# Patient Record
Sex: FEMALE | Race: WHITE | NOT HISPANIC OR LATINO | Employment: UNEMPLOYED | URBAN - METROPOLITAN AREA
[De-identification: names, ages, dates, MRNs, and addresses within clinical notes are randomized per-mention and may not be internally consistent; named-entity substitution may affect disease eponyms.]

---

## 2020-06-27 ENCOUNTER — OFFICE VISIT (OUTPATIENT)
Dept: URGENT CARE | Facility: CLINIC | Age: 60
End: 2020-06-27
Payer: MEDICAID

## 2020-06-27 VITALS
WEIGHT: 234 LBS | BODY MASS INDEX: 36.73 KG/M2 | HEIGHT: 67 IN | OXYGEN SATURATION: 96 % | SYSTOLIC BLOOD PRESSURE: 148 MMHG | DIASTOLIC BLOOD PRESSURE: 74 MMHG | TEMPERATURE: 96.8 F | HEART RATE: 85 BPM

## 2020-06-27 DIAGNOSIS — L08.9 TOE INFECTION: ICD-10-CM

## 2020-06-27 DIAGNOSIS — S91.209A AVULSION OF TOENAIL, INITIAL ENCOUNTER: ICD-10-CM

## 2020-06-27 PROCEDURE — 99203 OFFICE O/P NEW LOW 30 MIN: CPT | Performed by: PHYSICIAN ASSISTANT

## 2020-06-27 RX ORDER — LOSARTAN POTASSIUM AND HYDROCHLOROTHIAZIDE 12.5; 1 MG/1; MG/1
TABLET ORAL
COMMUNITY
Start: 2020-05-09 | End: 2022-08-12

## 2020-06-27 RX ORDER — LOSARTAN POTASSIUM 100 MG/1
TABLET ORAL
COMMUNITY
Start: 2020-06-11 | End: 2022-07-13 | Stop reason: SDUPTHER

## 2020-06-27 RX ORDER — CEPHALEXIN 500 MG/1
500 CAPSULE ORAL 4 TIMES DAILY
Qty: 28 CAP | Refills: 0 | Status: SHIPPED | OUTPATIENT
Start: 2020-06-27 | End: 2020-07-04

## 2020-06-27 RX ORDER — CEPHALEXIN 500 MG/1
500 CAPSULE ORAL 4 TIMES DAILY
Qty: 28 CAP | Refills: 0 | Status: SHIPPED | OUTPATIENT
Start: 2020-06-27 | End: 2020-06-27

## 2020-06-27 ASSESSMENT — ENCOUNTER SYMPTOMS
CHILLS: 0
COUGH: 0
JOINT SWELLING: 1
FATIGUE: 0
FEVER: 0
RESPIRATORY NEGATIVE: 1
WEAKNESS: 0
CARDIOVASCULAR NEGATIVE: 1

## 2020-06-28 NOTE — PROGRESS NOTES
"Subjective:      Asha Phan is a 59 y.o. female who presents with Toe Injury (x3 days. Rt big toe injury. Nailbed seperation. Redness, pain to touch.)            Nail avulsion 3 days ago.  Has been cleaning it and soaking it.  However today she noticed redness and pain spreading.  She is concerned about infection.    Toe Injury   This is a new problem. The current episode started in the past 7 days. The problem occurs constantly. The problem has been gradually worsening. Associated symptoms include joint swelling and a rash. Pertinent negatives include no chills, coughing, fatigue, fever or weakness. Nothing aggravates the symptoms. She has tried heat for the symptoms. The treatment provided mild relief.       PMH:  has a past medical history of Thyroid disorder.  MEDS:   Current Outpatient Medications:   •  losartan-hydrochlorothiazide (HYZAAR) 100-12.5 MG per tablet, , Disp: , Rfl:   •  cephALEXin (KEFLEX) 500 MG Cap, Take 1 Cap by mouth 4 times a day for 7 days., Disp: 28 Cap, Rfl: 0  •  losartan (COZAAR) 100 MG Tab, LOSARTAN POTASSIUM 100 MG TABS, Disp: , Rfl:   •  oxycodone-acetaminophen (PERCOCET) 5-325 MG TABS, Take 1-2 Tabs by mouth every 6 hours as needed (pain). (Patient not taking: Reported on 6/27/2020), Disp: 20 Each, Rfl: 0  •  albuterol (VENTOLIN OR PROVENTIL) 108 (90 BASE) MCG/ACT AERS, Inhale 2 Puffs by mouth every 6 hours as needed for Shortness of Breath. (Patient not taking: Reported on 6/27/2020), Disp: 8.5 g, Rfl: 3  ALLERGIES:   Allergies   Allergen Reactions   • Demerol Rash     \"was told this during surgery\"  Other reaction(s): rash     SURGHX:   Past Surgical History:   Procedure Laterality Date   • OTHER      mastoid bone     SOCHX:  reports that she has never smoked. She has never used smokeless tobacco. She reports current alcohol use. She reports that she does not use drugs.  FH: family history is not on file.      Review of Systems   Constitutional: Negative for chills, " "fatigue and fever.   Respiratory: Negative.  Negative for cough.    Cardiovascular: Negative.    Musculoskeletal: Positive for joint swelling.   Skin: Positive for rash.        Nail avulsion   Neurological: Negative for weakness.       Medications, Allergies, and current problem list reviewed today in Epic     Objective:     /74   Pulse 85   Temp 36 °C (96.8 °F)   Ht 1.702 m (5' 7\")   Wt 106.1 kg (234 lb)   SpO2 96%   BMI 36.65 kg/m²      Physical Exam  Vitals signs and nursing note reviewed.   Constitutional:       General: She is not in acute distress.     Appearance: She is well-developed. She is not diaphoretic.   HENT:      Head: Normocephalic and atraumatic.   Eyes:      Conjunctiva/sclera: Conjunctivae normal.   Neck:      Musculoskeletal: Normal range of motion and neck supple.   Cardiovascular:      Rate and Rhythm: Normal rate and regular rhythm.      Heart sounds: Normal heart sounds.   Pulmonary:      Effort: Pulmonary effort is normal. No respiratory distress.      Breath sounds: Normal breath sounds. No wheezing.   Musculoskeletal:      Right foot: Normal range of motion. Tenderness present. No bony tenderness, swelling or deformity.        Feet:       Comments: Right great toenail avulsion.  No bony tenderness.  There is redness, edema and tenderness is erythema, edema and tenderness spreading proximally.  Range of motion normal   Skin:     General: Skin is warm and dry.   Neurological:      Mental Status: She is alert and oriented to person, place, and time.   Psychiatric:         Behavior: Behavior normal.         Thought Content: Thought content normal.         Judgment: Judgment normal.                 Assessment/Plan:     1. Toe infection  cephALEXin (KEFLEX) 500 MG Cap    DISCONTINUED: cephALEXin (KEFLEX) 500 MG Cap   2. Avulsion of toenail, initial encounter  cephALEXin (KEFLEX) 500 MG Cap    DISCONTINUED: cephALEXin (KEFLEX) 500 MG Cap     Right great toenail avulsion.  Nail is " still attached at the cuticle.  No removal indicated.  No bony tenderness or signs of musculoskeletal injury.  There is redness and tenderness spreading proximally concerning for early infection.  OTC meds and conservative measures as discussed    Return to clinic or go to ED if symptoms worsen or persist. Indications for ED discussed at length. Patient voices understanding. Follow-up with your primary care provider in 3-5 days. Red flags discussed. All side effects of medication discussed including allergic response, GI upset, tendon injury, etc.    Please note that this dictation was created using voice recognition software. I have made every reasonable attempt to correct obvious errors, but I expect that there are errors of grammar and possibly content that I did not discover before finalizing the note.

## 2022-03-30 LAB — HBA1C MFR BLD: 6.9 % (ref 0–5.6)

## 2022-04-06 RX ORDER — HYDROCHLOROTHIAZIDE 25 MG/1
TABLET ORAL
COMMUNITY
Start: 2022-02-27 | End: 2022-04-06 | Stop reason: SDUPTHER

## 2022-04-06 RX ORDER — HYDROCHLOROTHIAZIDE 25 MG/1
25 TABLET ORAL DAILY
Qty: 90 TABLET | Refills: 3 | Status: SHIPPED | OUTPATIENT
Start: 2022-04-06 | End: 2022-07-05

## 2022-04-26 ENCOUNTER — OFFICE VISIT (OUTPATIENT)
Dept: MEDICAL GROUP | Facility: CLINIC | Age: 62
End: 2022-04-26
Payer: MEDICAID

## 2022-04-26 VITALS
OXYGEN SATURATION: 90 % | HEART RATE: 93 BPM | HEIGHT: 68 IN | DIASTOLIC BLOOD PRESSURE: 78 MMHG | SYSTOLIC BLOOD PRESSURE: 140 MMHG | BODY MASS INDEX: 42.07 KG/M2 | WEIGHT: 277.6 LBS

## 2022-04-26 DIAGNOSIS — E03.9 HYPOTHYROIDISM, UNSPECIFIED TYPE: ICD-10-CM

## 2022-04-26 DIAGNOSIS — E11.9 TYPE 2 DIABETES MELLITUS WITHOUT COMPLICATION, WITHOUT LONG-TERM CURRENT USE OF INSULIN (HCC): ICD-10-CM

## 2022-04-26 DIAGNOSIS — I10 PRIMARY HYPERTENSION: ICD-10-CM

## 2022-04-26 DIAGNOSIS — F32.A DEPRESSION, UNSPECIFIED DEPRESSION TYPE: ICD-10-CM

## 2022-04-26 DIAGNOSIS — R73.9 HYPERGLYCEMIA: ICD-10-CM

## 2022-04-26 PROCEDURE — 99215 OFFICE O/P EST HI 40 MIN: CPT | Performed by: STUDENT IN AN ORGANIZED HEALTH CARE EDUCATION/TRAINING PROGRAM

## 2022-04-26 RX ORDER — LEVOTHYROXINE SODIUM 0.03 MG/1
TABLET ORAL
COMMUNITY
End: 2022-08-12

## 2022-04-28 PROBLEM — R06.83 SNORING: Status: ACTIVE | Noted: 2019-12-19

## 2022-04-28 PROBLEM — E78.5 DYSLIPIDEMIA: Status: ACTIVE | Noted: 2021-03-04

## 2022-04-28 PROBLEM — E11.9 DIABETES MELLITUS, TYPE 2 (HCC): Status: ACTIVE | Noted: 2019-05-24

## 2022-04-28 PROBLEM — E66.9 OBESITY: Status: ACTIVE | Noted: 2019-12-19

## 2022-04-28 PROBLEM — C50.919 MALIGNANT NEOPLASM OF BREAST (HCC): Status: ACTIVE | Noted: 2019-03-22

## 2022-04-28 PROBLEM — I10 HYPERTENSION: Status: ACTIVE | Noted: 2018-08-17

## 2022-04-28 PROBLEM — F32.A DEPRESSION: Status: ACTIVE | Noted: 2019-04-02

## 2022-04-28 PROBLEM — Z63.9 FAMILY TENSION: Status: ACTIVE | Noted: 2019-03-22

## 2022-04-28 PROBLEM — E03.9 HYPOTHYROIDISM: Status: ACTIVE | Noted: 2021-03-04

## 2022-04-28 RX ORDER — ASPIRIN 81 MG/1
81 TABLET, CHEWABLE ORAL DAILY
COMMUNITY

## 2022-04-28 NOTE — ASSESSMENT & PLAN NOTE
Time of visit, information in the patient's chart was not up-to-date.  Regrettably we discussed this lab as a potential indicator of this disease process rather than being diagnostic.  We do plan to repeat an A1c in 3 months.   Patient would benefit from both lifestyle modification as well as pharmacotherapy with an agent such as metformin.  The patient has educated herself on dietary and physical activity recommendations and states good knowledge of appropriate recommendations.  However she notes barriers include family stressors as listed above.

## 2022-04-28 NOTE — ASSESSMENT & PLAN NOTE
Encouraged patient to continue follow-up with her surgeon.  Counseled patient that delaying treatment poses significant risk to her health.

## 2022-04-28 NOTE — ASSESSMENT & PLAN NOTE
Encouraged patient to take levothyroxine daily as prescribed and have TSH rechecked in 4 to 6 weeks.  She is agreeable to this plan.

## 2022-04-28 NOTE — PROGRESS NOTES
"Subjective:     CC: Follow-up lab results, depressed mood, and breast cancer    HPI:   Asha presents today with    Problem   Dyslipidemia   Hypothyroidism    Prescribed levothyroxine 25 mcg daily  Recent TSH mildly elevated at 5.50  Patient reports she had not been taking her medication regularly at the time she had blood collected for this lab.     Obesity   Snoring   Diabetes Mellitus, Type 2 (Hcc)    A1c in March 6.9%     Depression    Patient with history of depressed mood.  She has insight into this and believes much of it is related to her family stress as well as her diagnosis of breast cancer.  She has had passive suicidal ideation, but denies SI today and reports she has never had an intention or plan.  She reports caring for her family members as the major protective factor.  She has had difficulty finding appropriate resources in this community, both for herself and for her daughters who also struggle with mental health issues.  She previously saw a therapist for a single session and did not feel that this was a good fit for her.  However, she is open to seeking out another therapist or counselor which may be a better fit and understands the value in this treatment.  The patient is not interested in pharmacotherapy at this time.     Family Tension   Malignant Neoplasm of Breast (Hcc)    Bilateral breast cancer diagnosed in 2019  Patient has established with a surgeon.  She has delayed surgery due to personal hindrances, particularly family stress at home.  Patient recently had a follow-up visit with her surgeon, Dr. Arteaga, who ordered repeat CT and bone scan.  The patient is hoping to schedule resection in June of this year.     Hypertension    Taking losartan 100 mg and hydrochlorothiazide 25 mg daily  Today's blood pressure 140/78           Objective:     Exam:  /78 (BP Location: Right arm, Patient Position: Sitting, BP Cuff Size: Large adult)   Pulse 93   Ht 1.721 m (5' 7.75\")   Wt (!) " 126 kg (277 lb 9.6 oz)   SpO2 90%   BMI 42.52 kg/m²  Body mass index is 42.52 kg/m².    Physical Exam  Constitutional:       General: She is not in acute distress.     Appearance: Normal appearance.   HENT:      Ears:      Comments: Bilateral auricles with stiff cartilage.  Eyes:      Conjunctiva/sclera: Conjunctivae normal.   Pulmonary:      Effort: Pulmonary effort is normal. No respiratory distress.   Musculoskeletal:         General: No deformity.   Skin:     General: Skin is warm and dry.   Neurological:      General: No focal deficit present.      Mental Status: She is alert.      Motor: No weakness.      Coordination: Coordination normal.   Psychiatric:      Comments: Depressed affect congruent with mood.           Assessment & Plan:     61 y.o. female with the following -     Problem List Items Addressed This Visit     Depression     Encourage patient to schedule visit with our clinical psychologist to learn about other potential resources in the community for her.         Diabetes mellitus, type 2 (HCC)     Time of visit, information in the patient's chart was not up-to-date.  Regrettably we discussed this lab as a potential indicator of this disease process rather than being diagnostic.  We do plan to repeat an A1c in 3 months.   Patient would benefit from both lifestyle modification as well as pharmacotherapy with an agent such as metformin.  The patient has educated herself on dietary and physical activity recommendations and states good knowledge of appropriate recommendations.  However she notes barriers include family stressors as listed above.         Hypertension     Continue antihypertensives  Consider intensifying therapy if home blood pressures persistently above goal.         Hypothyroidism     Encouraged patient to take levothyroxine daily as prescribed and have TSH rechecked in 4 to 6 weeks.  She is agreeable to this plan.         Relevant Medications    levothyroxine (SYNTHROID) 25 MCG Tab     Other Relevant Orders    TSH      Other Visit Diagnoses     Hyperglycemia        Relevant Orders    HEMOGLOBIN A1C          Follow-up in 4 to 6 weeks or sooner as needed    I spent approximately 40 minutes on this encounter, with greater than 50% of that time being face-to-face with patient providing counseling and/or coordination of care.    Viridiana Stephens MD

## 2022-04-28 NOTE — ASSESSMENT & PLAN NOTE
Continue antihypertensives  Consider intensifying therapy if home blood pressures persistently above goal.

## 2022-04-28 NOTE — ASSESSMENT & PLAN NOTE
Encourage patient to schedule visit with our clinical psychologist to learn about other potential resources in the community for her.

## 2022-05-26 ENCOUNTER — APPOINTMENT (OUTPATIENT)
Dept: MEDICAL GROUP | Facility: CLINIC | Age: 62
End: 2022-05-26
Payer: MEDICAID

## 2022-07-13 RX ORDER — LOSARTAN POTASSIUM 100 MG/1
TABLET ORAL
Qty: 90 TABLET | Refills: 3 | Status: SHIPPED | OUTPATIENT
Start: 2022-07-13 | End: 2023-08-21

## 2022-07-14 ENCOUNTER — APPOINTMENT (OUTPATIENT)
Dept: MEDICAL GROUP | Facility: CLINIC | Age: 62
End: 2022-07-14
Payer: MEDICAID

## 2022-08-12 ENCOUNTER — OFFICE VISIT (OUTPATIENT)
Dept: MEDICAL GROUP | Facility: CLINIC | Age: 62
End: 2022-08-12
Payer: MEDICAID

## 2022-08-12 VITALS
HEIGHT: 68 IN | TEMPERATURE: 97.6 F | DIASTOLIC BLOOD PRESSURE: 90 MMHG | OXYGEN SATURATION: 94 % | HEART RATE: 90 BPM | SYSTOLIC BLOOD PRESSURE: 150 MMHG | WEIGHT: 262 LBS | BODY MASS INDEX: 39.71 KG/M2

## 2022-08-12 DIAGNOSIS — E11.9 TYPE 2 DIABETES MELLITUS WITHOUT COMPLICATION, WITHOUT LONG-TERM CURRENT USE OF INSULIN (HCC): ICD-10-CM

## 2022-08-12 DIAGNOSIS — M54.16 LUMBAR RADICULOPATHY: ICD-10-CM

## 2022-08-12 DIAGNOSIS — C50.912 BILATERAL MALIGNANT NEOPLASM OF BREAST IN FEMALE, UNSPECIFIED ESTROGEN RECEPTOR STATUS, UNSPECIFIED SITE OF BREAST (HCC): ICD-10-CM

## 2022-08-12 DIAGNOSIS — C50.911 BILATERAL MALIGNANT NEOPLASM OF BREAST IN FEMALE, UNSPECIFIED ESTROGEN RECEPTOR STATUS, UNSPECIFIED SITE OF BREAST (HCC): ICD-10-CM

## 2022-08-12 DIAGNOSIS — R06.83 SNORING: ICD-10-CM

## 2022-08-12 DIAGNOSIS — I10 PRIMARY HYPERTENSION: ICD-10-CM

## 2022-08-12 DIAGNOSIS — E03.9 HYPOTHYROIDISM, UNSPECIFIED TYPE: ICD-10-CM

## 2022-08-12 PROCEDURE — 99213 OFFICE O/P EST LOW 20 MIN: CPT | Mod: GE | Performed by: STUDENT IN AN ORGANIZED HEALTH CARE EDUCATION/TRAINING PROGRAM

## 2022-08-12 ASSESSMENT — PATIENT HEALTH QUESTIONNAIRE - PHQ9
SUM OF ALL RESPONSES TO PHQ QUESTIONS 1-9: 19
CLINICAL INTERPRETATION OF PHQ2 SCORE: 6
5. POOR APPETITE OR OVEREATING: 2 - MORE THAN HALF THE DAYS

## 2022-08-13 NOTE — ASSESSMENT & PLAN NOTE
Patient states she had bone scan and CT chest abdomen and pelvis performed in April 2022-that was all within normal limits.  However, given patient's longstanding history with untreated breast cancer we will proceed forward with lumbar imaging to rule out metastatic disease.  MRI lumbar spine ordered.

## 2022-08-13 NOTE — ASSESSMENT & PLAN NOTE
Patient states she has discontinued her levothyroxine.  She is requesting a repeat lab slip today-this was provided to patient.

## 2022-08-13 NOTE — PROGRESS NOTES
"Subjective:     CC: back pain    HPI:   Asha presents today with :    Problem   Lumbar Radiculopathy    Patient complains today of lower back pain.  She describes the pain as starting in her left hip and radiating down her left leg into her toes.  It is associated with numbness and tingling.  Not associated with weakness.  She was taking Flexeril that she had previously been prescribed at home.  She has also been taking codeine and methocarbamol that were initially prescribed to her ex-boyfriend.  This has not been associated with urinary or stool incontinence.  Has started to feel general improvement over the last few days.     Hypothyroidism    Prescribed levothyroxine 25 mcg daily  Recent TSH mildly elevated at 5.50  Patient reports she had not been taking her medication regularly at the time she had blood collected for this lab.     Hypertension    Blood pressures at home 130s/80s.         Current Outpatient Medications Ordered in Epic   Medication Sig Dispense Refill    losartan (COZAAR) 100 MG Tab Take 1 tablet by mouth daily 90 Tablet 3    aspirin (ASA) 81 MG Chew Tab chewable tablet Chew 81 mg every day.       No current [facility]-ordered facility-administered medications on file.         ROS:  Gen: no fevers/chills, no changes in weight  Pulm: no sob, no cough  CV: no chest pain, no palpitations  GI: no nausea/vomiting, no diarrhea    Objective:     Exam:  BP (!) 150/90 (BP Location: Right arm, Patient Position: Sitting, BP Cuff Size: Large adult long)   Pulse 90   Temp 36.4 °C (97.6 °F)   Ht 1.727 m (5' 8\")   Wt 119 kg (262 lb)   SpO2 94%   BMI 39.84 kg/m²  Body mass index is 39.84 kg/m².    Gen: Alert and oriented, No apparent distress.  Ext: No clubbing, cyanosis, edema.    Labs: ordered     Assessment & Plan:     62 y.o. female with the following -     Problem List Items Addressed This Visit       Diabetes mellitus, type 2 (HCC)    Relevant Orders    Comp Metabolic Panel    Hypertension    Relevant " Orders    Comp Metabolic Panel    Hypothyroidism     Patient states she has discontinued her levothyroxine.  She is requesting a repeat lab slip today-this was provided to patient.         Relevant Orders    TSH WITH REFLEX TO FT4    Malignant neoplasm of breast (HCC)    Relevant Orders    MR-LUMBAR SPINE-WITH & W/O    CBC WITH DIFFERENTIAL    Snoring    Relevant Orders    Referral to Pulmonary and Sleep Medicine    Lumbar radiculopathy     Patient states she had bone scan and CT chest abdomen and pelvis performed in April 2022-that was all within normal limits.  However, given patient's longstanding history with untreated breast cancer we will proceed forward with lumbar imaging to rule out metastatic disease.  MRI lumbar spine ordered.         Relevant Orders    MR-LUMBAR SPINE-WITH & W/O    Comp Metabolic Panel    CBC WITH DIFFERENTIAL         No follow-ups on file.    Rosalinda Goldberg MD   PGY3

## 2022-08-16 ENCOUNTER — PATIENT MESSAGE (OUTPATIENT)
Dept: MEDICAL GROUP | Facility: CLINIC | Age: 62
End: 2022-08-16
Payer: MEDICAID

## 2022-08-17 DIAGNOSIS — E78.5 DYSLIPIDEMIA: ICD-10-CM

## 2022-08-17 DIAGNOSIS — E03.9 ACQUIRED HYPOTHYROIDISM: ICD-10-CM

## 2022-08-17 DIAGNOSIS — Z13.79 GENETIC SCREENING: ICD-10-CM

## 2022-08-17 DIAGNOSIS — E11.9 TYPE 2 DIABETES MELLITUS WITHOUT COMPLICATION, WITHOUT LONG-TERM CURRENT USE OF INSULIN (HCC): ICD-10-CM

## 2022-08-17 DIAGNOSIS — E66.9 CLASS 2 OBESITY WITH BODY MASS INDEX (BMI) OF 39.0 TO 39.9 IN ADULT, UNSPECIFIED OBESITY TYPE, UNSPECIFIED WHETHER SERIOUS COMORBIDITY PRESENT: ICD-10-CM

## 2022-08-17 DIAGNOSIS — I10 PRIMARY HYPERTENSION: ICD-10-CM

## 2022-08-18 ENCOUNTER — APPOINTMENT (OUTPATIENT)
Dept: MEDICAL GROUP | Facility: CLINIC | Age: 62
End: 2022-08-18
Payer: MEDICAID

## 2022-08-26 ENCOUNTER — OFFICE VISIT (OUTPATIENT)
Dept: MEDICAL GROUP | Facility: CLINIC | Age: 62
End: 2022-08-26
Payer: MEDICAID

## 2022-08-26 VITALS — BODY MASS INDEX: 37.89 KG/M2 | RESPIRATION RATE: 16 BRPM | WEIGHT: 250 LBS | HEIGHT: 68 IN

## 2022-08-26 DIAGNOSIS — C50.912 BILATERAL MALIGNANT NEOPLASM OF BREAST IN FEMALE, UNSPECIFIED ESTROGEN RECEPTOR STATUS, UNSPECIFIED SITE OF BREAST (HCC): ICD-10-CM

## 2022-08-26 DIAGNOSIS — M54.16 LUMBAR RADICULOPATHY: ICD-10-CM

## 2022-08-26 DIAGNOSIS — E03.9 HYPOTHYROIDISM, UNSPECIFIED TYPE: ICD-10-CM

## 2022-08-26 DIAGNOSIS — E11.9 TYPE 2 DIABETES MELLITUS WITHOUT COMPLICATION, WITHOUT LONG-TERM CURRENT USE OF INSULIN (HCC): ICD-10-CM

## 2022-08-26 DIAGNOSIS — H61.899 OTHER SPECIFIED DISORDERS OF EXTERNAL EAR, UNSPECIFIED EAR: ICD-10-CM

## 2022-08-26 DIAGNOSIS — C50.911 BILATERAL MALIGNANT NEOPLASM OF BREAST IN FEMALE, UNSPECIFIED ESTROGEN RECEPTOR STATUS, UNSPECIFIED SITE OF BREAST (HCC): ICD-10-CM

## 2022-08-26 PROCEDURE — 99213 OFFICE O/P EST LOW 20 MIN: CPT | Performed by: FAMILY MEDICINE

## 2022-08-26 RX ORDER — CYCLOBENZAPRINE HCL 5 MG
5-10 TABLET ORAL 3 TIMES DAILY PRN
Qty: 60 TABLET | Refills: 2 | Status: SHIPPED | OUTPATIENT
Start: 2022-08-26 | End: 2023-05-22

## 2022-08-26 NOTE — NON-PROVIDER
"HPI:    Asha is a 61yo female with PMH of T2DM, breast cancer bilaterally, hypertension, hypothyroidism, and depression. She is presenting to clinic today for follow up on Left sided lumbar radiculopathy she was previously seen for 2 weeks ago. She had been trying stretches and CBD oil daily with tylenol as needed to manage pain and feels she is 80% improved since then, but would like a muscle relaxant to help. The pain is still waking her up at night, and she finds it difficult to walk at times.     She manages her diabetes with diet and exercise, is not interested in medications to help at this time. She checks fingerstick glucose at home and was unable to recall the exact numbers but said she is usually \"in the yellow\".     She manages her hypertension with Losartan 100mg and occasional hydrochlorothiazide with she feels it is high or she notices edema. She also monitors at home occasionally but unable to produce exact numbers, she said its been worse lately with home stress.     Her hypothyroidism was initially managed with a low dose levothyroxine but she reports feeling worse on the medication so she stopped. She has had symptoms of weight gain, but attributes that to poor sleep and life stress.     She does have bilateral breast cancer diagnosed in 2019, and a double mastectomy planned for October with [facility]. She has regular bone scans and CT to monitor through them.     Her depression has been worse with recent life stress. She previously managed her depression with therapy, but stopped around the time she was diagnosed with breast cancer. She is open to restarting therapy but unsure where to go and concerned about being able to get an appointment. She does not want medication at this time for depression, just help working through her current situation.     She is concerned she has sleep apnea, she has already received a referral for a sleep study, and scheduled for December.    She also noted " that the cartilage in her ear helix has calcified/ossified progressively starting around the time she was diagnosed with hypothyroidism.     Medical history:  Mastoiditis in her teens    Social Hx:  Pt lives with her  who recently underwent treatment for prostate cancer but it has metastasized. She has one living child and a good  relationship with her, and lost her son 15yrs ago.     Exam:    HEENT: Head is normocephalic, atraumatic, and without lesions. No erythema or injury to ears, outer helix and antihelix of ears bilaterally is stiff and not moveable, lobule is still soft and flexible bilaterally. Mucous membranes are moist.  Extremities: Warm, well perfused, no erythema, no edema.  Neuro: A&O x4. CN's II-XII are physiological and grossly intact, no localizing findings.  Psychiatric: Depressed mood, congruent affect, speech is pressured but thought process is linear and organized. Responses and behavior appropriate for situation.   Skin: No rashes, ulceration, edema, or lesions noted.       Assessment and Plan:    Asha is a 63yo female presenting to clinic for follow up on lumbar radiculopathy which has largely improved with daily stretching and CBD oil, but is still causing difficulty walking and sleeping. She also has a history of diabetes, hypertension, hypothyroidism, breast cancer, and depression addressed at this visit.     Lumbar radiculopathy, Encouraged pt to continue with stretching exercises daily and monitor for continued improvement. Rx for Flexeril given PRN.    Type 2 Diabetes Mellitus, A1c ordered to assess average glucose levels and discuss results and management moving forward at a future date. Pt prefers to avoid medications.     Hypertension, Continue losartan and monitoring BP at home, we can discuss this further at a future appointment.    Hypothyroidism, labs ordered to check TSH and T4 additionally referral placed to endocrinology for follow up.     Breast Cancer, pt will  continue to follow up with [facility].    Depression, encouraged pt to schedule an appointment with Dr. Guevara here at the clinic or via telemedicine. Pt understands that this appointment will be to help her find a therapist that will continue her care.

## 2022-08-26 NOTE — PROGRESS NOTES
Subjective:     CC: flexeril refill and needs labs/imaging orders re-printed    HPI:   Asha presents today with:    Of note, patient was 15 minutes late to her appointment today.  We were not able to address all of patient's concerns today.  Will need close follow-up with PCP when able to schedule.    Re-printed labs  Re-printed back MRI    She has bilateral breast cancer diagnosed in 2019, and a double mastectomy planned for October with [facility].      Back pain:   Back pain is L sided, radiates down leg  Trying home stretching and CBD oil plus tylenol PRN  Feels 80% improved but has spasms that wake her up and finds it difficult to walk  Requests muscle relaxer, has been on flexeril previously  MRI was ordered last visit which patient wants to follow-up with d/t her cancer status    She is concerned she has sleep apnea, she has already received a referral for a sleep study, and it is scheduled for December.    PMH:  T2DM - managed with diet/exercise  Bilat breast cancer  HTN - on losartan 100mg daily and occasional HCTZ PRN for high BP and edema  Hypothyroidism - was on levothyroxine, but felt worse on the medication so stopped.  Requests referral back to endocrinology.  Previously saw Dr. Balderas. She reports he thinks she had an 'autoimmune disorder because of something like [id number]'    Depression.  Worse recently d/t increased life stress.  Was previously in therapy.    Hx mastoiditis, L side  Auricular calcification, aquired.  Did research on it which she is worried is an 'extreme metabolic disorder'.  Which is why she wants to check labs.  Onset was around when she had her thyroid problems in her early 20s.  Cant remember if it was progressive bilaterally or if started on one side.     Social Hx:  Pt lives with her  who recently underwent treatment for prostate cancer but it has metastasized. She has one living child and a good  relationship with her, and lost her son 15yrs ago.     "    Current Outpatient Medications Ordered in Epic   Medication Sig Dispense Refill    cyclobenzaprine (FLEXERIL) 5 mg tablet Take 1-2 Tablets by mouth 3 times a day as needed for Muscle Spasms. 60 Tablet 2    losartan (COZAAR) 100 MG Tab Take 1 tablet by mouth daily 90 Tablet 3    aspirin (ASA) 81 MG Chew Tab chewable tablet Chew 81 mg every day.       No current [facility]-ordered facility-administered medications on file.       Past Medical History:   Diagnosis Date    Thyroid disorder         Past Surgical History:   Procedure Laterality Date    OTHER      mastoid bone        No family history on file.       ROS:  Gen: no fevers/chills, +weight gain, +poor sleep  Head/Neck:  +chronic calcification/ossification of ears  Pulm: no sob, no cough  CV: no chest pain, no palpitations  GI: no nausea/vomiting, no diarrhea  : no dysuria  MSk: +back pain  Skin: no rash  Neuro: no headaches, no weakness  Psych: +Depressed mood      Objective:     Exam:  BP (!) (P) 145/85 (BP Location: Right arm, Patient Position: Sitting, BP Cuff Size: Adult long)   Pulse (P) 74   Resp 16   Ht 1.727 m (5' 8\")   Wt 113 kg (250 lb)   SpO2 (P) 91%   BMI 38.01 kg/m²  Body mass index is 38.01 kg/m².    Gen: Alert and oriented, No apparent distress.  Head:  NCAT, EOMI, sclera clear without discharge, Ears: helix firmness bilaterally.  Faint posterior auricular scar behind L ear.  TMs grey bilat with intact light reflex  Neck: Neck is supple without lymphadenopathy.  Lungs: Normal effort, CTA bilaterally, no wheezes, rhonchi, or rales  CV: Regular rate and rhythm. No murmurs, rubs, or gallops.  Abd:   Non-distended, soft  Ext: No clubbing, cyanosis, edema.  MSK: Unassisted gait  Derm: No lesions on exposed skin  Psych: normal mood and affect        Assessment & Plan:     62 y.o. female with the following -     Problem List Items Addressed This Visit       Diabetes mellitus, type 2 (HCC)     Re-printed labs ordered last visit  Patient manages " "with diet/exercise, not on medications  Checks home blood sugars         Hypothyroidism     Not taking levothyroxine  Interested in endocrinology referral  Reports she has seen many endocrinologists in the past d/t difficulties controlling her thyroid symptoms         Relevant Orders    Referral to Endocrinology    Malignant neoplasm of breast (HCC)     Follow up with surgery as scheduled         Lumbar radiculopathy     Continue supportive care  Re-printed MRI order from last visit  Prescribed flexeril per patient request         Relevant Medications    cyclobenzaprine (FLEXERIL) 5 mg tablet    Other specified disorders of external ear, unspecified ear     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  May consider re-visiting ENT referral.  This is a chronic, stable condition for the patient, onset in her 20s.  She is not aware of the underlying cause and reports its onset was spontaneous around the time she developed thyroid problems.              Follow-up: with PCP when able to schedule       "

## 2022-08-30 ENCOUNTER — TELEPHONE (OUTPATIENT)
Dept: MEDICAL GROUP | Facility: CLINIC | Age: 62
End: 2022-08-30
Payer: MEDICAID

## 2022-08-30 NOTE — TELEPHONE ENCOUNTER
Caller Name: Asha Phan    Call Back Number: [phone] (home) [phone] (work)      How would the patient prefer to be contacted with a response: Phone call OK to leave a detailed message    Pt called requesting a list of recommended residents for her to schedule with to follow up on her labs. She asked for recommendations from  and . Please advise.

## 2022-09-01 PROBLEM — H61.899: Status: ACTIVE | Noted: 2022-09-01

## 2022-09-01 NOTE — ASSESSMENT & PLAN NOTE
Re-printed labs ordered last visit  Patient manages with diet/exercise, not on medications  Checks home blood sugars

## 2022-09-01 NOTE — ASSESSMENT & PLAN NOTE
Continue supportive care  Re-printed MRI order from last visit  Prescribed flexeril per patient request

## 2022-09-01 NOTE — ASSESSMENT & PLAN NOTE
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  May consider re-visiting ENT referral.  This is a chronic, stable condition for the patient, onset in her 20s.  She is not aware of the underlying cause and reports its onset was spontaneous around the time she developed thyroid problems.  "

## 2022-09-01 NOTE — ASSESSMENT & PLAN NOTE
Not taking levothyroxine  Interested in endocrinology referral  Reports she has seen many endocrinologists in the past d/t difficulties controlling her thyroid symptoms

## 2022-09-13 ENCOUNTER — APPOINTMENT (OUTPATIENT)
Dept: MEDICAL GROUP | Facility: CLINIC | Age: 62
End: 2022-09-13
Payer: MEDICAID

## 2022-09-26 ENCOUNTER — APPOINTMENT (OUTPATIENT)
Dept: MEDICAL GROUP | Facility: CLINIC | Age: 62
End: 2022-09-26
Payer: MEDICAID

## 2022-11-01 ENCOUNTER — APPOINTMENT (OUTPATIENT)
Dept: MEDICAL GROUP | Facility: CLINIC | Age: 62
End: 2022-11-01
Payer: MEDICAID

## 2022-11-07 ENCOUNTER — APPOINTMENT (OUTPATIENT)
Dept: MEDICAL GROUP | Facility: CLINIC | Age: 62
End: 2022-11-07
Payer: MEDICAID

## 2022-11-23 ENCOUNTER — APPOINTMENT (OUTPATIENT)
Dept: MEDICAL GROUP | Facility: CLINIC | Age: 62
End: 2022-11-23
Payer: MEDICAID

## 2022-12-06 ENCOUNTER — OFFICE VISIT (OUTPATIENT)
Dept: MEDICAL GROUP | Facility: CLINIC | Age: 62
End: 2022-12-06
Payer: MEDICAID

## 2022-12-06 VITALS
DIASTOLIC BLOOD PRESSURE: 78 MMHG | WEIGHT: 258 LBS | SYSTOLIC BLOOD PRESSURE: 140 MMHG | TEMPERATURE: 98 F | OXYGEN SATURATION: 95 % | HEART RATE: 94 BPM | HEIGHT: 68 IN | BODY MASS INDEX: 39.1 KG/M2

## 2022-12-06 DIAGNOSIS — F32.A DEPRESSION, UNSPECIFIED DEPRESSION TYPE: ICD-10-CM

## 2022-12-06 DIAGNOSIS — C50.912 BILATERAL MALIGNANT NEOPLASM OF BREAST IN FEMALE, UNSPECIFIED ESTROGEN RECEPTOR STATUS, UNSPECIFIED SITE OF BREAST (HCC): ICD-10-CM

## 2022-12-06 DIAGNOSIS — C50.911 BILATERAL MALIGNANT NEOPLASM OF BREAST IN FEMALE, UNSPECIFIED ESTROGEN RECEPTOR STATUS, UNSPECIFIED SITE OF BREAST (HCC): ICD-10-CM

## 2022-12-06 PROCEDURE — 99212 OFFICE O/P EST SF 10 MIN: CPT | Performed by: PSYCHOLOGIST

## 2022-12-06 PROCEDURE — 99213 OFFICE O/P EST LOW 20 MIN: CPT | Mod: GE | Performed by: STUDENT IN AN ORGANIZED HEALTH CARE EDUCATION/TRAINING PROGRAM

## 2022-12-06 NOTE — Clinical Note
Asking how to get to [facility] for appointment for referral placed at this visit for breast cancer surgeon

## 2022-12-07 DIAGNOSIS — C50.912 BILATERAL MALIGNANT NEOPLASM OF BREAST IN FEMALE, UNSPECIFIED ESTROGEN RECEPTOR STATUS, UNSPECIFIED SITE OF BREAST (HCC): ICD-10-CM

## 2022-12-07 DIAGNOSIS — C50.911 BILATERAL MALIGNANT NEOPLASM OF BREAST IN FEMALE, UNSPECIFIED ESTROGEN RECEPTOR STATUS, UNSPECIFIED SITE OF BREAST (HCC): ICD-10-CM

## 2022-12-07 NOTE — PROGRESS NOTES
"Subjective:     CC: malignant breast cancer    HPI:   Asha presents today for:    Problem   Malignant Neoplasm of Breast (Hcc)    Bilateral breast cancer diagnosed in 2019  Patient has established with a surgeon Dr Arteaga.  She has delayed surgery due to personal hindrances, particularly family stress at home.  Patient recently had a follow-up visit with her surgeon, Dr. Arteaga, who ordered repeat CT and bone scan.  The patient is hoping to schedule resection soon, but has dealt with unfortunate personal discord between herself and her surgeon. She is here today to request an urgent referral for a second opinion to a surgeon who operates outside of the local group. Specifics of this referral are noted in the referral order.          Current Outpatient Medications Ordered in Epic   Medication Sig Dispense Refill    losartan (COZAAR) 100 MG Tab Take 1 tablet by mouth daily 90 Tablet 3    aspirin (ASA) 81 MG Chew Tab chewable tablet Chew 81 mg every day.      cyclobenzaprine (FLEXERIL) 5 mg tablet Take 1-2 Tablets by mouth 3 times a day as needed for Muscle Spasms. (Patient not taking: Reported on 12/6/2022) 60 Tablet 2     No current Epic-ordered facility-administered medications on file.         ROS:    No new symptoms listed below:  Gen: no fevers/chills, no changes in weight  Eyes: no changes in vision  ENT: no sore throat, no hearing loss, no bloody nose  Pulm: no sob, no cough  CV: no chest pain, no palpitations  GI: no nausea/vomiting, no diarrhea  : no dysuria  MSk: no myalgias  Skin: no rash  Neuro: no headaches, no numbness/tingling  Heme/Lymph: no easy bruising      Objective:     Exam:  BP (!) 140/78   Pulse 94   Temp 36.7 °C (98 °F)   Ht 1.727 m (5' 8\")   Wt 117 kg (258 lb)   SpO2 95%   BMI 39.23 kg/m²  Body mass index is 39.23 kg/m².    Gen: Alert and oriented, No apparent distress.  Neck: Neck is supple without lymphadenopathy.  Lungs: Normal effort, CTA bilaterally, no wheezes, " rhonchi, or rales  CV: Regular rate and rhythm. No murmurs, rubs, or gallops.  Ext: No clubbing, cyanosis, edema.      Labs: has not yet completed. Reprinted for patient today    Assessment & Plan:     62 y.o. female with the following -     Problem List Items Addressed This Visit       Malignant neoplasm of breast (HCC)     Referral placed for Dr Ludy Bonilla. See referral order for details.    Notified social work interns regarding anticipated need for transportation to [place].          Relevant Orders    Referral to [facility]

## 2022-12-07 NOTE — ASSESSMENT & PLAN NOTE
Referral placed for Dr Ludy Bonilla. See referral order for details.    Notified social work interns regarding anticipated need for transportation to [place].

## 2022-12-07 NOTE — PROGRESS NOTES
[facility]  Psychotherapy Summary Note    Full therapy note has been documented and is under restricted viewing.  Please see below for summary of today's session.     Patient Name: Asha Phan  Patient MRN: [id number]  Today's Date:  12-6-22    Type of session: intake assessment  Session start time: 3:30   Session stop time: 3:50  Length of time spent face to face with patient: 20  Persons in attendance: patient    Diagnoses:   1. Depression, unspecified depression type       Pt. Came late to the session, we met briefly.    Discusses overwhelm with physical problems, mainly return of cancer.  And struggle to arrange care.      No SI, no  EMMANUEL.  Interested in therapy and we will meet again to talk further before suggesting referrals.  Maribel Guevara, Ph.D.

## 2022-12-12 ENCOUNTER — APPOINTMENT (OUTPATIENT)
Dept: MEDICAL GROUP | Facility: CLINIC | Age: 62
End: 2022-12-12
Payer: MEDICAID

## 2022-12-19 ENCOUNTER — APPOINTMENT (OUTPATIENT)
Dept: MEDICAL GROUP | Facility: CLINIC | Age: 62
End: 2022-12-19
Payer: MEDICAID

## 2022-12-27 ENCOUNTER — APPOINTMENT (OUTPATIENT)
Dept: MEDICAL GROUP | Facility: CLINIC | Age: 62
End: 2022-12-27
Payer: MEDICAID

## 2022-12-28 ENCOUNTER — OFFICE VISIT (OUTPATIENT)
Dept: MEDICAL GROUP | Facility: CLINIC | Age: 62
End: 2022-12-28
Payer: MEDICAID

## 2022-12-28 VITALS
HEART RATE: 88 BPM | OXYGEN SATURATION: 94 % | WEIGHT: 258 LBS | SYSTOLIC BLOOD PRESSURE: 150 MMHG | BODY MASS INDEX: 39.1 KG/M2 | DIASTOLIC BLOOD PRESSURE: 96 MMHG | TEMPERATURE: 98.6 F | HEIGHT: 68 IN

## 2022-12-28 DIAGNOSIS — C50.912 BILATERAL MALIGNANT NEOPLASM OF BREAST IN FEMALE, UNSPECIFIED ESTROGEN RECEPTOR STATUS, UNSPECIFIED SITE OF BREAST (HCC): ICD-10-CM

## 2022-12-28 DIAGNOSIS — C50.911 BILATERAL MALIGNANT NEOPLASM OF BREAST IN FEMALE, UNSPECIFIED ESTROGEN RECEPTOR STATUS, UNSPECIFIED SITE OF BREAST (HCC): ICD-10-CM

## 2022-12-28 PROCEDURE — 99213 OFFICE O/P EST LOW 20 MIN: CPT | Mod: GC

## 2022-12-28 RX ORDER — LOSARTAN POTASSIUM 100 MG/1
1 TABLET ORAL DAILY
COMMUNITY
Start: 2022-07-13 | End: 2023-08-13

## 2022-12-28 RX ORDER — HYDROCHLOROTHIAZIDE 12.5 MG/1
12.5 CAPSULE, GELATIN COATED ORAL DAILY
COMMUNITY
End: 2023-08-13

## 2022-12-28 RX ORDER — ACETAMINOPHEN 325 MG/1
TABLET ORAL
COMMUNITY

## 2022-12-29 ENCOUNTER — TELEPHONE (OUTPATIENT)
Dept: MEDICAL GROUP | Facility: CLINIC | Age: 62
End: 2022-12-29
Payer: MEDICAID

## 2022-12-29 NOTE — TELEPHONE ENCOUNTER
[facility] ([facility] called and said she was returning your call.    Regarding Asha Phan    Please call her back  [phone]  Ext [phone]

## 2022-12-29 NOTE — PROGRESS NOTES
Subjective:     CC: referral to general surgery for bilateral breast cancer    HPI:   Asha presents today with for new referral to general surgeon since the office of the last referred surgeon called her and had to cancel as surgeon was full and unable to see her     Past Medical History:   Diagnosis Date    Thyroid disorder        Past Surgical History:   Procedure Laterality Date    OTHER      mastoid bone       No family history on file.  Social History     Socioeconomic History    Marital status: Legally      Spouse name: Not on file    Number of children: Not on file    Years of education: Not on file    Highest education level: Not on file   Occupational History    Not on file   Tobacco Use    Smoking status: Never    Smokeless tobacco: Never   Substance and Sexual Activity    Alcohol use: Yes     Comment: occas    Drug use: No    Sexual activity: Not on file   Other Topics Concern    Not on file   Social History Narrative    Not on file     Social Determinants of Health     Financial Resource Strain: Not on file   Food Insecurity: Not on file   Transportation Needs: Not on file   Physical Activity: Not on file   Stress: Not on file   Social Connections: Not on file   Intimate Partner Violence: Not on file   Housing Stability: Not on file       Current Outpatient Medications on File Prior to Visit   Medication Sig Dispense Refill    acetaminophen (TYLENOL) 325 MG Tab Tylenol 325 mg tablet   Take 2 tablets every 6 hours by oral route.      hydrochlorothiazide (MICROZIDE) 12.5 MG capsule Take 12.5 mg by mouth every day.      losartan (COZAAR) 100 MG Tab Take 1 tablet by mouth daily 90 Tablet 3    aspirin (ASA) 81 MG Chew Tab chewable tablet Chew 81 mg every day.      losartan (COZAAR) 100 MG Tab Take 1 Tablet by mouth every day. (Patient not taking: Reported on 12/28/2022)      cyclobenzaprine (FLEXERIL) 5 mg tablet Take 1-2 Tablets by mouth 3 times a day as needed for Muscle Spasms. (Patient not  "taking: Reported on 12/6/2022) 60 Tablet 2     No current facility-administered medications on file prior to visit.       Allergies   Allergen Reactions    Demerol Rash     \"was told this during surgery\"  Other reaction(s): rash       ROS:  Gen: no fevers/chills, no weight loss  Pulm: no sob, no cough  CV: no chest pain, no palpitations  GI: no nausea/vomiting, no diarrhea  : no dysuria  MSk: no myalgias  Skin: no rash  Neuro: no headaches, no weakness      OBJECTIVE:     Exam:  BP (!) 150/96 (BP Location: Left arm, Patient Position: Sitting)   Pulse 88   Temp 37 °C (98.6 °F) (Temporal)   Ht 1.715 m (5' 7.5\")   Wt 117 kg (258 lb)   SpO2 94%   BMI 39.81 kg/m²  Body mass index is 39.81 kg/m².    Gen: Alert and oriented, No apparent distress.  Head:  NCAT, EOMI, sclera clear without discharge  Neck: Neck is supple without lymphadenopathy.  Lungs: Normal effort, CTA bilaterally, no wheezes, rhonchi, or rales  CV: Regular rate and rhythm. No murmurs, rubs, or gallops.  Abd:   Non-distended, soft  Ext: No clubbing, cyanosis, edema.  MSK: Unassisted gait  Derm: No lesions on exposed skin  Psych: normal mood and affect      ASSESSMENT/PLAN:     62 y.o. female with the following -     Problem List Items Addressed This Visit       Malignant neoplasm of breast (HCC)    Relevant Orders    Referral to General Surgery    REFERRAL TO CARE MANAGEMENT       Jose Luis Downs, PGY-1  [facility]  "

## 2023-01-03 NOTE — TELEPHONE ENCOUNTER
Phone Number Called: [phone] (home) [phone] (work)      Call outcome: Anesha     Message: I have return Anesha phone call back no answer. I was kept on hold for 17 min. I had to hang up and i'll again later.

## 2023-01-05 NOTE — TELEPHONE ENCOUNTER
Phone Number Called: [phone]    Call outcome:  spoke with Remy    Message: I have called Remy, she informed me she spoke with the patient and informed her that she will have Dr. Vivas reviewed the records and they will later call her. They asked her in the meantime to get the breast imaging films. I did informed her if Dr. Franco is willing to speak to Dr. Vivas about the patient. She will keep posted on the status once's Dr. Vivas reviews the records.

## 2023-01-11 ENCOUNTER — TELEPHONE (OUTPATIENT)
Dept: MEDICAL GROUP | Facility: CLINIC | Age: 63
End: 2023-01-11
Payer: MEDICAID

## 2023-01-12 NOTE — TELEPHONE ENCOUNTER
Caller Name: Asha Hess Sylvester    Call Back Number: [phone] (home) [phone] (work)        Pt called looking for an appointment with a resident due to the hardship of being able to get in to see you. I made sure to make her an appointment on a day you were precepting so you can pop in an say hi. Scheduled for 1/19 with Dr. Vieyra at 3:40pm    Thank you

## 2023-01-13 ENCOUNTER — TELEPHONE (OUTPATIENT)
Dept: MEDICAL GROUP | Facility: CLINIC | Age: 63
End: 2023-01-13
Payer: MEDICAID

## 2023-01-13 ENCOUNTER — RESEARCH ENCOUNTER (OUTPATIENT)
Dept: RESEARCH | Facility: WORKSITE | Age: 63
End: 2023-01-13
Payer: MEDICAID

## 2023-01-13 DIAGNOSIS — Z00.6 RESEARCH STUDY PATIENT: ICD-10-CM

## 2023-01-13 NOTE — RESEARCH NOTE
Patient already in HNP. Patient consented and enrolled in BOX study. ELF test order and lab appointment scheduled.

## 2023-01-14 NOTE — TELEPHONE ENCOUNTER
Pt called and said she needed to talk to Dr. Franco. It would be very quick. I think its regarding the surgeon she is going to.

## 2023-01-16 ENCOUNTER — APPOINTMENT (OUTPATIENT)
Dept: MEDICAL GROUP | Facility: CLINIC | Age: 63
End: 2023-01-16
Payer: MEDICAID

## 2023-01-18 ENCOUNTER — APPOINTMENT (OUTPATIENT)
Dept: BEHAVIORAL HEALTH | Facility: PSYCHIATRIC FACILITY | Age: 63
End: 2023-01-18
Payer: MEDICAID

## 2023-01-19 ENCOUNTER — HOSPITAL ENCOUNTER (OUTPATIENT)
Facility: MEDICAL CENTER | Age: 63
End: 2023-01-19
Attending: PATHOLOGY
Payer: COMMERCIAL

## 2023-01-19 ENCOUNTER — OFFICE VISIT (OUTPATIENT)
Dept: MEDICAL GROUP | Facility: CLINIC | Age: 63
End: 2023-01-19
Payer: MEDICAID

## 2023-01-19 ENCOUNTER — APPOINTMENT (OUTPATIENT)
Dept: LAB | Facility: MEDICAL CENTER | Age: 63
End: 2023-01-19
Payer: MEDICAID

## 2023-01-19 VITALS — BODY MASS INDEX: 39.81 KG/M2 | WEIGHT: 258 LBS

## 2023-01-19 DIAGNOSIS — C50.911 BILATERAL MALIGNANT NEOPLASM OF BREAST IN FEMALE, UNSPECIFIED ESTROGEN RECEPTOR STATUS, UNSPECIFIED SITE OF BREAST (HCC): ICD-10-CM

## 2023-01-19 DIAGNOSIS — C50.912 BILATERAL MALIGNANT NEOPLASM OF BREAST IN FEMALE, UNSPECIFIED ESTROGEN RECEPTOR STATUS, UNSPECIFIED SITE OF BREAST (HCC): ICD-10-CM

## 2023-01-19 PROCEDURE — 99213 OFFICE O/P EST LOW 20 MIN: CPT | Mod: GE | Performed by: STUDENT IN AN ORGANIZED HEALTH CARE EDUCATION/TRAINING PROGRAM

## 2023-01-20 NOTE — PROGRESS NOTES
"Subjective:     CC: Follow-up    HPI:   Asha is a 62-year-old female patient who is established to clinic with me to me who presents for follow-up.    Patient is here for follow-up to discuss referrals.  She has been referred to a breast surgeon for evaluation of breast cancer.  She has scheduled appointment in [place] February 2, 2023.  She is requesting to speak with Dr. Franco, to inform her that she will be sending paperwork for assistance with transportation.  In addition that she will complete her labs today.      ROS: See HPI.     Objective:     Exam:  BP (P) 128/80 (BP Location: Left arm, Patient Position: Sitting, BP Cuff Size: Large adult)   Pulse (P) 85   Temp (P) 36.2 °C (97.2 °F) (Temporal)   Resp (P) 16   Ht (P) 1.715 m (5' 7.5\")   Wt 117 kg (258 lb)   SpO2 (P) 95%   BMI (P) 39.81 kg/m²  Body mass index is 39.81 kg/m² (pended).    Physical Exam:  General: Pt resting in NAD, cooperative   Skin:  No cyanosis or jaundice   HEENT: NC/AT  Lungs:  Non-labored.   CNS:  No gross focal neurologic deficits  Psych: Appropriate mood and affect       Assessment & Plan:     62 y.o. female with the following -     1. Bilateral malignant neoplasm of breast in female, unspecified estrogen receptor status, unspecified site of breast (HCC)  Chronic. Patient has been referred to surgeon and has scheduled appointment.   -Follow-up with surgeon.    -Referral to social work to assist with transportation       "

## 2023-01-23 ENCOUNTER — TELEPHONE (OUTPATIENT)
Dept: MEDICAL GROUP | Facility: CLINIC | Age: 63
End: 2023-01-23
Payer: MEDICAID

## 2023-01-23 DIAGNOSIS — Z00.6 RESEARCH STUDY PATIENT: ICD-10-CM

## 2023-01-23 NOTE — TELEPHONE ENCOUNTER
MSW Intern spoke to [facility] Oncology social worker to discuss transportation options and reimbursement plans. MSW Intern was told that because the patient is seeking treatment outside of [facility], there isn't an option for assistance to [place]. Oncology social worker suggested to  contact [employer] after she speaks to an [facility] Care management team member @[phone]. MSW Intern contacted patient to and provided the information for [facility] [employer], [facility] Management, Ross[facility] Jenkins, [facility], and [facility].     MSW Intern provided patient with emotional support and encouragement.

## 2023-01-25 ENCOUNTER — APPOINTMENT (OUTPATIENT)
Dept: BEHAVIORAL HEALTH | Facility: PSYCHIATRIC FACILITY | Age: 63
End: 2023-01-25
Payer: MEDICAID

## 2023-01-28 LAB
ELF SCORE: 9.9
RELATIVE RISK: ABNORMAL
RISK GROUP: ABNORMAL
RISK: 23.6 %

## 2023-01-29 ENCOUNTER — PATIENT MESSAGE (OUTPATIENT)
Dept: MEDICAL GROUP | Facility: CLINIC | Age: 63
End: 2023-01-29
Payer: MEDICAID

## 2023-02-01 ENCOUNTER — OFFICE VISIT (OUTPATIENT)
Dept: BEHAVIORAL HEALTH | Facility: PSYCHIATRIC FACILITY | Age: 63
End: 2023-02-01
Payer: MEDICAID

## 2023-02-01 DIAGNOSIS — F32.A DEPRESSION, UNSPECIFIED DEPRESSION TYPE: ICD-10-CM

## 2023-02-01 PROCEDURE — 90792 PSYCH DIAG EVAL W/MED SRVCS: CPT | Mod: GC

## 2023-02-03 NOTE — PROGRESS NOTES
"[facility] Psychiatric Evaluation     Evaluation completed by: Natalie Mcdaniel M.D.   Date of Service: 02/03/23   Appointment type: in-office appointment. Patient was late, 45 minutes total    Information below was collected from: patient    Special language or communication needs: No  Responded to any questions about patient rights: N/a  Reviewed limits of confidentiality: No  Confidentiality: The patient was informed that her medical records are confidential except for use by the treatment team in this clinic and others involved in her care.  Records may be shared with outside entities if the patient signs a release of information.  Information may be shared with appropriate authorities without a release of information to report instances of child/elder abuse or if it is determined she is in imminent risk of harm to self or others.     CHIEF COMPLAINT  \"I need support\"    HISTORY OF PRESENT ILLNESS  Asha Hess Sylvester is a 62 y.o. old female who presents today for initial psychiatric evaluation for assessment of psychotherapy needs. Asha is currently going through major health difficulties that combined with lack of support from family have been difficult to cope with. Over the years she has become estranged from several family members including siblings and certain children to who she was supportive of in times of their need but has not felt that she received the same courtesy when she needed it. Additionally, there is a difficult relationship with ex- who not only is not supportive but also has actively tried to turn Asha's children against her.   She was diagnosed with b/l breast cancer in 2019 but due to several circumstances treatment was delayed and tumors grew in size. Now, she is planning to get double mastectomy in [place] which is adding additional stress because she was unable to find surgeon willing to work with her preferences locally, going to [place] for surgery adds " "financial strain, and she cannot find plastic surgeon willing to do reconstructive work for her.    Over the years, Asha has sought out various support groups, mental health classes through [facility], and on/off therapy to help cope with having a child struggle with mental illness and losing another child to unexpected death. She currently is overwhelmed with stressors and looks for support helping her cope.      PSYCHIATRIC REVIEW OF SYSTEMS  Depression: +poor sleep, dysphoria, low energy, increased appetite, poor concentration, passive SI. Denies current active SI with a plan.  Anxiety: +poor concentration, racing thoughts, excessive worry, poor sleep   OCD: deferred   PTSD: deferred   Josee: deferred  Psychosis: deferred  Eating Disorders: deferred  Sleep: poor sleep, difficulty with staying asleep    MEDICAL REVIEW OF SYSTEMS  ROS  Deferred    CURRENT MEDICATIONS  No psychiatric medications  Takes OTC ashwaganda, fish oil, RUY, NAC  Losartan, tylenol. HCTZ, flexeril, asa    ALLERGIES  Allergies   Allergen Reactions    Demerol Rash     \"was told this during surgery\"  Other reaction(s): rash        PAST PSYCHIATRIC HISTORY  On and off short term psychotherapy  Years ago saw psychiatrist who trialed several SSRI's that she did not find helpful.    Diagnoses:   MDD      Self Harm/Suicide Attempts: Deferred    Past Hospitalizations: Denies    SOCIAL HISTORY   . Had 4 children and adopted granddaughter. One child passed away. Adult daughter with drug use who lost custody of 2 children, one adopted by Asha and another adopted by different family who did not allow to remain in contact. Estranged from multiple family members. Difficult relationship with ex . Lives with one of the daughters.  Rest of social history deferred to later appointment    SUBSTANCE USE HISTORY  Alcohol: Denies  Tobacco: Denies  Cannabis: CBD only  Opioids: Denies  Prescription medications: Denies  History of inpatient/outpatient " "rehab treatment: denies    MEDICAL HISTORY  Past Medical History:  No date: Thyroid disorder   Past Surgical History:   Procedure Laterality Date    OTHER      mastoid bone        B/l breast cancer  Cardiac arrhythmias:Deferred  Thyroid disease: No  Diabetes: T2DM  Seizures: Deferred  Head injury/TBI: Deferred    FAMILY PSYCHIATRIC HISTORY  Psychiatric diagnoses:  ADHD, Bipolar, Schizophrenia, alcohol use disorder,  History of suicide attempts:  deferred  History of incarceration: deferred  Substance use history:  Daughter with ongoing drug use, clarification deferred    FAMILY MEDICAL HISTORY  Deferred  Cardiac arrhythmias:   Sudden cardiac death:   Thyroid disease:   Seizure history:     PHYSICAL EXAMINATION  Vital signs: There were no vitals taken for this visit.  Musculoskeletal: Gait is normal. No gross abnormalities noted.   Abnormal movements: none apreciated    MENTAL STATUS EXAMINATION    General: Asha Phan appears stated age and exhibits grooming which is appropriate.  Hygiene is appropriate.     Behavior: Pt is calm and cooperative with interview.  No apparent distress.  Eye contact is appropriate.   Psychomotor: Psychomotor agitation or retardation not noted.  Tics or tremors not noted.  Speech: rate within normal limits, volume within normal limits, and hypertalkative  Mood: \"worried about everything\"  Affect: Congruent with content and Anxious,  Thought Process: Logical and some tangentiality  Thought Content: endorses passive suicidal ideation without a plan or intent  Within normal limits  Perception: No evidence of internal preoccupation. No delusions noted on interview.    Attention span and concentration: Normal  Orientation: Alert and Fully Oriented  Recent and remote memory: No gross evidence of memory deficits  Insight: Adequate  Judgment: Adequate    SAFETY ASSESSMENT - RISK TO SELF  Current suicide attempts or self harm: No  Past suicide attempts or self harm: " Deferred  History of suicide by family member: Deferred  History of suicide by friend/significant other: Deferred  Recent change in amount/specificity/intensity of suicidal thoughts or self-harm behavior: Yes, with current health concerns patient endorses increase in passive SI   Ongoing substance use disorder: No  Current access to firearms, medications, or other identified means of suicide/self-harm: No  If yes, willing to restrict access to means of suicide/self-harm: N/a  Protective factors present: Yes, daughter and granddaughter that need her     SAFETY ASSESSMENT - RISK TO OTHERS  Current aggressive behavior or risk to others: No  Past aggressive behavior or risk to others:  deferred  Recent change in amount/specificity/intensity of thoughts or threats to harm others? No  Current access to firearms/other identified means of harm? No  If yes, willing to restrict access to weapons/means of harm? N/a     CURRENT RISK ASSESSMENT       Suicide: Moderate       Homicide: Low       Self-Harm: Low       Relapse: Not applicable       Crisis Safety Plan Reviewed No    [place]  records  Not indicated. No prescribing    ASSESSMENT  Asha Hess Sylvester is a 62 y.o. old female presenting for initial evaluation for feeling overwhelmed with personal and family difficulties and coping with multiple health concerns including b/l breast cancer and upcoming b/l double mastectomy without a current plan of reconstructive surgery due to not being able to find surgeon accepting her insurance.     Biological: Multiple family members with mental illness, personal history of MDD refractory to medication trials. Personal ongoing health problems.  Psychological: Past experience with short term psychotherapy, resilience, help seeking behavior including joining support groups. Overwhelmed by health difficulties and poor support system.  Social: Has supportive daughter and granddaughter. Estranged from sister and several children.  Difficult relationship with ex .     Today, will plan to begin establishing therapeutic relationship by building rapport, explaining setup of this psychotherapy clinic, assessing patient's hopes for therapy, provide support.    DIAGNOSES/PLAN  Problem 1: Depression, recurrent, complicated by life circumstances  Medications: none  Psychotherapy: Weekly therapy    Problem 2: Anxiety  Medications: none  Psychotherapy: Weekly therapy    Problem 3: Poor sleep  Medications: None  Psychotherapy: Weekly  Other: Plan to discuss sleep hygiene    The patient was educated to call 911, call the suicide hotline, or go to the local ER if having thoughts of suicide or homicide.  The patient verbalized understanding and agreement.   The proposed treatment plan was discussed with the patient who was provided the opportunity to ask questions and make suggestions regarding alternative treatment. Patient verbalized understanding and expressed agreement with the plan.      Return to clinic in 1 week or sooner if symptoms worsen.    This appointment was supervised by attending psychiatrist, Donny Kumar MD, who agrees with assessment and treatment plan.  See attending attestation for more details.       Natalie Mcdaniel M.D.  02/03/23

## 2023-02-15 ENCOUNTER — APPOINTMENT (OUTPATIENT)
Dept: BEHAVIORAL HEALTH | Facility: PSYCHIATRIC FACILITY | Age: 63
End: 2023-02-15
Payer: MEDICAID

## 2023-02-15 ENCOUNTER — TELEPHONE (OUTPATIENT)
Dept: BEHAVIORAL HEALTH | Facility: PSYCHIATRIC FACILITY | Age: 63
End: 2023-02-15
Payer: MEDICAID

## 2023-02-15 NOTE — TELEPHONE ENCOUNTER
Patient has not been seen after initial evaluation because she cancelled appointments presumably due to having to manage her other health concerns. Left a voice message on patient's cell phone requesting to call [employer] at [phone] or send [employer] message confirming that she will attend next scheduled appointment on 2/22/2023 at 4pm by Tuesday 02/21/2023. Patient was notified that if she does not call to confirm appointment, the appointments will be cancelled. She was also notified that in the event she does not call back and future appointments are cancelled, she may call to request new appointments in the future.

## 2023-02-22 ENCOUNTER — OFFICE VISIT (OUTPATIENT)
Dept: BEHAVIORAL HEALTH | Facility: PSYCHIATRIC FACILITY | Age: 63
End: 2023-02-22
Payer: MEDICAID

## 2023-02-22 DIAGNOSIS — F32.A DEPRESSION, UNSPECIFIED DEPRESSION TYPE: ICD-10-CM

## 2023-02-22 PROCEDURE — 99999 PR NO CHARGE: CPT

## 2023-02-23 NOTE — PSYCHOTHERAPY
[facility]  Psychotherapy Progress Note    Patient Name: Asha Phan  Patient MRN: [id number]  Today's Date: 2/22/2023     Resident/Fellow providing service: Natalie Mcdaniel M.D.  Supervising Attending: Mickey Ibarra MD    Type of session:Individual psychotherapy  Session start time: 4:15  Session stop time: 4:50  Length of time spent face to face with patient: 35  Persons in attendance:Patient    Subjective/New Info: Danika went down to [place] to meet with the surgeon who was surprised that she was not followed by oncology here in [place] for radiation or chemo.  Surgeon now wants more imaging before deciding if she is operable as if there are any mets she is no longer operable case.  Surgeon also suggested that shrinking down tumor with chemo is the best approach and she does not like that.    In the meantime she is having difficulty with navigating difficult family relationships.  She is living with her 28-year-old daughter Juana who has ADHD and is her only support.  Her  who is 12 years older than her and has end-stage prostate cancer is estranged although they are still legally .  They have terrible relationship.  Her daughter Ursula is in a relationship with a transgender person and has antagonistic relationship with Asha.  Stage in her  Serge adopted their granddaughter Micki who used to live with astoniel.  They got an argument and she asked her to go stay with Ursula for a few days.  Micki became very upset and refused to come back and live with astoniel.  She is currently with Ursula and states she believes that if she fights her on it and Micki decides she does not want to live with her then she will be forced to go back into foster care which she does not want to happen.  Her oldest daughter Sia lives in the [place] area, has been on meth for 20+ years, and works as escort support herself.  She is also Micki's biological mom.  Micki was supposed to go to residential  "mental health program to which Serge ended up not agreeing to (stage believes he did that just despite her).  Now she just found out that Micki is in the hospital for slitting her wrists and she was just transferred from [facility] to [facility].    Stage and now needs to get PET scan and a new MRI.  She will try calling [facility] to see if they have any availability as PET scan and at [facility] is broken and [facility] is scheduling out very far.    Next session:  Find out why sly campos and Serge are still legally ?  Continue building rapport and let her talk.    Objective/Observations:   Participation: Active verbal participation   Grooming: Neat   Cognition: Alert and Fully Oriented   Eye contact: Good   Mood: \"okay, not really\"   Affect: Congruent with content, Anxious, and Tearful   Thought process: Logical and Tangential   Speech: Volume within normal limits, Rapid, and Hypertalkative   Other:     Diagnoses: No diagnosis found.     Current assessment of risk:   SUICIDE: Low   Homicide: Low   Self-harm: Low   Relapse: Not applicable   Other: Denies SI   Safety Plan reviewed? No   If evidence of imminent risk is present, intervention/plan: n/a  Therapeutic Intervention(s): Establish rapport and Supportive psychotherapy     Medications Reviewed: No     Treatment Goal(s)/Objective(s) addressed: Establish therapeutic relationship, improve coping skills, difficult family dynamic navigation      Progress toward Treatment Goals: No change     Plan:      - Continue weekly therapy.    Natalie Mcdaniel M.D.  2/22/2023                              "

## 2023-02-23 NOTE — PROGRESS NOTES
[facility]  Psychotherapy Summary Note    Full therapy note has been documented and is under restricted viewing.  Please see below for summary of today's session.     Patient Name: Asha Phan  Patient MRN: [id number]  Today's Date:      Resident/Fellow providing service: Natalie Mcdaniel M.D.    Type of session:Individual psychotherapy  Session start time: 4:15  Session stop time: 4:50  Length of time spent face to face with patient: 35min  Persons in attendance:Patient    Diagnoses: No diagnosis found.     Symptoms currently being addressed in therapy: depression, interpersonal difficulty, and anxiety    Therapeutic Intervention(s): Establish rapport and Supportive psychotherapy    Medications Reviewed: No    Treatment Goal(s)/Objective(s) addressed: Establish therapeutic relationship, improve coping skills, difficult family dynamic navigation     Progress toward Treatment Goals: No change    Plan:      - Continue weekly therapy.    Discussed with supervising attending, Mickey Ibarra MD.    Natalie Mcdaniel M.D.

## 2023-03-08 ENCOUNTER — OFFICE VISIT (OUTPATIENT)
Dept: BEHAVIORAL HEALTH | Facility: PSYCHIATRIC FACILITY | Age: 63
End: 2023-03-08
Payer: MEDICAID

## 2023-03-08 DIAGNOSIS — F32.A DEPRESSION, UNSPECIFIED DEPRESSION TYPE: ICD-10-CM

## 2023-03-08 PROCEDURE — 99999 PR NO CHARGE: CPT

## 2023-03-10 NOTE — PSYCHOTHERAPY
" [facility]  Psychotherapy Progress Note    Patient Name: Asha Phan  Patient MRN: [id number]  Today's Date: 3/10/2023     Resident/Fellow providing service: Natalie Mcdaniel M.D.  Supervising Attending: Donny Kumar MD    Type of session:Individual psychotherapy  Session start time: 4:10pm  Session stop time: 4:55pm   Length of time spent face to face with patient: 45min  Persons in attendance:Patient    Subjective/New Info: She had PET scan done at [facility] and depending on what it shows will have to either go on to get MRI next (if no mets are present and surgery is still the main plan) or seek oncology for radiation/chemo. Discussed that she will need oncologist regardless or PET scan outcome as even if cancer is surgical it will still likely require shrinking with radiation or chemo. Pointed out that she needs to call oncology and request appt immediately to save time. Tried to encourage task prioritization but Asha is too stressed out and has difficulty with focusing on any single task.    She also is dealing with a housing difficulty as home she lives in through [facility] needs to be inspected and will likely fail it. She was supposed to get extension for health reasons which was not granted. She is working with legal services and will find out if she has a case shortly. She may lose housing in 1-2 months.    Objective/Observations:   Participation: Active verbal participation and Verbally monopolizing   Grooming: Neat   Cognition: Alert and Fully Oriented   Eye contact: Good   Mood: \"good I guess\"   Affect: Congruent with content   Thought process: Logical and Tangential   Speech: Volume within normal limits and Hypertalkative   Other:     Diagnoses: No diagnosis found.     Current assessment of risk:   SUICIDE: Low   Homicide: Low   Self-harm: Low   Relapse: Not applicable   Other: n/a   Safety Plan reviewed? Not Indicated   If evidence of imminent risk is present, intervention/plan: " n/a    Therapeutic Intervention(s): Establish rapport, Problem-solving, Stressors assessed, and Supportive psychotherapy     Medications Reviewed: No     Treatment Goal(s)/Objective(s) addressed: Improve coping with stressors, priority setting, emotional support      Progress toward Treatment Goals: No change     Plan:      - Continue weekly therapy.      Natalie Mcdaniel M.D.  3/10/2023

## 2023-03-10 NOTE — PROGRESS NOTES
[facility]  Psychotherapy Summary Note    Full therapy note has been documented and is under restricted viewing.  Please see below for summary of today's session.     Patient Name: Asha Phan  Patient MRN: [id number]  Today's Date:      Resident/Fellow providing service: Natalie Mcdaniel M.D.    Type of session:Individual psychotherapy  Session start time: 4:10pm  Session stop time: 4:55pm  Length of time spent face to face with patient: 45 min  Persons in attendance:Patient    Diagnoses: No diagnosis found.     Symptoms currently being addressed in therapy: depression, anxiety, and poor distress tolerance    Therapeutic Intervention(s): Establish rapport, Problem-solving, Stressors assessed, and Supportive psychotherapy    Medications Reviewed: No    Treatment Goal(s)/Objective(s) addressed: Improve coping with stressors, priority setting, emotional support     Progress toward Treatment Goals: No change    Plan:      - Continue weekly therapy.    Discussed with supervising attending, Donny Kumar MD.    Natalie Mcdaniel M.D.

## 2023-03-15 ENCOUNTER — OFFICE VISIT (OUTPATIENT)
Dept: BEHAVIORAL HEALTH | Facility: PSYCHIATRIC FACILITY | Age: 63
End: 2023-03-15
Payer: MEDICAID

## 2023-03-15 DIAGNOSIS — F41.9 ANXIETY: Chronic | ICD-10-CM

## 2023-03-15 DIAGNOSIS — F32.A DEPRESSION, UNSPECIFIED DEPRESSION TYPE: ICD-10-CM

## 2023-03-15 PROCEDURE — 99999 PR NO CHARGE: CPT

## 2023-03-16 NOTE — PROGRESS NOTES
[facility]  Psychotherapy Summary Note    Full therapy note has been documented and is under restricted viewing.  Please see below for summary of today's session.     Patient Name: Asha Phan  Patient MRN: [id number]  Today's Date:      Resident/Fellow providing service: Natalie Mcdaniel M.D.    Type of session:Individual psychotherapy  Session start time: 4:15  Session stop time: 4:55  Length of time spent face to face with patient: 40 min  Persons in attendance:Patient    Diagnoses: No diagnosis found.     Symptoms currently being addressed in therapy: depression, anxiety, and poor distress tolerance    Therapeutic Intervention(s): Establish rapport, Problem-solving, Psychoeducation regarding internal vs external locus of control, and Supportive psychotherapy    Medications Reviewed: No    Treatment Goal(s)/Objective(s) addressed: Prioritizing tasks and focusing on most urgent ones that are within internal locus of control, providing emotional support.    Progress toward Treatment Goals: No change    Plan:      - Continue weekly therapy.    Discussed with supervising attending, Donny Kumar MD.    Natalie Mcdaniel M.D.

## 2023-03-16 NOTE — PSYCHOTHERAPY
[facility]  Psychotherapy Progress Note    Patient Name: Asha Phan  Patient MRN: [id number]  Today's Date: 3/15/2023     Resident/Fellow providing service: Natalie Mcdaniel M.D.  Supervising Attending: Donny Kumar MD    Type of session:Individual psychotherapy  Session start time: 4:15pm  Session stop time: 4:55pm  Length of time spent face to face with patient: 40 min  Persons in attendance:Patient    Subjective/New Info: Today Asha was a little less overwhelmed the last week.  She was supposed to get her PET scan this upcoming Friday, but she will need to reschedule because her late son's friend recently passed away and she wishes to attend the service.  She has a direct phone number for that technician of PET service and is confident she can reschedule for next week.  She has looked into oncology providers and even found one that accepts her insurance.  She was not able to schedule with this oncologist because they require a referral from primary care.  She has primary care appointment for March 21 and oncology office told her that they should be able to get her in within to 3 weeks after.    Asha several times brought up that PET scan and any further imaging is pointless if there is nobody (for example oncologist) to look at these images.  We discussed that every part of her setting up necessary components of her diagnosis and treatment are independently important and should be treated as such.  She eventually admitted that she simply scared to find out that it is too late and cancer has metastasized.  Provided emotional support and discussed that imaging does not change whether cancer has or has not spread but the potential future outcome that involves getting better cannot possibly occur without imaging.  As scary as it is there is no path to getting better without it. Asha talked about her daughter that lives with her who is her only support and she is the main reason why she is  "still hopeful and is still fighting.  She did say that without the daughter she probably would have given up already but daughter is a strong protective factor that motivates her to continue and have future oriented hopeful outlook.    More than half of session provided emotional support and allowed Asha to speak where she went through several tangents of various stressors present now and in the past that have to do with her family, ex-, other children, mother, medical system, and general human kindness and lack of it.  It was important to let her speak and be heard as part of her support.    Objective/Observations:   Participation: Active verbal participation and Verbally monopolizing   Grooming:  eccentric but neat   Cognition: Alert and Fully Oriented   Eye contact: Good   Mood: \"I'm here\"   Affect: Congruent with content and Anxious   Thought process: Logical and Tangential   Speech: Volume within normal limits and Hypertalkative   Other:     Diagnoses:   1. Anxiety    2. Depression, unspecified depression type         Current assessment of risk:   SUICIDE: Low   Homicide: Low   Self-harm: Low   Relapse: Not applicable   Other: n/a   Safety Plan reviewed? No   If evidence of imminent risk is present, intervention/plan: n/a      Therapeutic Intervention(s): Establish rapport, Problem-solving, Psychoeducation regarding internal vs external locus of control, and Supportive psychotherapy    Medications Reviewed: No    Treatment Goal(s)/Objective(s) addressed: Prioritizing tasks and focusing on most urgent ones that are within internal locus of control, providing emotional support.    Progress toward Treatment Goals: No change    Plan:      - Continue weekly therapy.    Natalie Mcdaniel M.D.  3/15/2023                              "

## 2023-03-21 ENCOUNTER — OFFICE VISIT (OUTPATIENT)
Dept: MEDICAL GROUP | Facility: CLINIC | Age: 63
End: 2023-03-21
Payer: MEDICAID

## 2023-03-21 DIAGNOSIS — C50.911 BILATERAL MALIGNANT NEOPLASM OF BREAST IN FEMALE, UNSPECIFIED ESTROGEN RECEPTOR STATUS, UNSPECIFIED SITE OF BREAST (HCC): ICD-10-CM

## 2023-03-21 DIAGNOSIS — C50.912 BILATERAL MALIGNANT NEOPLASM OF BREAST IN FEMALE, UNSPECIFIED ESTROGEN RECEPTOR STATUS, UNSPECIFIED SITE OF BREAST (HCC): ICD-10-CM

## 2023-03-21 DIAGNOSIS — E11.9 TYPE 2 DIABETES MELLITUS WITHOUT COMPLICATION, WITHOUT LONG-TERM CURRENT USE OF INSULIN (HCC): ICD-10-CM

## 2023-03-21 PROCEDURE — 99213 OFFICE O/P EST LOW 20 MIN: CPT | Mod: GE | Performed by: STUDENT IN AN ORGANIZED HEALTH CARE EDUCATION/TRAINING PROGRAM

## 2023-03-21 RX ORDER — LORAZEPAM 0.5 MG/1
0.5 TABLET ORAL EVERY 4 HOURS PRN
Qty: 2 TABLET | Refills: 0 | Status: SHIPPED | OUTPATIENT
Start: 2023-03-21 | End: 2023-03-22

## 2023-03-21 ASSESSMENT — PATIENT HEALTH QUESTIONNAIRE - PHQ9: CLINICAL INTERPRETATION OF PHQ2 SCORE: 0

## 2023-03-21 NOTE — ASSESSMENT & PLAN NOTE
-Radiation oncology referral sent to Dr Cornejo per patient's request.  -Oncology referral ordered  -Follow up with scans and with the breast surgeon.

## 2023-03-22 ENCOUNTER — OFFICE VISIT (OUTPATIENT)
Dept: BEHAVIORAL HEALTH | Facility: PSYCHIATRIC FACILITY | Age: 63
End: 2023-03-22
Payer: MEDICAID

## 2023-03-22 DIAGNOSIS — F41.9 ANXIETY: Chronic | ICD-10-CM

## 2023-03-22 DIAGNOSIS — F32.A DEPRESSION, UNSPECIFIED DEPRESSION TYPE: ICD-10-CM

## 2023-03-22 PROCEDURE — 99999 PR NO CHARGE: CPT

## 2023-03-22 NOTE — ASSESSMENT & PLAN NOTE
- Educated patient on healthy lifestyle changes  - Recheck A1c at the end of May to reassess glucose levels.  Consider initiation of metformin at that time.

## 2023-03-22 NOTE — PROGRESS NOTES
[facility]    Chief Complaint   Patient presents with    Follow-Up     Lab review       HISTORY OF PRESENT ILLNESS: Patient is a 62 y.o. female established patient who presents today to discuss the medical issues below:    Problem   Diabetes Mellitus, Type 2 (Hcc)    A1c in March 6.9%    3/21/23: Most recent A1c stable at 6.9%. She is not interested in medications at this time. Would prefer to try lifestyle changes and then approach medication if necessary.     Malignant Neoplasm of Breast (Hcc)    Bilateral breast cancer diagnosed in 2019  Patient has established with a surgeon Dr Arteaga.  She has delayed surgery due to personal hindrances, particularly family stress at home.  Patient recently had a follow-up visit with her surgeon, Dr. Arteaga, who ordered repeat CT and bone scan.  The patient is hoping to schedule resection soon, but has dealt with unfortunate personal discord between herself and her surgeon. She is here today to request an urgent referral for a second opinion to a surgeon who operates outside of the local group. Specifics of this referral are noted in the referral order.     3/21/23: Patient requesting referral to radiation oncologist. States she has seen a surgeon in [place] that wants to have radiation therapy done prior to surgical intervention. Scheduled for PET scan and MRI, as ordered by surgery. She does not have an oncologist managing her cancer. States that she saw Dr. Andersen in the past and was not happy with her care there.          Patient Active Problem List    Diagnosis Date Noted    Anxiety 03/15/2023    Other specified disorders of external ear, unspecified ear 09/01/2022    Lumbar radiculopathy 08/12/2022    Dyslipidemia 03/04/2021    Hypothyroidism 03/04/2021    Obesity 12/19/2019    Snoring 12/19/2019    Diabetes mellitus, type 2 (HCC) 05/24/2019    Depression 04/02/2019    Family tension 03/22/2019    Malignant neoplasm of breast (HCC) 03/22/2019     Hypertension 08/17/2018       Allergies:Demerol    Current Outpatient Medications   Medication Sig Dispense Refill    LORazepam (ATIVAN) 0.5 MG Tab Take 1 Tablet by mouth every four hours as needed for Anxiety for up to 1 day. 2 Tablet 0    acetaminophen (TYLENOL) 325 MG Tab Tylenol 325 mg tablet   Take 2 tablets every 6 hours by oral route.      hydrochlorothiazide (MICROZIDE) 12.5 MG capsule Take 12.5 mg by mouth every day.      losartan (COZAAR) 100 MG Tab Take 1 tablet by mouth daily 90 Tablet 3    aspirin (ASA) 81 MG Chew Tab chewable tablet Chew 81 mg every day.      losartan (COZAAR) 100 MG Tab Take 1 Tablet by mouth every day. (Patient not taking: Reported on 12/28/2022)      cyclobenzaprine (FLEXERIL) 5 mg tablet Take 1-2 Tablets by mouth 3 times a day as needed for Muscle Spasms. (Patient not taking: Reported on 12/6/2022) 60 Tablet 2     No current facility-administered medications for this visit.         Past Medical History:   Diagnosis Date    Thyroid disorder        Social History     Tobacco Use    Smoking status: Never    Smokeless tobacco: Never   Substance Use Topics    Alcohol use: Yes     Comment: occas    Drug use: No       No family status information on file.   No family history on file.    ROS:  Negative except as stated above.      Exam:   Vitals:    03/21/23 1514   BP: (P) 130/86   Pulse: (P) 100   Resp: (P) 16   SpO2: (P) 92%      Body mass index is 39.16 kg/m² (pended).  General:  Well nourished, well developed female in NAD.  HENT: Normocephalic  Pulmonary: Normal work of breathing  Cardiovascular: Warm and well-perfused  Extremities: No LE edema noted. 5/5 strength in all extremities  Neuro: Grossly nonfocal.  Skin: No lesions, erythema, or other abnormalities noted.  Psych: Alert and oriented to person, place, and time. Appropriate mood and conversation.    Assessment/Plan:    Malignant neoplasm of breast (HCC)  -Radiation oncology referral sent to Dr Cornejo per patient's  request.  -Oncology referral ordered  -Follow up with scans and with the breast surgeon.    Diabetes mellitus, type 2 (HCC)  - Educated patient on healthy lifestyle changes  - Recheck A1c at the end of May to reassess glucose levels.  Consider initiation of metformin at that time.        Gabino Morton, DO  [facility]  PGY-3

## 2023-03-23 NOTE — PROGRESS NOTES
[facility]  Psychotherapy Summary Note    Full therapy note has been documented and is under restricted viewing.  Please see below for summary of today's session.     Patient Name: Asha Phan  Patient MRN: [id number]  Today's Date:      Resident/Fellow providing service: Natalie Mcdaniel M.D.    Type of session:Individual psychotherapy  Session start time: 4:15pm  Session stop time: 5:00pm  Length of time spent face to face with patient: 45 min  Persons in attendance:Patient    Diagnoses:   1. Depression, unspecified depression type    2. Anxiety         Symptoms currently being addressed in therapy: depression, anxiety, and poor distress tolerance    Therapeutic Intervention(s): Establish rapport, Problem-solving, Stressors assessed, and Supportive psychotherapy    Medications Reviewed: No    Treatment Goal(s)/Objective(s) addressed: Continue prioritizing and focusing on most urgent tasks to access treatment. Emotional support.     Progress toward Treatment Goals: No change    Plan:      - Continue weekly therapy.    Discussed with supervising attending, Donny Kumar MD.    Natalie Mcdaniel M.D.

## 2023-03-23 NOTE — PSYCHOTHERAPY
[facility]  Psychotherapy Progress Note    Patient Name: Asha Phan  Patient MRN: [id number]  Today's Date: 3/22/2023     Resident/Fellow providing service: Natalie Mcdaniel M.D.  Supervising Attending: Donny Kumar MD    Type of session:Individual psychotherapy  Session start time: 4:15pm  Session stop time: 5:00pm  Length of time spent face to face with patient: 45min  Persons in attendance:Patient    Subjective/New Info: Asha was able to reschedule her PET scan for next Friday.  She also was seen in [facility] where they gave her referrals to radiation oncology and hematology/oncology.  Now she just has to call and make appointments.  Overall, she was more calm today and having a plan of action appears to have helped.  She is concerned that results of imaging will go directly to the surgeon who per Asha is an excellent surgeon but lacks in communication and bedside manner skills.  She is concerned that imaging will show bad news and the surgeon will just call, drop the news, and leave.  Her results will go to [facility] first as they were the ones who ordered them; however, if they do not get reviewed in a timely manner they may further go to surgeon or just be released to patient on my chart.  While Asha likes Dr. Franco at [facility] and most residents she has seen were great, the fractured care has been difficult for her.  We discussed an office at [facility] who only takes Medicaid patients that may be a better fit.    Asha has been consistently 15 minutes late to each appointment.  That typically happens because of her daughter who drives her being unable to get out of the house on time.  Asha attributes that to her ADHD.  Juana (the daughter) has had some less than great experiences with prior therapist and mental health.  She refuses to seek care beyond just ADHD medications.  We agreed that Asha will tell her daughter that her check-in time from now on will be 3:45 PM.  It  "would also be helpful if Juana would attend a session. Asha thinks she may be reluctant due to her dislike of therapists, but she will present it as helpful for Asha as this is her therapy appointment and getting family input is helpful for the patient.    We revisited plans for further cancer care.    Next visit:  Ask why she is not driving herself    Objective/Observations:              Participation: Active verbal participation and Verbally monopolizing              Grooming:  eccentric but neat              Cognition: Alert and Fully Oriented              Eye contact: Good              Mood: \"Okay, I guess\"              Affect: Congruent with content and Anxious              Thought process: Logical and Tangential              Speech: Volume within normal limits and Hypertalkative              Other:     Diagnoses:   1. Depression, unspecified depression type    2. Anxiety         Current assessment of risk:   SUICIDE: Low   Homicide: Low   Self-harm: Low   Relapse: Not applicable   Other: n/a   Safety Plan reviewed? Not Indicated   If evidence of imminent risk is present, intervention/plan: n/a    TTherapeutic Intervention(s): Establish rapport, Problem-solving, Stressors assessed, and Supportive psychotherapy     Medications Reviewed: No     Treatment Goal(s)/Objective(s) addressed: Continue prioritizing and focusing on most urgent tasks to access treatment. Emotional support.      Progress toward Treatment Goals: No change     Plan:      - Continue weekly therapy.       Natalie Mcdaniel M.D.  3/22/2023                              "

## 2023-03-29 ENCOUNTER — APPOINTMENT (OUTPATIENT)
Dept: BEHAVIORAL HEALTH | Facility: PSYCHIATRIC FACILITY | Age: 63
End: 2023-03-29
Payer: MEDICAID

## 2023-04-05 ENCOUNTER — OFFICE VISIT (OUTPATIENT)
Dept: BEHAVIORAL HEALTH | Facility: PSYCHIATRIC FACILITY | Age: 63
End: 2023-04-05
Payer: MEDICAID

## 2023-04-05 DIAGNOSIS — F32.A DEPRESSION, UNSPECIFIED DEPRESSION TYPE: ICD-10-CM

## 2023-04-05 PROCEDURE — 99999 PR NO CHARGE: CPT

## 2023-04-06 NOTE — PROGRESS NOTES
[facility]  Psychotherapy Summary Note    Full therapy note has been documented and is under restricted viewing.  Please see below for summary of today's session.     Patient Name: Asha Phan  Patient MRN: [id number]  Today's Date:      Resident/Fellow providing service: Natalie Mcdaniel M.D.    Type of session:Individual psychotherapy  Session start time: 4:30  Session stop time: 4:50pm  Length of time spent face to face with patient: 20min  Persons in attendance:Patient    Diagnoses:   1. Depression, unspecified depression type         Symptoms currently being addressed in therapy: depression, anxiety, and interpersonal difficulty    Therapeutic Intervention(s): Problem-solving, Stressors assessed, and Supportive psychotherapy    Medications Reviewed: No    Treatment Goal(s)/Objective(s) addressed: Continue working towards most immediate goals of personal health     Progress toward Treatment Goals: No change    Plan:      - Continue weekly therapy.    Discussed with supervising attending, Donny Kumar MD.    Natalie Mcdaniel M.D.

## 2023-04-06 NOTE — PSYCHOTHERAPY
" [facility]  Psychotherapy Progress Note    Patient Name: Asha Phan  Patient MRN: [id number]  Today's Date: 4/5/2023     Resident/Fellow providing service: Natalie Mcdaniel M.D.  Supervising Attending: Donny Kumar MD    Type of session:Individual psychotherapy  Session start time: 4:30pm  Session stop time: 4:50pm  Length of time spent face to face with patient: 20min  Persons in attendance:Patient    Subjective/New Info: Asha showed up 30 minutes late. She is stressed that PET scan was a few weeks ago and nobody called her. She has appt with tr[facility]et for 4/11 and needs to see family med. I will reach out to family med to ask if she can be squeezed in sooner.    Objective/Observations:   Participation: Active verbal participation and Verbally monopolizing   Grooming: Casual   Cognition: Alert and Fully Oriented   Eye contact: Good   Mood: \"stressed\"   Affect: Flexible and Full range   Thought process: Logical   Speech: Volume within normal limits and Hypertalkative   Other:     Diagnoses:   1. Depression, unspecified depression type         Current assessment of risk:   SUICIDE: Low   Homicide: Low   Self-harm: Low   Relapse: Low   Other: n/a   Safety Plan reviewed? No   If evidence of imminent risk is present, intervention/plan: n/a    Symptoms currently being addressed in therapy: depression, anxiety, and interpersonal difficulty     Therapeutic Intervention(s): Problem-solving, Stressors assessed, and Supportive psychotherapy     Medications Reviewed: No     Treatment Goal(s)/Objective(s) addressed: Continue working towards most immediate goals of personal health      Progress toward Treatment Goals: No change    Plan:      - Continue weekly therapy.    Natalie Mcdaniel M.D.  4/5/2023                              "

## 2023-04-11 ENCOUNTER — APPOINTMENT (OUTPATIENT)
Dept: RADIATION ONCOLOGY | Facility: MEDICAL CENTER | Age: 63
End: 2023-04-11
Attending: RADIOLOGY
Payer: MEDICAID

## 2023-04-12 ENCOUNTER — APPOINTMENT (OUTPATIENT)
Dept: BEHAVIORAL HEALTH | Facility: PSYCHIATRIC FACILITY | Age: 63
End: 2023-04-12
Payer: MEDICAID

## 2023-04-17 ENCOUNTER — APPOINTMENT (OUTPATIENT)
Dept: BEHAVIORAL HEALTH | Facility: PSYCHIATRIC FACILITY | Age: 63
End: 2023-04-17
Payer: MEDICAID

## 2023-04-17 ENCOUNTER — PATIENT OUTREACH (OUTPATIENT)
Dept: ONCOLOGY | Facility: MEDICAL CENTER | Age: 63
End: 2023-04-17
Payer: MEDICAID

## 2023-04-17 NOTE — PROGRESS NOTES
Oncology Nurse Navigator (ONN) Margaret Lopez reached out to patient at [phone], to follow up on new referral.  No answer.  ONN left voice message with my contact information requesting a call back at patient's convenience.    INTERVENTION:  ONN reached out to [facility] & requested the most recent patient note from Dr. Marc.  Staff found most recent note dated November, 2022, & will fax to TAYA Lopez.

## 2023-04-19 ENCOUNTER — APPOINTMENT (OUTPATIENT)
Dept: BEHAVIORAL HEALTH | Facility: PSYCHIATRIC FACILITY | Age: 63
End: 2023-04-19
Payer: MEDICAID

## 2023-04-21 ENCOUNTER — PATIENT OUTREACH (OUTPATIENT)
Dept: ONCOLOGY | Facility: MEDICAL CENTER | Age: 63
End: 2023-04-21

## 2023-04-21 NOTE — PROGRESS NOTES
"Oncology Nurse Navigator (ONN) Margaret Lopez listened to patient's voice message left 4/20/23 @ [phone] from [phone].  ONN replied with an email as her message stated multiple appointments today and her desire to avoid \"phone tag\".  I encouraged her to call back at her convenience & provided my work hours.  Patient's message did mention Monday mid- to late-afternoon would be a good time to connect.    "

## 2023-04-25 ENCOUNTER — HOSPITAL ENCOUNTER (OUTPATIENT)
Facility: MEDICAL CENTER | Age: 63
End: 2023-04-25
Attending: INTERNAL MEDICINE
Payer: MEDICAID

## 2023-04-25 ENCOUNTER — HOSPITAL ENCOUNTER (OUTPATIENT)
Dept: HEMATOLOGY ONCOLOGY | Facility: MEDICAL CENTER | Age: 63
End: 2023-04-25
Attending: INTERNAL MEDICINE
Payer: MEDICAID

## 2023-04-25 VITALS
OXYGEN SATURATION: 94 % | HEART RATE: 111 BPM | RESPIRATION RATE: 16 BRPM | DIASTOLIC BLOOD PRESSURE: 72 MMHG | BODY MASS INDEX: 38.65 KG/M2 | TEMPERATURE: 97.8 F | SYSTOLIC BLOOD PRESSURE: 124 MMHG | HEIGHT: 67 IN | WEIGHT: 246.25 LBS

## 2023-04-25 DIAGNOSIS — C50.911 BILATERAL MALIGNANT NEOPLASM OF BREAST IN FEMALE, UNSPECIFIED ESTROGEN RECEPTOR STATUS, UNSPECIFIED SITE OF BREAST (HCC): ICD-10-CM

## 2023-04-25 DIAGNOSIS — C50.912 BILATERAL MALIGNANT NEOPLASM OF BREAST IN FEMALE, UNSPECIFIED ESTROGEN RECEPTOR STATUS, UNSPECIFIED SITE OF BREAST (HCC): ICD-10-CM

## 2023-04-25 LAB
FORWARD REASON: SPWHY: NORMAL
FORWARDED TO LAB: SPWHR: NORMAL
SPECIMEN SENT (2ND): SPWT2: NORMAL
SPECIMEN SENT (3RD): SPWT3: NORMAL
SPECIMEN SENT (4TH): SPWT4: NORMAL
SPECIMEN SENT: SPWT1: NORMAL

## 2023-04-25 PROCEDURE — 99205 OFFICE O/P NEW HI 60 MIN: CPT | Performed by: INTERNAL MEDICINE

## 2023-04-25 PROCEDURE — 99212 OFFICE O/P EST SF 10 MIN: CPT | Performed by: INTERNAL MEDICINE

## 2023-04-25 ASSESSMENT — PAIN SCALES - GENERAL: PAINLEVEL: NO PAIN

## 2023-04-25 NOTE — PROGRESS NOTES
Consult:  Hematology/Oncology      Referring Physician: Self referred  Primary Care:  Amelia Franco M.D.    Diagnosis: Bilateral breast cancer    Chief Complaint: Bilateral breast cancer    History of Presenting Illness:  Asha Phan is a 62 y.o. postmenopausal female who was initially diagnosed with bilateral breast cancer in February 2022.  Apparently she felt a mass in her left breast as early as 2019.  The left breast showed invasive ductal carcinoma, grade 1, ER positive CT positive, HER2 negative by history although the pathology is not available to me.  She also had a right breast cancer biopsied under ultrasound at that time likely also estrogen positive.  She was seen by Dr. Arteaga and initially scheduled for surgery but opted not to do it.  She had a multiple social and family issues as well as the pandemic and did not receive any neoadjuvant endocrine therapy or chemotherapy.  On 2/7/2023 she obtained another consult from surgeon Dr. Gomez in [place].  By that time she was having bleeding from the left breast mass that was growing through the skin.  The right breast mass has gotten larger as well.  She was told to have chemo or endocrine therapy prior to considering surgery.  She had a PET scan on 3/24/2023 which showed a large left retroareolar and lateral breast mass with hypermetabolic uptake eroding into the skin.  In addition there was 1.7 cm left axillary lymph node and a 2.8 cm soft tissue mass in the right lateral breast at 9:00.  There was small focal area of hypermetabolic uptake in the anterior cortex of the proximal right femoral diaphysis which may represent early bone metastases but no CT correlation was noted.  No other evidence of disease was noted.  She has recently had intermittent bleeding from the mass which is ulcerating the chest wall now.  Denies any symptoms referable to metastatic disease.  Only chronic problem is hypertension. appetite and weight are  "stable.    Past Medical History:   Diagnosis Date    Thyroid disorder        Past Surgical History:   Procedure Laterality Date    OTHER      mastoid bone       Social History     Tobacco Use    Smoking status: Never    Smokeless tobacco: Never   Substance Use Topics    Alcohol use: Yes     Comment: occas    Drug use: No        History reviewed. No pertinent family history.    Allergies as of 04/25/2023 - Reviewed 04/25/2023   Allergen Reaction Noted    Demerol Rash 05/20/2011         Current Outpatient Medications:     acetaminophen (TYLENOL) 325 MG Tab, Tylenol 325 mg tablet  Take 2 tablets every 6 hours by oral route., Disp: , Rfl:     hydrochlorothiazide (MICROZIDE) 12.5 MG capsule, Take 12.5 mg by mouth every day., Disp: , Rfl:     cyclobenzaprine (FLEXERIL) 5 mg tablet, Take 1-2 Tablets by mouth 3 times a day as needed for Muscle Spasms., Disp: 60 Tablet, Rfl: 2    losartan (COZAAR) 100 MG Tab, Take 1 tablet by mouth daily, Disp: 90 Tablet, Rfl: 3    aspirin (ASA) 81 MG Chew Tab chewable tablet, Chew 81 mg every day., Disp: , Rfl:     losartan (COZAAR) 100 MG Tab, Take 1 Tablet by mouth every day. (Patient not taking: Reported on 12/28/2022), Disp: , Rfl:     Review of Systems:  Review of Systems   Constitutional:  Positive for malaise/fatigue and weight loss.   HENT: Negative.     Eyes: Negative.    Respiratory: Negative.     Cardiovascular: Negative.    Gastrointestinal: Negative.    Genitourinary: Negative.    Musculoskeletal: Negative.    Skin: Negative.    Neurological: Negative.    Endo/Heme/Allergies: Negative.    Psychiatric/Behavioral: Negative.          Physical Exam:  Vitals:    04/25/23 0902   BP: 124/72   Pulse: (!) 111   Resp: 16   Temp: 36.6 °C (97.8 °F)   TempSrc: Temporal   SpO2: 94%   Weight: 112 kg (246 lb 4.1 oz)   Height: 1.702 m (5' 7.01\")       DESC; KARNOFSKY SCALE WITH ECOG EQUIVALENT: 80, Normal activity with effort; some signs or symptoms of disease (ECOG equivalent 1)    DISTRESS " LEVEL: mild distress    Physical Exam  Constitutional:       Appearance: Normal appearance.   HENT:      Head: Normocephalic.      Mouth/Throat:      Mouth: Mucous membranes are moist.      Pharynx: Oropharynx is clear.   Eyes:      Extraocular Movements: Extraocular movements intact.      Conjunctiva/sclera: Conjunctivae normal.      Pupils: Pupils are equal, round, and reactive to light.   Cardiovascular:      Rate and Rhythm: Regular rhythm. Tachycardia present.      Pulses: Normal pulses.   Pulmonary:      Effort: Pulmonary effort is normal.      Breath sounds: Normal breath sounds.   Chest:      Comments: 4/25/2023: left breast is replaced by tumor and has involuted substantially.  Laterally there is partial destruction of the lateral nipple areolar complex with scar and ulceration and skin infiltration by tumor.  There are couple small satellite nodule lesions on the left breast.  2 x 2 centimeter left axillary adenopathy is noted but no supraclavicular or infraclavicular adenopathy.  Right breast shows a 3 x 3 cm mass at 9:00.  Abdominal:      General: Abdomen is flat. Bowel sounds are normal.      Palpations: Abdomen is soft. There is no mass.   Musculoskeletal:         General: Normal range of motion.   Skin:     General: Skin is warm.   Neurological:      General: No focal deficit present.      Mental Status: She is alert and oriented to person, place, and time.   Psychiatric:         Mood and Affect: Mood normal.         Behavior: Behavior normal.          Labs:  No visits with results within 1 Month(s) from this visit.   Latest known visit with results is:   Hospital Outpatient Visit on 01/19/2023   Component Date Value Ref Range Status    Elf Score 01/19/2023 9.9   Final    Risk Group 01/19/2023 Moderate   Final    Risk 01/19/2023 23.6 (A)  % Final    (12.4%, 34.9%) Chance of liver related event within 5 years    Relative Risk 01/19/2023 7.4x   Final    Comment: *Relative Risks (calculated hazard ratios  using Davey proportional hazard  model after adjusting for age and sex, relative to ELF < 9.8) for Liver  Related Outcomes within 5 years.    Prognostic Determinations:  A review of clinical outcomes from 457 patients followed for a mean of 5  years was used to establish the prognostic utility of ELF (Oksana et al. 2019).  The results from this analysis demonstrate that the ELF scores are useful  for categorizing patients into risk groups according to the likelihood of  future liver-related clinical outcomes. These outcomes are defined as liver  related death, complication of portal hypertension, liver cancer and liver  transplantation.    Test Description:  The Enhanced Liver Fibrosis (ELF) Score is a multivariate index immunoassay  intended to provide an algorithm score based on quantitative measurements of  hyaluronic acid (HA), amino-terminal propeptide of type III procollagen  (PIIINP) and tissue inhibitor of metalloproteinase 1 (TIMP-1) from [facility].  This test combines the three serum biomarkers to assess the likelihood of  progression to cirrhosis and liver-related clinical events in patients with  advanced fibrosis due to BOX. The analytical measurement range is 4.5 to  14.7    The ELF Test is the only IVD assay granted marketing authorization by the  FDA and measures biomarkers directly involved in the active process of  scarring. The test was developed and its performance characteristics  determined by the [facility]. The laboratory is regulated  under CLIA as qualified to perform high-complexity testing. This testing is  used for clinical purposes. It should not be regarded as investigational or  for research.    Test Performed at: [facility]  [address] [place] [id number]; [phone]  : Rodney Flor MD         Imaging:       Pathology:  Not available    Assessment & Plan:  1. Bilateral malignant neoplasm of breast in  female, unspecified estrogen receptor status, unspecified site of breast (HCC)     1.  Bilateral breast cancer neglected for greater than a year now with extensive destruction of the left breast and ulceration and skin lesions making her inoperable on the left.  Right breast shows a 3 x 3 cm mass.  PET scan shows no clear metastatic disease but possible early bone metastases.  In any event she is a candidate for systemic therapy with letrozole and ribociclib.  We are first checking define MBC and foundation 1 testing as well as routine labs.  I will start her on letrozole for the time being.      Any questions and concerns raised by the patient were answered to the best of my ability. Thank you for allowing me to participate in the care for this patient. Please feel free to contact me for any questions or concerns.     Cm Tabor M.D.

## 2023-04-26 ENCOUNTER — TELEPHONE (OUTPATIENT)
Dept: HEMATOLOGY ONCOLOGY | Facility: MEDICAL CENTER | Age: 63
End: 2023-04-26
Payer: MEDICAID

## 2023-04-26 ENCOUNTER — OFFICE VISIT (OUTPATIENT)
Dept: BEHAVIORAL HEALTH | Facility: PSYCHIATRIC FACILITY | Age: 63
End: 2023-04-26
Payer: MEDICAID

## 2023-04-26 DIAGNOSIS — F32.A DEPRESSION, UNSPECIFIED DEPRESSION TYPE: ICD-10-CM

## 2023-04-26 PROCEDURE — 99999 PR NO CHARGE: CPT

## 2023-04-26 RX ORDER — LETROZOLE 2.5 MG/1
2.5 TABLET, FILM COATED ORAL DAILY
Qty: 30 TABLET | Refills: 5 | Status: SHIPPED | OUTPATIENT
Start: 2023-04-26 | End: 2023-08-13

## 2023-04-26 ASSESSMENT — ENCOUNTER SYMPTOMS
WEIGHT LOSS: 1
RESPIRATORY NEGATIVE: 1
PSYCHIATRIC NEGATIVE: 1
MUSCULOSKELETAL NEGATIVE: 1
GASTROINTESTINAL NEGATIVE: 1
EYES NEGATIVE: 1
CARDIOVASCULAR NEGATIVE: 1
NEUROLOGICAL NEGATIVE: 1

## 2023-04-26 NOTE — ADDENDUM NOTE
Encounter addended by: Korina Cooper, Med Ass't on: 4/26/2023 3:20 PM   Actions taken: Charge Capture section accepted

## 2023-04-26 NOTE — TELEPHONE ENCOUNTER
Called patient LVM stating that DR. Tabor wants her to start Letrozole and he has sent it to her pharmacy. Also stated that I made her a follow up appointment in 2 weeks per his request on 05/06/23 @ 9:30am. Advised she can call back with any questions.

## 2023-04-26 NOTE — ADDENDUM NOTE
Encounter addended by: Korina Cooper, Med Ass't on: 4/26/2023 3:19 PM   Actions taken: Charge Capture section accepted

## 2023-04-27 NOTE — PROGRESS NOTES
[facility]  Psychotherapy Summary Note    Full therapy note has been documented and is under restricted viewing.  Please see below for summary of today's session.     Patient Name: Asha Phan  Patient MRN: [id number]  Today's Date:      Resident/Fellow providing service: Natalie Mcdaniel M.D.    Type of session:Individual psychotherapy  Session start time: 4:25pm   Session stop time: 5:05pm  Length of time spent face to face with patient: 40min  Persons in attendance:Patient    Diagnoses: No diagnosis found.     Symptoms currently being addressed in therapy: depression, anxiety, and interpersonal difficulty    Therapeutic Intervention(s): Problem-solving, Stressors assessed, and Supportive psychotherapy    Medications Reviewed: No    Treatment Goal(s)/Objective(s) addressed: Continue to provide support and set goals for navigating cancer treatment. Plan to set more specific goals and objectives for long term therapy about pre-cancer stressors.     Progress toward Treatment Goals: No change    Plan:      - Continue weekly therapy.    Discussed with supervising attending, Donny Kumar MD.    Natalie Mcdaniel M.D.       Called patient to see if she has done any ortho follow-up since her PCP appointment 8/15, LVM requesting return call.

## 2023-04-27 NOTE — PSYCHOTHERAPY
" [facility]  Psychotherapy Progress Note    Patient Name: Asha Phan  Patient MRN: [id number]  Today's Date: 4/26/2023     Resident/Fellow providing service: Natalie Mcdaniel M.D.  Supervising Attending: Donny Kumar MD    Type of session:Individual psychotherapy  Session start time: 4:25pm  Session stop time: 5:05pm  Length of time spent face to face with patient: 40min  Persons in attendance:Patient    Subjective/New Info: Danika came in today a bit more calm than before.  She had her appointment with oncology yesterday and they will be starting her on letrozole for now with plans of possibly starting chemo with Ribociclib after blood work is done.  Oncologist said that cancer has invaded in the skin but no obvious mets are present although there is a concern for early stage meds and bone.  Most of the session was supportive therapy helping her process what she learned at oncology.    She is requesting that we start working on trauma and grief pertaining to her past including death of her son and losing communication with her other children.    Objective/Observations:   Participation: Active verbal participation, Verbally monopolizing, and Engaged   Grooming: Casual   Cognition: Alert and Fully Oriented   Eye contact: Good   Mood: \"Good I guess\"   Affect: Constricted, Congruent with content, and Anxious   Thought process: Logical and linear   Speech: Volume within normal limits and Hypertalkative   Other:     Diagnoses: No diagnosis found.     Current assessment of risk:   SUICIDE: Low   Homicide: Low   Self-harm: Low   Relapse: Low   Other: n/a   Safety Plan reviewed? No   If evidence of imminent risk is present, intervention/plan: n/a    Symptoms currently being addressed in therapy: depression, anxiety, and interpersonal difficulty    Therapeutic Intervention(s): Problem-solving, Stressors assessed, and Supportive psychotherapy    Medications Reviewed: No    Treatment Goal(s)/Objective(s) " addressed: Continue to provide support and set goals for navigating cancer treatment. Plan to set more specific goals and objectives for long term therapy about pre-cancer stressors.     Progress toward Treatment Goals: No change    Plan:      - Continue weekly therapy.    Discussed with supervising attending, Donny Kumar MD.      Natalie Mcdaniel M.D.  4/26/2023

## 2023-05-01 ENCOUNTER — TELEPHONE (OUTPATIENT)
Dept: HEMATOLOGY ONCOLOGY | Facility: MEDICAL CENTER | Age: 63
End: 2023-05-01

## 2023-05-01 NOTE — TELEPHONE ENCOUNTER
"Pt called to inform Dr Tabor that she was going to the Emergency Room. She will go to [facility] in [facility]. Her breast in bleeding and she thinks it may be infected. States \"this is getting out of hand\" she was notably upset and crying on the phone and expressed she was having pain.In the event she does not get admitted - I held a 2 PM appt for 5/2 for pt.  "

## 2023-05-03 ENCOUNTER — OFFICE VISIT (OUTPATIENT)
Dept: BEHAVIORAL HEALTH | Facility: PSYCHIATRIC FACILITY | Age: 63
End: 2023-05-03
Payer: MEDICAID

## 2023-05-03 DIAGNOSIS — F32.A DEPRESSION, UNSPECIFIED DEPRESSION TYPE: ICD-10-CM

## 2023-05-03 PROCEDURE — 99999 PR NO CHARGE: CPT

## 2023-05-04 ENCOUNTER — PATIENT OUTREACH (OUTPATIENT)
Dept: ONCOLOGY | Facility: MEDICAL CENTER | Age: 63
End: 2023-05-04
Payer: MEDICAID

## 2023-05-04 NOTE — PROGRESS NOTES
On May 4th, 2023, Oncology Social Worker Lakshmi Cohn attempted telephone contact with pt. to follow up on psychosocial distress screening.  OSW Lalit left voicemail message for pt. requesting pt. call back at earliest convenience.  OSW Lalit left contact information in voicemail message.

## 2023-05-08 NOTE — PSYCHOTHERAPY
" [facility]  Psychotherapy Progress Note    Patient Name: Asha Phan  Patient MRN: [id number]  Today's Date: 5/8/2023     Resident/Fellow providing service: Natalie Mcdaniel M.D.  Supervising Attending: Donny Kumar MD    Type of session:Individual psychotherapy  Session start time: 4:15pm  Session stop time: 4:55pm  Length of time spent face to face with patient: 40min  Persons in attendance:Patient    Subjective/New Info: Asha took estrogen 2 days and stopped because of side effects. We discussed at length expected side effects and coping with them, and what may happen if she does not take letrazole and ends up with bone or brain mets.  Discussed appointments she has had and what she needs to make. She was in ED 1 day prior to this appt with infection and was discharged on abx. Provided her info on [facility] and helped set med management appt.    Objective/Observations:   Participation: Active verbal participation, Verbally monopolizing, and Engaged   Grooming: Casual   Cognition: Alert and Fully Oriented   Eye contact: Good   Mood: \"shell\"   Affect: Flexible, Constricted, and Congruent with content   Thought process: Logical   Speech: Rate within normal limits, Volume within normal limits, and Hypertalkative   Other:     Diagnoses: No diagnosis found.     Current assessment of risk:   SUICIDE: Low   Homicide: Low   Self-harm: Low   Relapse: Not applicable   Other: n/a    Safety Plan reviewed? No   If evidence of imminent risk is present, intervention/plan: n/a    Symptoms currently being addressed in therapy: depression and anxiety    Therapeutic Intervention(s): Positive behavior reinforced, Problem-solving, Stressors assessed, and Supportive psychotherapy    Medications Reviewed: No    Treatment Goal(s)/Objective(s) addressed: Continue prioritizing most urgent problems, explore consequences of various choices made or not made     Progress toward Treatment Goals: No change    Plan:      - " Continue weekly therapy.    Natalie Mcdaniel M.D.  5/8/2023

## 2023-05-08 NOTE — PROGRESS NOTES
[facility]  Psychotherapy Summary Note    Full therapy note has been documented and is under restricted viewing.  Please see below for summary of today's session.     Patient Name: Asha Phan  Patient MRN: [id number]  Today's Date:      Resident/Fellow providing service: Natalie Mcdaniel M.D.    Type of session:Individual psychotherapy  Session start time: 4:15pm  Session stop time: 4:55pm  Length of time spent face to face with patient: 40min  Persons in attendance:Patient    Diagnoses: No diagnosis found.     Symptoms currently being addressed in therapy: depression and anxiety    Therapeutic Intervention(s): Positive behavior reinforced, Problem-solving, Stressors assessed, and Supportive psychotherapy    Medications Reviewed: No    Treatment Goal(s)/Objective(s) addressed: Continue prioritizing most urgent problems, explore consequences of various choices made or not made     Progress toward Treatment Goals: No change    Plan:      - Continue weekly therapy.    Discussed with supervising attending, Donny Kumar MD.    Natalie Mcdaniel M.D.

## 2023-05-09 ENCOUNTER — APPOINTMENT (OUTPATIENT)
Dept: HEMATOLOGY ONCOLOGY | Facility: MEDICAL CENTER | Age: 63
End: 2023-05-09
Payer: MEDICAID

## 2023-05-10 ENCOUNTER — APPOINTMENT (OUTPATIENT)
Dept: BEHAVIORAL HEALTH | Facility: PSYCHIATRIC FACILITY | Age: 63
End: 2023-05-10
Payer: MEDICAID

## 2023-05-10 NOTE — PSYCHOTHERAPY
"[facility]  Psychotherapy Progress Note    Patient Name: Asha Phan  Patient MRN: [id number]  Today's Date: 5/10/2023     Resident/Fellow providing service: Natalie Mcdaniel M.D.  Supervising Attending: { Psych Attendings:45942}    Type of session:{[facility] SERVICES:31963008}  Session start time: ***  Session stop time: ***  Length of time spent face to face with patient: ***  Persons in attendance:{[facility] HEALTH ATTENDEES:39975096}    Subjective/New Info: ***    Objective/Observations:   Participation: {[facility] PARTICIPATION MEASURES:68815109}   Grooming: {[facility] GROOMIN}   Cognition: {[facility] ORIENTATION:45091713}   Eye contact: {[facility] EYE CONTACT:06354631}   Mood: \"***\"   Affect: {[facility] AFFECT:32156192}   Thought process: {[facility] THOUGHT PROCESS:73592201}   Speech: {[facility] SPEECH:53748420}   Other:     Diagnoses: No diagnosis found.     Current assessment of risk:   SUICIDE: {[facility] RATINGS:15631828}   Homicide: {[facility] RATINGS:88346620}   Self-harm: {[facility] RATINGS:46486859}   Relapse: {[facility] RATINGS:81408134}   Other: ***   Safety Plan reviewed? {YES/NO/NOT INDICATED:28837}   If evidence of imminent risk is present, intervention/plan: ***    Therapeutic Intervention(s): {THERAPEUTICINTERVENTION:23790}    Treatment Goal(s)/Objective(s) addressed: ***     Progress toward Treatment Goals: {[facility] GOAL PROGRESS:37080926}    Plan:  {THERAPYTXPLAN:89960}    Natalie Mcdaniel M.D.  5/10/2023                              "

## 2023-05-17 ENCOUNTER — APPOINTMENT (OUTPATIENT)
Dept: BEHAVIORAL HEALTH | Facility: PSYCHIATRIC FACILITY | Age: 63
End: 2023-05-17
Payer: MEDICAID

## 2023-05-22 RX ORDER — CYCLOBENZAPRINE HCL 5 MG
TABLET ORAL
Qty: 60 TABLET | Refills: 2 | Status: SHIPPED | OUTPATIENT
Start: 2023-05-22

## 2023-05-24 ENCOUNTER — OFFICE VISIT (OUTPATIENT)
Dept: BEHAVIORAL HEALTH | Facility: PSYCHIATRIC FACILITY | Age: 63
End: 2023-05-24
Payer: MEDICAID

## 2023-05-24 DIAGNOSIS — F32.A DEPRESSION, UNSPECIFIED DEPRESSION TYPE: ICD-10-CM

## 2023-05-24 PROCEDURE — 99999 PR NO CHARGE: CPT

## 2023-05-25 NOTE — PROGRESS NOTES
[facility]  Psychotherapy Summary Note    Full therapy note has been documented and is under restricted viewing.  Please see below for summary of today's session.     Patient Name: Asha Phan  Patient MRN: [id number]  Today's Date:      Resident/Fellow providing service: Natalie Mcdaniel M.D.    Type of session:Individual psychotherapy  Session start time: 4:10pm  Session stop time: 5:10pm  Length of time spent face to face with patient: 60min  Persons in attendance:Patient    Diagnoses: No diagnosis found.     Symptoms currently being addressed in therapy: anxiety, poor insight, and poor distress tolerance    Therapeutic Intervention(s): Supportive psychotherapy    Medications Reviewed: No    Treatment Goal(s)/Objective(s) addressed: Assessed progress towards goals of health management, began exploring past family dynamics causing current distress     Progress toward Treatment Goals: No change    Plan:      - Continue weekly therapy.    Discussed with supervising attending, Donny Kumar MD.    Natalie Mcdaniel M.D.

## 2023-05-30 ENCOUNTER — OFFICE VISIT (OUTPATIENT)
Dept: MEDICAL GROUP | Facility: CLINIC | Age: 63
End: 2023-05-30
Payer: MEDICAID

## 2023-05-30 VITALS
HEIGHT: 68 IN | OXYGEN SATURATION: 98 % | SYSTOLIC BLOOD PRESSURE: 138 MMHG | TEMPERATURE: 98.4 F | WEIGHT: 247 LBS | DIASTOLIC BLOOD PRESSURE: 80 MMHG | BODY MASS INDEX: 37.44 KG/M2 | HEART RATE: 94 BPM

## 2023-05-30 DIAGNOSIS — R06.02 SHORTNESS OF BREATH: ICD-10-CM

## 2023-05-30 DIAGNOSIS — R19.8 ABNORMAL BOWEL MOVEMENT: ICD-10-CM

## 2023-05-30 DIAGNOSIS — E03.9 HYPOTHYROIDISM, UNSPECIFIED TYPE: ICD-10-CM

## 2023-05-30 DIAGNOSIS — M54.41 ACUTE RIGHT-SIDED LOW BACK PAIN WITH RIGHT-SIDED SCIATICA: ICD-10-CM

## 2023-05-30 DIAGNOSIS — E11.9 TYPE 2 DIABETES MELLITUS WITHOUT COMPLICATION, WITHOUT LONG-TERM CURRENT USE OF INSULIN (HCC): ICD-10-CM

## 2023-05-30 PROCEDURE — 3079F DIAST BP 80-89 MM HG: CPT | Performed by: STUDENT IN AN ORGANIZED HEALTH CARE EDUCATION/TRAINING PROGRAM

## 2023-05-30 PROCEDURE — 99213 OFFICE O/P EST LOW 20 MIN: CPT | Mod: GE | Performed by: STUDENT IN AN ORGANIZED HEALTH CARE EDUCATION/TRAINING PROGRAM

## 2023-05-30 PROCEDURE — 3075F SYST BP GE 130 - 139MM HG: CPT | Performed by: STUDENT IN AN ORGANIZED HEALTH CARE EDUCATION/TRAINING PROGRAM

## 2023-05-30 RX ORDER — MELOXICAM 15 MG/1
15 TABLET ORAL DAILY
Qty: 30 TABLET | Refills: 0 | Status: SHIPPED | OUTPATIENT
Start: 2023-05-30 | End: 2023-07-03

## 2023-05-31 ENCOUNTER — APPOINTMENT (OUTPATIENT)
Dept: BEHAVIORAL HEALTH | Facility: PSYCHIATRIC FACILITY | Age: 63
End: 2023-05-31
Payer: MEDICAID

## 2023-05-31 ENCOUNTER — OFFICE VISIT (OUTPATIENT)
Dept: BEHAVIORAL HEALTH | Facility: PSYCHIATRIC FACILITY | Age: 63
End: 2023-05-31
Payer: MEDICAID

## 2023-05-31 DIAGNOSIS — F41.9 ANXIETY: ICD-10-CM

## 2023-05-31 PROCEDURE — 90832 PSYTX W PT 30 MINUTES: CPT | Mod: GC

## 2023-05-31 NOTE — PROGRESS NOTES
[facility]    Chief Complaint   Patient presents with    Follow-Up     Back pain, Shortness of breath        HISTORY OF PRESENT ILLNESS: Patient is a 62 y.o. female established patient who presents today to discuss the medical issues below:    Problem   Shortness of Breath    Patient reports ongoing shortness of breath for approximately the last 2 months.  States this is present even at rest, and is exacerbated with minimal physical activity.  Patient was evaluated [facility] approximately 3 weeks ago, CT of the chest abdomen pelvis was performed which showed masses in bilateral lungs, bilateral pleural effusions, and masses in bilateral breasts.  No evidence of pulmonary embolism.  Patient reports that about 1 month prior she had had a PET scan which did not show any metastases in the lungs.       Acute Right-Sided Low Back Pain With Right-Sided Sciatica    Patient reports new onset of low right back pain that radiates to the posterior right leg.  This is particularly bothersome at night and interferes with sleep.  Has been taking Flexeril with minimal success.  Exacerbated with walking further distances.  Denies lower extremity numbness/tingling, leg weakness, fevers.       Abnormal Bowel Movement    Patient reports new onset of difficulty with bowel movements over the past 2 months, reports that she now requires daily MiraLAX or Colace.  Patient states that when she does not take either of these, she experiences some bloating, abdominal pain, and inability to have a bowel movement.  When she does take these, she reports regular daily bowel movements with normal stool caliber and consistency.  Denies rectal bleeding, rectal pain, nausea/vomiting, or other concerning symptoms.  Patient is concerned that this may be IBS-C  - No new med changes, no new diet changes.            Patient Active Problem List    Diagnosis Date Noted    Shortness of breath 05/30/2023    Acute right-sided low back pain with  right-sided sciatica 05/30/2023    Abnormal bowel movement 05/30/2023    Anxiety 03/15/2023    Other specified disorders of external ear, unspecified ear 09/01/2022    Lumbar radiculopathy 08/12/2022    Dyslipidemia 03/04/2021    Hypothyroidism 03/04/2021    Obesity 12/19/2019    Snoring 12/19/2019    Diabetes mellitus, type 2 (HCC) 05/24/2019    Depression 04/02/2019    Family tension 03/22/2019    Malignant neoplasm of breast (HCC) 03/22/2019    Hypertension 08/17/2018       Allergies:Demerol    Current Outpatient Medications   Medication Sig Dispense Refill    meloxicam (MOBIC) 15 MG tablet Take 1 Tablet by mouth every day. 30 Tablet 0    cyclobenzaprine (FLEXERIL) 5 mg tablet TAKE 1 TO 2 TABLETS BY MOUTH THREE TIMES DAILY AS NEEDED FOR MUSCLE SPASMS 60 Tablet 2    acetaminophen (TYLENOL) 325 MG Tab Tylenol 325 mg tablet   Take 2 tablets every 6 hours by oral route.      losartan (COZAAR) 100 MG Tab Take 1 tablet by mouth daily 90 Tablet 3    aspirin (ASA) 81 MG Chew Tab chewable tablet Chew 81 mg every day.      letrozole (FEMARA) 2.5 MG Tab Take 1 Tablet by mouth every day. (Patient not taking: Reported on 5/30/2023) 30 Tablet 5    losartan (COZAAR) 100 MG Tab Take 1 Tablet by mouth every day. (Patient not taking: Reported on 12/28/2022)      hydrochlorothiazide (MICROZIDE) 12.5 MG capsule Take 12.5 mg by mouth every day. (Patient not taking: Reported on 5/30/2023)       No current facility-administered medications for this visit.         Past Medical History:   Diagnosis Date    Thyroid disorder        Social History     Tobacco Use    Smoking status: Never    Smokeless tobacco: Never   Substance Use Topics    Alcohol use: Yes     Comment: occas    Drug use: No       No family status information on file.   No family history on file.    ROS:  Negative except as stated above.      Exam:   Vitals:    05/30/23 1641   BP: 138/80   Pulse: 94   Temp: 36.9 °C (98.4 °F)   SpO2: 98%    Body mass index is 37.56  kg/m².  General:  Well nourished, well developed female in NAD.  HENT: Normocephalic, EOMI, PERRLA.  Pulmonary: Clear to ausculation.  Mildly increased effort. No rales, rhonchi, or wheezing.  Cardiovascular: Regular rate and rhythm without murmur.   Abdomen: Soft and nontender, no palpable liver, spleen, or masses.  Extremities: No LE edema noted. 5/5 strength in all extremities  Neuro: Grossly nonfocal.  Skin: No lesions, erythema, or other abnormalities noted.  Psych: Alert and oriented to person, place, and time. Appropriate mood and conversation.    Assessment/Plan:    Shortness of breath  Exam of the lungs today largely unremarkable, however patient seems to be short of breath with speaking.  Pulse ox 98%.  Etiology of shortness of breath likely secondary to new pulmonary masses versus pleural effusion, however cannot rule out cardiac etiology at this time.  - Advised patient to follow-up ASAP with oncology to report new findings  - Echocardiogram ordered  - Repeat TSH ordered  - ER precautions given    Acute right-sided low back pain with right-sided sciatica  Straight leg test positive, location of pain appears to be muscular rather than bony.  For now we will attempt treatment with daily NSAIDs with meloxicam 15 mg as well as referral to physical therapy.  Low threshold for imaging in the future given history of breast cancer.    Abnormal bowel movement  Balance seem to be fairly regular without other issues on MiraLAX.  Potentially IBS C, however further work-up is recommended.  Patient would like referral to gastroenterology for further assessment.  - Continue MiraLAX as needed, pending GI referral        Gabino Morton,   [facility]  PGY-3

## 2023-05-31 NOTE — ASSESSMENT & PLAN NOTE
Straight leg test positive, location of pain appears to be muscular rather than bony.  For now we will attempt treatment with daily NSAIDs with meloxicam 15 mg as well as referral to physical therapy.  Low threshold for imaging in the future given history of breast cancer.

## 2023-05-31 NOTE — ASSESSMENT & PLAN NOTE
Exam of the lungs today largely unremarkable, however patient seems to be short of breath with speaking.  Pulse ox 98%.  Etiology of shortness of breath likely secondary to new pulmonary masses versus pleural effusion, however cannot rule out cardiac etiology at this time.  - Advised patient to follow-up ASAP with oncology to report new findings  - Echocardiogram ordered  - Repeat TSH ordered  - ER precautions given

## 2023-05-31 NOTE — ASSESSMENT & PLAN NOTE
Balance seem to be fairly regular without other issues on MiraLAX.  Potentially IBS C, however further work-up is recommended.  Patient would like referral to gastroenterology for further assessment.  - Continue MiraLAX as needed, pending GI referral
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
## 2023-06-01 NOTE — PSYCHOTHERAPY
" [facility]  Psychotherapy Progress Note    Patient Name: Asha Phan  Patient MRN: [id number]  Today's Date: 5/31/2023     Resident/Fellow providing service: Natalie Mcdaniel M.D.  Supervising Attending: Donny Kumar MD    Type of session:Individual psychotherapy  Session start time: 4:30pm  Session stop time: 4:55pm  Length of time spent face to face with patient: 25min  Persons in attendance:Patient    Subjective/New Info: Today patient expressed frustration with medical system again.  She cannot handle everything and had a lot of excuses to every suggestion that was provided.  She states that her daughter Juana will be lost without her but at the same time when challenged and the fact that unless she takes some action she may die is pointed out she states that she cannot handle it and needs to figure out her sleep first.  She canceled her radiation oncology appointment because oncologist apparently told her to do so and she canceled her med management with psychiatry appointment because she wanted to see her primary care first and she wanted to see me since I will be leaving on a vacation at the end of June.  Any time a suggestion is provided she finds a reason why she cannot do it.  Today's session was only 25 minutes and we ended on an agreement that she needs to call oncology and radiation oncology to make appointments and possibly reach out to Rossana Jenkins for additional resources.    Next visit will revisit what she is looking to get out of therapy, and how can I specifically be helpful.  Then will attempt to connect what she stated as her goals to episodes of her being resistant to achieving them.  The hope is that she may become more accepting of what needs to be achieved.    Objective/Observations:   Participation: Active verbal participation, Verbally monopolizing, and Resistant   Grooming: Casual   Cognition: Alert and Fully Oriented   Eye contact: Good   Mood: \"okay\"   Affect: " Constricted, Congruent with content, and Anxious   Thought process: Goal-directed and Circumstantial   Speech: Volume within normal limits and Hypertalkative   Other:     Diagnoses: No diagnosis found.     Current assessment of risk:   SUICIDE: Low   Homicide: Low   Self-harm: Low   Relapse: Not applicable   Other: n/a   Safety Plan reviewed? No   If evidence of imminent risk is present, intervention/plan: n/a    Therapeutic Intervention(s): Stressors assessed, Supportive psychotherapy, and Treatment compliance addressed     Medications Reviewed: No     Treatment Goal(s)/Objective(s) addressed: Revisited importance of prioritizing most urgent goals and why they are important. Emotional support provided, goals for next week set.      Progress toward Treatment Goals: No change     Plan:      - Continue weekly therapy.     Discussed with supervising attending, Donny Kumar MD.     Natalie Mcdaniel M.D.  5/31/2023

## 2023-06-01 NOTE — PROGRESS NOTES
[facility]  Psychotherapy Summary Note    Full therapy note has been documented and is under restricted viewing.  Please see below for summary of today's session.     Patient Name: Asha Phan  Patient MRN: [id number]  Today's Date:      Resident/Fellow providing service: Natalie Mcdaniel M.D.    Type of session:Individual psychotherapy  Session start time: 4:30pm  Session stop time: 4:55pm  Length of time spent face to face with patient: 25min  Persons in attendance:Patient    Diagnoses: No diagnosis found.     Symptoms currently being addressed in therapy: anxiety and poor insight    Therapeutic Intervention(s): Stressors assessed, Supportive psychotherapy, and Treatment compliance addressed    Medications Reviewed: No    Treatment Goal(s)/Objective(s) addressed: Revisited importance of prioritizing most urgent goals and why they are important. Emotional support provided, goals for next week set.     Progress toward Treatment Goals: No change    Plan:      - Continue weekly therapy.    Discussed with supervising attending, Donny Kumar MD.    Natalie Mcdaniel M.D.

## 2023-06-07 ENCOUNTER — OFFICE VISIT (OUTPATIENT)
Dept: BEHAVIORAL HEALTH | Facility: PSYCHIATRIC FACILITY | Age: 63
End: 2023-06-07
Payer: MEDICAID

## 2023-06-07 DIAGNOSIS — F41.9 ANXIETY: Chronic | ICD-10-CM

## 2023-06-07 PROCEDURE — 90834 PSYTX W PT 45 MINUTES: CPT | Mod: GC

## 2023-06-09 NOTE — PROGRESS NOTES
[facility]  Psychotherapy Summary Note    Full therapy note has been documented and is under restricted viewing.  Please see below for summary of today's session.     Patient Name: Asha Phan  Patient MRN: [id number]  Today's Date:      Resident/Fellow providing service: Natalie Mcdaniel M.D.    Type of session:Individual psychotherapy  Session start time: 4:10pm  Session stop time: 5pm  Length of time spent face to face with patient: 50min  Persons in attendance:Patient    Diagnoses: No diagnosis found.     Symptoms currently being addressed in therapy: anxiety, poor insight, and poor distress tolerance    Therapeutic Intervention(s): Parenting/familial roles addressed, Supportive psychotherapy, and Treatment compliance addressed    Medications Reviewed: No    Treatment Goal(s)/Objective(s) addressed: Explore family dynamics to better understand drivers behind thoughts, behaviors, attitudes. Address ongoing management of urgent health concerns.     Progress toward Treatment Goals: No change    Plan:      - Continue weekly therapy.    Discussed with supervising attending, Donny Kumar MD.    Natalie Mcdaniel M.D.

## 2023-06-09 NOTE — PSYCHOTHERAPY
" [facility]  Psychotherapy Progress Note    Patient Name: Asha Phan  Patient MRN: [id number]  Today's Date: 2023     Resident/Fellow providing service: Natalie Mcdaniel M.D.  Supervising Attending: Donny Kumar MD    Type of session:Individual psychotherapy  Session start time: 4:10pm  Session stop time: 5pm  Length of time spent face to face with patient: 50min  Persons in attendance:Patient    Subjective/New Info: She wanted to spend most of the session talking about her children.  She specifically talks about her  son Case and her oldest daughter who \"was a good child\" who became \"boy crazy\" and now is escort  doing drugs.  When asked if she could turn back time what would she change in her parenting.  She immediately responded only their father.  She then went on to explain that he actually went out about that and she did not want a broken home.  When further attempts to ask about any regrets were made, she became defensive and went on to attempt to attack me by pointing out that just like being a good therapist you need a lot of experience it is same with being a parent.    Objective/Observations:   Participation: Active verbal participation, Verbally monopolizing, and Defensive   Grooming: Casual   Cognition: Alert and Fully Oriented   Eye contact: Good   Mood: \"fine\"   Affect: Flexible, Full range, and Congruent with content   Thought process: Logical   Speech: Rate within normal limits, Volume within normal limits, and Hypertalkative   Other:     Diagnoses:   1. Anxiety         Current assessment of risk:   SUICIDE: Low   Homicide: Low   Self-harm: Low   Relapse: Low   Other: n/a   Safety Plan reviewed? No   If evidence of imminent risk is present, intervention/plan: n/a    TSymptoms currently being addressed in therapy: anxiety, poor insight, and poor distress tolerance    Therapeutic Intervention(s): Parenting/familial roles addressed, Supportive psychotherapy, and Treatment " compliance addressed    Medications Reviewed: No    Treatment Goal(s)/Objective(s) addressed: Explore family dynamics to better understand drivers behind thoughts, behaviors, attitudes. Address ongoing management of urgent health concerns.     Progress toward Treatment Goals: No change    Plan:      - Continue weekly therapy.    Discussed with supervising attending, Donny Kumar MD.    Natalie Mcdaniel M.D.  6/9/2023

## 2023-06-14 ENCOUNTER — HOSPITAL ENCOUNTER (OUTPATIENT)
Dept: RADIOLOGY | Facility: MEDICAL CENTER | Age: 63
End: 2023-06-14
Attending: SURGERY
Payer: MEDICAID

## 2023-06-14 ENCOUNTER — OFFICE VISIT (OUTPATIENT)
Dept: BEHAVIORAL HEALTH | Facility: PSYCHIATRIC FACILITY | Age: 63
End: 2023-06-14
Payer: MEDICAID

## 2023-06-14 ENCOUNTER — HOSPITAL ENCOUNTER (OUTPATIENT)
Dept: RADIOLOGY | Facility: MEDICAL CENTER | Age: 63
End: 2023-06-14
Payer: MEDICAID

## 2023-06-14 DIAGNOSIS — F41.9 ANXIETY: ICD-10-CM

## 2023-06-14 PROCEDURE — 99999 PR NO CHARGE: CPT

## 2023-06-18 ENCOUNTER — TELEPHONE (OUTPATIENT)
Dept: HEMATOLOGY ONCOLOGY | Facility: MEDICAL CENTER | Age: 63
End: 2023-06-18
Payer: MEDICAID

## 2023-06-18 NOTE — TELEPHONE ENCOUNTER
After Hours Call    Medical Oncologist: Dr. Tabor  Diagnosis: Breast cancer  Treatment: Letrozole (according to the ER MD patient has not started taking)    Reason for call:  Patient presented to the ER at [facility] with c/o SOB. Imaging completed which showed bilateral effusions. Spoke personally to Dr. Kerr at the ER. He mentioned there were 3 nodules on the CT concerning for metastatic disease. Patient is hypoxic and being admitted.     Plan:  Patient requesting Dr. Tabor to see her while in the hospital, but unfortunately she is at an outside facility. I recommended evaluation for thoracentesis and cytology to be sent while admitted. We will schedule follow up in the office with Dr. Tabor once patient is discharged.     Will attempt to get medical records and imaging pushed to [facility].       CHERIE Roberts  [facility]  Office of [employer] [facility] [facility], and [place]

## 2023-06-20 ENCOUNTER — PATIENT OUTREACH (OUTPATIENT)
Dept: OTHER | Facility: MEDICAL CENTER | Age: 63
End: 2023-06-20
Payer: MEDICAID

## 2023-06-20 ENCOUNTER — PATIENT OUTREACH (OUTPATIENT)
Dept: ONCOLOGY | Facility: MEDICAL CENTER | Age: 63
End: 2023-06-20
Payer: MEDICAID

## 2023-06-20 NOTE — PROGRESS NOTES
On-call Oncology Nurse Navigator (TAYA) shared a phone conversation from patient's current in-patient nurse at [facility].  TAYA Lopez called the patient's cell phone.  No answer.  TAYA left a voice message with my contact information requesting a call back at her convenience.

## 2023-06-20 NOTE — PROGRESS NOTES
Oncology Nurse Navigation  Call received from Joana, inpatient RN from [facility].  Pt currently admitted for PE.  Joana calling to ensure pt connected with ONN.  Confirmed ONN Margaret Lopez has contacted pt.  Provided Joana dates of upcoming appointments: 7/27/23 with Dr. Conrejo and 8/1/23 with Pulmonology.

## 2023-06-21 ENCOUNTER — APPOINTMENT (OUTPATIENT)
Dept: BEHAVIORAL HEALTH | Facility: PSYCHIATRIC FACILITY | Age: 63
End: 2023-06-21
Payer: MEDICAID

## 2023-06-21 NOTE — PROGRESS NOTES
[facility]  Psychotherapy Summary Note    Full therapy note has been documented and is under restricted viewing.  Please see below for summary of today's session.     Patient Name: Asha Phan  Patient MRN: [id number]  Today's Date:      Resident/Fellow providing service: Natalie Mcdaniel M.D.    Type of session:Individual psychotherapy  Session start time: 4:05pm  Session stop time: 4:55pm  Length of time spent face to face with patient: 50min  Persons in attendance:Patient    Diagnoses: No diagnosis found.     Symptoms currently being addressed in therapy: anxiety    Therapeutic Intervention(s): Stressors assessed and Supportive psychotherapy    Medications Reviewed: No    Treatment Goal(s)/Objective(s) addressed: improving insight to prioritizing goals of care, understanding family dynamics and their impact on mental health    Progress toward Treatment Goals: No change    Plan:      - Continue weekly therapy.    Discussed with supervising attending, Americo Guerra MD.    Natalie Mcdaniel M.D.

## 2023-06-21 NOTE — PSYCHOTHERAPY
" [facility]  Psychotherapy Progress Note    Patient Name: Asah Phan  Patient MRN: [id number]  Today's Date: 6/20/2023     Resident/Fellow providing service: Natalie Mcdaniel M.D.  Supervising Attending: Americo Guerra MD    Type of session:Individual psychotherapy  Session start time: 4:05pm  Session stop time: 4:55pm  Length of time spent face to face with patient: 50min  Persons in attendance:Patient    Subjective/New Info: Discussed patients current plan to address her cancer and attempted to analyze most immediate and most time sensitive goals. Pt was unable to fully appreciate the urgency of situation coming up with excuses to every argument.     Objective/Observations:   Participation: Active verbal participation   Grooming: Casual   Cognition: Alert and Fully Oriented   Eye contact: Good   Mood: \"okay\"   Affect: Constricted   Thought process: Logical   Speech: Rate within normal limits, Volume within normal limits, and Hypertalkative   Other:     Diagnoses: No diagnosis found.     Current assessment of risk:   SUICIDE: Low   Homicide: Low   Self-harm: Low   Relapse: Low   Other: n/a   Safety Plan reviewed? Not Indicated   If evidence of imminent risk is present, intervention/plan: n/a    Therapeutic Intervention(s): Stressors assessed and Supportive psychotherapy     Medications Reviewed: No     Treatment Goal(s)/Objective(s) addressed: improving insight to prioritizing goals of care, understanding family dynamics and their impact on mental health     Progress toward Treatment Goals: No change     Plan:      - Continue weekly therapy.     Discussed with supervising attending, Americo Guerra MD.        Natalie Mcdaniel M.D.  6/20/2023                              "

## 2023-06-26 ENCOUNTER — HOSPITAL ENCOUNTER (OUTPATIENT)
Dept: RADIOLOGY | Facility: MEDICAL CENTER | Age: 63
End: 2023-06-26
Payer: MEDICAID

## 2023-07-02 DIAGNOSIS — M54.41 ACUTE RIGHT-SIDED LOW BACK PAIN WITH RIGHT-SIDED SCIATICA: ICD-10-CM

## 2023-07-03 RX ORDER — MELOXICAM 15 MG/1
15 TABLET ORAL DAILY
Qty: 30 TABLET | Refills: 0 | Status: SHIPPED | OUTPATIENT
Start: 2023-07-03

## 2023-07-05 ENCOUNTER — TELEPHONE (OUTPATIENT)
Dept: HEMATOLOGY ONCOLOGY | Facility: MEDICAL CENTER | Age: 63
End: 2023-07-05

## 2023-07-07 ENCOUNTER — PATIENT MESSAGE (OUTPATIENT)
Dept: HEMATOLOGY ONCOLOGY | Facility: MEDICAL CENTER | Age: 63
End: 2023-07-07
Payer: MEDICAID

## 2023-07-07 NOTE — PATIENT COMMUNICATION
Medical Oncology triage RN notified by physician , Mary, that patient has c/o symptoms and requested RN to follow up.  Called patient at [phone] and left voicemail informing pt that RN is following up with her and she can call us at [phone] with any questions/concerns.

## 2023-07-07 NOTE — TELEPHONE ENCOUNTER
Pt called and left a VM wanting to see Dr CRABTREE. I called back and the phone is out of service. I sent the pt a [employer] message offering Dr CRABTREE appt on 7/12 at 11 AM. If pt calls back, please give this spot to her.

## 2023-07-10 ENCOUNTER — TELEPHONE (OUTPATIENT)
Dept: HEMATOLOGY ONCOLOGY | Facility: MEDICAL CENTER | Age: 63
End: 2023-07-10
Payer: MEDICAID

## 2023-07-10 NOTE — TELEPHONE ENCOUNTER
Contacted patient via phone in regard to a message that patient was in [facility] due to c/o SOB. Patient explains that she needs to see Dr. Tabor soon in regard to imaging that was completed at [facility]. Pt stated she had a PET scan at end of May and that recent imaging showed masses in lungs and didn't understand how masses can show up in a month's time. PET actually completed 3/27/23 per facility records. Pt wants appointment scheduled. Patient did cancel an appointment at end of June. Transferred call to Mary to schedule an appointment with patient. Total time spent on phone with patient is 23 minutes.

## 2023-07-11 ENCOUNTER — HOSPITAL ENCOUNTER (OUTPATIENT)
Dept: RADIATION ONCOLOGY | Facility: MEDICAL CENTER | Age: 63
End: 2023-07-31
Attending: RADIOLOGY
Payer: MEDICAID

## 2023-07-11 ENCOUNTER — APPOINTMENT (OUTPATIENT)
Dept: BEHAVIORAL HEALTH | Facility: PSYCHIATRIC FACILITY | Age: 63
End: 2023-07-11
Payer: MEDICAID

## 2023-07-11 ENCOUNTER — HOSPITAL ENCOUNTER (OUTPATIENT)
Dept: HEMATOLOGY ONCOLOGY | Facility: MEDICAL CENTER | Age: 63
End: 2023-07-11
Attending: INTERNAL MEDICINE
Payer: MEDICAID

## 2023-07-11 VITALS
SYSTOLIC BLOOD PRESSURE: 161 MMHG | OXYGEN SATURATION: 93 % | TEMPERATURE: 98.2 F | HEART RATE: 85 BPM | RESPIRATION RATE: 16 BRPM | HEIGHT: 68 IN | WEIGHT: 243.17 LBS | DIASTOLIC BLOOD PRESSURE: 73 MMHG | BODY MASS INDEX: 36.85 KG/M2

## 2023-07-11 VITALS
WEIGHT: 242.06 LBS | OXYGEN SATURATION: 89 % | DIASTOLIC BLOOD PRESSURE: 84 MMHG | HEIGHT: 68 IN | HEART RATE: 86 BPM | SYSTOLIC BLOOD PRESSURE: 160 MMHG | TEMPERATURE: 99 F | BODY MASS INDEX: 36.69 KG/M2

## 2023-07-11 DIAGNOSIS — C50.912 BILATERAL MALIGNANT NEOPLASM OF BREAST IN FEMALE, UNSPECIFIED ESTROGEN RECEPTOR STATUS, UNSPECIFIED SITE OF BREAST (HCC): ICD-10-CM

## 2023-07-11 DIAGNOSIS — C50.911 BILATERAL MALIGNANT NEOPLASM OF BREAST IN FEMALE, UNSPECIFIED ESTROGEN RECEPTOR STATUS, UNSPECIFIED SITE OF BREAST (HCC): ICD-10-CM

## 2023-07-11 PROCEDURE — 99212 OFFICE O/P EST SF 10 MIN: CPT | Mod: 27 | Performed by: INTERNAL MEDICINE

## 2023-07-11 PROCEDURE — 99215 OFFICE O/P EST HI 40 MIN: CPT | Performed by: INTERNAL MEDICINE

## 2023-07-11 PROCEDURE — 99205 OFFICE O/P NEW HI 60 MIN: CPT | Performed by: RADIOLOGY

## 2023-07-11 PROCEDURE — 99214 OFFICE O/P EST MOD 30 MIN: CPT | Performed by: RADIOLOGY

## 2023-07-11 ASSESSMENT — PAIN SCALES - GENERAL
PAINLEVEL: 4=SLIGHT-MODERATE PAIN
PAINLEVEL: 5=MODERATE PAIN

## 2023-07-11 ASSESSMENT — ENCOUNTER SYMPTOMS
PSYCHIATRIC NEGATIVE: 1
MUSCULOSKELETAL NEGATIVE: 1
NEUROLOGICAL NEGATIVE: 1
WEIGHT LOSS: 1
CARDIOVASCULAR NEGATIVE: 1
RESPIRATORY NEGATIVE: 1
EYES NEGATIVE: 1
GASTROINTESTINAL NEGATIVE: 1

## 2023-07-11 NOTE — PROGRESS NOTES
"Patient was seen today in clinic with Dr. Cornejo for consult.  Vitals signs and weight were obtained and pain assessment was completed.  Allergies and medications were reviewed with the patient.       Vitals/Pain:  Vitals:    07/11/23 1312   BP: (!) 161/73   BP Location: Right arm   Patient Position: Sitting   BP Cuff Size: Adult long   Pulse: 85   Resp: 16   Temp: 36.8 °C (98.2 °F)   SpO2: 93%   Weight: 110 kg (243 lb 2.7 oz)   Height: 1.727 m (5' 8\")   Pain Score: 4=Slight-Moderate Pain        Allergies:   Demerol    Current Medications:  Current Outpatient Medications   Medication Sig Dispense Refill    meloxicam (MOBIC) 15 MG tablet TAKE 1 TABLET BY MOUTH EVERY DAY 30 Tablet 0    cyclobenzaprine (FLEXERIL) 5 mg tablet TAKE 1 TO 2 TABLETS BY MOUTH THREE TIMES DAILY AS NEEDED FOR MUSCLE SPASMS 60 Tablet 2    acetaminophen (TYLENOL) 325 MG Tab Tylenol 325 mg tablet   Take 2 tablets every 6 hours by oral route.      losartan (COZAAR) 100 MG Tab Take 1 tablet by mouth daily 90 Tablet 3    aspirin (ASA) 81 MG Chew Tab chewable tablet Chew 81 mg every day.      letrozole (FEMARA) 2.5 MG Tab Take 1 Tablet by mouth every day. (Patient not taking: Reported on 5/30/2023) 30 Tablet 5    losartan (COZAAR) 100 MG Tab Take 1 Tablet by mouth every day. (Patient not taking: Reported on 12/28/2022)      hydrochlorothiazide (MICROZIDE) 12.5 MG capsule Take 12.5 mg by mouth every day. (Patient not taking: Reported on 5/30/2023)       No current facility-administered medications for this encounter.           Arina Ventura R.N.   "

## 2023-07-11 NOTE — PROGRESS NOTES
Medical oncology follow-up visit: 7/11/2023.      Referring Physician: Self referred  Primary Care:  Amelia Franco M.D.    Diagnosis: Bilateral breast cancer    Chief Complaint: Bilateral breast cancer    History of Presenting Illness:  Asha Phan is a 62 y.o. postmenopausal female who was initially diagnosed with bilateral breast cancer in February 2022.  Apparently she felt a mass in her left breast as early as 2019.  The left breast showed invasive ductal carcinoma, grade 1, ER positive MO positive, HER2 negative by history although the pathology is not available to me.  She also had a right breast cancer biopsied under ultrasound at that time likely also estrogen positive.  She was seen by Dr. Arteaga and initially scheduled for surgery but opted not to do it.  She had a multiple social and family issues as well as the pandemic and did not receive any neoadjuvant endocrine therapy or chemotherapy.  On 2/7/2023 she obtained another consult from surgeon Dr. Gomez in [place].  By that time she was having bleeding from the left breast mass that was growing through the skin.  The right breast mass has gotten larger as well.  She was told to have chemo or endocrine therapy prior to considering surgery.  She had a PET scan on 3/24/2023 which showed a large left retroareolar and lateral breast mass with hypermetabolic uptake eroding into the skin.  In addition there was 1.7 cm left axillary lymph node and a 2.8 cm soft tissue mass in the right lateral breast at 9:00.  There was small focal area of hypermetabolic uptake in the anterior cortex of the proximal right femoral diaphysis which may represent early bone metastases but no CT correlation was noted.  No other evidence of disease was noted.  She has recently had intermittent bleeding from the mass which is ulcerating the chest wall now.  Denies any symptoms referable to metastatic disease.  Only chronic problem is hypertension. appetite  and weight are stable.    Interval history 7/11/2023: We recommended letrozole and ribociclib for her locally advanced breast cancer.  She took letrozole for 2 weeks when constipated her and discontinued.  She then developed increasing shortness of breath and ultimately went to [facility] where she was found to have bilateral pleural effusions which were both tapped.  Cytology on one of the effusions was positive for metastatic disease.  CT scan of the chest showed new pleural-based disease as well as lung disease.  Her chest wall disease on the left is not appreciably changed since we saw her in April 2023.  She is now distraught about having stage IV disease documented and is willing to initiate therapy.    Past Medical History:   Diagnosis Date    Anxiety     Depression     Diabetes (HCC)     HLD (hyperlipidemia)     HTN (hypertension)     Hypothyroidism     Thyroid disorder        Past Surgical History:   Procedure Laterality Date    CHOLECYSTECTOMY      OTHER      mastoid bone       Social History     Tobacco Use    Smoking status: Never    Smokeless tobacco: Never   Vaping Use    Vaping Use: Never used   Substance Use Topics    Alcohol use: Yes     Comment: occas    Drug use: No        Family History   Problem Relation Age of Onset    Lung Cancer Mother     Uterine cancer Sister     Cancer Maternal Grandfather     Stomach Cancer Paternal Grandmother        Allergies as of 07/11/2023 - Reviewed 07/11/2023   Allergen Reaction Noted    Demerol Rash 05/20/2011         Current Outpatient Medications:     meloxicam (MOBIC) 15 MG tablet, TAKE 1 TABLET BY MOUTH EVERY DAY, Disp: 30 Tablet, Rfl: 0    cyclobenzaprine (FLEXERIL) 5 mg tablet, TAKE 1 TO 2 TABLETS BY MOUTH THREE TIMES DAILY AS NEEDED FOR MUSCLE SPASMS, Disp: 60 Tablet, Rfl: 2    letrozole (FEMARA) 2.5 MG Tab, Take 1 Tablet by mouth every day. (Patient not taking: Reported on 5/30/2023), Disp: 30 Tablet, Rfl: 5    losartan (COZAAR) 100 MG Tab, Take 1  "Tablet by mouth every day. (Patient not taking: Reported on 12/28/2022), Disp: , Rfl:     acetaminophen (TYLENOL) 325 MG Tab, Tylenol 325 mg tablet  Take 2 tablets every 6 hours by oral route., Disp: , Rfl:     hydrochlorothiazide (MICROZIDE) 12.5 MG capsule, Take 12.5 mg by mouth every day. (Patient not taking: Reported on 5/30/2023), Disp: , Rfl:     losartan (COZAAR) 100 MG Tab, Take 1 tablet by mouth daily, Disp: 90 Tablet, Rfl: 3    aspirin (ASA) 81 MG Chew Tab chewable tablet, Chew 81 mg every day., Disp: , Rfl:     Review of Systems:  Review of Systems   Constitutional:  Positive for malaise/fatigue and weight loss.   HENT: Negative.     Eyes: Negative.    Respiratory: Negative.     Cardiovascular: Negative.    Gastrointestinal: Negative.    Genitourinary: Negative.    Musculoskeletal: Negative.    Skin: Negative.    Neurological: Negative.    Endo/Heme/Allergies: Negative.    Psychiatric/Behavioral: Negative.            Physical Exam:  Vitals:    07/11/23 1640   BP: (!) 160/84   BP Location: Right arm   Patient Position: Sitting   Pulse: 86   Temp: 37.2 °C (99 °F)   TempSrc: Temporal   SpO2: 89%   Weight: 110 kg (242 lb 1 oz)   Height: 1.727 m (5' 8\")       DESC; KARNOFSKY SCALE WITH ECOG EQUIVALENT: 80, Normal activity with effort; some signs or symptoms of disease (ECOG equivalent 1)    DISTRESS LEVEL: mild distress    Physical Exam  Constitutional:       Appearance: Normal appearance.   HENT:      Head: Normocephalic.      Mouth/Throat:      Mouth: Mucous membranes are moist.      Pharynx: Oropharynx is clear.   Eyes:      Extraocular Movements: Extraocular movements intact.      Conjunctiva/sclera: Conjunctivae normal.      Pupils: Pupils are equal, round, and reactive to light.   Cardiovascular:      Rate and Rhythm: Regular rhythm. Tachycardia present.      Pulses: Normal pulses.   Pulmonary:      Effort: Pulmonary effort is normal.      Breath sounds: Normal breath sounds.      Comments: Decreased " breath sounds third of the way up on the right and a quarter of the way up on the left.  No rales or rhonchi are noted.  Chest:      Comments: 7/11/2023: Essentially stable findings 4/25/2023: left breast is replaced by tumor and has involuted substantially.  Laterally there is partial destruction of the lateral nipple areolar complex with scar and ulceration and skin infiltration by tumor.  There are couple small satellite nodule lesions on the left breast.  2 x 2 centimeter left axillary adenopathy is noted but no supraclavicular or infraclavicular adenopathy.  Right breast shows a 3 x 3 cm mass at 9:00.  Abdominal:      General: Abdomen is flat. Bowel sounds are normal.      Palpations: Abdomen is soft. There is no mass.   Musculoskeletal:         General: Normal range of motion.   Skin:     General: Skin is warm.   Neurological:      General: No focal deficit present.      Mental Status: She is alert and oriented to person, place, and time.   Psychiatric:         Mood and Affect: Mood normal.         Behavior: Behavior normal.            Labs:  No visits with results within 1 Month(s) from this visit.   Latest known visit with results is:   Hospital Outpatient Visit on 01/19/2023   Component Date Value Ref Range Status    Elf Score 01/19/2023 9.9   Final    Risk Group 01/19/2023 Moderate   Final    Risk 01/19/2023 23.6 (A)  % Final    (12.4%, 34.9%) Chance of liver related event within 5 years    Relative Risk 01/19/2023 7.4x   Final    Comment: *Relative Risks (calculated hazard ratios using Davey proportional hazard  model after adjusting for age and sex, relative to ELF < 9.8) for Liver  Related Outcomes within 5 years.    Prognostic Determinations:  A review of clinical outcomes from 457 patients followed for a mean of 5  years was used to establish the prognostic utility of ELF (Oksana et al. 2019).  The results from this analysis demonstrate that the ELF scores are useful  for categorizing patients into risk  groups according to the likelihood of  future liver-related clinical outcomes. These outcomes are defined as liver  related death, complication of portal hypertension, liver cancer and liver  transplantation.    Test Description:  The Enhanced Liver Fibrosis (ELF) Score is a multivariate index immunoassay  intended to provide an algorithm score based on quantitative measurements of  hyaluronic acid (HA), amino-terminal propeptide of type III procollagen  (PIIINP) and tissue inhibitor of metalloproteinase 1 (TIMP-1) from [facility].  This test combines the three serum biomarkers to assess the likelihood of  progression to cirrhosis and liver-related clinical events in patients with  advanced fibrosis due to BOX. The analytical measurement range is 4.5 to  14.7    The ELF Test is the only IVD assay granted marketing authorization by the  FDA and measures biomarkers directly involved in the active process of  scarring. The test was developed and its performance characteristics  determined by the [facility]. The laboratory is regulated  under CLIA as qualified to perform high-complexity testing. This testing is  used for clinical purposes. It should not be regarded as investigational or  for research.    Test Performed at: [facility]  [address] [place] [id number]; [phone]  : Rodney Flor MD         Imaging:       Pathology:  Not available    Assessment & Plan:     1.  Bilateral breast cancer neglected for greater than a year now with extensive destruction of the left breast and ulceration and skin lesions making her inoperable on the left.  Right breast shows a 3 x 3 cm mass.  PET scan March 2023 showed non-hypermetabolic pleural effusions which have now progressed and cytology positive.  In addition she seems to have pleural masses which are progressed as well.  She took letrozole for 2 weeks and discontinued and did not start  CDK 4 6 inhibition.    Plan: We are going to get a biopsy of the mass in the left chest wall to confirm biomarkers.  I am sending her for another ultrasound and thoracentesis if fluid is accumulated.  We will see her back in 1 week to initiate therapy likely with fulvestrant and ribociclib.  I spent about 45 minutes with the patient and her daughter explaining the progression of breast cancer from inoperable locally advanced to metastatic which is clearly occurred over the last several months.    Any questions and concerns raised by the patient were answered to the best of my ability. Thank you for allowing me to participate in the care for this patient. Please feel free to contact me for any questions or concerns.     Cm Tabor M.D.

## 2023-07-11 NOTE — CONSULTS
RADIATION ONCOLOGY CONSULT    DATE OF SERVICE: 7/11/2023    IDENTIFICATION: A 62 y.o. female with  neglected bilateral breast cancer   now with an oozing left breast mass.She is here at the kind request of Dr. Cm Tabor.      HISTORY OF PRESENT ILLNESS:  patient noted a mass in her left breast what sounds like as early as 2019.  Work-up apparently was delayed because of COVID.   Biopsies were done 4/20/2019 of the left and the right breast.   2 biopsies were done on the left breast both revealed an invasive ductal carcinoma grade 1.  Tumor was  DC 80 Ki-67 10%.   Right breast was invasive ductal carcinoma tubular type grade 1.     Ki-67 5%.   The left breast was HER2 2+  FISH negative and the right breast was HER2 1+.      She was seen by Dr. Arteaga and surgery was scheduled but she opted against it.  She saw another surgeon down in [place] and did not have therapy there as well.  More recently she had a PET CT scan 3/24/2023 showing a large left retroareolar and lateral breast mass with hypermetabolic uptake eroding into the skin.  In addition there was a 1.7 cm left axillary lymph node and a 2.8 cm soft tissue mass in the right lateral breast at 9:00.  There was small focal hypermetabolic uptake in the anterior cortex of the proximal right femoral diaphysis which may represent early bone metastasis but no CT correlation was noted.  There is no evidence of other disease.  She was seen by Dr. Tabor who recommended hormone blockade and CD4 K inhibitor which apparently she did not take.  She was seen in the emergency room in June at [facility].   she presented with shortness of breath a CT of the chest was performed 618 showed small bilateral pleural effusions right greater than left there was the irregular 7.5 cm left breast mass extending to the nipple consistent with a primary breast cancer.  There is a 2.5 cm lesion in the R outer right breast worrisome for cancer and there were  soft tissue masses abutting the medial pleura and pericardium on the right 8 cm and on the left 3.5 cm worrisome for metastatic disease on the pleural surface from breast cancer.  She reports that she had the effusions drained and she was feeling better but she is again short of breath.  She is seen here in consultation about the role of radiation therapy for possibly palliating the left breast.        PAST MEDICAL HISTORY:   Past Medical History:   Diagnosis Date    Anxiety     Depression     Diabetes (HCC)     HLD (hyperlipidemia)     HTN (hypertension)     Hypothyroidism     Thyroid disorder        PAST SURGICAL HISTORY:  Past Surgical History:   Procedure Laterality Date    CHOLECYSTECTOMY      OTHER      mastoid bone       GYNECOLOGICAL STATUS:  G5, P5    HRT:  None    CURRENT MEDICATIONS:  Current Outpatient Medications   Medication Sig Dispense Refill    meloxicam (MOBIC) 15 MG tablet TAKE 1 TABLET BY MOUTH EVERY DAY 30 Tablet 0    cyclobenzaprine (FLEXERIL) 5 mg tablet TAKE 1 TO 2 TABLETS BY MOUTH THREE TIMES DAILY AS NEEDED FOR MUSCLE SPASMS 60 Tablet 2    acetaminophen (TYLENOL) 325 MG Tab Tylenol 325 mg tablet   Take 2 tablets every 6 hours by oral route.      losartan (COZAAR) 100 MG Tab Take 1 tablet by mouth daily 90 Tablet 3    aspirin (ASA) 81 MG Chew Tab chewable tablet Chew 81 mg every day.      letrozole (FEMARA) 2.5 MG Tab Take 1 Tablet by mouth every day. (Patient not taking: Reported on 5/30/2023) 30 Tablet 5    losartan (COZAAR) 100 MG Tab Take 1 Tablet by mouth every day. (Patient not taking: Reported on 12/28/2022)      hydrochlorothiazide (MICROZIDE) 12.5 MG capsule Take 12.5 mg by mouth every day. (Patient not taking: Reported on 5/30/2023)       No current facility-administered medications for this encounter.       ALLERGIES:    Demerol    FAMILY HISTORY:    Family History   Problem Relation Age of Onset    Lung Cancer Mother     Uterine cancer Sister     Cancer Maternal Grandfather      "Stomach Cancer Paternal Grandmother    [unfilled]        SOCIAL HISTORY:     reports that she has never smoked. She has never used smokeless tobacco. She reports current alcohol use. She reports that she does not use drugs.  Patient is a 62 year old female. She is currently unemployed and lives with her daughter and grand daughter.     REVIEW OF SYSTEMS: Pertinent positives consist of    The masses in the breast left greater than right.  She has swelling in the lower extremities right greater than left.  She has fatigue hot flashes.  She does have known thyroid disease.  She has alteration in taste constipation heartburn urinary frequency nocturia unsteady gait cough shortness of breath depression and anxiety.  The rest of the review of systems is negative and has been reviewed.     PAIN:   Pain Scale: 0-10  Pain Assessement: Initial  Pain Location, Orientation and Scale: Back: Lower : Chronic  : 4  What makes the pain better: Tylenol/ Mobic  What makes the pain worse: n/a       PHYSICAL EXAM:    1= Restricted in physically strenuous activity, but ambulatory and able to carry out work of a light sedentary nature, e.g., light housework, office work.  Vitals:    07/11/23 1312   BP: (!) 161/73   BP Location: Right arm   Patient Position: Sitting   BP Cuff Size: Adult long   Pulse: 85   Resp: 16   Temp: 36.8 °C (98.2 °F)   SpO2: 93%   Weight: 110 kg (243 lb 2.7 oz)   Height: 1.727 m (5' 8\")   Pain Score: 4=Slight-Moderate Pain        GENERAL:   Well-appearing alert and oriented x3 somewhat anxious  Breasts:  left breast has a large exophytic erythematous mass involving the nipple and skin.  It does not appear to be fixed to the chest wall.  There is also palpable axillary adenopathy.  Right breast has a mass in the outer region measuring about 3 cm in greatest dimension I do not appreciate a lymph node on that side.  HEENT:  Pupils are equal, round, and reactive to light.  Extraocular muscles   are intact. Sclerae " nonicteric.  Conjunctivae pink.  Oral cavity, tongue   protrudes midline.   NECK:   No peripheral adenopathy of the neck, supraclavicular fossa or axillae   bilaterally.  LUNGS:  Clear to ascultation   HEART:  Regular rate and rhythm.  No murmur appreciated  ABDOMEN:  Soft. No evidence of hepatosplenomegaly.    EXTREMITIES:  Without Edema.  NEUROLOGIC:  Cranial nerves II through XII were intact. Normal stance and gait motor and sensory grossly within normal limits            IMPRESSION:    A 62 y.o. with  neglected bilateral breast cancer   now with an oozing left breast mass..      RECOMMENDATIONS:     I had a long discussion with the patient regarding her situation.  I told her she most likely has stage IV disease.  The only thing radiation could do would be to palliate any type of discomfort that she has.  Specifically regarding the left breast there is a possibility we could give a short palliative course of radiation therapy to this to stop the oozing and smell.  I've described the details of radiation along with the side effects both acute and chronic, including but not exclusive to fatigue, skin reaction, local soreness, swelling, delayed healing, scarring fibrosis discoloration. Ample time was allowed for questions, and patient understands.      Patient is going to return to see us in about a month.  She is going to see Dr. Tabor about systemic management which I strongly recommend.  He may also need to get a biopsy of the lung if the pleural effusion cytology is negative.  We do not have that report.    Thank you for the opportunity to participate in her care.  If any questions or comments, please do not hesitate in calling.    Please note that this dictation was created using voice recognition software. I have made every reasonable attempt to correct obvious errors, but I expect that there are errors of grammar and possibly content that I did not discover before finalizing the note.

## 2023-07-12 NOTE — ADDENDUM NOTE
Encounter addended by: Zayra Colon, Med Ass't on: 7/12/2023 2:06 PM   Actions taken: Charge Capture section accepted

## 2023-07-13 ENCOUNTER — HOSPITAL ENCOUNTER (OUTPATIENT)
Dept: RADIOLOGY | Facility: MEDICAL CENTER | Age: 63
End: 2023-07-13
Attending: INTERNAL MEDICINE
Payer: MEDICAID

## 2023-07-13 VITALS — OXYGEN SATURATION: 94 % | DIASTOLIC BLOOD PRESSURE: 60 MMHG | SYSTOLIC BLOOD PRESSURE: 139 MMHG | HEART RATE: 66 BPM

## 2023-07-13 DIAGNOSIS — C50.912 BILATERAL MALIGNANT NEOPLASM OF BREAST IN FEMALE, UNSPECIFIED ESTROGEN RECEPTOR STATUS, UNSPECIFIED SITE OF BREAST (HCC): ICD-10-CM

## 2023-07-13 DIAGNOSIS — C50.911 BILATERAL MALIGNANT NEOPLASM OF BREAST IN FEMALE, UNSPECIFIED ESTROGEN RECEPTOR STATUS, UNSPECIFIED SITE OF BREAST (HCC): ICD-10-CM

## 2023-07-13 PROCEDURE — 76604 US EXAM CHEST: CPT

## 2023-07-19 ENCOUNTER — APPOINTMENT (OUTPATIENT)
Dept: RADIOLOGY | Facility: MEDICAL CENTER | Age: 63
End: 2023-07-19
Attending: INTERNAL MEDICINE
Payer: MEDICAID

## 2023-07-19 ENCOUNTER — APPOINTMENT (OUTPATIENT)
Dept: BEHAVIORAL HEALTH | Facility: PSYCHIATRIC FACILITY | Age: 63
End: 2023-07-19
Payer: MEDICAID

## 2023-07-20 ENCOUNTER — APPOINTMENT (OUTPATIENT)
Dept: HEMATOLOGY ONCOLOGY | Facility: MEDICAL CENTER | Age: 63
End: 2023-07-20
Payer: MEDICAID

## 2023-07-26 ENCOUNTER — APPOINTMENT (OUTPATIENT)
Dept: BEHAVIORAL HEALTH | Facility: PSYCHIATRIC FACILITY | Age: 63
End: 2023-07-26
Payer: MEDICAID

## 2023-07-27 ENCOUNTER — HOSPITAL ENCOUNTER (OUTPATIENT)
Dept: RADIOLOGY | Facility: MEDICAL CENTER | Age: 63
End: 2023-07-27
Attending: INTERNAL MEDICINE
Payer: MEDICAID

## 2023-07-27 ENCOUNTER — PATIENT OUTREACH (OUTPATIENT)
Dept: ONCOLOGY | Facility: MEDICAL CENTER | Age: 63
End: 2023-07-27
Payer: MEDICAID

## 2023-07-27 DIAGNOSIS — C50.911 BILATERAL MALIGNANT NEOPLASM OF BREAST IN FEMALE, UNSPECIFIED ESTROGEN RECEPTOR STATUS, UNSPECIFIED SITE OF BREAST (HCC): ICD-10-CM

## 2023-07-27 DIAGNOSIS — R92.8 ABNORMAL FINDING ON RADIOLOGICAL EXAMINATION OF BREAST: ICD-10-CM

## 2023-07-27 DIAGNOSIS — C50.912 BILATERAL MALIGNANT NEOPLASM OF BREAST IN FEMALE, UNSPECIFIED ESTROGEN RECEPTOR STATUS, UNSPECIFIED SITE OF BREAST (HCC): ICD-10-CM

## 2023-07-27 LAB — PATHOLOGY CONSULT NOTE: NORMAL

## 2023-07-27 PROCEDURE — 88374 M/PHMTRC ALYS ISHQUANT/SEMIQ: CPT

## 2023-07-27 PROCEDURE — 88360 TUMOR IMMUNOHISTOCHEM/MANUAL: CPT | Mod: 91

## 2023-07-27 PROCEDURE — 38505 NEEDLE BIOPSY LYMPH NODES: CPT

## 2023-07-27 PROCEDURE — 88361 TUMOR IMMUNOHISTOCHEM/COMPUT: CPT

## 2023-07-27 PROCEDURE — 88305 TISSUE EXAM BY PATHOLOGIST: CPT | Mod: 59

## 2023-07-27 PROCEDURE — 88342 IMHCHEM/IMCYTCHM 1ST ANTB: CPT

## 2023-07-27 PROCEDURE — A4648 IMPLANTABLE TISSUE MARKER: HCPCS

## 2023-07-27 RX ORDER — LORAZEPAM 0.5 MG/1
0.5 TABLET ORAL EVERY 4 HOURS PRN
Qty: 60 TABLET | Refills: 1 | Status: SHIPPED | OUTPATIENT
Start: 2023-07-27 | End: 2023-08-26

## 2023-07-27 NOTE — PROGRESS NOTES
On July 27, 2023, Oncology Social Worker Lakshmi Cohn attempted telephone contact with pt. to follow up on psychosocial distress screening.  OSW Lalit left voicemail message for pt. requesting pt. call back at earliest convenience.  OSW Lalit left contact information in voicemail message.

## 2023-07-28 ENCOUNTER — TELEPHONE (OUTPATIENT)
Dept: RADIOLOGY | Facility: MEDICAL CENTER | Age: 63
End: 2023-07-28
Payer: MEDICAID

## 2023-08-01 ENCOUNTER — HOSPITAL ENCOUNTER (OUTPATIENT)
Dept: RADIOLOGY | Facility: MEDICAL CENTER | Age: 63
End: 2023-08-01
Attending: INTERNAL MEDICINE
Payer: MEDICAID

## 2023-08-01 VITALS — DIASTOLIC BLOOD PRESSURE: 75 MMHG | SYSTOLIC BLOOD PRESSURE: 160 MMHG | HEART RATE: 71 BPM | OXYGEN SATURATION: 92 %

## 2023-08-01 DIAGNOSIS — C50.912 BILATERAL MALIGNANT NEOPLASM OF BREAST IN FEMALE, UNSPECIFIED ESTROGEN RECEPTOR STATUS, UNSPECIFIED SITE OF BREAST (HCC): ICD-10-CM

## 2023-08-01 DIAGNOSIS — C50.911 BILATERAL MALIGNANT NEOPLASM OF BREAST IN FEMALE, UNSPECIFIED ESTROGEN RECEPTOR STATUS, UNSPECIFIED SITE OF BREAST (HCC): ICD-10-CM

## 2023-08-01 LAB — CYTOLOGY REG CYTOL: NORMAL

## 2023-08-01 PROCEDURE — 88342 IMHCHEM/IMCYTCHM 1ST ANTB: CPT | Mod: XU

## 2023-08-01 PROCEDURE — 88305 TISSUE EXAM BY PATHOLOGIST: CPT

## 2023-08-01 PROCEDURE — C1729 CATH, DRAINAGE: HCPCS

## 2023-08-01 PROCEDURE — 88341 IMHCHEM/IMCYTCHM EA ADD ANTB: CPT | Mod: XU

## 2023-08-01 PROCEDURE — 88112 CYTOPATH CELL ENHANCE TECH: CPT

## 2023-08-01 PROCEDURE — 71045 X-RAY EXAM CHEST 1 VIEW: CPT

## 2023-08-01 PROCEDURE — 88360 TUMOR IMMUNOHISTOCHEM/MANUAL: CPT

## 2023-08-02 ENCOUNTER — APPOINTMENT (OUTPATIENT)
Dept: BEHAVIORAL HEALTH | Facility: PSYCHIATRIC FACILITY | Age: 63
End: 2023-08-02
Payer: MEDICAID

## 2023-08-04 ENCOUNTER — TELEPHONE (OUTPATIENT)
Dept: HEMATOLOGY ONCOLOGY | Facility: MEDICAL CENTER | Age: 63
End: 2023-08-04
Payer: MEDICAID

## 2023-08-04 NOTE — TELEPHONE ENCOUNTER
"I have spoke with Asha on numerous occasions throughout the referral process and now established time with Dr Tabor (Dr CRABTREE). Dr CRABTREE has always fit her in last minute as the pt tends to cancel or need to r/s regualry.     He opened time to see Asha 8/8 and offered her a 3 PM appt to see her regarding her bx results and to discuss tx plan and to address her concerns that she calls me about (anxiety, difficulty breathing, confusion about diagnosis). I have multiple times told the pt that I am not clinical and that she needs to call the clinic and have them route the calls appropriately, as I am just the . She did go by this for a week or so then resumed contacting me as she is stating she is \"falling through the cracks\". When in fact - we have offered her many accommodations to see her and get her on a treatment plan.    She has canceled and r/s multiple times with Dr CRABTREE, including a biopsy.    When I spoke with the pt today I offered her an appt 8/8 at 3 PM. She declined stating she wants her daughter to be there. I said it would be OK for her daughter to be on facetime or on the phone to be included in the conversations. Pt declined.    Pt goes on to say that Dr CRABTREE has previously fit her in and made sure to make time for her and now he is not working with her and has too many pts and she is \"being forgotten about\". I made sure to remind the pt that we have always accommodated her and reminder her that she declined to see Dr CRABTREE on 8/8.    She is wanting Dr CRABTREE to put in an order for a Thoracentesis for her Left lung as she just got her Right drained and is requesting relief. I did inform Dr CRABTREE of her wishes and he declined stating he wants to see her in office so he can make sure the procedure is warranted. When I informed the pt of his choice she was upset and feeling like Dr CRABTREE is not taking her health into consideration. I told her that  S does care about her health and is urgently wanting her to be " "seen in office and get a plan going.     I contacted social work. They have attempted to contacted her twice and left 2 vms. When talking with KENDRA Martins, she informed me that there was transportation options for breast cancer pts (Like Asha) that are available to her. Asha has been made aware of these accommodations.     I  urged the pt to make and keep appts with Dr CRABTREE. She states understands the importance but continued to give me reasons that she cannot (including transportation, lack of family support, she wants her daughter at all visits and her daughter is busy, Dr CRABTREE is too busy for her).    We booked her for 8/23 (Dr CRABTREE next available) since she did not want to see him on 8/8. She states she \"wants to see him sooner and hopes he can open up more time options to be seen since he has always done that in the past\" and feels she \"has become spoiled to his flexibility and hopes he can make time\" for her sooner.    I told the pt that if she is having trouble breathing she needs to go to the ED.  "

## 2023-08-07 ENCOUNTER — TELEPHONE (OUTPATIENT)
Dept: RADIOLOGY | Facility: MEDICAL CENTER | Age: 63
End: 2023-08-07
Payer: MEDICAID

## 2023-08-09 ENCOUNTER — APPOINTMENT (OUTPATIENT)
Dept: BEHAVIORAL HEALTH | Facility: PSYCHIATRIC FACILITY | Age: 63
End: 2023-08-09
Payer: MEDICAID

## 2023-08-15 ENCOUNTER — TELEPHONE (OUTPATIENT)
Dept: HEMATOLOGY ONCOLOGY | Facility: MEDICAL CENTER | Age: 63
End: 2023-08-15
Payer: MEDICAID

## 2023-08-15 NOTE — TELEPHONE ENCOUNTER
"An earlier appt became available 8/16 (Due to [facility] pt canceling consult). Attempted to contact Asha to offer her the appt - \"the subscriber you have reached is not available\". Unable to reach pt or leave a VM.  "

## 2023-08-16 ENCOUNTER — APPOINTMENT (OUTPATIENT)
Dept: BEHAVIORAL HEALTH | Facility: PSYCHIATRIC FACILITY | Age: 63
End: 2023-08-16
Payer: MEDICAID

## 2023-08-21 ENCOUNTER — OFFICE VISIT (OUTPATIENT)
Dept: MEDICAL GROUP | Facility: CLINIC | Age: 63
End: 2023-08-21
Payer: MEDICAID

## 2023-08-21 VITALS
SYSTOLIC BLOOD PRESSURE: 138 MMHG | OXYGEN SATURATION: 91 % | HEART RATE: 86 BPM | TEMPERATURE: 99.2 F | BODY MASS INDEX: 36.37 KG/M2 | WEIGHT: 240 LBS | DIASTOLIC BLOOD PRESSURE: 86 MMHG | HEIGHT: 68 IN

## 2023-08-21 DIAGNOSIS — C50.911 BILATERAL MALIGNANT NEOPLASM OF BREAST IN FEMALE, UNSPECIFIED ESTROGEN RECEPTOR STATUS, UNSPECIFIED SITE OF BREAST (HCC): ICD-10-CM

## 2023-08-21 DIAGNOSIS — I51.7 CARDIOMEGALY: ICD-10-CM

## 2023-08-21 DIAGNOSIS — C50.912 BILATERAL MALIGNANT NEOPLASM OF BREAST IN FEMALE, UNSPECIFIED ESTROGEN RECEPTOR STATUS, UNSPECIFIED SITE OF BREAST (HCC): ICD-10-CM

## 2023-08-21 DIAGNOSIS — E78.5 DYSLIPIDEMIA: ICD-10-CM

## 2023-08-21 DIAGNOSIS — R06.02 SHORTNESS OF BREATH: ICD-10-CM

## 2023-08-21 DIAGNOSIS — I51.7 ENLARGED HEART: ICD-10-CM

## 2023-08-21 DIAGNOSIS — E11.9 TYPE 2 DIABETES MELLITUS WITHOUT COMPLICATION, WITHOUT LONG-TERM CURRENT USE OF INSULIN (HCC): ICD-10-CM

## 2023-08-21 PROCEDURE — 99213 OFFICE O/P EST LOW 20 MIN: CPT | Mod: GE

## 2023-08-21 PROCEDURE — 3079F DIAST BP 80-89 MM HG: CPT

## 2023-08-21 PROCEDURE — 3075F SYST BP GE 130 - 139MM HG: CPT

## 2023-08-21 RX ORDER — HYDROCHLOROTHIAZIDE 12.5 MG/1
1 TABLET ORAL
COMMUNITY
End: 2023-08-21

## 2023-08-21 RX ORDER — AMOXICILLIN 250 MG
CAPSULE ORAL
COMMUNITY
Start: 2023-06-21

## 2023-08-21 RX ORDER — LOSARTAN POTASSIUM 100 MG/1
TABLET ORAL
Qty: 90 TABLET | Refills: 3 | Status: SHIPPED | OUTPATIENT
Start: 2023-08-21

## 2023-08-21 RX ORDER — LOSARTAN POTASSIUM 25 MG/1
25 TABLET ORAL
COMMUNITY
Start: 2023-06-18 | End: 2023-08-21

## 2023-08-21 RX ORDER — LOSARTAN POTASSIUM 50 MG/1
50 TABLET ORAL
COMMUNITY
Start: 2023-06-21

## 2023-08-21 RX ORDER — MIRTAZAPINE 15 MG/1
15 TABLET, FILM COATED ORAL
COMMUNITY
Start: 2023-05-02 | End: 2023-08-21

## 2023-08-21 RX ORDER — LOSARTAN POTASSIUM 100 MG/1
1 TABLET ORAL
COMMUNITY
End: 2023-08-21

## 2023-08-21 RX ORDER — LEVOTHYROXINE SODIUM 0.03 MG/1
TABLET ORAL
COMMUNITY
End: 2023-08-21

## 2023-08-21 RX ORDER — HYDROCHLOROTHIAZIDE 25 MG/1
TABLET ORAL
COMMUNITY
End: 2023-08-21

## 2023-08-21 NOTE — PROGRESS NOTES
Subjective:     CC: Shortness of breath    HPI:   Asha with pmhx anxiety, depression, 2 diabetes, dyslipidemia, hypertension, hypothyroidism, and breast cancer who presents today with shortness of breath.  Patient was diagnosed with a grade 1 invasive ductal carcinoma in 2019.  She was supposed to get surgery at the time but opted out of it.  The patient was lost to follow-up and most recently had a PET scan that showed a large breast mass with an left axillary lymph node and soft tissue mass in the right lateral breast.  Per chart review, patient also had hypermetabolic uptake in the anterior cortex of the proximal right femoral diaphysis.  Patient had also been hospitalized in  for shortness of breath and was found to have bilateral pleural effusions.  Cytology demonstrated that the effusions were positive for metastatic disease.  Patient is currently following with oncology and the plan was to get a biopsy of the mass in the left chest wall to confirm biomarkers.  She was also supposed to be started on Fulvestrant and Ribociclib but the patient has not followed up.    Patient reports that she has been experiencing shortness of breath since July when she was hospitalized for bilateral pleural effusions. She has been having recurrent thoracenteses for the pleural effusions that re accumulate. Per chart review, her last thoracentesis was at the beginning of August on the right side. The patient is requesting another thoracentesis and states that typically her Oncologist will order it to get done however, her appointment is not until this Wednesday.     Patient is also requesting a re-order of her echocardiogram. It was initially ordered after an incidental finding of cardiomegaly on chest radiographs. Patient denies any lower extremity swelling.    Problem   Cardiomegaly   Shortness of Breath    Patient reports ongoing shortness of breath for approximately the last 2 months.  States this is present even at rest,  and is exacerbated with minimal physical activity.  Patient was evaluated [facility] approximately 3 weeks ago, CT of the chest abdomen pelvis was performed which showed masses in bilateral lungs, bilateral pleural effusions, and masses in bilateral breasts.  No evidence of pulmonary embolism.  Patient reports that about 1 month prior she had had a PET scan which did not show any metastases in the lungs.     Malignant Neoplasm of Breast (Hcc)    Bilateral breast cancer diagnosed in 2019  Patient has established with a surgeon Dr Arteaga.  She has delayed surgery due to personal hindrances, particularly family stress at home.  Patient recently had a follow-up visit with her surgeon, Dr. Arteaga, who ordered repeat CT and bone scan.  The patient is hoping to schedule resection soon, but has dealt with unfortunate personal discord between herself and her surgeon. She is here today to request an urgent referral for a second opinion to a surgeon who operates outside of the local group. Specifics of this referral are noted in the referral order.     3/21/23: Patient requesting referral to radiation oncologist. States she has seen a surgeon in [place] that wants to have radiation therapy done prior to surgical intervention. Scheduled for PET scan and MRI, as ordered by surgery. She does not have an oncologist managing her cancer. States that she saw Dr. Andersen in the past and was not happy with her care there.         Current Outpatient Medications Ordered in Epic   Medication Sig Dispense Refill    losartan (COZAAR) 100 MG Tab TAKE 1 TABLET BY MOUTH DAILY 90 Tablet 3    senna-docusate (PERICOLACE OR SENOKOT S) 8.6-50 MG Tab 1 Tabs By Mouth 2 Times a Day.      LORazepam (ATIVAN) 0.5 MG Tab Take 1 Tablet by mouth every four hours as needed for Anxiety for up to 30 days. 60 Tablet 1    meloxicam (MOBIC) 15 MG tablet TAKE 1 TABLET BY MOUTH EVERY DAY 30 Tablet 0    cyclobenzaprine (FLEXERIL) 5 mg tablet TAKE  "1 TO 2 TABLETS BY MOUTH THREE TIMES DAILY AS NEEDED FOR MUSCLE SPASMS 60 Tablet 2    acetaminophen (TYLENOL) 325 MG Tab Tylenol 325 mg tablet   Take 2 tablets every 6 hours by oral route.      aspirin (ASA) 81 MG Chew Tab chewable tablet Chew 81 mg every day.      losartan (COZAAR) 50 MG Tab 50 mg. (Patient not taking: Reported on 8/21/2023)       No current Epic-ordered facility-administered medications on file.       ROS:  ROS as per HPI. Otherwise negative.      Objective:     Exam:  /86 (BP Location: Right arm, Patient Position: Sitting, BP Cuff Size: Large adult)   Pulse 86   Temp 37.3 °C (99.2 °F) (Temporal)   Ht 1.727 m (5' 8\")   Wt 109 kg (240 lb)   SpO2 91%   BMI 36.49 kg/m²  Body mass index is 36.49 kg/m².    Gen: Alert and oriented, No apparent distress.  Neck: Neck is supple without lymphadenopathy.  Lungs: Decreased breath sounds in lung bases bilaterally. Normal effort, no wheezes, rhonchi, or rales  CV: Regular rate and rhythm. No murmurs, rubs, or gallops.  Ext: No clubbing, cyanosis, edema.    Labs: None    Assessment & Plan:     63 y.o. female with the following -     Problem List Items Addressed This Visit       Diabetes mellitus, type 2 (HCC)    Relevant Orders    Lipid Profile    HEMOGLOBIN A1C    Comp Metabolic Panel    Dyslipidemia    Relevant Orders    Lipid Profile    Malignant neoplasm of breast (HCC)     Recommend follow-up with Oncology this week.         Relevant Orders    CBC WITH DIFFERENTIAL    Shortness of breath     Patient reports ongoing shortness of breath. She gets thoracenteses often for recurrent pleural effusions secondary to her breast cancer. Last thoracentesis was on the right side at the beginning of August. Decreased lung sounds in bilateral bases on exam. Ordered chest x-ray. Patient will be following up with Oncology this Wednesday.         Relevant Orders    DX-CHEST-2 VIEWS    TSH WITH REFLEX TO FT4    Cardiomegaly     Re-ordered echocardiogram.         " Relevant Medications    losartan (COZAAR) 50 MG Tab     Other Visit Diagnoses       Enlarged heart        Relevant Medications    losartan (COZAAR) 50 MG Tab    Other Relevant Orders    EC-ECHOCARDIOGRAM COMPLETE W/O CONT            I spent a total of 30 minutes with record review, exam, communication with the patient, communication with other providers, and documentation of this encounter.

## 2023-08-22 ENCOUNTER — HOSPITAL ENCOUNTER (OUTPATIENT)
Dept: RADIATION ONCOLOGY | Facility: MEDICAL CENTER | Age: 63
End: 2023-08-31
Attending: RADIOLOGY
Payer: MEDICAID

## 2023-08-22 VITALS
OXYGEN SATURATION: 91 % | DIASTOLIC BLOOD PRESSURE: 77 MMHG | SYSTOLIC BLOOD PRESSURE: 156 MMHG | HEART RATE: 82 BPM | BODY MASS INDEX: 36.37 KG/M2 | WEIGHT: 239.2 LBS

## 2023-08-22 PROCEDURE — 99213 OFFICE O/P EST LOW 20 MIN: CPT | Performed by: RADIOLOGY

## 2023-08-22 PROCEDURE — 99212 OFFICE O/P EST SF 10 MIN: CPT | Performed by: RADIOLOGY

## 2023-08-22 ASSESSMENT — PAIN SCALES - GENERAL: PAINLEVEL: 7=MODERATE-SEVERE PAIN

## 2023-08-22 NOTE — PROGRESS NOTES
RADIATION ONCOLOGY FOLLOW-UP    DATE OF SERVICE: 8/22/2023    IDENTIFICATION:   A 63 y.o. female with neglected bilateral breast cancer   now with an oozing left breast mass.She is here at the kind request of Dr. Cm Tabor.      HISTORY OF PRESENT ILLNESS:   patient noted a mass in her left breast what sounds like as early as 2019.  Work-up apparently was delayed because of COVID.   Biopsies were done 4/20/2019 of the left and the right breast.   2 biopsies were done on the left breast both revealed an invasive ductal carcinoma grade 1.  Tumor was  PA 80 Ki-67 10%.   Right breast was invasive ductal carcinoma tubular type grade 1.     Ki-67 5%.   The left breast was HER2 2+  FISH negative and the right breast was HER2 1+.      She was seen by Dr. Arteaga and surgery was scheduled but she opted against it.  She saw another surgeon down in [place] and did not have therapy there as well.  More recently she had a PET CT scan 3/24/2023 showing a large left retroareolar and lateral breast mass with hypermetabolic uptake eroding into the skin.  In addition there was a 1.7 cm left axillary lymph node and a 2.8 cm soft tissue mass in the right lateral breast at 9:00.  There was small focal hypermetabolic uptake in the anterior cortex of the proximal right femoral diaphysis which may represent early bone metastasis but no CT correlation was noted.  There is no evidence of other disease.  She was seen by Dr. Tabor who recommended hormone blockade and CD4 K inhibitor which apparently she did not take.  She was seen in the emergency room in June at [facility].   she presented with shortness of breath a CT of the chest was performed 618 showed small bilateral pleural effusions right greater than left there was the irregular 7.5 cm left breast mass extending to the nipple consistent with a primary breast cancer.  There is a 2.5 cm lesion in the R outer right breast worrisome for cancer and there  were soft tissue masses abutting the medial pleura and pericardium on the right 8 cm and on the left 3.5 cm worrisome for metastatic disease on the pleural surface from breast cancer.  She reports that she had the effusions drained and she was feeling better but she is again short of breath.    Patient was seen in consultation about a month ago about possible palliative radiation to the left oozing breast mass.  Since that time she has had pleural effusions drained and pathology has come back consistent with breast cancer.  She plans to see Dr. Tabor in the next week as well.  CURRENT MEDICATIONS:  Current Outpatient Medications   Medication Sig Dispense Refill    losartan (COZAAR) 100 MG Tab TAKE 1 TABLET BY MOUTH DAILY 90 Tablet 3    losartan (COZAAR) 50 MG Tab 50 mg. (Patient not taking: Reported on 8/21/2023)      senna-docusate (PERICOLACE OR SENOKOT S) 8.6-50 MG Tab 1 Tabs By Mouth 2 Times a Day.      LORazepam (ATIVAN) 0.5 MG Tab Take 1 Tablet by mouth every four hours as needed for Anxiety for up to 30 days. 60 Tablet 1    meloxicam (MOBIC) 15 MG tablet TAKE 1 TABLET BY MOUTH EVERY DAY 30 Tablet 0    cyclobenzaprine (FLEXERIL) 5 mg tablet TAKE 1 TO 2 TABLETS BY MOUTH THREE TIMES DAILY AS NEEDED FOR MUSCLE SPASMS 60 Tablet 2    acetaminophen (TYLENOL) 325 MG Tab Tylenol 325 mg tablet   Take 2 tablets every 6 hours by oral route.      aspirin (ASA) 81 MG Chew Tab chewable tablet Chew 81 mg every day.       No current facility-administered medications for this encounter.       ALLERGIES:  Demerol    FAMILY HISTORY:    Family History   Problem Relation Age of Onset    Lung Cancer Mother     Uterine cancer Sister     Cancer Maternal Grandfather     Stomach Cancer Paternal Grandmother    [unfilled]        SOCIAL HISTORY:     reports that she has never smoked. She has never used smokeless tobacco. She reports current alcohol use. She reports that she does not use drugs.    PAIN:   None    REVIEW OF SYSTEMS:  Is significant for  hot flashes shortness of breath supplemental oxygen use and depression  The rest of the review of systems has been reviewed.    PHYSICAL EXAM:     ECOG PERFORMANCE STATUS:  1= Restricted in physically strenuous activity, but ambulatory and able to carry out work of a light sedentary nature, e.g., light housework, office work.   Vitals:    08/22/23 1448   BP: (!) 156/77   BP Location: Right arm   Patient Position: Sitting   BP Cuff Size: Adult long   Pulse: 82   SpO2: 91%   Weight: 109 kg (239 lb 3.2 oz)   Pain Score: 7=Moderate-Severe Pain (Needs Thoracentesis, pain w breathing)        GENERAL:  alert oriented x3  BREASTS: left breast mass is oozing and malodorous unchanged from her prior exam.  She does have a mass in the right breast but this is contained within the breast itself.  HEENT:  Pupils are equal, round, and reactive to light.  Extraocular muscles   are intact. Sclerae nonicteric.  Conjunctivae pink.  Oral cavity, tongue   protrudes midline.   NECK:  No preauricular neck supraclavicular axillary adenopathy.  LUNGS:  Clear to ascultation   HEART:  Regular rate and rhythm.  No murmur appreciated  ABDOMEN:  Soft. No evidence of hepatosplenomegaly.   EXTREMITIES:  Without Edema.  NEUROLOGIC:  Cranial nerves II through XII were intact. Normal stance and gait motor and sensory grossly within normal limits          IMPRESSION:    A 63 y.o. with  neglected bilateral breast cancer     With bilateral pleural effusions now documented pathologically as metastatic disease.    RECOMMENDATIONS:       I explained to the patient at this time the only thing radiation therapy can do is palliation.  We could deliver palliative radiation therapy to the left breast to stop the oozing however this would take several treatments on the order of 10-20.   I explained her best option at this point would be systemic management.  She plans to see Dr. Tabor in the near future so she will discuss this with  him.  If she changes her mind and would like something treated with radiation therapy I am happy to see her again.        Thank you for the opportunity to participate in her care.  If any questions or comments, please do not hesitate in calling.      Please note that this dictation was created using voice recognition software. I have made every reasonable attempt to correct obvious errors, but I expect that there are errors of grammar and possibly content that I did not discover before finalizing the note.

## 2023-08-22 NOTE — PROGRESS NOTES
Patient was seen today in clinic with Dr. Cornejo for follow.  Vitals signs and weight were obtained and pain assessment was completed.  Allergies and medications were reviewed with the patient.      Vitals/Pain:  Vitals:    08/22/23 1448   BP: (!) 156/77   BP Location: Right arm   Patient Position: Sitting   BP Cuff Size: Adult long   Pulse: 82   SpO2: 91%   Weight: 109 kg (239 lb 3.2 oz)   Pain Score: 7=Moderate-Severe Pain (Needs Thoracentesis, pain w breathing)        Allergies:   Demerol    Current Medications:  Current Outpatient Medications   Medication Sig Dispense Refill    losartan (COZAAR) 100 MG Tab TAKE 1 TABLET BY MOUTH DAILY 90 Tablet 3    losartan (COZAAR) 50 MG Tab 50 mg. (Patient not taking: Reported on 8/21/2023)      senna-docusate (PERICOLACE OR SENOKOT S) 8.6-50 MG Tab 1 Tabs By Mouth 2 Times a Day.      LORazepam (ATIVAN) 0.5 MG Tab Take 1 Tablet by mouth every four hours as needed for Anxiety for up to 30 days. 60 Tablet 1    meloxicam (MOBIC) 15 MG tablet TAKE 1 TABLET BY MOUTH EVERY DAY 30 Tablet 0    cyclobenzaprine (FLEXERIL) 5 mg tablet TAKE 1 TO 2 TABLETS BY MOUTH THREE TIMES DAILY AS NEEDED FOR MUSCLE SPASMS 60 Tablet 2    acetaminophen (TYLENOL) 325 MG Tab Tylenol 325 mg tablet   Take 2 tablets every 6 hours by oral route.      aspirin (ASA) 81 MG Chew Tab chewable tablet Chew 81 mg every day.       No current facility-administered medications for this encounter.         PCP:  [facility]

## 2023-08-23 ENCOUNTER — APPOINTMENT (OUTPATIENT)
Dept: BEHAVIORAL HEALTH | Facility: PSYCHIATRIC FACILITY | Age: 63
End: 2023-08-23
Payer: MEDICAID

## 2023-08-23 ENCOUNTER — HOSPITAL ENCOUNTER (OUTPATIENT)
Dept: HEMATOLOGY ONCOLOGY | Facility: MEDICAL CENTER | Age: 63
End: 2023-08-23
Attending: INTERNAL MEDICINE
Payer: MEDICAID

## 2023-08-23 VITALS
DIASTOLIC BLOOD PRESSURE: 88 MMHG | WEIGHT: 238.65 LBS | TEMPERATURE: 98.5 F | HEIGHT: 68 IN | OXYGEN SATURATION: 88 % | HEART RATE: 91 BPM | BODY MASS INDEX: 36.17 KG/M2 | SYSTOLIC BLOOD PRESSURE: 140 MMHG

## 2023-08-23 DIAGNOSIS — I51.7 ENLARGED HEART: ICD-10-CM

## 2023-08-23 DIAGNOSIS — J91.0 MALIGNANT PLEURAL EFFUSION: ICD-10-CM

## 2023-08-23 DIAGNOSIS — C50.911 BILATERAL MALIGNANT NEOPLASM OF BREAST IN FEMALE, UNSPECIFIED ESTROGEN RECEPTOR STATUS, UNSPECIFIED SITE OF BREAST (HCC): ICD-10-CM

## 2023-08-23 DIAGNOSIS — C50.912 BILATERAL MALIGNANT NEOPLASM OF BREAST IN FEMALE, UNSPECIFIED ESTROGEN RECEPTOR STATUS, UNSPECIFIED SITE OF BREAST (HCC): ICD-10-CM

## 2023-08-23 PROCEDURE — 99212 OFFICE O/P EST SF 10 MIN: CPT | Performed by: INTERNAL MEDICINE

## 2023-08-23 PROCEDURE — 99215 OFFICE O/P EST HI 40 MIN: CPT | Performed by: INTERNAL MEDICINE

## 2023-08-23 ASSESSMENT — ENCOUNTER SYMPTOMS
MUSCULOSKELETAL NEGATIVE: 1
WEIGHT LOSS: 1
RESPIRATORY NEGATIVE: 1
GASTROINTESTINAL NEGATIVE: 1
NEUROLOGICAL NEGATIVE: 1
CARDIOVASCULAR NEGATIVE: 1
PSYCHIATRIC NEGATIVE: 1
EYES NEGATIVE: 1

## 2023-08-23 NOTE — PROGRESS NOTES
Medical oncology follow-up visit: 7/11/2023.      Referring Physician: Self referred  Primary Care:  Amelia Franco M.D.    Diagnosis: Bilateral breast cancer    Chief Complaint: Bilateral breast cancer    History of Presenting Illness:  Asha Phan is a 62 y.o. postmenopausal female who was initially diagnosed with bilateral breast cancer in February 2022.  Apparently she felt a mass in her left breast as early as 2019.  The left breast showed invasive ductal carcinoma, grade 1, ER positive DE positive, HER2 negative by history although the pathology is not available to me.  She also had a right breast cancer biopsied under ultrasound at that time likely also estrogen positive.  She was seen by Dr. Arteaga and initially scheduled for surgery but opted not to do it.  She had a multiple social and family issues as well as the pandemic and did not receive any neoadjuvant endocrine therapy or chemotherapy.  On 2/7/2023 she obtained another consult from surgeon Dr. Gomez in [place].  By that time she was having bleeding from the left breast mass that was growing through the skin.  The right breast mass has gotten larger as well.  She was told to have chemo or endocrine therapy prior to considering surgery.  She had a PET scan on 3/24/2023 which showed a large left retroareolar and lateral breast mass with hypermetabolic uptake eroding into the skin.  In addition there was 1.7 cm left axillary lymph node and a 2.8 cm soft tissue mass in the right lateral breast at 9:00.  There was small focal area of hypermetabolic uptake in the anterior cortex of the proximal right femoral diaphysis which may represent early bone metastases but no CT correlation was noted.  No other evidence of disease was noted.  She has recently had intermittent bleeding from the mass which is ulcerating the chest wall now.  Denies any symptoms referable to metastatic disease.  Only chronic problem is hypertension. appetite  and weight are stable.    Interval history 7/11/2023: We recommended letrozole and ribociclib for her locally advanced breast cancer.  She took letrozole for 2 weeks when constipated her and discontinued.  She then developed increasing shortness of breath and ultimately went to [facility] where she was found to have bilateral pleural effusions which were both tapped.  Cytology on one of the effusions was positive for metastatic disease.  CT scan of the chest showed new pleural-based disease as well as lung disease.  Her chest wall disease on the left is not appreciably changed since we saw her in April 2023.  She is now distraught about having stage IV disease documented and is willing to initiate therapy.    Interval history 8/23/2023: She has had thoracenteses on both the left and right lung since we last saw her.  She has not taken any systemic therapy since we last saw her.  A biopsy of her left breast showed invasive ductal carcinoma, grade 1, ER positive greater than 90%, MT +10%, Ki-67 less than 5%, HER2 2+ IHC FISH negative.  [place] lymph node biopsy was positive for cancer.  There is some oozing from the ulcerated portion of the breast cancer in the left areola laterally.  She has no bony pain.    Past Medical History:   Diagnosis Date    Anxiety     Depression     Diabetes (HCC)     HLD (hyperlipidemia)     HTN (hypertension)     Hypothyroidism     Thyroid disorder        Past Surgical History:   Procedure Laterality Date    CHOLECYSTECTOMY      OTHER      mastoid bone       Social History     Tobacco Use    Smoking status: Never    Smokeless tobacco: Never   Vaping Use    Vaping Use: Never used   Substance Use Topics    Alcohol use: Yes     Comment: occas    Drug use: No        Family History   Problem Relation Age of Onset    Lung Cancer Mother     Uterine cancer Sister     Cancer Maternal Grandfather     Stomach Cancer Paternal Grandmother        Allergies as of 08/23/2023 - Reviewed 08/23/2023  "  Allergen Reaction Noted    Demerol Rash 05/20/2011         Current Outpatient Medications:     losartan (COZAAR) 100 MG Tab, TAKE 1 TABLET BY MOUTH DAILY, Disp: 90 Tablet, Rfl: 3    losartan (COZAAR) 50 MG Tab, 50 mg. (Patient not taking: Reported on 8/21/2023), Disp: , Rfl:     senna-docusate (PERICOLACE OR SENOKOT S) 8.6-50 MG Tab, 1 Tabs By Mouth 2 Times a Day., Disp: , Rfl:     LORazepam (ATIVAN) 0.5 MG Tab, Take 1 Tablet by mouth every four hours as needed for Anxiety for up to 30 days., Disp: 60 Tablet, Rfl: 1    meloxicam (MOBIC) 15 MG tablet, TAKE 1 TABLET BY MOUTH EVERY DAY, Disp: 30 Tablet, Rfl: 0    cyclobenzaprine (FLEXERIL) 5 mg tablet, TAKE 1 TO 2 TABLETS BY MOUTH THREE TIMES DAILY AS NEEDED FOR MUSCLE SPASMS, Disp: 60 Tablet, Rfl: 2    acetaminophen (TYLENOL) 325 MG Tab, Tylenol 325 mg tablet  Take 2 tablets every 6 hours by oral route., Disp: , Rfl:     aspirin (ASA) 81 MG Chew Tab chewable tablet, Chew 81 mg every day., Disp: , Rfl:     Review of Systems:  Review of Systems   Constitutional:  Positive for malaise/fatigue and weight loss.   HENT: Negative.     Eyes: Negative.    Respiratory: Negative.     Cardiovascular: Negative.    Gastrointestinal: Negative.    Genitourinary: Negative.    Musculoskeletal: Negative.    Skin: Negative.    Neurological: Negative.    Endo/Heme/Allergies: Negative.    Psychiatric/Behavioral: Negative.            Physical Exam:  Vitals:    08/23/23 1522   BP: (!) 140/88   BP Location: Right arm   Patient Position: Sitting   Pulse: 91   Temp: 36.9 °C (98.5 °F)   TempSrc: Temporal   SpO2: 88%   Weight: 108 kg (238 lb 10.4 oz)   Height: 1.727 m (5' 8\")       DESC; KARNOFSKY SCALE WITH ECOG EQUIVALENT: 80, Normal activity with effort; some signs or symptoms of disease (ECOG equivalent 1)    DISTRESS LEVEL: mild distress    Physical Exam  Constitutional:       Appearance: Normal appearance.   HENT:      Head: Normocephalic.      Mouth/Throat:      Mouth: Mucous membranes " are moist.      Pharynx: Oropharynx is clear.   Eyes:      Extraocular Movements: Extraocular movements intact.      Conjunctiva/sclera: Conjunctivae normal.      Pupils: Pupils are equal, round, and reactive to light.   Cardiovascular:      Rate and Rhythm: Regular rhythm. Tachycardia present.      Pulses: Normal pulses.   Pulmonary:      Effort: Pulmonary effort is normal.      Breath sounds: Normal breath sounds.      Comments: Decreased breath sounds third of the way up on the right and a quarter of the way up on the left.  No rales or rhonchi are noted.  Chest:      Comments: 8/23/2023: Slightly worse in the left breast.  Right breast stable 7/11/2023: Essentially stable findings 4/25/2023: left breast is replaced by tumor and has involuted substantially.  Laterally there is partial destruction of the lateral nipple areolar complex with scar and ulceration and skin infiltration by tumor.  There are couple small satellite nodule lesions on the left breast.  2 x 2 centimeter left axillary adenopathy is noted but no supraclavicular or infraclavicular adenopathy.  Right breast shows a 3 x 3 cm mass at 9:00.  Abdominal:      General: Abdomen is flat. Bowel sounds are normal.      Palpations: Abdomen is soft. There is no mass.   Musculoskeletal:         General: Normal range of motion.   Skin:     General: Skin is warm.   Neurological:      General: No focal deficit present.      Mental Status: She is alert and oriented to person, place, and time.   Psychiatric:         Mood and Affect: Mood normal.         Behavior: Behavior normal.            Labs:  No visits with results within 1 Month(s) from this visit.   Latest known visit with results is:   Hospital Outpatient Visit on 01/19/2023   Component Date Value Ref Range Status    Elf Score 01/19/2023 9.9   Final    Risk Group 01/19/2023 Moderate   Final    Risk 01/19/2023 23.6 (A)  % Final    (12.4%, 34.9%) Chance of liver related event within 5 years    Relative Risk  01/19/2023 7.4x   Final    Comment: *Relative Risks (calculated hazard ratios using Davey proportional hazard  model after adjusting for age and sex, relative to ELF < 9.8) for Liver  Related Outcomes within 5 years.    Prognostic Determinations:  A review of clinical outcomes from 457 patients followed for a mean of 5  years was used to establish the prognostic utility of ELF (Oksana et al. 2019).  The results from this analysis demonstrate that the ELF scores are useful  for categorizing patients into risk groups according to the likelihood of  future liver-related clinical outcomes. These outcomes are defined as liver  related death, complication of portal hypertension, liver cancer and liver  transplantation.    Test Description:  The Enhanced Liver Fibrosis (ELF) Score is a multivariate index immunoassay  intended to provide an algorithm score based on quantitative measurements of  hyaluronic acid (HA), amino-terminal propeptide of type III procollagen  (PIIINP) and tissue inhibitor of metalloproteinase 1 (TIMP-1) from [facility].  This test combines the three serum biomarkers to assess the likelihood of  progression to cirrhosis and liver-related clinical events in patients with  advanced fibrosis due to BOX. The analytical measurement range is 4.5 to  14.7    The ELF Test is the only IVD assay granted marketing authorization by the  FDA and measures biomarkers directly involved in the active process of  scarring. The test was developed and its performance characteristics  determined by the [facility]. The laboratory is regulated  under CLIA as qualified to perform high-complexity testing. This testing is  used for clinical purposes. It should not be regarded as investigational or  for research.    Test Performed at: [facility]  [address] [place] [id number]; [phone]  : Rodney Flor MD         Imaging:       Pathology:  Not  available    Assessment & Plan:     1.  Bilateral breast cancer neglected for greater than a year now with extensive destruction of the left breast and ulceration and skin lesions making her inoperable on the left.  Right breast shows a 3 x 3 cm mass.  PET scan March 2023 showed non-hypermetabolic pleural effusions which have now progressed and cytology positive.  In addition she seems to have pleural masses which are.  Now requiring more frequent thoracenteses.  I had a vikash discussion with her today about her progression and prognosis.  I estimate that without treatment she has 6 months or less to live.  We recommended letrozole and Ibrance which has a 5 out of 6 chance of keeping her disease stable and a 2 out of 3 chance of actually shrinking the cancer.  Some benefits of letrozole and Ibrance were explained in detail again and she is willing to try.  Plan: We will get a CT of chest abdomen pelvis to evaluate for other progression of disease.  She will have a stat ultrasound of her chest and thoracentesis of the right side which appears worse clinically.  We will also get an echocardiogram to rule out per pericardial effusion.  She will get [facility] at [facility] tomorrow to check her tumor markers and CBC and chemistry pretreatment with Ibrance.  We will try to get the Ibrance as soon as possible.  She has letrozole at home and will start that tonight.  We will see her back 2 weeks after she starts letrozole.  Her prognosis is guarded but there is optimism that if she is willing to take combination therapy and tolerates that she may get substantial benefit.    Any questions and concerns raised by the patient were answered to the best of my ability. Thank you for allowing me to participate in the care for this patient. Please feel free to contact me for any questions or concerns.     Cm Tabor M.D.

## 2023-08-23 NOTE — ASSESSMENT & PLAN NOTE
Patient reports ongoing shortness of breath. She gets thoracenteses often for recurrent pleural effusions secondary to her breast cancer. Last thoracentesis was on the right side at the beginning of August. Decreased lung sounds in bilateral bases on exam. Ordered chest x-ray. Patient will be following up with Oncology this Wednesday.

## 2023-08-24 NOTE — ADDENDUM NOTE
Encounter addended by: Fidencio Lewis, Med Ass't on: 8/23/2023 5:22 PM   Actions taken: Charge Capture section accepted

## 2023-08-25 DIAGNOSIS — I51.7 CARDIOMEGALY: ICD-10-CM

## 2023-08-26 ENCOUNTER — APPOINTMENT (OUTPATIENT)
Dept: RADIOLOGY | Facility: MEDICAL CENTER | Age: 63
End: 2023-08-26
Attending: INTERNAL MEDICINE
Payer: MEDICAID

## 2023-08-29 ENCOUNTER — HOSPITAL ENCOUNTER (OUTPATIENT)
Dept: CARDIOLOGY | Facility: MEDICAL CENTER | Age: 63
End: 2023-08-29
Attending: FAMILY MEDICINE
Payer: MEDICAID

## 2023-08-29 ENCOUNTER — HOSPITAL ENCOUNTER (OUTPATIENT)
Dept: RADIOLOGY | Facility: MEDICAL CENTER | Age: 63
End: 2023-08-29
Attending: INTERNAL MEDICINE
Payer: MEDICAID

## 2023-08-29 VITALS — SYSTOLIC BLOOD PRESSURE: 127 MMHG | OXYGEN SATURATION: 96 % | DIASTOLIC BLOOD PRESSURE: 62 MMHG | HEART RATE: 74 BPM

## 2023-08-29 DIAGNOSIS — I51.7 CARDIOMEGALY: ICD-10-CM

## 2023-08-29 DIAGNOSIS — C50.912 BILATERAL MALIGNANT NEOPLASM OF BREAST IN FEMALE, UNSPECIFIED ESTROGEN RECEPTOR STATUS, UNSPECIFIED SITE OF BREAST (HCC): ICD-10-CM

## 2023-08-29 DIAGNOSIS — J91.0 MALIGNANT PLEURAL EFFUSION: ICD-10-CM

## 2023-08-29 DIAGNOSIS — C50.911 BILATERAL MALIGNANT NEOPLASM OF BREAST IN FEMALE, UNSPECIFIED ESTROGEN RECEPTOR STATUS, UNSPECIFIED SITE OF BREAST (HCC): ICD-10-CM

## 2023-08-29 LAB
CYTOLOGY REG CYTOL: NORMAL
LV EJECT FRACT  99904: 70

## 2023-08-29 PROCEDURE — 71045 X-RAY EXAM CHEST 1 VIEW: CPT

## 2023-08-29 PROCEDURE — 88112 CYTOPATH CELL ENHANCE TECH: CPT

## 2023-08-29 PROCEDURE — 93306 TTE W/DOPPLER COMPLETE: CPT | Mod: 26 | Performed by: INTERNAL MEDICINE

## 2023-08-29 PROCEDURE — 93306 TTE W/DOPPLER COMPLETE: CPT

## 2023-08-29 PROCEDURE — 88342 IMHCHEM/IMCYTCHM 1ST ANTB: CPT

## 2023-08-29 PROCEDURE — 88341 IMHCHEM/IMCYTCHM EA ADD ANTB: CPT

## 2023-08-29 PROCEDURE — 88305 TISSUE EXAM BY PATHOLOGIST: CPT

## 2023-08-29 PROCEDURE — C1729 CATH, DRAINAGE: HCPCS

## 2023-08-30 ENCOUNTER — OFFICE VISIT (OUTPATIENT)
Dept: BEHAVIORAL HEALTH | Facility: PSYCHIATRIC FACILITY | Age: 63
End: 2023-08-30
Payer: MEDICAID

## 2023-08-30 ENCOUNTER — TELEPHONE (OUTPATIENT)
Dept: HEMATOLOGY ONCOLOGY | Facility: MEDICAL CENTER | Age: 63
End: 2023-08-30
Payer: MEDICAID

## 2023-08-30 ENCOUNTER — APPOINTMENT (OUTPATIENT)
Dept: BEHAVIORAL HEALTH | Facility: PSYCHIATRIC FACILITY | Age: 63
End: 2023-08-30
Payer: MEDICAID

## 2023-08-30 ENCOUNTER — TELEMEDICINE (OUTPATIENT)
Dept: HEMATOLOGY ONCOLOGY | Facility: MEDICAL CENTER | Age: 63
End: 2023-08-30
Payer: MEDICAID

## 2023-08-30 DIAGNOSIS — F41.9 ANXIETY: ICD-10-CM

## 2023-08-30 PROCEDURE — 90837 PSYTX W PT 60 MINUTES: CPT

## 2023-08-30 NOTE — TELEPHONE ENCOUNTER
Pt called and let a VM with me. She wanted to speak with Dr CRABTREE's care team (Fidencio). Pt is aware that I am not clinical and has been informed to contact the main clinic line to get expedited care as I have 72 hours to get back to the pt regarding scheduling matters and the PAR's will make sure the pt is transferred to the clinical staff to have concerns addressed in a faster matter pending urgency. Pt can call the clinic direct line at [phone].     Fidencio please follow up with the pt. She requested to speak with you regarding matter of her care that she would like him to be made aware of prior to him leaving and being out of office until mid   September. Pt can be reached [phone].

## 2023-08-31 NOTE — PSYCHOTHERAPY
" [facility]  Psychotherapy Progress Note    Patient Name: Asha Hannon  Patient MRN: [id number]  Today's Date: 8/30/2023     Resident/Fellow providing service: Natalie Mcdaniel M.D.  Supervising Attending: Donny Kumar MD    Type of session:Individual psychotherapy  Session start time: 4 PM  Session stop time: 5 PM  Length of time spent face to face with patient: 1 hour  Persons in attendance:Patient    Subjective/New Info: Danika spent most of the time talking about how medical system and doctors made mistakes and failed her on her return.  She now has stage IV cancer with metastatic metastases.  She started taking letrozole 10 days ago and  Recommended Ibrance yet she has concerns over potential effects on her liver with it.    Discussed whether she can commit to coming to therapy weekly.  Discussed constraints of this clinic and offered solutions of switching to morning clinic for as needed visits.  She will come next week and go from there.  Address that if she commits to coming weekly and missed an appointment will have to revisit this conversation.    Objective/Observations:   Participation: Active verbal participation and Verbally monopolizing   Grooming: Casual   Cognition: Alert and Fully Oriented   Eye contact: Indirect   Mood: \"Fine\"   Affect: Constricted and Anxious   Thought process: Goal-directed and Circumstantial   Speech: Volume within normal limits and Hypertalkative   Other:     Diagnoses: No diagnosis found.     Current assessment of risk:   SUICIDE: Low   Homicide: Low   Self-harm: Low   Relapse: Not applicable   Other: Not applicable   Safety Plan reviewed? Not Indicated   If evidence of imminent risk is present, intervention/plan: NA    Therapeutic Intervention(s): Stressors assessed, Supportive psychotherapy, and Treatment compliance addressed    Treatment Goal(s)/Objective(s) addressed: Improved coping with stressors    Progress toward Treatment Goals: Mild " decline    Plan:      - Next visit in 1 week    Natalie Mcdaniel M.D.  8/30/2023

## 2023-08-31 NOTE — TELEPHONE ENCOUNTER
Attempted to call patient back today regarding concerns. I was unable to leave her a voice message but it did allow me to send her an SMS text message with the office phone number ([phone]) for the patient to call back to discuss her concerns.

## 2023-08-31 NOTE — PROGRESS NOTES
[facility]  Psychotherapy Summary Note    Full therapy note has been documented and is under restricted viewing.  Please see below for summary of today's session.     Patient Name: Asha Hannon  Patient MRN: [id number]  Today's Date: 08/30/2023    Resident/Fellow providing service: Natalie Mcdaniel M.D.    Type of session:Individual psychotherapy  Session start time: 4 PM  Session stop time: 5 PM  Length of time spent face to face with patient: 1 hour  Persons in attendance:Patient    DSM-V Diagnoses: No diagnosis found.     Current Medications:  None    Medications Reviewed: No    Symptoms currently being addressed in therapy: anxiety, interpersonal difficulty, and poor insight    Therapeutic Intervention(s): Stressors assessed, Supportive psychotherapy, and Treatment compliance addressed    Treatment Goal(s)/Objective(s) addressed: Improved coping with stressors    Progress toward Treatment Goals: Mild decline    Plan:      - Next visit in 1 week    Discussed with supervising attending. Please see attending attestation for further details.    Natalie Mcdaniel M.D.

## 2023-09-06 ENCOUNTER — OFFICE VISIT (OUTPATIENT)
Dept: BEHAVIORAL HEALTH | Facility: PSYCHIATRIC FACILITY | Age: 63
End: 2023-09-06
Payer: MEDICAID

## 2023-09-06 ENCOUNTER — APPOINTMENT (OUTPATIENT)
Dept: BEHAVIORAL HEALTH | Facility: PSYCHIATRIC FACILITY | Age: 63
End: 2023-09-06
Payer: MEDICAID

## 2023-09-06 DIAGNOSIS — F41.9 ANXIETY: ICD-10-CM

## 2023-09-06 PROCEDURE — 90832 PSYTX W PT 30 MINUTES: CPT | Mod: GC

## 2023-09-07 NOTE — PROGRESS NOTES
[facility]  Psychotherapy Summary Note    Full therapy note has been documented and is under restricted viewing.  Please see below for summary of today's session.     Patient Name: Asha Hannon  Patient MRN: [id number]  Today's Date: 09/06/2023    Resident/Fellow providing service: Natalie Mcdaniel M.D.    Type of session:Individual psychotherapy  Session start time: 4:35 PM  Session stop time: 5:05 PM 30 minutes  Length of time spent face to face with patient: 30 minutes  Persons in attendance:Patient    DSM-V Diagnoses: No diagnosis found.     Current Medications:  None managed by behavioral health    Medications Reviewed: No    Symptoms currently being addressed in therapy: depression, anxiety, grief, and interpersonal difficulty    Therapeutic Intervention(s): Supportive psychotherapy     Treatment Goal(s)/Objective(s) addressed: Patient cannot commit to regular therapy at this time, she is aware that I will see her as needed during morning clinic.  She is okay to call to schedule 4 PM psychotherapy but is aware that this appointment slot may fill     Progress toward Treatment Goals: No change     Plan:      - Terminating scheduled weekly therapy due to patient being unable to come weekly, patient can schedule as needed sessions availability of schedule permitting    Discussed with supervising attending. Please see attending attestation for further details.    Natalie Mcdaniel M.D.

## 2023-09-07 NOTE — PSYCHOTHERAPY
"[facility]  Psychotherapy Progress Note    Patient Name: Asha Hannon  Patient MRN: [id number]  Today's Date: 9/6/2023     Resident/Fellow providing service: Natalie Mcdaniel M.D.  Supervising Attending: Donny Kumar MD    Type of session:Individual psychotherapy  Session start time: 4:35 PM  Session stop time: 5:05 PM 30 minutes  Length of time spent face to face with patient: 30 minutes  Persons in attendance:Patient    Subjective/New Info: Today patient wanted to discuss her family dynamics and how heartbroken she is over being abandoned by her family.  She talked about her relationship with her ex-, her daughters who have abandoned her, and her granddaughter who she cannot help her because she will not talk to her.  She also talked about the only child who has remained with her Rocio and how if Danika dies she will be left alone with no family.  Discussed how her family watched her take care of her mother dying of cancer and she was there for her and how heartbroken she is that her family is not doing the same for her.    Objective/Observations:   Participation: Verbally monopolizing and Defensive   Grooming: Casual   Cognition: Alert and Fully Oriented   Eye contact: Limited   Mood: \"Fine I guess\"   Affect: Congruent with content, Sad, Anxious, Tearful, and Angry   Thought process: Circumstantial   Speech: Rate within normal limits, Volume within normal limits, and Hypertalkative   Other:     Diagnoses: No diagnosis found.     Current assessment of risk:   SUICIDE: Low   Homicide: Low   Self-harm: Low   Relapse: Not applicable   Other: NA   Safety Plan reviewed? Not Indicated   If evidence of imminent risk is present, intervention/plan: NA    Therapeutic Intervention(s): Supportive psychotherapy    Treatment Goal(s)/Objective(s) addressed: Patient cannot commit to regular therapy at this time, she is aware that I will see her as needed during morning clinic.  She is okay to call to " schedule 4 PM psychotherapy but is aware that this appointment slot may fill    Progress toward Treatment Goals: No change    Plan:      - Terminating scheduled weekly therapy due to patient being unable to come weekly, patient can schedule as needed sessions availability of schedule permitting    Natalie Mcdaniel M.D.  9/6/2023

## 2023-09-11 ENCOUNTER — TELEPHONE (OUTPATIENT)
Dept: HEMATOLOGY ONCOLOGY | Facility: MEDICAL CENTER | Age: 63
End: 2023-09-11
Payer: MEDICAID

## 2023-09-11 DIAGNOSIS — C50.911 BILATERAL MALIGNANT NEOPLASM OF BREAST IN FEMALE, UNSPECIFIED ESTROGEN RECEPTOR STATUS, UNSPECIFIED SITE OF BREAST (HCC): ICD-10-CM

## 2023-09-11 DIAGNOSIS — C50.912 BILATERAL MALIGNANT NEOPLASM OF BREAST IN FEMALE, UNSPECIFIED ESTROGEN RECEPTOR STATUS, UNSPECIFIED SITE OF BREAST (HCC): ICD-10-CM

## 2023-09-11 RX ORDER — LETROZOLE 2.5 MG/1
2.5 TABLET, FILM COATED ORAL DAILY
Qty: 30 TABLET | Refills: 5 | Status: SHIPPED | OUTPATIENT
Start: 2023-09-11 | End: 2024-03-05

## 2023-09-11 NOTE — TELEPHONE ENCOUNTER
Pt called and let a VM with me. She wanted to speak with Dr CRABTREE's care team (Fidencio). Pt is aware that I am not clinical and has been informed to contact the main clinic line to get expedited care as I have 72 hours to get back to the pt regarding scheduling matters and the PAR's will make sure the pt is transferred to the clinical staff to have concerns addressed in a faster matter pending urgency. Pt can call the clinic direct line at [phone].     Pt called previously and wanted to speak with Fidencio. I sent an encounter to Fidencio and Fidencio attempted to reach pt without success. Pt called 9/8/2023 and left a VM regarding starting a new medication. I transferred this message to Fidencio and she informed me that she would make contact with Dr CRABTREE's Nurse, Tamara, in regards to these medications. Fidencio will follow up with pt.     Pt can call the clinic direct line at [phone].

## 2023-09-11 NOTE — TELEPHONE ENCOUNTER
"LVM for patient asking her to call me back at [facility]. Patient was inquiring about starting letrozole and ibrance. I spoke with Cristiana CRESPO. She stated: \"Dr. CRABTREE ordered a CT chest, abdomen and pelvis which looks like it has not been done or scheduled.  Can you please get this scheduled within the next week so that it is done before she starts the Ibrance?   He also ordered labs which are CBC, CMP, CA 27.29 and CEA.  Looks like she goes to [facility], can you find out if this patient has done these labs yet or not?  If she has not please have her get those done ASAP as well.  She needs those before starting the Ibrance as well.\"     I asked in VM if patient had completed her labs at [facility]? Also if she had completed her CT scan at [facility]? Or if she needed for me to schedule at [facility] and to please call me back directly at [phone].  "

## 2023-09-12 ENCOUNTER — TELEPHONE (OUTPATIENT)
Dept: HEMATOLOGY ONCOLOGY | Facility: MEDICAL CENTER | Age: 63
End: 2023-09-12
Payer: MEDICAID

## 2023-09-12 ENCOUNTER — APPOINTMENT (OUTPATIENT)
Dept: HEMATOLOGY ONCOLOGY | Facility: MEDICAL CENTER | Age: 63
End: 2023-09-12
Payer: MEDICAID

## 2023-09-12 NOTE — TELEPHONE ENCOUNTER
"Pt called to cancel appt with Alsop. I attempted to call pt back but got a message that \"the subscriber is unavailable\". Pt noted in VM that she was having some GI upset and was unable to make appt as she needed some rest.     Asha, I was unable to get a hold of you. Please call back so we can get you on the books to see Alsop if you would like to see her before you scheduled appt with Dr Tabor on 9/19/2023.  "

## 2023-09-13 ENCOUNTER — APPOINTMENT (OUTPATIENT)
Dept: BEHAVIORAL HEALTH | Facility: PSYCHIATRIC FACILITY | Age: 63
End: 2023-09-13
Payer: MEDICAID

## 2023-09-18 ENCOUNTER — OFFICE VISIT (OUTPATIENT)
Dept: MEDICAL GROUP | Facility: CLINIC | Age: 63
End: 2023-09-18
Payer: MEDICAID

## 2023-09-18 VITALS
HEIGHT: 68 IN | HEART RATE: 90 BPM | WEIGHT: 238.8 LBS | DIASTOLIC BLOOD PRESSURE: 82 MMHG | TEMPERATURE: 97.8 F | BODY MASS INDEX: 36.19 KG/M2 | OXYGEN SATURATION: 94 % | SYSTOLIC BLOOD PRESSURE: 138 MMHG

## 2023-09-18 DIAGNOSIS — N61.0 BREAST INFECTION: ICD-10-CM

## 2023-09-18 DIAGNOSIS — R06.02 SHORTNESS OF BREATH: ICD-10-CM

## 2023-09-18 DIAGNOSIS — J91.0 MALIGNANT PLEURAL EFFUSION: ICD-10-CM

## 2023-09-18 DIAGNOSIS — R94.5 ABNORMAL LIVER FUNCTION: ICD-10-CM

## 2023-09-18 PROCEDURE — 3079F DIAST BP 80-89 MM HG: CPT

## 2023-09-18 PROCEDURE — 3075F SYST BP GE 130 - 139MM HG: CPT

## 2023-09-18 PROCEDURE — 99213 OFFICE O/P EST LOW 20 MIN: CPT | Mod: GE

## 2023-09-18 RX ORDER — AMOXICILLIN AND CLAVULANATE POTASSIUM 875; 125 MG/1; MG/1
1 TABLET, FILM COATED ORAL 2 TIMES DAILY
Qty: 20 TABLET | Refills: 0 | Status: SHIPPED | OUTPATIENT
Start: 2023-09-18 | End: 2023-09-28

## 2023-09-19 ENCOUNTER — HOSPITAL ENCOUNTER (OUTPATIENT)
Dept: HEMATOLOGY ONCOLOGY | Facility: MEDICAL CENTER | Age: 63
End: 2023-09-19
Attending: INTERNAL MEDICINE
Payer: MEDICAID

## 2023-09-19 VITALS
DIASTOLIC BLOOD PRESSURE: 80 MMHG | BODY MASS INDEX: 36.07 KG/M2 | HEART RATE: 79 BPM | HEIGHT: 68 IN | SYSTOLIC BLOOD PRESSURE: 140 MMHG | WEIGHT: 237.99 LBS | TEMPERATURE: 97.6 F | OXYGEN SATURATION: 93 %

## 2023-09-19 DIAGNOSIS — C50.911 BILATERAL MALIGNANT NEOPLASM OF BREAST IN FEMALE, UNSPECIFIED ESTROGEN RECEPTOR STATUS, UNSPECIFIED SITE OF BREAST (HCC): ICD-10-CM

## 2023-09-19 DIAGNOSIS — J91.0 MALIGNANT PLEURAL EFFUSION: ICD-10-CM

## 2023-09-19 DIAGNOSIS — C50.912 BILATERAL MALIGNANT NEOPLASM OF BREAST IN FEMALE, UNSPECIFIED ESTROGEN RECEPTOR STATUS, UNSPECIFIED SITE OF BREAST (HCC): ICD-10-CM

## 2023-09-19 PROCEDURE — 99214 OFFICE O/P EST MOD 30 MIN: CPT | Performed by: INTERNAL MEDICINE

## 2023-09-19 PROCEDURE — 99212 OFFICE O/P EST SF 10 MIN: CPT | Performed by: INTERNAL MEDICINE

## 2023-09-19 ASSESSMENT — ENCOUNTER SYMPTOMS
GASTROINTESTINAL NEGATIVE: 1
RESPIRATORY NEGATIVE: 1
PSYCHIATRIC NEGATIVE: 1
WEIGHT LOSS: 1
MUSCULOSKELETAL NEGATIVE: 1
NEUROLOGICAL NEGATIVE: 1
EYES NEGATIVE: 1
CARDIOVASCULAR NEGATIVE: 1

## 2023-09-19 ASSESSMENT — PAIN SCALES - GENERAL: PAINLEVEL: 4=SLIGHT-MODERATE PAIN

## 2023-09-19 NOTE — ASSESSMENT & PLAN NOTE
Informed patient that results have demonstrated pleural effusion is likely due to malignancy and that it will recur while patient's breast cancer is being treated. Patient understands but would still like follow-up with Pulmonology given the recurrence and risk of pulmonary side effects with her chemotherapy. Placed referral.

## 2023-09-19 NOTE — PROGRESS NOTES
Subjective:     CC: Follow-up, breast infection    HPI:   Asha with pmhx anxiety, depression, 2 diabetes, dyslipidemia, hypertension, hypothyroidism, and breast cancer who presents today with:    Problem   Breast Infection    Patient has a history of breast cancer and is currently following with Oncology. She reports that in the past few days, she has been experiencing yellow drainage from the mass and blood. The patient has also noticed erythema around the breast. She is not currently getting radiation therapy and is getting chemotherapy.     Abnormal Liver Function    Patient reports having a previous abnormality in her liver test. Per chart review, patient had ELF testing done which demonstrated moderate risk for liver related event. Patient denies a history of hepatitis B or C. She does not drink alcohol and feels that she has a healthy lifestyle.     Malignant Pleural Effusion    Patient would like to see a Pulmonologist due to her lung issues. She is currently on oxygen as needed for recurring pleural and is receiving therapeutic thoracentesis. Patient also has a history of sleep apnea and is currently using a CPAP. She was not using it for several weeks due to not having the connector between the CPAP and Oxygen. Patientis currently experiencing shortness of breath which only slightly improved with her recent thoracentesis.          Current Outpatient Medications Ordered in Epic   Medication Sig Dispense Refill    amoxicillin-clavulanate (AUGMENTIN) 875-125 MG Tab Take 1 Tablet by mouth 2 times a day for 10 days. 20 Tablet 0    Palbociclib 125 MG Cap Take 125 mg by mouth see administration instructions. Take 125mg by mouth daily on days 1 through 21 (3 weeks on, 1 week off) of 28 day cycle. 21 Capsule 1    letrozole (FEMARA) 2.5 MG Tab Take 1 Tablet by mouth every day. 30 Tablet 5    losartan (COZAAR) 100 MG Tab TAKE 1 TABLET BY MOUTH DAILY 90 Tablet 3    losartan (COZAAR) 50 MG Tab 50 mg.       "senna-docusate (PERICOLACE OR SENOKOT S) 8.6-50 MG Tab 1 Tabs By Mouth 2 Times a Day.      meloxicam (MOBIC) 15 MG tablet TAKE 1 TABLET BY MOUTH EVERY DAY 30 Tablet 0    cyclobenzaprine (FLEXERIL) 5 mg tablet TAKE 1 TO 2 TABLETS BY MOUTH THREE TIMES DAILY AS NEEDED FOR MUSCLE SPASMS 60 Tablet 2    acetaminophen (TYLENOL) 325 MG Tab Tylenol 325 mg tablet   Take 2 tablets every 6 hours by oral route.      aspirin (ASA) 81 MG Chew Tab chewable tablet Chew 81 mg every day.       No current [facility]-ordered facility-administered medications on file.       ROS:  ROS as per HPI. Otherwise negative.    Objective:     Exam:  /82 (BP Location: Left arm, Patient Position: Sitting, BP Cuff Size: Adult)   Pulse 90   Temp 36.6 °C (97.8 °F) (Temporal)   Ht 1.727 m (5' 8\")   Wt 108 kg (238 lb 12.8 oz)   SpO2 94%   BMI 36.31 kg/m²  Body mass index is 36.31 kg/m².    Gen: Alert and oriented, No apparent distress.  Neck: Neck is supple without lymphadenopathy.  Lungs: Decreased breath sounds in bilateral lower lungs. Normal effort, no wheezes, rhonchi, or rales  CV: Regular rate and rhythm. No murmurs, rubs, or gallops.  Ext: No clubbing, cyanosis, edema.    Labs: None    Assessment & Plan:     63 y.o. female with the following -     Problem List Items Addressed This Visit       Shortness of breath    Relevant Orders    DME Incentive Spirometer    Malignant pleural effusion     Informed patient that results have demonstrated pleural effusion is likely due to malignancy and that it will recur while patient's breast cancer is being treated. Patient understands but would still like follow-up with Pulmonology given the recurrence and risk of pulmonary side effects with her chemotherapy. Placed referral.         Relevant Orders    Referral to Pulmonary and Sleep Medicine    Breast infection     Will prescribe 10 day course of Augmentin 875mg twice daily. Recommend follow-up if erythema or drainage does not improve.         Abnormal " liver function     Recommend further testing for autoimmune and viral causes of liver disease. Will also order RUQ US.         Relevant Orders    Referral For Fibroscan    HEP B SURFACE ANTIGEN    HEP C VIRUS ANTIBODY    ALPHA-1 ANTITRYPSIN PHENOTYPE    TRANSFERRIN    IGGY REFLEXIVE PROFILE    US-RUQ       I spent a total of 30 minutes with record review, exam, communication with the patient, communication with other providers, and documentation of this encounter.      Return in about 4 weeks (around 10/16/2023).

## 2023-09-19 NOTE — ASSESSMENT & PLAN NOTE
Recommend further testing for autoimmune and viral causes of liver disease. Will also order RUQ US.

## 2023-09-19 NOTE — PROGRESS NOTES
Medical oncology follow-up visit: 9/19/2023      Referring Physician: Self referred  Primary Care:  Amelia Franco M.D.    Diagnosis: Bilateral breast cancer    Chief Complaint: Bilateral breast cancer    History of Presenting Illness:  Asha Phan is a 62 y.o. postmenopausal female who was initially diagnosed with bilateral breast cancer in February 2022.  Apparently she felt a mass in her left breast as early as 2019.  The left breast showed invasive ductal carcinoma, grade 1, ER positive MN positive, HER2 negative by history although the pathology is not available to me.  She also had a right breast cancer biopsied under ultrasound at that time likely also estrogen positive.  She was seen by Dr. Arteaga and initially scheduled for surgery but opted not to do it.  She had a multiple social and family issues as well as the pandemic and did not receive any neoadjuvant endocrine therapy or chemotherapy.  On 2/7/2023 she obtained another consult from surgeon Dr. Gomez in [place].  By that time she was having bleeding from the left breast mass that was growing through the skin.  The right breast mass has gotten larger as well.  She was told to have chemo or endocrine therapy prior to considering surgery.  She had a PET scan on 3/24/2023 which showed a large left retroareolar and lateral breast mass with hypermetabolic uptake eroding into the skin.  In addition there was 1.7 cm left axillary lymph node and a 2.8 cm soft tissue mass in the right lateral breast at 9:00.  There was small focal area of hypermetabolic uptake in the anterior cortex of the proximal right femoral diaphysis which may represent early bone metastases but no CT correlation was noted.  No other evidence of disease was noted.  She has recently had intermittent bleeding from the mass which is ulcerating the chest wall now.  Denies any symptoms referable to metastatic disease.  Only chronic problem is hypertension. appetite and  weight are stable.    Interval history 7/11/2023: We recommended letrozole and ribociclib for her locally advanced breast cancer.  She took letrozole for 2 weeks when constipated her and discontinued.  She then developed increasing shortness of breath and ultimately went to [facility] where she was found to have bilateral pleural effusions which were both tapped.  Cytology on one of the effusions was positive for metastatic disease.  CT scan of the chest showed new pleural-based disease as well as lung disease.  Her chest wall disease on the left is not appreciably changed since we saw her in April 2023.  She is now distraught about having stage IV disease documented and is willing to initiate therapy.    Interval history 8/23/2023: She has had thoracenteses on both the left and right lung since we last saw her.  She has not taken any systemic therapy since we last saw her.  A biopsy of her left breast showed invasive ductal carcinoma, grade 1, ER positive greater than 90%, ME +10%, Ki-67 less than 5%, HER2 2+ IHC FISH negative.  Sch[place]z lymph node biopsy was positive for cancer.  There is some oozing from the ulcerated portion of the breast cancer in the left areola laterally.  She has no bony pain.    Interval history 9/19/2023: She continues to have thoracenteses for malignant pleural effusions.  The breast mass became malodorous and she saw her primary care physician and started on oral antibiotics.  Some of this may be due to necrotic tumor.  She started her letrozole 3 weeks ago and is tolerating it reasonably well so far.  Having spoken to her some of her friends she wishes to try abemaciclib instead of Ibrance.  We had a long discussion with her that any CDK 4 6 inhibitor is preferable with her advanced disease to none.    Past Medical History:   Diagnosis Date    Anxiety     Depression     Diabetes (HCC)     HLD (hyperlipidemia)     HTN (hypertension)     Hypothyroidism     Thyroid disorder         Past Surgical History:   Procedure Laterality Date    CHOLECYSTECTOMY      OTHER      mastoid bone       Social History     Tobacco Use    Smoking status: Never    Smokeless tobacco: Never   Vaping Use    Vaping Use: Never used   Substance Use Topics    Alcohol use: Yes     Comment: occas    Drug use: No        Family History   Problem Relation Age of Onset    Lung Cancer Mother     Uterine cancer Sister     Cancer Maternal Grandfather     Stomach Cancer Paternal Grandmother        Allergies as of 09/19/2023 - Reviewed 09/19/2023   Allergen Reaction Noted    Demerol Rash 05/20/2011         Current Outpatient Medications:     amoxicillin-clavulanate (AUGMENTIN) 875-125 MG Tab, Take 1 Tablet by mouth 2 times a day for 10 days., Disp: 20 Tablet, Rfl: 0    Palbociclib 125 MG Cap, Take 125 mg by mouth see administration instructions. Take 125mg by mouth daily on days 1 through 21 (3 weeks on, 1 week off) of 28 day cycle., Disp: 21 Capsule, Rfl: 1    letrozole (FEMARA) 2.5 MG Tab, Take 1 Tablet by mouth every day., Disp: 30 Tablet, Rfl: 5    losartan (COZAAR) 100 MG Tab, TAKE 1 TABLET BY MOUTH DAILY, Disp: 90 Tablet, Rfl: 3    losartan (COZAAR) 50 MG Tab, 50 mg., Disp: , Rfl:     senna-docusate (PERICOLACE OR SENOKOT S) 8.6-50 MG Tab, 1 Tabs By Mouth 2 Times a Day., Disp: , Rfl:     meloxicam (MOBIC) 15 MG tablet, TAKE 1 TABLET BY MOUTH EVERY DAY, Disp: 30 Tablet, Rfl: 0    cyclobenzaprine (FLEXERIL) 5 mg tablet, TAKE 1 TO 2 TABLETS BY MOUTH THREE TIMES DAILY AS NEEDED FOR MUSCLE SPASMS, Disp: 60 Tablet, Rfl: 2    acetaminophen (TYLENOL) 325 MG Tab, Tylenol 325 mg tablet  Take 2 tablets every 6 hours by oral route., Disp: , Rfl:     aspirin (ASA) 81 MG Chew Tab chewable tablet, Chew 81 mg every day., Disp: , Rfl:     Review of Systems:  Review of Systems   Constitutional:  Positive for malaise/fatigue and weight loss.   HENT: Negative.     Eyes: Negative.    Respiratory: Negative.     Cardiovascular: Negative.   "  Gastrointestinal: Negative.    Genitourinary: Negative.    Musculoskeletal: Negative.    Skin: Negative.    Neurological: Negative.    Endo/Heme/Allergies: Negative.    Psychiatric/Behavioral: Negative.            Physical Exam:  Vitals:    09/19/23 1639   BP: (!) 140/80   BP Location: Right arm   Patient Position: Sitting   Pulse: 79   Temp: 36.4 °C (97.6 °F)   TempSrc: Temporal   SpO2: 93%   Weight: 108 kg (237 lb 15.8 oz)   Height: 1.727 m (5' 8\")       DESC; KARNOFSKY SCALE WITH ECOG EQUIVALENT: 80, Normal activity with effort; some signs or symptoms of disease (ECOG equivalent 1)    DISTRESS LEVEL: mild distress    Physical Exam  Constitutional:       Appearance: Normal appearance.   HENT:      Head: Normocephalic.      Mouth/Throat:      Mouth: Mucous membranes are moist.      Pharynx: Oropharynx is clear.   Eyes:      Extraocular Movements: Extraocular movements intact.      Conjunctiva/sclera: Conjunctivae normal.      Pupils: Pupils are equal, round, and reactive to light.   Cardiovascular:      Rate and Rhythm: Regular rhythm. Tachycardia present.      Pulses: Normal pulses.   Pulmonary:      Effort: Pulmonary effort is normal.      Breath sounds: Normal breath sounds.      Comments: Decreased breath sounds third of the way up on the right and a quarter of the way up on the left.  No rales or rhonchi are noted.  Chest:      Comments: 8/23/2023: Slightly worse in the left breast.  Right breast stable 7/11/2023: Essentially stable findings 4/25/2023: left breast is replaced by tumor and has involuted substantially.  Laterally there is partial destruction of the lateral nipple areolar complex with scar and ulceration and skin infiltration by tumor.  There are couple small satellite nodule lesions on the left breast.  2 x 2 centimeter left axillary adenopathy is noted but no supraclavicular or infraclavicular adenopathy.  Right breast shows a 3 x 3 cm mass at 9:00.  Abdominal:      General: Abdomen is flat. " Bowel sounds are normal.      Palpations: Abdomen is soft. There is no mass.   Musculoskeletal:         General: Normal range of motion.   Skin:     General: Skin is warm.   Neurological:      General: No focal deficit present.      Mental Status: She is alert and oriented to person, place, and time.   Psychiatric:         Mood and Affect: Mood normal.         Behavior: Behavior normal.          Labs:  No visits with results within 1 Month(s) from this visit.   Latest known visit with results is:   Hospital Outpatient Visit on 01/19/2023   Component Date Value Ref Range Status    Elf Score 01/19/2023 9.9   Final    Risk Group 01/19/2023 Moderate   Final    Risk 01/19/2023 23.6 (A)  % Final    (12.4%, 34.9%) Chance of liver related event within 5 years    Relative Risk 01/19/2023 7.4x   Final    Comment: *Relative Risks (calculated hazard ratios using Davey proportional hazard  model after adjusting for age and sex, relative to ELF < 9.8) for Liver  Related Outcomes within 5 years.    Prognostic Determinations:  A review of clinical outcomes from 457 patients followed for a mean of 5  years was used to establish the prognostic utility of ELF (Oksana et al. 2019).  The results from this analysis demonstrate that the ELF scores are useful  for categorizing patients into risk groups according to the likelihood of  future liver-related clinical outcomes. These outcomes are defined as liver  related death, complication of portal hypertension, liver cancer and liver  transplantation.    Test Description:  The Enhanced Liver Fibrosis (ELF) Score is a multivariate index immunoassay  intended to provide an algorithm score based on quantitative measurements of  hyaluronic acid (HA), amino-terminal propeptide of type III procollagen  (PIIINP) and tissue inhibitor of metalloproteinase 1 (TIMP-1) from [facility].  This test combines the three serum biomarkers to assess the likelihood of  progression to cirrhosis  and liver-related clinical events in patients with  advanced fibrosis due to BOX. The analytical measurement range is 4.5 to  14.7    The ELF Test is the only IVD assay granted marketing authorization by the  FDA and measures biomarkers directly involved in the active process of  scarring. The test was developed and its performance characteristics  determined by the [facility]. The laboratory is regulated  under CLIA as qualified to perform high-complexity testing. This testing is  used for clinical purposes. It should not be regarded as investigational or  for research.    Test Performed at: [facility]  [address] [place] [id number]; [phone]  : Rodney Flor MD         Imaging:       Pathology:  Not available    Assessment & Plan:     1.  Bilateral breast cancer neglected for greater than a year now with extensive destruction of the left breast and ulceration and skin lesions making her inoperable on the left.  Right breast shows a 3 x 3 cm mass.  PET scan March 2023 showed non-hypermetabolic pleural effusions which have now progressed and cytology positive.  In addition she seems to have pleural masses which are.  Now requiring more frequent thoracenteses.  Started anastrozole early September 2023.  Has not yet started CDK 4 6 inhibitor.  We are going to move to initiate abemaciclib 150 mg twice daily daily as soon as possible.  .  Plan: Continue resolve and add abemaciclib.  She was to have thoracentesis as needed.  See her back in 2 weeks.  Any questions and concerns raised by the patient were answered to the best of my ability. Thank you for allowing me to participate in the care for this patient. Please feel free to contact me for any questions or concerns.     Cm Tabor M.D.

## 2023-09-19 NOTE — ASSESSMENT & PLAN NOTE
Will prescribe 10 day course of Augmentin 875mg twice daily. Recommend follow-up if erythema or drainage does not improve.

## 2023-09-20 ENCOUNTER — OFFICE VISIT (OUTPATIENT)
Dept: BEHAVIORAL HEALTH | Facility: PSYCHIATRIC FACILITY | Age: 63
End: 2023-09-20
Payer: MEDICAID

## 2023-09-20 ENCOUNTER — APPOINTMENT (OUTPATIENT)
Dept: BEHAVIORAL HEALTH | Facility: PSYCHIATRIC FACILITY | Age: 63
End: 2023-09-20
Payer: MEDICAID

## 2023-09-20 DIAGNOSIS — C50.911 BILATERAL MALIGNANT NEOPLASM OF BREAST IN FEMALE, UNSPECIFIED ESTROGEN RECEPTOR STATUS, UNSPECIFIED SITE OF BREAST (HCC): ICD-10-CM

## 2023-09-20 DIAGNOSIS — F41.9 ANXIETY: ICD-10-CM

## 2023-09-20 DIAGNOSIS — J91.0 MALIGNANT PLEURAL EFFUSION: ICD-10-CM

## 2023-09-20 DIAGNOSIS — C50.912 BILATERAL MALIGNANT NEOPLASM OF BREAST IN FEMALE, UNSPECIFIED ESTROGEN RECEPTOR STATUS, UNSPECIFIED SITE OF BREAST (HCC): ICD-10-CM

## 2023-09-20 PROCEDURE — 90832 PSYTX W PT 30 MINUTES: CPT | Mod: GC

## 2023-09-20 RX ORDER — ABEMACICLIB 150 MG/1
150 TABLET ORAL EVERY 12 HOURS
Qty: 60 TABLET | Refills: 0 | Status: SHIPPED | OUTPATIENT
Start: 2023-09-20

## 2023-09-21 ENCOUNTER — TELEPHONE (OUTPATIENT)
Dept: HEMATOLOGY ONCOLOGY | Facility: MEDICAL CENTER | Age: 63
End: 2023-09-21
Payer: MEDICAID

## 2023-09-21 LAB — HBA1C MFR BLD: 6.8 % (ref ?–5.8)

## 2023-09-21 NOTE — ADDENDUM NOTE
Encounter addended by: Fidencio Lewis, Med Ass't on: 9/21/2023 11:53 AM   Actions taken: Charge Capture section accepted

## 2023-09-21 NOTE — TELEPHONE ENCOUNTER
Received request for Verzenio, ran a test claim and a PA is required.    Submitted PA via CoverMyMeds key is [id number]

## 2023-09-25 NOTE — PSYCHOTHERAPY
"[facility]  Psychotherapy Progress Note    Patient Name: Asha Hannon  Patient MRN: [id number]  Today's Date: 9/20/2023     Resident/Fellow providing service: Natalie Mcdaniel M.D.  Supervising Attending: Donny Kumar MD    Type of session:Individual psychotherapy  Session start time: 4:30  Session stop time: 5pm  Length of time spent face to face with patient: 30 min  Persons in attendance:Patient    Subjective/New Info: Asha spent todays session talking about failings of medical care she experienced. Provided validation of emotions and support while refraining from validating behavior.     Most of the session discussed her family and specifically circumstances pertaining to her son's passing from accidental overdose on heroin after which he was placed in a shower that resulted in him drowning. Discussed how she learned about it and stigma experienced related to passing from drug related cause.    Objective/Observations:   Participation: Active verbal participation and Verbally monopolizing   Grooming: Casual   Cognition: Alert and Fully Oriented   Eye contact: Limited   Mood: \"I'm still here, so good I guess\"   Affect: Flexible, Full range, Congruent with content, Anxious, Tearful, and Angry   Thought process: Circumstantial, Perseveration, and mostly organized   Speech: Rate within normal limits, Volume within normal limits, and Hypertalkative   Other:     Diagnoses: No diagnosis found.     Current assessment of risk:   SUICIDE: Low   Homicide: Low   Self-harm: Low   Relapse: Not applicable   Other: n/a   Safety Plan reviewed? Not Indicated   If evidence of imminent risk is present, intervention/plan: n/a    Therapeutic Intervention(s): Parenting/familial roles addressed, Stressors assessed, and Supportive psychotherapy    Treatment Goal(s)/Objective(s) addressed: Emotional support and improving insight.      Progress toward Treatment Goals: No change    Plan:      - Psychotherapy at " scheduled appointments weekly will be terminated due to limitations of this office that are unable to meet the flexibility requirements related to patient's complex medical conditions resulting  in inability to consistently attend scheduled appointments and difficulty with arriving to appointments within set by office policy start time due to having to rely on family member for transportation.   -OK to schedule as needed appointments in the morning clinic     Natalie Mcdaniel M.D.  9/20/2023

## 2023-09-25 NOTE — PROGRESS NOTES
[facility]  Psychotherapy Summary Note    Full therapy note has been documented and is under restricted viewing.  Please see below for summary of today's session.     Patient Name: Asha Hannon  Patient MRN: [id number]  Today's Date: 09/20/2023    Resident/Fellow providing service: Natalie Mcdaniel M.D.      Type of session:Individual psychotherapy  Session start time: 4:30  Session stop time: 5pm  Length of time spent face to face with patient: 30 min  Persons in attendance:Patient    DSM-V Diagnoses:   1. Anxiety         Current Medications:  None prescribed by behavioral health    Medications Reviewed:  n/a    Symptoms currently being addressed in therapy: anxiety, grief, and interpersonal difficulty    Therapeutic Intervention(s): Parenting/familial roles addressed, Stressors assessed, and Supportive psychotherapy     Treatment Goal(s)/Objective(s) addressed: Emotional support and improving insight.       Progress toward Treatment Goals: No change     Plan:      - Psychotherapy at scheduled appointments weekly will be terminated due to limitations of this office that are unable to meet the flexibility requirements related to patient's complex medical conditions resulting  in inability to consistently attend scheduled appointments and difficulty with arriving to appointments within set by office policy start time due to having to rely on family member for transportation.   -OK to schedule as needed appointments in the morning clinic     Discussed with supervising attending. Please see attending attestation for further details.    Natalie Mcdaniel M.D.

## 2023-09-27 ENCOUNTER — APPOINTMENT (OUTPATIENT)
Dept: BEHAVIORAL HEALTH | Facility: PSYCHIATRIC FACILITY | Age: 63
End: 2023-09-27
Payer: MEDICAID

## 2023-10-04 ENCOUNTER — TELEPHONE (OUTPATIENT)
Dept: HEMATOLOGY ONCOLOGY | Facility: MEDICAL CENTER | Age: 63
End: 2023-10-04
Payer: MEDICAID

## 2023-10-04 ENCOUNTER — APPOINTMENT (OUTPATIENT)
Dept: BEHAVIORAL HEALTH | Facility: PSYCHIATRIC FACILITY | Age: 63
End: 2023-10-04
Payer: MEDICAID

## 2023-10-04 NOTE — TELEPHONE ENCOUNTER
"PT left a VM with scheduling to RS her appt on 10/6/2023 with Dr CRABTREE. I called pt back. Phone line picked up but no one responded to my \"chantal\". Pt also requested Fidencio's direct line. Fidencio' number is [phone].  "

## 2023-10-05 ENCOUNTER — APPOINTMENT (OUTPATIENT)
Dept: MEDICAL GROUP | Facility: CLINIC | Age: 63
End: 2023-10-05
Payer: MEDICAID

## 2023-10-05 ENCOUNTER — TELEPHONE (OUTPATIENT)
Dept: HEMATOLOGY ONCOLOGY | Facility: MEDICAL CENTER | Age: 63
End: 2023-10-05

## 2023-10-05 NOTE — TELEPHONE ENCOUNTER
Patient called to cancel 10/6 appointment with Dr. Tabor. She shared that she recently had a death in her family, and is in need of imaging. Attempted to reschedule patient and she requested an afternoon. Was unable to provide patient with date options before she shared that she would call back at a later time to reschedule.

## 2023-10-06 ENCOUNTER — APPOINTMENT (OUTPATIENT)
Dept: HEMATOLOGY ONCOLOGY | Facility: MEDICAL CENTER | Age: 63
End: 2023-10-06
Payer: MEDICAID

## 2023-10-06 NOTE — TELEPHONE ENCOUNTER
Unable to get a hold of patient to check status of order for her medication. Called pharmacy to check status and they informed me they haven't been able to get a hold of patient to schedule a delivery. Sent a [employer] message to patient with pharmacy's contact info.

## 2023-10-11 ENCOUNTER — APPOINTMENT (OUTPATIENT)
Dept: BEHAVIORAL HEALTH | Facility: PSYCHIATRIC FACILITY | Age: 63
End: 2023-10-11
Payer: MEDICAID

## 2023-10-16 ENCOUNTER — APPOINTMENT (OUTPATIENT)
Dept: MEDICAL GROUP | Facility: CLINIC | Age: 63
End: 2023-10-16
Payer: MEDICAID

## 2023-10-23 ENCOUNTER — APPOINTMENT (OUTPATIENT)
Dept: RADIATION ONCOLOGY | Facility: MEDICAL CENTER | Age: 63
End: 2023-10-23
Attending: RADIOLOGY
Payer: MEDICAID

## 2023-10-26 ENCOUNTER — OFFICE VISIT (OUTPATIENT)
Dept: MEDICAL GROUP | Facility: CLINIC | Age: 63
End: 2023-10-26
Payer: MEDICAID

## 2023-10-26 VITALS
HEIGHT: 68 IN | WEIGHT: 242.6 LBS | HEART RATE: 84 BPM | TEMPERATURE: 97.9 F | BODY MASS INDEX: 36.77 KG/M2 | OXYGEN SATURATION: 92 % | DIASTOLIC BLOOD PRESSURE: 88 MMHG | SYSTOLIC BLOOD PRESSURE: 138 MMHG

## 2023-10-26 DIAGNOSIS — J91.0 MALIGNANT PLEURAL EFFUSION: ICD-10-CM

## 2023-10-26 DIAGNOSIS — C50.911 BILATERAL MALIGNANT NEOPLASM OF BREAST IN FEMALE, UNSPECIFIED ESTROGEN RECEPTOR STATUS, UNSPECIFIED SITE OF BREAST (HCC): ICD-10-CM

## 2023-10-26 DIAGNOSIS — C50.912 BILATERAL MALIGNANT NEOPLASM OF BREAST IN FEMALE, UNSPECIFIED ESTROGEN RECEPTOR STATUS, UNSPECIFIED SITE OF BREAST (HCC): ICD-10-CM

## 2023-10-26 DIAGNOSIS — R94.5 ABNORMAL LIVER FUNCTION: ICD-10-CM

## 2023-10-26 DIAGNOSIS — M81.0 OSTEOPOROSIS, UNSPECIFIED OSTEOPOROSIS TYPE, UNSPECIFIED PATHOLOGICAL FRACTURE PRESENCE: ICD-10-CM

## 2023-10-26 DIAGNOSIS — J30.2 SEASONAL ALLERGIES: ICD-10-CM

## 2023-10-26 DIAGNOSIS — G47.30 SLEEP APNEA, UNSPECIFIED TYPE: ICD-10-CM

## 2023-10-26 DIAGNOSIS — E03.9 HYPOTHYROIDISM, UNSPECIFIED TYPE: ICD-10-CM

## 2023-10-26 PROCEDURE — 3079F DIAST BP 80-89 MM HG: CPT

## 2023-10-26 PROCEDURE — 3075F SYST BP GE 130 - 139MM HG: CPT

## 2023-10-26 PROCEDURE — 99214 OFFICE O/P EST MOD 30 MIN: CPT | Mod: GC

## 2023-10-26 RX ORDER — LORATADINE 10 MG/1
10 TABLET ORAL DAILY
Qty: 30 TABLET | Refills: 11 | Status: SHIPPED | OUTPATIENT
Start: 2023-10-26

## 2023-10-26 RX ORDER — LEVOTHYROXINE SODIUM 0.03 MG/1
25 TABLET ORAL
Qty: 30 TABLET | Refills: 11 | Status: SHIPPED | OUTPATIENT
Start: 2023-10-26 | End: 2023-10-30

## 2023-10-26 RX ORDER — EPINEPHRINE 0.3 MG/.3ML
INJECTION SUBCUTANEOUS
Qty: 1 EACH | Refills: 0 | Status: SHIPPED | OUTPATIENT
Start: 2023-10-26

## 2023-10-26 NOTE — PROGRESS NOTES
Subjective:     CC:     HPI:   Asha with pmhx anxiety, depression, 2 diabetes, dyslipidemia, hypertension, hypothyroidism, and breast cancer who presents today with:    Problem   Seasonal Allergies    Patient has a history of seasonal allergies and has been experiencing nasal congestion and runny nose.  She was previously on an antihistamine and is requesting a new prescription.  Patient has not tried any intranasal sprays.     Abnormal Liver Function    Patient reports having a previous abnormality in her liver test. Patient had ELF testing done which demonstrated moderate risk for liver related event. Patient denies a history of hepatitis B or C. She does not drink alcohol and feels that she has a healthy lifestyle.  Further testing for autoimmune hepatitis and viral hepatitis were negative.  Her right upper quadrant scan is scheduled.     Malignant Pleural Effusion    Patient would like to see a Pulmonologist due to her lung issues. She is currently on oxygen as needed for recurring pleural and is receiving therapeutic thoracentesis. Patient also has a history of sleep apnea and is currently using a CPAP. She was not using it for several weeks due to not having the connector between the CPAP and Oxygen.  Her shortness of breath has improved and she is no longer requiring frequent thoracenteses.  The patient reports that with her prior referral, the clinic she was referred to had split up and she now needs a new referral.  She is also requesting an incentive spirometer and portable oxygen tank.     Hypothyroidism    Patient has a history of hypothyroidism.  Her most recent TSH was 5.  The patient was previously prescribed levothyroxine 25 mcg daily which she was not taking.  She reports symptoms of fatigue, hair loss, and weight gain however she is also receiving chemotherapy and is unsure which symptoms are related to the medication and which are related to low thyroid.         Current Outpatient Medications  "Ordered in [facility]   Medication Sig Dispense Refill    EPINEPHrine (EPIPEN) 0.3 MG/0.3ML Solution Auto-injector solution for injection Inject 0.3 mL into the thigh one time for 1 dose 1 Each 0    loratadine (CLARITIN) 10 MG Tab Take 1 Tablet by mouth every day. 30 Tablet 11    levothyroxine (SYNTHROID) 25 MCG Tab Take 1 Tablet by mouth every morning on an empty stomach. 30 Tablet 11    Abemaciclib (VERZENIO) 150 MG Tab Take 150 mg by mouth every 12 hours. Can be taken with our without food. 60 Tablet 0    Palbociclib 125 MG Cap Take 125 mg by mouth see administration instructions. Take 125mg by mouth daily on days 1 through 21 (3 weeks on, 1 week off) of 28 day cycle. 21 Capsule 1    letrozole (FEMARA) 2.5 MG Tab Take 1 Tablet by mouth every day. 30 Tablet 5    losartan (COZAAR) 100 MG Tab TAKE 1 TABLET BY MOUTH DAILY 90 Tablet 3    losartan (COZAAR) 50 MG Tab 50 mg.      senna-docusate (PERICOLACE OR SENOKOT S) 8.6-50 MG Tab 1 Tabs By Mouth 2 Times a Day.      meloxicam (MOBIC) 15 MG tablet TAKE 1 TABLET BY MOUTH EVERY DAY 30 Tablet 0    cyclobenzaprine (FLEXERIL) 5 mg tablet TAKE 1 TO 2 TABLETS BY MOUTH THREE TIMES DAILY AS NEEDED FOR MUSCLE SPASMS 60 Tablet 2    acetaminophen (TYLENOL) 325 MG Tab Tylenol 325 mg tablet   Take 2 tablets every 6 hours by oral route.      aspirin (ASA) 81 MG Chew Tab chewable tablet Chew 81 mg every day.       No current [facility]-ordered facility-administered medications on file.       ROS:  ROS as per HPI. Otherwise negative.      Objective:     Exam:  /88 (BP Location: Left arm, Patient Position: Sitting, BP Cuff Size: Adult)   Pulse 84   Temp 36.6 °C (97.9 °F) (Temporal)   Ht 1.727 m (5' 8\")   Wt 110 kg (242 lb 9.6 oz)   SpO2 92%   BMI 36.89 kg/m²  Body mass index is 36.89 kg/m².    Gen: Alert and oriented, No apparent distress.  Neck: Neck is supple without lymphadenopathy.  Lungs: Normal effort, CTA bilaterally, no wheezes, rhonchi, or rales  CV: Regular rate and rhythm. No " murmurs, rubs, or gallops.  Ext: No clubbing, cyanosis, edema.    Labs: None    Assessment & Plan:     63 y.o. female with the following -     Problem List Items Addressed This Visit       Hypothyroidism     Will order levothyroxine 25 mcg.  Patient will follow-up in 4-6 weeks for repeat TSH to determine if current dose is adequate.         Relevant Medications    levothyroxine (SYNTHROID) 25 MCG Tab    Malignant neoplasm of breast (HCC)    Relevant Orders    Referral to Wound Clinic    Malignant pleural effusion     Ordered incentive spirometer and portable oxygen tank.  New referral placed to pulmonology given recurrent paracentesis for possible pleurodesis.         Relevant Orders    DME Incentive Spirometer    DME Portable Oxygen Concentrator    Referral to Pulmonary and Sleep Medicine    Abnormal liver function     Recent CMP was normal.  She is negative for autoimmune hepatitis or viral hepatitis.  Recommend following up with right upper quadrant scan.         Seasonal allergies     Prescribed loratadine.  Recommend follow-up if symptoms do not improve for trial of intranasal corticosteroids.          Other Visit Diagnoses       Sleep apnea, unspecified type        Relevant Orders    Referral to Pulmonary and Sleep Medicine    Osteoporosis, unspecified osteoporosis type, unspecified pathological fracture presence        Relevant Orders    DS-BONE DENSITY STUDY (DEXA)            I spent a total of 30 minutes with record review, exam, communication with the patient, communication with other providers, and documentation of this encounter.      Return in about 4 weeks (around 11/23/2023).

## 2023-10-27 NOTE — ASSESSMENT & PLAN NOTE
Ordered incentive spirometer and portable oxygen tank.  New referral placed to pulmonology given recurrent paracentesis for possible pleurodesis.

## 2023-10-27 NOTE — ASSESSMENT & PLAN NOTE
Will order levothyroxine 25 mcg.  Patient will follow-up in 4-6 weeks for repeat TSH to determine if current dose is adequate.

## 2023-10-27 NOTE — ASSESSMENT & PLAN NOTE
Prescribed loratadine.  Recommend follow-up if symptoms do not improve for trial of intranasal corticosteroids.

## 2023-10-27 NOTE — ASSESSMENT & PLAN NOTE
Recent CMP was normal.  She is negative for autoimmune hepatitis or viral hepatitis.  Recommend following up with right upper quadrant scan.

## 2023-10-30 RX ORDER — LEVOTHYROXINE SODIUM 0.03 MG/1
25 TABLET ORAL
Qty: 90 TABLET | Refills: 2 | Status: SHIPPED | OUTPATIENT
Start: 2023-10-30

## 2023-11-06 ENCOUNTER — TELEPHONE (OUTPATIENT)
Dept: HEALTH INFORMATION MANAGEMENT | Facility: OTHER | Age: 63
End: 2023-11-06
Payer: MEDICAID

## 2023-11-06 NOTE — TELEPHONE ENCOUNTER
Left VM for Wound Care & Dexa scheduling. If patient returns my call, please advise so I may assist with scheduling.

## 2023-11-16 ENCOUNTER — OFFICE VISIT (OUTPATIENT)
Dept: MEDICAL GROUP | Facility: CLINIC | Age: 63
End: 2023-11-16
Payer: MEDICAID

## 2023-11-16 VITALS
BODY MASS INDEX: 37.44 KG/M2 | DIASTOLIC BLOOD PRESSURE: 78 MMHG | OXYGEN SATURATION: 94 % | HEART RATE: 84 BPM | RESPIRATION RATE: 14 BRPM | SYSTOLIC BLOOD PRESSURE: 151 MMHG | WEIGHT: 247 LBS | HEIGHT: 68 IN

## 2023-11-16 DIAGNOSIS — Z59.9 HOUSING PROBLEMS: ICD-10-CM

## 2023-11-16 PROCEDURE — 99213 OFFICE O/P EST LOW 20 MIN: CPT | Mod: GE

## 2023-11-16 PROCEDURE — 3078F DIAST BP <80 MM HG: CPT

## 2023-11-16 PROCEDURE — 3077F SYST BP >= 140 MM HG: CPT

## 2023-11-16 SDOH — ECONOMIC STABILITY - INCOME SECURITY: PROBLEM RELATED TO HOUSING AND ECONOMIC CIRCUMSTANCES, UNSPECIFIED: Z59.9

## 2023-11-16 NOTE — LETTER
Asha Hannon had an appointment with us today 11/16/2023. Patient is currently undergoing treatment for breast cancer and losing her housing would be detrimental to her health. She will need stable housing throughout her treatment.        Thank you,         Katherine Brown M.D.  Electronically Signed

## 2023-11-17 NOTE — PROGRESS NOTES
Subjective:     CC: Follow-up    HPI:   Asha with pmhx anxiety, depression, 2 diabetes, dyslipidemia, hypertension, hypothyroidism, and breast cancer who presents today with:    Problem   Housing Problems    Patient is requesting a letter stating her current medical condition to exempt her from losing her housing as well as her power. She is currently living in a house and she is renting. Patient lives with her daughter and granddaughter.          Current Outpatient Medications Ordered in Epic   Medication Sig Dispense Refill    levothyroxine (SYNTHROID) 25 MCG Tab TAKE 1 TABLET BY MOUTH EVERY MORNING ON AN EMPTY STOMACH 90 Tablet 2    EPINEPHrine (EPIPEN) 0.3 MG/0.3ML Solution Auto-injector solution for injection Inject 0.3 mL into the thigh one time for 1 dose 1 Each 0    loratadine (CLARITIN) 10 MG Tab Take 1 Tablet by mouth every day. 30 Tablet 11    Abemaciclib (VERZENIO) 150 MG Tab Take 150 mg by mouth every 12 hours. Can be taken with our without food. 60 Tablet 0    Palbociclib 125 MG Cap Take 125 mg by mouth see administration instructions. Take 125mg by mouth daily on days 1 through 21 (3 weeks on, 1 week off) of 28 day cycle. 21 Capsule 1    letrozole (FEMARA) 2.5 MG Tab Take 1 Tablet by mouth every day. 30 Tablet 5    losartan (COZAAR) 100 MG Tab TAKE 1 TABLET BY MOUTH DAILY 90 Tablet 3    losartan (COZAAR) 50 MG Tab 50 mg.      senna-docusate (PERICOLACE OR SENOKOT S) 8.6-50 MG Tab 1 Tabs By Mouth 2 Times a Day.      meloxicam (MOBIC) 15 MG tablet TAKE 1 TABLET BY MOUTH EVERY DAY 30 Tablet 0    cyclobenzaprine (FLEXERIL) 5 mg tablet TAKE 1 TO 2 TABLETS BY MOUTH THREE TIMES DAILY AS NEEDED FOR MUSCLE SPASMS 60 Tablet 2    acetaminophen (TYLENOL) 325 MG Tab Tylenol 325 mg tablet   Take 2 tablets every 6 hours by oral route.      aspirin (ASA) 81 MG Chew Tab chewable tablet Chew 81 mg every day.       No current [facility]-ordered facility-administered medications on file.       ROS:  ROS as per HPI. Otherwise  "negative.      Objective:     Exam:  BP (!) 151/78 (BP Location: Right arm, Patient Position: Sitting, BP Cuff Size: Large adult)   Pulse 84   Resp 14   Ht 1.727 m (5' 8\")   Wt 112 kg (247 lb)   SpO2 94%   BMI 37.56 kg/m²  Body mass index is 37.56 kg/m².    Gen: Alert and oriented, No apparent distress.  Neck: Neck is supple without lymphadenopathy.  Lungs: Normal effort, CTA bilaterally, no wheezes, rhonchi, or rales  CV: Regular rate and rhythm. No murmurs, rubs, or gallops.  Ext: No clubbing, cyanosis, edema.    Labs: None    Assessment & Plan:     63 y.o. female with the following -     Problem List Items Addressed This Visit       Housing problems     Faxed letter to [employer].  Patient will also be faxing over paperwork to be filled out for the electric company.            I spent a total of 30 minutes with record review, exam, communication with the patient, communication with other providers, and documentation of this encounter.      Return in about 4 weeks (around 12/14/2023), or if symptoms worsen or fail to improve.       "

## 2023-11-17 NOTE — ASSESSMENT & PLAN NOTE
Faxed letter to [facility].  Patient will also be faxing over paperwork to be filled out for the electric company.

## 2023-11-22 ENCOUNTER — TELEPHONE (OUTPATIENT)
Dept: MEDICAL GROUP | Facility: CLINIC | Age: 63
End: 2023-11-22
Payer: MEDICAID

## 2023-11-23 NOTE — TELEPHONE ENCOUNTER
VOICEMAIL  1. Caller Name: Asha Hannon                      Call Back Number: [phone] (home)       2. Message: documentation.for [employer] form that she needs filled out. She needs it asap. I don't see any forms in here chart.   She will try to email me the forms otherwise she will faxed them.      3. Patient approves office to leave a detailed voicemail/MyChart message: yes

## 2023-11-27 ENCOUNTER — APPOINTMENT (OUTPATIENT)
Dept: RADIATION ONCOLOGY | Facility: MEDICAL CENTER | Age: 63
End: 2023-11-27
Attending: RADIOLOGY
Payer: MEDICAID

## 2023-12-08 RX ORDER — AMOXICILLIN AND CLAVULANATE POTASSIUM 875; 125 MG/1; MG/1
1 TABLET, FILM COATED ORAL 2 TIMES DAILY
COMMUNITY

## 2023-12-08 NOTE — TELEPHONE ENCOUNTER
Received request via: Patient    Was the patient seen in the last year in this department? Yes    Does the patient have an active prescription (recently filled or refills available) for medication(s) requested? No    Does the patient have longterm Plus and need 100 day supply (blood pressure, diabetes and cholesterol meds only)? Patient does not have SCP    Patient called requesting a refill on her medication, as she is having the bumps again and has always gotten a refill on these medication to help out. Please advise.

## 2023-12-11 RX ORDER — AMOXICILLIN AND CLAVULANATE POTASSIUM 875; 125 MG/1; MG/1
1 TABLET, FILM COATED ORAL 2 TIMES DAILY
OUTPATIENT
Start: 2023-12-11

## 2023-12-21 ENCOUNTER — APPOINTMENT (OUTPATIENT)
Dept: MEDICAL GROUP | Facility: CLINIC | Age: 63
End: 2023-12-21
Payer: MEDICAID

## 2023-12-22 ENCOUNTER — OFFICE VISIT (OUTPATIENT)
Dept: MEDICAL GROUP | Facility: CLINIC | Age: 63
End: 2023-12-22
Payer: MEDICAID

## 2023-12-22 VITALS
OXYGEN SATURATION: 93 % | HEART RATE: 80 BPM | WEIGHT: 238 LBS | SYSTOLIC BLOOD PRESSURE: 140 MMHG | TEMPERATURE: 98.1 F | DIASTOLIC BLOOD PRESSURE: 84 MMHG | BODY MASS INDEX: 36.07 KG/M2 | HEIGHT: 68 IN

## 2023-12-22 DIAGNOSIS — C50.912 BILATERAL MALIGNANT NEOPLASM OF BREAST IN FEMALE, UNSPECIFIED ESTROGEN RECEPTOR STATUS, UNSPECIFIED SITE OF BREAST (HCC): ICD-10-CM

## 2023-12-22 DIAGNOSIS — C50.911 BILATERAL MALIGNANT NEOPLASM OF BREAST IN FEMALE, UNSPECIFIED ESTROGEN RECEPTOR STATUS, UNSPECIFIED SITE OF BREAST (HCC): ICD-10-CM

## 2023-12-22 PROBLEM — G47.33 MODERATE OBSTRUCTIVE SLEEP APNEA-HYPOPNEA SYNDROME: Status: ACTIVE | Noted: 2023-03-07

## 2023-12-22 PROBLEM — G47.34 NOCTURNAL HYPOXIA: Status: ACTIVE | Noted: 2023-03-07

## 2023-12-22 PROBLEM — Z17.0 ESTROGEN RECEPTOR POSITIVE STATUS (ER+): Status: ACTIVE | Noted: 2023-02-07

## 2023-12-22 PROBLEM — C50.919 PRIMARY MALIGNANT NEOPLASM OF FEMALE BREAST (HCC): Status: ACTIVE | Noted: 2023-12-22

## 2023-12-22 PROCEDURE — 3079F DIAST BP 80-89 MM HG: CPT

## 2023-12-22 PROCEDURE — 3077F SYST BP >= 140 MM HG: CPT

## 2023-12-22 PROCEDURE — 99213 OFFICE O/P EST LOW 20 MIN: CPT | Mod: GE

## 2023-12-23 NOTE — PROGRESS NOTES
"[facility] MEDICINE     PATIENT ID:  NAME:  Asha Hannon  MRN:               [id number]  YOB: 1960    Date: 1:38 PM      Resident: Elvis Russell M.D.    CC:  Breast mass      HPI: Asha Hannon is a 63 y.o. female who presented for follow up of breast mass.  Patient states that she has a longstanding history of metastatic bilateral breast cancer.  She notes that she has had difficulty following up with her oncologist and radiation oncologist due to personal issues.  She states that she has not made appointments very frequently and has had difficulty with this \"to issues in her personal life including her daughter's mental health.  She states that she would like any additional help she could have with making these appointments easier for her.  She notes that in the past she has had cellulitis related to this breast mass which is fungated outside of her normal breast tissue.  She states that currently she is having slight discharge from the mass of her left breast but denies any associated erythema, swelling, fever, chills or any other concerns at this time.      No problems updated.    REVIEW OF SYSTEMS:   Ten systems reviewed and were negative except as noted in the HPI.                PROBLEM LIST  Patient Active Problem List   Diagnosis    Depression    Diabetes mellitus, type 2 (HCC)    Dyslipidemia    Family tension    Hypertension    Hypothyroidism    Malignant neoplasm of breast (HCC)    Obesity    Snoring    Lumbar radiculopathy    Other specified disorders of external ear, unspecified ear    Anxiety    Shortness of breath    Acute right-sided low back pain with right-sided sciatica    Abnormal bowel movement    Cardiomegaly    Malignant pleural effusion    Breast infection    Abnormal liver function    Seasonal allergies    Housing problems    Estrogen receptor positive status (ER+)    Moderate obstructive sleep apnea-hypopnea syndrome    Nocturnal hypoxia    Primary " "malignant neoplasm of female breast (HCC)        PAST SURGICAL HISTORY:  Past Surgical History:   Procedure Laterality Date    CHOLECYSTECTOMY      OTHER      mastoid bone       FAMILY HISTORY:  Family History   Problem Relation Age of Onset    Lung Cancer Mother     Uterine cancer Sister     Cancer Maternal Grandfather     Stomach Cancer Paternal Grandmother        SOCIAL HISTORY:   Social History     Tobacco Use    Smoking status: Never    Smokeless tobacco: Never   Substance Use Topics    Alcohol use: Yes     Comment: occas       ALLERGIES:  Allergies   Allergen Reactions    Demerol Rash     \"was told this during surgery\"  Other reaction(s): rash       OUTPATIENT MEDICATIONS:    Current Outpatient Medications:     amoxicillin-clavulanate (AUGMENTIN) 875-125 MG Tab, Take 1 Tablet by mouth 2 times a day., Disp: , Rfl:     levothyroxine (SYNTHROID) 25 MCG Tab, TAKE 1 TABLET BY MOUTH EVERY MORNING ON AN EMPTY STOMACH, Disp: 90 Tablet, Rfl: 2    EPINEPHrine (EPIPEN) 0.3 MG/0.3ML Solution Auto-injector solution for injection, Inject 0.3 mL into the thigh one time for 1 dose, Disp: 1 Each, Rfl: 0    loratadine (CLARITIN) 10 MG Tab, Take 1 Tablet by mouth every day., Disp: 30 Tablet, Rfl: 11    Abemaciclib (VERZENIO) 150 MG Tab, Take 150 mg by mouth every 12 hours. Can be taken with our without food., Disp: 60 Tablet, Rfl: 0    Palbociclib 125 MG Cap, Take 125 mg by mouth see administration instructions. Take 125mg by mouth daily on days 1 through 21 (3 weeks on, 1 week off) of 28 day cycle., Disp: 21 Capsule, Rfl: 1    letrozole (FEMARA) 2.5 MG Tab, Take 1 Tablet by mouth every day., Disp: 30 Tablet, Rfl: 5    losartan (COZAAR) 100 MG Tab, TAKE 1 TABLET BY MOUTH DAILY, Disp: 90 Tablet, Rfl: 3    losartan (COZAAR) 50 MG Tab, 50 mg., Disp: , Rfl:     senna-docusate (PERICOLACE OR SENOKOT S) 8.6-50 MG Tab, 1 Tabs By Mouth 2 Times a Day., Disp: , Rfl:     meloxicam (MOBIC) 15 MG tablet, TAKE 1 TABLET BY MOUTH EVERY DAY, " "Disp: 30 Tablet, Rfl: 0    cyclobenzaprine (FLEXERIL) 5 mg tablet, TAKE 1 TO 2 TABLETS BY MOUTH THREE TIMES DAILY AS NEEDED FOR MUSCLE SPASMS, Disp: 60 Tablet, Rfl: 2    acetaminophen (TYLENOL) 325 MG Tab, Tylenol 325 mg tablet  Take 2 tablets every 6 hours by oral route., Disp: , Rfl:     aspirin (ASA) 81 MG Chew Tab chewable tablet, Chew 81 mg every day., Disp: , Rfl:     PHYSICAL EXAM:  Vitals:    12/22/23 1614   BP: (!) 140/84   BP Location: Left arm   Patient Position: Sitting   BP Cuff Size: Adult   Pulse: 80   Temp: 36.7 °C (98.1 °F)   TempSrc: Temporal   SpO2: 93%   Weight: 108 kg (238 lb)   Height: 1.727 m (5' 8\")       General: Pt resting in NAD, pleasant and cooperative   Skin:  Pink, warm and dry.  HEENT: NC/AT. EOMI.  Lungs:  Symmetrical.  CTAB, good air movement, no adventitious sounds  Breasts: There is a irregular 5 x 4 cm fungated breast mass to the left lateral breast, associated foul odor, minimal drainage from necrotic tissue, no associated fluctuance, no surrounding erythema or any other evidence of current infection.  Cardiovascular:  S1/S2 RRR, no murmurs rubs or gallops  Abdomen:  Abdomen is soft, nontender  Extremities:  Full range of motion.  CNS:  Muscle tone is normal. No gross focal neurologic deficits      ASSESSMENT/PLAN:   63 y.o. female who presents to clinic for follow-up of left breast mass.  Patient has a longstanding history of known bilateral breast malignancy with metastasis.  She is currently established with oncology and radiation oncology but has not been making her appointments due to difficulties with her personal life.  Counseled the patient that it is important she follows up with her cancer specialist and will provide her with a referral to care management for care coordination to attempt to make this easier for her.  Advised her that she will need to call the referral center to ensure that this happens.  Patient states that she understands and agrees with this course.  " Will also provide patient with referral to wound management as she states she has been referred there in the past and not been able to make this a reality.  On exam today there does not appear to be any evidence of infection and I do not feel that antibiotics would be helpful at this time.  Counseled the patient about this and she states she understands and agrees with this course.  Patient does have follow-up planned at this clinic next month and advised her to make sure she follows up with her oncology team as soon as possible.    Problem List Items Addressed This Visit       Malignant neoplasm of breast (HCC)    Relevant Orders    REFERRAL TO CARE MANAGEMENT    Referral to Wound Clinic       Elvis Russell M.D.  PGY-3  [facility]

## 2024-01-01 ENCOUNTER — APPOINTMENT (OUTPATIENT)
Dept: RADIOLOGY | Facility: MEDICAL CENTER | Age: 64
DRG: 456 | End: 2024-01-01
Payer: MEDICARE

## 2024-01-01 ENCOUNTER — APPOINTMENT (OUTPATIENT)
Dept: RADIOLOGY | Facility: MEDICAL CENTER | Age: 64
DRG: 456 | End: 2024-01-01
Attending: EMERGENCY MEDICINE
Payer: MEDICARE

## 2024-01-01 PROCEDURE — 71260 CT THORAX DX C+: CPT

## 2024-01-01 PROCEDURE — 72157 MRI CHEST SPINE W/O & W/DYE: CPT

## 2024-01-01 PROCEDURE — 72131 CT LUMBAR SPINE W/O DYE: CPT

## 2024-01-01 PROCEDURE — 72158 MRI LUMBAR SPINE W/O & W/DYE: CPT

## 2024-01-01 PROCEDURE — 72128 CT CHEST SPINE W/O DYE: CPT

## 2024-01-01 PROCEDURE — 70553 MRI BRAIN STEM W/O & W/DYE: CPT

## 2024-01-01 PROCEDURE — 71045 X-RAY EXAM CHEST 1 VIEW: CPT

## 2024-01-09 ENCOUNTER — PATIENT MESSAGE (OUTPATIENT)
Dept: HEALTH INFORMATION MANAGEMENT | Facility: OTHER | Age: 64
End: 2024-01-09

## 2024-02-03 ENCOUNTER — TELEPHONE (OUTPATIENT)
Dept: MEDICAL GROUP | Facility: CLINIC | Age: 64
End: 2024-02-03
Payer: MEDICAID

## 2024-02-03 DIAGNOSIS — K76.0 FATTY LIVER: ICD-10-CM

## 2024-02-06 ENCOUNTER — APPOINTMENT (OUTPATIENT)
Dept: MEDICAL GROUP | Facility: CLINIC | Age: 64
End: 2024-02-06
Payer: MEDICAID

## 2024-04-30 ENCOUNTER — TELEPHONE (OUTPATIENT)
Dept: HEMATOLOGY ONCOLOGY | Facility: MEDICAL CENTER | Age: 64
End: 2024-04-30
Payer: MEDICAID

## 2024-04-30 NOTE — TELEPHONE ENCOUNTER
"Received phone call from patient asking about a refill on letrozole, orders for scans, and an appointment. Per patient, patient has not been around the area for the past 6-7 months and is ready to \" the pieces\". Patient scheduled June 5th at 3:30pm. Advised patient that questions and concerns will be discussed with Dr. Tabor and will contact patient to update on plan of care. No other concerns at this time.  " Medicine approved for one REFILL. Needs follow up appointment with me or LAURA in person or virtually

## 2024-05-02 ENCOUNTER — TELEPHONE (OUTPATIENT)
Dept: HEMATOLOGY ONCOLOGY | Facility: MEDICAL CENTER | Age: 64
End: 2024-05-02
Payer: MEDICAID

## 2024-05-02 DIAGNOSIS — C50.911 BILATERAL MALIGNANT NEOPLASM OF BREAST IN FEMALE, UNSPECIFIED ESTROGEN RECEPTOR STATUS, UNSPECIFIED SITE OF BREAST (HCC): ICD-10-CM

## 2024-05-02 DIAGNOSIS — C50.912 BILATERAL MALIGNANT NEOPLASM OF BREAST IN FEMALE, UNSPECIFIED ESTROGEN RECEPTOR STATUS, UNSPECIFIED SITE OF BREAST (HCC): ICD-10-CM

## 2024-05-02 DIAGNOSIS — Z79.899 ENCOUNTER FOR LONG-TERM (CURRENT) USE OF HIGH-RISK MEDICATION: ICD-10-CM

## 2024-05-02 RX ORDER — LETROZOLE 2.5 MG/1
2.5 TABLET, FILM COATED ORAL DAILY
Qty: 30 TABLET | Refills: 0 | Status: SHIPPED | OUTPATIENT
Start: 2024-05-02

## 2024-05-03 ENCOUNTER — TELEPHONE (OUTPATIENT)
Dept: HEMATOLOGY ONCOLOGY | Facility: MEDICAL CENTER | Age: 64
End: 2024-05-03
Payer: MEDICAID

## 2024-05-03 NOTE — TELEPHONE ENCOUNTER
Contacted patient in regard to plan of care. Patient will receive one month of letrozole with no refills and CBC CMP CEA Ca 27.29 ordered per Dr. Tabor. Patient states she is trying to schedule the scans and is unable to until the labwork is completed. No answer, left voicemail explaining the above.

## 2024-05-03 NOTE — TELEPHONE ENCOUNTER
Received Refill PA request via [employer]  for letrozole (FEMARA) 2.5 MG Tab . (Quantity:30, Day Supply:30)     Insurance: [employer]  Member ID:  [id number]  BIN: [id number]  PCN: [id number]  Group: [id number]     Ran Test claim via [employer] & medication Pays for a $0.00 copay. Will outreach to patient to offer specialty pharmacy services and or release to preferred pharmacy

## 2024-05-10 ENCOUNTER — PATIENT OUTREACH (OUTPATIENT)
Dept: ONCOLOGY | Facility: MEDICAL CENTER | Age: 64
End: 2024-05-10
Payer: MEDICAID

## 2024-05-10 NOTE — PROGRESS NOTES
"ONCOLOGY NURSE NAVIGATION (ONN):  Nurse Navigator Margaret Lopez reached out to patient after listening to voice message she left on 5.9.2024 @ 1750.  Voice message requested a call back in \"mid to late afternoon.\"  No answer.  NN left a voice message stating I would call again in 1 hour.  Patient has JACQUE Lopez contact information, as her message was left on my desk answering machine.  Last attempt to contact patient was 6/20/2023 when message was left by ONN requesting a call back at her first convenience.    5.10.24 @ 1529.  ONN placed a call to the patient.  She answered, but asked to call ONN back, as she needed to find her phone .  ONN confirmed patient's request and shared that I would be in the office today until 4:00 p.m..  Patient verbalized understanding and shared she will call back today.    5.10.24 @ 1542.  Patient called JACQUE Lopez and we spoke for 32 minutes about the general events that have happened since our last contact on 6/20/2023.  She explained she is \"the rock in her family\" and that she was out of the area for many months assisting with family concerns.  JACQUE Lopez verbalized understanding and offered information on the role of the Oncology Nurse Navigator and the resources at [facility].  We reviewed upcoming appointments and Asha shared some thoughts on her plan of care.  NN asked if there was any specific areas of concern regarding questions or barriers to care, but nothing specific was shared at this time.  Patient requested to call back Tuesday afternoon to speak about her situation, care and possible resources.  NN agreed to the plan and patient will call at her convenience Tuesday afternoon.    "

## 2024-05-14 ENCOUNTER — PATIENT OUTREACH (OUTPATIENT)
Dept: ONCOLOGY | Facility: MEDICAL CENTER | Age: 64
End: 2024-05-14
Payer: MEDICAID

## 2024-05-14 NOTE — PROGRESS NOTES
ONCOLOGY NURSE NAVIGATION (ONN):  Nurse Navigator Margaret Lopez listed to voice message left by patient at 0744 this morning.  She is not able to keep our planned telephone call this afternoon.  She requested financial and psychosocial support flyers be mailed to her home address.  Patient specifically mentioned [employer], [employer], financial resources and emotional support.  Patient shared she will reach out once the information is received if she has any questions, concerns or would like additional assistance.  Packet prepared and mailed out today via [email or web address].

## 2024-05-16 ENCOUNTER — PATIENT OUTREACH (OUTPATIENT)
Dept: HEALTH INFORMATION MANAGEMENT | Facility: OTHER | Age: 64
End: 2024-05-16
Payer: MEDICAID

## 2024-05-20 NOTE — PROGRESS NOTES
Chw attempted to follow up back up with patient. No response. Per chart notes, pt is set up with oncology nurse navigator for social and medical care assistance needs. Chw not to follow.

## 2024-05-24 ENCOUNTER — APPOINTMENT (OUTPATIENT)
Dept: RADIOLOGY | Facility: MEDICAL CENTER | Age: 64
End: 2024-05-24
Attending: INTERNAL MEDICINE
Payer: MEDICAID

## 2024-06-04 ENCOUNTER — APPOINTMENT (OUTPATIENT)
Dept: RADIOLOGY | Facility: MEDICAL CENTER | Age: 64
End: 2024-06-04
Payer: MEDICAID

## 2024-06-05 ENCOUNTER — APPOINTMENT (OUTPATIENT)
Dept: RADIOLOGY | Facility: MEDICAL CENTER | Age: 64
End: 2024-06-05
Payer: MEDICAID

## 2024-06-05 ENCOUNTER — HOSPITAL ENCOUNTER (OUTPATIENT)
Dept: HEMATOLOGY ONCOLOGY | Facility: MEDICAL CENTER | Age: 64
End: 2024-06-05
Attending: INTERNAL MEDICINE
Payer: MEDICAID

## 2024-06-05 VITALS
HEIGHT: 68 IN | WEIGHT: 244.49 LBS | HEART RATE: 95 BPM | BODY MASS INDEX: 37.05 KG/M2 | OXYGEN SATURATION: 96 % | TEMPERATURE: 98.7 F | SYSTOLIC BLOOD PRESSURE: 138 MMHG | DIASTOLIC BLOOD PRESSURE: 84 MMHG

## 2024-06-05 DIAGNOSIS — C50.919 PRIMARY MALIGNANT NEOPLASM OF FEMALE BREAST (HCC): ICD-10-CM

## 2024-06-05 PROCEDURE — 99212 OFFICE O/P EST SF 10 MIN: CPT | Performed by: INTERNAL MEDICINE

## 2024-06-05 PROCEDURE — 99215 OFFICE O/P EST HI 40 MIN: CPT | Performed by: INTERNAL MEDICINE

## 2024-06-05 RX ORDER — CLINDAMYCIN HYDROCHLORIDE 300 MG/1
300 CAPSULE ORAL 3 TIMES DAILY
Qty: 30 CAPSULE | Refills: 1 | Status: SHIPPED | OUTPATIENT
Start: 2024-06-05

## 2024-06-05 ASSESSMENT — PAIN SCALES - GENERAL: PAINLEVEL: 5=MODERATE PAIN

## 2024-06-05 ASSESSMENT — ENCOUNTER SYMPTOMS
EYES NEGATIVE: 1
CARDIOVASCULAR NEGATIVE: 1
WEIGHT LOSS: 1
NEUROLOGICAL NEGATIVE: 1
PSYCHIATRIC NEGATIVE: 1
RESPIRATORY NEGATIVE: 1
GASTROINTESTINAL NEGATIVE: 1
MUSCULOSKELETAL NEGATIVE: 1

## 2024-06-05 NOTE — PROGRESS NOTES
Medical oncology follow-up visit: 6/5/2024      Referring Physician: Self referred  Primary Care:  Amelia Franco M.D.    Diagnosis: Bilateral breast cancer    Chief Complaint: Bilateral breast cancer    History of Presenting Illness:  Asha Phan is a 62 y.o. postmenopausal female who was initially diagnosed with bilateral breast cancer in February 2022.  Apparently she felt a mass in her left breast as early as 2019.  The left breast showed invasive ductal carcinoma, grade 1, ER positive [place] positive, HER2 negative by history although the pathology is not available to me.  She also had a right breast cancer biopsied under ultrasound at that time likely also estrogen positive.  She was seen by Dr. Arteaga and initially scheduled for surgery but opted not to do it.  She had a multiple social and family issues as well as the pandemic and did not receive any neoadjuvant endocrine therapy or chemotherapy.  On 2/7/2023 she obtained another consult from surgeon Dr. Gomez in [place].  By that time she was having bleeding from the left breast mass that was growing through the skin.  The right breast mass has gotten larger as well.  She was told to have chemo or endocrine therapy prior to considering surgery.  She had a PET scan on 3/24/2023 which showed a large left retroareolar and lateral breast mass with hypermetabolic uptake eroding into the skin.  In addition there was 1.7 cm left axillary lymph node and a 2.8 cm soft tissue mass in the right lateral breast at 9:00.  There was small focal area of hypermetabolic uptake in the anterior cortex of the proximal right femoral diaphysis which may represent early bone metastases but no CT correlation was noted.  No other evidence of disease was noted.  She has recently had intermittent bleeding from the mass which is ulcerating the chest wall now.  Denies any symptoms referable to metastatic disease.  Only chronic problem is hypertension. appetite and  weight are stable.    Interval history 7/11/2023: We recommended letrozole and ribociclib for her locally advanced breast cancer.  She took letrozole for 2 weeks when constipated her and discontinued.  She then developed increasing shortness of breath and ultimately went to [facility] where she was found to have bilateral pleural effusions which were both tapped.  Cytology on one of the effusions was positive for metastatic disease.  CT scan of the chest showed new pleural-based disease as well as lung disease.  Her chest wall disease on the left is not appreciably changed since we saw her in April 2023.  She is now distraught about having stage IV disease documented and is willing to initiate therapy.    Interval history 8/23/2023: She has had thoracenteses on both the left and right lung since we last saw her.  She has not taken any systemic therapy since we last saw her.  A biopsy of her left breast showed invasive ductal carcinoma, grade 1, ER positive greater than 90%, [place] +10%, Ki-67 less than 5%, HER2 2+ IHC FISH negative.  [place] lymph node biopsy was positive for cancer.  There is some oozing from the ulcerated portion of the breast cancer in the left areola laterally.  She has no bony pain.    Interval history 9/19/2023: She continues to have thoracenteses for malignant pleural effusions.  The breast mass became malodorous and she saw her primary care physician and started on oral antibiotics.  Some of this may be due to necrotic tumor.  She started her letrozole 3 weeks ago and is tolerating it reasonably well so far.  Having spoken to her some of her friends she wishes to try abemaciclib instead of Ibrance.  We had a long discussion with her that any CDK 4 6 inhibitor is preferable with her advanced disease to none.    Interval history 6/5/2024: She has been lost to follow-up because she has been helping her daughter who has an addiction problem.  She has not required any thoracenteses.  She has  been on letrozole until she ran out just recently.  Scans are scheduled for next week.  Her left breast has become erythematous again and the ulcerated lesion in the lateral aspect has gotten larger.  She has not had labs or scans in months.      Past Medical History:   Diagnosis Date    Anxiety     Depression     Diabetes (HCC)     HLD (hyperlipidemia)     HTN (hypertension)     Hypothyroidism        Past Surgical History:   Procedure Laterality Date    CHOLECYSTECTOMY      OTHER      mastoid bone       Social History     Tobacco Use    Smoking status: Never    Smokeless tobacco: Never   Vaping Use    Vaping status: Never Used   Substance Use Topics    Alcohol use: Yes     Comment: occas    Drug use: No        Family History   Problem Relation Age of Onset    Lung Cancer Mother     Uterine cancer Sister     Cancer Maternal Grandfather     Stomach Cancer Paternal Grandmother        Allergies as of 06/05/2024 - Reviewed 06/05/2024   Allergen Reaction Noted    Demerol Rash 05/20/2011         Current Outpatient Medications:     letrozole (FEMARA) 2.5 MG Tab, Take 1 Tablet by mouth every day., Disp: 30 Tablet, Rfl: 0    amoxicillin-clavulanate (AUGMENTIN) 500-125 MG Tab, Take 1 Tablet by mouth 4 times a day. Is taking from a left over prescription (1 tablet 4 times daily) currently 1.5 days 6/5/24, Disp: , Rfl:     EPINEPHrine (EPIPEN) 0.3 MG/0.3ML Solution Auto-injector solution for injection, Inject 0.3 mL into the thigh one time for 1 dose, Disp: 1 Each, Rfl: 0    loratadine (CLARITIN) 10 MG Tab, Take 1 Tablet by mouth every day., Disp: 30 Tablet, Rfl: 11    losartan (COZAAR) 100 MG Tab, TAKE 1 TABLET BY MOUTH DAILY, Disp: 90 Tablet, Rfl: 3    meloxicam (MOBIC) 15 MG tablet, TAKE 1 TABLET BY MOUTH EVERY DAY, Disp: 30 Tablet, Rfl: 0    cyclobenzaprine (FLEXERIL) 5 mg tablet, TAKE 1 TO 2 TABLETS BY MOUTH THREE TIMES DAILY AS NEEDED FOR MUSCLE SPASMS, Disp: 60 Tablet, Rfl: 2    acetaminophen (TYLENOL) 325 MG Tab,  "Tylenol 325 mg tablet  Take 2 tablets every 6 hours by oral route., Disp: , Rfl:     aspirin (ASA) 81 MG Chew Tab chewable tablet, Chew 81 mg every day., Disp: , Rfl:     levothyroxine (SYNTHROID) 25 MCG Tab, TAKE 1 TABLET BY MOUTH EVERY MORNING ON AN EMPTY STOMACH (Patient not taking: Reported on 6/5/2024), Disp: 90 Tablet, Rfl: 2    Abemaciclib (VERZENIO) 150 MG Tab, Take 150 mg by mouth every 12 hours. Can be taken with our without food. (Patient not taking: Reported on 6/5/2024), Disp: 60 Tablet, Rfl: 0    Palbociclib 125 MG Cap, Take 125 mg by mouth see administration instructions. Take 125mg by mouth daily on days 1 through 21 (3 weeks on, 1 week off) of 28 day cycle. (Patient not taking: Reported on 6/5/2024), Disp: 21 Capsule, Rfl: 1    losartan (COZAAR) 50 MG Tab, 50 mg. (Patient not taking: Reported on 6/5/2024), Disp: , Rfl:     senna-docusate (PERICOLACE OR SENOKOT S) 8.6-50 MG Tab, 1 Tabs By Mouth 2 Times a Day. (Patient not taking: Reported on 6/5/2024), Disp: , Rfl:     Review of Systems:  Review of Systems   Constitutional:  Positive for malaise/fatigue and weight loss.   HENT: Negative.     Eyes: Negative.    Respiratory: Negative.     Cardiovascular: Negative.    Gastrointestinal: Negative.    Genitourinary: Negative.    Musculoskeletal: Negative.    Skin: Negative.    Neurological: Negative.    Endo/Heme/Allergies: Negative.    Psychiatric/Behavioral: Negative.            Physical Exam:  Vitals:    06/05/24 1633   BP: 138/84   BP Location: Right arm   Patient Position: Sitting   Pulse: 95   Temp: 37.1 °C (98.7 °F)   TempSrc: Temporal   SpO2: 96%   Weight: 111 kg (244 lb 7.8 oz)   Height: 1.727 m (5' 8\")       DESC; KARNOFSKY SCALE WITH ECOG EQUIVALENT: 80, Normal activity with effort; some signs or symptoms of disease (ECOG equivalent 1)    DISTRESS LEVEL: mild distress    Physical Exam  Constitutional:       Appearance: Normal appearance.   HENT:      Head: Normocephalic.      Mouth/Throat:      " Mouth: Mucous membranes are moist.      Pharynx: Oropharynx is clear.   Eyes:      Extraocular Movements: Extraocular movements intact.      Conjunctiva/sclera: Conjunctivae normal.      Pupils: Pupils are equal, round, and reactive to light.   Cardiovascular:      Rate and Rhythm: Regular rhythm. Tachycardia present.      Pulses: Normal pulses.   Pulmonary:      Effort: Pulmonary effort is normal.      Breath sounds: Normal breath sounds.      Comments: Decreased breath sounds third of the way up on the right and a quarter of the way up on the left.  No rales or rhonchi are noted.  Chest:      Comments: 6/5/2024: Markedly worse left breast than a year ago with a large ulcerated lesion and diffuse erythema and firmness in the rest of the breast.  2 x 2 centimeter left axillary lymph node 2 x 2 cm left supraclavicular lymph node 8/23/2023: Slightly worse in the left breast.  Right breast stable 7/11/2023: Essentially stable findings 4/25/2023: left breast is replaced by tumor and has involuted substantially.  Laterally there is partial destruction of the lateral nipple areolar complex with scar and ulceration and skin infiltration by tumor.  There are couple small satellite nodule lesions on the left breast.  2 x 2 centimeter left axillary adenopathy is noted but no supraclavicular or infraclavicular adenopathy.  Right breast shows a 3 x 3 cm mass at 9:00.  Abdominal:      General: Abdomen is flat. Bowel sounds are normal.      Palpations: Abdomen is soft. There is no mass.   Musculoskeletal:         General: Normal range of motion.   Skin:     General: Skin is warm.   Neurological:      General: No focal deficit present.      Mental Status: She is alert and oriented to person, place, and time.   Psychiatric:         Mood and Affect: Mood normal.         Behavior: Behavior normal.            Labs:  No visits with results within 1 Month(s) from this visit.   Latest known visit with results is:   Hospital Outpatient  Visit on 01/19/2023   Component Date Value Ref Range Status    Elf Score 01/19/2023 9.9   Final    Risk Group 01/19/2023 Moderate   Final    Risk 01/19/2023 23.6 (A)  % Final    (12.4%, 34.9%) Chance of liver related event within 5 years    Relative Risk 01/19/2023 7.4x   Final    Comment: *Relative Risks (calculated hazard ratios using Davey proportional hazard  model after adjusting for age and sex, relative to ELF < 9.8) for Liver  Related Outcomes within 5 years.    Prognostic Determinations:  A review of clinical outcomes from 457 patients followed for a mean of 5  years was used to establish the prognostic utility of ELF (Oksana et al. 2019).  The results from this analysis demonstrate that the ELF scores are useful  for categorizing patients into risk groups according to the likelihood of  future liver-related clinical outcomes. These outcomes are defined as liver  related death, complication of portal hypertension, liver cancer and liver  transplantation.    Test Description:  The Enhanced Liver Fibrosis (ELF) Score is a multivariate index immunoassay  intended to provide an algorithm score based on quantitative measurements of  hyaluronic acid (HA), amino-terminal propeptide of type III procollagen  (PIIINP) and tissue inhibitor of metalloproteinase 1 (TIMP-1) from [facility].  This test combines the three serum biomarkers to assess the likelihood of  progression to cirrhosis and liver-related clinical events in patients with  advanced fibrosis due to BOX. The analytical measurement range is 4.5 to  14.7    The ELF Test is the only IVD assay granted marketing authorization by the  FDA and measures biomarkers directly involved in the active process of  scarring. The test was developed and its performance characteristics  determined by the [facility]. The laboratory is regulated  under CLIA as qualified to perform high-complexity testing. This testing is  used for clinical  purposes. It should not be regarded as investigational or  for research.    Test Performed at: [facility]  [address] [place] [id number]; [phone]  : Rodney Flor MD         Imaging:       Pathology:  Not available    Assessment & Plan:     1.  Bilateral breast cancer neglected for greater than a year now with extensive destruction of the left breast and ulceration and skin lesions making her inoperable on the left.  Right breast shows a 3 x 3 cm mass.  PET scan March 2023 showed non-hypermetabolic pleural effusions which have now progressed and cytology positive.  In addition she seems to have pleural masses which are.  Now requiring more frequent thoracenteses.  Started aromatase inhibition early September 2023.  Has not yet started CDK 4 6 inhibitor.  Lost to follow-up for over 6 months although apparently was on letrozole as single agent.  Now with clear progression of disease in the left breast.    Plan: Obtain scans next week and blood work today.  Then I will see her back and see the best treatment approach.  She appears willing to take abemaciclib now and also asked about radiation treatment.    Any questions and concerns raised by the patient were answered to the best of my ability. Thank you for allowing me to participate in the care for this patient. Please feel free to contact me for any questions or concerns.     Cm Tabor M.D.

## 2024-06-06 NOTE — ADDENDUM NOTE
Encounter addended by: Fidencio Lewis, Med Ass't on: 6/6/2024 9:24 AM   Actions taken: Charge Capture section accepted

## 2024-06-10 DIAGNOSIS — C50.911 BILATERAL MALIGNANT NEOPLASM OF BREAST IN FEMALE, UNSPECIFIED ESTROGEN RECEPTOR STATUS, UNSPECIFIED SITE OF BREAST (HCC): ICD-10-CM

## 2024-06-10 DIAGNOSIS — C50.912 BILATERAL MALIGNANT NEOPLASM OF BREAST IN FEMALE, UNSPECIFIED ESTROGEN RECEPTOR STATUS, UNSPECIFIED SITE OF BREAST (HCC): ICD-10-CM

## 2024-06-10 RX ORDER — LETROZOLE 2.5 MG/1
2.5 TABLET, FILM COATED ORAL DAILY
Qty: 30 TABLET | Refills: 0 | Status: SHIPPED | OUTPATIENT
Start: 2024-06-10 | End: 2024-06-19 | Stop reason: SDUPTHER

## 2024-06-14 ENCOUNTER — HOSPITAL ENCOUNTER (OUTPATIENT)
Dept: RADIOLOGY | Facility: MEDICAL CENTER | Age: 64
End: 2024-06-14
Attending: INTERNAL MEDICINE
Payer: MEDICAID

## 2024-06-19 ENCOUNTER — HOSPITAL ENCOUNTER (OUTPATIENT)
Dept: HEMATOLOGY ONCOLOGY | Facility: MEDICAL CENTER | Age: 64
End: 2024-06-19
Attending: INTERNAL MEDICINE
Payer: MEDICAID

## 2024-06-19 VITALS
HEART RATE: 95 BPM | SYSTOLIC BLOOD PRESSURE: 136 MMHG | OXYGEN SATURATION: 97 % | DIASTOLIC BLOOD PRESSURE: 60 MMHG | WEIGHT: 245.81 LBS | RESPIRATION RATE: 18 BRPM | HEIGHT: 68 IN | TEMPERATURE: 99.1 F | BODY MASS INDEX: 37.25 KG/M2

## 2024-06-19 DIAGNOSIS — C50.912 BILATERAL MALIGNANT NEOPLASM OF BREAST IN FEMALE, UNSPECIFIED ESTROGEN RECEPTOR STATUS, UNSPECIFIED SITE OF BREAST (HCC): ICD-10-CM

## 2024-06-19 DIAGNOSIS — C50.919 PRIMARY MALIGNANT NEOPLASM OF FEMALE BREAST (HCC): ICD-10-CM

## 2024-06-19 DIAGNOSIS — C50.911 BILATERAL MALIGNANT NEOPLASM OF BREAST IN FEMALE, UNSPECIFIED ESTROGEN RECEPTOR STATUS, UNSPECIFIED SITE OF BREAST (HCC): ICD-10-CM

## 2024-06-19 PROCEDURE — 99212 OFFICE O/P EST SF 10 MIN: CPT | Performed by: INTERNAL MEDICINE

## 2024-06-19 PROCEDURE — 99214 OFFICE O/P EST MOD 30 MIN: CPT | Performed by: INTERNAL MEDICINE

## 2024-06-19 RX ORDER — LETROZOLE 2.5 MG/1
2.5 TABLET, FILM COATED ORAL DAILY
Qty: 90 TABLET | Refills: 3 | Status: SHIPPED | OUTPATIENT
Start: 2024-06-19

## 2024-06-19 ASSESSMENT — ENCOUNTER SYMPTOMS
EYES NEGATIVE: 1
RESPIRATORY NEGATIVE: 1
MUSCULOSKELETAL NEGATIVE: 1
GASTROINTESTINAL NEGATIVE: 1
PSYCHIATRIC NEGATIVE: 1
CARDIOVASCULAR NEGATIVE: 1
NEUROLOGICAL NEGATIVE: 1
WEIGHT LOSS: 1

## 2024-06-20 ENCOUNTER — TELEPHONE (OUTPATIENT)
Dept: RADIATION ONCOLOGY | Facility: MEDICAL CENTER | Age: 64
End: 2024-06-20
Payer: MEDICAID

## 2024-06-20 NOTE — TELEPHONE ENCOUNTER
Call left for patient with no answer. VM left for patient after being notified of a referral for palliative radiation with Dr. Velez. Contact information was left for patient to proceed with scheduling.

## 2024-06-20 NOTE — PROGRESS NOTES
Medical oncology follow-up visit: 6/19/2024      Referring Physician: Self referred  Primary Care:  Amelia Franco M.D.    Diagnosis: Bilateral breast cancer    Chief Complaint: Bilateral breast cancer    History of Presenting Illness:  Asha Phan is a 62 y.o. postmenopausal female who was initially diagnosed with bilateral breast cancer in February 2022.  Apparently she felt a mass in her left breast as early as 2019.  The left breast showed invasive ductal carcinoma, grade 1, ER positive SD positive, HER2 negative by history although the pathology is not available to me.  She also had a right breast cancer biopsied under ultrasound at that time likely also estrogen positive.  She was seen by Dr. Arteaga and initially scheduled for surgery but opted not to do it.  She had a multiple social and family issues as well as the pandemic and did not receive any neoadjuvant endocrine therapy or chemotherapy.  On 2/7/2023 she obtained another consult from surgeon Dr. Gomez in [place].  By that time she was having bleeding from the left breast mass that was growing through the skin.  The right breast mass has gotten larger as well.  She was told to have chemo or endocrine therapy prior to considering surgery.  She had a PET scan on 3/24/2023 which showed a large left retroareolar and lateral breast mass with hypermetabolic uptake eroding into the skin.  In addition there was 1.7 cm left axillary lymph node and a 2.8 cm soft tissue mass in the right lateral breast at 9:00.  There was small focal area of hypermetabolic uptake in the anterior cortex of the proximal right femoral diaphysis which may represent early bone metastases but no CT correlation was noted.  No other evidence of disease was noted.  She has recently had intermittent bleeding from the mass which is ulcerating the chest wall now.  Denies any symptoms referable to metastatic disease.  Only chronic problem is hypertension. appetite and  weight are stable.    Interval history 7/11/2023: We recommended letrozole and ribociclib for her locally advanced breast cancer.  She took letrozole for 2 weeks when constipated her and discontinued.  She then developed increasing shortness of breath and ultimately went to [facility] where she was found to have bilateral pleural effusions which were both tapped.  Cytology on one of the effusions was positive for metastatic disease.  CT scan of the chest showed new pleural-based disease as well as lung disease.  Her chest wall disease on the left is not appreciably changed since we saw her in April 2023.  She is now distraught about having stage IV disease documented and is willing to initiate therapy.    Interval history 8/23/2023: She has had thoracenteses on both the left and right lung since we last saw her.  She has not taken any systemic therapy since we last saw her.  A biopsy of her left breast showed invasive ductal carcinoma, grade 1, ER positive greater than 90%, WA +10%, Ki-67 less than 5%, HER2 2+ IHC FISH negative.  [place] lymph node biopsy was positive for cancer.  There is some oozing from the ulcerated portion of the breast cancer in the left areola laterally.  She has no bony pain.    Interval history 9/19/2023: She continues to have thoracenteses for malignant pleural effusions.  The breast mass became malodorous and she saw her primary care physician and started on oral antibiotics.  Some of this may be due to necrotic tumor.  She started her letrozole 3 weeks ago and is tolerating it reasonably well so far.  Having spoken to her some of her friends she wishes to try abemaciclib instead of Ibrance.  We had a long discussion with her that any CDK 4 6 inhibitor is preferable with her advanced disease to none.    Interval history 6/5/2024: She has been lost to follow-up because she has been helping her daughter who has an addiction problem.  She has not required any thoracenteses.  She has  been on letrozole until she ran out just recently.  Scans are scheduled for next week.  Her left breast has become erythematous again and the ulcerated lesion in the lateral aspect has gotten larger.  She has not had labs or scans in months.    Interval history 6/19/2024: She was unable to get the scans or labs in the last 2 weeks.  However they are rescheduled for July 12.  She has had no shortness of breath cough or chest pain and months.  She has not used any oxygen in months as well.  We gave her clindamycin for secondary infection around her exophytic tumor and this is somewhat better but the tumor is slightly larger.  She asked about palliative radiation to the breast tumor and I think this is a good idea before starting abemaciclib which she is not willing to take.  I will refer her to Dr. Velez.    Past Medical History:   Diagnosis Date    Anxiety     Depression     Diabetes (HCC)     HLD (hyperlipidemia)     HTN (hypertension)     Hypothyroidism        Past Surgical History:   Procedure Laterality Date    CHOLECYSTECTOMY      OTHER      mastoid bone       Social History     Tobacco Use    Smoking status: Never    Smokeless tobacco: Never   Vaping Use    Vaping status: Never Used   Substance Use Topics    Alcohol use: Yes     Comment: occas    Drug use: No        Family History   Problem Relation Age of Onset    Lung Cancer Mother     Uterine cancer Sister     Cancer Maternal Grandfather     Stomach Cancer Paternal Grandmother        Allergies as of 06/19/2024 - Reviewed 06/19/2024   Allergen Reaction Noted    Demerol Rash 05/20/2011         Current Outpatient Medications:     letrozole (FEMARA) 2.5 MG Tab, Take 1 Tablet by mouth every day., Disp: 30 Tablet, Rfl: 0    clindamycin (CLEOCIN) 300 MG Cap, Take 1 Capsule by mouth 3 times a day., Disp: 30 Capsule, Rfl: 1    EPINEPHrine (EPIPEN) 0.3 MG/0.3ML Solution Auto-injector solution for injection, Inject 0.3 mL into the thigh one time for 1 dose, Disp: 1  Each, Rfl: 0    losartan (COZAAR) 100 MG Tab, TAKE 1 TABLET BY MOUTH DAILY, Disp: 90 Tablet, Rfl: 3    meloxicam (MOBIC) 15 MG tablet, TAKE 1 TABLET BY MOUTH EVERY DAY, Disp: 30 Tablet, Rfl: 0    cyclobenzaprine (FLEXERIL) 5 mg tablet, TAKE 1 TO 2 TABLETS BY MOUTH THREE TIMES DAILY AS NEEDED FOR MUSCLE SPASMS, Disp: 60 Tablet, Rfl: 2    acetaminophen (TYLENOL) 325 MG Tab, Tylenol 325 mg tablet  Take 2 tablets every 6 hours by oral route., Disp: , Rfl:     amoxicillin-clavulanate (AUGMENTIN) 500-125 MG Tab, Take 1 Tablet by mouth 4 times a day. Is taking from a left over prescription (1 tablet 4 times daily) currently 1.5 days 6/5/24 (Patient not taking: Reported on 6/19/2024), Disp: , Rfl:     levothyroxine (SYNTHROID) 25 MCG Tab, TAKE 1 TABLET BY MOUTH EVERY MORNING ON AN EMPTY STOMACH (Patient not taking: Reported on 6/5/2024), Disp: 90 Tablet, Rfl: 2    loratadine (CLARITIN) 10 MG Tab, Take 1 Tablet by mouth every day. (Patient not taking: Reported on 6/19/2024), Disp: 30 Tablet, Rfl: 11    Abemaciclib (VERZENIO) 150 MG Tab, Take 150 mg by mouth every 12 hours. Can be taken with our without food. (Patient not taking: Reported on 6/5/2024), Disp: 60 Tablet, Rfl: 0    Palbociclib 125 MG Cap, Take 125 mg by mouth see administration instructions. Take 125mg by mouth daily on days 1 through 21 (3 weeks on, 1 week off) of 28 day cycle. (Patient not taking: Reported on 6/5/2024), Disp: 21 Capsule, Rfl: 1    losartan (COZAAR) 50 MG Tab, 50 mg. (Patient not taking: Reported on 6/5/2024), Disp: , Rfl:     senna-docusate (PERICOLACE OR SENOKOT S) 8.6-50 MG Tab, 1 Tabs By Mouth 2 Times a Day. (Patient not taking: Reported on 6/5/2024), Disp: , Rfl:     aspirin (ASA) 81 MG Chew Tab chewable tablet, Chew 81 mg every day. (Patient not taking: Reported on 6/19/2024), Disp: , Rfl:     Review of Systems:  Review of Systems   Constitutional:  Positive for malaise/fatigue and weight loss.   HENT: Negative.     Eyes: Negative.   "  Respiratory: Negative.     Cardiovascular: Negative.    Gastrointestinal: Negative.    Genitourinary: Negative.    Musculoskeletal: Negative.    Skin: Negative.    Neurological: Negative.    Endo/Heme/Allergies: Negative.    Psychiatric/Behavioral: Negative.            Physical Exam:  Vitals:    06/19/24 1655   Height: 1.727 m (5' 8\")       DESC; KARNOFSKY SCALE WITH ECOG EQUIVALENT: 80, Normal activity with effort; some signs or symptoms of disease (ECOG equivalent 1)    DISTRESS LEVEL: mild distress    Physical Exam  Constitutional:       Appearance: Normal appearance.   HENT:      Head: Normocephalic.      Mouth/Throat:      Mouth: Mucous membranes are moist.      Pharynx: Oropharynx is clear.   Eyes:      Extraocular Movements: Extraocular movements intact.      Conjunctiva/sclera: Conjunctivae normal.      Pupils: Pupils are equal, round, and reactive to light.   Cardiovascular:      Rate and Rhythm: Regular rhythm. Tachycardia present.      Pulses: Normal pulses.   Pulmonary:      Effort: Pulmonary effort is normal.      Breath sounds: Normal breath sounds.      Comments: Decreased breath sounds third of the way up on the right and a quarter of the way up on the left.  No rales or rhonchi are noted.  Chest:      Comments: 6/5/2024: Markedly worse left breast than a year ago with a large ulcerated lesion and diffuse erythema and firmness in the rest of the breast.  2 x 2 centimeter left axillary lymph node 2 x 2 cm left supraclavicular lymph node 8/23/2023: Slightly worse in the left breast.  Right breast stable 7/11/2023: Essentially stable findings 4/25/2023: left breast is replaced by tumor and has involuted substantially.  Laterally there is partial destruction of the lateral nipple areolar complex with scar and ulceration and skin infiltration by tumor.  There are couple small satellite nodule lesions on the left breast.  2 x 2 centimeter left axillary adenopathy is noted but no supraclavicular or " infraclavicular adenopathy.  Right breast shows a 3 x 3 cm mass at 9:00.  Abdominal:      General: Abdomen is flat. Bowel sounds are normal.      Palpations: Abdomen is soft. There is no mass.   Musculoskeletal:         General: Normal range of motion.   Skin:     General: Skin is warm.   Neurological:      General: No focal deficit present.      Mental Status: She is alert and oriented to person, place, and time.   Psychiatric:         Mood and Affect: Mood normal.         Behavior: Behavior normal.            Labs:  No visits with results within 1 Month(s) from this visit.   Latest known visit with results is:   Hospital Outpatient Visit on 01/19/2023   Component Date Value Ref Range Status    Elf Score 01/19/2023 9.9   Final    Risk Group 01/19/2023 Moderate   Final    Risk 01/19/2023 23.6 (A)  % Final    (12.4%, 34.9%) Chance of liver related event within 5 years    Relative Risk 01/19/2023 7.4x   Final    Comment: *Relative Risks (calculated hazard ratios using Davey proportional hazard  model after adjusting for age and sex, relative to ELF < 9.8) for Liver  Related Outcomes within 5 years.    Prognostic Determinations:  A review of clinical outcomes from 457 patients followed for a mean of 5  years was used to establish the prognostic utility of ELF (Oksana et al. 2019).  The results from this analysis demonstrate that the ELF scores are useful  for categorizing patients into risk groups according to the likelihood of  future liver-related clinical outcomes. These outcomes are defined as liver  related death, complication of portal hypertension, liver cancer and liver  transplantation.    Test Description:  The Enhanced Liver Fibrosis (ELF) Score is a multivariate index immunoassay  intended to provide an algorithm score based on quantitative measurements of  hyaluronic acid (HA), amino-terminal propeptide of type III procollagen  (PIIINP) and tissue inhibitor of metalloproteinase 1 (TIMP-1) from s                            fifi.  This test combines the three serum biomarkers to assess the likelihood of  progression to cirrhosis and liver-related clinical events in patients with  advanced fibrosis due to BOX. The analytical measurement range is 4.5 to  14.7    The ELF Test is the only IVD assay granted marketing authorization by the  FDA and measures biomarkers directly involved in the active process of  scarring. The test was developed and its performance characteristics  determined by the [facility]. The laboratory is regulated  under CLIA as qualified to perform high-complexity testing. This testing is  used for clinical purposes. It should not be regarded as investigational or  for research.    Test Performed at: [facility]  [address] [place] [id number]; [phone]  : Rodney Flor MD         Imaging:       Pathology:  Not available    Assessment & Plan:     1.  Bilateral breast cancer neglected for greater than a year now with extensive destruction of the left breast and ulceration and skin lesions making her inoperable on the left.  Right breast shows a 3 x 3 cm mass.  PET scan March 2023 showed non-hypermetabolic pleural effusions which have now progressed and cytology positive.  In addition she seems to have pleural masses which are.  Now requiring more frequent thoracenteses.  Started aromatase inhibition early September 2023.  Has not yet started CDK 4 6 inhibitor.  Lost to follow-up for over 6 months although apparently was on letrozole as single agent.  Now with clear progression of disease in the left breast.  However not symptomatic from pleural effusion suggesting systemic disease is under relatively good control despite growth in the breast.  Will refer to radiation for local control and await scans to determine addition of abemaciclib to letrozole.    Plan: Obtain scans next week and blood work.  I will see her back after her CT scan in 4 weeks.   She will continue letrozole for now.  Any questions and concerns raised by the patient were answered to the best of my ability. Thank you for allowing me to participate in the care for this patient. Please feel free to contact me for any questions or concerns.     Cm Tabor M.D.

## 2024-06-28 ENCOUNTER — APPOINTMENT (OUTPATIENT)
Dept: RADIOLOGY | Facility: MEDICAL CENTER | Age: 64
End: 2024-06-28
Payer: MEDICAID

## 2024-07-01 DIAGNOSIS — C50.912 BILATERAL MALIGNANT NEOPLASM OF BREAST IN FEMALE, UNSPECIFIED ESTROGEN RECEPTOR STATUS, UNSPECIFIED SITE OF BREAST (HCC): ICD-10-CM

## 2024-07-01 DIAGNOSIS — C50.911 BILATERAL MALIGNANT NEOPLASM OF BREAST IN FEMALE, UNSPECIFIED ESTROGEN RECEPTOR STATUS, UNSPECIFIED SITE OF BREAST (HCC): ICD-10-CM

## 2024-07-01 RX ORDER — LORAZEPAM 0.5 MG/1
0.5 TABLET ORAL EVERY 4 HOURS PRN
Qty: 60 TABLET | Refills: 0 | Status: SHIPPED | OUTPATIENT
Start: 2024-07-01 | End: 2024-07-31

## 2024-07-02 ENCOUNTER — APPOINTMENT (OUTPATIENT)
Dept: RADIOLOGY | Facility: MEDICAL CENTER | Age: 64
End: 2024-07-02
Payer: MEDICAID

## 2024-07-12 ENCOUNTER — HOSPITAL ENCOUNTER (OUTPATIENT)
Dept: RADIOLOGY | Facility: MEDICAL CENTER | Age: 64
End: 2024-07-12
Attending: INTERNAL MEDICINE
Payer: MEDICAID

## 2024-07-12 DIAGNOSIS — C50.911 BILATERAL MALIGNANT NEOPLASM OF BREAST IN FEMALE, UNSPECIFIED ESTROGEN RECEPTOR STATUS, UNSPECIFIED SITE OF BREAST (HCC): ICD-10-CM

## 2024-07-12 DIAGNOSIS — C50.912 BILATERAL MALIGNANT NEOPLASM OF BREAST IN FEMALE, UNSPECIFIED ESTROGEN RECEPTOR STATUS, UNSPECIFIED SITE OF BREAST (HCC): ICD-10-CM

## 2024-07-12 DIAGNOSIS — J91.0 MALIGNANT PLEURAL EFFUSION: ICD-10-CM

## 2024-07-12 PROCEDURE — 700117 HCHG RX CONTRAST REV CODE 255: Mod: UD | Performed by: INTERNAL MEDICINE

## 2024-07-12 PROCEDURE — 71260 CT THORAX DX C+: CPT

## 2024-07-12 RX ADMIN — IOHEXOL 100 ML: 350 INJECTION, SOLUTION INTRAVENOUS at 16:18

## 2024-07-16 ENCOUNTER — APPOINTMENT (OUTPATIENT)
Dept: HEMATOLOGY ONCOLOGY | Facility: MEDICAL CENTER | Age: 64
End: 2024-07-16
Payer: MEDICAID

## 2024-07-25 ENCOUNTER — OFFICE VISIT (OUTPATIENT)
Dept: WOUND CARE | Facility: MEDICAL CENTER | Age: 64
End: 2024-07-25
Payer: MEDICAID

## 2024-07-25 DIAGNOSIS — Z91.199 NO-SHOW FOR APPOINTMENT: ICD-10-CM

## 2024-07-25 PROCEDURE — 99999 PR NO CHARGE: CPT | Performed by: NURSE PRACTITIONER

## 2024-07-30 ENCOUNTER — HOSPITAL ENCOUNTER (OUTPATIENT)
Dept: RADIATION ONCOLOGY | Facility: MEDICAL CENTER | Age: 64
End: 2024-07-30
Attending: RADIOLOGY
Payer: MEDICAID

## 2024-07-30 ENCOUNTER — APPOINTMENT (OUTPATIENT)
Dept: RADIOLOGY | Facility: MEDICAL CENTER | Age: 64
End: 2024-07-30
Payer: MEDICAID

## 2024-07-30 ENCOUNTER — HOSPITAL ENCOUNTER (OUTPATIENT)
Dept: RADIOLOGY | Facility: MEDICAL CENTER | Age: 64
End: 2024-07-30
Payer: MEDICAID

## 2024-07-30 VITALS
HEART RATE: 92 BPM | BODY MASS INDEX: 36.37 KG/M2 | SYSTOLIC BLOOD PRESSURE: 136 MMHG | DIASTOLIC BLOOD PRESSURE: 81 MMHG | WEIGHT: 239.2 LBS | OXYGEN SATURATION: 95 %

## 2024-07-30 DIAGNOSIS — M81.0 OSTEOPOROSIS, UNSPECIFIED OSTEOPOROSIS TYPE, UNSPECIFIED PATHOLOGICAL FRACTURE PRESENCE: ICD-10-CM

## 2024-07-30 DIAGNOSIS — C50.919 PRIMARY MALIGNANT NEOPLASM OF FEMALE BREAST (HCC): ICD-10-CM

## 2024-07-30 PROCEDURE — 77080 DXA BONE DENSITY AXIAL: CPT

## 2024-07-30 ASSESSMENT — PAIN SCALES - GENERAL: PAINLEVEL: 4=SLIGHT-MODERATE PAIN

## 2024-07-31 ENCOUNTER — TELEPHONE (OUTPATIENT)
Dept: HEMATOLOGY ONCOLOGY | Facility: MEDICAL CENTER | Age: 64
End: 2024-07-31
Payer: MEDICAID

## 2024-07-31 ENCOUNTER — APPOINTMENT (OUTPATIENT)
Dept: HEMATOLOGY ONCOLOGY | Facility: MEDICAL CENTER | Age: 64
End: 2024-07-31
Payer: MEDICAID

## 2024-07-31 ENCOUNTER — NON-PROVIDER VISIT (OUTPATIENT)
Dept: WOUND CARE | Facility: MEDICAL CENTER | Age: 64
End: 2024-07-31
Payer: MEDICAID

## 2024-07-31 PROCEDURE — 97602 WOUND(S) CARE NON-SELECTIVE: CPT

## 2024-07-31 PROCEDURE — 99211 OFF/OP EST MAY X REQ PHY/QHP: CPT

## 2024-08-06 ENCOUNTER — HOSPITAL ENCOUNTER (OUTPATIENT)
Dept: RADIATION ONCOLOGY | Facility: MEDICAL CENTER | Age: 64
End: 2024-08-06

## 2024-08-06 ENCOUNTER — HOSPITAL ENCOUNTER (OUTPATIENT)
Dept: RADIATION ONCOLOGY | Facility: MEDICAL CENTER | Age: 64
End: 2024-08-06
Attending: RADIOLOGY
Payer: MEDICARE

## 2024-08-06 PROCEDURE — 77290 THER RAD SIMULAJ FIELD CPLX: CPT | Performed by: RADIOLOGY

## 2024-08-06 PROCEDURE — 77334 RADIATION TREATMENT AID(S): CPT | Performed by: RADIOLOGY

## 2024-08-06 PROCEDURE — 77470 SPECIAL RADIATION TREATMENT: CPT | Performed by: RADIOLOGY

## 2024-08-06 PROCEDURE — 77263 THER RADIOLOGY TX PLNG CPLX: CPT | Performed by: RADIOLOGY

## 2024-08-06 PROCEDURE — 77334 RADIATION TREATMENT AID(S): CPT | Mod: 26 | Performed by: RADIOLOGY

## 2024-08-06 PROCEDURE — 77470 SPECIAL RADIATION TREATMENT: CPT | Mod: 26 | Performed by: RADIOLOGY

## 2024-08-06 PROCEDURE — 77290 THER RAD SIMULAJ FIELD CPLX: CPT | Mod: 26 | Performed by: RADIOLOGY

## 2024-08-06 NOTE — RADIATION PLANNING NOTES
Echocardiogram PATIENT NAME Asha Hannon   PRIMARY PHYSICIAN Katherine Brown [id number]   REFERRING PHYSICIAN No ref. provider found 1960       DATE OF SERVICE: 8/6/2024    DIAGNOSIS:  No matching staging information was found for the patient.       SPECIAL TREATMENT PROCEDURE NOTE:  Considerable additional effort required in the management of this case because of administration of concurrent okay chemotherapy which may result in increased normal tissue toxicity. This includes longer and possibly more frequent on treatment visits.

## 2024-08-06 NOTE — RADIATION PLANNING NOTES
Clinical Treatment Planning Note    DATE OF SERVICE: 8/6/2024    DIAGNOSIS:  Metastatic breast cancer      IMAGING REVIEWED:  [x] CT     [] MRI     [x] PET/CT     [] BONE SCAN     [] MAMMO     [] OTHER      TREATMENT INTENT:   [] CURATIVE     [] MAINTENANCE     [x]  PALLIATIVE      []  SUPPORTIVE     []  PROPHYLACTIC     [] BENIGN     []  CONSOLIDATIVE      [] DEFINITIVE   []  OLOGIMETASTATIC      LINE OF TREATMENT:  [x] ADJUVANT   [] DEFINITIVE   [] NEOADJUVANT   [] RE-TREATMENT      TECHNIQUE PLANNED:  [] IMRT   [x] 3D   [] SBRT   [] SRS/SRT   [] HDR   [] ELECTRON       IMRT JUSTIFICATION:  []   An immediately adjacent area has been previously irradiated and abutting portals must be established with high precision.    []  Dose escalation is planned to deliver radiation doses in excess of those commonly utilized for similar tumors with conventional treatment.    []  The target volume is concave or convex, and the critical normal tissues are within or around that convexity or concavity.    []  The target volume is in close proximity to critical structures that must be protected.    []  The volume of interest must be covered with narrow margins to adequately protect  immediately adjacent structures.      FIELDS & BLOCKING:  [x] COMPLEX BLOCKS     []  = 3 TX AREAS     []  ARCS     []  CUSTOM SHEILD        []  SIMPLE BLOCK      CHEMOTHERAPY:  []  CONCURRENT     []  INDUCTION     [x] SEQUENTIAL     []  <30 DAYS FROM XRT      NOTES:    OAR CONSTRAINTS: (GUIDELINES ONLY NOT ABSOLUTE)    Target Prescribed Coverage   PTV 95% of PTV covered by 95% (cGy) of RX Dose       WILFRIDO Goal   Max point dose PTV Eval <=110%   Contralateral Breast V5% < 2Gy   Ipsilateral Lung V15% < 20Gy   Ipsilateral Lung V35% < 10Gy   Ipsilateral Lung V50% < 5Gy   Contralateral Lung V10% < 5Gy   Heart (L Sided) V5% < 20Gy   *RTOG 1005, START-A, START-B

## 2024-08-06 NOTE — RADIATION PLANNING NOTES
DATE OF SERVICE: 8/6/2024    DIAGNOSIS:  Metastatic breast cancer    DATE OF SERVICE: 8/6/2024    TYPE OF SIMULATION: Breast       [] Right    [x] Left      GOAL OF TREATMENT:   [x] Curative  [] Palliative  [] Oligometastatic    COMPLEX:  [x] Complex Blocking   []Arcs  [] Custom Blocks  [] >3 Sites    PROCEDURE: Patient placed in supine position on wing board with VAC MARGARITA bag with appropriate slant to compensate for slope of chest wall.  Breast/chest wall borders marked CT scan obtained to contain entire volume of interest.      I have personally reviewed the relevant data, performed the target localization, and determined all relevant factors for this patient’s simulation.

## 2024-08-07 ENCOUNTER — OFFICE VISIT (OUTPATIENT)
Dept: WOUND CARE | Facility: MEDICAL CENTER | Age: 64
End: 2024-08-07
Payer: MEDICARE

## 2024-08-07 ENCOUNTER — HOSPITAL ENCOUNTER (OUTPATIENT)
Dept: HEMATOLOGY ONCOLOGY | Facility: MEDICAL CENTER | Age: 64
End: 2024-08-07
Attending: INTERNAL MEDICINE
Payer: MEDICARE

## 2024-08-07 VITALS
OXYGEN SATURATION: 95 % | BODY MASS INDEX: 36.19 KG/M2 | DIASTOLIC BLOOD PRESSURE: 72 MMHG | TEMPERATURE: 99.1 F | HEART RATE: 85 BPM | WEIGHT: 238.76 LBS | SYSTOLIC BLOOD PRESSURE: 118 MMHG | HEIGHT: 68 IN

## 2024-08-07 VITALS
TEMPERATURE: 97.3 F | RESPIRATION RATE: 16 BRPM | SYSTOLIC BLOOD PRESSURE: 127 MMHG | DIASTOLIC BLOOD PRESSURE: 80 MMHG | HEART RATE: 83 BPM | OXYGEN SATURATION: 97 %

## 2024-08-07 DIAGNOSIS — C50.911 BILATERAL MALIGNANT NEOPLASM OF BREAST IN FEMALE, UNSPECIFIED ESTROGEN RECEPTOR STATUS, UNSPECIFIED SITE OF BREAST (HCC): ICD-10-CM

## 2024-08-07 DIAGNOSIS — E11.65 CONTROLLED TYPE 2 DIABETES MELLITUS WITH HYPERGLYCEMIA, WITHOUT LONG-TERM CURRENT USE OF INSULIN (HCC): ICD-10-CM

## 2024-08-07 DIAGNOSIS — N63.20 MASS OF LEFT BREAST, UNSPECIFIED QUADRANT: ICD-10-CM

## 2024-08-07 DIAGNOSIS — C50.912 BILATERAL MALIGNANT NEOPLASM OF BREAST IN FEMALE, UNSPECIFIED ESTROGEN RECEPTOR STATUS, UNSPECIFIED SITE OF BREAST (HCC): ICD-10-CM

## 2024-08-07 DIAGNOSIS — C50.919 PRIMARY MALIGNANT NEOPLASM OF FEMALE BREAST (HCC): ICD-10-CM

## 2024-08-07 PROCEDURE — 99214 OFFICE O/P EST MOD 30 MIN: CPT | Performed by: INTERNAL MEDICINE

## 2024-08-07 PROCEDURE — 3079F DIAST BP 80-89 MM HG: CPT | Performed by: NURSE PRACTITIONER

## 2024-08-07 PROCEDURE — 99214 OFFICE O/P EST MOD 30 MIN: CPT | Performed by: NURSE PRACTITIONER

## 2024-08-07 PROCEDURE — 99212 OFFICE O/P EST SF 10 MIN: CPT | Performed by: INTERNAL MEDICINE

## 2024-08-07 PROCEDURE — 99214 OFFICE O/P EST MOD 30 MIN: CPT

## 2024-08-07 PROCEDURE — 3074F SYST BP LT 130 MM HG: CPT | Performed by: NURSE PRACTITIONER

## 2024-08-07 RX ORDER — HYDROCODONE BITARTRATE AND ACETAMINOPHEN 5; 325 MG/1; MG/1
1 TABLET ORAL EVERY 4 HOURS PRN
Qty: 30 TABLET | Refills: 0 | Status: SHIPPED | OUTPATIENT
Start: 2024-08-07 | End: 2024-09-06

## 2024-08-07 RX ORDER — METRONIDAZOLE 500 MG/1
500 TABLET ORAL DAILY
Qty: 30 TABLET | Refills: 0 | Status: SHIPPED | OUTPATIENT
Start: 2024-08-07 | End: 2024-09-06

## 2024-08-07 RX ORDER — IBUPROFEN 200 MG
200 TABLET ORAL EVERY 6 HOURS PRN
COMMUNITY

## 2024-08-07 ASSESSMENT — ENCOUNTER SYMPTOMS
MUSCULOSKELETAL NEGATIVE: 1
PSYCHIATRIC NEGATIVE: 1
WEIGHT LOSS: 1
CARDIOVASCULAR NEGATIVE: 1
RESPIRATORY NEGATIVE: 1
NEUROLOGICAL NEGATIVE: 1
EYES NEGATIVE: 1

## 2024-08-07 ASSESSMENT — PAIN SCALES - GENERAL: PAINLEVEL: 7=MODERATE-SEVERE PAIN

## 2024-08-07 NOTE — PATIENT INSTRUCTIONS
-Keep dressings clean and dry. Change dressings if they become over saturated, soiled or fall off.     -If you need to change your dressings at home: Wash your wound with normal saline, wound cleanser, or unscented soap and water prior to applying your new dressings. Please avoid cleansing with hydrogen peroxide or rubbing alcohol. Hydrogen peroxide and rubbing alcohol are toxic to new tissue and skin cells.    -Should you experience any significant changes in your wound(s), such as signs of infection (increasing redness, swelling, localized heat, increased pain, fever > 101 F, chills) or have any questions regarding your home care instructions, please contact the wound center at [phone]. If after hours, contact your primary care physician or go to the hospital emergency room.     -If you are 5 or more minutes late for an appointment, we reserve the right to cancel and reschedule that appointment. Additionally, if you are habitually late or not showing (3 late cancellations and/or no shows), we reserve the right to cancel your remaining appointments and it will be your responsibility to obtain a new referral if services are still needed.

## 2024-08-08 ASSESSMENT — ENCOUNTER SYMPTOMS
VOMITING: 0
NAUSEA: 0
SHORTNESS OF BREATH: 0
DIARRHEA: 0
FEVER: 0
COUGH: 0
CHILLS: 0

## 2024-08-08 NOTE — PROGRESS NOTES
Medical oncology follow-up visit: 8/7/2024      Referring Physician: Self referred  Primary Care:  Amelia Franco M.D.    Diagnosis: Bilateral breast cancer    Chief Complaint: Bilateral breast cancer    History of Presenting Illness:  Asha Phan is a 62 y.o. postmenopausal female who was initially diagnosed with bilateral breast cancer in February 2022.  Apparently she felt a mass in her left breast as early as 2019.  The left breast showed invasive ductal carcinoma, grade 1, ER positive CT positive, HER2 negative by history although the pathology is not available to me.  She also had a right breast cancer biopsied under ultrasound at that time likely also estrogen positive.  She was seen by Dr. Arteaga and initially scheduled for surgery but opted not to do it.  She had a multiple social and family issues as well as the pandemic and did not receive any neoadjuvant endocrine therapy or chemotherapy.  On 2/7/2023 she obtained another consult from surgeon Dr. Gomez in [place].  By that time she was having bleeding from the left breast mass that was growing through the skin.  The right breast mass has gotten larger as well.  She was told to have chemo or endocrine therapy prior to considering surgery.  She had a PET scan on 3/24/2023 which showed a large left retroareolar and lateral breast mass with hypermetabolic uptake eroding into the skin.  In addition there was 1.7 cm left axillary lymph node and a 2.8 cm soft tissue mass in the right lateral breast at 9:00.  There was small focal area of hypermetabolic uptake in the anterior cortex of the proximal right femoral diaphysis which may represent early bone metastases but no CT correlation was noted.  No other evidence of disease was noted.  She has recently had intermittent bleeding from the mass which is ulcerating the chest wall now.  Denies any symptoms referable to metastatic disease.  Only chronic problem is hypertension. appetite and  weight are stable.    Interval history 7/11/2023: We recommended letrozole and ribociclib for her locally advanced breast cancer.  She took letrozole for 2 weeks when constipated her and discontinued.  She then developed increasing shortness of breath and ultimately went to [facility] where she was found to have bilateral pleural effusions which were both tapped.  Cytology on one of the effusions was positive for metastatic disease.  CT scan of the chest showed new pleural-based disease as well as lung disease.  Her chest wall disease on the left is not appreciably changed since we saw her in April 2023.  She is now distraught about having stage IV disease documented and is willing to initiate therapy.    Interval history 8/23/2023: She has had thoracenteses on both the left and right lung since we last saw her.  She has not taken any systemic therapy since we last saw her.  A biopsy of her left breast showed invasive ductal carcinoma, grade 1, ER positive greater than 90%, CO +10%, Ki-67 less than 5%, HER2 2+ IHC FISH negative.  [place] lymph node biopsy was positive for cancer.  There is some oozing from the ulcerated portion of the breast cancer in the left areola laterally.  She has no bony pain.    Interval history 9/19/2023: She continues to have thoracenteses for malignant pleural effusions.  The breast mass became malodorous and she saw her primary care physician and started on oral antibiotics.  Some of this may be due to necrotic tumor.  She started her letrozole 3 weeks ago and is tolerating it reasonably well so far.  Having spoken to her some of her friends she wishes to try abemaciclib instead of Ibrance.  We had a long discussion with her that any CDK 4 6 inhibitor is preferable with her advanced disease to none.    Interval history 6/5/2024: She has been lost to follow-up because she has been helping her daughter who has an addiction problem.  She has not required any thoracenteses.  She has  been on letrozole until she ran out just recently.  Scans are scheduled for next week.  Her left breast has become erythematous again and the ulcerated lesion in the lateral aspect has gotten larger.  She has not had labs or scans in months.    Interval history 6/19/2024: She was unable to get the scans or labs in the last 2 weeks.  However they are rescheduled for July 12.  She has had no shortness of breath cough or chest pain and months.  She has not used any oxygen in months as well.  We gave her clindamycin for secondary infection around her exophytic tumor and this is somewhat better but the tumor is slightly larger.  She asked about palliative radiation to the breast tumor and I think this is a good idea before starting abemaciclib which she is not willing to take.  I will refer her to Dr. Velez.    Interval history 8/7/2024: She finally saw Dr. Ariel Ashraf and is getting simulated and will start radiation to her breast next week.  It is gotten somewhat worse in terms of exudate and intermittent bleeding.  She is getting aggressive wound care now though.  She had a CT of the chest abdomen pelvis on 7/12/2024 which showed improvement in consolidation in the right lung and minimal pleural effusion.  However her bilateral axillary axillary adenopathy is worse as is a hilar node.  Most importantly there are numerous new sclerotic lesions in the bones as well as a large mass in the posterior right liver measuring 6.7 x 8.8 cm and multiple additional smaller metastatic lesions noted.  She has been adherent by history to her letrozole.  Her pain is worse in the breast.  And she has no opioids for this.    Past Medical History:   Diagnosis Date    Anxiety     Depression     Diabetes (HCC)     HLD (hyperlipidemia)     HTN (hypertension)     Hypothyroidism        Past Surgical History:   Procedure Laterality Date    CHOLECYSTECTOMY      OTHER      mastoid bone       Social History     Tobacco Use    Smoking status:  Never    Smokeless tobacco: Never   Vaping Use    Vaping status: Never Used   Substance Use Topics    Alcohol use: Yes     Comment: occas    Drug use: No        Family History   Problem Relation Age of Onset    Lung Cancer Mother     Uterine cancer Sister     Cancer Maternal Grandfather     Stomach Cancer Paternal Grandmother        Allergies as of 08/07/2024 - Reviewed 08/07/2024   Allergen Reaction Noted    Demerol Rash 05/20/2011         Current Outpatient Medications:     ibuprofen (MOTRIN) 200 MG Tab, Take 200 mg by mouth every 6 hours as needed., Disp: , Rfl:     Multiple Vitamin (MULTIVITAMIN ADULT PO), multivitamin, Disp: , Rfl:     letrozole (FEMARA) 2.5 MG Tab, Take 1 Tablet by mouth every day., Disp: 90 Tablet, Rfl: 3    EPINEPHrine (EPIPEN) 0.3 MG/0.3ML Solution Auto-injector solution for injection, Inject 0.3 mL into the thigh one time for 1 dose, Disp: 1 Each, Rfl: 0    losartan (COZAAR) 100 MG Tab, TAKE 1 TABLET BY MOUTH DAILY, Disp: 90 Tablet, Rfl: 3    cyclobenzaprine (FLEXERIL) 5 mg tablet, TAKE 1 TO 2 TABLETS BY MOUTH THREE TIMES DAILY AS NEEDED FOR MUSCLE SPASMS, Disp: 60 Tablet, Rfl: 2    acetaminophen (TYLENOL) 325 MG Tab, Tylenol 325 mg tablet  Take 2 tablets every 6 hours by oral route., Disp: , Rfl:     aspirin (ASA) 81 MG Chew Tab chewable tablet, Chew 81 mg every day., Disp: , Rfl:     levothyroxine (SYNTHROID) 25 MCG Tab, TAKE 1 TABLET BY MOUTH EVERY MORNING ON AN EMPTY STOMACH (Patient not taking: Reported on 6/5/2024), Disp: 90 Tablet, Rfl: 2    Abemaciclib (VERZENIO) 150 MG Tab, Take 150 mg by mouth every 12 hours. Can be taken with our without food. (Patient not taking: Reported on 6/5/2024), Disp: 60 Tablet, Rfl: 0    Review of Systems:  Review of Systems   Constitutional:  Positive for malaise/fatigue and weight loss.   HENT: Negative.     Eyes: Negative.    Respiratory: Negative.     Cardiovascular: Negative.    Gastrointestinal:  Positive for melena.   Genitourinary: Negative.   "  Musculoskeletal: Negative.    Skin: Negative.    Neurological: Negative.    Endo/Heme/Allergies: Negative.    Psychiatric/Behavioral: Negative.            Physical Exam:  Vitals:    08/07/24 1635   BP: 118/72   BP Location: Right arm   Patient Position: Sitting   Pulse: 85   Temp: 37.3 °C (99.1 °F)   TempSrc: Temporal   SpO2: 95%   Weight: 108 kg (238 lb 12.1 oz)   Height: 1.715 m (5' 7.5\")       DESC; KARNOFSKY SCALE WITH ECOG EQUIVALENT: 80, Normal activity with effort; some signs or symptoms of disease (ECOG equivalent 1)    DISTRESS LEVEL: mild distress    Physical Exam  Constitutional:       Appearance: Normal appearance.   HENT:      Head: Normocephalic.      Mouth/Throat:      Mouth: Mucous membranes are moist.      Pharynx: Oropharynx is clear.   Eyes:      Extraocular Movements: Extraocular movements intact.      Conjunctiva/sclera: Conjunctivae normal.      Pupils: Pupils are equal, round, and reactive to light.   Cardiovascular:      Rate and Rhythm: Regular rhythm. Tachycardia present.      Pulses: Normal pulses.   Pulmonary:      Effort: Pulmonary effort is normal.      Breath sounds: Normal breath sounds.      Comments: Decreased breath sounds third of the way up on the right and a quarter of the way up on the left.  No rales or rhonchi are noted.  Chest:      Comments: 6/5/2024: Markedly worse left breast than a year ago with a large ulcerated lesion and diffuse erythema and firmness in the rest of the breast.  2 x 2 centimeter left axillary lymph node 2 x 2 cm left supraclavicular lymph node 8/23/2023: Slightly worse in the left breast.  Right breast stable 7/11/2023: Essentially stable findings 4/25/2023: left breast is replaced by tumor and has involuted substantially.  Laterally there is partial destruction of the lateral nipple areolar complex with scar and ulceration and skin infiltration by tumor.  There are couple small satellite nodule lesions on the left breast.  2 x 2 centimeter left " axillary adenopathy is noted but no supraclavicular or infraclavicular adenopathy.  Right breast shows a 3 x 3 cm mass at 9:00.  Abdominal:      General: Abdomen is flat. Bowel sounds are normal.      Palpations: Abdomen is soft. There is no mass.   Musculoskeletal:         General: Normal range of motion.   Lymphadenopathy:      Comments: New large 3 x 3 cm left supraclavicular lymph node.  Bilateral axillary lymphadenopathy left greater than right.   Skin:     General: Skin is warm.   Neurological:      General: No focal deficit present.      Mental Status: She is alert and oriented to person, place, and time.   Psychiatric:         Mood and Affect: Mood normal.         Behavior: Behavior normal.            Labs:  No visits with results within 1 Month(s) from this visit.   Latest known visit with results is:   Hospital Outpatient Visit on 01/19/2023   Component Date Value Ref Range Status    Elf Score 01/19/2023 9.9   Final    Risk Group 01/19/2023 Moderate   Final    Risk 01/19/2023 23.6 (A)  % Final    (12.4%, 34.9%) Chance of liver related event within 5 years    Relative Risk 01/19/2023 7.4x   Final    Comment: *Relative Risks (calculated hazard ratios using Davey proportional hazard  model after adjusting for age and sex, relative to ELF < 9.8) for Liver  Related Outcomes within 5 years.    Prognostic Determinations:  A review of clinical outcomes from 457 patients followed for a mean of 5  years was used to establish the prognostic utility of ELF (Oksana et al. 2019).  The results from this analysis demonstrate that the ELF scores are useful  for categorizing patients into risk groups according to the likelihood of  future liver-related clinical outcomes. These outcomes are defined as liver  related death, complication of portal hypertension, liver cancer and liver  transplantation.    Test Description:  The Enhanced Liver Fibrosis (ELF) Score is a multivariate index immunoassay  intended to provide an algorithm  score based on quantitative measurements of  hyaluronic acid (HA), amino-terminal propeptide of type III procollagen  (PIIINP) and tissue inhibitor of metalloproteinase 1 (TIMP-1) from [employer]i calix.  This test combines the three serum biomarkers to assess the likelihood of  progression to cirrhosis and liver-related clinical events in patients with  advanced fibrosis due to BOX. The analytical measurement range is 4.5 to  14.7    The ELF Test is the only IVD assay granted marketing authorization by the  FDA and measures biomarkers directly involved in the active process of  scarring. The test was developed and its performance characteristics  determined by the [facility]. The laboratory is regulated  under CLIA as qualified to perform high-complexity testing. This testing is  used for clinical purposes. It should not be regarded as investigational or  for research.    Test Performed at: [facility]  [address] [place] [id number]; [phone]  : Rodney Flor MD         Imaging:       Pathology:  Not available    Assessment & Plan:     1.  Bilateral breast cancer neglected for greater than a year now with extensive destruction of the left breast and ulceration and skin lesions making her inoperable on the left.  Right breast shows a 3 x 3 cm mass.  PET scan March 2023 showed non-hypermetabolic pleural effusions which have now progressed and cytology positive.  In addition she seems to have pleural masses which are.  Now requiring more frequent thoracenteses.  Started aromatase inhibition early September 2023.  Has not yet started CDK 4 6 inhibitor.  Lost to follow-up for over 6 months although apparently was on letrozole as single agent.  Now with clear progression of disease in the left breast.  However not symptomatic from pleural effusion suggesting systemic disease is under relatively good control despite growth in the breast.  Will  refer to radiation for local control and await scans to determine addition of abemaciclib to letrozole.  August 2024: Progression of disease in liver with extensive disease.  Breast is worse.  Lung is better.  Lymph nodes are worse.  To start radiation to symptomatic oozing and exophytic left breast mass.    Plan: Dr. Velez is planning 10 palliative radiation treatments.  Will see her back in 3 weeks to start abemaciclib at that time.  Any questions and concerns raised by the patient were answered to the best of my ability. Thank you for allowing me to participate in the care for this patient. Please feel free to contact me for any questions or concerns.     Cm Tabor M.D.

## 2024-08-08 NOTE — ADDENDUM NOTE
Encounter addended by: Fidencio Lewis, Med Ass't on: 8/8/2024 8:23 AM   Actions taken: Charge Capture section accepted

## 2024-08-08 NOTE — ADDENDUM NOTE
Encounter addended by: Cm Tabor M.D. on: 8/7/2024 6:08 PM   Actions taken: Actions taken from a BestPractice Advisory, Order list changed, Diagnosis association updated

## 2024-08-08 NOTE — PROGRESS NOTES
Provider Encounter- Full Thickness wound    HISTORY OF PRESENT ILLNESS  Wound History:    START OF CARE IN CLINIC: 07/31/2024               REFERRING PROVIDER: Elvis Russell M.D.              WOUND- Full Thickness Wound              LOCATION: Left breast               HISTORY: 64-year-old female referred to [facility] for management of a fungating breast wound to her left breast.  PER ONCOLOGY NOTE: She was initially diagnosed with bilateral breast cancer in February 2022. Apparently she felt a mass in her left breast as early as 2019. The left breast showed invasive ductal carcinoma, grade 1, ER positive SD positive, HER2 negative by history. She also had a right breast cancer biopsied under ultrasound at that time likely also estrogen positive. She was seen by Dr. Arteaga and initially scheduled for surgery but opted not to do it. She had a multiple social and family issues as well as the pandemic and did not receive any neoadjuvant endocrine therapy or chemotherapy.  In February 2023 she obtained another consult from surgeon Dr. Gomez in [place]. By that time she was having bleeding from the left breast mass that was growing through the skin. The right breast mass had gotten larger as well. She was told to have chemo or endocrine therapy prior to considering surgery. She had a PET scan on 3/24/2023 which showed a large left retroareolar and lateral breast mass with hypermetabolic uptake eroding into the skin. In addition there was 1.7 cm left axillary lymph node and a 2.8 cm soft tissue mass in the right lateral breast at 9:00. There was small focal area of hypermetabolic uptake in the anterior cortex of the proximal right femoral diaphysis which may represent early bone metastases but no CT correlation was noted. No other evidence of disease was noted. She has recently had intermittent bleeding from the mass which is ulcerating the chest wall now.         Pertinent Medical History: Metastatic breast  cancer, diabetes mellitus type 2, anxiety, obesity    TOBACCO USE:      Patient's problem list, allergies, and current medications reviewed and updated in Epic    Interval History:  8/7/2024 : Initial provider visit with CHERIE Freire, ESTEBAN-BC, AMNAN, CFCN.   Patient states she is feeling okay.  She was seen by radiation oncology earlier this week, and will be starting radiation therapy to her breast next week.  She will be seeing her oncologist later today.  She is taking letrozole, has been reluctant to try any other types of chemo or immunotherapy.   She has been dressing her wound daily, cleansing with saline or soap and water in the shower, and then applying nonadherent dressings and gauze.      REVIEW OF SYSTEMS:   Review of Systems   Constitutional:  Negative for chills and fever.   Respiratory:  Negative for cough and shortness of breath.    Cardiovascular:  Negative for chest pain.   Gastrointestinal:  Negative for diarrhea, nausea and vomiting.       PHYSICAL EXAMINATION:   /80   Pulse 83   Temp 36.3 °C (97.3 °F) (Temporal)   Resp 16   SpO2 97%     Physical Exam  Cardiovascular:      Rate and Rhythm: Normal rate.   Pulmonary:      Effort: Pulmonary effort is normal.   Skin:     Comments: Full-thickness wound of left breast (neoplasm): Measures larger.  Slough to approximately 80% of wound bed.  Moderate to heavy serous drainage, mild odor.  Induration to periwound.   Neurological:      Mental Status: She is alert.         WOUND ASSESSMENT  Wound 07/31/24 Neoplasm Left breast (Active)   Wound Image   08/07/24 1526   Site Assessment Pink;Red;Yellow;Tan;Painful 08/07/24 1526   Periwound Assessment Fragile 08/07/24 1526   Margins Unattached edges 08/07/24 1526   Closure Secondary intention 07/31/24 1500   Drainage Amount Copious 08/07/24 1526   Drainage Description Serosanguineous 08/07/24 1526   Treatments Cleansed;Site care 08/07/24 1526   Wound Cleansing Hypochlorus Acid 08/07/24 1526    Periwound Protectant Not Applicable 24 1526   Dressing Status Old drainage 24 1500   Dressing Changed New 24 1500   Dressing Cleansing/Solutions Not Applicable 24 1526   Dressing Options Petrolatum Gauze (yellow);Super Absorbent Pad;Other (Comments) 24 1526   Dressing Change/Treatment Frequency As Needed 24 1526   Wound Team Following Weekly 24 1500   Non-staged Wound Description Full thickness 24 1526   Wound Length (cm) 11.2 cm 24 1526   Wound Width (cm) 15 cm 24 1526   Wound Depth (cm) 1 cm 24 1500   Wound Surface Area (cm^2) 168 cm^2 24 1526   Wound Volume (cm^3) 147 cm^3 24 1500   Tunneling (cm) 0 cm 24 1526   Undermining (cm) 0 cm 24 1526   Wound Odor Mild 24 1526   Exposed Structures None 24 1526   Number of days: 7       PROCEDURE:   -2% viscous lidocaine applied topically to wound bed for approximately 5 minutes prior to assessment  -No excisional debridement, contraindicated malignancy  -Wound care completed by wound RN, refer to flowsheet  -Patient tolerated the procedure well, without c/o pain or discomfort.       Pertinent Labs and Diagnostics:    Labs:     A1c:   Lab Results   Component Value Date/Time    HBA1C 6.8 (A) 2023 12:00 AM          IMAGIN2024-CT of abdomen and pelvis with IV contrast  IMPRESSION:     1.  Interval progression of disease. The breast masses are not clearly within the field-of-view of the current study however there appears to be significant interval progression of infiltrative left breast mass from prior study.  2.  Bilateral axillary lymphadenopathy, new on the right and worsening on the left with new abnormal sided lymph nodes.  3.  New mildly enlarged right hilar lymph node.  4.  Small 6 mm posterior right upper lobe nodule appears new from prior, nonspecific and attention on follow-up.  5.  New right hepatic metastases.  6.  Improved right middle lobe and  "lingular consolidations and mild improvement in pleural effusions.  7.  New/worsening osseous metastatic disease, mostly sclerotic with a few lytic lesions.  8.  Additional findings are as described.    VASCULAR STUDIES: N/A    LAST  WOUND CULTURE:  DATE : No results found for: \"CULTRSULT\"      ASSESSMENT AND PLAN:     1. Primary malignant neoplasm of female breast (HCC)  2. Mass of left breast, unspecified quadrant    8/7/2024: Breast mass first identified in 2019.  Significant delays in seeking care.  Patient has been reluctant to pursue aggressive treatment.  Tumor eroded through skin and has progressively gotten larger  -Wound area measures larger today  -Mild odor.  Rx for Flagyl sent to patient's pharmacy.  Patient instructed to crush and applied to wound bed as needed for odor  -She has an appointment later today with her oncologist.  She is currently on letrozole, has been resistant to other treatment modalities  -She has been seen by radiation oncology, and will be starting radiation therapy to her breast next week  -Patient return to clinic weekly for assessment and management of this wound,.  Excisional debridement is contraindicated due to malignancy  -Patient understands the goal of treatment at this point is palliative only, management of drainage and odor.    3. Controlled type 2 diabetes mellitus with hyperglycemia, without long-term current use of insulin (HCC)    8/7/2024: Patient does not check her blood sugars, nor is she taking diabetes medications.  Has opted to treat holistically.  -Encouraged patient to check her blood sugars at least sporadically.   -Patient to follow-up with her PCP      PATIENT EDUCATION  - Importance of adequate nutrition for wound healing  -Advised to go to ER for any increased redness, swelling, drainage, or odor, or if patient develops fever, chills, nausea or vomiting.     My total time spent caring for the patient on the day of the encounter was 30 minutes.   This " does not include time spent on separately billable procedures/tests.       Please note that this note may have been created using voice recognition software. I have worked with technical experts from [facility] to optimize the interface.  I have made every reasonable attempt to correct obvious errors, but there may be errors of grammar and possibly content that I did not discover before finalizing the note.    N

## 2024-08-09 PROCEDURE — 77334 RADIATION TREATMENT AID(S): CPT | Mod: 26 | Performed by: RADIOLOGY

## 2024-08-09 PROCEDURE — 77295 3-D RADIOTHERAPY PLAN: CPT | Mod: 26 | Performed by: RADIOLOGY

## 2024-08-09 PROCEDURE — 77295 3-D RADIOTHERAPY PLAN: CPT | Performed by: RADIOLOGY

## 2024-08-09 PROCEDURE — 77300 RADIATION THERAPY DOSE PLAN: CPT | Mod: 26 | Performed by: RADIOLOGY

## 2024-08-09 PROCEDURE — 77334 RADIATION TREATMENT AID(S): CPT | Performed by: RADIOLOGY

## 2024-08-09 PROCEDURE — 77332 RADIATION TREATMENT AID(S): CPT | Mod: XU | Performed by: RADIOLOGY

## 2024-08-09 PROCEDURE — 77300 RADIATION THERAPY DOSE PLAN: CPT | Performed by: RADIOLOGY

## 2024-08-09 PROCEDURE — 77332 RADIATION TREATMENT AID(S): CPT | Mod: 26,XU | Performed by: RADIOLOGY

## 2024-08-15 ENCOUNTER — APPOINTMENT (OUTPATIENT)
Dept: WOUND CARE | Facility: MEDICAL CENTER | Age: 64
End: 2024-08-15
Payer: MEDICARE

## 2024-08-20 ENCOUNTER — HOSPITAL ENCOUNTER (OUTPATIENT)
Dept: RADIATION ONCOLOGY | Facility: MEDICAL CENTER | Age: 64
End: 2024-08-20

## 2024-08-20 LAB
CHEMOTHERAPY INFUSION START DATE: NORMAL
CHEMOTHERAPY RECORDS: 3
CHEMOTHERAPY RECORDS: 3000
CHEMOTHERAPY RECORDS: NORMAL
CHEMOTHERAPY RX CANCER: NORMAL
DATE 1ST CHEMO CANCER: NORMAL
RAD ONC ARIA COURSE LAST TREATMENT DATE: NORMAL
RAD ONC ARIA COURSE TREATMENT ELAPSED DAYS: NORMAL
RAD ONC ARIA REFERENCE POINT DOSAGE GIVEN TO DATE: 3
RAD ONC ARIA REFERENCE POINT ID: NORMAL
RAD ONC ARIA REFERENCE POINT SESSION DOSAGE GIVEN: 3

## 2024-08-20 PROCEDURE — 77280 THER RAD SIMULAJ FIELD SMPL: CPT | Performed by: RADIOLOGY

## 2024-08-20 PROCEDURE — 77417 THER RADIOLOGY PORT IMAGE(S): CPT | Performed by: RADIOLOGY

## 2024-08-20 PROCEDURE — 77280 THER RAD SIMULAJ FIELD SMPL: CPT | Mod: 26 | Performed by: RADIOLOGY

## 2024-08-20 PROCEDURE — 77412 RADIATION TX DELIVERY LVL 3: CPT | Performed by: RADIOLOGY

## 2024-08-20 NOTE — CT SIMULATION
DATE OF SERVICE: 8/20/2024    Radiation Therapy Episodes       Active Episodes       Radiation Therapy: 3D CRT                   Radiation Treatments         Plan Last Treated On Elapsed Days Fractions Treated Prescribed Fraction Dose (cGy) Prescribed Total Dose (cGy)    L Breast 8/20/2024 0 @ [id number] 1 of 10 300 3,000                  Reference Point Last Treated On Elapsed Days Most Recent Session Dose (cGy) Total Dose (cGy)    L Breast 8/20/2024 0 @ [id number] 300 300                            First Visit Simple Simulation: Called by Truebeam machine to verify treatment parameters including:  treatment site, treatment dose, and treatment setup prior to first treatment. Image derived shifts reviewed in all appropriate planes.  Shifts approved.  Patient treated.    I have personally reviewed the relevant data, performed the target localization, and determined all relevant factors for this patient’s simulation.

## 2024-08-21 ENCOUNTER — HOSPITAL ENCOUNTER (OUTPATIENT)
Dept: RADIATION ONCOLOGY | Facility: MEDICAL CENTER | Age: 64
End: 2024-08-21
Payer: MEDICARE

## 2024-08-21 ENCOUNTER — HOSPITAL ENCOUNTER (OUTPATIENT)
Dept: RADIATION ONCOLOGY | Facility: MEDICAL CENTER | Age: 64
End: 2024-08-21
Attending: RADIOLOGY
Payer: MEDICARE

## 2024-08-21 VITALS
TEMPERATURE: 96.9 F | SYSTOLIC BLOOD PRESSURE: 165 MMHG | DIASTOLIC BLOOD PRESSURE: 83 MMHG | BODY MASS INDEX: 36.71 KG/M2 | WEIGHT: 237.88 LBS | OXYGEN SATURATION: 92 % | HEART RATE: 99 BPM

## 2024-08-21 LAB
CHEMOTHERAPY INFUSION START DATE: NORMAL
CHEMOTHERAPY RECORDS: 3
CHEMOTHERAPY RECORDS: 3000
CHEMOTHERAPY RECORDS: NORMAL
CHEMOTHERAPY RX CANCER: NORMAL
DATE 1ST CHEMO CANCER: NORMAL
RAD ONC ARIA COURSE LAST TREATMENT DATE: NORMAL
RAD ONC ARIA COURSE TREATMENT ELAPSED DAYS: NORMAL
RAD ONC ARIA REFERENCE POINT DOSAGE GIVEN TO DATE: 6
RAD ONC ARIA REFERENCE POINT ID: NORMAL
RAD ONC ARIA REFERENCE POINT SESSION DOSAGE GIVEN: 3

## 2024-08-21 PROCEDURE — 77412 RADIATION TX DELIVERY LVL 3: CPT | Performed by: RADIOLOGY

## 2024-08-21 PROCEDURE — 77417 THER RADIOLOGY PORT IMAGE(S): CPT | Performed by: RADIOLOGY

## 2024-08-21 ASSESSMENT — PAIN SCALES - GENERAL: PAINLEVEL: 3=SLIGHT PAIN

## 2024-08-21 NOTE — ON TREATMENT VISIT
"  ON TREATMENT NOTE  RADIATION ONCOLOGY DEPARTMENT    Patient name:  Asha Hannon    Primary Physician:  Katherine Brown M.D. MRN: [id number]  CSN: [id number]   Referring physician:  Gabino Morton D.O. : 1960, 64 y.o.     ENCOUNTER DATE:  24    DIAGNOSIS:    Metastatic breast cancer    TREATMENT SUMMARY:  Aria Treatment Information          2024   Aria Course Treatment Dates   Course First Treatment Date 2024    Course Last Treatment Date 2024    Aria Treatment Summary   L Breast  Plan from Course C1_LBreast   Fraction 2 of 10   Elapsed Course Days 1 @    Prescribed Fraction Dose 300 cGy   Prescribed Total Dose 3,000 cGy   L Breast  Reference Point from Course C1_LBreast   Elapsed Course Days 1 @    Session Dose 300 cGy   Total Dose 600 cGy      Radiation Treatments       Active   Plans   L Breast   Most recent treatment: Dose planned: 300 cGy (fraction 2 of 10 on 2024)   Total: Dose planned: 3,000 cGy   Elapsed Days: 1 @            Historical   No historical radiation treatments to show.               SUBJECTIVE:   Patient is tolerating her radiation well.  No changes yet she would attribute to her radiation.    VITAL SIGNS:    Encounter Vitals  Temperature: 36.1 °C (96.9 °F)  Blood Pressure : (!) 165/83  Pulse: 99  Pulse Oximetry: 92 %  Weight: 108 kg (237 lb 14 oz)  Weight Source: Stand Up Scale  Pain Score: 3=Slight Pain ('I just took a hydrocodone.\")      2024     3:25 PM 2024     4:35 PM 2024     3:09 PM 2024     4:33 PM   Pain Assessment   Pain Score 3=SLIGHT MONY 7=MODERATE-S 4=SLIGHT MONY 5=MODERATE P   Pain Loc BREAST BREAST BREAST BREAST          PHYSICAL EXAM:  No erythema    TOXICITY      2024     3:27 PM   Toxicity Assessment   Toxicity Assessment Breast   Fatigue (lethargy, malaise, asthenia) Moderate (e.g., decrease in performance status by 1 ECOG level or 20% Karnofsky) or causing " difficulty performing some activities   Fever (in the absence of neutropenia) None   Radiation Dermatitis None   Lymphatics Normal   RT - Pain due to RT None   Dyspnea Normal         IMPRESSION:  Cancer Staging   Malignant neoplasm of breast (HCC)  Staging form: Breast, AJCC 8th Edition  - Clinical stage from 4/25/2023: cT4c, cN2a, cM0, ER+, AR: Unknown, HER2- - Unsigned      PLAN:  No change in treatment plan    Disposition:  Treatment plan reviewed. Questions answered. Continue therapy outlined.     Masoud Velez M.D.    No orders of the defined types were placed in this encounter.

## 2024-08-22 ENCOUNTER — TELEPHONE (OUTPATIENT)
Dept: HEMATOLOGY ONCOLOGY | Facility: MEDICAL CENTER | Age: 64
End: 2024-08-22
Payer: MEDICARE

## 2024-08-22 ENCOUNTER — HOSPITAL ENCOUNTER (OUTPATIENT)
Dept: RADIATION ONCOLOGY | Facility: MEDICAL CENTER | Age: 64
End: 2024-08-22
Payer: MEDICARE

## 2024-08-22 ENCOUNTER — APPOINTMENT (OUTPATIENT)
Dept: WOUND CARE | Facility: MEDICAL CENTER | Age: 64
End: 2024-08-22
Payer: MEDICARE

## 2024-08-22 LAB
CHEMOTHERAPY INFUSION START DATE: NORMAL
CHEMOTHERAPY RECORDS: 3
CHEMOTHERAPY RECORDS: 3000
CHEMOTHERAPY RECORDS: NORMAL
CHEMOTHERAPY RX CANCER: NORMAL
DATE 1ST CHEMO CANCER: NORMAL
RAD ONC ARIA COURSE LAST TREATMENT DATE: NORMAL
RAD ONC ARIA COURSE TREATMENT ELAPSED DAYS: NORMAL
RAD ONC ARIA REFERENCE POINT DOSAGE GIVEN TO DATE: 9
RAD ONC ARIA REFERENCE POINT ID: NORMAL
RAD ONC ARIA REFERENCE POINT SESSION DOSAGE GIVEN: 3

## 2024-08-22 PROCEDURE — 77412 RADIATION TX DELIVERY LVL 3: CPT | Performed by: RADIOLOGY

## 2024-08-22 PROCEDURE — 77417 THER RADIOLOGY PORT IMAGE(S): CPT | Performed by: RADIOLOGY

## 2024-08-22 PROCEDURE — 77336 RADIATION PHYSICS CONSULT: CPT | Performed by: RADIOLOGY

## 2024-08-22 NOTE — TELEPHONE ENCOUNTER
Prior Authorization for Verzenio 150MG tablets (Quantity: 60, Days: 30) has been submitted via Cover My Meds: Key ([id number])    Insurance: [employer]    Will follow up in 24-48 business hours.

## 2024-08-22 NOTE — TELEPHONE ENCOUNTER
Prior Authorization for Verzenio 150MG tablets has been approved for a quantity of 60 , day supply 30    Prior Authorization reference number: N/A  Insurance: [employer]  Effective dates: 08/22/2024 to 12/30/2099  Copay: $4.60

## 2024-08-23 ENCOUNTER — HOSPITAL ENCOUNTER (OUTPATIENT)
Dept: RADIATION ONCOLOGY | Facility: MEDICAL CENTER | Age: 64
End: 2024-08-23
Payer: MEDICARE

## 2024-08-23 LAB
CHEMOTHERAPY INFUSION START DATE: NORMAL
CHEMOTHERAPY RECORDS: 3
CHEMOTHERAPY RECORDS: 3000
CHEMOTHERAPY RECORDS: NORMAL
CHEMOTHERAPY RX CANCER: NORMAL
DATE 1ST CHEMO CANCER: NORMAL
RAD ONC ARIA COURSE LAST TREATMENT DATE: NORMAL
RAD ONC ARIA COURSE TREATMENT ELAPSED DAYS: NORMAL
RAD ONC ARIA REFERENCE POINT DOSAGE GIVEN TO DATE: 12
RAD ONC ARIA REFERENCE POINT ID: NORMAL
RAD ONC ARIA REFERENCE POINT SESSION DOSAGE GIVEN: 3

## 2024-08-23 PROCEDURE — 77417 THER RADIOLOGY PORT IMAGE(S): CPT | Performed by: RADIOLOGY

## 2024-08-23 PROCEDURE — 77412 RADIATION TX DELIVERY LVL 3: CPT | Performed by: RADIOLOGY

## 2024-08-26 ENCOUNTER — HOSPITAL ENCOUNTER (OUTPATIENT)
Dept: RADIATION ONCOLOGY | Facility: MEDICAL CENTER | Age: 64
End: 2024-08-26

## 2024-08-26 LAB
CHEMOTHERAPY INFUSION START DATE: NORMAL
CHEMOTHERAPY RECORDS: 3
CHEMOTHERAPY RECORDS: 3000
CHEMOTHERAPY RECORDS: NORMAL
CHEMOTHERAPY RX CANCER: NORMAL
DATE 1ST CHEMO CANCER: NORMAL
RAD ONC ARIA COURSE LAST TREATMENT DATE: NORMAL
RAD ONC ARIA COURSE TREATMENT ELAPSED DAYS: NORMAL
RAD ONC ARIA REFERENCE POINT DOSAGE GIVEN TO DATE: 15
RAD ONC ARIA REFERENCE POINT ID: NORMAL
RAD ONC ARIA REFERENCE POINT SESSION DOSAGE GIVEN: 3

## 2024-08-26 PROCEDURE — 77417 THER RADIOLOGY PORT IMAGE(S): CPT | Performed by: RADIOLOGY

## 2024-08-26 PROCEDURE — 77412 RADIATION TX DELIVERY LVL 3: CPT | Performed by: RADIOLOGY

## 2024-08-27 ENCOUNTER — HOSPITAL ENCOUNTER (OUTPATIENT)
Dept: RADIATION ONCOLOGY | Facility: MEDICAL CENTER | Age: 64
End: 2024-08-27

## 2024-08-27 ENCOUNTER — APPOINTMENT (OUTPATIENT)
Dept: RADIOLOGY | Facility: MEDICAL CENTER | Age: 64
End: 2024-08-27
Payer: MEDICARE

## 2024-08-27 LAB
CHEMOTHERAPY INFUSION START DATE: NORMAL
CHEMOTHERAPY RECORDS: 3
CHEMOTHERAPY RECORDS: 3000
CHEMOTHERAPY RECORDS: NORMAL
CHEMOTHERAPY RX CANCER: NORMAL
DATE 1ST CHEMO CANCER: NORMAL
RAD ONC ARIA COURSE LAST TREATMENT DATE: NORMAL
RAD ONC ARIA COURSE TREATMENT ELAPSED DAYS: NORMAL
RAD ONC ARIA REFERENCE POINT DOSAGE GIVEN TO DATE: 18
RAD ONC ARIA REFERENCE POINT ID: NORMAL
RAD ONC ARIA REFERENCE POINT SESSION DOSAGE GIVEN: 3

## 2024-08-27 PROCEDURE — 77427 RADIATION TX MANAGEMENT X5: CPT | Performed by: RADIOLOGY

## 2024-08-27 PROCEDURE — 77417 THER RADIOLOGY PORT IMAGE(S): CPT | Performed by: RADIOLOGY

## 2024-08-27 PROCEDURE — 77412 RADIATION TX DELIVERY LVL 3: CPT | Performed by: RADIOLOGY

## 2024-08-28 ENCOUNTER — HOSPITAL ENCOUNTER (OUTPATIENT)
Dept: RADIATION ONCOLOGY | Facility: MEDICAL CENTER | Age: 64
End: 2024-08-28
Attending: RADIOLOGY
Payer: MEDICARE

## 2024-08-28 ENCOUNTER — APPOINTMENT (OUTPATIENT)
Dept: HEMATOLOGY ONCOLOGY | Facility: MEDICAL CENTER | Age: 64
End: 2024-08-28
Payer: MEDICARE

## 2024-08-28 ENCOUNTER — HOSPITAL ENCOUNTER (OUTPATIENT)
Dept: RADIATION ONCOLOGY | Facility: MEDICAL CENTER | Age: 64
End: 2024-08-28

## 2024-08-28 VITALS
TEMPERATURE: 97.1 F | BODY MASS INDEX: 36.81 KG/M2 | OXYGEN SATURATION: 95 % | SYSTOLIC BLOOD PRESSURE: 126 MMHG | HEART RATE: 88 BPM | DIASTOLIC BLOOD PRESSURE: 76 MMHG | WEIGHT: 238.54 LBS

## 2024-08-28 LAB
CHEMOTHERAPY INFUSION START DATE: NORMAL
CHEMOTHERAPY RECORDS: 3
CHEMOTHERAPY RECORDS: 3000
CHEMOTHERAPY RECORDS: NORMAL
CHEMOTHERAPY RX CANCER: NORMAL
DATE 1ST CHEMO CANCER: NORMAL
RAD ONC ARIA COURSE LAST TREATMENT DATE: NORMAL
RAD ONC ARIA COURSE TREATMENT ELAPSED DAYS: NORMAL
RAD ONC ARIA REFERENCE POINT DOSAGE GIVEN TO DATE: 21
RAD ONC ARIA REFERENCE POINT ID: NORMAL
RAD ONC ARIA REFERENCE POINT SESSION DOSAGE GIVEN: 3

## 2024-08-28 PROCEDURE — 77417 THER RADIOLOGY PORT IMAGE(S): CPT | Performed by: RADIOLOGY

## 2024-08-28 PROCEDURE — 77412 RADIATION TX DELIVERY LVL 3: CPT | Performed by: RADIOLOGY

## 2024-08-28 ASSESSMENT — PAIN SCALES - GENERAL: PAINLEVEL: 7=MODERATE-SEVERE PAIN

## 2024-08-28 NOTE — ON TREATMENT VISIT
ON TREATMENT NOTE  RADIATION ONCOLOGY DEPARTMENT    Patient name:  Asha Hannon    Primary Physician:  Katherine Brown M.D. MRN: [id number]  CSN: [id number]   Referring physician:  Gabino Morton D.O. : 1960, 64 y.o.     ENCOUNTER DATE:  24    DIAGNOSIS:    Metastatic breast cancer    TREATMENT SUMMARY:  Aria Treatment Information          2024   Aria Course Treatment Dates   Course First Treatment Date 2024    Course Last Treatment Date 2024    Aria Treatment Summary   L Breast  Plan from Course C1_LBreast   Fraction 7 of 10   Elapsed Course Days 8 @ [id number]   Prescribed Fraction Dose 300 cGy   Prescribed Total Dose 3,000 cGy   L Breast  Reference Point from Course C1_LBreast   Elapsed Course Days 8 @ [id number]   Session Dose 300 cGy   Total Dose 2,100 cGy      Radiation Treatments       Active   Plans   L Breast   Most recent treatment: Dose planned: 300 cGy (fraction 7 of 10 on 2024)   Total: Dose planned: 3,000 cGy   Elapsed Days: 8 @            Historical   No historical radiation treatments to show.               SUBJECTIVE:   Patient is starting to see some initial skin response from the radiation.  The tumor remains stable but has not yet shown evidence of regression.    VITAL SIGNS:    Encounter Vitals  Temperature: 36.2 °C (97.1 °F)  Blood Pressure : 126/76  Pulse: 88  Pulse Oximetry: 95 %  Weight: 108 kg (238 lb 8.6 oz)  Weight Source: Stand Up Scale  Pain Score: 7=Moderate-Severe Pain      2024     3:17 PM 2024     3:25 PM 2024     4:35 PM 2024     3:09 PM 2024     4:33 PM   Pain Assessment   Pain Score 7=MODERATE-S 3=SLIGHT MONY 7=MODERATE-S 4=SLIGHT MONY 5=MODERATE P   Pain Loc BREAST BREAST BREAST BREAST BREAST          PHYSICAL EXAM:  Large fungating mass largely unchanged    TOXICITY      2024     3:20 PM 2024     3:27 PM   Toxicity Assessment   Toxicity Assessment Breast Breast   Fatigue  (lethargy, malaise, asthenia) Moderate (e.g., decrease in performance status by 1 ECOG level or 20% Karnofsky) or causing difficulty performing some activities Moderate (e.g., decrease in performance status by 1 ECOG level or 20% Karnofsky) or causing difficulty performing some activities   Fever (in the absence of neutropenia) None None   Radiation Dermatitis Faint erythema or dry desquamation None   Lymphatics Normal Normal   RT - Pain due to RT Disabling None   Dyspnea Dyspnea on exertion Normal         IMPRESSION:  Cancer Staging   Malignant neoplasm of breast (HCC)  Staging form: Breast, AJCC 8th Edition  - Clinical stage from 4/25/2023: cT4c, cN2a, cM0, ER+, CA: Unknown, HER2- - Unsigned      PLAN:  No change in treatment plan    Disposition:  Treatment plan reviewed. Questions answered. Continue therapy outlined.     Masoud Velez M.D.    No orders of the defined types were placed in this encounter.

## 2024-08-29 ENCOUNTER — OFFICE VISIT (OUTPATIENT)
Dept: WOUND CARE | Facility: MEDICAL CENTER | Age: 64
End: 2024-08-29
Payer: MEDICARE

## 2024-08-29 ENCOUNTER — HOSPITAL ENCOUNTER (OUTPATIENT)
Dept: RADIATION ONCOLOGY | Facility: MEDICAL CENTER | Age: 64
End: 2024-08-29

## 2024-08-29 VITALS
TEMPERATURE: 97.4 F | DIASTOLIC BLOOD PRESSURE: 64 MMHG | SYSTOLIC BLOOD PRESSURE: 120 MMHG | HEART RATE: 73 BPM | RESPIRATION RATE: 18 BRPM

## 2024-08-29 DIAGNOSIS — E11.65 CONTROLLED TYPE 2 DIABETES MELLITUS WITH HYPERGLYCEMIA, WITHOUT LONG-TERM CURRENT USE OF INSULIN (HCC): ICD-10-CM

## 2024-08-29 DIAGNOSIS — C50.919 PRIMARY MALIGNANT NEOPLASM OF FEMALE BREAST (HCC): ICD-10-CM

## 2024-08-29 DIAGNOSIS — N63.20 MASS OF LEFT BREAST, UNSPECIFIED QUADRANT: ICD-10-CM

## 2024-08-29 LAB

## 2024-08-29 PROCEDURE — 99213 OFFICE O/P EST LOW 20 MIN: CPT

## 2024-08-29 PROCEDURE — 77336 RADIATION PHYSICS CONSULT: CPT | Performed by: RADIOLOGY

## 2024-08-29 PROCEDURE — 77417 THER RADIOLOGY PORT IMAGE(S): CPT | Performed by: RADIOLOGY

## 2024-08-29 PROCEDURE — 77412 RADIATION TX DELIVERY LVL 3: CPT | Performed by: RADIOLOGY

## 2024-08-29 NOTE — PROGRESS NOTES
Pt inquires about supply order. Kristi needed pt's Medicare information and it has been uploaded to their system.

## 2024-08-29 NOTE — PATIENT INSTRUCTIONS
Should you experience any significant changes in your wound(s) such as infection (redness, swelling, localized heat, increased pain, fever >101 F, chills) or have any questions regarding your home care instructions, please contact the wound center [phone]. If after hours, contact your primary care physician or go the hospital emergency room.  Keep dressing clean and dry and cover while bathing. Only change dressing if over saturated, soiled or its falling off.

## 2024-08-30 ENCOUNTER — HOSPITAL ENCOUNTER (OUTPATIENT)
Dept: RADIATION ONCOLOGY | Facility: MEDICAL CENTER | Age: 64
End: 2024-08-30

## 2024-08-30 LAB
CHEMOTHERAPY INFUSION START DATE: NORMAL
CHEMOTHERAPY RECORDS: 3 GY
CHEMOTHERAPY RECORDS: 3000 CGY
CHEMOTHERAPY RECORDS: NORMAL
CHEMOTHERAPY RX CANCER: NORMAL
DATE 1ST CHEMO CANCER: NORMAL
RAD ONC ARIA COURSE LAST TREATMENT DATE: NORMAL
RAD ONC ARIA COURSE TREATMENT ELAPSED DAYS: NORMAL
RAD ONC ARIA REFERENCE POINT DOSAGE GIVEN TO DATE: 27 GY
RAD ONC ARIA REFERENCE POINT ID: NORMAL
RAD ONC ARIA REFERENCE POINT SESSION DOSAGE GIVEN: 3 GY

## 2024-08-30 PROCEDURE — 77417 THER RADIOLOGY PORT IMAGE(S): CPT | Performed by: RADIOLOGY

## 2024-08-30 PROCEDURE — 77412 RADIATION TX DELIVERY LVL 3: CPT | Performed by: RADIOLOGY

## 2024-08-30 NOTE — PROGRESS NOTES
Provider Encounter- Full Thickness wound    HISTORY OF PRESENT ILLNESS  Wound History:    START OF CARE IN CLINIC: 07/31/2024               REFERRING PROVIDER: Elvis Russell M.D.              WOUND- Full Thickness Wound              LOCATION: Left breast               HISTORY: 64-year-old female referred to [facility] for management of a fungating breast wound to her left breast.  PER ONCOLOGY NOTE: She was initially diagnosed with bilateral breast cancer in February 2022. Apparently she felt a mass in her left breast as early as 2019. The left breast showed invasive ductal carcinoma, grade 1, ER positive PA positive, HER2 negative by history. She also had a right breast cancer biopsied under ultrasound at that time likely also estrogen positive. She was seen by Dr. Arteaga and initially scheduled for surgery but opted not to do it. She had a multiple social and family issues as well as the pandemic and did not receive any neoadjuvant endocrine therapy or chemotherapy.  In February 2023 she obtained another consult from surgeon Dr. Gomez in [place]. By that time she was having bleeding from the left breast mass that was growing through the skin. The right breast mass had gotten larger as well. She was told to have chemo or endocrine therapy prior to considering surgery. She had a PET scan on 3/24/2023 which showed a large left retroareolar and lateral breast mass with hypermetabolic uptake eroding into the skin. In addition there was 1.7 cm left axillary lymph node and a 2.8 cm soft tissue mass in the right lateral breast at 9:00. There was small focal area of hypermetabolic uptake in the anterior cortex of the proximal right femoral diaphysis which may represent early bone metastases but no CT correlation was noted. No other evidence of disease was noted. She has recently had intermittent bleeding from the mass which is ulcerating the chest wall now.         Pertinent Medical History: Metastatic breast  cancer, diabetes mellitus type 2, anxiety, obesity    TOBACCO USE:      Patient's problem list, allergies, and current medications reviewed and updated in Epic    Interval History:  8/7/2024 : Initial provider visit with CHERIE Freire, PERRY, LAUREN, KISHORE.   Patient states she is feeling okay.  She was seen by radiation oncology earlier this week, and will be starting radiation therapy to her breast next week.  She will be seeing her oncologist later today.  She is taking letrozole, has been reluctant to try any other types of chemo or immunotherapy.   She has been dressing her wound daily, cleansing with saline or soap and water in the shower, and then applying nonadherent dressings and gauze.    8/29/2024 : Clinic visit with CHERIE Freire, PERRY, LAUREN, KISHORE.   Patient returns to clinic after 3-week absence, she was last seen on 8/7.  Since then she has completed several weeks of radiation therapy.  Her breast wound measures slightly smaller.  She did  Flagyl prescription that I ordered last visit, however was confused about whether she was to take this p.o. or apply to wound bed.  This was clarified in clinic today, I instructed her to apply to wound bed. .  She states she was given capsules,  I told her to open capsules and sprinkle contents on wound bed to manage odor.   She has 2 more radiation treatments left, and will be starting oral chemo shortly thereafter.  Her main complaint today is pain and inability to sleep.  Her oncologist is manage her pain medication, she has an appointment to see him next week.      REVIEW OF SYSTEMS:   Unchanged from previous clinic visit on 8/7/2024, except as documented in interval history above    PHYSICAL EXAMINATION:   /64   Pulse 73   Temp 36.3 °C (97.4 °F) (Temporal)   Resp 18     Physical Exam  Cardiovascular:      Rate and Rhythm: Normal rate.   Pulmonary:      Effort: Pulmonary effort is normal.   Skin:     Comments: Full-thickness wound of  left breast (neoplasm): Measures smaller.  Slough and friable tissue to wound bed.  Moderate to heavy serous drainage, mild odor.  Erythema to periwound radiating 5-10 cm from wound edges (she is receiving radiation therapy)   Neurological:      Mental Status: She is alert and oriented to person, place, and time.         WOUND ASSESSMENT  Wound 07/31/24 Neoplasm Left breast (Active)   Wound Image   08/29/24 1600   Site Assessment Pink;Red;Yellow;Painful 08/29/24 1600   Periwound Assessment Fragile;Blanchable erythema 08/29/24 1600   Margins Unattached edges 08/29/24 1600   Closure Secondary intention 07/31/24 1500   Drainage Amount Copious 08/29/24 1600   Drainage Description Serosanguineous 08/29/24 1600   Treatments Cleansed;Site care 08/29/24 1600   Wound Cleansing Hypochlorus Acid 08/29/24 1600   Periwound Protectant Not Applicable 08/29/24 1600   Dressing Status Old drainage 07/31/24 1500   Dressing Changed New 07/31/24 1500   Dressing Cleansing/Solutions Not Applicable 08/29/24 1600   Dressing Options Petrolatum Gauze (yellow);Super Absorbent Pad;Other (Comments) 08/29/24 1600   Dressing Change/Treatment Frequency As Needed 08/29/24 1600   Wound Team Following Weekly 07/31/24 1500   Non-staged Wound Description Full thickness 08/29/24 1600   Wound Length (cm) 7 cm 08/29/24 1600   Wound Width (cm) 12 cm 08/29/24 1600   Wound Depth (cm) 1 cm 07/31/24 1500   Wound Surface Area (cm^2) 84 cm^2 08/29/24 1600   Wound Volume (cm^3) 147 cm^3 07/31/24 1500   Tunneling (cm) 0 cm 08/29/24 1600   Undermining (cm) 0 cm 08/29/24 1600   Wound Odor Mild 08/29/24 1600   Exposed Structures None 08/29/24 1600   Number of days: 29       PROCEDURE:   -2% viscous lidocaine applied topically to wound bed for approximately 5 minutes prior to assessment  -No excisional debridement, contraindicated malignancy  -Wound care completed by wound RN, refer to flowsheet  -Patient tolerated the procedure well, without c/o pain or discomfort.  "      Pertinent Labs and Diagnostics:    Labs:     A1c:   Lab Results   Component Value Date/Time    HBA1C 6.8 (A) 2023 12:00 AM          IMAGIN2024-CT of abdomen and pelvis with IV contrast  IMPRESSION:     1.  Interval progression of disease. The breast masses are not clearly within the field-of-view of the current study however there appears to be significant interval progression of infiltrative left breast mass from prior study.  2.  Bilateral axillary lymphadenopathy, new on the right and worsening on the left with new abnormal sided lymph nodes.  3.  New mildly enlarged right hilar lymph node.  4.  Small 6 mm posterior right upper lobe nodule appears new from prior, nonspecific and attention on follow-up.  5.  New right hepatic metastases.  6.  Improved right middle lobe and lingular consolidations and mild improvement in pleural effusions.  7.  New/worsening osseous metastatic disease, mostly sclerotic with a few lytic lesions.  8.  Additional findings are as described.    VASCULAR STUDIES: N/A    LAST  WOUND CULTURE:  DATE : No results found for: \"CULTRSULT\"      ASSESSMENT AND PLAN:     1. Primary malignant neoplasm of female breast (HCC)  2. Mass of left breast, unspecified quadrant    2024: Breast mass first identified in 2019.  Significant delays in seeking care.  Patient was reluctant to pursue aggressive treatment.  Tumor eroded through skin and has progressively gotten larger  -Patient has undergone several weeks of radiation therapy, has 2 more treatments  -Wound area has decreased slightly since last assessment.  Tissue quality about the same  -Mild odor.  Rx for Flagyl sent to patient's pharmacy last visit.  Patient unsure administration.  Reviewed that this was to be crush and applied to wound bed to manage odor.  Patient states she was given capsule, which will be even easier, she only needs to open capsule and sprinkle powder on her wound bed.  -Follow-up with oncologist " next week  -Patient return to clinic in 2 weeks for assessment and management of this wound,.  Excisional debridement is contraindicated due to malignancy  -Patient understands the goal of treatment at this point is palliative only, management of drainage and odor.    Wound care: Xeroform, superabsorbent pad, secured with patient's own bra    3. Controlled type 2 diabetes mellitus with hyperglycemia, without long-term current use of insulin ([facility])    8/29/2024: Patient does not check her blood sugars, nor is she taking diabetes medications.  Has opted to treat holistically.  -I previously encouraged her to check her blood sugars at least sporadically.   -Patient to follow-up with her PCP      PATIENT EDUCATION  - Importance of adequate nutrition for wound healing  -Advised to go to ER for any increased redness, swelling, drainage, or odor, or if patient develops fever, chills, nausea or vomiting.     My total time spent caring for the patient on the day of the encounter was 20 minutes  This does not include time spent on separately billable procedures/tests.       Please note that this note may have been created using voice recognition software. I have worked with technical experts from [employer] to optimize the interface.  I have made every reasonable attempt to correct obvious errors, but there may be errors of grammar and possibly content that I did not discover before finalizing the note.    N

## 2024-09-03 ENCOUNTER — HOSPITAL ENCOUNTER (OUTPATIENT)
Dept: RADIATION ONCOLOGY | Facility: MEDICAL CENTER | Age: 64
End: 2024-09-03
Attending: RADIOLOGY
Payer: MEDICARE

## 2024-09-03 ENCOUNTER — HOSPITAL ENCOUNTER (OUTPATIENT)
Dept: RADIATION ONCOLOGY | Facility: MEDICAL CENTER | Age: 64
End: 2024-09-03

## 2024-09-03 LAB
CHEMOTHERAPY INFUSION START DATE: NORMAL
CHEMOTHERAPY INFUSION START DATE: NORMAL
CHEMOTHERAPY INFUSION STOP DATE: NORMAL
CHEMOTHERAPY RECORDS: 3 GY
CHEMOTHERAPY RECORDS: 3 GY
CHEMOTHERAPY RECORDS: 3000 CGY
CHEMOTHERAPY RECORDS: 3000 CGY
CHEMOTHERAPY RECORDS: NORMAL
CHEMOTHERAPY RX CANCER: NORMAL
CHEMOTHERAPY RX CANCER: NORMAL
DATE 1ST CHEMO CANCER: NORMAL
DATE 1ST CHEMO CANCER: NORMAL
RAD ONC ARIA COURSE LAST TREATMENT DATE: NORMAL
RAD ONC ARIA COURSE LAST TREATMENT DATE: NORMAL
RAD ONC ARIA COURSE TREATMENT ELAPSED DAYS: NORMAL
RAD ONC ARIA COURSE TREATMENT ELAPSED DAYS: NORMAL
RAD ONC ARIA REFERENCE POINT DOSAGE GIVEN TO DATE: 30 GY
RAD ONC ARIA REFERENCE POINT DOSAGE GIVEN TO DATE: 30 GY
RAD ONC ARIA REFERENCE POINT ID: NORMAL
RAD ONC ARIA REFERENCE POINT ID: NORMAL
RAD ONC ARIA REFERENCE POINT SESSION DOSAGE GIVEN: 3 GY

## 2024-09-03 PROCEDURE — 77427 RADIATION TX MANAGEMENT X5: CPT | Performed by: RADIOLOGY

## 2024-09-03 PROCEDURE — 77417 THER RADIOLOGY PORT IMAGE(S): CPT | Performed by: RADIOLOGY

## 2024-09-03 PROCEDURE — 77412 RADIATION TX DELIVERY LVL 3: CPT | Performed by: RADIOLOGY

## 2024-09-04 DIAGNOSIS — C50.911 BILATERAL MALIGNANT NEOPLASM OF BREAST IN FEMALE, UNSPECIFIED ESTROGEN RECEPTOR STATUS, UNSPECIFIED SITE OF BREAST (HCC): ICD-10-CM

## 2024-09-04 DIAGNOSIS — C50.912 BILATERAL MALIGNANT NEOPLASM OF BREAST IN FEMALE, UNSPECIFIED ESTROGEN RECEPTOR STATUS, UNSPECIFIED SITE OF BREAST (HCC): ICD-10-CM

## 2024-09-04 RX ORDER — HYDROCODONE BITARTRATE AND ACETAMINOPHEN 7.5; 325 MG/1; MG/1
1 TABLET ORAL EVERY 4 HOURS PRN
Qty: 60 TABLET | Refills: 0 | Status: SHIPPED | OUTPATIENT
Start: 2024-09-04 | End: 2024-10-04

## 2024-09-04 NOTE — RADIATION COMPLETION NOTES
END OF TREATMENT SUMMARY    Patient name:  Asha Hannon    Primary Physician:  Katherine Brown M.D. MRN: [id number]  CSN: [id number]   Referring physician:  Cm Tabor : 1960, 64 y.o.       TREATMENT SUMMARY:        Course First Treatment Date 2024    Course Last Treatment Date 2024   Course Elapsed Days 14 @ 2024   Course Intent Curative     Radiation Therapy Episodes       Active Episodes       Radiation Therapy: 3D CRT                   Radiation Treatments         Plan Last Treated On Elapsed Days Fractions Treated Prescribed Fraction Dose (cGy) Prescribed Total Dose (cGy)    L Breast 9/3/2024 14 @ 2024 10 of 10 300 3,000                  Reference Point Last Treated On Elapsed Days Most Recent Session Dose (cGy) Total Dose (cGy)    L Breast 9/3/2024 14 @ 2024 -- 3,000                                     STAGE:   Stage IV     TREATMENT INDICATION:   Metastatic breast cancer, ulcerative left breast     CONCURRENT SYSTEMIC TREATMENT:   Letrozole and Verzenio     RT COURSE DISCONTINUED EARLY:   No     PATIENT EXPERIENCE:       2024     3:20 PM 2024     3:27 PM   Toxicity Assessment   Toxicity Assessment Breast Breast   Fatigue (lethargy, malaise, asthenia) Moderate (e.g., decrease in performance status by 1 ECOG level or 20% Karnofsky) or causing difficulty performing some activities Moderate (e.g., decrease in performance status by 1 ECOG level or 20% Karnofsky) or causing difficulty performing some activities   Fever (in the absence of neutropenia) None None   Radiation Dermatitis Faint erythema or dry desquamation None   Lymphatics Normal Normal   RT - Pain due to RT Disabling None   Dyspnea Dyspnea on exertion Normal        FOLLOW-UP PLAN:   6 Weeks     COMMENT:          ANATOMIC TARGET SUMMARY    ANATOMIC TARGET MODALITY TECHNIQUE   Left breast   External beam, photons 3D Conformal            COMMENT:         DIAGRAMS:      DOSE VOLUME  HISTOGRAMS:

## 2024-09-05 ENCOUNTER — TELEPHONE (OUTPATIENT)
Dept: HEMATOLOGY ONCOLOGY | Facility: MEDICAL CENTER | Age: 64
End: 2024-09-05
Payer: MEDICARE

## 2024-09-05 NOTE — TELEPHONE ENCOUNTER
Contacted patient in regard to voicemail left. Patient inquired about pain medication. Contacted patient via to update on status of pain medication. Pain medication sent yesterday with pharmacy receipt confirmed at 5:31 pm per chart review. No answer, left message describing above information.

## 2024-09-10 ENCOUNTER — OFFICE VISIT (OUTPATIENT)
Dept: WOUND CARE | Facility: MEDICAL CENTER | Age: 64
End: 2024-09-10
Payer: MEDICARE

## 2024-09-10 VITALS
RESPIRATION RATE: 16 BRPM | TEMPERATURE: 97.7 F | DIASTOLIC BLOOD PRESSURE: 96 MMHG | SYSTOLIC BLOOD PRESSURE: 130 MMHG | HEART RATE: 91 BPM | OXYGEN SATURATION: 92 %

## 2024-09-10 DIAGNOSIS — C50.919 PRIMARY MALIGNANT NEOPLASM OF FEMALE BREAST (HCC): ICD-10-CM

## 2024-09-10 PROCEDURE — 99213 OFFICE O/P EST LOW 20 MIN: CPT

## 2024-09-10 PROCEDURE — 99213 OFFICE O/P EST LOW 20 MIN: CPT | Performed by: NURSE PRACTITIONER

## 2024-09-10 NOTE — PROGRESS NOTES
Patient was almost 10 minutes late to her appointment today.  I had already close after chart and designated her as a no-show.  However, patient asked to be seen due to concerns for increased shortness of breath and pain in her chest.  She states shortness of breath has been going on for at least 10 days.  I informed her that she should go to the emergency room, offered to call  RODNEY, which she declined.  Stated her daughter will take her to the emergency room at [facility].    Despite her symptoms today, she was insistent that a look at her wound.  She states she has been changing the dressing 4 times per day and is running out of ABD pads, was hopeful we could order her some more.  I explained to her that her insurance would only cover certain amount of dressings per month, not likely enough for 4 times per day.      Her wound was assessed in clinic today.  No significant change in area or drainage.  No appreciable odor.  Wound cleansed and new dressing applied.  No excisional debridement, contraindicated by malignancy.    She is supposed to see her oncologist, Dr. Thornton tomorrow to go over her more recent scans.    Patient to go to emergency room ASAP due to increased shortness of breath and diffuse pain in chest.    My total time spent caring for the patient on the day of the encounter was 20 minutes.   This does not include time spent on separately billable procedures/tests.

## 2024-09-10 NOTE — PATIENT INSTRUCTIONS
Reviewed POC (pt to go to ED per APRN conversation with pt), importance of keeping the secondary dressing dry and intact, s/s of complications/infection.  Pt verbalized understanding to all.

## 2024-09-11 ENCOUNTER — APPOINTMENT (OUTPATIENT)
Dept: HEMATOLOGY ONCOLOGY | Facility: MEDICAL CENTER | Age: 64
End: 2024-09-11
Payer: MEDICARE

## 2024-09-20 ENCOUNTER — HOSPITAL ENCOUNTER (INPATIENT)
Facility: MEDICAL CENTER | Age: 64
LOS: 2 days | DRG: 189 | End: 2024-09-23
Attending: STUDENT IN AN ORGANIZED HEALTH CARE EDUCATION/TRAINING PROGRAM | Admitting: INTERNAL MEDICINE
Payer: MEDICARE

## 2024-09-20 ENCOUNTER — APPOINTMENT (OUTPATIENT)
Dept: RADIOLOGY | Facility: MEDICAL CENTER | Age: 64
DRG: 189 | End: 2024-09-20
Attending: STUDENT IN AN ORGANIZED HEALTH CARE EDUCATION/TRAINING PROGRAM
Payer: MEDICARE

## 2024-09-20 DIAGNOSIS — J90 PLEURAL EFFUSION: ICD-10-CM

## 2024-09-20 DIAGNOSIS — C78.7 METASTASIS TO LIVER (HCC): ICD-10-CM

## 2024-09-20 DIAGNOSIS — C78.02 MALIGNANT NEOPLASM METASTATIC TO BOTH LUNGS (HCC): ICD-10-CM

## 2024-09-20 DIAGNOSIS — C78.01 MALIGNANT NEOPLASM METASTATIC TO BOTH LUNGS (HCC): ICD-10-CM

## 2024-09-20 DIAGNOSIS — C50.919 PRIMARY MALIGNANT NEOPLASM OF FEMALE BREAST (HCC): ICD-10-CM

## 2024-09-20 DIAGNOSIS — C79.51 METASTASIS TO BONE (HCC): ICD-10-CM

## 2024-09-20 LAB
BASOPHILS # BLD AUTO: 0.9 % (ref 0–1.8)
BASOPHILS # BLD: 0.06 K/UL (ref 0–0.12)
EKG IMPRESSION: NORMAL
EOSINOPHIL # BLD AUTO: 0.09 K/UL (ref 0–0.51)
EOSINOPHIL NFR BLD: 1.3 % (ref 0–6.9)
ERYTHROCYTE [DISTWIDTH] IN BLOOD BY AUTOMATED COUNT: 47.9 FL (ref 35.9–50)
HCT VFR BLD AUTO: 36 % (ref 37–47)
HGB BLD-MCNC: 11 G/DL (ref 12–16)
IMM GRANULOCYTES # BLD AUTO: 0.04 K/UL (ref 0–0.11)
IMM GRANULOCYTES NFR BLD AUTO: 0.6 % (ref 0–0.9)
LYMPHOCYTES # BLD AUTO: 1.15 K/UL (ref 1–4.8)
LYMPHOCYTES NFR BLD: 16.5 % (ref 22–41)
MCH RBC QN AUTO: 25.6 PG (ref 27–33)
MCHC RBC AUTO-ENTMCNC: 30.6 G/DL (ref 32.2–35.5)
MCV RBC AUTO: 83.9 FL (ref 81.4–97.8)
MONOCYTES # BLD AUTO: 0.63 K/UL (ref 0–0.85)
MONOCYTES NFR BLD AUTO: 9 % (ref 0–13.4)
NEUTROPHILS # BLD AUTO: 5 K/UL (ref 1.82–7.42)
NEUTROPHILS NFR BLD: 71.7 % (ref 44–72)
NRBC # BLD AUTO: 0 K/UL
NRBC BLD-RTO: 0 /100 WBC (ref 0–0.2)
PLATELET # BLD AUTO: 319 K/UL (ref 164–446)
PMV BLD AUTO: 9.2 FL (ref 9–12.9)
RBC # BLD AUTO: 4.29 M/UL (ref 4.2–5.4)
WBC # BLD AUTO: 7 K/UL (ref 4.8–10.8)

## 2024-09-20 PROCEDURE — 93005 ELECTROCARDIOGRAM TRACING: CPT | Performed by: STUDENT IN AN ORGANIZED HEALTH CARE EDUCATION/TRAINING PROGRAM

## 2024-09-20 PROCEDURE — 84484 ASSAY OF TROPONIN QUANT: CPT

## 2024-09-20 PROCEDURE — 83880 ASSAY OF NATRIURETIC PEPTIDE: CPT

## 2024-09-20 PROCEDURE — 84443 ASSAY THYROID STIM HORMONE: CPT

## 2024-09-20 PROCEDURE — 85025 COMPLETE CBC W/AUTO DIFF WBC: CPT

## 2024-09-20 PROCEDURE — 71045 X-RAY EXAM CHEST 1 VIEW: CPT

## 2024-09-20 PROCEDURE — 93005 ELECTROCARDIOGRAM TRACING: CPT

## 2024-09-20 PROCEDURE — 36415 COLL VENOUS BLD VENIPUNCTURE: CPT

## 2024-09-20 PROCEDURE — 99285 EMERGENCY DEPT VISIT HI MDM: CPT

## 2024-09-20 PROCEDURE — 80053 COMPREHEN METABOLIC PANEL: CPT

## 2024-09-21 ENCOUNTER — APPOINTMENT (OUTPATIENT)
Dept: RADIOLOGY | Facility: MEDICAL CENTER | Age: 64
DRG: 189 | End: 2024-09-21
Payer: MEDICARE

## 2024-09-21 PROBLEM — J90 BILATERAL PLEURAL EFFUSION: Status: ACTIVE | Noted: 2024-09-21

## 2024-09-21 PROBLEM — J96.21 ACUTE ON CHRONIC HYPOXIC RESPIRATORY FAILURE (HCC): Status: ACTIVE | Noted: 2024-09-21

## 2024-09-21 PROBLEM — J90 PLEURAL EFFUSION: Status: ACTIVE | Noted: 2024-09-21

## 2024-09-21 PROBLEM — C78.00 METASTASIS TO LUNG (HCC): Status: ACTIVE | Noted: 2024-09-21

## 2024-09-21 PROBLEM — C78.7 METASTASIS TO LIVER (HCC): Status: ACTIVE | Noted: 2024-09-21

## 2024-09-21 PROBLEM — C79.51 METASTASIS TO BONE (HCC): Status: ACTIVE | Noted: 2024-09-21

## 2024-09-21 LAB
ALBUMIN SERPL BCP-MCNC: 3.6 G/DL (ref 3.2–4.9)
ALBUMIN/GLOB SERPL: 1.3 G/DL
ALP SERPL-CCNC: 86 U/L (ref 30–99)
ALT SERPL-CCNC: 22 U/L (ref 2–50)
ANION GAP SERPL CALC-SCNC: 8 MMOL/L (ref 7–16)
AST SERPL-CCNC: 49 U/L (ref 12–45)
B PARAP IS1001 DNA NPH QL NAA+NON-PROBE: NOT DETECTED
B PERT.PT PRMT NPH QL NAA+NON-PROBE: NOT DETECTED
BILIRUB SERPL-MCNC: 0.3 MG/DL (ref 0.1–1.5)
BUN SERPL-MCNC: 11 MG/DL (ref 8–22)
C PNEUM DNA NPH QL NAA+NON-PROBE: NOT DETECTED
CALCIUM ALBUM COR SERPL-MCNC: 9.7 MG/DL (ref 8.5–10.5)
CALCIUM SERPL-MCNC: 9.4 MG/DL (ref 8.5–10.5)
CHLORIDE SERPL-SCNC: 101 MMOL/L (ref 96–112)
CO2 SERPL-SCNC: 29 MMOL/L (ref 20–33)
CREAT SERPL-MCNC: 0.67 MG/DL (ref 0.5–1.4)
FLUAV RNA NPH QL NAA+NON-PROBE: NOT DETECTED
FLUAV RNA SPEC QL NAA+PROBE: NEGATIVE
FLUBV RNA NPH QL NAA+NON-PROBE: NOT DETECTED
FLUBV RNA SPEC QL NAA+PROBE: NEGATIVE
GFR SERPLBLD CREATININE-BSD FMLA CKD-EPI: 97 ML/MIN/1.73 M 2
GLOBULIN SER CALC-MCNC: 2.8 G/DL (ref 1.9–3.5)
GLUCOSE SERPL-MCNC: 133 MG/DL (ref 65–99)
HADV DNA NPH QL NAA+NON-PROBE: NOT DETECTED
HCOV 229E RNA NPH QL NAA+NON-PROBE: NOT DETECTED
HCOV HKU1 RNA NPH QL NAA+NON-PROBE: NOT DETECTED
HCOV NL63 RNA NPH QL NAA+NON-PROBE: NOT DETECTED
HCOV OC43 RNA NPH QL NAA+NON-PROBE: NOT DETECTED
HMPV RNA NPH QL NAA+NON-PROBE: NOT DETECTED
HPIV1 RNA NPH QL NAA+NON-PROBE: NOT DETECTED
HPIV2 RNA NPH QL NAA+NON-PROBE: NOT DETECTED
HPIV3 RNA NPH QL NAA+NON-PROBE: NOT DETECTED
HPIV4 RNA NPH QL NAA+NON-PROBE: NOT DETECTED
INR PPP: 0.97 (ref 0.87–1.13)
M PNEUMO DNA NPH QL NAA+NON-PROBE: NOT DETECTED
NT-PROBNP SERPL IA-MCNC: 105 PG/ML (ref 0–125)
NT-PROBNP SERPL IA-MCNC: 98 PG/ML (ref 0–125)
POTASSIUM SERPL-SCNC: 4 MMOL/L (ref 3.6–5.5)
PROT SERPL-MCNC: 6.4 G/DL (ref 6–8.2)
PROTHROMBIN TIME: 12.9 SEC (ref 12–14.6)
RSV RNA NPH QL NAA+NON-PROBE: NOT DETECTED
RSV RNA SPEC QL NAA+PROBE: NEGATIVE
RV+EV RNA NPH QL NAA+NON-PROBE: NOT DETECTED
SARS-COV-2 RNA NPH QL NAA+NON-PROBE: NOTDETECTED
SARS-COV-2 RNA RESP QL NAA+PROBE: NOTDETECTED
SODIUM SERPL-SCNC: 138 MMOL/L (ref 135–145)
TROPONIN T SERPL-MCNC: 11 NG/L (ref 6–19)
TSH SERPL DL<=0.005 MIU/L-ACNC: 2.34 UIU/ML (ref 0.38–5.33)

## 2024-09-21 PROCEDURE — 83036 HEMOGLOBIN GLYCOSYLATED A1C: CPT

## 2024-09-21 PROCEDURE — 97602 WOUND(S) CARE NON-SELECTIVE: CPT

## 2024-09-21 PROCEDURE — 99222 1ST HOSP IP/OBS MODERATE 55: CPT | Mod: GC | Performed by: FAMILY MEDICINE

## 2024-09-21 PROCEDURE — 71275 CT ANGIOGRAPHY CHEST: CPT

## 2024-09-21 PROCEDURE — 0241U HCHG SARS-COV-2 COVID-19 NFCT DS RESP RNA 4 TRGT ED POC: CPT

## 2024-09-21 PROCEDURE — 83880 ASSAY OF NATRIURETIC PEPTIDE: CPT

## 2024-09-21 PROCEDURE — 71045 X-RAY EXAM CHEST 1 VIEW: CPT

## 2024-09-21 PROCEDURE — 85610 PROTHROMBIN TIME: CPT

## 2024-09-21 PROCEDURE — 0202U NFCT DS 22 TRGT SARS-COV-2: CPT

## 2024-09-21 PROCEDURE — 99222 1ST HOSP IP/OBS MODERATE 55: CPT | Performed by: INTERNAL MEDICINE

## 2024-09-21 PROCEDURE — 700117 HCHG RX CONTRAST REV CODE 255: Mod: UD | Performed by: STUDENT IN AN ORGANIZED HEALTH CARE EDUCATION/TRAINING PROGRAM

## 2024-09-21 PROCEDURE — 0WJ93ZZ INSPECTION OF RIGHT PLEURAL CAVITY, PERCUTANEOUS APPROACH: ICD-10-PCS | Performed by: NURSE PRACTITIONER

## 2024-09-21 PROCEDURE — A9270 NON-COVERED ITEM OR SERVICE: HCPCS | Mod: UD

## 2024-09-21 PROCEDURE — 700102 HCHG RX REV CODE 250 W/ 637 OVERRIDE(OP): Mod: UD

## 2024-09-21 PROCEDURE — 700111 HCHG RX REV CODE 636 W/ 250 OVERRIDE (IP): Mod: JZ

## 2024-09-21 PROCEDURE — C1729 CATH, DRAINAGE: HCPCS

## 2024-09-21 PROCEDURE — 36415 COLL VENOUS BLD VENIPUNCTURE: CPT

## 2024-09-21 PROCEDURE — 770004 HCHG ROOM/CARE - ONCOLOGY PRIVATE *

## 2024-09-21 RX ORDER — HYDROMORPHONE HYDROCHLORIDE 1 MG/ML
0.5 INJECTION, SOLUTION INTRAMUSCULAR; INTRAVENOUS; SUBCUTANEOUS
Status: DISCONTINUED | OUTPATIENT
Start: 2024-09-21 | End: 2024-09-23 | Stop reason: HOSPADM

## 2024-09-21 RX ORDER — ENOXAPARIN SODIUM 100 MG/ML
40 INJECTION SUBCUTANEOUS DAILY
Status: DISCONTINUED | OUTPATIENT
Start: 2024-09-21 | End: 2024-09-23 | Stop reason: HOSPADM

## 2024-09-21 RX ORDER — OXYCODONE HYDROCHLORIDE 10 MG/1
10 TABLET ORAL
Status: DISCONTINUED | OUTPATIENT
Start: 2024-09-21 | End: 2024-09-22

## 2024-09-21 RX ORDER — OXYCODONE HYDROCHLORIDE 5 MG/1
5 TABLET ORAL
Status: DISCONTINUED | OUTPATIENT
Start: 2024-09-21 | End: 2024-09-22

## 2024-09-21 RX ORDER — ONDANSETRON 2 MG/ML
4 INJECTION INTRAMUSCULAR; INTRAVENOUS EVERY 4 HOURS PRN
Status: DISCONTINUED | OUTPATIENT
Start: 2024-09-21 | End: 2024-09-23 | Stop reason: HOSPADM

## 2024-09-21 RX ORDER — SODIUM HYPOCHLORITE 1.25 MG/ML
SOLUTION TOPICAL 2 TIMES DAILY
Status: DISCONTINUED | OUTPATIENT
Start: 2024-09-21 | End: 2024-09-21

## 2024-09-21 RX ORDER — ACETAMINOPHEN 325 MG/1
650 TABLET ORAL EVERY 6 HOURS PRN
Status: DISCONTINUED | OUTPATIENT
Start: 2024-09-21 | End: 2024-09-23 | Stop reason: HOSPADM

## 2024-09-21 RX ORDER — FUROSEMIDE 10 MG/ML
20 INJECTION INTRAMUSCULAR; INTRAVENOUS ONCE
Status: COMPLETED | OUTPATIENT
Start: 2024-09-21 | End: 2024-09-21

## 2024-09-21 RX ORDER — LOSARTAN POTASSIUM 50 MG/1
100 TABLET ORAL DAILY
Status: DISCONTINUED | OUTPATIENT
Start: 2024-09-21 | End: 2024-09-23 | Stop reason: HOSPADM

## 2024-09-21 RX ORDER — LETROZOLE 2.5 MG/1
2.5 TABLET, FILM COATED ORAL DAILY
Status: DISCONTINUED | OUTPATIENT
Start: 2024-09-21 | End: 2024-09-21

## 2024-09-21 RX ORDER — ONDANSETRON 4 MG/1
4 TABLET, ORALLY DISINTEGRATING ORAL EVERY 4 HOURS PRN
Status: DISCONTINUED | OUTPATIENT
Start: 2024-09-21 | End: 2024-09-23 | Stop reason: HOSPADM

## 2024-09-21 RX ORDER — LABETALOL HYDROCHLORIDE 5 MG/ML
10 INJECTION, SOLUTION INTRAVENOUS EVERY 4 HOURS PRN
Status: DISCONTINUED | OUTPATIENT
Start: 2024-09-21 | End: 2024-09-23 | Stop reason: HOSPADM

## 2024-09-21 RX ADMIN — ONDANSETRON 4 MG: 2 INJECTION INTRAMUSCULAR; INTRAVENOUS at 16:20

## 2024-09-21 RX ADMIN — LOSARTAN POTASSIUM 100 MG: 50 TABLET, FILM COATED ORAL at 07:41

## 2024-09-21 RX ADMIN — OXYCODONE HYDROCHLORIDE 10 MG: 10 TABLET ORAL at 09:37

## 2024-09-21 RX ADMIN — IOHEXOL 60 ML: 350 INJECTION, SOLUTION INTRAVENOUS at 01:15

## 2024-09-21 RX ADMIN — ONDANSETRON 4 MG: 2 INJECTION INTRAMUSCULAR; INTRAVENOUS at 12:32

## 2024-09-21 RX ADMIN — FUROSEMIDE 20 MG: 10 INJECTION, SOLUTION INTRAVENOUS at 15:00

## 2024-09-21 RX ADMIN — OXYCODONE HYDROCHLORIDE 5 MG: 5 TABLET ORAL at 05:41

## 2024-09-21 RX ADMIN — ACETAMINOPHEN 650 MG: 325 TABLET, FILM COATED ORAL at 20:03

## 2024-09-21 SDOH — ECONOMIC STABILITY: TRANSPORTATION INSECURITY
IN THE PAST 12 MONTHS, HAS THE LACK OF TRANSPORTATION KEPT YOU FROM MEDICAL APPOINTMENTS OR FROM GETTING MEDICATIONS?: PATIENT DECLINED

## 2024-09-21 SDOH — ECONOMIC STABILITY: TRANSPORTATION INSECURITY
IN THE PAST 12 MONTHS, HAS LACK OF RELIABLE TRANSPORTATION KEPT YOU FROM MEDICAL APPOINTMENTS, MEETINGS, WORK OR FROM GETTING THINGS NEEDED FOR DAILY LIVING?: PATIENT DECLINED

## 2024-09-21 ASSESSMENT — LIFESTYLE VARIABLES
TOTAL SCORE: 0
TOTAL SCORE: 0
CONSUMPTION TOTAL: NEGATIVE
HAVE PEOPLE ANNOYED YOU BY CRITICIZING YOUR DRINKING: NO
AVERAGE NUMBER OF DAYS PER WEEK YOU HAVE A DRINK CONTAINING ALCOHOL: 1
EVER HAD A DRINK FIRST THING IN THE MORNING TO STEADY YOUR NERVES TO GET RID OF A HANGOVER: NO
EVER FELT BAD OR GUILTY ABOUT YOUR DRINKING: NO
HAVE YOU EVER FELT YOU SHOULD CUT DOWN ON YOUR DRINKING: NO
HOW MANY TIMES IN THE PAST YEAR HAVE YOU HAD 5 OR MORE DRINKS IN A DAY: 0
TOTAL SCORE: 0
ON A TYPICAL DAY WHEN YOU DRINK ALCOHOL HOW MANY DRINKS DO YOU HAVE: 1
DOES PATIENT WANT TO STOP DRINKING: NO
ALCOHOL_USE: YES

## 2024-09-21 ASSESSMENT — PAIN DESCRIPTION - PAIN TYPE
TYPE: ACUTE PAIN
TYPE: ACUTE PAIN
TYPE: ACUTE PAIN;CHRONIC PAIN
TYPE: ACUTE PAIN;CHRONIC PAIN
TYPE: ACUTE PAIN
TYPE: ACUTE PAIN

## 2024-09-21 ASSESSMENT — FIBROSIS 4 INDEX
FIB4 SCORE: 2.13
FIB4 SCORE: 2.13

## 2024-09-21 ASSESSMENT — HEART SCORE
HISTORY: SLIGHTLY SUSPICIOUS
ECG: NORMAL
AGE: 65+
RISK FACTORS: 1-2 RISK FACTORS
HEART SCORE: 3
TROPONIN: LESS THAN OR EQUAL TO NORMAL LIMIT

## 2024-09-21 ASSESSMENT — SOCIAL DETERMINANTS OF HEALTH (SDOH)
IN THE PAST 12 MONTHS, HAS THE ELECTRIC, GAS, OIL, OR WATER COMPANY THREATENED TO SHUT OFF SERVICE IN YOUR HOME?: PATIENT DECLINED
WITHIN THE PAST 12 MONTHS, YOU WORRIED THAT YOUR FOOD WOULD RUN OUT BEFORE YOU GOT THE MONEY TO BUY MORE: PATIENT DECLINED
WITHIN THE PAST 12 MONTHS, THE FOOD YOU BOUGHT JUST DIDN'T LAST AND YOU DIDN'T HAVE MONEY TO GET MORE: PATIENT DECLINED
WITHIN THE LAST YEAR, HAVE TO BEEN RAPED OR FORCED TO HAVE ANY KIND OF SEXUAL ACTIVITY BY YOUR PARTNER OR EX-PARTNER?: PATIENT DECLINED
WITHIN THE LAST YEAR, HAVE YOU BEEN AFRAID OF YOUR PARTNER OR EX-PARTNER?: PATIENT DECLINED
WITHIN THE LAST YEAR, HAVE YOU BEEN HUMILIATED OR EMOTIONALLY ABUSED IN OTHER WAYS BY YOUR PARTNER OR EX-PARTNER?: PATIENT DECLINED
WITHIN THE LAST YEAR, HAVE YOU BEEN KICKED, HIT, SLAPPED, OR OTHERWISE PHYSICALLY HURT BY YOUR PARTNER OR EX-PARTNER?: PATIENT DECLINED

## 2024-09-21 ASSESSMENT — PATIENT HEALTH QUESTIONNAIRE - PHQ9
SUM OF ALL RESPONSES TO PHQ9 QUESTIONS 1 AND 2: 0
2. FEELING DOWN, DEPRESSED, IRRITABLE, OR HOPELESS: NOT AT ALL
1. LITTLE INTEREST OR PLEASURE IN DOING THINGS: NOT AT ALL

## 2024-09-21 NOTE — PROGRESS NOTES
Received bedside report from Cait night shift RN. Assumed care at 0700  Patient a & o x 4.   SpO2 95% on 2.5L. SOB with exertion  Voiding w/o difficulty  BM on PTA  Pt tolerating reg diet  Pt states pain 6/10 to left breast; wound care consult  Patient ambulating self Gait stead.   Patient oriented to room, surroundings and call bell. Bed alarm off. Bed in lowest position, locked and upper side rails up. Patient demonstrated correct use of call bell. Call bell/belongings within reach. Patient educated on hourly rounding.   Plan for today:  Thoracentesis this morning  Pain control  Wound care  Monitor vitals.   Pall consult.

## 2024-09-21 NOTE — PROGRESS NOTES
Assumed patient care at 1610. Patient transported via WC with RN. Patient oriented to the room and use of call light. Patient complains of a minor headache and mild nausea. Will medicate per MAR. No acute changes. Will continue to monitor.

## 2024-09-21 NOTE — H&P
FAMILY MEDICINE HISTORY AND PHYSICAL       PATIENT ID:  NAME:  Asha Hannon  MRN:               [id number]  YOB: 1960    Date of Admission: 9/20/2024     Attending: Lida Jones M.d.     Resident: Abdulaziz Nieto M.D. (PGY-2)    Primary Care Physician:  Katherine Brown M.D.    CC:    Chief Complaint   Patient presents with    Shortness of Breath     Reports having worsening short of breath starting late August, been having trouble breathing for the last 4 session of her radiation. Unable to breath laying back at 2.5 LPM NC. Hx of stage 4 bilateral breast cancer. Denies fever or anyone sick at home. She states having chest pain only when she breath.  Not on chemo but underwent 10 session of radiation.       HPI: Asha Hannon is a 64 y.o. female with stage 4 breast cancer with mets to lungs, liver, and bones who presented with progressive shortness of breath over the past one month. She states she had a similar episode one year ago and was found to have a pleural effusion at that time, requiring admission for drainage bilaterally. She was diagnosed with lung metastases at that time and was started on letrozole shortly thereafter. She required supplemental O2 in the month following her prior admission but had no issues with breathing and did not use any supplemental O2 until this past month.     She states around the end of August, she noticed some cold-like symptoms with a few days of fevers, fatigue, and some pleurisy. The fevers resolved, but the pleuritic pain continued and worsened, along with some increasing shortness of breath. She started radiation therapy of her left lung during the last week of August, and [facility] through her treatment course, she began requiring supplemental O2 while lying flat during her treatments. Over the last few weeks, her pleuritic pain has been improving, but she has had progressive shortness of breath and started using supplemental oxygen  intermittently. She is now requiring supplemental O2 all of the time. Today she felt worsening SOB while lying flat, and unable to sleep, so she presented for evaluation.    Pt is followed by Dr. Tabor with [facility] and Dr. Velez with [facility]. She has a large, fungating mass of the left breast that she has been seeing wound care for. She also has a history of Type 2 Diabetes which she has opted to treat holistically and thus does not check her blood sugar or take any medications.    ER Course:  Pt presented to ED for shortness of breath, 96% on 2.5 L O2.  EKG shows sinus rhythm. CBC and CMP grossly normal. BNP, troponin normal. Viral respiratory panel negative. CXR shows bilateral effusions. CTA Chest shows no obvious PE, but does show interval increase of her effusions as well as interval increase of metastatic disease. Pt admitted for management of pleural effusions.    REVIEW OF SYSTEMS:   Ten systems reviewed and were negative except as noted in the HPI.                PAST MEDICAL HISTORY:   has a past medical history of Anxiety, Depression, Diabetes (HCC), HLD (hyperlipidemia), HTN (hypertension), and Hypothyroidism.     PAST SURGICAL HISTORY:   has a past surgical history that includes other and cholecystectomy.     FAMILY HISTORY:  family history includes Cancer in her maternal grandfather; Lung Cancer in her mother; Stomach Cancer in her paternal grandmother; Uterine cancer in her sister.     SOCIAL HISTORY:   Employment: Unemployed  Living Situation: Lives in [place] with her daughter, Jane, whom she designates as her medical surrogate.  Smoking: Denies  Etoh: Occasional, a few drinks per month.  Recreational Drug: Occasional CBD gummies, denies other drugs.    DIET:   Orders Placed This Encounter   Procedures    Diet Order Diet: Regular     Standing Status:   Standing     Number of Occurrences:   1     Order Specific Question:   Diet:     Answer:   Regular [1]  "      ALLERGIES:  Allergies   Allergen Reactions    Demerol Rash     \"was told this during surgery\"  Other reaction(s): rash       OUTPATIENT MEDICATIONS:    Current Outpatient Medications:     HYDROcodone-acetaminophen (NORCO) 7.5-325 MG tab, Take 1 Tablet by mouth every four hours as needed for Moderate Pain for up to 30 days., Disp: 60 Tablet, Rfl: 0    ibuprofen (MOTRIN) 200 MG Tab, Take 200 mg by mouth every 6 hours as needed., Disp: , Rfl:     Multiple Vitamin (MULTIVITAMIN ADULT PO), multivitamin, Disp: , Rfl:     letrozole (FEMARA) 2.5 MG Tab, Take 1 Tablet by mouth every day., Disp: 90 Tablet, Rfl: 3    levothyroxine (SYNTHROID) 25 MCG Tab, TAKE 1 TABLET BY MOUTH EVERY MORNING ON AN EMPTY STOMACH (Patient not taking: Reported on 2024), Disp: 90 Tablet, Rfl: 2    EPINEPHrine (EPIPEN) 0.3 MG/0.3ML Solution Auto-injector solution for injection, Inject 0.3 mL into the thigh one time for 1 dose, Disp: 1 Each, Rfl: 0    Abemaciclib (VERZENIO) 150 MG Tab, Take 150 mg by mouth every 12 hours. Can be taken with our without food. (Patient not taking: Reported on 2024), Disp: 60 Tablet, Rfl: 0    losartan (COZAAR) 100 MG Tab, TAKE 1 TABLET BY MOUTH DAILY, Disp: 90 Tablet, Rfl: 3    cyclobenzaprine (FLEXERIL) 5 mg tablet, TAKE 1 TO 2 TABLETS BY MOUTH THREE TIMES DAILY AS NEEDED FOR MUSCLE SPASMS, Disp: 60 Tablet, Rfl: 2    acetaminophen (TYLENOL) 325 MG Tab, Tylenol 325 mg tablet  Take 2 tablets every 6 hours by oral route., Disp: , Rfl:     aspirin (ASA) 81 MG Chew Tab chewable tablet, Chew 81 mg every day., Disp: , Rfl:     PHYSICAL EXAM:  Vitals:    24 2146 24 2319 24 0116 24 0149   BP:  (!) 160/73 (!) 185/78 (!) 163/72   Pulse:  75 75 73   Resp:       Temp:       TempSrc:       SpO2:  94% 97% 96%   Weight: 109 kg (239 lb 10.2 oz)      Height: 1.727 m (5' 8\")      , Temp (24hrs), Av.7 °C (96.3 °F), Min:35.7 °C (96.3 °F), Max:35.7 °C (96.3 °F)  , Pulse Oximetry: 96 %, O2 (LPM): 3, " O2 Delivery Device: Silicone Nasal Cannula    Physical Exam  Vitals reviewed.   Constitutional:       General: She is not in acute distress.     Appearance: Normal appearance. She is not toxic-appearing.      Comments: Pleasant  female, sitting in ER [facility].  red hair. Answering questions with verbose responses, often tangentially.   HENT:      Head: Normocephalic and atraumatic.      Right Ear: External ear normal.      Left Ear: External ear normal.      Nose: Nose normal. No congestion.      Mouth/Throat:      Mouth: Mucous membranes are moist.      Pharynx: Oropharynx is clear.   Eyes:      Extraocular Movements: Extraocular movements intact.      Pupils: Pupils are equal, round, and reactive to light.   Cardiovascular:      Rate and Rhythm: Normal rate and regular rhythm.      Heart sounds: No murmur heard.  Pulmonary:      Effort: Pulmonary effort is normal. No respiratory distress.      Breath sounds: Rales (bibasilar) present.   Chest:      Chest wall: No tenderness.   Abdominal:      General: Bowel sounds are normal. There is no distension.      Palpations: Abdomen is soft.      Tenderness: There is no abdominal tenderness. There is no guarding or rebound.   Musculoskeletal:         General: No swelling or deformity. Normal range of motion.      Cervical back: Normal range of motion and neck supple.   Skin:     General: Skin is warm and dry.      Capillary Refill: Capillary refill takes less than 2 seconds.      Findings: Lesion present.      Comments: Erythema on central chest at radiation site. Large fungating mass of the left breast, see clinical picture.   Neurological:      General: No focal deficit present.      Mental Status: She is alert and oriented to person, place, and time.      Cranial Nerves: No cranial nerve deficit.   Psychiatric:         Mood and Affect: Mood normal.         Behavior: Behavior normal.            LAB TESTS:   Results for orders placed or performed during the  hospital encounter of 09/20/24   CBC with Differential   Result Value Ref Range    WBC 7.0 4.8 - 10.8 K/uL    RBC 4.29 4.20 - 5.40 M/uL    Hemoglobin 11.0 (L) 12.0 - 16.0 g/dL    Hematocrit 36.0 (L) 37.0 - 47.0 %    MCV 83.9 81.4 - 97.8 fL    MCH 25.6 (L) 27.0 - 33.0 pg    MCHC 30.6 (L) 32.2 - 35.5 g/dL    RDW 47.9 35.9 - 50.0 fL    Platelet Count 319 164 - 446 K/uL    MPV 9.2 9.0 - 12.9 fL    Neutrophils-Polys 71.70 44.00 - 72.00 %    Lymphocytes 16.50 (L) 22.00 - 41.00 %    Monocytes 9.00 0.00 - 13.40 %    Eosinophils 1.30 0.00 - 6.90 %    Basophils 0.90 0.00 - 1.80 %    Immature Granulocytes 0.60 0.00 - 0.90 %    Nucleated RBC 0.00 0.00 - 0.20 /100 WBC    Neutrophils (Absolute) 5.00 1.82 - 7.42 K/uL    Lymphs (Absolute) 1.15 1.00 - 4.80 K/uL    Monos (Absolute) 0.63 0.00 - 0.85 K/uL    Eos (Absolute) 0.09 0.00 - 0.51 K/uL    Baso (Absolute) 0.06 0.00 - 0.12 K/uL    Immature Granulocytes (abs) 0.04 0.00 - 0.11 K/uL    NRBC (Absolute) 0.00 K/uL   Comp Metabolic Panel   Result Value Ref Range    Sodium 138 135 - 145 mmol/L    Potassium 4.0 3.6 - 5.5 mmol/L    Chloride 101 96 - 112 mmol/L    Co2 29 20 - 33 mmol/L    Anion Gap 8.0 7.0 - 16.0    Glucose 133 (H) 65 - 99 mg/dL    Bun 11 8 - 22 mg/dL    Creatinine 0.67 0.50 - 1.40 mg/dL    Calcium 9.4 8.5 - 10.5 mg/dL    Correct Calcium 9.7 8.5 - 10.5 mg/dL    AST(SGOT) 49 (H) 12 - 45 U/L    ALT(SGPT) 22 2 - 50 U/L    Alkaline Phosphatase 86 30 - 99 U/L    Total Bilirubin 0.3 0.1 - 1.5 mg/dL    Albumin 3.6 3.2 - 4.9 g/dL    Total Protein 6.4 6.0 - 8.2 g/dL    Globulin 2.8 1.9 - 3.5 g/dL    A-G Ratio 1.3 g/dL   proBrain Natriuretic Peptide, NT   Result Value Ref Range    NT-proBNP 98 0 - 125 pg/mL   Troponin   Result Value Ref Range    Troponin T 11 6 - 19 ng/L   ESTIMATED GFR   Result Value Ref Range    GFR (CKD-EPI) 97 >60 mL/min/1.73 m 2   EKG   Result Value Ref Range    Report       [facility] of the [facility] Emergency Dept.    Test Date:  2024-09-20  Pt Name:     CHILANGO MUÑOZ         Department: ER  MRN:        [id number]                      Room:  Gender:     Female                       Technician: [id number]  :        1960                   Requested By:ER TRIAGE PROTOCOL  Order #:    [id number]                    Reading MD: Wally Nunez    Measurements  Intervals                                Axis  Rate:       75                           P:          65  VT:         156                          QRS:        53  QRSD:       101                          T:          43  QT:         358  QTc:        400    Interpretive Statements  Sinus rhythm  Compared to ECG 2013 01:20:00  No significant changes  Electronically Signed On 2024 23:52:07 PDT by Wally Nunez     POC CoV-2, FLU A/B, RSV by PCR   Result Value Ref Range    POC Influenza A RNA, PCR Negative Negative    POC Influenza B RNA, PCR Negative Negative    POC RSV, by PCR Negative Negative    POC SARS-CoV-2, PCR NotDetected NotDetected        CULTURES:   Results       Procedure Component Value Units Date/Time    FLUID CULTURE W/GRAM STAIN [[id number]]     Order Status: Sent Specimen: Body Fluid from Thoracentesis Fluid     AFB CULTURE [[id number]]     Order Status: Sent Specimen: Body Fluid from Thoracentesis Fluid     FUNGAL CULTURE [[id number]]     Order Status: Sent Specimen: Body Fluid from Thoracentesis Fluid             IMAGES:  CT-CTA CHEST PULMONARY ARTERY W/ RECONS   Final Result         1. No definite evidence for pulmonary embolism.   2. Interval increase in pleural effusions and bilateral atelectasis.   3. Thoracic lymphadenopathy.   4. Hepatic and osseous metastatic disease has worsened.   5. Interval increase in size of the right upper lobe nodule.            DX-CHEST-PORTABLE (1 VIEW)   Final Result      Bilateral effusions with associated compressive atelectasis/infiltrate.      US-THORACENTESIS PUNCTURE RIGHT    (Results Pending)        CONSULTS:   Interventional  Radiology  Palliative Care    ASSESSMENT/PLAN:   64 y.o. female with stage 4 breast cancer with mets to lungs, liver, and bones admitted for subacute hypoxic respiratory failure 2/2 bilateral pleural effusions. Likely malignant effusion in the setting of worsening metastatic disease. Pt followed by Dr. Tabor with [facility] and Dr. Velez with [facility].     I anticipate this patient is appropriate for observation status at this time because she may have quick clinical improvement after thoracentesis and will need outpatient follow up for long-term management of her malignancy.    * Acute on chronic hypoxic respiratory failure (HCC)- (present on admission)  Assessment & Plan  Subacute hypoxic respiratory failure in the setting of bilateral pleural effusions, progressive over the last 4 weeks. Worse with lying flat. No current signs or symptoms to indicate infectious etiology. Most likely malignant effusion in the setting of worsening metastatic disease.   - Titrate O2 for sats greater than 90%  - Continuous pulse oximetry  - Monitor for improvement after management of pleural effusions    Bilateral pleural effusion- (present on admission)  Assessment & Plan  Bilateral pleural effusions, pt with symptoms worsening gradually over the past month. Likely malignant effusion in the setting of worsening metastatic disease.   - US-guided thoracentesis ordered, monitor for improvement in respiratory status  - Fluid labs ordered, will follow results  - Pt will need outpatient follow up with oncology for long-term management of her underlying metastatic disease    Malignant neoplasm of breast (HCC)- (present on admission)  Assessment & Plan  Malignancy of left breast with metastasis to lung, bone, and liver. Followed by Dr. Tabor with [facility] and Dr. Velez with [facility]. Recently underwent radiation therapy of the left lung, last treatment was 9/3. Currently on letrozole.  Imaging on admission shows likely progression of metastatic disease. Pt seems to have a poor understanding of the progression of her disease, as she reports the return and progression of her cancer was a complete surprise to her, yet she has a grossly fungating mass of the left breast which would be difficult to miss. Chart review shows frequent loss to follow up and failed attempts to contact the patient about her results. There is likely a component of denial of her condition and avoidance of bad news that has contributed to the under-treatment and progression of her disease.  - Inpatient consult to wound care  - [facility] in AM of patient's admission  - Discussed general long-term goals with patient, explained plan of care will mostly be with her oncology team outpatient after discharge  - Pt agreeable to inpatient or outpatient referral to palliative care team for GOC discussion and filling out POLST.    - Pt currently full code, designates her daughter, Jane, as her surrogate decision maker   - Ordered IP consult to palliative care, outpatient referral also placed.    Hypothyroidism- (present on admission)  Assessment & Plan  Pt with prior reported hypothyroidism on levothyroxine 25 mcg daily. Not taking for at least the past year.  - Check TSH with reflex to T4    Diabetes mellitus, type 2 (HCC)- (present on admission)  Assessment & Plan  Pt with history of T2DM, last A1c was 6.8 one year ago. Pt not checking home blood sugars or taking any medications.  - Will check A1c, consider adding SSI as indicated          Core Measures:  Fluids: PO hydration   Lines: Peripheral IV for intravenous access  Abx: None  Diet: regular diet  PPX: enoxaparin ppx after thoracentesis    Disposition  Patient is not medically cleared for discharge.   Anticipate discharge to home with close outpatient follow-up.  I have placed the appropriate orders for post-discharge needs.    I have personally seen and examined the patient  at bedside. I discussed the plan of care with patient and  Lida Jones M.d..    CODE Status: Full Code      Abdulaziz Nieto M.D.   PGY-2  [facility]

## 2024-09-21 NOTE — PROGRESS NOTES
1118  Thoracentesis at bedside. Resp panel swab sent to lab. Wound care evaluated pt and did dressing change.       1335  Pt had thoracentesis. Unable to drain any fluid. CXR done. Lasix ordered. Resp panel negative.     1511  Attempted to call report to tru on . Tru RN unavailable and will call RN back.       1520  Report given to Tru RITTER on . Awiating transport.

## 2024-09-21 NOTE — CARE PLAN
Problem: Knowledge Deficit - Standard  Goal: Patient and family/care givers will demonstrate understanding of plan of care, disease process/condition, diagnostic tests and medications  Outcome: Progressing     Problem: Pain - Standard  Goal: Alleviation of pain or a reduction in pain to the patient’s comfort goal  Outcome: Progressing     Problem: Respiratory:  Goal: Respiratory status will improve  Outcome: Progressing   The patient is Stable - Low risk of patient condition declining or worsening    Shift Goals  Clinical Goals: thoracentesis; wound care  Patient Goals: poc  Family Goals: none at bedside    Progress made toward(s) clinical / shift goals:  pt states pain improved with 10mg of oral oxycodone. Wound care did dressing change. Daily dressing changes. Pt sob with exertion. Pt requiring 2.5L NC. Right thoracentesis done but unable to aspirate any fluid.     Patient is not progressing towards the following goals:

## 2024-09-21 NOTE — WOUND TEAM
[facility]  Inpatient Services  Initial Wound and Skin Care Evaluation    Admission Date: 9/20/2024     Last order of IP CONSULT TO WOUND CARE was found on 9/21/2024 from Hospital Encounter on 9/20/2024     HPI, PMH, SH: Reviewed    Past Surgical History:   Procedure Laterality Date    CHOLECYSTECTOMY      OTHER      mastoid bone     Social History     Tobacco Use    Smoking status: Never    Smokeless tobacco: Never   Substance Use Topics    Alcohol use: Yes     Comment: occas     Chief Complaint   Patient presents with    Shortness of Breath     Reports having worsening short of breath starting late August, been having trouble breathing for the last 4 session of her radiation. Unable to breath laying back at 2.5 LPM NC. Hx of stage 4 bilateral breast cancer. Denies fever or anyone sick at home. She states having chest pain only when she breath.  Not on chemo but underwent 10 session of radiation.     Diagnosis: Acute on chronic hypoxic respiratory failure (HCC) [J96.21]  Pleural effusion [J90]    Unit where seen by Wound Team: T206/00     WOUND CONSULT RELATED TO:  L breast     WOUND TEAM PLAN OF CARE - Frequency of Follow-up:   Nursing to follow dressing orders written for wound care. Contact wound team if area fails to progress, deteriorates or with any questions/concerns if something comes up before next scheduled follow up (See below as to whether wound is following and frequency of wound follow up)   Weekly - L breast     WOUND HISTORY:     Sent from [facility] wound clinic after reporting SOB. Patient with hx of stage 4 breast cancer and fungating mass to her left breast that was being followed by [facility]        WOUND ASSESSMENT/LDA    Wound 07/31/24 Neoplasm Left breast (Active)   Date First Assessed/Time First Assessed: 07/31/24 1721   Present on Original Admission: Yes  Hand Hygiene Completed: Yes  Primary Wound Type: Neoplasm  Wound Description (Comments): Left breast      Assessments 9/21/2024 10:21  AM   Wound Image     Site Assessment Red;Drainage;Fragile;Yellow   Periwound Assessment Denuded;Red   Margins Undefined edges   Closure Open to air   Drainage Amount Moderate   Drainage Description Cloudy/turbid;Foul purulent   Treatments Cleansed;Site care   Wound Cleansing Approved Wound Cleanser   Periwound Protectant Cavilon Advanced   Dressing Status Clean;Dry;Intact   Dressing Changed New   Dressing Cleansing/Solutions Not Applicable   Dressing Options Petrolatum Gauze (yellow);Absorbent Abdominal Pad   Dressing Change/Treatment Frequency Daily, and As Needed   NEXT Dressing Change/Treatment Date 09/22/24   Wound Team Following Weekly   Non-staged Wound Description Full thickness   Wound Length (cm) 4.5 cm   Wound Width (cm) 8 cm   Wound Surface Area (cm^2) 36 cm^2   Shape irregular   Wound Odor Mild;Foul   Exposed Structures None   WOUND NURSE ONLY - Time Spent with Patient (mins) 30        Vascular:    LESLEE:   No results found.    Lab Values:    Lab Results   Component Value Date/Time    WBC 7.0 09/20/2024 10:43 PM    RBC 4.29 09/20/2024 10:43 PM    HEMOGLOBIN 11.0 (L) 09/20/2024 10:43 PM    HEMATOCRIT 36.0 (L) 09/20/2024 10:43 PM    HBA1C 6.8 (A) 09/21/2023 12:00 AM         Culture Results show:  No results found for this or any previous visit (from the past 720 hour(s)).    Pain Level/Medicated:  PO pain medications administered by bedside RN   prior       INTERVENTIONS BY WOUND TEAM:  Chart and images reviewed. Discussed with bedside RN. All areas of concern (based on picture review, LDA review and discussion with bedside RN) have been thoroughly assessed. Documentation of areas based on significant findings. This RN in to assess patient. Performed standard wound care which includes appropriate positioning, dressing removal and non-selective debridement. Pictures and measurements obtained weekly if/when required.    Wound:  L breast   Preparation for Dressing removal: Dressing soaked with wound  cleanser  Cleansed/Non-selectively Debrided with:  Wound cleanser and Gauze  An wound: Cleansed with Wound cleanser and Gauze, Prepped with Cavilon Advanced  Primary Dressing:  xeroform yellow   Secondary (Outer) Dressing: ABD pad    Advanced Wound Care Discharge Planning  Number of Clinicians necessary to complete wound care: 1  Is patient requiring IV pain medications for dressing changes:  No   Length of time for dressing change 30 min. (This does not include chart review, pre-medication time, set up, clean up or time spent charting.)    Interdisciplinary consultation: Patient, Bedside RN, N/A.  Pressure injury and staging reviewed with N/A.    EVALUATION / RATIONALE FOR TREATMENT:     Date:  09/21/24  Wound Status:  Initial evaluation    Patient with fungating mass along left breast. Wound tissue fragile and bleeding easily with mild, malodorous smell. Xeroform (yellow) applied to provide an occlusive dressing that incorporates bacteriostatic protection. Patient also with denuded periwound skin, likely due to drainage from her wound in addition to radiation dermatitis. Advanced skin prep applied due to its ability to conform to the skin and creases to assure a greater skin barrier integrity, durability, and protection against irritants. It is also a non-stinging solvent that is able to attach to wet, weepy, damaged skin due to its polymer-cyanoacrylate properties in order to repel the irritants and support patient healing and comfort.      Sacrum and BL heels intact. Pt is mobile and ambulatory.          Goals: Steady decrease in wound area and depth weekly.    NURSING PLAN OF CARE ORDERS:  Dressing changes: See Dressing Care orders  Skin care: See Skin Care orders    NUTRITION RECOMMENDATIONS   Wound Team Recommendations:  N/A    DIET ORDERS (From admission to next 24h)       Start     Ordered    09/21/24 5583  Diet Order Diet: Regular  ALL MEALS        Question:  Diet:  Answer:  Regular    09/21/24 6544                     PREVENTATIVE INTERVENTIONS:    Q shift Raj - performed per nursing policy  Q shift pressure point assessments - performed per nursing policy    Surface/Positioning  Standard/trauma mattress - Currently in Place  Reposition q 2 hours - Currently in Place    Offloading/Redistribution  N/A    Anticipated discharge plans:  TBD        Vac Discharge Needs:  Vac Discharge plan is purely a recommendation from wound team and not a requirement for discharge unless otherwise stated by physician.  Not Applicable Pt not on a wound vac

## 2024-09-21 NOTE — PROGRESS NOTES
4 Eyes Skin Assessment Completed by RIYA Chavira and RIYA Calderón.    Head WDL  Ears WDL  Nose Dryness around nares  Mouth WDL  Neck WDL  Breast/Chest Redness and Discoloration Fungating mass of left breast (Abdominal pad in place)  Shoulder Blades WDL  Spine WDL  (R) Arm/Elbow/Hand Scab scattered scratches  (L) Arm/Elbow/Hand WDL  Abdomen small scratch to right lower abdomen  Groin WDL  Scrotum/Coccyx/Buttocks WDL  (R) Leg Redness, Scab, and Edema  (L) Leg Redness, Scab, and Edema  (R) Heel/Foot/Toe WDL  (L) Heel/Foot/Toe WDL          Devices In Places Blood Pressure Cuff and Pulse Ox      Interventions In Place Pillows    Possible Skin Injury No    Pictures Uploaded Into Epic Yes  Wound Consult Placed Yes  RN Wound Prevention Protocol Ordered Yes

## 2024-09-21 NOTE — ASSESSMENT & PLAN NOTE
Subacute hypoxic respiratory failure in the setting of bilateral pleural effusions, progressive over the last 4 weeks. Worse with lying flat. No current signs or symptoms to indicate infectious etiology. Most likely malignant effusion in the setting of worsening metastatic disease.   - Titrate O2 for sats greater than 90%  - Continuous pulse oximetry  - Monitor for improvement after management of pleural effusions

## 2024-09-21 NOTE — PROCEDURES
Diagnostic RT side Thoracentesis was performed beside by CHERIE Moreno. Thora was attempted in two different areas on the RT side without success in removing fluid. No fluid was sent to the lab. Pt tolerated procedure well. DX Chest was ordered and performed in Pt room.     CHERIE Moreno advised she will reach out to ordering Dr to provide a report.     Post procedure report was provided to the RN via text. Vitals were monitored in room.

## 2024-09-21 NOTE — HOSPITAL COURSE
Asha Hannon is a 64-year-old female with a past medical history of stage IV breast cancer with mets to the lungs, liver, and bones, as well as type 2 diabetes that she elected to treat with holistically (does not take medications or check her BG) who presented on 9/21/2024 with progressively worsening shortness of breath over the past month, with it being especially worse left dorsiflexion of her radiation.  She stated that she had a similar episode approximately 1 year ago and was found to have a pleural effusion at that time, requiring admission for bilateral thoracenteses.  At that time, she was diagnosed with lung metastases and was started on letrozole shortly thereafter.  Over the past month she has had difficulty laying on her back and started requiring supplemental oxygen at 2.5 L nasal cannula.  Patient reports at the end of August she started noticing some pain like symptoms for few days with fevers, fatigue, and some pleurisy.  The fevers did resolve, but the pleuritic chest pain continued and got worse, with progressive worsening of her shortness of breath.  She started radiation therapy shortly after on her left lung in the last week of August, and started requiring oxygen during her therapy treatments.  Patient stated over the last 2 weeks or so she started requiring supplemental O2 around-the-clock.  On the day of admission, she reported feeling worsening shortness of breath while laying flat, was unable to sleep, so she presented to the hospital for further evaluation.    Patient is followed by Dr. Thornton with [facility] and Dr. Velez with radiation oncology.  The patient has a large, fungating mass of the left breast for which she has been seeing wound care.     In the ED, she was satting 96% on 2.5 L nasal cannula.  EKG demonstrated sinus rhythm.  Her chest x-ray demonstrated bilateral effusions with associated compressive atelectasis versus infiltrate.  COVID/flu/RSV negative.   CTA demonstrating increased size of pleural effusions and bilateral atelectasis, thoracic lymphadenopathy, negative for PE, and demonstrated worsening of her hepatic and osseous metastatic disease.  Additionally it demonstrated increased size of her right upper lobe nodule.  She was admitted to observation status for a IR guided thoracentesis

## 2024-09-21 NOTE — ASSESSMENT & PLAN NOTE
Pt with history of T2DM, last A1c was 6.8 one year ago. Pt not checking home blood sugars or taking any medications.  - Will check A1c, consider adding SSI as indicated

## 2024-09-21 NOTE — ASSESSMENT & PLAN NOTE
Malignancy of left breast with metastasis to lung, bone, and liver. Followed by Dr. Tabor with [facility] and Dr. Velez with Radiation Oncology. Recently underwent radiation therapy of the left lung, last treatment was 9/3. Currently on letrozole. Imaging on admission shows likely progression of metastatic disease. Pt seems to have a poor understanding of the progression of her disease, as she reports the return and progression of her cancer was a complete surprise to her, yet she has a grossly fungating mass of the left breast which would be difficult to miss. Chart review shows frequent loss to follow up and failed attempts to contact the patient about her results. There is likely a component of denial of her condition and avoidance of bad news that has contributed to the under-treatment and progression of her disease.  - Inpatient consult to wound care  - [facility] in AM of patient's admission  - Discussed general long-term goals with patient, explained plan of care will mostly be with her oncology team outpatient after discharge  - Pt agreeable to inpatient or outpatient referral to palliative care team for GOC discussion and filling out POLST.    - Pt currently full code, designates her daughter, Jane, as her surrogate decision maker   - Ordered IP consult to palliative care, outpatient referral also placed.

## 2024-09-21 NOTE — ASSESSMENT & PLAN NOTE
Pt with prior reported hypothyroidism on levothyroxine 25 mcg daily. Not taking for at least the past year.  - Check TSH with reflex to T4

## 2024-09-21 NOTE — CONSULTS
Pulmonary Consultation    Date of consult: 9/21/2024    Referring Physician  Arabella Andres D.N.P.    Reason for Consultation  Pleural effusion    History of Presenting Illness  64 y.o. female who presented 9/20/2024 with hx of stage IV breast Ca initially dx in 2022 ( ER positive greater than 90%, LA +10%, Ki-67 less than 5%, HER2 2+ IHC FISH negative) with mets to liver, lungs ad  bones.   Based on review on oncology notes pt opted for no surgery  Admitted with SOB  She has a fungating mass of the left breat and also type II DM.  Now needing O2 2l  Liver mass now  9 cm  IR attempted US guide thoracentesis but unable to get any fluid out  Based on the review of the CT scan pleural effusions are small and associcated with atelectasis        Code Status  Full Code    Review of Systems  ROS  Patient asleep    Past Medical History   has a past medical history of Anxiety, Depression, Diabetes (HCC), HLD (hyperlipidemia), HTN (hypertension), and Hypothyroidism.    Surgical History   has a past surgical history that includes other and cholecystectomy.    Family History  family history includes Cancer in her maternal grandfather; Lung Cancer in her mother; Stomach Cancer in her paternal grandmother; Uterine cancer in her sister.    Social History   reports that she has never smoked. She has never used smokeless tobacco. She reports current alcohol use. She reports current drug use. Drug: Marijuana.    Medications  Home Medications       Reviewed by Cait Santiago R.N. (Registered Nurse) on 09/21/24 at 0406  Med List Status: Complete     Medication Last Dose Status   Abemaciclib (VERZENIO) 150 MG Tab Not Started Yet Active   acetaminophen (TYLENOL) 325 MG Tab UNK Active   aspirin (ASA) 81 MG Chew Tab chewable tablet 9/20/2024 Active   cyclobenzaprine (FLEXERIL) 5 mg tablet UNK Active   EPINEPHrine (EPIPEN) 0.3 MG/0.3ML Solution Auto-injector solution for injection UNK Active   HYDROcodone-acetaminophen (NORCO) 7.5-325 MG  "tab 9/20/2024 Active   ibuprofen (MOTRIN) 200 MG Tab UNK Active   letrozole (FEMARA) 2.5 MG Tab 9/20/2024 Active   levothyroxine (SYNTHROID) 25 MCG Tab Not Taking Active   losartan (COZAAR) 100 MG Tab 9/20/2024 Active   Multiple Vitamin (MULTIVITAMIN ADULT PO) 9/20/2024 Active                  Audit from Redirected Encounters    **Home medications have not yet been reviewed for this encounter**       Current Facility-Administered Medications   Medication Dose Route Frequency Provider Last Rate Last Admin    [Held by provider] enoxaparin (Lovenox) inj 40 mg  40 mg Subcutaneous DAILY AT 1800 Abdulaziz Nieto M.D.        Pharmacy Consult Request ...Pain Management Review 1 Each  1 Each Other PHARMACY TO DOSE Abdulaziz Nieto M.D.        oxyCODONE immediate-release (Roxicodone) tablet 5 mg  5 mg Oral Q3HRS PRN Abdulaziz Nieto M.D.   5 mg at 09/21/24 0541    Or    oxyCODONE immediate release (Roxicodone) tablet 10 mg  10 mg Oral Q3HRS PRN Abdulaziz Nieto M.D.   10 mg at 09/21/24 0937    Or    HYDROmorphone (Dilaudid) injection 0.5 mg  0.5 mg Intravenous Q3HRS PRN Abdulaziz Nieto M.D.        acetaminophen (Tylenol) tablet 650 mg  650 mg Oral Q6HRS PRN Abdulaziz Nieto M.D.        labetalol (Normodyne/Trandate) injection 10 mg  10 mg Intravenous Q4HRS PRN Abdulaziz Nieto M.D.        ondansetron (Zofran) syringe/vial injection 4 mg  4 mg Intravenous Q4HRS PRN Abdulaziz Nieto M.D.   4 mg at 09/21/24 1232    ondansetron (Zofran ODT) dispertab 4 mg  4 mg Oral Q4HRS PRN Abdulaziz Nieto M.D.        losartan (Cozaar) tablet 100 mg  100 mg Oral DAILY Abdulaziz Nieto M.D.   100 mg at 09/21/24 0741       Allergies  Allergies   Allergen Reactions    Demerol Rash     \"was told this during surgery\"  Other reaction(s): rash       Vital Signs last 24 hours  Temp:  [35.7 °C (96.3 °F)-36.3 °C (97.4 °F)] 36.1 °C (97 °F)  Pulse:  [65-82] 65  Resp:  [14-24] 20  BP: (160-187)/(67-83) 161/67  SpO2:  [94 %-97 %] 95 %    Physical Exam  Physical " Exam  Constitutional:       Appearance: She is obese.      Comments: asleep   HENT:      Head: Normocephalic and atraumatic.   Cardiovascular:      Rate and Rhythm: Normal rate.   Pulmonary:      Effort: Pulmonary effort is normal.         Fluids  No intake or output data in the 24 hours ending 24 1244    Laboratory  Recent Results (from the past 48 hour(s))   EKG    Collection Time: 24 10:06 PM   Result Value Ref Range    Report       [facility]    Test Date:  2024  Pt Name:    CHILANGO MUÑOZ         Department: ER  MRN:        [id number]                      Room:  Gender:     Female                       Technician: [id number]  :        1960                   Requested By:ER TRIAGE PROTOCOL  Order #:    [id number]                    Reading MD: Wally Nunez    Measurements  Intervals                                Axis  Rate:       75                           P:          65  OR:         156                          QRS:        53  QRSD:       101                          T:          43  QT:         358  QTc:        400    Interpretive Statements  Sinus rhythm  Compared to ECG 2013 01:20:00  No significant changes  Electronically Signed On 2024 23:52:07 PDT by Wally Nunez     CBC with Differential    Collection Time: 24 10:43 PM   Result Value Ref Range    WBC 7.0 4.8 - 10.8 K/uL    RBC 4.29 4.20 - 5.40 M/uL    Hemoglobin 11.0 (L) 12.0 - 16.0 g/dL    Hematocrit 36.0 (L) 37.0 - 47.0 %    MCV 83.9 81.4 - 97.8 fL    MCH 25.6 (L) 27.0 - 33.0 pg    MCHC 30.6 (L) 32.2 - 35.5 g/dL    RDW 47.9 35.9 - 50.0 fL    Platelet Count 319 164 - 446 K/uL    MPV 9.2 9.0 - 12.9 fL    Neutrophils-Polys 71.70 44.00 - 72.00 %    Lymphocytes 16.50 (L) 22.00 - 41.00 %    Monocytes 9.00 0.00 - 13.40 %    Eosinophils 1.30 0.00 - 6.90 %    Basophils 0.90 0.00 - 1.80 %    Immature Granulocytes 0.60 0.00 - 0.90 %    Nucleated RBC 0.00 0.00 - 0.20 /100 WBC     Neutrophils (Absolute) 5.00 1.82 - 7.42 K/uL    Lymphs (Absolute) 1.15 1.00 - 4.80 K/uL    Monos (Absolute) 0.63 0.00 - 0.85 K/uL    Eos (Absolute) 0.09 0.00 - 0.51 K/uL    Baso (Absolute) 0.06 0.00 - 0.12 K/uL    Immature Granulocytes (abs) 0.04 0.00 - 0.11 K/uL    NRBC (Absolute) 0.00 K/uL   Comp Metabolic Panel    Collection Time: 09/20/24 10:43 PM   Result Value Ref Range    Sodium 138 135 - 145 mmol/L    Potassium 4.0 3.6 - 5.5 mmol/L    Chloride 101 96 - 112 mmol/L    Co2 29 20 - 33 mmol/L    Anion Gap 8.0 7.0 - 16.0    Glucose 133 (H) 65 - 99 mg/dL    Bun 11 8 - 22 mg/dL    Creatinine 0.67 0.50 - 1.40 mg/dL    Calcium 9.4 8.5 - 10.5 mg/dL    Correct Calcium 9.7 8.5 - 10.5 mg/dL    AST(SGOT) 49 (H) 12 - 45 U/L    ALT(SGPT) 22 2 - 50 U/L    Alkaline Phosphatase 86 30 - 99 U/L    Total Bilirubin 0.3 0.1 - 1.5 mg/dL    Albumin 3.6 3.2 - 4.9 g/dL    Total Protein 6.4 6.0 - 8.2 g/dL    Globulin 2.8 1.9 - 3.5 g/dL    A-G Ratio 1.3 g/dL   proBrain Natriuretic Peptide, NT    Collection Time: 09/20/24 10:43 PM   Result Value Ref Range    NT-proBNP 98 0 - 125 pg/mL   Troponin    Collection Time: 09/20/24 10:43 PM   Result Value Ref Range    Troponin T 11 6 - 19 ng/L   ESTIMATED GFR    Collection Time: 09/20/24 10:43 PM   Result Value Ref Range    GFR (CKD-EPI) 97 >60 mL/min/1.73 m 2   TSH WITH REFLEX TO FT4    Collection Time: 09/20/24 10:43 PM   Result Value Ref Range    TSH 2.340 0.380 - 5.330 uIU/mL   POC CoV-2, FLU A/B, RSV by PCR    Collection Time: 09/21/24 12:08 AM   Result Value Ref Range    POC Influenza A RNA, PCR Negative Negative    POC Influenza B RNA, PCR Negative Negative    POC RSV, by PCR Negative Negative    POC SARS-CoV-2, PCR NotDetected NotDetected   PT/INR    Collection Time: 09/21/24  7:50 AM   Result Value Ref Range    PT 12.9 12.0 - 14.6 sec    INR 0.97 0.87 - 1.13   Respiratory Panel by PCR (Inpatient ONLY)    Collection Time: 09/21/24 10:25 AM    Specimen: Nasopharyngeal   Result Value Ref  Range    Adenovirus, PCR Not Detected     SARS-CoV-2 (COVID-19) RNA by AMBIKA NotDetected     Coronavirus 229E, PCR Not Detected     Coronavirus HKU1, PCR Not Detected     Coronavirus NL63, PCR Not Detected     Coronavirus OC43, PCR Not Detected     Human Metapneumovirus, PCR Not Detected     Rhino/Enterovirus, PCR Not Detected     Influenza A, PCR Not Detected     Influenza B, PCR Not Detected     Parainfluenza 1, PCR Not Detected     Parainfluenza 2, PCR Not Detected     Parainfluenza 3, PCR Not Detected     Parainfluenza 4, PCR Not Detected     RSV (Respiratory Syncytial Virus), PCR Not Detected     Bordetella parapertussis ([id number]), PCR Not Detected     Bordetella pertussis (ptxP), PCR Not Detected     Mycoplasma pneumoniae, PCR Not Detected     Chlamydia pneumoniae, PCR Not Detected          Assessment/Plan  #Pleural effusions b/l    Small b/l effusion in the costophrenic angles with associated atelectasis  Very small effusions and should not be the cause of her hypoxemia  These effusions are known to be malignant  At this time I do not recommend any intervention with the pleural effusions  Her hypoxemia could be from shunting through the liver that is very enlarged and also has an enlarged liver mass.  She also has elevated BMI with elevated HCO3 and could support obesity hypoventilation as a component of her hypoxemia especially when laying down  Target sats 89-92%  Agree with palliative team consult as pt's cancer is progressing     D/w Jorgito Andres DNP

## 2024-09-21 NOTE — ED PROVIDER NOTES
CHIEF COMPLAINT  Chief Complaint   Patient presents with    Shortness of Breath     Reports having worsening short of breath starting late August, been having trouble breathing for the last 4 session of her radiation. Unable to breath laying back at 2.5 LPM NC. Hx of stage 4 bilateral breast cancer. Denies fever or anyone sick at home. She states having chest pain only when she breath.  Not on chemo but underwent 10 session of radiation.       LIMITATION TO HISTORY   Select: None    [facility]    Asha Jimena Hannon is a 64 y.o. female who presents to the Emergency Department patient presented for evaluation of ongoing shortness of breath with associated chest pain and a dry nonproductive cough.  Patient does have a history of breast cancer, is undergoing radiation therapy.  States over the past month she has had increasing shortness of breath.  She was previously on oxygen though has been off it for over a year until about 3 weeks ago and has been having an increasing oxygen requirement at home over the past few weeks.  Does admit to pain on the right side of her chest that seems to be sharp radiating to her back and worse with inspiration.  No measurable fevers.    OUTSIDE HISTORIAN(S):  Select: None    EXTERNAL RECORDS REVIEWED  Select: Other multiple external records reviewed, including the patient's oncology note, patient does have a history of a fungating breast wound of her left breast.  Does have a history of invasive ductal carcinoma      PAST MEDICAL HISTORY  Past Medical History:   Diagnosis Date    Anxiety     Depression     Diabetes (HCC)     HLD (hyperlipidemia)     HTN (hypertension)     Hypothyroidism      .    SURGICAL HISTORY  Past Surgical History:   Procedure Laterality Date    CHOLECYSTECTOMY      OTHER      mastoid bone         FAMILY HISTORY  Family History   Problem Relation Age of Onset    Lung Cancer Mother     Uterine cancer Sister     Cancer Maternal Grandfather     Stomach Cancer  Paternal Grandmother           SOCIAL HISTORY  Social History     Socioeconomic History    Marital status: Legally      Spouse name: Not on file    Number of children: Not on file    Years of education: Not on file    Highest education level: Not on file   Occupational History    Not on file   Tobacco Use    Smoking status: Never    Smokeless tobacco: Never   Vaping Use    Vaping status: Never Used   Substance and Sexual Activity    Alcohol use: Yes     Comment: occas    Drug use: No    Sexual activity: Not on file   Other Topics Concern    Not on file   Social History Narrative    Not on file     Social Determinants of Health     Financial Resource Strain: Not on file   Food Insecurity: Not on file   Transportation Needs: Not on file   Physical Activity: Not on file   Stress: Not on file   Social Connections: Not on file   Intimate Partner Violence: Not on file   Housing Stability: Not on file         CURRENT MEDICATIONS  No current facility-administered medications on file prior to encounter.     Current Outpatient Medications on File Prior to Encounter   Medication Sig Dispense Refill    HYDROcodone-acetaminophen (NORCO) 7.5-325 MG tab Take 1 Tablet by mouth every four hours as needed for Moderate Pain for up to 30 days. 60 Tablet 0    ibuprofen (MOTRIN) 200 MG Tab Take 200 mg by mouth every 6 hours as needed.      Multiple Vitamin (MULTIVITAMIN ADULT PO) multivitamin      letrozole (FEMARA) 2.5 MG Tab Take 1 Tablet by mouth every day. 90 Tablet 3    levothyroxine (SYNTHROID) 25 MCG Tab TAKE 1 TABLET BY MOUTH EVERY MORNING ON AN EMPTY STOMACH (Patient not taking: Reported on 6/5/2024) 90 Tablet 2    EPINEPHrine (EPIPEN) 0.3 MG/0.3ML Solution Auto-injector solution for injection Inject 0.3 mL into the thigh one time for 1 dose 1 Each 0    Abemaciclib (VERZENIO) 150 MG Tab Take 150 mg by mouth every 12 hours. Can be taken with our without food. (Patient not taking: Reported on 6/5/2024) 60 Tablet 0     "losartan (COZAAR) 100 MG Tab TAKE 1 TABLET BY MOUTH DAILY 90 Tablet 3    cyclobenzaprine (FLEXERIL) 5 mg tablet TAKE 1 TO 2 TABLETS BY MOUTH THREE TIMES DAILY AS NEEDED FOR MUSCLE SPASMS 60 Tablet 2    acetaminophen (TYLENOL) 325 MG Tab Tylenol 325 mg tablet   Take 2 tablets every 6 hours by oral route.      aspirin (ASA) 81 MG Chew Tab chewable tablet Chew 81 mg every day.             ALLERGIES  Allergies   Allergen Reactions    Demerol Rash     \"was told this during surgery\"  Other reaction(s): rash       PHYSICAL EXAM  VITAL SIGNS:BP (!) 163/72   Pulse 73   Temp (!) 35.7 °C (96.3 °F) (Temporal)   Resp 14   Ht 1.727 m (5' 8\")   Wt 109 kg (239 lb 10.2 oz)   SpO2 96%   BMI 36.44 kg/m²       VITALS - vital signs documented prior to this note have been reviewed and noted,  GENERAL - awake, alert, oriented, GCS 15, no apparent distress, non-toxic  appearing  HEENT - normocephalic, atraumatic, pupils equal, sclera anicteric, mucus  membranes moist  NECK - supple, no meningismus, full active range of motion, trachea midline  CARDIOVASCULAR - regular rate/rhythm, no murmurs/gallops/rubs  Chest: Fungating wound of left chest wall  PULMONARY - no respiratory distress, speaking in full sentences, clear to  auscultation bilaterally, no wheezing/ronchi/rales, no accessory muscle use  GASTROINTESTINAL - soft, non-tender, non-distended, no rebound, guarding,  or peritonitis  GENITOURINARY - Deferred  NEUROLOGIC - Awake alert, normal mental status, speech fluid, cognition  normal, moves all extremities  MUSCULOSKELETAL - no obvious asymmetry or deformities present  EXTREMITIES - warm, well-perfused, no cyanosis or significant edema  DERMATOLOGIC - warm, dry, no rashes, no jaundice  PSYCHIATRIC - normal affect, normal insight, normal concentration    DIAGNOSTIC STUDIES / PROCEDURES  EKG  I have independently interpreted this EKG  Interpreted below by myself as EKG shows a normal sinus rhythm with a normal MS QRS QTc " interval there is a normal axis.  There is no ST elevation or depression to suggest ischemia no abnormal T wave inversion.  There is no STEMI pattern.  Interpretation is normal sinus rhythm as interpreted by myself  Rate is 75         LABS  Labs Reviewed   CBC WITH DIFFERENTIAL - Abnormal; Notable for the following components:       Result Value    Hemoglobin 11.0 (*)     Hematocrit 36.0 (*)     MCH 25.6 (*)     MCHC 30.6 (*)     Lymphocytes 16.50 (*)     All other components within normal limits   COMP METABOLIC PANEL - Abnormal; Notable for the following components:    Glucose 133 (*)     AST(SGOT) 49 (*)     All other components within normal limits   PROBRAIN NATRIURETIC PEPTIDE, NT   TROPONIN   ESTIMATED GFR   POCT COV-2, FLU A/B, RSV BY PCR   POC COV-2, FLU A/B, RSV BY PCR       Mild anemia  RADIOLOGY  I have independently interpreted the diagnostic imaging associated with this visit and am waiting the final reading from the radiologist.   My preliminary interpretation is as follows: Infiltrate of the right lower lobe      Radiologist interpretation:   CT-CTA CHEST PULMONARY ARTERY W/ RECONS   Final Result         1. No definite evidence for pulmonary embolism.   2. Interval increase in pleural effusions and bilateral atelectasis.   3. Thoracic lymphadenopathy.   4. Hepatic and osseous metastatic disease has worsened.   5. Interval increase in size of the right upper lobe nodule.            DX-CHEST-PORTABLE (1 VIEW)   Final Result      Bilateral effusions with associated compressive atelectasis/infiltrate.           COURSE & MEDICAL DECISION MAKING    ED COURSE:        INTERVENTIONS BY ME:  Medications   iohexol (OMNIPAQUE) 350 mg/mL (IV) (60 mL Intravenous Given 9/21/24 0115)               CHEST PAIN:   HEART Score for Major Cardiac Events  HEART Score     History: Slightly suspicious  ECG: Normal  Age: 65+  Risk Factors: 1-2 risk factors  Troponin: Less than or equal to normal limit    Heart Score:  3    Total Score   0-3 Points = Low Score, risk of MACE 0.9-1.7%.  4-6 Points = Moderate Score, risk of MACE 12-16.6%  7-10 Points = High Score, risk of MACE 50-65%     INITIAL ASSESSMENT, COURSE AND PLAN  Care Narrative: Patient presented for evaluation of shortness of breath.  Differential include was not limited to pneumonia pleural effusion pneumothorax pulmonary embolism CHF exacerbation atypical ACS among many other considerations.  Patient does have a known history of breast cancer, as well as a prior history of pleural effusions and oxygen dependence  though has not been on oxygen for the past year until about 3 weeks ago when she began to experience increasing shortness of breath and exertional dyspnea.  A chest x-ray was obtained which upon my review does show infiltrates of the right lower lobe as well as an effusion.  Given her history of malignancy with worsening shortness of breath did obtain a CTA, which was negative for pulmonary embolism though did show moderate to large bilateral pleural effusions.  I believe is likely etiology of the patient's increasing dyspnea and oxygen requirement thus we will admit the patient to the hospital for further care and evaluation.  Spoke with the medicine residents, and the patient was admitted in stable condition.         ADDITIONAL PROBLEM LIST    DISPOSITION AND DISCUSSIONS  I have discussed management of the patient with the following physicians and JAMILAH's: Medicine residents        FINAL DIAGNOSIS  #1 acute on chronic hypoxic respiratory failure  2.  Bilateral pleural effusions  3.  Anemia  4.  Breast cancer         Electronically signed by: Wally Nunez DO ,2:27 AM 09/20/24

## 2024-09-21 NOTE — ED TRIAGE NOTES
"Chief Complaint   Patient presents with    Shortness of Breath     Reports having worsening short of breath starting late August, been having trouble breathing for the last 4 session of her radiation. Unable to breath laying back at 2.5 LPM NC. Hx of stage 4 bilateral breast cancer. Denies fever or anyone sick at home. She states having chest pain only when she breath.  Not on chemo but underwent 10 session of radiation.       Patient states having cough but dry and non productive.     BP (!) 170/83   Pulse 81   Temp (!) 35.7 °C (96.3 °F) (Temporal)   Resp 14   Ht 1.727 m (5' 8\")   Wt 109 kg (239 lb 10.2 oz)   SpO2 96%   BMI 36.44 kg/m²       Pt on O2 at 2.5 LPN NC, came in to triage ambulatory with steady gait for the above complaints.     Pt is alert and oriented x 4, speaking in full sentences, follows commands and responds appropriately to questions.     Respirations slightly labored.    Pt placed EKG room and phleb arey. Pt educated on triage process.     Pt encouraged to inform staff for any changes in condition or if needs help while waiting to be room in.      "

## 2024-09-21 NOTE — ED NOTES
Pt back from the rest room ambulatory with steady gait,  changed to hospital gown       Xray at bedside

## 2024-09-21 NOTE — ED NOTES
Pt refused w/c multiple times and ambulated to Red 4 with a steady gait. C/C is is Shortness of breath. Pt is in a gown, on the monitor and call light within reach. Chart up for ERP. Pt ambulated to and from Bathroom with a steady gait and no assistance.

## 2024-09-21 NOTE — ED NOTES
POCT ([facility] ER ONLY) Cov-2, Flu A/B, RSV by PCR run on Julep machine, results awaiting.

## 2024-09-21 NOTE — PROGRESS NOTES
HOSPITALIST AFTER MIDNIGHT PROGRESS NOTE    Asha Hannon is a 64-year-old female with a past medical history of stage IV breast cancer with mets to the lungs, liver, and bones, as well as type 2 diabetes that she elected to treat with holistically (does not take medications or check her BG) who presented on 9/21/2024 with progressively worsening shortness of breath over the past month, with it being especially worse left dorsiflexion of her radiation.  She stated that she had a similar episode approximately 1 year ago and was found to have a pleural effusion at that time, requiring admission for bilateral thoracenteses.  At that time, she was diagnosed with lung metastases and was started on letrozole shortly thereafter.  Over the past month she has had difficulty laying on her back and started requiring supplemental oxygen at 2.5 L nasal cannula.  Patient reports at the end of August she started noticing some pain like symptoms for few days with fevers, fatigue, and some pleurisy.  The fevers did resolve, but the pleuritic chest pain continued and got worse, with progressive worsening of her shortness of breath.  She started radiation therapy shortly after on her left lung in the last week of August, and started requiring oxygen during her therapy treatments.  Patient stated over the last 2 weeks or so she started requiring supplemental O2 around-the-clock.  On the day of admission, she reported feeling worsening shortness of breath while laying flat, was unable to sleep, so she presented to the hospital for further evaluation.    Patient is followed by Dr. Thornton with [facility] and Dr. Velez with radiation oncology.  The patient has a large, fungating mass of the left breast for which she has been seeing wound care.     In the ED, she was satting 96% on 2.5 L nasal cannula.  EKG demonstrated sinus rhythm.  Her chest x-ray demonstrated bilateral effusions with associated compressive  atelectasis versus infiltrate.  COVID/flu/RSV negative.  CTA demonstrating increased size of pleural effusions and bilateral atelectasis, thoracic lymphadenopathy, negative for PE, and demonstrated worsening of her hepatic and osseous metastatic disease.  Additionally it demonstrated increased size of her right upper lobe nodule.  She was admitted to observation status for a IR guided thoracentesis       Vitals:    09/21/24 0800   BP: (!) 161/67   Pulse: 65   Resp:    Temp:    SpO2: 95%      Interval Update:  09/21/24: Reports slight improvement in her shortness of breath.  Does have chest wall pain, but reports narcotics are effective for this.  Will check a respiratory panel PCR.  Lung sounds notable for Rales bilaterally and diminished in the bases.  Additionally she does have a murmur.    Addendum: Pulm Dr Hopper was consulted as IR was unable to successfully perform thoracentesis.  Per Dr. Hopper, the thoracentesis was likely unsuccessful due to low volume pleural fluid.  Will trial a dose of Lasix.  Continue to titrate oxygen as able.    #Assessment/Plan:    # Acute on chronic hypoxic respiratory failure  -In the setting of bilateral pleural effusions that are likely malignant effusions  -IR guided thoracentesis  -Titrate oxygen to maintain SpO2 greater than 90%  -Continuous pulse oximetry  -Pleural fluid labs are ordered  -Extended respiratory viral panel ordered    # Malignant neoplasm of breast  -With metastases to lung, bone, and liver  -CTA chest demonstrated increased size of right upper lobe nodule and liver mass -likely worsening progression of her malignancy  -Poor prognosis  -Last radiation treatment noted on 9/3  -Palliative care consult pending -patient does have poor insight into the severity of her condition and her overall very poor prognosis    Plan: Update to inpatient status as patient will likely require several days of oxygen titration and monitoring post-thoracentesis.  Pending palliative  consult.    ------------------------------------------------------------------------------------------------------------------------------------  Electronically signed by:  Ilan Andres, DNP, APRN, AGAHONORIO-BC  Hospitalist Services

## 2024-09-21 NOTE — ASSESSMENT & PLAN NOTE
Bilateral pleural effusions, pt with symptoms worsening gradually over the past month. Likely malignant effusion in the setting of worsening metastatic disease.   - US-guided thoracentesis ordered, monitor for improvement in respiratory status  - Fluid labs ordered, will follow results  - Pt will need outpatient follow up with oncology for long-term management of her underlying metastatic disease

## 2024-09-21 NOTE — CARE PLAN
The patient is Stable - Low risk of patient condition declining or worsening    Shift Goals  Clinical Goals: O2 weening, Thoracentesis  Patient Goals: Thoracentesis, Rest  Family Goals: Not at bedside    Progress made toward(s) clinical / shift goals:    Problem: Knowledge Deficit - Standard  Goal: Patient and family/care givers will demonstrate understanding of plan of care, disease process/condition, diagnostic tests and medications  Description: Target End Date:  1-3 days or as soon as patient condition allows    Document in Patient Education    1.  Patient and family/caregiver oriented to unit, equipment, visitation policy and means for communicating concern  2.  Complete/review Learning Assessment  3.  Assess knowledge level of disease process/condition, treatment plan, diagnostic tests and medications  4.  Explain disease process/condition, treatment plan, diagnostic tests and medications  Outcome: Progressing       Problem: Respiratory:  Goal: Respiratory status will improve  Outcome: Progressing

## 2024-09-22 ENCOUNTER — APPOINTMENT (OUTPATIENT)
Dept: CARDIOLOGY | Facility: MEDICAL CENTER | Age: 64
DRG: 189 | End: 2024-09-22
Attending: INTERNAL MEDICINE
Payer: MEDICARE

## 2024-09-22 PROBLEM — E03.9 HYPOTHYROIDISM: Status: RESOLVED | Noted: 2021-03-04 | Resolved: 2024-09-22

## 2024-09-22 LAB
ANION GAP SERPL CALC-SCNC: 8 MMOL/L (ref 7–16)
ANISOCYTOSIS BLD QL SMEAR: ABNORMAL
BASOPHILS # BLD AUTO: 0.7 % (ref 0–1.8)
BASOPHILS # BLD: 0.05 K/UL (ref 0–0.12)
BUN SERPL-MCNC: 9 MG/DL (ref 8–22)
CALCIUM SERPL-MCNC: 9.1 MG/DL (ref 8.5–10.5)
CHLORIDE SERPL-SCNC: 100 MMOL/L (ref 96–112)
CO2 SERPL-SCNC: 30 MMOL/L (ref 20–33)
COMMENT 1642: NORMAL
CREAT SERPL-MCNC: 0.52 MG/DL (ref 0.5–1.4)
EOSINOPHIL # BLD AUTO: 0.21 K/UL (ref 0–0.51)
EOSINOPHIL NFR BLD: 3 % (ref 0–6.9)
ERYTHROCYTE [DISTWIDTH] IN BLOOD BY AUTOMATED COUNT: 47.7 FL (ref 35.9–50)
EST. AVERAGE GLUCOSE BLD GHB EST-MCNC: 157 MG/DL
GFR SERPLBLD CREATININE-BSD FMLA CKD-EPI: 103 ML/MIN/1.73 M 2
GLUCOSE SERPL-MCNC: 156 MG/DL (ref 65–99)
HBA1C MFR BLD: 7.1 % (ref 4–5.6)
HCT VFR BLD AUTO: 35.9 % (ref 37–47)
HGB BLD-MCNC: 10.6 G/DL (ref 12–16)
HYPOCHROMIA BLD QL SMEAR: ABNORMAL
IMM GRANULOCYTES # BLD AUTO: 0.02 K/UL (ref 0–0.11)
IMM GRANULOCYTES NFR BLD AUTO: 0.3 % (ref 0–0.9)
LYMPHOCYTES # BLD AUTO: 1.08 K/UL (ref 1–4.8)
LYMPHOCYTES NFR BLD: 15.6 % (ref 22–41)
MCH RBC QN AUTO: 25 PG (ref 27–33)
MCHC RBC AUTO-ENTMCNC: 29.5 G/DL (ref 32.2–35.5)
MCV RBC AUTO: 84.7 FL (ref 81.4–97.8)
MICROCYTES BLD QL SMEAR: ABNORMAL
MONOCYTES # BLD AUTO: 0.71 K/UL (ref 0–0.85)
MONOCYTES NFR BLD AUTO: 10.3 % (ref 0–13.4)
MORPHOLOGY BLD-IMP: NORMAL
NEUTROPHILS # BLD AUTO: 4.85 K/UL (ref 1.82–7.42)
NEUTROPHILS NFR BLD: 70.1 % (ref 44–72)
NRBC # BLD AUTO: 0 K/UL
NRBC BLD-RTO: 0 /100 WBC (ref 0–0.2)
PLATELET # BLD AUTO: 294 K/UL (ref 164–446)
PLATELET BLD QL SMEAR: NORMAL
PMV BLD AUTO: 9.2 FL (ref 9–12.9)
POTASSIUM SERPL-SCNC: 4.2 MMOL/L (ref 3.6–5.5)
RBC # BLD AUTO: 4.24 M/UL (ref 4.2–5.4)
RBC BLD AUTO: PRESENT
SODIUM SERPL-SCNC: 138 MMOL/L (ref 135–145)
WBC # BLD AUTO: 6.9 K/UL (ref 4.8–10.8)

## 2024-09-22 PROCEDURE — 93306 TTE W/DOPPLER COMPLETE: CPT

## 2024-09-22 PROCEDURE — 99232 SBSQ HOSP IP/OBS MODERATE 35: CPT | Performed by: INTERNAL MEDICINE

## 2024-09-22 PROCEDURE — 770004 HCHG ROOM/CARE - ONCOLOGY PRIVATE *

## 2024-09-22 PROCEDURE — 85025 COMPLETE CBC W/AUTO DIFF WBC: CPT

## 2024-09-22 PROCEDURE — 700102 HCHG RX REV CODE 250 W/ 637 OVERRIDE(OP)

## 2024-09-22 PROCEDURE — 80048 BASIC METABOLIC PNL TOTAL CA: CPT

## 2024-09-22 PROCEDURE — A9270 NON-COVERED ITEM OR SERVICE: HCPCS

## 2024-09-22 PROCEDURE — 700117 HCHG RX CONTRAST REV CODE 255: Performed by: INTERNAL MEDICINE

## 2024-09-22 PROCEDURE — 700111 HCHG RX REV CODE 636 W/ 250 OVERRIDE (IP): Mod: JZ | Performed by: INTERNAL MEDICINE

## 2024-09-22 RX ORDER — FUROSEMIDE 10 MG/ML
20 INJECTION INTRAMUSCULAR; INTRAVENOUS
Status: DISCONTINUED | OUTPATIENT
Start: 2024-09-22 | End: 2024-09-23 | Stop reason: HOSPADM

## 2024-09-22 RX ORDER — HYDROCODONE BITARTRATE AND ACETAMINOPHEN 5; 325 MG/1; MG/1
1-2 TABLET ORAL EVERY 4 HOURS PRN
Status: DISCONTINUED | OUTPATIENT
Start: 2024-09-22 | End: 2024-09-23 | Stop reason: HOSPADM

## 2024-09-22 RX ADMIN — HUMAN ALBUMIN MICROSPHERES AND PERFLUTREN 3 ML: 10; .22 INJECTION, SOLUTION INTRAVENOUS at 17:06

## 2024-09-22 RX ADMIN — LOSARTAN POTASSIUM 100 MG: 50 TABLET, FILM COATED ORAL at 04:52

## 2024-09-22 RX ADMIN — FUROSEMIDE 20 MG: 10 INJECTION, SOLUTION INTRAVENOUS at 10:58

## 2024-09-22 RX ADMIN — ACETAMINOPHEN 650 MG: 325 TABLET, FILM COATED ORAL at 09:10

## 2024-09-22 RX ADMIN — ENOXAPARIN SODIUM 40 MG: 100 INJECTION SUBCUTANEOUS at 18:12

## 2024-09-22 ASSESSMENT — COGNITIVE AND FUNCTIONAL STATUS - GENERAL
DAILY ACTIVITIY SCORE: 24
SUGGESTED CMS G CODE MODIFIER MOBILITY: CH
MOBILITY SCORE: 24
SUGGESTED CMS G CODE MODIFIER DAILY ACTIVITY: CH

## 2024-09-22 ASSESSMENT — ENCOUNTER SYMPTOMS
WEAKNESS: 0
SHORTNESS OF BREATH: 1

## 2024-09-22 ASSESSMENT — PAIN DESCRIPTION - PAIN TYPE
TYPE: ACUTE PAIN
TYPE: ACUTE PAIN

## 2024-09-22 NOTE — PROGRESS NOTES
Hospital Medicine Daily Progress Note    Date of Service  9/22/2024    Chief Complaint  Asha Hannon is a 64 y.o. female admitted 9/20/2024 with SOB    Hospital Course  63 yo woman with metastatic breast cancer to lung, liver, bone who presented with worsening shortness of breath.  Pt is followed by Dr. Tabor with [facility] and Dr. Velez with Radiation Oncology. She has a large, fungating mass of the left breast that she has been seeing wound care for.  CTA chest was negative for PE, showed bilateral pleural effusions.  IR was unable to perform thoracentesis, concern for loculations.  Pulmonology was consulted, and did not recommend further intervention.    Interval Problem Update  93% on 2.5 L O2  She feels like her shortness of breath is a little better.  I will continue her on Lasix    I have discussed this patient's plan of care and discharge plan at IDT rounds today with Case Management, Nursing, Nursing leadership, and other members of the IDT team.    Consultants/Specialty  pulmonary    Code Status  Full Code    Disposition  The patient is not medically cleared for discharge to home or a post-acute facility.  Anticipate discharge to: home with close outpatient follow-up    I have placed the appropriate orders for post-discharge needs.    Review of Systems  Review of Systems   Constitutional:  Positive for malaise/fatigue.   Respiratory:  Positive for shortness of breath.    Cardiovascular:  Negative for chest pain.   Neurological:  Negative for weakness.        Physical Exam  Temp:  [36.4 °C (97.5 °F)-36.9 °C (98.5 °F)] 36.7 °C (98 °F)  Pulse:  [60-66] 65  Resp:  [16-18] 18  BP: (117-136)/(55-80) 135/55  SpO2:  [93 %-96 %] 93 %    Physical Exam  Vitals and nursing note reviewed.   Constitutional:       General: She is not in acute distress.     Appearance: She is not toxic-appearing.   HENT:      Head: Normocephalic.      Mouth/Throat:      Mouth: Mucous membranes are moist.    Eyes:      General:         Right eye: No discharge.         Left eye: No discharge.   Cardiovascular:      Rate and Rhythm: Normal rate and regular rhythm.   Pulmonary:      Effort: Pulmonary effort is normal. No respiratory distress.      Breath sounds: No wheezing or rales.   Abdominal:      Palpations: Abdomen is soft.      Tenderness: There is no abdominal tenderness.   Musculoskeletal:         General: No swelling.      Cervical back: Neck supple.   Skin:     General: Skin is warm and dry.      Comments: Breast lesion noted, wound photos reviewed   Neurological:      Mental Status: She is alert and oriented to person, place, and time.         Fluids    Intake/Output Summary (Last 24 hours) at 9/22/2024 1554  Last data filed at 9/22/2024 1000  Gross per 24 hour   Intake 600 ml   Output --   Net 600 ml        Laboratory  Recent Labs     09/20/24 2243 09/22/24  0338   WBC 7.0 6.9   RBC 4.29 4.24   HEMOGLOBIN 11.0* 10.6*   HEMATOCRIT 36.0* 35.9*   MCV 83.9 84.7   MCH 25.6* 25.0*   MCHC 30.6* 29.5*   RDW 47.9 47.7   PLATELETCT 319 294   MPV 9.2 9.2     Recent Labs     09/20/24 2243 09/22/24  0338   SODIUM 138 138   POTASSIUM 4.0 4.2   CHLORIDE 101 100   CO2 29 30   GLUCOSE 133* 156*   BUN 11 9   CREATININE 0.67 0.52   CALCIUM 9.4 9.1     Recent Labs     09/21/24  0750   INR 0.97               Imaging  US-THORACENTESIS PUNCTURE RIGHT   Final Result      1. Unsuccessful Ultrasound guided right sided thoracentesis due to to inability to aspirate fluid. No fluid withdrawn      DX-CHEST-PORTABLE (1 VIEW)   Final Result      1.  No evidence of pneumothorax.      2.  Stable bibasilar atelectasis/consolidation.      3.  Small bilateral pleural effusions.      CT-CTA CHEST PULMONARY ARTERY W/ RECONS   Final Result         1. No definite evidence for pulmonary embolism.   2. Interval increase in pleural effusions and bilateral atelectasis.   3. Thoracic lymphadenopathy.   4. Hepatic and osseous metastatic disease has  worsened.   5. Interval increase in size of the right upper lobe nodule.            DX-CHEST-PORTABLE (1 VIEW)   Final Result      Bilateral effusions with associated compressive atelectasis/infiltrate.      EC-ECHOCARDIOGRAM COMPLETE W/ CONT    (Results Pending)        Assessment/Plan  * Acute on chronic hypoxic respiratory failure (HCC)- (present on admission)  Assessment & Plan  CTA chest showed pleural effusion, negative for PE  Unable to have thoracentesis, pulmonology did not recommend further intervention  Continue on Lasix diuresis  Check echocardiogram  If unable to wean off oxygen, may need home O2    Malignant neoplasm of breast (HCC)- (present on admission)  Assessment & Plan  With mets to liver and bone  Dr. Tabor with [facility] and Dr. Velez with Radiation Oncology  I will consult [facility]  Wound care for breast mass    Diabetes mellitus, type 2 (HCC)- (present on admission)  Assessment & Plan  A1c 7.1%.  Diet controlled.  Glucose currently controlled, monitor daily         VTE prophylaxis:    enoxaparin ppx      I have performed a physical exam and reviewed and updated ROS and Plan today (9/22/2024). In review of yesterday's note (9/21/2024), there are no changes except as documented above.

## 2024-09-22 NOTE — CARE PLAN
The patient is Stable - Low risk of patient condition declining or worsening    Shift Goals  Clinical Goals: thoracentesis; wound care  Patient Goals: poc  Family Goals: none at bedside    Progress made toward(s) clinical / shift goals:    Problem: Knowledge Deficit - Standard  Goal: Patient and family/care givers will demonstrate understanding of plan of care, disease process/condition, diagnostic tests and medications  Outcome: Progressing     Problem: Pain - Standard  Goal: Alleviation of pain or a reduction in pain to the patient’s comfort goal  Outcome: Progressing     Problem: Respiratory:  Goal: Respiratory status will improve  Outcome: Progressing     Problem: Hemodynamics  Goal: Patient's hemodynamics, fluid balance and neurologic status will be stable or improve  Outcome: Progressing     Problem: Wound/ / Incision Healing  Goal: Patient's wound/surgical incision will decrease in size and heals properly  Outcome: Progressing     Problem: Fall Risk  Goal: Patient will remain free from falls  Outcome: Progressing       Patient is not progressing towards the following goals:

## 2024-09-22 NOTE — PROGRESS NOTES
Multiple attempts made to complete home oxygen assessment with patient. Patient states she is tired and needs to rest. Education provided to explain the importance of the assessment to the patient by this RN, patient still refusing at this time, as she is tired and wants to rest.

## 2024-09-22 NOTE — CARE PLAN
The patient is Stable - Low risk of patient condition declining or worsening    Shift Goals  Clinical Goals: wean O2 as tolerated  Patient Goals: rest  Family Goals: none at bedside    Progress made toward(s) clinical / shift goals:   in use, decreasing oxygen usage as tolerated, encouraged deep breathing and coughing, educated on use of IS.     Problem: Knowledge Deficit - Standard  Goal: Patient and family/care givers will demonstrate understanding of plan of care, disease process/condition, diagnostic tests and medications  Outcome: Progressing     Problem: Pain - Standard  Goal: Alleviation of pain or a reduction in pain to the patient’s comfort goal  Outcome: Progressing     Problem: Respiratory:  Goal: Respiratory status will improve  Outcome: Progressing     Problem: Communication  Goal: The ability to communicate needs accurately and effectively will improve  Outcome: Progressing     Problem: Respiratory  Goal: Patient will achieve/maintain optimum respiratory ventilation and gas exchange  Outcome: Progressing       Patient is not progressing towards the following goals:

## 2024-09-22 NOTE — ASSESSMENT & PLAN NOTE
With mets to liver and bone  Dr. Tabor with [facility] On[facility] and Dr. Velez with Radiation Oncology  I will consult [facility] oncology  Wound care for breast mass

## 2024-09-22 NOTE — ASSESSMENT & PLAN NOTE
CTA chest showed pleural effusion, negative for PE  Unable to have thoracentesis, pulmonology did not recommend further intervention  Continue on Lasix diuresis  Check echocardiogram  If unable to wean off oxygen, may need home O2

## 2024-09-23 ENCOUNTER — PHARMACY VISIT (OUTPATIENT)
Dept: PHARMACY | Facility: MEDICAL CENTER | Age: 64
End: 2024-09-23
Payer: COMMERCIAL

## 2024-09-23 VITALS
HEART RATE: 66 BPM | OXYGEN SATURATION: 96 % | RESPIRATION RATE: 18 BRPM | BODY MASS INDEX: 35.82 KG/M2 | DIASTOLIC BLOOD PRESSURE: 74 MMHG | WEIGHT: 236.33 LBS | HEIGHT: 68 IN | TEMPERATURE: 97 F | SYSTOLIC BLOOD PRESSURE: 137 MMHG

## 2024-09-23 LAB
ANION GAP SERPL CALC-SCNC: 9 MMOL/L (ref 7–16)
BASOPHILS # BLD AUTO: 0.5 % (ref 0–1.8)
BASOPHILS # BLD: 0.04 K/UL (ref 0–0.12)
BUN SERPL-MCNC: 10 MG/DL (ref 8–22)
CALCIUM SERPL-MCNC: 9.3 MG/DL (ref 8.5–10.5)
CHLORIDE SERPL-SCNC: 99 MMOL/L (ref 96–112)
CO2 SERPL-SCNC: 32 MMOL/L (ref 20–33)
CREAT SERPL-MCNC: 0.56 MG/DL (ref 0.5–1.4)
EOSINOPHIL # BLD AUTO: 0.15 K/UL (ref 0–0.51)
EOSINOPHIL NFR BLD: 1.9 % (ref 0–6.9)
ERYTHROCYTE [DISTWIDTH] IN BLOOD BY AUTOMATED COUNT: 47.3 FL (ref 35.9–50)
GFR SERPLBLD CREATININE-BSD FMLA CKD-EPI: 101 ML/MIN/1.73 M 2
GLUCOSE SERPL-MCNC: 154 MG/DL (ref 65–99)
HCT VFR BLD AUTO: 38.9 % (ref 37–47)
HGB BLD-MCNC: 11.7 G/DL (ref 12–16)
IMM GRANULOCYTES # BLD AUTO: 0.03 K/UL (ref 0–0.11)
IMM GRANULOCYTES NFR BLD AUTO: 0.4 % (ref 0–0.9)
LYMPHOCYTES # BLD AUTO: 1.26 K/UL (ref 1–4.8)
LYMPHOCYTES NFR BLD: 16.4 % (ref 22–41)
MCH RBC QN AUTO: 25.1 PG (ref 27–33)
MCHC RBC AUTO-ENTMCNC: 30.1 G/DL (ref 32.2–35.5)
MCV RBC AUTO: 83.3 FL (ref 81.4–97.8)
MONOCYTES # BLD AUTO: 0.61 K/UL (ref 0–0.85)
MONOCYTES NFR BLD AUTO: 7.9 % (ref 0–13.4)
NEUTROPHILS # BLD AUTO: 5.61 K/UL (ref 1.82–7.42)
NEUTROPHILS NFR BLD: 72.9 % (ref 44–72)
NRBC # BLD AUTO: 0 K/UL
NRBC BLD-RTO: 0 /100 WBC (ref 0–0.2)
PLATELET # BLD AUTO: 319 K/UL (ref 164–446)
PMV BLD AUTO: 9 FL (ref 9–12.9)
POTASSIUM SERPL-SCNC: 3.6 MMOL/L (ref 3.6–5.5)
RBC # BLD AUTO: 4.67 M/UL (ref 4.2–5.4)
SODIUM SERPL-SCNC: 140 MMOL/L (ref 135–145)
WBC # BLD AUTO: 7.7 K/UL (ref 4.8–10.8)

## 2024-09-23 PROCEDURE — 99497 ADVNCD CARE PLAN 30 MIN: CPT | Performed by: NURSE PRACTITIONER

## 2024-09-23 PROCEDURE — 85025 COMPLETE CBC W/AUTO DIFF WBC: CPT

## 2024-09-23 PROCEDURE — 80048 BASIC METABOLIC PNL TOTAL CA: CPT

## 2024-09-23 PROCEDURE — 99239 HOSP IP/OBS DSCHRG MGMT >30: CPT | Performed by: INTERNAL MEDICINE

## 2024-09-23 PROCEDURE — 700111 HCHG RX REV CODE 636 W/ 250 OVERRIDE (IP): Mod: JZ | Performed by: INTERNAL MEDICINE

## 2024-09-23 PROCEDURE — RXMED WILLOW AMBULATORY MEDICATION CHARGE: Performed by: INTERNAL MEDICINE

## 2024-09-23 PROCEDURE — 700102 HCHG RX REV CODE 250 W/ 637 OVERRIDE(OP): Performed by: INTERNAL MEDICINE

## 2024-09-23 PROCEDURE — 99223 1ST HOSP IP/OBS HIGH 75: CPT | Mod: 25 | Performed by: NURSE PRACTITIONER

## 2024-09-23 PROCEDURE — A9270 NON-COVERED ITEM OR SERVICE: HCPCS | Performed by: INTERNAL MEDICINE

## 2024-09-23 PROCEDURE — 700102 HCHG RX REV CODE 250 W/ 637 OVERRIDE(OP)

## 2024-09-23 PROCEDURE — A9270 NON-COVERED ITEM OR SERVICE: HCPCS

## 2024-09-23 RX ORDER — FUROSEMIDE 20 MG/1
20 TABLET ORAL DAILY
Qty: 14 TABLET | Refills: 0 | Status: SHIPPED | OUTPATIENT
Start: 2024-09-23 | End: 2024-09-23

## 2024-09-23 RX ORDER — FUROSEMIDE 20 MG/1
20 TABLET ORAL
Qty: 14 TABLET | Refills: 0 | Status: SHIPPED | OUTPATIENT
Start: 2024-09-23

## 2024-09-23 RX ORDER — POTASSIUM CHLORIDE 1500 MG/1
20 TABLET, EXTENDED RELEASE ORAL DAILY
Qty: 14 TABLET | Refills: 0 | Status: SHIPPED | OUTPATIENT
Start: 2024-09-23 | End: 2024-10-07

## 2024-09-23 RX ADMIN — FUROSEMIDE 20 MG: 10 INJECTION, SOLUTION INTRAVENOUS at 05:36

## 2024-09-23 RX ADMIN — HYDROCODONE BITARTRATE AND ACETAMINOPHEN 2 TABLET: 5; 325 TABLET ORAL at 05:36

## 2024-09-23 RX ADMIN — ACETAMINOPHEN 650 MG: 325 TABLET, FILM COATED ORAL at 13:01

## 2024-09-23 RX ADMIN — LOSARTAN POTASSIUM 100 MG: 50 TABLET, FILM COATED ORAL at 05:36

## 2024-09-23 ASSESSMENT — PAIN DESCRIPTION - PAIN TYPE
TYPE: ACUTE PAIN

## 2024-09-23 ASSESSMENT — ENCOUNTER SYMPTOMS
SHORTNESS OF BREATH: 1
CONSTIPATION: 0
DIARRHEA: 0
INSOMNIA: 1

## 2024-09-23 NOTE — CARE PLAN
The patient is Stable - Low risk of patient condition declining or worsening    Shift Goals  Clinical Goals: Wean patient off of O2, ambulation  Patient Goals: Sleep  Family Goals: Comfort    Progress made toward(s) clinical / shift goals:        Problem: Pain - Standard  Goal: Alleviation of pain or a reduction in pain to the patient’s comfort goal  Outcome: Progressing     Problem: Wound/ / Incision Healing  Goal: Patient's wound/surgical incision will decrease in size and heals properly  Outcome: Progressing     Problem: Fall Risk  Goal: Patient will remain free from falls  Outcome: Progressing  Note: Patient able to ambulate safely. Steady on her feet.        Patient is not progressing towards the following goals:

## 2024-09-23 NOTE — DISCHARGE PLANNING
1150  DPA sent referral to [facility], per choice form.     1205  DPA spoke with Ximena ([facility]). Ximena says just received order and is processing it.  has a lot of orders and delivery will be a while. Advised KENDRA Harp.     1216  DPA received call from [facility] Ambrocio) to confirm Medicaid ID#.     1426  DPA spoke with Kati Christianson), requested update on delivery. Kati says pt is the next stop for the . DPA let Kati know pt will be in the DC lounge for delivery. Advised KENDRA Harp.

## 2024-09-23 NOTE — CONSULTS
"MRN: [id number]  Date of palliative consult: 24  Reason for consult: ACP: Stage IV breast cancer, patient requesting assistance filling out POLST, discussing goals of care.  Referring provider: Dr. Nieto  Location of consult: [facility]  Additional consulting services: Pulmonary     HPI:   Asha Hannon is a 64 y.o. female with medical history significant for stage IV bilateral breast cancer followed by [facility] onco/rad onc with recent radiation therapy with metastases to lung, liver, and bones presented to ED for shortness of breath.  CT imaging of chest  without definitive evidence of pulmonary embolism, increase in pleural effusions and bilateral atelectasis, thoracic lymphadenopathy, worsening hepatic and osseous metastases, and increase in size of right upper lobe nodule.  Patient underwent unsuccessful ultrasound-guided right sided thoracentesis (inability to aspirate fluid).    Medical History: Diabetes, HTN, HLD anxiety, depression hypothyroidism, cholecystectomy    ROS:    Review of Systems   Constitutional:  Positive for malaise/fatigue.   Respiratory:  Positive for shortness of breath.    Cardiovascular:  Positive for chest pain (\"discomfort\" from breast cancer/tumor whorse after readiation but improving now).   Gastrointestinal:  Negative for constipation and diarrhea.   Psychiatric/Behavioral:  The patient has insomnia.      PE:   Recent vital signs  BMI: Body mass index is 35.93 kg/m².    Temp (24hrs), Av.5 °C (97.7 °F), Min:36.1 °C (97 °F), Max:36.8 °C (98.3 °F)  Temperature: 36.1 °C (97 °F)  Pulse  Av.2  Min: 60  Max: 82   Blood Pressure : 137/74       Physical Exam  Constitutional:       General: She is not in acute distress.     Comments: Sitting upright on bed.  Kept eyes closed the majority of her discussion which she counted to being sleep deprived.  Daughter sleeping on cot.   Pulmonary:      Effort: No respiratory distress.   Musculoskeletal:         General: " Deformity (left uper chest related to breast cancer) present.   Skin:     Coloration: Skin is pale.      Findings: Erythema (left chest) present.   Neurological:      Mental Status: She is alert and oriented to person, place, and time.      Comments: Oriented to event       ASSESSMENT/PLAN WITH SHARED DECISION MAKING:   PHYSICAL ASPECTS OF CARE  Palliative Performance Scale: 60%    Stage IV breast cancer with lung, liver, and bone metastases followed by Dr. Liz [facility]illa Oncology & Dr. Agustin Quintanilla Radiation Oncology  - discussed plan of care via VOLTE with oncology team  Acute on chronic hypoxic respiratory failure  Bilateral pleural effusions    SOCIAL ASPECTS OF CARE  Lives in [facility] with her daughter Juana who assist patient with getting to and from appointments.  Patient independent with ADLs however stopped driving a little while ago.  She started taking some opioid pain medication after radiation and did not want to be driving while taking pain medications.  Patient does not work.    SPIRITUAL ASPECTS OF CARE   Deferred due to patient fatigue/discharging.     GOALS OF CARE/SERIOUS ILLNESS CONVERSATION  Introduced myself to patient. Discussed role of palliative care and reason for consult.  she is agreeable to discuss goals of care.  Patient shared her perspective of her cancer journey.  She confirms she has responded well to treatment and wants to avoid chemotherapy however is more open to systemic treatment at this time.  She has not completed any type of healthcare directive but expressed she knows she should.  Offered assistance with completing document.  She expressed she has significant fatigue due to not sleeping.  Left advanced directive with her and reviewed.  Left POLST form with her and reviewed.  Confirmed I can assist her with documents now and potentially arrange for mobile notary or volunteer notary.  She would like to discuss documents with her daughter.  Confirmed [facility] has  "advance directive workshops (information can be found on our website) and there are other workshops in the community.      We discussed CODE STATUS.  Patient expressed she likely would not want resuscitation if there was no hope of recovery or survival.  She feels positive as her overall functional status has been quite good up until a few weeks ago.  She again is hopeful that systemic treatment will help prolong her life and improve her function and she may even have some elements of remission though she understands she will not have total remission.  I provided education in regards to poor survival rates in patients who have advanced cancer who received CPR.  Discussed my concern as a relates to her malignant pleural effusions and chest wall mass given anatomical nature of chest compressions.  She would like to give this some thought, discussed with daughter, and will reach out if she would like to complete healthcare directives.  I discussed outpatient palliative services including home palliative versus oncology palliative clinic.  All questions answered and concerns addressed.  Provided palliative care contact information and encouraged patient to reach out with any questions/needs.     Code Status: Full    ACP Documents: None    20 minutes spent discussing advance care planning, this time excludes any other billed services.    Interval diagnostic studies and medical documentation entries pertinent to this case were reviewed independently by me. This patient has at least one acute or chronic illness or injury that poses a threat to life or bodily function. This patient suffers from a high risk of morbidity from additional invasive diagnostic testing or intensive treatment. Discussion of recommendations and coordination of care undertaken with primary provider/treatment team.      Petra \"Halley\" CHERIE Jameson, [facility]  Inpatient Palliative Care (service hours Mon-Fri 8AM - 5PM)  [phone]      "

## 2024-09-23 NOTE — FACE TO FACE
"Face to Face Note  -  Durable Medical Equipment    Ria Coyle M.D. - NPI: [id number]  I certify that this patient is under my care and that they had a durable medical equipment(DME)face to face encounter by myself that meets the physician DME face-to-face encounter requirements with this patient on:    Date of encounter:   Patient:                    MRN:                       YOB: 2025  Asha Hannon  [id number]  1960     The encounter with the patient was in whole, or in part, for the following medical condition, which is the primary reason for durable medical equipment:  Other - metastatic breast cancer    I certify that, based on my findings, the following durable medical equipment is medically necessary:    Oxygen   HOME O2 Saturation Measurements:(Values must be present for Home Oxygen orders)  Room air sat at rest: 90  Room air sat with amb: 86  With liters of O2: 2, O2 sat at rest with O2: 96  With Liters of O2: 2, O2 sat with amb with O2 : 93  Is the patient mobile?: Yes  If patient feels more short of breath, they can go up to 6 liters per minute and contact healthcare provider.    Supporting Symptoms: The patient requires supplemental oxygen, as the following interventions have been tried with limited or no improvement: \"Ambulation with oximetry and \"Incentive spirometry.    My Clinical findings support the need for the above equipment due to:  Hypoxia  "

## 2024-09-23 NOTE — DISCHARGE PLANNING
Care Transition Team Assessment    Patient is a 64 year-old female admitted for Acute on chronic hypoxic respiratory failure. Please see patient's H&P for prior medical history. LMSW met with patient at bedside to complete assessment. Patient is A&Ox4 and able to verify the information on the face sheet. Patient lives with daughter in a single story house with zero steps to enter, Jane Duron (p) [phone]; NOK and emergency contact. No Advance Directive on file, patient currently working on completing AD and will provide hospital with copy. Prior to admission patient is independent with ADL's and IADL’s. Patient uses 2L of O2 at baseline supplied by [facility]. Patient reported that daughter is good support for her. Patient receives monthly SSI deposits. The patient's PCP is Dr. Brown. Patient's preferred pharmacy is [facility]. Patient denies a history of SNF/IPR nor HHC use in the past. Patient denies any SA or MH concerns. Patient's confirmed medical coverage via Medicare. Patient has means of transportation when medically cleared and daughter will provide transport once stable for discharge. Patient's primary oncologist is Dr. Tabor.    Obtained choice for [facility]- pt discharged with O2.     Information Source  Orientation Level: Oriented X4  Information Given By: Patient  Who is responsible for making decisions for patient? : Patient    Readmission Evaluation  Is this a readmission?: No    Elopement Risk  Legal Hold: No  Ambulatory or Self Mobile in Wheelchair: Yes  Disoriented: No  Psychiatric Symptoms: None  History of Wandering: No  Elopement this Admit: No  Vocalizing Wanting to Leave: No  Displays Behaviors, Body Language Wanting to Leave: No-Not at Risk for Elopement  Elopement Risk: Not at Risk for Elopement    Interdisciplinary Discharge Planning  Lives with - Patient's Self Care Capacity: Adult Children  Patient or legal guardian wants to designate a caregiver: No  Support  Systems: Family Member(s), Friends / Neighbors  Housing / Facility: 1 Story House    Discharge Preparedness  What is your plan after discharge?: Home with help  What are your discharge supports?: Child  Prior Functional Level: Ambulatory  Difficulity with ADLs: None  Difficulity with IADLs: None    Functional Assesment  Prior Functional Level: Ambulatory    Finances  Financial Barriers to Discharge: No  Prescription Coverage: Yes    Vision / Hearing Impairment  Vision Impairment : Yes  Right Eye Vision: Impaired, Wears Glasses  Left Eye Vision: Impaired, Wears Glasses  Hearing Impairment : No         Advance Directive  Advance Directive?: None    Domestic Abuse  Have you ever been the victim of abuse or violence?: No  Possible Abuse/Neglect Reported to:: Not Applicable    Psychological Assessment  History of Substance Abuse: None  History of Psychiatric Problems: No  Non-compliant with Treatment: No  Newly Diagnosed Illness: No    Discharge Risks or Barriers  Discharge risks or barriers?: Complex medical needs  Patient risk factors: Complex medical needs    Anticipated Discharge Information  Discharge Disposition: Discharged to home/self care (01)

## 2024-09-23 NOTE — DISCHARGE SUMMARY
Discharge Summary    CHIEF COMPLAINT ON ADMISSION  Chief Complaint   Patient presents with    Shortness of Breath     Reports having worsening short of breath starting late August, been having trouble breathing for the last 4 session of her radiation. Unable to breath laying back at 2.5 LPM NC. Hx of stage 4 bilateral breast cancer. Denies fever or anyone sick at home. She states having chest pain only when she breath.  Not on chemo but underwent 10 session of radiation.       Reason for Admission  Difficulty Breathing     Admission Date  9/20/2024    CODE STATUS  Full Code    HPI & HOSPITAL COURSE  63 yo woman with metastatic breast cancer to lung, liver, bone who presented with worsening shortness of breath.  Pt is followed by Dr. Tabor with [facility] and Dr. Velez with Radiation Oncology. She has a large, fungating mass of the left breast that she has been seeing wound care for.  CTA chest was negative for PE, showed bilateral pleural effusions, evidence of metastatic liver and bone lesions.  IR was unable to perform thoracentesis, concern for loculations.  Pulmonology was consulted and said that her effusions are quite small and hypoxemia could be related to shunting of her liver mass.  She was given Lasix with some improvement of her shortness of breath.  She was seen by Dr. Tabor and made plans to follow-up with her for chemotherapy.  She was set up with home oxygen on discharge.    Therefore, she is discharged in good and stable condition to home with close outpatient follow-up.    The patient met 2-midnight criteria for an inpatient stay at the time of discharge.    Discharge Date  9/23/24    FOLLOW UP ITEMS POST DISCHARGE  Follow-up with oncology    DISCHARGE DIAGNOSES  Principal Problem:    Acute on chronic hypoxic respiratory failure (HCC) (POA: Yes)  Active Problems:    Diabetes mellitus, type 2 (HCC) (POA: Yes)      Overview: A1c in March 6.9%            3/21/23: Most recent A1c  stable at 6.9%. She is not interested in       medications at this time. Would prefer to try lifestyle changes and then       approach medication if necessary.    Malignant neoplasm of breast (HCC) (POA: Yes)      Overview: Bilateral breast cancer diagnosed in 2019      Patient has established with a surgeon Dr Arteaga.  She has delayed       surgery due to personal hindrances, particularly family stress at home.      Patient recently had a follow-up visit with her surgeon, Dr. Arteaga,       who ordered repeat CT and bone scan.  The patient is hoping to schedule       resection soon, but has dealt with unfortunate personal discord between       herself and her surgeon. She is here today to request an urgent referral       for a second opinion to a surgeon who operates outside of the local group.       Specifics of this referral are noted in the referral order.             3/21/23: Patient requesting referral to radiation oncologist. States she       has seen a surgeon in [place] that wants to have radiation therapy done       prior to surgical intervention. Scheduled for PET scan and MRI, as ordered       by surgery. She does not have an oncologist managing her cancer. States       that she saw Dr. Andersen in the past and was not happy with her care       there.    Bilateral pleural effusion (POA: Yes)    Metastasis to liver (HCC) (POA: Yes)    Metastasis to bone (HCC) (POA: Yes)    Metastasis to lung (HCC) (POA: Yes)    Pleural effusion (POA: Yes)  Resolved Problems:    Hypothyroidism (POA: Yes)      Overview: Patient has a history of hypothyroidism.  Her most recent TSH was 5.  The       patient was previously prescribed levothyroxine 25 mcg daily which she was       not taking.  She reports symptoms of fatigue, hair loss, and weight gain       however she is also receiving chemotherapy and is unsure which symptoms       are related to the medication and which are related to low thyroid.      FOLLOW  UP  Future Appointments   Date Time Provider Department Center   9/27/2024  4:30 PM Parth Downs M.D. [facility]   10/1/2024 11:30 AM Elvis Russell M.D. [facility]   10/2/2024  4:00 PM Cm Tabor M.D. [facility] None   10/21/2024  3:00 PM Masoud Velez M.D. [facility] None   10/30/2024  3:30 PM Amelia Franco M.D. [facility] Tabor M.D.  [address] [facility] [address]  [facility] [id number] [phone]    Follow up in 1 week(s)        MEDICATIONS ON DISCHARGE     Medication List        START taking these medications        Instructions   furosemide 20 MG Tabs  Commonly known as: Lasix   Take 1 Tablet by mouth 1 time a day as needed (Shortness of breath or swelling).  Dose: 20 mg     potassium chloride SA 20 MEQ Tbcr  Commonly known as: Kdur   Take 1 Tablet by mouth every day for 14 days. Take with furosemide  Dose: 20 mEq            CONTINUE taking these medications        Instructions   acetaminophen 325 MG Tabs  Commonly known as: Tylenol   Tylenol 325 mg tablet   Take 2 tablets every 6 hours by oral route.     aspirin 81 MG Chew chewable tablet  Commonly known as: Asa   Chew 81 mg every day.  Dose: 81 mg     cyclobenzaprine 5 mg tablet  Commonly known as: Flexeril   TAKE 1 TO 2 TABLETS BY MOUTH THREE TIMES DAILY AS NEEDED FOR MUSCLE SPASMS     EPINEPHrine 0.3 MG/0.3ML Soaj solution for injection  Commonly known as: Epipen   Doctor's comments: Please provide EpiPen that is patient or insurance preference.  Inject 0.3 mL into the thigh one time for 1 dose     HYDROcodone-acetaminophen 7.5-325 MG tab  Commonly known as: Norco   Take 1 Tablet by mouth every four hours as needed for Moderate Pain for up to 30 days.  Dose: 1 Tablet     ibuprofen 200 MG Tabs  Commonly known as: Motrin   Take 200 mg by mouth every 6 hours as needed.  Dose: 200 mg     losartan 100 MG Tabs  Commonly known as: Cozaar   TAKE 1 TABLET BY MOUTH DAILY     MULTIVITAMIN ADULT PO   multivitamin         "    STOP taking these medications      levothyroxine 25 MCG Tabs  Commonly known as: Synthroid     Verzenio 150 MG Tabs  Generic drug: Abemaciclib              Allergies  Allergies   Allergen Reactions    Demerol Rash     \"was told this during surgery\"  Other reaction(s): rash       DIET  Orders Placed This Encounter   Procedures    Diet Order Diet: Regular     Standing Status:   Standing     Number of Occurrences:   1     Order Specific Question:   Diet:     Answer:   Regular [1]       ACTIVITY  As tolerated.    CONSULTATIONS  Pulmonology, oncology    PROCEDURES  EC-ECHOCARDIOGRAM COMPLETE W/ CONT         US-THORACENTESIS PUNCTURE RIGHT   Final Result      1. Unsuccessful Ultrasound guided right sided thoracentesis due to to inability to aspirate fluid. No fluid withdrawn      DX-CHEST-PORTABLE (1 VIEW)   Final Result      1.  No evidence of pneumothorax.      2.  Stable bibasilar atelectasis/consolidation.      3.  Small bilateral pleural effusions.      CT-CTA CHEST PULMONARY ARTERY W/ RECONS   Final Result         1. No definite evidence for pulmonary embolism.   2. Interval increase in pleural effusions and bilateral atelectasis.   3. Thoracic lymphadenopathy.   4. Hepatic and osseous metastatic disease has worsened.   5. Interval increase in size of the right upper lobe nodule.            DX-CHEST-PORTABLE (1 VIEW)   Final Result      Bilateral effusions with associated compressive atelectasis/infiltrate.           LABORATORY  Lab Results   Component Value Date    SODIUM 140 09/23/2024    POTASSIUM 3.6 09/23/2024    CHLORIDE 99 09/23/2024    CO2 32 09/23/2024    GLUCOSE 154 (H) 09/23/2024    BUN 10 09/23/2024    CREATININE 0.56 09/23/2024        Lab Results   Component Value Date    WBC 7.7 09/23/2024    HEMOGLOBIN 11.7 (L) 09/23/2024    HEMATOCRIT 38.9 09/23/2024    PLATELETCT 319 09/23/2024        Total time of the discharge process exceeds 35 minutes.  "

## 2024-09-23 NOTE — DISCHARGE INSTRUCTIONS
Chronic Respiratory Failure  Chronic respiratory failure is a long-term type of respiratory failure. Respiratory failure is when one or both of these things happen:  Oxygen cannot pass from the lungs into the blood, causing the blood oxygen level to drop. Loss of blood oxygen means tissues and organs may not work well.  A gas called carbon dioxide cannot pass from the blood into the lungs so the body can get rid of it. The buildup of carbon dioxide can damage the tissues and organs in the body.  Chronic respiratory failure may develop over weeks, months, or years. It is usually the body's response to another long-term (chronic) condition that affects breathing.  You may also get sudden shortness of breath (acute respiratory failure) when you have chronic respiratory failure. Sudden shortness of breath needs to be treated right away.  What increases the risk?  You are more likely to develop this condition if you have another chronic condition, such as:  Chronic obstructive pulmonary disease (COPD).  Cystic fibrosis.  Neuromuscular weakness from a stroke, spinal cord injury, or myasthenia gravis.  Pulmonary fibrosis. This is scarring of the lung tissue.  Sleep apnea.  Obesity-hypoventilation syndrome.  Congestive heart failure.  What are the signs or symptoms?  Chronic respiratory failure may not have clear symptoms when it happens slowly over time and the body gets used to it.  Symptoms of this condition may include:  Shortness of breath or trouble breathing.  Feeling very tired, sleepy, or low energy.  Blurred vision or headaches.  Fast breathing.  Confusion.  Fingernail beds or toenail beds that look bluish.  Fingernails or toenails that look wide, swollen, spongy, or bulging.  How is this diagnosed?  This condition may be diagnosed based on:  Your medical history.  A physical exam.  Other tests. These may include:  Blood tests, such as an arterial blood gas test to check oxygen and carbon dioxide  levels.  Imaging tests, such as a chest X-ray or CT scan.  Pulmonary function tests. These help to find out if you have chronic lung disease.  An echocardiogram. This test uses sound waves to make pictures of your heart.  How is this treated?  Treatment for this condition depends on the cause. Treatment may include:  Oxygen given through a tube with prongs that sit in your nose (nasal cannula) or through a mask that fits over your face.  Noninvasive positive pressure ventilation. This is breathing support using a machine to blow air into your lungs through a mask. Examples of these machines are:  Continuous positive airway pressure (CPAP) machine.  Bilevel positive airway pressure (BIPAP) machine.  Medicines to help with breathing, such as:  Medicines that open up and relax air passages, such as bronchodilators. These may be given through a device that turns liquid medicines into a mist you can breathe in (nebulizer).  Diuretics. These medicines get rid of extra fluid in your lungs.  Steroid medicines. These decrease inflammation in the lungs.  Pulmonary rehabilitation. This is an exercise program that strengthens the muscles in your chest and helps you learn breathing methods to manage your condition.  A ventilator. This is a breathing machine that sends oxygen to your lungs through a tube that is put into your windpipe (trachea). This machine is used when you can no longer breathe well enough on your own.  Follow these instructions at home:  Medicines  Take over-the-counter and prescription medicines only as told by your health care provider.  If you were prescribed antibiotics, take them as told by your health care provider. Do not stop using the antibiotic even if you start to feel better.  General instructions    Do not use any products that contain nicotine or tobacco. These products include cigarettes, chewing tobacco, and vaping devices, such as e-cigarettes. If you need help quitting, ask your health care  provider.  If you were told to use oxygen therapy at home, follow the instructions from your health care provider. Do not use more oxygen than you were told. Getting too much oxygen may be harmful when you have this condition.  If you were given a CPAP or BIPAP machine, follow the instructions for use, cleaning, and care as told by your health care provider.  Return to your normal activities as told by your health care provider. Ask your health care provider what activities are safe for you.  Stay up to date on all vaccines, especially pneumonia and yearly flu (influenza) vaccines.  Stay away from people who are sick, and avoid crowded places, especially during the cold and flu season.  Keep all follow-up visits.  Contact a health care provider if:  Your shortness of breath gets worse.  You have more mucus from your lungs (sputum), high-pitched whistling sounds when you breathe (wheezing), coughing, or loss of energy.  You get oxygen therapy and you still have trouble breathing.  You have a fever of 100.4°F (38°C) or higher.  Get help right away if:  Your shortness of breath gets much worse or gets worse very fast.  You get pain, tightness, or pressure in your chest.  You cannot say more than a few words without having to catch your breath.  Your lips, toenails, or fingernails look bluish.  You become confused or difficult to wake up.  These symptoms may be an emergency. Get help right away. Call 911.  Do not wait to see if the symptoms will go away.  Do not drive yourself to the hospital.  Summary  Chronic respiratory failure is a long-term breathing problem that may develop over weeks, months, or years. It is usually the body's response to another long-term condition that affects breathing.  This condition may not have clear symptoms when it happens slowly over time and the body gets used to it.  Shortness of breath and trouble breathing are the most common symptoms of this condition.  If you were told to use  oxygen therapy at home, do not use more oxygen than you were told. Getting too much oxygen may be harmful when you have this condition.  This information is not intended to replace advice given to you by your health care provider. Make sure you discuss any questions you have with your health care provider.  Document Revised: 02/24/2023 Document Reviewed: 02/24/2023  [employer] Patient Education © 2023 [employer]  Acute Respiratory Failure, Adult  Acute respiratory failure is when one or both of these things happen:  Oxygen cannot pass from the lungs into the blood, causing the blood oxygen level to drop. Loss of blood oxygen means tissues and organs may not work well.  A gas called carbon dioxide cannot pass from the blood into the lungs so the body can get rid of it. The buildup of carbon dioxide can damage the tissues and organs in the body.  Acute respiratory failure happens fast. It is an emergency and needs to be treated right away.  What are the causes?  Common causes of respiratory failure that may cause low oxygen levels include:  Trauma to the lung, chest, or ribs, or to the tissues around the lung.  Lung conditions, such as pneumonia, asthma, or blood clots in the lungs (pulmonary embolism).  Breathing in harmful chemicals, smoke, water, or vomit.  A widespread infection (sepsis).  Heart attack.  Common causes of respiratory failure that cause a buildup of carbon dioxide include:  Stroke.  A spinal cord injury.  Drug or alcohol overdose.  Sepsis.  The heart stopping all of a sudden (cardiac arrest).  What increases the risk?  This condition is more likely to develop in people who have:  Known lung conditions, such as asthma or chronic obstructive pulmonary disease (COPD).  A condition that damages or weakens the muscles, nerves, bones, or tissues that are involved in breathing, such as myasthenia gravis or Guillain-Barré syndrome.  A health problem that blocks the unconscious reflex that is involved in  breathing, such as hypothyroidism or sleep apnea.  What are the signs or symptoms?  Symptoms may depend on the cause and on the levels of oxygen and carbon dioxide in your blood.   Trouble breathing is the main symptom of acute respiratory failure.  Other symptoms may include:  Fast breathing.  Making high-pitched whistling sounds when you breathe (wheezing) and grunting.  Confusion or changes in behavior.  Feeling tired, sleeping more than normal, or being hard to wake.  Skin, lips, or fingernails that look blue (cyanosis).  Feeling restless or anxious.  Fast or irregular heartbeats (palpitations).  How is this diagnosed?  This condition may be diagnosed based on:  Your medical history and a physical exam. Your health care provider will listen to your heart and lungs to check for abnormal sounds.  Tests to confirm the diagnosis and find the cause of respiratory failure. Tests may include:  Measuring the amount of oxygen in your blood (pulse oximetry). This involves placing a small device on your finger, earlobe, or toe.  Blood tests to measure levels of blood oxygen and carbon dioxide.  Chest X-ray.  How is this treated?  Treatment for this condition usually takes place in a hospital intensive care unit (ICU). Treatment depends on the cause of the condition. Treatment may include one or more of these:  Oxygen given through your nose or a face mask.  A device to help you breathe, such as a continuous positive airway pressure (CPAP) machine or bilevel positive airway pressure (BIPAP) machine. The device gives you oxygen and pressure.  Other breathing treatments, fluids, and medicines.  A breathing machine called a ventilator. This gives you oxygen and pressure to help you breathe. A tube is put into your mouth and windpipe (trachea) and connects to the ventilator.  Tracheostomy placement, if you are on a ventilator for a long time. A tracheostomy is a breathing tube put through your neck into your trachea.  Follow  these instructions at home:  Medicines  Take over-the-counter and prescription medicines only as told by your health care provider.  If you were prescribed antibiotics, take them as told by your health care provider. Do not stop using the antibiotic even if you start to feel better.  General instructions  Return to your normal activities as told by your health care provider. Ask your health care provider what activities are safe for you.  Do not use any products that contain nicotine or tobacco. These products include cigarettes, chewing tobacco, and vaping devices, such as e-cigarettes. If you need help quitting, ask your health care provider.  Keep all follow-up visits.  Contact a health care provider if:  Your symptoms do not improve or they get worse.  Get help right away if:  You are having trouble breathing.  You lose consciousness.  Your heart starts beating very fast.  Your fingers, lips, or other areas of your body turn blue.  You are confused.  These symptoms may be an emergency. Get help right away. Call 911.  Do not wait to see if the symptoms will go away.  Do not drive yourself to the hospital.  Summary  Acute respiratory failure is a condition that develops fast and needs to be treated right away.  The main symptom of this condition is trouble breathing.  Treatment for this condition usually takes place in a hospital intensive care unit (ICU). Treatment may include oxygen, fluids, and medicines. A machine may be used to help you breathe, such as a ventilator.  Contact a health care provider if your symptoms do not improve or if they get worse.  This information is not intended to replace advice given to you by your health care provider. Make sure you discuss any questions you have with your health care provider.  Document Revised: 02/24/2023 Document Reviewed: 02/24/2023  [employer] Patient Education © 2023 [employer]

## 2024-09-23 NOTE — PROGRESS NOTES
DC instructions reviewed w/ pt, verbalized understanding. PIV dc'd, armband removed. Meds to bed delivered. Home o2 pending delivery.

## 2024-09-24 DIAGNOSIS — C50.919 PRIMARY MALIGNANT NEOPLASM OF FEMALE BREAST (HCC): ICD-10-CM

## 2024-09-24 DIAGNOSIS — C50.912 BILATERAL MALIGNANT NEOPLASM OF BREAST IN FEMALE, UNSPECIFIED ESTROGEN RECEPTOR STATUS, UNSPECIFIED SITE OF BREAST (HCC): ICD-10-CM

## 2024-09-24 DIAGNOSIS — C50.911 BILATERAL MALIGNANT NEOPLASM OF BREAST IN FEMALE, UNSPECIFIED ESTROGEN RECEPTOR STATUS, UNSPECIFIED SITE OF BREAST (HCC): ICD-10-CM

## 2024-09-26 ENCOUNTER — APPOINTMENT (OUTPATIENT)
Dept: WOUND CARE | Facility: MEDICAL CENTER | Age: 64
End: 2024-09-26
Payer: MEDICARE

## 2024-09-27 ENCOUNTER — APPOINTMENT (OUTPATIENT)
Dept: MEDICAL GROUP | Facility: CLINIC | Age: 64
End: 2024-09-27
Payer: MEDICARE

## 2024-10-01 ENCOUNTER — APPOINTMENT (OUTPATIENT)
Dept: MEDICAL GROUP | Facility: CLINIC | Age: 64
End: 2024-10-01
Payer: MEDICARE

## 2024-10-02 ENCOUNTER — APPOINTMENT (OUTPATIENT)
Dept: HEMATOLOGY ONCOLOGY | Facility: MEDICAL CENTER | Age: 64
End: 2024-10-02
Payer: MEDICARE

## 2024-10-04 ENCOUNTER — APPOINTMENT (OUTPATIENT)
Dept: MEDICAL GROUP | Facility: CLINIC | Age: 64
End: 2024-10-04
Payer: MEDICARE

## 2024-10-09 ENCOUNTER — APPOINTMENT (OUTPATIENT)
Dept: HEMATOLOGY ONCOLOGY | Facility: MEDICAL CENTER | Age: 64
End: 2024-10-09
Payer: MEDICARE

## 2024-10-10 ENCOUNTER — APPOINTMENT (OUTPATIENT)
Dept: MEDICAL GROUP | Facility: CLINIC | Age: 64
End: 2024-10-10
Payer: MEDICARE

## 2024-10-11 DIAGNOSIS — C50.911 BILATERAL MALIGNANT NEOPLASM OF BREAST IN FEMALE, UNSPECIFIED ESTROGEN RECEPTOR STATUS, UNSPECIFIED SITE OF BREAST (HCC): ICD-10-CM

## 2024-10-11 DIAGNOSIS — C50.912 BILATERAL MALIGNANT NEOPLASM OF BREAST IN FEMALE, UNSPECIFIED ESTROGEN RECEPTOR STATUS, UNSPECIFIED SITE OF BREAST (HCC): ICD-10-CM

## 2024-10-11 RX ORDER — HYDROCODONE BITARTRATE AND ACETAMINOPHEN 5; 325 MG/1; MG/1
1 TABLET ORAL EVERY 4 HOURS PRN
Qty: 60 TABLET | Refills: 0 | Status: SHIPPED | OUTPATIENT
Start: 2024-10-11 | End: 2024-11-10

## 2024-10-16 ENCOUNTER — OFFICE VISIT (OUTPATIENT)
Dept: MEDICAL GROUP | Facility: CLINIC | Age: 64
End: 2024-10-16
Payer: MEDICARE

## 2024-10-16 VITALS
BODY MASS INDEX: 35.01 KG/M2 | HEIGHT: 68 IN | WEIGHT: 231 LBS | HEART RATE: 88 BPM | DIASTOLIC BLOOD PRESSURE: 79 MMHG | TEMPERATURE: 97.7 F | SYSTOLIC BLOOD PRESSURE: 154 MMHG | OXYGEN SATURATION: 90 %

## 2024-10-16 DIAGNOSIS — C78.02 MALIGNANT NEOPLASM METASTATIC TO BOTH LUNGS (HCC): ICD-10-CM

## 2024-10-16 DIAGNOSIS — C78.7 METASTASIS TO LIVER (HCC): ICD-10-CM

## 2024-10-16 DIAGNOSIS — E11.9 TYPE 2 DIABETES MELLITUS WITHOUT COMPLICATION, WITHOUT LONG-TERM CURRENT USE OF INSULIN (HCC): ICD-10-CM

## 2024-10-16 DIAGNOSIS — C78.01 MALIGNANT NEOPLASM METASTATIC TO BOTH LUNGS (HCC): ICD-10-CM

## 2024-10-16 DIAGNOSIS — C50.912 BILATERAL MALIGNANT NEOPLASM OF BREAST IN FEMALE, UNSPECIFIED ESTROGEN RECEPTOR STATUS, UNSPECIFIED SITE OF BREAST (HCC): ICD-10-CM

## 2024-10-16 DIAGNOSIS — C50.911 BILATERAL MALIGNANT NEOPLASM OF BREAST IN FEMALE, UNSPECIFIED ESTROGEN RECEPTOR STATUS, UNSPECIFIED SITE OF BREAST (HCC): ICD-10-CM

## 2024-10-16 DIAGNOSIS — C79.51 METASTASIS TO BONE (HCC): ICD-10-CM

## 2024-10-16 PROCEDURE — 99215 OFFICE O/P EST HI 40 MIN: CPT

## 2024-10-16 RX ORDER — LANCETS 30 GAUGE
EACH MISCELLANEOUS
Qty: 100 EACH | Refills: 0 | Status: SHIPPED | OUTPATIENT
Start: 2024-10-16

## 2024-10-16 RX ORDER — GLUCOSAMINE HCL/CHONDROITIN SU 500-400 MG
CAPSULE ORAL
Qty: 100 EACH | Refills: 0 | Status: SHIPPED | OUTPATIENT
Start: 2024-10-16

## 2024-10-16 ASSESSMENT — FIBROSIS 4 INDEX: FIB4 SCORE: 2.13

## 2024-10-17 LAB
LV EJECT FRACT  99904: 67
LV EJECT FRACT MOD 2C 99903: 72.26
LV EJECT FRACT MOD 4C 99902: 61.64
LV EJECT FRACT MOD BP 99901: 66.76

## 2024-10-17 PROCEDURE — 93306 TTE W/DOPPLER COMPLETE: CPT | Mod: 26 | Performed by: INTERNAL MEDICINE

## 2024-10-24 ENCOUNTER — APPOINTMENT (OUTPATIENT)
Dept: MEDICAL GROUP | Facility: CLINIC | Age: 64
End: 2024-10-24
Payer: MEDICARE

## 2024-10-28 ENCOUNTER — HOSPITAL ENCOUNTER (OUTPATIENT)
Dept: HEMATOLOGY ONCOLOGY | Facility: MEDICAL CENTER | Age: 64
End: 2024-10-28
Attending: FAMILY MEDICINE
Payer: MEDICARE

## 2024-10-28 VITALS
WEIGHT: 232 LBS | OXYGEN SATURATION: 95 % | HEART RATE: 88 BPM | HEIGHT: 68 IN | TEMPERATURE: 97.9 F | BODY MASS INDEX: 35.16 KG/M2 | DIASTOLIC BLOOD PRESSURE: 88 MMHG | SYSTOLIC BLOOD PRESSURE: 142 MMHG

## 2024-10-28 DIAGNOSIS — C78.02 MALIGNANT NEOPLASM METASTATIC TO BOTH LUNGS (HCC): ICD-10-CM

## 2024-10-28 DIAGNOSIS — C78.01 MALIGNANT NEOPLASM METASTATIC TO BOTH LUNGS (HCC): ICD-10-CM

## 2024-10-28 DIAGNOSIS — G89.3 CANCER RELATED PAIN: ICD-10-CM

## 2024-10-28 DIAGNOSIS — C50.919 PRIMARY MALIGNANT NEOPLASM OF FEMALE BREAST (HCC): ICD-10-CM

## 2024-10-28 PROCEDURE — 99214 OFFICE O/P EST MOD 30 MIN: CPT | Performed by: FAMILY MEDICINE

## 2024-10-28 PROCEDURE — 99212 OFFICE O/P EST SF 10 MIN: CPT | Performed by: FAMILY MEDICINE

## 2024-10-28 ASSESSMENT — FIBROSIS 4 INDEX: FIB4 SCORE: 2.13

## 2024-10-30 ENCOUNTER — APPOINTMENT (OUTPATIENT)
Dept: MEDICAL GROUP | Facility: CLINIC | Age: 64
End: 2024-10-30
Payer: MEDICARE

## 2024-10-30 ENCOUNTER — HOSPITAL ENCOUNTER (OUTPATIENT)
Dept: HEMATOLOGY ONCOLOGY | Facility: MEDICAL CENTER | Age: 64
End: 2024-10-30
Attending: INTERNAL MEDICINE
Payer: MEDICARE

## 2024-10-30 ENCOUNTER — HOSPITAL ENCOUNTER (OUTPATIENT)
Dept: LAB | Facility: MEDICAL CENTER | Age: 64
End: 2024-10-30
Attending: INTERNAL MEDICINE
Payer: MEDICARE

## 2024-10-30 VITALS
HEIGHT: 68 IN | BODY MASS INDEX: 35.3 KG/M2 | SYSTOLIC BLOOD PRESSURE: 168 MMHG | DIASTOLIC BLOOD PRESSURE: 84 MMHG | WEIGHT: 232.92 LBS | OXYGEN SATURATION: 96 % | HEART RATE: 102 BPM | TEMPERATURE: 98.1 F

## 2024-10-30 DIAGNOSIS — C50.912 BILATERAL MALIGNANT NEOPLASM OF BREAST IN FEMALE, UNSPECIFIED ESTROGEN RECEPTOR STATUS, UNSPECIFIED SITE OF BREAST (HCC): ICD-10-CM

## 2024-10-30 DIAGNOSIS — C50.911 BILATERAL MALIGNANT NEOPLASM OF BREAST IN FEMALE, UNSPECIFIED ESTROGEN RECEPTOR STATUS, UNSPECIFIED SITE OF BREAST (HCC): ICD-10-CM

## 2024-10-30 PROBLEM — G89.3 CANCER RELATED PAIN: Status: ACTIVE | Noted: 2024-10-30

## 2024-10-30 LAB
ALBUMIN SERPL BCP-MCNC: 4.2 G/DL (ref 3.2–4.9)
ALBUMIN/GLOB SERPL: 1.2 G/DL
ALP SERPL-CCNC: 106 U/L (ref 30–99)
ALT SERPL-CCNC: 40 U/L (ref 2–50)
ANION GAP SERPL CALC-SCNC: 12 MMOL/L (ref 7–16)
ANISOCYTOSIS BLD QL SMEAR: ABNORMAL
AST SERPL-CCNC: 77 U/L (ref 12–45)
BASOPHILS # BLD AUTO: 0.9 % (ref 0–1.8)
BASOPHILS # BLD: 0.05 K/UL (ref 0–0.12)
BILIRUB SERPL-MCNC: 0.4 MG/DL (ref 0.1–1.5)
BUN SERPL-MCNC: 10 MG/DL (ref 8–22)
CALCIUM ALBUM COR SERPL-MCNC: 10.1 MG/DL (ref 8.5–10.5)
CALCIUM SERPL-MCNC: 10.3 MG/DL (ref 8.5–10.5)
CEA SERPL-MCNC: 87.6 NG/ML (ref 0–3)
CHLORIDE SERPL-SCNC: 99 MMOL/L (ref 96–112)
CO2 SERPL-SCNC: 28 MMOL/L (ref 20–33)
COMMENT 1642: NORMAL
CREAT SERPL-MCNC: 0.59 MG/DL (ref 0.5–1.4)
EOSINOPHIL # BLD AUTO: 0.09 K/UL (ref 0–0.51)
EOSINOPHIL NFR BLD: 1.7 % (ref 0–6.9)
ERYTHROCYTE [DISTWIDTH] IN BLOOD BY AUTOMATED COUNT: 51.1 FL (ref 35.9–50)
GFR SERPLBLD CREATININE-BSD FMLA CKD-EPI: 100 ML/MIN/1.73 M 2
GLOBULIN SER CALC-MCNC: 3.5 G/DL (ref 1.9–3.5)
GLUCOSE SERPL-MCNC: 168 MG/DL (ref 65–99)
HCT VFR BLD AUTO: 43.9 % (ref 37–47)
HGB BLD-MCNC: 13.1 G/DL (ref 12–16)
IMM GRANULOCYTES # BLD AUTO: 0.02 K/UL (ref 0–0.11)
IMM GRANULOCYTES NFR BLD AUTO: 0.4 % (ref 0–0.9)
LYMPHOCYTES # BLD AUTO: 0.86 K/UL (ref 1–4.8)
LYMPHOCYTES NFR BLD: 15.9 % (ref 22–41)
MCH RBC QN AUTO: 25.1 PG (ref 27–33)
MCHC RBC AUTO-ENTMCNC: 29.8 G/DL (ref 32.2–35.5)
MCV RBC AUTO: 84.1 FL (ref 81.4–97.8)
MICROCYTES BLD QL SMEAR: ABNORMAL
MONOCYTES # BLD AUTO: 0.4 K/UL (ref 0–0.85)
MONOCYTES NFR BLD AUTO: 7.4 % (ref 0–13.4)
MORPHOLOGY BLD-IMP: NORMAL
NEUTROPHILS # BLD AUTO: 4 K/UL (ref 1.82–7.42)
NEUTROPHILS NFR BLD: 73.7 % (ref 44–72)
NRBC # BLD AUTO: 0 K/UL
NRBC BLD-RTO: 0 /100 WBC (ref 0–0.2)
PLATELET # BLD AUTO: 347 K/UL (ref 164–446)
PLATELET BLD QL SMEAR: NORMAL
PMV BLD AUTO: 9.4 FL (ref 9–12.9)
POTASSIUM SERPL-SCNC: 4.1 MMOL/L (ref 3.6–5.5)
PROT SERPL-MCNC: 7.7 G/DL (ref 6–8.2)
RBC # BLD AUTO: 5.22 M/UL (ref 4.2–5.4)
RBC BLD AUTO: PRESENT
SODIUM SERPL-SCNC: 139 MMOL/L (ref 135–145)
WBC # BLD AUTO: 5.4 K/UL (ref 4.8–10.8)

## 2024-10-30 PROCEDURE — 99212 OFFICE O/P EST SF 10 MIN: CPT | Performed by: INTERNAL MEDICINE

## 2024-10-30 PROCEDURE — 86300 IMMUNOASSAY TUMOR CA 15-3: CPT

## 2024-10-30 PROCEDURE — 80053 COMPREHEN METABOLIC PANEL: CPT

## 2024-10-30 PROCEDURE — 82378 CARCINOEMBRYONIC ANTIGEN: CPT

## 2024-10-30 PROCEDURE — 36415 COLL VENOUS BLD VENIPUNCTURE: CPT

## 2024-10-30 PROCEDURE — 85025 COMPLETE CBC W/AUTO DIFF WBC: CPT

## 2024-10-30 RX ORDER — AMLODIPINE BESYLATE 5 MG/1
TABLET ORAL
COMMUNITY
Start: 2024-10-27

## 2024-10-30 RX ORDER — LETROZOLE 2.5 MG/1
2.5 TABLET, FILM COATED ORAL DAILY
Qty: 30 TABLET | Refills: 5 | Status: SHIPPED | OUTPATIENT
Start: 2024-10-30

## 2024-10-30 RX ORDER — EPINEPHRINE 0.3 MG/.3ML
INJECTION SUBCUTANEOUS
Qty: 1 EACH | Refills: 0 | Status: SHIPPED | OUTPATIENT
Start: 2024-10-30

## 2024-10-30 ASSESSMENT — ENCOUNTER SYMPTOMS
MYALGIAS: 1
NERVOUS/ANXIOUS: 0
BACK PAIN: 1
DEPRESSION: 0
PALPITATIONS: 0
SHORTNESS OF BREATH: 0
MUSCULOSKELETAL NEGATIVE: 1
WEIGHT LOSS: 1
BLURRED VISION: 0
COUGH: 0
BRUISES/BLEEDS EASILY: 0
NEUROLOGICAL NEGATIVE: 1
HEADACHES: 0
VOMITING: 0
NAUSEA: 0
CARDIOVASCULAR NEGATIVE: 1
FEVER: 0
EYES NEGATIVE: 1
CONSTIPATION: 0
WEIGHT LOSS: 0
PSYCHIATRIC NEGATIVE: 1
DIARRHEA: 0
CHILLS: 0

## 2024-10-30 ASSESSMENT — PAIN SCALES - GENERAL: PAINLEVEL: 3=SLIGHT PAIN

## 2024-10-30 ASSESSMENT — FIBROSIS 4 INDEX: FIB4 SCORE: 2.13

## 2024-10-31 ASSESSMENT — ENCOUNTER SYMPTOMS
SHORTNESS OF BREATH: 1
ABDOMINAL PAIN: 1

## 2024-11-01 LAB — CANCER AG27-29 SERPL-ACNC: 129.5 U/ML

## 2024-11-05 ENCOUNTER — APPOINTMENT (OUTPATIENT)
Dept: MEDICAL GROUP | Facility: CLINIC | Age: 64
End: 2024-11-05
Payer: MEDICARE

## 2024-11-05 VITALS
BODY MASS INDEX: 35.46 KG/M2 | HEIGHT: 68 IN | HEART RATE: 78 BPM | WEIGHT: 234 LBS | OXYGEN SATURATION: 92 % | DIASTOLIC BLOOD PRESSURE: 67 MMHG | RESPIRATION RATE: 20 BRPM | TEMPERATURE: 97.8 F | SYSTOLIC BLOOD PRESSURE: 117 MMHG

## 2024-11-05 DIAGNOSIS — G89.3 CANCER RELATED PAIN: ICD-10-CM

## 2024-11-05 DIAGNOSIS — J90 BILATERAL PLEURAL EFFUSION: ICD-10-CM

## 2024-11-05 DIAGNOSIS — C50.912 BILATERAL MALIGNANT NEOPLASM OF BREAST IN FEMALE, UNSPECIFIED ESTROGEN RECEPTOR STATUS, UNSPECIFIED SITE OF BREAST (HCC): ICD-10-CM

## 2024-11-05 DIAGNOSIS — E87.8 ELECTROLYTE ABNORMALITY: ICD-10-CM

## 2024-11-05 DIAGNOSIS — M54.6 ACUTE LEFT-SIDED THORACIC BACK PAIN: ICD-10-CM

## 2024-11-05 DIAGNOSIS — C50.911 BILATERAL MALIGNANT NEOPLASM OF BREAST IN FEMALE, UNSPECIFIED ESTROGEN RECEPTOR STATUS, UNSPECIFIED SITE OF BREAST (HCC): ICD-10-CM

## 2024-11-05 DIAGNOSIS — E11.9 TYPE 2 DIABETES MELLITUS WITHOUT COMPLICATION, WITHOUT LONG-TERM CURRENT USE OF INSULIN (HCC): ICD-10-CM

## 2024-11-05 PROCEDURE — 3078F DIAST BP <80 MM HG: CPT

## 2024-11-05 PROCEDURE — 99214 OFFICE O/P EST MOD 30 MIN: CPT

## 2024-11-05 PROCEDURE — 3074F SYST BP LT 130 MM HG: CPT

## 2024-11-05 RX ORDER — LIDOCAINE 4 G/G
1 PATCH TOPICAL EVERY 24 HOURS
Qty: 20 PATCH | Refills: 0 | Status: SHIPPED | OUTPATIENT
Start: 2024-11-05

## 2024-11-05 ASSESSMENT — FIBROSIS 4 INDEX: FIB4 SCORE: 2.28

## 2024-11-06 NOTE — PROGRESS NOTES
[facility]     PATIENT ID:  NAME:  Asha Hannon  MRN:               [id number]  YOB: 1960    Date: 3:00 PM      Fellow: Elvis Russell M.D.    CC:  Follow up      HPI: Asha Hannon is a 64 y.o. female who presented for follow up.  Patient states that after her last visit the clinic she was seen at the emergency department at [facility] and admitted secondary to acute respiratory failure.  The patient had bilateral pleural centesis and fluid collected was found to have metastatic cells in both samples.  Patient states that she has felt slightly improved since discharge from the hospital but states she has continued to need supplemental oxygen and has been having worsening of her chronic chest wall pain.  She states that she met with palliative care and was advised that her oncologist usually likes to control the patient's pain regimen and she states she is worried that her current pain regimen may not be strong enough.  She notes over the past week her worst pain has been along her left mid back.  Otherwise the patient denies any recent fever, chills, nausea, vomiting, headache, dizziness, lightheadedness, short falls, injury or trauma.    No problems updated.    REVIEW OF SYSTEMS:   Ten systems reviewed and were negative except as noted in the HPI.                PROBLEM LIST  Patient Active Problem List   Diagnosis    Depression    Diabetes mellitus, type 2 (HCC)    Dyslipidemia    Family tension    Hypertension    Malignant neoplasm of breast (HCC)    Obesity    Snoring    Lumbar radiculopathy    Other specified disorders of external ear, unspecified ear    Anxiety    Shortness of breath    Acute right-sided low back pain with right-sided sciatica    Abnormal bowel movement    Cardiomegaly    Malignant pleural effusion    Breast infection    Abnormal liver function    Seasonal allergies    Housing problems    Estrogen receptor positive status (ER+)    Moderate  "obstructive sleep apnea-hypopnea syndrome    Nocturnal hypoxia    Primary malignant neoplasm of female breast (HCC)    Acute on chronic hypoxic respiratory failure (HCC)    Bilateral pleural effusion    Metastasis to liver (HCC)    Metastasis to bone (HCC)    Metastasis to lung (HCC)    Pleural effusion    Cancer related pain        PAST SURGICAL HISTORY:  Past Surgical History:   Procedure Laterality Date    CHOLECYSTECTOMY      OTHER      mastoid bone       FAMILY HISTORY:  Family History   Problem Relation Age of Onset    Lung Cancer Mother     Uterine cancer Sister     Cancer Maternal Grandfather     Stomach Cancer Paternal Grandmother        SOCIAL HISTORY:   Social History     Tobacco Use    Smoking status: Never    Smokeless tobacco: Never   Substance Use Topics    Alcohol use: Yes     Comment: occas       ALLERGIES:  Allergies   Allergen Reactions    Demerol Rash     \"was told this during surgery\"  Other reaction(s): rash       OUTPATIENT MEDICATIONS:    Current Outpatient Medications:     lidocaine (ASPERFLEX) 4 % Patch, Place 1 Patch on the skin every 24 hours., Disp: 20 Patch, Rfl: 0    EPINEPHrine (EPIPEN) 0.3 MG/0.3ML Solution Auto-injector solution for injection, Inject 0.3 mL into the thigh one time for 1 dose, Disp: 1 Each, Rfl: 0    Blood Glucose Meter Kit, Test blood sugar as recommended by provider. One Touch Verio Flex blood glucose monitoring kit., Disp: 1 Kit, Rfl: 0    Blood Glucose Test Strips, Use one One Touch Verio Flex strip to test blood sugar once daily early morning before first meal and when symptomatic., Disp: 100 Strip, Rfl: 0    Alcohol Swabs, Wipe site with prep pad prior to injection., Disp: 100 Each, Rfl: 0    HYDROcodone-acetaminophen (NORCO) 5-325 MG Tab per tablet, Take 1 Tablet by mouth every four hours as needed (severe pain) for up to 30 days., Disp: 60 Tablet, Rfl: 0    losartan (COZAAR) 100 MG Tab, TAKE 1 TABLET BY MOUTH DAILY, Disp: 90 Tablet, Rfl: 3    cyclobenzaprine " "(FLEXERIL) 5 mg tablet, TAKE 1 TO 2 TABLETS BY MOUTH THREE TIMES DAILY AS NEEDED FOR MUSCLE SPASMS, Disp: 60 Tablet, Rfl: 2    aspirin (ASA) 81 MG Chew Tab chewable tablet, Chew 81 mg every day., Disp: , Rfl:     amLODIPine (NORVASC) 5 MG Tab, , Disp: , Rfl:     letrozole (FEMARA) 2.5 MG Tab, Take 1 Tablet by mouth every day., Disp: 30 Tablet, Rfl: 5    Lancets, Use one One Touch Verio Flex lancet to test blood sugar once daily early morning before first meal and as needed for symptomatic., Disp: 100 Each, Rfl: 0    Abemaciclib 150 MG Tab, Take 150 mg by mouth 2 times a day., Disp: 30 Tablet, Rfl: 2    furosemide (LASIX) 20 MG Tab, Take 1 Tablet by mouth 1 time a day as needed (Shortness of breath or swelling)., Disp: 14 Tablet, Rfl: 0    ibuprofen (MOTRIN) 200 MG Tab, Take 200 mg by mouth every 6 hours as needed. (Patient not taking: Reported on 10/30/2024), Disp: , Rfl:     Multiple Vitamin (MULTIVITAMIN ADULT PO), multivitamin, Disp: , Rfl:     acetaminophen (TYLENOL) 325 MG Tab, Tylenol 325 mg tablet  Take 2 tablets every 6 hours by oral route. (Patient not taking: Reported on 10/30/2024), Disp: , Rfl:     PHYSICAL EXAM:  Vitals:    11/05/24 1644   BP: 117/67   Pulse: 78   Resp: 20   Temp: 36.6 °C (97.8 °F)   TempSrc: Temporal   SpO2: 92%   Weight: 106 kg (234 lb)   Height: 1.727 m (5' 8\")       General: Pt resting in NAD, pleasant and cooperative   Skin:  Pink, warm and dry.  HEENT: NC/AT. EOMI. PERRLA  Lungs:  Symmetrical.  CTAB, diminished lung sounds throughout, no rales, no rhonchi, no wheezes, no adventitious sounds  Chest wall: Mild tenderness to palpation to mid thoracic paraspinal region on the left chest wall  Cardiovascular:  S1/S2 RRR, no murmurs rubs or gallops, no lower extremity edema  Abdomen:  Abdomen is soft, nontender, no distention  Extremities:  Full range of motion.  CNS:  Muscle tone is normal. No gross focal neurologic deficits      ASSESSMENT/PLAN:   64 y.o. female presents to clinic for " follow-up after being seen in the emergency department and admitted to [facility].  The patient states that after our last visit she visited the emergency department where she was admitted due to acute respiratory failure.  She states during that admission she had pleurocentesis of both lungs with a small amount of fluid which was tested and found to be positive for metastatic cells.  Patient states that she has recovered slightly since discharge from the hospital but has not had as dramatic of her recovery as she did in the past when she had large amounts of fluid drained.  She states that she has also had chronic pain that her oncologist believes is related to her metastatic cancer as she does have known bony metastasis.  She locates her pain currently in the right paraspinal thoracic area.  Upon review of the patient's CT scans it does appear that she has metastasis to multiple ribs which may be the source of this pain.  She is currently on norco prescribed by her oncologist and has met with palliative care. Counseled patient to continue to make these concerns heard by her oncology team but if any acute concerns I would be happy to help find a solution.  On examination today patient does not appear to have pathologic fracture.  Patient feels very let down by her oncology team and states she feels she is only heard when she is at this clinic, reassured patient that I will be as involved as she would like me to be with any problems in the future.  Will plan on monthly follow ups for continued care and encouraged that if she has any other concerns to reach out or return to clinic.     Problem List Items Addressed This Visit       Diabetes mellitus, type 2 (HCC)    Malignant neoplasm of breast (HCC)    [Metastasis to liver (HCC)]    [Metastasis to bone (HCC)]    [Metastasis to lung (HCC)]    Bilateral pleural effusion    Cancer related pain     Other Visit Diagnoses       Acute left-sided thoracic back  pain        Relevant Medications    lidocaine (ASPERFLEX) 4 % Patch    Electrolyte abnormality        Relevant Orders    Basic Metabolic Panel            Elvis Russell M.D.  PGY-4, [employer] Fellow  [facility]

## 2024-11-12 ENCOUNTER — APPOINTMENT (OUTPATIENT)
Dept: MEDICAL GROUP | Facility: CLINIC | Age: 64
End: 2024-11-12
Payer: MEDICARE

## 2024-11-12 DIAGNOSIS — C50.911 BILATERAL MALIGNANT NEOPLASM OF BREAST IN FEMALE, UNSPECIFIED ESTROGEN RECEPTOR STATUS, UNSPECIFIED SITE OF BREAST (HCC): ICD-10-CM

## 2024-11-12 DIAGNOSIS — C50.912 BILATERAL MALIGNANT NEOPLASM OF BREAST IN FEMALE, UNSPECIFIED ESTROGEN RECEPTOR STATUS, UNSPECIFIED SITE OF BREAST (HCC): ICD-10-CM

## 2024-11-12 RX ORDER — HYDROCODONE BITARTRATE AND ACETAMINOPHEN 5; 325 MG/1; MG/1
1 TABLET ORAL EVERY 4 HOURS PRN
Qty: 60 TABLET | Refills: 0 | Status: CANCELLED | OUTPATIENT
Start: 2024-11-12 | End: 2024-12-12

## 2024-11-13 DIAGNOSIS — C50.911 BILATERAL MALIGNANT NEOPLASM OF BREAST IN FEMALE, UNSPECIFIED ESTROGEN RECEPTOR STATUS, UNSPECIFIED SITE OF BREAST (HCC): ICD-10-CM

## 2024-11-13 DIAGNOSIS — C50.912 BILATERAL MALIGNANT NEOPLASM OF BREAST IN FEMALE, UNSPECIFIED ESTROGEN RECEPTOR STATUS, UNSPECIFIED SITE OF BREAST (HCC): ICD-10-CM

## 2024-11-13 RX ORDER — HYDROCODONE BITARTRATE AND ACETAMINOPHEN 5; 325 MG/1; MG/1
1 TABLET ORAL EVERY 4 HOURS PRN
Qty: 60 TABLET | Refills: 0 | Status: SHIPPED | OUTPATIENT
Start: 2024-11-13 | End: 2024-12-13

## 2024-11-20 ENCOUNTER — APPOINTMENT (OUTPATIENT)
Dept: HEMATOLOGY ONCOLOGY | Facility: MEDICAL CENTER | Age: 64
End: 2024-11-20
Payer: MEDICARE

## 2024-11-27 NOTE — TELEPHONE ENCOUNTER
Received request via: Pharmacy    Was the patient seen in the last year in this department? Yes    Does the patient have an active prescription (recently filled or refills available) for medication(s) requested? No    Pharmacy Name: [facility]     Does the patient have [facility] and need 100-day supply? (This applies to ALL medications) Patient does not have SCP

## 2024-12-03 RX ORDER — LOSARTAN POTASSIUM 100 MG/1
TABLET ORAL
Qty: 90 TABLET | Refills: 3 | Status: ON HOLD | OUTPATIENT
Start: 2024-12-03

## 2024-12-16 ENCOUNTER — APPOINTMENT (OUTPATIENT)
Dept: HEMATOLOGY ONCOLOGY | Facility: MEDICAL CENTER | Age: 64
End: 2024-12-16
Attending: NURSE PRACTITIONER
Payer: MEDICARE

## 2024-12-16 ENCOUNTER — TELEPHONE (OUTPATIENT)
Dept: HEMATOLOGY ONCOLOGY | Facility: MEDICAL CENTER | Age: 64
End: 2024-12-16
Payer: MEDICARE

## 2024-12-16 NOTE — TELEPHONE ENCOUNTER
Called ans spoke to pts daughter Jane . Pt stated that Asha is admitted to [facility]. Have rescheduled a follow up with daughter. Provider notified

## 2024-12-19 ENCOUNTER — TELEPHONE (OUTPATIENT)
Dept: HEMATOLOGY ONCOLOGY | Facility: MEDICAL CENTER | Age: 64
End: 2024-12-19
Payer: MEDICARE

## 2024-12-19 NOTE — TELEPHONE ENCOUNTER
Received a call from patient for Medical Oncology. She states she is going to be discharged from [facility] today but will probably be going back to [facility] ER because she is not ambulatory and can not get around. She states that the doctors at [facility] ordered MRI scans to be completed as well as other blood work. She is also requesting that I request all of her records from October 2024 to current as she has been admitted there 3 separate times. I let her know I would request her records.     She apologized for missing her appointment earlier this week with Cristiana CRESPO and I let her know she did not need to apologize. We found out she was admitted in the hospital and knew she would not be able to attend her appointment. She was thankful for our conversation and looks forward to her return to [facility].

## 2024-12-24 ENCOUNTER — APPOINTMENT (OUTPATIENT)
Dept: HEMATOLOGY ONCOLOGY | Facility: MEDICAL CENTER | Age: 64
End: 2024-12-24
Attending: NURSE PRACTITIONER
Payer: MEDICARE

## 2024-12-24 ENCOUNTER — TELEPHONE (OUTPATIENT)
Dept: HEMATOLOGY ONCOLOGY | Facility: MEDICAL CENTER | Age: 64
End: 2024-12-24
Payer: MEDICARE

## 2024-12-25 ENCOUNTER — APPOINTMENT (OUTPATIENT)
Dept: RADIOLOGY | Facility: MEDICAL CENTER | Age: 64
End: 2024-12-25
Attending: EMERGENCY MEDICINE
Payer: MEDICARE

## 2024-12-25 ENCOUNTER — HOSPITAL ENCOUNTER (INPATIENT)
Facility: MEDICAL CENTER | Age: 64
End: 2024-12-25
Attending: EMERGENCY MEDICINE | Admitting: FAMILY MEDICINE
Payer: MEDICARE

## 2024-12-25 DIAGNOSIS — R06.02 SHORTNESS OF BREATH: ICD-10-CM

## 2024-12-25 DIAGNOSIS — R94.5 ABNORMAL LIVER FUNCTION: ICD-10-CM

## 2024-12-25 DIAGNOSIS — R26.2 INABILITY TO WALK: ICD-10-CM

## 2024-12-25 DIAGNOSIS — H61.899 OTHER SPECIFIED DISORDERS OF EXTERNAL EAR, UNSPECIFIED EAR: ICD-10-CM

## 2024-12-25 DIAGNOSIS — E78.5 DYSLIPIDEMIA: ICD-10-CM

## 2024-12-25 DIAGNOSIS — E87.6 HYPOKALEMIA: ICD-10-CM

## 2024-12-25 DIAGNOSIS — C50.912 BILATERAL MALIGNANT NEOPLASM OF BREAST IN FEMALE, UNSPECIFIED ESTROGEN RECEPTOR STATUS, UNSPECIFIED SITE OF BREAST (HCC): ICD-10-CM

## 2024-12-25 DIAGNOSIS — N61.0 BREAST INFECTION: ICD-10-CM

## 2024-12-25 DIAGNOSIS — C50.919 PRIMARY MALIGNANT NEOPLASM OF FEMALE BREAST (HCC): ICD-10-CM

## 2024-12-25 DIAGNOSIS — R19.8 ABNORMAL BOWEL MOVEMENT: ICD-10-CM

## 2024-12-25 DIAGNOSIS — J96.22 ACUTE ON CHRONIC RESPIRATORY FAILURE WITH HYPOXIA AND HYPERCAPNIA (HCC): ICD-10-CM

## 2024-12-25 DIAGNOSIS — F32.A DEPRESSION, UNSPECIFIED DEPRESSION TYPE: ICD-10-CM

## 2024-12-25 DIAGNOSIS — Z63.9 FAMILY TENSION: ICD-10-CM

## 2024-12-25 DIAGNOSIS — J90 PLEURAL EFFUSION: ICD-10-CM

## 2024-12-25 DIAGNOSIS — C78.01 MALIGNANT NEOPLASM METASTATIC TO BOTH LUNGS (HCC): ICD-10-CM

## 2024-12-25 DIAGNOSIS — J90 BILATERAL PLEURAL EFFUSION: ICD-10-CM

## 2024-12-25 DIAGNOSIS — C50.912 MALIGNANT NEOPLASM OF LEFT FEMALE BREAST, UNSPECIFIED ESTROGEN RECEPTOR STATUS, UNSPECIFIED SITE OF BREAST (HCC): ICD-10-CM

## 2024-12-25 DIAGNOSIS — R29.898 WEAKNESS OF BOTH LOWER EXTREMITIES: ICD-10-CM

## 2024-12-25 DIAGNOSIS — D63.8 ANEMIA OF CHRONIC DISEASE: ICD-10-CM

## 2024-12-25 DIAGNOSIS — C78.02 MALIGNANT NEOPLASM METASTATIC TO BOTH LUNGS (HCC): ICD-10-CM

## 2024-12-25 DIAGNOSIS — Z71.89 GOALS OF CARE, COUNSELING/DISCUSSION: ICD-10-CM

## 2024-12-25 DIAGNOSIS — M54.41 ACUTE RIGHT-SIDED LOW BACK PAIN WITH RIGHT-SIDED SCIATICA: ICD-10-CM

## 2024-12-25 DIAGNOSIS — J96.21 ACUTE ON CHRONIC RESPIRATORY FAILURE WITH HYPOXIA AND HYPERCAPNIA (HCC): ICD-10-CM

## 2024-12-25 DIAGNOSIS — R06.83 SNORING: ICD-10-CM

## 2024-12-25 DIAGNOSIS — M80.08XA PATHOLOGICAL FRACTURE OF VERTEBRA DUE TO OSTEOPOROSIS, UNSPECIFIED OSTEOPOROSIS TYPE, INITIAL ENCOUNTER (HCC): ICD-10-CM

## 2024-12-25 DIAGNOSIS — E11.69 TYPE 2 DIABETES MELLITUS WITH OTHER SPECIFIED COMPLICATION, WITHOUT LONG-TERM CURRENT USE OF INSULIN (HCC): ICD-10-CM

## 2024-12-25 DIAGNOSIS — G89.3 CANCER RELATED PAIN: ICD-10-CM

## 2024-12-25 DIAGNOSIS — C79.49 MALIGNANT NEOPLASM METASTATIC TO EPIDURAL SPACE (HCC): ICD-10-CM

## 2024-12-25 DIAGNOSIS — G47.34 NOCTURNAL HYPOXIA: ICD-10-CM

## 2024-12-25 DIAGNOSIS — M54.16 LUMBAR RADICULOPATHY: ICD-10-CM

## 2024-12-25 DIAGNOSIS — C50.911 BILATERAL MALIGNANT NEOPLASM OF BREAST IN FEMALE, UNSPECIFIED ESTROGEN RECEPTOR STATUS, UNSPECIFIED SITE OF BREAST (HCC): ICD-10-CM

## 2024-12-25 DIAGNOSIS — J91.0 MALIGNANT PLEURAL EFFUSION: ICD-10-CM

## 2024-12-25 DIAGNOSIS — J98.11 ATELECTASIS: ICD-10-CM

## 2024-12-25 DIAGNOSIS — R53.1 WEAKNESS: ICD-10-CM

## 2024-12-25 DIAGNOSIS — Z17.0 ESTROGEN RECEPTOR POSITIVE STATUS (ER+): ICD-10-CM

## 2024-12-25 DIAGNOSIS — C79.51 METASTASIS TO BONE (HCC): ICD-10-CM

## 2024-12-25 DIAGNOSIS — Z59.9 HOUSING PROBLEMS: ICD-10-CM

## 2024-12-25 DIAGNOSIS — J30.2 SEASONAL ALLERGIES: ICD-10-CM

## 2024-12-25 DIAGNOSIS — R10.13 EPIGASTRIC PAIN: ICD-10-CM

## 2024-12-25 DIAGNOSIS — J95.811 POSTPROCEDURAL PNEUMOTHORAX: Primary | ICD-10-CM

## 2024-12-25 DIAGNOSIS — I10 PRIMARY HYPERTENSION: ICD-10-CM

## 2024-12-25 DIAGNOSIS — S22.000A COMPRESSION FRACTURE OF BODY OF THORACIC VERTEBRA (HCC): ICD-10-CM

## 2024-12-25 DIAGNOSIS — I51.7 CARDIOMEGALY: ICD-10-CM

## 2024-12-25 DIAGNOSIS — I95.81 HYPOTENSION AFTER PROCEDURE: ICD-10-CM

## 2024-12-25 DIAGNOSIS — C78.7 METASTASIS TO LIVER (HCC): ICD-10-CM

## 2024-12-25 DIAGNOSIS — J93.9 PNEUMOTHORAX, UNSPECIFIED TYPE: ICD-10-CM

## 2024-12-25 DIAGNOSIS — J96.02 ACUTE HYPERCAPNIC RESPIRATORY FAILURE (HCC): ICD-10-CM

## 2024-12-25 DIAGNOSIS — C80.1 MALIGNANCY (HCC): ICD-10-CM

## 2024-12-25 DIAGNOSIS — G47.33 MODERATE OBSTRUCTIVE SLEEP APNEA-HYPOPNEA SYNDROME: ICD-10-CM

## 2024-12-25 DIAGNOSIS — F41.9 ANXIETY: Chronic | ICD-10-CM

## 2024-12-25 DIAGNOSIS — G25.81 RESTLESS LEG SYNDROME: ICD-10-CM

## 2024-12-25 LAB
ALBUMIN SERPL BCP-MCNC: 3.9 G/DL (ref 3.2–4.9)
ALBUMIN/GLOB SERPL: 1.4 G/DL
ALP SERPL-CCNC: 84 U/L (ref 30–99)
ALT SERPL-CCNC: 49 U/L (ref 2–50)
ANION GAP SERPL CALC-SCNC: 13 MMOL/L (ref 7–16)
APPEARANCE UR: ABNORMAL
AST SERPL-CCNC: 139 U/L (ref 12–45)
BACTERIA #/AREA URNS HPF: ABNORMAL /HPF
BASOPHILS # BLD AUTO: 0.8 % (ref 0–1.8)
BASOPHILS # BLD: 0.04 K/UL (ref 0–0.12)
BILIRUB SERPL-MCNC: 0.5 MG/DL (ref 0.1–1.5)
BILIRUB UR QL STRIP.AUTO: NEGATIVE
BUN SERPL-MCNC: 9 MG/DL (ref 8–22)
CALCIUM ALBUM COR SERPL-MCNC: 9.5 MG/DL (ref 8.5–10.5)
CALCIUM SERPL-MCNC: 9.4 MG/DL (ref 8.5–10.5)
CASTS URNS QL MICRO: ABNORMAL /LPF (ref 0–2)
CHLORIDE SERPL-SCNC: 99 MMOL/L (ref 96–112)
CO2 SERPL-SCNC: 25 MMOL/L (ref 20–33)
COLOR UR: YELLOW
CREAT SERPL-MCNC: 0.65 MG/DL (ref 0.5–1.4)
EKG IMPRESSION: NORMAL
EOSINOPHIL # BLD AUTO: 0.08 K/UL (ref 0–0.51)
EOSINOPHIL NFR BLD: 1.6 % (ref 0–6.9)
EPITHELIAL CELLS 1715: ABNORMAL /HPF (ref 0–5)
ERYTHROCYTE [DISTWIDTH] IN BLOOD BY AUTOMATED COUNT: 51.8 FL (ref 35.9–50)
GFR SERPLBLD CREATININE-BSD FMLA CKD-EPI: 97 ML/MIN/1.73 M 2
GLOBULIN SER CALC-MCNC: 2.8 G/DL (ref 1.9–3.5)
GLUCOSE SERPL-MCNC: 143 MG/DL (ref 65–99)
GLUCOSE UR STRIP.AUTO-MCNC: NEGATIVE MG/DL
HCT VFR BLD AUTO: 36.5 % (ref 37–47)
HGB BLD-MCNC: 11.9 G/DL (ref 12–16)
IMM GRANULOCYTES # BLD AUTO: 0.01 K/UL (ref 0–0.11)
IMM GRANULOCYTES NFR BLD AUTO: 0.2 % (ref 0–0.9)
KETONES UR STRIP.AUTO-MCNC: ABNORMAL MG/DL
LEUKOCYTE ESTERASE UR QL STRIP.AUTO: ABNORMAL
LIPASE SERPL-CCNC: 11 U/L (ref 11–82)
LYMPHOCYTES # BLD AUTO: 0.81 K/UL (ref 1–4.8)
LYMPHOCYTES NFR BLD: 15.9 % (ref 22–41)
MCH RBC QN AUTO: 27.7 PG (ref 27–33)
MCHC RBC AUTO-ENTMCNC: 32.6 G/DL (ref 32.2–35.5)
MCV RBC AUTO: 85.1 FL (ref 81.4–97.8)
MICRO URNS: ABNORMAL
MONOCYTES # BLD AUTO: 0.47 K/UL (ref 0–0.85)
MONOCYTES NFR BLD AUTO: 9.3 % (ref 0–13.4)
NEUTROPHILS # BLD AUTO: 3.67 K/UL (ref 1.82–7.42)
NEUTROPHILS NFR BLD: 72.2 % (ref 44–72)
NITRITE UR QL STRIP.AUTO: NEGATIVE
NRBC # BLD AUTO: 0 K/UL
NRBC BLD-RTO: 0 /100 WBC (ref 0–0.2)
PH UR STRIP.AUTO: 5.5 [PH] (ref 5–8)
PLATELET # BLD AUTO: 250 K/UL (ref 164–446)
PMV BLD AUTO: 9.1 FL (ref 9–12.9)
POTASSIUM SERPL-SCNC: 3.6 MMOL/L (ref 3.6–5.5)
PROT SERPL-MCNC: 6.7 G/DL (ref 6–8.2)
PROT UR QL STRIP: 30 MG/DL
RBC # BLD AUTO: 4.29 M/UL (ref 4.2–5.4)
RBC # URNS HPF: ABNORMAL /HPF (ref 0–2)
RBC UR QL AUTO: ABNORMAL
SODIUM SERPL-SCNC: 137 MMOL/L (ref 135–145)
SP GR UR STRIP.AUTO: 1.01
TROPONIN T SERPL-MCNC: 21 NG/L (ref 6–19)
UROBILINOGEN UR STRIP.AUTO-MCNC: 1 EU/DL
WBC # BLD AUTO: 5.1 K/UL (ref 4.8–10.8)
WBC #/AREA URNS HPF: ABNORMAL /HPF

## 2024-12-25 PROCEDURE — 84484 ASSAY OF TROPONIN QUANT: CPT

## 2024-12-25 PROCEDURE — 36415 COLL VENOUS BLD VENIPUNCTURE: CPT

## 2024-12-25 PROCEDURE — 83690 ASSAY OF LIPASE: CPT

## 2024-12-25 PROCEDURE — 700111 HCHG RX REV CODE 636 W/ 250 OVERRIDE (IP): Mod: UD | Performed by: EMERGENCY MEDICINE

## 2024-12-25 PROCEDURE — 85025 COMPLETE CBC W/AUTO DIFF WBC: CPT

## 2024-12-25 PROCEDURE — 700111 HCHG RX REV CODE 636 W/ 250 OVERRIDE (IP): Mod: JZ,UD

## 2024-12-25 PROCEDURE — 80053 COMPREHEN METABOLIC PANEL: CPT

## 2024-12-25 PROCEDURE — 96375 TX/PRO/DX INJ NEW DRUG ADDON: CPT

## 2024-12-25 PROCEDURE — 96376 TX/PRO/DX INJ SAME DRUG ADON: CPT

## 2024-12-25 PROCEDURE — 770004 HCHG ROOM/CARE - ONCOLOGY PRIVATE *

## 2024-12-25 PROCEDURE — 81001 URINALYSIS AUTO W/SCOPE: CPT

## 2024-12-25 PROCEDURE — 700117 HCHG RX CONTRAST REV CODE 255: Mod: UD | Performed by: EMERGENCY MEDICINE

## 2024-12-25 PROCEDURE — 700111 HCHG RX REV CODE 636 W/ 250 OVERRIDE (IP): Mod: JZ,UD | Performed by: EMERGENCY MEDICINE

## 2024-12-25 PROCEDURE — 96374 THER/PROPH/DIAG INJ IV PUSH: CPT

## 2024-12-25 PROCEDURE — 71045 X-RAY EXAM CHEST 1 VIEW: CPT

## 2024-12-25 PROCEDURE — 93005 ELECTROCARDIOGRAM TRACING: CPT | Mod: TC | Performed by: EMERGENCY MEDICINE

## 2024-12-25 PROCEDURE — 99285 EMERGENCY DEPT VISIT HI MDM: CPT

## 2024-12-25 RX ORDER — CARVEDILOL 3.12 MG/1
3.12 TABLET ORAL 2 TIMES DAILY
COMMUNITY

## 2024-12-25 RX ORDER — PANTOPRAZOLE SODIUM 40 MG/10ML
40 INJECTION, POWDER, LYOPHILIZED, FOR SOLUTION INTRAVENOUS ONCE
Status: COMPLETED | OUTPATIENT
Start: 2024-12-25 | End: 2024-12-25

## 2024-12-25 RX ORDER — ACETAMINOPHEN 500 MG
1000 TABLET ORAL EVERY 6 HOURS
Status: DISCONTINUED | OUTPATIENT
Start: 2024-12-26 | End: 2024-12-26

## 2024-12-25 RX ORDER — METOCLOPRAMIDE HYDROCHLORIDE 5 MG/ML
10 INJECTION INTRAMUSCULAR; INTRAVENOUS ONCE
Status: COMPLETED | OUTPATIENT
Start: 2024-12-26 | End: 2024-12-26

## 2024-12-25 RX ORDER — ONDANSETRON 2 MG/ML
4 INJECTION INTRAMUSCULAR; INTRAVENOUS ONCE
Status: DISCONTINUED | OUTPATIENT
Start: 2024-12-25 | End: 2024-12-25

## 2024-12-25 RX ORDER — OXYCODONE HYDROCHLORIDE 10 MG/1
10 TABLET ORAL
Status: DISCONTINUED | OUTPATIENT
Start: 2024-12-25 | End: 2024-12-26

## 2024-12-25 RX ORDER — CALCITONIN SALMON 200 [IU]/.09ML
1 SPRAY, METERED NASAL DAILY
Status: DISCONTINUED | OUTPATIENT
Start: 2024-12-26 | End: 2025-01-02

## 2024-12-25 RX ORDER — HYDROCODONE BITARTRATE AND ACETAMINOPHEN 5; 325 MG/1; MG/1
1 TABLET ORAL EVERY 4 HOURS PRN
COMMUNITY

## 2024-12-25 RX ORDER — OXYCODONE HYDROCHLORIDE 5 MG/1
5 TABLET ORAL
Status: DISCONTINUED | OUTPATIENT
Start: 2024-12-25 | End: 2024-12-26

## 2024-12-25 RX ORDER — ACETAMINOPHEN 500 MG
1000 TABLET ORAL EVERY 6 HOURS PRN
Status: DISCONTINUED | OUTPATIENT
Start: 2024-12-31 | End: 2024-12-26

## 2024-12-25 RX ORDER — ROPINIROLE 1 MG/1
1 TABLET, FILM COATED ORAL ONCE
Status: COMPLETED | OUTPATIENT
Start: 2024-12-26 | End: 2024-12-26

## 2024-12-25 RX ORDER — HYDROMORPHONE HYDROCHLORIDE 1 MG/ML
0.5 INJECTION, SOLUTION INTRAMUSCULAR; INTRAVENOUS; SUBCUTANEOUS
Status: DISCONTINUED | OUTPATIENT
Start: 2024-12-25 | End: 2024-12-26

## 2024-12-25 RX ORDER — METOCLOPRAMIDE HYDROCHLORIDE 5 MG/ML
10 INJECTION INTRAMUSCULAR; INTRAVENOUS ONCE
Status: COMPLETED | OUTPATIENT
Start: 2024-12-25 | End: 2024-12-25

## 2024-12-25 RX ADMIN — METOCLOPRAMIDE HYDROCHLORIDE 10 MG: 5 INJECTION INTRAMUSCULAR; INTRAVENOUS at 18:39

## 2024-12-25 RX ADMIN — FENTANYL CITRATE 50 MCG: 50 INJECTION, SOLUTION INTRAMUSCULAR; INTRAVENOUS at 18:42

## 2024-12-25 RX ADMIN — PANTOPRAZOLE SODIUM 40 MG: 40 INJECTION, POWDER, FOR SOLUTION INTRAVENOUS at 21:28

## 2024-12-25 RX ADMIN — FENTANYL CITRATE 50 MCG: 50 INJECTION, SOLUTION INTRAMUSCULAR; INTRAVENOUS at 20:43

## 2024-12-25 RX ADMIN — IOHEXOL 100 ML: 350 INJECTION, SOLUTION INTRAVENOUS at 20:40

## 2024-12-25 RX ADMIN — FENTANYL CITRATE 50 MCG: 50 INJECTION, SOLUTION INTRAMUSCULAR; INTRAVENOUS at 23:57

## 2024-12-25 SDOH — ECONOMIC STABILITY - INCOME SECURITY: PROBLEM RELATED TO HOUSING AND ECONOMIC CIRCUMSTANCES, UNSPECIFIED: Z59.9

## 2024-12-25 SDOH — SOCIAL STABILITY - SOCIAL INSECURITY: PROBLEM RELATED TO PRIMARY SUPPORT GROUP, UNSPECIFIED: Z63.9

## 2024-12-25 ASSESSMENT — FIBROSIS 4 INDEX: FIB4 SCORE: 2.28

## 2024-12-26 PROBLEM — G25.81 RESTLESS LEG SYNDROME: Status: ACTIVE | Noted: 2024-12-26

## 2024-12-26 PROBLEM — S22.050A COMPRESSION FRACTURE OF T5 VERTEBRA (HCC): Status: ACTIVE | Noted: 2024-12-26

## 2024-12-26 PROBLEM — C50.919 BREAST CANCER METASTASIZED TO BONE, UNSPECIFIED LATERALITY (HCC): Status: RESOLVED | Noted: 2024-12-25 | Resolved: 2024-12-26

## 2024-12-26 PROBLEM — C79.51 BREAST CANCER METASTASIZED TO BONE, UNSPECIFIED LATERALITY (HCC): Status: RESOLVED | Noted: 2024-12-25 | Resolved: 2024-12-26

## 2024-12-26 PROBLEM — K59.00 CONSTIPATION: Status: ACTIVE | Noted: 2024-12-26

## 2024-12-26 PROBLEM — R53.1 WEAKNESS: Status: ACTIVE | Noted: 2024-12-26

## 2024-12-26 LAB
ALBUMIN SERPL BCP-MCNC: 3.7 G/DL (ref 3.2–4.9)
ALBUMIN/GLOB SERPL: 1.4 G/DL
ALP SERPL-CCNC: 78 U/L (ref 30–99)
ALT SERPL-CCNC: 51 U/L (ref 2–50)
ANION GAP SERPL CALC-SCNC: 13 MMOL/L (ref 7–16)
AST SERPL-CCNC: 136 U/L (ref 12–45)
BILIRUB SERPL-MCNC: 0.4 MG/DL (ref 0.1–1.5)
BUN SERPL-MCNC: 9 MG/DL (ref 8–22)
CALCIUM ALBUM COR SERPL-MCNC: 9.5 MG/DL (ref 8.5–10.5)
CALCIUM SERPL-MCNC: 9.3 MG/DL (ref 8.5–10.5)
CHLORIDE SERPL-SCNC: 100 MMOL/L (ref 96–112)
CO2 SERPL-SCNC: 26 MMOL/L (ref 20–33)
CREAT SERPL-MCNC: 0.71 MG/DL (ref 0.5–1.4)
ERYTHROCYTE [DISTWIDTH] IN BLOOD BY AUTOMATED COUNT: 53.8 FL (ref 35.9–50)
EST. AVERAGE GLUCOSE BLD GHB EST-MCNC: 137 MG/DL
FERRITIN SERPL-MCNC: 316 NG/ML (ref 10–291)
GFR SERPLBLD CREATININE-BSD FMLA CKD-EPI: 94 ML/MIN/1.73 M 2
GLOBULIN SER CALC-MCNC: 2.7 G/DL (ref 1.9–3.5)
GLUCOSE SERPL-MCNC: 128 MG/DL (ref 65–99)
HBA1C MFR BLD: 6.4 % (ref 4–5.6)
HCT VFR BLD AUTO: 35.9 % (ref 37–47)
HGB BLD-MCNC: 11.7 G/DL (ref 12–16)
IRON SATN MFR SERPL: 11 % (ref 15–55)
IRON SERPL-MCNC: 33 UG/DL (ref 40–170)
MCH RBC QN AUTO: 28.5 PG (ref 27–33)
MCHC RBC AUTO-ENTMCNC: 32.6 G/DL (ref 32.2–35.5)
MCV RBC AUTO: 87.6 FL (ref 81.4–97.8)
NT-PROBNP SERPL IA-MCNC: 267 PG/ML (ref 0–125)
PLATELET # BLD AUTO: 231 K/UL (ref 164–446)
PMV BLD AUTO: 8.8 FL (ref 9–12.9)
POTASSIUM SERPL-SCNC: 3.7 MMOL/L (ref 3.6–5.5)
PROCALCITONIN SERPL-MCNC: 0.08 NG/ML
PROT SERPL-MCNC: 6.4 G/DL (ref 6–8.2)
RBC # BLD AUTO: 4.1 M/UL (ref 4.2–5.4)
SODIUM SERPL-SCNC: 139 MMOL/L (ref 135–145)
TIBC SERPL-MCNC: 292 UG/DL (ref 250–450)
TROPONIN T SERPL-MCNC: 22 NG/L (ref 6–19)
TROPONIN T SERPL-MCNC: 25 NG/L (ref 6–19)
UIBC SERPL-MCNC: 259 UG/DL (ref 110–370)
WBC # BLD AUTO: 5.1 K/UL (ref 4.8–10.8)

## 2024-12-26 PROCEDURE — 83036 HEMOGLOBIN GLYCOSYLATED A1C: CPT

## 2024-12-26 PROCEDURE — 700111 HCHG RX REV CODE 636 W/ 250 OVERRIDE (IP): Mod: JZ

## 2024-12-26 PROCEDURE — A9270 NON-COVERED ITEM OR SERVICE: HCPCS

## 2024-12-26 PROCEDURE — 99231 SBSQ HOSP IP/OBS SF/LOW 25: CPT | Performed by: INTERNAL MEDICINE

## 2024-12-26 PROCEDURE — 99223 1ST HOSP IP/OBS HIGH 75: CPT | Performed by: NURSE PRACTITIONER

## 2024-12-26 PROCEDURE — 700111 HCHG RX REV CODE 636 W/ 250 OVERRIDE (IP): Performed by: EMERGENCY MEDICINE

## 2024-12-26 PROCEDURE — 700102 HCHG RX REV CODE 250 W/ 637 OVERRIDE(OP): Performed by: NURSE PRACTITIONER

## 2024-12-26 PROCEDURE — 83880 ASSAY OF NATRIURETIC PEPTIDE: CPT

## 2024-12-26 PROCEDURE — 700102 HCHG RX REV CODE 250 W/ 637 OVERRIDE(OP)

## 2024-12-26 PROCEDURE — 97602 WOUND(S) CARE NON-SELECTIVE: CPT

## 2024-12-26 PROCEDURE — 84145 PROCALCITONIN (PCT): CPT

## 2024-12-26 PROCEDURE — 83550 IRON BINDING TEST: CPT

## 2024-12-26 PROCEDURE — 700102 HCHG RX REV CODE 250 W/ 637 OVERRIDE(OP): Performed by: EMERGENCY MEDICINE

## 2024-12-26 PROCEDURE — 85027 COMPLETE CBC AUTOMATED: CPT

## 2024-12-26 PROCEDURE — 99233 SBSQ HOSP IP/OBS HIGH 50: CPT | Mod: GC | Performed by: STUDENT IN AN ORGANIZED HEALTH CARE EDUCATION/TRAINING PROGRAM

## 2024-12-26 PROCEDURE — 83540 ASSAY OF IRON: CPT

## 2024-12-26 PROCEDURE — 82728 ASSAY OF FERRITIN: CPT

## 2024-12-26 PROCEDURE — 770004 HCHG ROOM/CARE - ONCOLOGY PRIVATE *

## 2024-12-26 PROCEDURE — A9270 NON-COVERED ITEM OR SERVICE: HCPCS | Performed by: NURSE PRACTITIONER

## 2024-12-26 PROCEDURE — 36415 COLL VENOUS BLD VENIPUNCTURE: CPT

## 2024-12-26 PROCEDURE — 84484 ASSAY OF TROPONIN QUANT: CPT

## 2024-12-26 PROCEDURE — 700111 HCHG RX REV CODE 636 W/ 250 OVERRIDE (IP)

## 2024-12-26 PROCEDURE — 80053 COMPREHEN METABOLIC PANEL: CPT

## 2024-12-26 PROCEDURE — A9270 NON-COVERED ITEM OR SERVICE: HCPCS | Performed by: EMERGENCY MEDICINE

## 2024-12-26 PROCEDURE — 700101 HCHG RX REV CODE 250

## 2024-12-26 RX ORDER — ACETAMINOPHEN 325 MG/1
650 TABLET ORAL EVERY 4 HOURS PRN
Status: DISCONTINUED | OUTPATIENT
Start: 2024-12-26 | End: 2025-01-01

## 2024-12-26 RX ORDER — ROPINIROLE 1 MG/1
1 TABLET, FILM COATED ORAL
Status: DISCONTINUED | OUTPATIENT
Start: 2024-12-26 | End: 2025-01-04

## 2024-12-26 RX ORDER — AMOXICILLIN 250 MG
2 CAPSULE ORAL EVERY EVENING
Status: DISCONTINUED | OUTPATIENT
Start: 2024-12-26 | End: 2024-12-27

## 2024-12-26 RX ORDER — HEPARIN SODIUM 5000 [USP'U]/ML
5000 INJECTION, SOLUTION INTRAVENOUS; SUBCUTANEOUS EVERY 8 HOURS
Status: DISCONTINUED | OUTPATIENT
Start: 2024-12-26 | End: 2025-01-01

## 2024-12-26 RX ORDER — ONDANSETRON 2 MG/ML
4 INJECTION INTRAMUSCULAR; INTRAVENOUS EVERY 4 HOURS PRN
Status: DISCONTINUED | OUTPATIENT
Start: 2024-12-26 | End: 2025-01-26

## 2024-12-26 RX ORDER — LIDOCAINE 4 G/G
1 PATCH TOPICAL EVERY 24 HOURS
Status: DISCONTINUED | OUTPATIENT
Start: 2024-12-26 | End: 2025-01-07

## 2024-12-26 RX ORDER — ONDANSETRON 4 MG/1
4 TABLET, ORALLY DISINTEGRATING ORAL EVERY 4 HOURS PRN
Status: DISCONTINUED | OUTPATIENT
Start: 2024-12-26 | End: 2025-01-04

## 2024-12-26 RX ORDER — POLYETHYLENE GLYCOL 3350 17 G/17G
1 POWDER, FOR SOLUTION ORAL
Status: DISCONTINUED | OUTPATIENT
Start: 2024-12-26 | End: 2024-12-27

## 2024-12-26 RX ORDER — FUROSEMIDE 10 MG/ML
40 INJECTION INTRAMUSCULAR; INTRAVENOUS ONCE
Status: DISPENSED | OUTPATIENT
Start: 2024-12-26 | End: 2024-12-27

## 2024-12-26 RX ORDER — CARVEDILOL 3.12 MG/1
3.12 TABLET ORAL 2 TIMES DAILY
Status: DISCONTINUED | OUTPATIENT
Start: 2024-12-26 | End: 2025-01-07

## 2024-12-26 RX ORDER — METOCLOPRAMIDE 10 MG/1
10 TABLET ORAL 4 TIMES DAILY PRN
Status: DISCONTINUED | OUTPATIENT
Start: 2024-12-26 | End: 2024-12-27

## 2024-12-26 RX ORDER — FERROUS SULFATE 325(65) MG
325 TABLET ORAL
Status: DISCONTINUED | OUTPATIENT
Start: 2024-12-27 | End: 2025-01-04

## 2024-12-26 RX ORDER — LOSARTAN POTASSIUM 50 MG/1
100 TABLET ORAL
Status: DISCONTINUED | OUTPATIENT
Start: 2024-12-26 | End: 2025-01-07

## 2024-12-26 RX ORDER — LETROZOLE 2.5 MG/1
2.5 TABLET, FILM COATED ORAL DAILY
Status: DISCONTINUED | OUTPATIENT
Start: 2024-12-26 | End: 2024-12-26

## 2024-12-26 RX ORDER — HYDROCODONE BITARTRATE AND ACETAMINOPHEN 5; 325 MG/1; MG/1
1 TABLET ORAL EVERY 6 HOURS PRN
Status: DISCONTINUED | OUTPATIENT
Start: 2024-12-26 | End: 2025-01-01

## 2024-12-26 RX ORDER — ROPINIROLE 0.25 MG/1
0.25 TABLET, FILM COATED ORAL
Status: DISCONTINUED | OUTPATIENT
Start: 2024-12-26 | End: 2024-12-26

## 2024-12-26 RX ORDER — HYDROCODONE BITARTRATE AND ACETAMINOPHEN 5; 325 MG/1; MG/1
2 TABLET ORAL EVERY 6 HOURS PRN
Status: DISCONTINUED | OUTPATIENT
Start: 2024-12-26 | End: 2025-01-01

## 2024-12-26 RX ADMIN — METOCLOPRAMIDE 10 MG: 5 INJECTION, SOLUTION INTRAMUSCULAR; INTRAVENOUS at 01:40

## 2024-12-26 RX ADMIN — CARVEDILOL 3.12 MG: 3.12 TABLET, FILM COATED ORAL at 05:53

## 2024-12-26 RX ADMIN — ROPINIROLE HYDROCHLORIDE 1 MG: 1 TABLET, FILM COATED ORAL at 20:27

## 2024-12-26 RX ADMIN — HYDROCODONE BITARTRATE AND ACETAMINOPHEN 1 TABLET: 5; 325 TABLET ORAL at 14:40

## 2024-12-26 RX ADMIN — LIDOCAINE 1 PATCH: 4 PATCH TOPICAL at 10:17

## 2024-12-26 RX ADMIN — HEPARIN SODIUM 5000 UNITS: 5000 INJECTION, SOLUTION INTRAVENOUS; SUBCUTANEOUS at 05:50

## 2024-12-26 RX ADMIN — POLYETHYLENE GLYCOL 3350 1 PACKET: 17 POWDER, FOR SOLUTION ORAL at 14:40

## 2024-12-26 RX ADMIN — CARVEDILOL 3.12 MG: 3.12 TABLET, FILM COATED ORAL at 16:54

## 2024-12-26 RX ADMIN — FENTANYL CITRATE 25 MCG: 50 INJECTION, SOLUTION INTRAMUSCULAR; INTRAVENOUS at 05:48

## 2024-12-26 RX ADMIN — FENTANYL CITRATE 25 MCG: 50 INJECTION, SOLUTION INTRAMUSCULAR; INTRAVENOUS at 23:09

## 2024-12-26 RX ADMIN — ACETAMINOPHEN 650 MG: 325 TABLET ORAL at 12:14

## 2024-12-26 RX ADMIN — ROPINIROLE HYDROCHLORIDE 1 MG: 1 TABLET, FILM COATED ORAL at 00:23

## 2024-12-26 RX ADMIN — HYDROCODONE BITARTRATE AND ACETAMINOPHEN 1 TABLET: 5; 325 TABLET ORAL at 20:56

## 2024-12-26 RX ADMIN — HYDROCODONE BITARTRATE AND ACETAMINOPHEN 1 TABLET: 5; 325 TABLET ORAL at 04:31

## 2024-12-26 RX ADMIN — METOCLOPRAMIDE 10 MG: 10 TABLET ORAL at 23:09

## 2024-12-26 RX ADMIN — LOSARTAN POTASSIUM 100 MG: 50 TABLET, FILM COATED ORAL at 05:52

## 2024-12-26 RX ADMIN — SENNOSIDES AND DOCUSATE SODIUM 1 TABLET: 50; 8.6 TABLET ORAL at 17:30

## 2024-12-26 ASSESSMENT — COGNITIVE AND FUNCTIONAL STATUS - GENERAL
DRESSING REGULAR UPPER BODY CLOTHING: A LITTLE
SUGGESTED CMS G CODE MODIFIER DAILY ACTIVITY: CK
STANDING UP FROM CHAIR USING ARMS: A LOT
MOVING TO AND FROM BED TO CHAIR: A LITTLE
MOBILITY SCORE: 16
DAILY ACTIVITIY SCORE: 19
WALKING IN HOSPITAL ROOM: A LOT
DRESSING REGULAR LOWER BODY CLOTHING: A LITTLE
TOILETING: A LOT
SUGGESTED CMS G CODE MODIFIER MOBILITY: CK
CLIMB 3 TO 5 STEPS WITH RAILING: A LOT
MOVING FROM LYING ON BACK TO SITTING ON SIDE OF FLAT BED: A LITTLE
HELP NEEDED FOR BATHING: A LITTLE

## 2024-12-26 ASSESSMENT — LIFESTYLE VARIABLES
EVER FELT BAD OR GUILTY ABOUT YOUR DRINKING: NO
HAVE YOU EVER FELT YOU SHOULD CUT DOWN ON YOUR DRINKING: NO
AVERAGE NUMBER OF DAYS PER WEEK YOU HAVE A DRINK CONTAINING ALCOHOL: 0
TOTAL SCORE: 0
TOTAL SCORE: 0
HAVE PEOPLE ANNOYED YOU BY CRITICIZING YOUR DRINKING: NO
DOES PATIENT WANT TO STOP DRINKING: NO
ON A TYPICAL DAY WHEN YOU DRINK ALCOHOL HOW MANY DRINKS DO YOU HAVE: 0
CONSUMPTION TOTAL: NEGATIVE
TOTAL SCORE: 0
HOW MANY TIMES IN THE PAST YEAR HAVE YOU HAD 5 OR MORE DRINKS IN A DAY: 0
EVER HAD A DRINK FIRST THING IN THE MORNING TO STEADY YOUR NERVES TO GET RID OF A HANGOVER: NO
ALCOHOL_USE: NO

## 2024-12-26 ASSESSMENT — PAIN DESCRIPTION - PAIN TYPE
TYPE: ACUTE PAIN

## 2024-12-26 ASSESSMENT — PATIENT HEALTH QUESTIONNAIRE - PHQ9
SUM OF ALL RESPONSES TO PHQ9 QUESTIONS 1 AND 2: 0
1. LITTLE INTEREST OR PLEASURE IN DOING THINGS: NOT AT ALL
2. FEELING DOWN, DEPRESSED, IRRITABLE, OR HOPELESS: NOT AT ALL

## 2024-12-26 ASSESSMENT — SOCIAL DETERMINANTS OF HEALTH (SDOH)
WITHIN THE LAST YEAR, HAVE YOU BEEN AFRAID OF YOUR PARTNER OR EX-PARTNER?: PATIENT DECLINED
WITHIN THE LAST YEAR, HAVE YOU BEEN HUMILIATED OR EMOTIONALLY ABUSED IN OTHER WAYS BY YOUR PARTNER OR EX-PARTNER?: PATIENT DECLINED
WITHIN THE LAST YEAR, HAVE YOU BEEN KICKED, HIT, SLAPPED, OR OTHERWISE PHYSICALLY HURT BY YOUR PARTNER OR EX-PARTNER?: PATIENT DECLINED
WITHIN THE PAST 12 MONTHS, YOU WORRIED THAT YOUR FOOD WOULD RUN OUT BEFORE YOU GOT THE MONEY TO BUY MORE: PATIENT DECLINED
IN THE PAST 12 MONTHS, HAS THE ELECTRIC, GAS, OIL, OR WATER COMPANY THREATENED TO SHUT OFF SERVICE IN YOUR HOME?: PATIENT DECLINED
WITHIN THE LAST YEAR, HAVE TO BEEN RAPED OR FORCED TO HAVE ANY KIND OF SEXUAL ACTIVITY BY YOUR PARTNER OR EX-PARTNER?: PATIENT DECLINED
WITHIN THE PAST 12 MONTHS, THE FOOD YOU BOUGHT JUST DIDN'T LAST AND YOU DIDN'T HAVE MONEY TO GET MORE: PATIENT DECLINED

## 2024-12-26 ASSESSMENT — FIBROSIS 4 INDEX: FIB4 SCORE: 5.16

## 2024-12-26 NOTE — PROGRESS NOTES
Pt refussed MRI and Lasixs.   Pt says she is unable to lay flat due to the fluids in her lungs. Pt say that the ER doctor says she would need a thoracentesis before the MRI.     Pt as well refused lasix. Pt says that they don't help and only make here pee. Pt says she will need to talk to a doctor about that. Pt says she prefers a thoracentesis.     This RN educated to the pt on laxi is a less invasive method of treating pleural effusions.

## 2024-12-26 NOTE — PROGRESS NOTES
FAMILY MEDICINE PROGRESS NOTE          PATIENT ID:  NAME:  Asha Hannon  MRN:               [id number]  YOB: 1960    Date of Admission: 12/25/2024     Attending: Americo Ly M.d.     Resident: Yosi Espino M.D.    Primary Care Physician:  Elvis Russell M.D.      SUBJECTIVE:   No acute events overnight, patient reports that her weakness is what brought her into the hospital.  She states that she is short of breath not worse than yesterday.  Reports that diuresis was ineffective for her pleural effusion last time and she got it drained at a subsequent hospitalization.  Patient reports she cannot tolerate MRI due to the breathing and pain without medication.  Denies fever, chills, nausea, vomiting, abdominal pain, diarrhea.  She is constipated.    OBJECTIVE:  Temp:  [36.1 °C (97 °F)-36.6 °C (97.8 °F)] 36.5 °C (97.7 °F)  Pulse:  [63-78] 73  Resp:  [14-20] 20  BP: (122-205)/(58-98) 140/70  SpO2:  [89 %-100 %] 96 %    No intake or output data in the 24 hours ending 12/26/24 0548    PHYSICAL EXAM:  Physical Exam  Constitutional:       General: She is not in acute distress.     Appearance: Normal appearance. She is not toxic-appearing or diaphoretic.   HENT:      Head: Normocephalic and atraumatic.      Right Ear: External ear normal. There is no impacted cerumen.      Nose: Nose normal. No congestion.      Mouth/Throat:      Mouth: Mucous membranes are moist.      Pharynx: No oropharyngeal exudate.   Eyes:      General:         Right eye: No discharge.         Left eye: No discharge.      Extraocular Movements: Extraocular movements intact.   Cardiovascular:      Rate and Rhythm: Normal rate and regular rhythm.      Pulses: Normal pulses.      Heart sounds: No murmur heard.  Pulmonary:      Breath sounds: No wheezing, rhonchi or rales.      Comments: Decreased lung sounds at base R>L  Abdominal:      General: Abdomen is flat. There is no distension.      Palpations: Abdomen is soft.       Tenderness: There is no abdominal tenderness.   Musculoskeletal:         General: Tenderness present. Normal range of motion.      Cervical back: Normal range of motion. No rigidity.      Right lower leg: Edema present.      Left lower leg: Edema present.      Comments: 5 out of 5 strength in upper and lower extremities   Neurological:      General: No focal deficit present.      Mental Status: She is alert and oriented to person, place, and time.      Cranial Nerves: No cranial nerve deficit.      Motor: No weakness.      Comments: 2+ Achilles reflex.  Sensation intact   Psychiatric:         Mood and Affect: Mood normal.         Behavior: Behavior normal.         LABS:  Reviewed    IMAGING:  Reviewed    CULTURES:   Reviewed    MEDS:  Reviewed    ASSESSMENT/PLAN:  64 y.o. female admitted for compression fracture of vertebrae  * Malignant neoplasm of breast (HCC)- (present on admission)  Assessment & Plan  Extensive metastatic disease to lungs, liver, and bones. Followed by Dr. Tabor, Oncology, and Dr. Velez, Rad Onc. Pt has declined surgical management in the past, currently on oral chemotherapy. Large fungating mass of the left breast, followed by wound care.   - Admit to med/onc floor  - Consulting Dr. Tabor for chemo recommendations   - Lidocaine patch and hydrocodone for pain management while inpatient. Cannot do fentanyl on floor   - Pain regimen, pharmacy consult  - Discussing GOC  - Wound care consulted for fungating left breast mass     Restless leg syndrome  Assessment & Plan  Patient reports a history of restless leg syndrome well-controlled on ropinirole although she does not know the dosage.  - Start ropinirole 0.25 mg nightly and will titrate up  - Iron studies    Constipation  Assessment & Plan  Chronic.  Patient reports it is worse with her chronic opioid use.  - Bowel regimen ordered, will titrate to 1-2 soft stools daily    Weakness- (present on admission)  Assessment & Plan  Pt  reporting progressive weakness since discharge in September, now becoming weak even with ambulation to the restroom. Not requiring any assistive devices. Pt requesting bedside commode due to difficulty with frequent ambulation to restroom  - PT/OT consults    Compression fracture of T5 vertebra (HCC)- (present on admission)  Assessment & Plan  Noted on CT imaging, pathologic fracture secondary to cancer.Neurosurgery, consulted appreciate recommendations.  - full spine MRI  - Pain regimen ordered, pharmacy consult  - Calcitonin for compression-fracture-related pain control  - Follow neurosurgery recs    Cancer related pain- (present on admission)  Assessment & Plan  Pain regimen ordered, malignant neoplasm of breast for plan.     Bilateral pleural effusion- (present on admission)  Assessment & Plan  Bilateral moderate sized pleural effusions seen on admission CT. Hx of same on prior admission in September 2024. At that time, IR was consulted for thoracentesis and this could not be completed due to loculations. Pulmonology was then consulted, and they noted her hypoxia may be in part due to shunting from her liver mass.  Patient reports subsequently she went to [facility] who performed thoracentesis on the right.  Patient reports thoracentesis was consistent with malignant effusion though records not available.  Pt on 2 L NC at discharge in 09/2024, requiring 3 L NC to maintain saturations on admission.  - Continue O2 supplementation, goal sats >90%.   - IR consult thoracentesis   - Trial CORDELIA garg labs   -Obtaining [facility] outside records    Acute on chronic hypoxic respiratory failure (HCC)- (present on admission)  Assessment & Plan  Likely related to bilateral pleural effusion. In addition, see by pulmonology at last admission and thought to be component of hepatic shunting. Patient with new bilateral upper lobe infiltrates on CTAP. Without clinical pneumonia.   -Procal ordered  -Trending troponin,  low threshold for CT PE   -CTM, low threshold treatment for CAP     Hypertension- (present on admission)  Assessment & Plan  Continue home carvedilol, losartan    Diabetes mellitus, type 2 (HCC)- (present on admission)  Assessment & Plan  Diet controlled. Last A1c 7.1 in September 2024.  - Trend blood glucose on chem panel         Core Measures:  Fluids: PO  Lines: PIV  Abx: None  Diet: diabetic  PPX: SCDs/TEDs and heparin ppx    CODE Status: Full Code      Disposition  Patient is not medically cleared for discharge.   Anticipate discharge to to home with close outpatient follow-up.  I have placed the appropriate orders for post-discharge needs.      Yosi Espino M.D.

## 2024-12-26 NOTE — ED NOTES
Med rec updated  and complete. Allergies reviewed.      Interviewed family ( daughter) at bedside.    No anticoagulant or antiplatelet medications.  No outpatient antibiotic use in last 30 days.    Preferred pharmacy   [facility] = [phone]    Abemaciclib is [facility] [phone]

## 2024-12-26 NOTE — ASSESSMENT & PLAN NOTE
Extensive metastatic disease to lungs, liver, and bones. Followed by Dr. Tabor, Oncology, and Dr. Velez, Rad Onc. Pt has declined surgical management in the past, currently on oral chemotherapy. Large fungating mass of the left breast. Wound care has signed off. Dr. Tabor consulted, recommends patient follow in 1 week for discussion of different cancer treatment regimen, possible further radiation if recommended by Rad Onc.  - Full spine/brain MRI, completed 12/31, will follow results    - Wound care consulted for fungating left breast mass, signed off  - Lidocaine patch and hydrocodone for pain management while inpatient with breakthrough fentanyl, will adjust regimen prior to discharge  - Palliative care consulted, patient deferring GOC discussion until MRI has returned.   - Follow up with Dr. Tabor in 1 week following discharge

## 2024-12-26 NOTE — ASSESSMENT & PLAN NOTE
Noted on CT imaging, pathologic fracture secondary to cancer. Neurosurgery, consulted appreciate recommendations.  - full spine/brain MRI completed 12/31, will follow up results  - Pain regimen ordered, pharmacy consult  - Calcitonin for compression-fracture-related pain control  - Follow neurosurgery recs  - Per oncology would likely proceed with radiation pending what imaging shows

## 2024-12-26 NOTE — ED TRIAGE NOTES
"Chief Complaint   Patient presents with    Shortness of Breath     Pt c/o shortness of breath over the past couple of days. Denies new fever.  Pt wears 1.5L NC at baseline.  Pt has hx breast cancer with mets to the lungs. Currently taking PO chemotherapy pills.     Extremity Weakness     Pt states she has progressively been feeling weaker over the past couple of days to that point where she cannot ambulate.     Abdominal Pain     Pt also c/o epigastric pain and nausea.      /58   Pulse 68   Temp 36.3 °C (97.3 °F) (Temporal)   Resp 15   Ht 1.727 m (5' 8\")   Wt 106 kg (234 lb)   LMP 03/05/2013   SpO2 93%   BMI 35.58 kg/m²     Pt GCS 15.  Pt connected to monitor. Abdomina pain and EKG protocol ordered.   "

## 2024-12-26 NOTE — ED PROVIDER NOTES
ED Provider Note    CHIEF COMPLAINT  Chief Complaint   Patient presents with    Shortness of Breath     Pt c/o shortness of breath over the past couple of days. Denies new fever.  Pt wears 1.5L NC at baseline.  Pt has hx breast cancer with mets to the lungs. Currently taking PO chemotherapy pills.     Extremity Weakness     Pt states she has progressively been feeling weaker over the past couple of days to that point where she cannot ambulate.     Abdominal Pain     Pt also c/o epigastric pain and nausea.      EXTERNAL RECORDS REVIEWED  Outpatient notes from [facility].  She has been seen most recently on 11/5.  Patient has had issues with recurrent pleural effusions, known metastatic cells in the samples, has required increasing oxygen needs, has been having worsening chronic chest wall pain.  Patient notes that she had met with palliative care and advised that her oncologist usually likes to control the patient's pain regiment and she is concerned that her pain regimen is not strong enough.  That time she was not having any fevers, chills or nausea or vomiting.    known hx of stage IV breast Ca initially dx in 2022 ( ER positive greater than 90%, UT +10%, Ki-67 less than 5%, HER2 2+ IHC FISH negative) with mets to liver, lungs ad  bones.   Based on review on oncology notes pt opted for no surgery  She has a fungating mass of the left breat and also type II DM.    Most recent oncology note from October shows that they had a vikash discussion regarding acceleration of her cancer burden, thoughts of starting IV chemotherapy or switching to other medications.  She was somewhat reticent to this and the plan was for a 3-week follow-up.  Dr. Tabor    HPI/ROS  LIMITATION TO HISTORY   Select: : None  OUTSIDE HISTORIAN(S):  Daughter at bedside    Asha Hannon is a 64 y.o. female who presents to the emergency room for evaluation of ongoing worsening pain discomfort and lower extremity weakness.   Patient states that she has been on chemotherapy for some time, had switch medications at the end of October and into November with some interruptions secondary to admissions.  Since finishing radiation she has had substantial amounts of debilitation and feels like not only as her respiratory status getting slowly worse which has been attributed to intermittent effusions in addition to liver mass with apparent flow.  She is having worsening amounts of upper back pain and over the last 6 days reports that she is unable to get to her follow-up appointments as anytime she tries to stand she has had profound weakness and discomfort throughout her upper legs that causes her legs to buckle.  She has not been able to get to her car, this had gone on until she could not take it anymore and with her pain medications she was still having breakthrough pain and discomfort and came in for further evaluation.  She is denying any fevers, denying any bowel incontinence, no bladder incontinence, no pelvic numbness.  She has not had any back procedures.    PAST MEDICAL HISTORY   has a past medical history of Anxiety, Depression, Diabetes (HCC), HLD (hyperlipidemia), HTN (hypertension), and Hypothyroidism.    SURGICAL HISTORY   has a past surgical history that includes other and cholecystectomy.    FAMILY HISTORY  Family History   Problem Relation Age of Onset    Lung Cancer Mother     Uterine cancer Sister     Cancer Maternal Grandfather     Stomach Cancer Paternal Grandmother        SOCIAL HISTORY  Social History     Tobacco Use    Smoking status: Never    Smokeless tobacco: Never   Vaping Use    Vaping status: Never Used   Substance and Sexual Activity    Alcohol use: Yes     Comment: occas    Drug use: Yes     Types: Marijuana     Comment: CBD/THC gummies    Sexual activity: Not on file       CURRENT MEDICATIONS  Home Medications       Reviewed by Veronika Taylor R.N. (Registered Nurse) on 12/25/24 at 1703  Med List Status:  "<None>     Medication Last Dose Status   Abemaciclib 150 MG Tab  Active   acetaminophen (TYLENOL) 325 MG Tab  Active   Alcohol Swabs  Active   amLODIPine (NORVASC) 5 MG Tab  Active   aspirin (ASA) 81 MG Chew Tab chewable tablet  Active   Blood Glucose Meter Kit  Active   Blood Glucose Test Strips  Active   cyclobenzaprine (FLEXERIL) 5 mg tablet  Active   EPINEPHrine (EPIPEN) 0.3 MG/0.3ML Solution Auto-injector solution for injection  Active   furosemide (LASIX) 20 MG Tab  Active   ibuprofen (MOTRIN) 200 MG Tab  Active   Lancets  Active   letrozole (FEMARA) 2.5 MG Tab  Active   lidocaine (ASPERFLEX) 4 % Patch  Active   losartan (COZAAR) 100 MG Tab  Active   Multiple Vitamin (MULTIVITAMIN ADULT PO)  Active                  Audit from Redirected Encounters    **Home medications have not yet been reviewed for this encounter**         ALLERGIES  Allergies   Allergen Reactions    Demerol Rash     \"was told this during surgery\"  Other reaction(s): rash       PHYSICAL EXAM  VITAL SIGNS: /58   Pulse 68   Temp 36.3 °C (97.3 °F) (Temporal)   Resp 15   Ht 1.727 m (5' 8\")   Wt 106 kg (234 lb)   LMP 03/05/2013   SpO2 93%   BMI 35.58 kg/m²    Genl: F sitting in chair comfortably, speaking clearly, tearful, appears in mild distress   Head: NC/AT   Pulmonary: Oxygen dependent, lungs are clear to auscultation bilaterally, no wheezes  Chest: No TTP  CV:  RRR, no murmur appreciated  Back: T5 tenderness along the midline, nonspecific.  Lumbar discomfort, no midline tenderness in the lower thoracic or lumbar region.  No sacral tenderness  Abdomen: soft, NT/ND; no rebound/guarding, no masses palpated, no HSM   : no CVA or suprapubic tenderness   Musculoskeletal: Pain free ROM of the neck. Moving upper and lower extremities in spontaneous and coordinated fashion  Neuro: A&Ox4 (person, place, time, situation), speech fluent, gait Unable to assess as the patient has difficulty bearing weight on her legs however in the bed she " can elevate and extend hips and knees and ankles. no focal deficits appreciated, No cerebellar signs. Sensation is grossly intact in the distal upper and lower extremities.    Psych: Patient has an appropriate affect and behavior  Skin: No rash or lesions.  No pallor or jaundice.  No cyanosis.  Warm and dry.  Like it was    EKG/LABS  Labs Reviewed   CBC WITH DIFFERENTIAL - Abnormal; Notable for the following components:       Result Value    Hemoglobin 11.9 (*)     Hematocrit 36.5 (*)     RDW 51.8 (*)     Neutrophils-Polys 72.20 (*)     Lymphocytes 15.90 (*)     Lymphs (Absolute) 0.81 (*)     All other components within normal limits   COMP METABOLIC PANEL - Abnormal; Notable for the following components:    Glucose 143 (*)     AST(SGOT) 139 (*)     All other components within normal limits   TROPONIN - Abnormal; Notable for the following components:    Troponin T 21 (*)     All other components within normal limits   LIPASE   ESTIMATED GFR   URINALYSIS     RADIOLOGY/PROCEDURES   I have independently interpreted the diagnostic imaging associated with this visit and am waiting the final reading from the radiologist.   My preliminary interpretation is as follows: Stable bilateral pleural effusions and stable cardiomegaly  Radiologist interpretation:  DX-CHEST-PORTABLE (1 VIEW)   Final Result      1.  Stable bibasilar atelectasis/consolidation and small bilateral pleural effusions.      2.  Stable cardiomegaly and interstitial edema.      CT-CHEST,ABDOMEN,PELVIS WITH    (Results Pending)   CT-TSPINE W/O PLUS RECONS    (Results Pending)   CT-LSPINE W/O PLUS RECONS    (Results Pending)     COURSE & MEDICAL DECISION MAKING    ASSESSMENT, COURSE AND PLAN  Care Narrative: Patient presents emergency room for symptoms as described above.  The patient is alert, oriented and has a complex past medical history with known metastatic disease process, multiple areas of involvement including a large liver mass that has caused pseudo  heart failure symptomology and ongoing back discomfort.  Patient is supposed to be on several medications but with her debilitation's over the last several months she has not fully started up this chemotherapy regimen.  She now presents primarily with weakness that is only when attempts to ambulate.  She is not having bowel or bladder incontinence, she is able to move her extremities while sitting in the bed, she has some nonspecific upper abdominal discomfort and some shortness of breath that she finds difficult to fully discern if it is worse or better.  She has significant penumbra and overall likely will require admission as she is unable to walk at the bedside during my assessment.  Concern would be for pathological fractures, spinal metastases and other ongoing symptomology.  She has not had a headache, neck pain and has no evidence of trauma.  Her chest wall does not show other evidence of progressive infectious etiology from her prior breast cancer sites.    Lab work shows stable anemia, no gross electrolyte abnormalities or VERONICA, she has a troponin that is detectable with no active chest pain, no ischemic EKG changes.  Urinalysis came back with some leukocyte esterase and small amount of blood though there is contamination noted and she has been asymptomatic.    Plan at the time of signout is for the patient to be receiving CT imaging here to rule out other pathological fractures.  Patient will require admission to the hospital for MRIs of the T and L-spine she is unable to walk and has known metastatic disease process.  Oncoming physician will follow-up on the CTs to make sure there is no other emergent neurosurgical or spinal interventions needed, will admit to family medicine.    FINAL DIAGNOSIS  1. Shortness of breath    2. Malignancy (HCC)    3. Inability to walk    4. Weakness of both lower extremities    5. Bilateral malignant neoplasm of breast in female, unspecified estrogen receptor status,  unspecified site of breast (HCC)      Electronically signed by: Shaheed Franco M.D., 12/25/2024 5:24 PM

## 2024-12-26 NOTE — H&P
FAMILY MEDICINE HISTORY AND PHYSICAL       PATIENT ID:  NAME:  Asha Hannon  MRN:               [id number]  YOB: 1960    Date of Admission: 12/25/2024     Attending: Mark Butts MD    Resident: Abdulaziz Nieto M.D. (PGY-2)    Primary Care Physician:  Elvis Russell M.D.    CC:    Chief Complaint   Patient presents with    Shortness of Breath     Pt c/o shortness of breath over the past couple of days. Denies new fever.  Pt wears 1.5L NC at baseline.  Pt has hx breast cancer with mets to the lungs. Currently taking PO chemotherapy pills.     Extremity Weakness     Pt states she has progressively been feeling weaker over the past couple of days to that point where she cannot ambulate.     Abdominal Pain     Pt also c/o epigastric pain and nausea.        HPI: Asha Hannon is a 64 y.o. female with history of metastatic breast cancer who presented with worsening weakness, SOB, and abdominal pain for the past several weeks. She is very sleepy during our discussion and provides only brief answers to questioning. She reports that since her last discharge from the hospital, she has had progressively worsening weakness and difficulty ambulating at home, which prompted her to seek additional care today. In addition, for the last several days she has had increasing shortness of breath at home, with some associated upper abdominal pain and nausea without vomiting. She also reports some pain in her mid/lower back that does not radiate. She denies any chest pains, no diarrhea, no bladder/bowel symptoms, no extremity pains. Though she reports weakness, she has been able to ambulate independently at home; she is just becoming very easily fatigued even with mild exertion.    The patient has a history of known breast cancer with metastasis to lungs and bones. She is followed by Dr. Tabor, Oncology. It appears she has declined multiple surgical interventions for her cancer in the past and  has been too weak for aggressive chemotherapy more recently. She has recently completed a course of radiation therapy and has a large fungating mass of her left breast, followed by wound care.     During her prior hospitalization in September 2024, she was noted to have loculated pleural effusions that could not be drained by IR thoracentesis. Pulmonology at the time felt that some of her hypoxemia may be related to shunting from her liver mass.       ER Course:  Pt presented to ED with CC as above. VS notable for 3 L NC required to maintain saturations >90%. CBC, CMP grossly normal, trop mildly elevated. EKG reassuring. CXR shows small bilateral pleural effusions. CT imaging shows new extensive metastases in the lungs and spine and compression fracture of T5. Pt medicated with PPI, GI cocktail, fentanyl. Neurosurgery consulted by ERP, recommended admission for full spine MRI w/wo for further evaluation.     REVIEW OF SYSTEMS:   Ten systems reviewed and were negative except as noted in the HPI.                PAST MEDICAL HISTORY:   has a past medical history of Anxiety, Depression, Diabetes (HCC), HLD (hyperlipidemia), HTN (hypertension), and Hypothyroidism.     PAST SURGICAL HISTORY:   has a past surgical history that includes other and cholecystectomy.     FAMILY HISTORY:  family history includes Cancer in her maternal grandfather; Lung Cancer in her mother; Stomach Cancer in her paternal grandmother; Uterine cancer in her sister.     SOCIAL HISTORY:   Employment: Unemployed  Living Situation: Lives in [place] with her daughter, Jane. Jane accompanied her mother to the hospital but had left by the time of my interview.  Smoking: Denies  Etoh: Denies  Recreational Drug: Occ. CBD gummies    DIET:   Orders Placed This Encounter   Procedures    Diet Order Diet: Consistent CHO (Diabetic)     Standing Status:   Standing     Number of Occurrences:   1     Order Specific Question:   Diet:     Answer:   Consistent CHO  "(Diabetic) [4]       ALLERGIES:  Allergies   Allergen Reactions    Demerol Rash     \"was told this during surgery\"  Other reaction(s): rash       OUTPATIENT MEDICATIONS:  Current Outpatient Medications   Medication Instructions    Abemaciclib 150 mg, Oral, 2 TIMES DAILY    carvedilol (COREG) 3.125 mg, Oral, 2 TIMES DAILY    HYDROcodone-acetaminophen (NORCO) 5-325 MG Tab per tablet 1 Tablet, Oral, EVERY 4 HOURS PRN    letrozole (FEMARA) 2.5 mg, Oral, DAILY    losartan (COZAAR) 100 MG Tab Take 1 tablet by mouth daily        PHYSICAL EXAM:  Vitals:    24 1800 24 1846 24   BP:  (!) 205/98 (!) 144/73    Pulse: 68 78 78    Resp: 20   14   Temp:       TempSrc:       SpO2: 94% 94% 91%    Weight:       Height:       , Temp (24hrs), Av.3 °C (97.3 °F), Min:36.3 °C (97.3 °F), Max:36.3 °C (97.3 °F)  , Pulse Oximetry: 91 %, O2 (LPM): 3, O2 Delivery Device: Silicone Nasal Cannula    Physical Exam  Vitals reviewed.   Constitutional:       General: She is not in acute distress.     Appearance: Normal appearance. She is ill-appearing.      Comments: Very polite female sleeping in hospital bed. Fatigued, sleeping but wakes for exam. Quickly falling back asleep during questioning.   HENT:      Head: Normocephalic and atraumatic.      Right Ear: External ear normal.      Left Ear: External ear normal.      Nose: Nose normal. No congestion.      Mouth/Throat:      Mouth: Mucous membranes are moist.      Pharynx: Oropharynx is clear.   Eyes:      Extraocular Movements: Extraocular movements intact.      Pupils: Pupils are equal, round, and reactive to light.   Cardiovascular:      Rate and Rhythm: Normal rate and regular rhythm.      Heart sounds: No murmur heard.  Pulmonary:      Effort: Pulmonary effort is normal. No respiratory distress.      Comments: Lung sounds diminished  Abdominal:      General: Abdomen is flat. Bowel sounds are normal. There is no distension.      Palpations: Abdomen is " soft.      Tenderness: There is no abdominal tenderness. There is no guarding or rebound.   Musculoskeletal:         General: No swelling or deformity. Normal range of motion.      Cervical back: Normal range of motion and neck supple.   Skin:     General: Skin is warm and dry.      Capillary Refill: Capillary refill takes less than 2 seconds.      Comments: Fungating mass of left breast   Neurological:      General: No focal deficit present.      Mental Status: She is alert and oriented to person, place, and time. Mental status is at baseline.      Cranial Nerves: No cranial nerve deficit.   Psychiatric:         Mood and Affect: Mood normal.         Behavior: Behavior normal.            LAB TESTS:   Results for orders placed or performed during the hospital encounter of 12/25/24   CBC with Differential    Collection Time: 12/25/24  5:08 PM   Result Value Ref Range    WBC 5.1 4.8 - 10.8 K/uL    RBC 4.29 4.20 - 5.40 M/uL    Hemoglobin 11.9 (L) 12.0 - 16.0 g/dL    Hematocrit 36.5 (L) 37.0 - 47.0 %    MCV 85.1 81.4 - 97.8 fL    MCH 27.7 27.0 - 33.0 pg    MCHC 32.6 32.2 - 35.5 g/dL    RDW 51.8 (H) 35.9 - 50.0 fL    Platelet Count 250 164 - 446 K/uL    MPV 9.1 9.0 - 12.9 fL    Neutrophils-Polys 72.20 (H) 44.00 - 72.00 %    Lymphocytes 15.90 (L) 22.00 - 41.00 %    Monocytes 9.30 0.00 - 13.40 %    Eosinophils 1.60 0.00 - 6.90 %    Basophils 0.80 0.00 - 1.80 %    Immature Granulocytes 0.20 0.00 - 0.90 %    Nucleated RBC 0.00 0.00 - 0.20 /100 WBC    Neutrophils (Absolute) 3.67 1.82 - 7.42 K/uL    Lymphs (Absolute) 0.81 (L) 1.00 - 4.80 K/uL    Monos (Absolute) 0.47 0.00 - 0.85 K/uL    Eos (Absolute) 0.08 0.00 - 0.51 K/uL    Baso (Absolute) 0.04 0.00 - 0.12 K/uL    Immature Granulocytes (abs) 0.01 0.00 - 0.11 K/uL    NRBC (Absolute) 0.00 K/uL   Complete Metabolic Panel    Collection Time: 12/25/24  5:08 PM   Result Value Ref Range    Sodium 137 135 - 145 mmol/L    Potassium 3.6 3.6 - 5.5 mmol/L    Chloride 99 96 - 112 mmol/L     Co2 25 20 - 33 mmol/L    Anion Gap 13.0 7.0 - 16.0    Glucose 143 (H) 65 - 99 mg/dL    Bun 9 8 - 22 mg/dL    Creatinine 0.65 0.50 - 1.40 mg/dL    Calcium 9.4 8.5 - 10.5 mg/dL    Correct Calcium 9.5 8.5 - 10.5 mg/dL    AST(SGOT) 139 (H) 12 - 45 U/L    ALT(SGPT) 49 2 - 50 U/L    Alkaline Phosphatase 84 30 - 99 U/L    Total Bilirubin 0.5 0.1 - 1.5 mg/dL    Albumin 3.9 3.2 - 4.9 g/dL    Total Protein 6.7 6.0 - 8.2 g/dL    Globulin 2.8 1.9 - 3.5 g/dL    A-G Ratio 1.4 g/dL   Lipase    Collection Time: 24  5:08 PM   Result Value Ref Range    Lipase 11 11 - 82 U/L   Troponin    Collection Time: 24  5:08 PM   Result Value Ref Range    Troponin T 21 (H) 6 - 19 ng/L   ESTIMATED GFR    Collection Time: 24  5:08 PM   Result Value Ref Range    GFR (CKD-EPI) 97 >60 mL/min/1.73 m 2   EKG    Collection Time: 24  5:20 PM   Result Value Ref Range    Report       [facility] Emergency [facility]    Test Date:  2024  Pt Name:    CHILANGO MUÑOZ         Department: ER  MRN:        [id number]                      Room:       [facility]  Gender:     Female                       Technician: [id number]  :        1960                   Requested By:ER TRIAGE PROTOCOL  Order #:    [id number]                    Reading MD:    Measurements  Intervals                                Axis  Rate:       70                           P:          43  NJ:         149                          QRS:        72  QRSD:       108                          T:          13  QT:         396  QTc:        428    Interpretive Statements  Sinus rhythm  Probable left atrial enlargement  Artifact in lead(s) I,II,III,aVR,aVL,aVF  Compared to ECG 2024 22:06:23  No significant changes     Urinalysis    Collection Time: 24  7:00 PM    Specimen: Urine   Result Value Ref Range    Color Yellow     Character Cloudy (A)     Specific Gravity 1.014 <1.035    Ph 5.5 5.0 - 8.0    Glucose Negative Negative mg/dL    Ketones  Trace (A) Negative mg/dL    Protein 30 (A) Negative mg/dL    Bilirubin Negative Negative    Urobilinogen, Urine 1.0 <=1.0 EU/dL    Nitrite Negative Negative    Leukocyte Esterase Moderate (A) Negative    Occult Blood Small (A) Negative    Micro Urine Req Microscopic    URINE MICROSCOPIC (W/UA)    Collection Time: 12/25/24  7:00 PM   Result Value Ref Range    WBC 11-20 (A) /hpf    RBC 3-5 (A) 0 - 2 /hpf    Bacteria Few (A) None /hpf    Epithelial Cells 6-10 (A) 0 - 5 /hpf    Urine Casts 3-5 (A) 0 - 2 /lpf     *Note: Due to a large number of results and/or encounters for the requested time period, some results have not been displayed. A complete set of results can be found in Results Review.        CULTURES:   Results       Procedure Component Value Units Date/Time    Urinalysis [[id number]]  (Abnormal) Collected: 12/25/24 1900    Order Status: Completed Specimen: Urine Updated: 12/25/24 1945     Color Yellow     Character Cloudy     Specific Gravity 1.014     Ph 5.5     Glucose Negative mg/dL      Ketones Trace mg/dL      Protein 30 mg/dL      Bilirubin Negative     Urobilinogen, Urine 1.0 EU/dL      Nitrite Negative     Leukocyte Esterase Moderate     Occult Blood Small     Micro Urine Req Microscopic            IMAGES:  CT-LSPINE W/O PLUS RECONS   Final Result         1. Diffuse metastatic disease.   2. No definite pathologic fracture.      CT-TSPINE W/O PLUS RECONS   Final Result         1. Extensive metastatic disease.   2. There is new pathologic compression fracture deformity of T5.      CT-CHEST,ABDOMEN,PELVIS WITH   Final Result         1. New bilateral upper lobe nodular infiltrates.   2. Moderate-sized bilateral pleural effusions.   3. Interval worsening of hepatic metastatic disease.   4. New abdominal lymphadenopathy. Thoracic adenopathy is again noted.   5. Diffuse osseous metastatic disease. There is a new compression fracture of T5 as reported on the prior spine CT.      DX-CHEST-PORTABLE (1 VIEW)    Final Result      1.  Stable bibasilar atelectasis/consolidation and small bilateral pleural effusions.      2.  Stable cardiomegaly and interstitial edema.      MR-CERVICAL SPINE-WITH & W/O    (Results Pending)   MR-LUMBAR SPINE-WITH & W/O    (Results Pending)   MR-THORACIC SPINE-WITH & W/O    (Results Pending)        CONSULTS:   Spine/Neurosurgery    ASSESSMENT/PLAN:   64 y.o. female with history of breast cancer with metastases to lungs admitted for increasing weakness, pain, and shortness of breath. Found to have increasing, extensive metastases on admission workup, including spinal metastases with new T5 compression fracture. Neuro/Spine consulted by ERP, recommended admission for full spine MRI w/wo for further evaluation. Will start with diuresis for pleural effusions given prior difficulty with thoracentesis due to loculations. Will consider IR consult to attempt thoracentesis if needed. Wound care consulted for fungating left breast mass. Palliative consulted for additional goals of care discussion.     I anticipate this patient will require at least two midnights for appropriate medical management, necessitating inpatient admission because she is requiring neurosurgical evaluation for possible procedures.      * Malignant neoplasm of breast (HCC)- (present on admission)  Assessment & Plan  Extensive metastatic disease to lungs, liver, and bones. Followed by Dr. Tabor, Oncology, and Dr. Velez, Rad Onc. Pt has declined surgical management in the past, currently on oral chemotherapy. Large fungating mass of the left breast, followed by wound care. Pt still full code at the time of admission, too tired to engage in thorough goals of care discussion at this time as she is repeatedly falling asleep throughout the interview.   - Admit to med/onc floor  - Will contact oncology in AM, prescribing oncologist must be contacted in order to continue chemotherapy inpatient  - Pt on hydrocodone chronically  "outpatient. Reports \"violent reaction\" to oxycodone/dilaudid. Requesting fentanyl/hydrocodone for pain management while inpatient.   - Pain regimen, pharmacy consult  - Palliative care consulted for in-depth goals of care discussion with patient and family  - Wound care consulted for fungating left breast mass     Bilateral pleural effusion- (present on admission)  Assessment & Plan  Bilateral pleural effusions seen on admission CT. Hx of same on prior admission in September 2024. At that time, IR was consulted for thoracentesis and this could not be completed due to loculations. Pulmonology was then consulted, and they noted her hypoxia may be in part due to shunting from her liver mass. Pt on 1.5 L NC baseline, requiring 3 L NC to maintain saturations on admission.  - Continue O2 supplementation, goal sats >90%.   - Consider IR consult in AM for possible thoracentesis  - Will order lasix IV 40 mg once, as patient had good response to diuresis on prior admission.    Acute on chronic hypoxic respiratory failure (HCC)- (present on admission)  Assessment & Plan  See #Bilateral pleural effusion    Weakness- (present on admission)  Assessment & Plan  Pt reporting progressive weakness since discharge in September, now becoming weak even with ambulation to the restroom. Not requiring any assistive devices. Pt requesting bedside commode due to difficulty with frequent ambulation to restroom  - PT/OT consults    Compression fracture of T5 vertebra (HCC)- (present on admission)  Assessment & Plan  Noted on CT imaging, likely due to extensive metastatic disease. Dr. Lowery, Neurosurgery, consulted by ERP. Recommended full spine MRI w/wo for further characterization.  - Ordered full spine MRI  - Pain regimen ordered, pharmacy consult  - Ordered IN calcitonin for compression-fracture-related pain control  - Follow neurosurgery recs    Cancer related pain- (present on admission)  Assessment & Plan  Pain regimen ordered, see " #Malignant neoplasm of breast    Hypertension- (present on admission)  Assessment & Plan  Continue home carvedilol, losartan    Diabetes mellitus, type 2 (HCC)- (present on admission)  Assessment & Plan  Diet controlled. Last A1c 7.1 in September 2024.  - Trend blood glucose on chem panel  - Check A1c          Core Measures:  Fluids: PO hydration   Lines: Peripheral IV for intravenous access  Abx: None  Diet: diabetic diet  PPX: SCDs/TEDs and heparin ppx    Disposition  Patient is not medically cleared for discharge.   Anticipate discharge dispo pending PT/OT recs.  I have placed the appropriate orders for post-discharge needs.    I have personally seen and examined the patient at bedside. I discussed the plan of care with patient, bedside RN, and pharmacy.    CODE Status: Full Code. Palliative consult ordered for additional goals of care discussion once family is present and patient is more awake.       Abdulaziz Nieto M.D.   PGY-2  [facility]

## 2024-12-26 NOTE — PROGRESS NOTES
CT of the thoracic and lumbar spine reviewed.  Extensive metastatic disease seen along the spinal axis with a new pathologic compression fracture of T5.  Will need MRI of the entire spinal axis with and without contrast.  Will see patient once MRIs have been performed.  Being admitted to medicine for pain control and further metastatic disease workup.  Patient has known breast cancer with mets.  Has not started treatment yet according to emergency room physician.

## 2024-12-26 NOTE — ASSESSMENT & PLAN NOTE
Likely related to bilateral pleural effusion. In addition, see by pulmonology at last admission and thought to be component of hepatic shunting. Patient with new bilateral upper lobe infiltrates on CTAP. Without clinical pneumonia and procal negative. Troponin peaked.   -CTM, titrate oxygen to goal  -Home O2 eval before discharge if pt is still requiring oxygen

## 2024-12-26 NOTE — PROGRESS NOTES
4 Eyes Skin Assessment Completed by RIYA Carrera and RIYA Retana.    Head WDL  Ears Redness and Blanching  Nose WDL  Mouth WDL  Neck WDL  Breast/Chest WDL  Shoulder Blades WDL  Spine WDL  (R) Arm/Elbow/Hand WDL  (L) Arm/Elbow/Hand WDL  Abdomen Bruising  Groin WDL  Scrotum/Coccyx/Buttocks WDL  (R) Leg Scab  (L) Leg Scab  (R) Heel/Foot/Toe WDL  (L) Heel/Foot/Toe WDL          Devices In Places Pulse Ox      Interventions In Place Gray Ear Foams and Pillows    Possible Skin Injury No    Pictures Uploaded Into Epic No, needs to be completed  Wound Consult Placed N/A  RN Wound Prevention Protocol Ordered No

## 2024-12-26 NOTE — PROGRESS NOTES
Neurosurgery Progress Note    Subjective:  65 yo female with known h/o bilateral breast cancer since 2019 but apparently had delay in treatment for various reasons until .  Has completed some hormone therapy and radiation per patient.  Presented with worsening thoracic spine pain and notes some right thigh numbness.  CT's of the thoracic and lumbar spine show diffuse spinal mets with T5 pathologic fx and CT CAP shows new upper lung infiltrates with bilateral pleural effusions with worsening hepatic mets and new abdominal lymphadenopathy in addition to known thoracic adenopathy.  Denies saddle anesthesia or loss of control over B&B.  Patient has some SOB with pleural effusions and states Lasix has not helped with this before, and is requesting thoracentesis prior to obtaining MRI's as she does not feel she could lay flat for these.   MRI's of spinal axis are ordered and pending.     Exam:  Calm and cooperative with examination.  Speech eloquent and clear.  A&O x3  CN 2-12 grossly intact.  PERRL, EOMI  Motor 4/5 bilateral EHL's remainder 5/5.  Bilateral UE and LE sensory exam with decreased right L3 sensory only.    BP  Min: 122/60  Max: 205/98  Pulse  Av.3  Min: 63  Max: 78  Resp  Av.9  Min: 14  Max: 20  Temp  Av.4 °C (97.5 °F)  Min: 36.1 °C (97 °F)  Max: 36.6 °C (97.8 °F)  SpO2  Av.8 %  Min: 89 %  Max: 100 %    No data recorded    Recent Labs     24  17024  0334   WBC 5.1 5.1   RBC 4.29 4.10*   HEMOGLOBIN 11.9* 11.7*   HEMATOCRIT 36.5* 35.9*   MCV 85.1 87.6   MCH 27.7 28.5   MCHC 32.6 32.6   RDW 51.8* 53.8*   PLATELETCT 250 231   MPV 9.1 8.8*     Recent Labs     24  17024  0334   SODIUM 137 139   POTASSIUM 3.6 3.7   CHLORIDE 99 100   CO2 25 26   GLUCOSE 143* 128*   BUN 9 9   CREATININE 0.65 0.71   CALCIUM 9.4 9.3               Intake/Output       None            No intake or output data in the 24 hours ending 24 0933          heparin  5,000 Units Q8HRS     ondansetron  4 mg Q4HRS PRN    ondansetron  4 mg Q4HRS PRN    carvedilol  3.125 mg BID    losartan  100 mg Q DAY    HYDROcodone-acetaminophen  1 Tablet Q6HRS PRN    Or    HYDROcodone-acetaminophen  2 Tablet Q6HRS PRN    furosemide  40 mg Once    senna-docusate  2 Tablet Q EVENING    And    polyethylene glycol/lytes  1 Packet QDAY PRN    GI Cocktail (hyoscyamine-lidocaine-Maalox)  30 mL Once    Pharmacy Consult Request  1 Each PHARMACY TO DOSE    calcitonin (salmon)  1 [place] DAILY       Assessment and Plan:  Hospital day # 2  Known breast cancer with worsening spinal mets and other mets.  Needs MRI's full spinal axis with and without contrast, and will likely need thoracentesis prior to undergoing these.  Continue medical care.  Dr. Lowery to fully evaluate after MRI's completed.  Following.      Chemical prophylactic DVT therapy: No  Start date/time: TBD     Brain Compression: No

## 2024-12-26 NOTE — ED NOTES
Pt transferred off unit with hospital staff for admission. Report given, patient on O2. Belongings and family sent with patient

## 2024-12-26 NOTE — ASSESSMENT & PLAN NOTE
Pt reporting progressive weakness since discharge in September, now becoming weak even with ambulation to the restroom. Not requiring any assistive devices. Pt requesting bedside commode due to difficulty with frequent ambulation to restroom  - PT/OT consults

## 2024-12-26 NOTE — DISCHARGE PLANNING
Care Transition Team Assessment    Patient is a 64 year-old female admitted for malignant neoplasm of breast. Please see patient's H&P for prior medical history. LMSW met with patient at bedside to complete assessment. Patient is A&Ox4 and able to verify the information on the face sheet. Patient lives with daughter in a single story house with zero steps to enter, Jane Duron (p) [phone]; NOK and emergency contact. No Advance Directive on file, patient currently working on completing AD and will provide hospital with copy. Prior to admission patient is independent with ADL's and IADL’s. Patient uses 2L of O2 at baseline supplied by [employer]. Patient reported that daughter is good support for her. Patient receives monthly SSI deposits. The patient's PCP is Dr. Brown. Patient's preferred pharmacy is [facility]. Patient denies a history of SNF/IPR nor HHC use in the past. Patient denies any SA or MH concerns. Patient's confirmed medical coverage via Medicare. Patient has means of transportation when medically cleared and daughter will provide transport once stable for discharge. Patient's primary oncologist is Dr. Tabor.       Information Source  Orientation Level: Oriented X4  Information Given By: Patient  Who is responsible for making decisions for patient? : Patient    Readmission Evaluation  Is this a readmission?: No    Elopement Risk  Legal Hold: No  Ambulatory or Self Mobile in Wheelchair: Yes  Disoriented: No  Psychiatric Symptoms: None  History of Wandering: No  Elopement this Admit: No  Vocalizing Wanting to Leave: No  Displays Behaviors, Body Language Wanting to Leave: No-Not at Risk for Elopement  Elopement Risk: Not at Risk for Elopement    Interdisciplinary Discharge Planning  Lives with - Patient's Self Care Capacity: Adult Children  Patient or legal guardian wants to designate a caregiver: Yes  Caregiver name: Jane  Caregiver contact info: [phone]  [place] [place] for  Release of Health Information has been completed: Yes  Support Systems: Family Member(s)  Housing / Facility: 1 Story House    Discharge Preparedness  What is your plan after discharge?: Home with help  What are your discharge supports?: Child  Prior Functional Level: Ambulatory  Difficulity with ADLs: None  Difficulity with IADLs: None    Functional Assesment  Prior Functional Level: Ambulatory    Finances  Financial Barriers to Discharge: No  Prescription Coverage: Yes              Advance Directive  Advance Directive?: None    Domestic Abuse  Have you ever been the victim of abuse or violence?: No  Possible Abuse/Neglect Reported to:: Not Applicable    Psychological Assessment  History of Substance Abuse: None  History of Psychiatric Problems: No  Non-compliant with Treatment: No  Newly Diagnosed Illness: No    Discharge Risks or Barriers  Discharge risks or barriers?: Complex medical needs  Patient risk factors: Complex medical needs    Anticipated Discharge Information  Discharge Disposition: Discharged to home/self care (01)

## 2024-12-26 NOTE — ASSESSMENT & PLAN NOTE
Bilateral moderate sized pleural effusions seen on admission CT. Hx of same on prior admission in September 2024. At that time, IR was consulted for thoracentesis and this could not be completed due to loculations. Pulmonology was then consulted, and they noted her hypoxia may be in part due to shunting from her liver mass.  Patient reports subsequently she went to [facility] who performed thoracentesis on the right.  Patient reports thoracentesis was consistent with malignant effusion though records not available.  Pt on 2 L NC at discharge in 09/2024, requiring 3 L NC to maintain saturations on admission which is stable to slightly improving.   - Continue O2 supplementation, goal sats >90%.   - S/p thoracentesis 12/27, will consider repeat therapeutic thoracentesis prior to discharge if pt is still having increased O2 demand  - Malignant cells seen per pathology and exudative effusion with     - Fluid cultures show moderate WBC but no organisms, fungal cultures also negative

## 2024-12-26 NOTE — CARE PLAN
The patient is Watcher - Medium risk of patient condition declining or worsening    Shift Goals  Clinical Goals: Pain control  Patient Goals: Thoracentesis procedure  Family Goals: pt safety    Progress made toward(s) clinical / shift goals:  Pain not well controlled at this time, patient refusing MAR ordered medications. No thoracentesis will be done today, patient chose to eat instead, NPO at midnight, thoracentesis tomorrow    Patient is not progressing towards the following goals:      Problem: Pain - Standard  Goal: Alleviation of pain or a reduction in pain to the patient’s comfort goal  Outcome: Not Progressing  Note: Patient refusing MAR medications

## 2024-12-26 NOTE — ED NOTES
Bedside report to RIYA Stevens.  Pt in [facility], connected to monitor, on 3L NC.  Daughter at bedside. Call light within reach.

## 2024-12-26 NOTE — CARE PLAN
The patient is Watcher - Medium risk of patient condition declining or worsening    Shift Goals  Clinical Goals: MRI, pain claude be controlled at 3/10 or less  Patient Goals: Sleep pain control  Family Goals: pt safety    Progress made toward(s) clinical / shift goals:    Problem: Knowledge Deficit - Standard  Goal: Patient and family/care givers will demonstrate understanding of plan of care, disease process/condition, diagnostic tests and medications  Outcome: Progressing     Problem: Fall Risk  Goal: Patient will remain free from falls  Outcome: Progressing  Note: Pt and family member refused bed alarm. Pt and family educated.   Bed is locked, at lowest position, and call light is within reach.         Patient is not progressing towards the following goals:

## 2024-12-26 NOTE — PROGRESS NOTES
"ED Observation Progress Note    Date of Service: 12/25/24    Interval History and Interventions    Patient with metastatic breast cancer signed out to follow-up imaging due to back pain and weakness in the legs.  Unfortunately the patient does have evidence of significant metastatic disease throughout the chest on pelvis on the spine unfortunately she does have a new fracture and T5 secondary to her cancer.    10:30 PM  Spoke w Dr Lowery on-call for spine who at this time recommends full MRI of the spine from C to L-spine with and without contrast and he will evaluate the patient tomorrow.      I spoke with the family medicine resident they have accepted the patient to their service.  Diminished breath sounds bilaterally    Physical Exam  BP (!) 144/73   Pulse 78   Temp 36.3 °C (97.3 °F) (Temporal)   Resp 14   Ht 1.727 m (5' 8\")   Wt 106 kg (234 lb)   LMP 03/05/2013   SpO2 91%   BMI 35.58 kg/m² .    Constitutional: Awake and alert. Nontoxic  HENT:  Grossly normal  Eyes: Grossly normal  Neck: Normal range of motion  Cardiovascular: Normal heart rate   Thorax & Lungs: No respiratory distress  Abdomen: Nontender  Skin:  No pathologic rash.   Extremities: Well perfused  Psychiatric: Affect normal    Labs  Labs Reviewed   CBC WITH DIFFERENTIAL - Abnormal; Notable for the following components:       Result Value    Hemoglobin 11.9 (*)     Hematocrit 36.5 (*)     RDW 51.8 (*)     Neutrophils-Polys 72.20 (*)     Lymphocytes 15.90 (*)     Lymphs (Absolute) 0.81 (*)     All other components within normal limits   COMP METABOLIC PANEL - Abnormal; Notable for the following components:    Glucose 143 (*)     AST(SGOT) 139 (*)     All other components within normal limits   URINALYSIS - Abnormal; Notable for the following components:    Character Cloudy (*)     Ketones Trace (*)     Protein 30 (*)     Leukocyte Esterase Moderate (*)     Occult Blood Small (*)     All other components within normal limits   TROPONIN - " Abnormal; Notable for the following components:    Troponin T 21 (*)     All other components within normal limits   URINE MICROSCOPIC (W/UA) - Abnormal; Notable for the following components:    WBC 11-20 (*)     RBC 3-5 (*)     Bacteria Few (*)     Epithelial Cells 6-10 (*)     Urine Casts 3-5 (*)     All other components within normal limits   LIPASE   ESTIMATED GFR         Radiology  CT-LSPINE W/O PLUS RECONS   Final Result         1. Diffuse metastatic disease.   2. No definite pathologic fracture.      CT-TSPINE W/O PLUS RECONS   Final Result         1. Extensive metastatic disease.   2. There is new pathologic compression fracture deformity of T5.      CT-CHEST,ABDOMEN,PELVIS WITH   Final Result         1. New bilateral upper lobe nodular infiltrates.   2. Moderate-sized bilateral pleural effusions.   3. Interval worsening of hepatic metastatic disease.   4. New abdominal lymphadenopathy. Thoracic adenopathy is again noted.   5. Diffuse osseous metastatic disease. There is a new compression fracture of T5 as reported on the prior spine CT.      DX-CHEST-PORTABLE (1 VIEW)   Final Result      1.  Stable bibasilar atelectasis/consolidation and small bilateral pleural effusions.      2.  Stable cardiomegaly and interstitial edema.          Problem List  1.   1. Shortness of breath        2. Malignancy (HCC)        3. Inability to walk        4. Weakness of both lower extremities        5. Bilateral malignant neoplasm of breast in female, unspecified estrogen receptor status, unspecified site of breast (HCC)        6. Compression fracture of body of thoracic vertebra (HCC)              Electronically signed by: Peggy Sharpe M.D., 12/25/2024 10:30 PM

## 2024-12-26 NOTE — ASSESSMENT & PLAN NOTE
Patient reports a history of restless leg syndrome well-controlled on ropinirole although she does not know the dosage. Has iron deficient anemia.  - Ropinirole 1 mg nightly and will titrate up  - Iron supplementation ordered

## 2024-12-26 NOTE — PROGRESS NOTES
Medical Oncology  Patient well known to me. Has apparently been on 1/2 dose Verzenio with letrozole and has clearcut progression of disease. Willing to try fulvestrant as endocrine treatment with full dose Verzenio once best approach to progressive spinal disease and pathologic fracture addressed. She might benefit from additional radiation.   MRI of back pending after thoracentesis. I will followup in 1 week.  Cm Tabor

## 2024-12-26 NOTE — ED NOTES
Pt assisted to bedside commode, x1 assist. Pt unable to void, states commode is too tall and cuts off the circulation in her legs.  Pt assisted back to bed and new commode found for pt.

## 2024-12-27 ENCOUNTER — APPOINTMENT (OUTPATIENT)
Dept: RADIOLOGY | Facility: MEDICAL CENTER | Age: 64
End: 2024-12-27
Payer: MEDICARE

## 2024-12-27 LAB
ALBUMIN SERPL BCP-MCNC: 3.4 G/DL (ref 3.2–4.9)
ALBUMIN/GLOB SERPL: 1.1 G/DL
ALP SERPL-CCNC: 77 U/L (ref 30–99)
ALT SERPL-CCNC: 49 U/L (ref 2–50)
AMYLASE FLD-CCNC: 29 U/L
ANION GAP SERPL CALC-SCNC: 12 MMOL/L (ref 7–16)
APPEARANCE FLD: NORMAL
AST SERPL-CCNC: 137 U/L (ref 12–45)
BASOPHILS # BLD AUTO: 1.2 % (ref 0–1.8)
BASOPHILS # BLD: 0.05 K/UL (ref 0–0.12)
BILIRUB SERPL-MCNC: 0.4 MG/DL (ref 0.1–1.5)
BODY FLD TYPE: NORMAL
BUN SERPL-MCNC: 9 MG/DL (ref 8–22)
CALCIUM ALBUM COR SERPL-MCNC: 9.7 MG/DL (ref 8.5–10.5)
CALCIUM SERPL-MCNC: 9.2 MG/DL (ref 8.5–10.5)
CELLS FLD: 7
CHLORIDE SERPL-SCNC: 101 MMOL/L (ref 96–112)
CO2 SERPL-SCNC: 23 MMOL/L (ref 20–33)
COLOR FLD: NORMAL
CREAT SERPL-MCNC: 0.61 MG/DL (ref 0.5–1.4)
CSF COMMENTS 1658: NORMAL
CYTOLOGY REG CYTOL: NORMAL
EOSINOPHIL # BLD AUTO: 0.09 K/UL (ref 0–0.51)
EOSINOPHIL NFR BLD: 2.1 % (ref 0–6.9)
ERYTHROCYTE [DISTWIDTH] IN BLOOD BY AUTOMATED COUNT: 55.1 FL (ref 35.9–50)
FUNGUS SPEC FUNGUS STN: NORMAL
GFR SERPLBLD CREATININE-BSD FMLA CKD-EPI: 99 ML/MIN/1.73 M 2
GLOBULIN SER CALC-MCNC: 3.1 G/DL (ref 1.9–3.5)
GLUCOSE BLD STRIP.AUTO-MCNC: 140 MG/DL (ref 65–99)
GLUCOSE FLD-MCNC: 129 MG/DL
GLUCOSE SERPL-MCNC: 149 MG/DL (ref 65–99)
GRAM STN SPEC: NORMAL
HCT VFR BLD AUTO: 36 % (ref 37–47)
HGB BLD-MCNC: 11.7 G/DL (ref 12–16)
IMM GRANULOCYTES # BLD AUTO: 0.02 K/UL (ref 0–0.11)
IMM GRANULOCYTES NFR BLD AUTO: 0.5 % (ref 0–0.9)
LDH FLD L TO P-CCNC: 608 U/L
LYMPHOCYTES # BLD AUTO: 0.78 K/UL (ref 1–4.8)
LYMPHOCYTES NFR BLD: 18.4 % (ref 22–41)
LYMPHOCYTES NFR FLD: 73 %
MCH RBC QN AUTO: 28.7 PG (ref 27–33)
MCHC RBC AUTO-ENTMCNC: 32.5 G/DL (ref 32.2–35.5)
MCV RBC AUTO: 88.5 FL (ref 81.4–97.8)
MONOCYTES # BLD AUTO: 0.48 K/UL (ref 0–0.85)
MONOCYTES NFR BLD AUTO: 11.3 % (ref 0–13.4)
MONOS+MACROS NFR FLD MANUAL: 15 %
NEUTROPHILS # BLD AUTO: 2.83 K/UL (ref 1.82–7.42)
NEUTROPHILS NFR BLD: 66.5 % (ref 44–72)
NEUTROPHILS NFR FLD: 3 %
NRBC # BLD AUTO: 0 K/UL
NRBC BLD-RTO: 0 /100 WBC (ref 0–0.2)
NUC CELL # FLD: 306 CELLS/UL
PATH REV: NORMAL
PATH REV: NORMAL
PH FLD: 8 [PH]
PLATELET # BLD AUTO: 197 K/UL (ref 164–446)
PMV BLD AUTO: 9.1 FL (ref 9–12.9)
POTASSIUM SERPL-SCNC: 3.7 MMOL/L (ref 3.6–5.5)
PROT FLD-MCNC: 4.2 G/DL
PROT SERPL-MCNC: 6.5 G/DL (ref 6–8.2)
RBC # BLD AUTO: 4.07 M/UL (ref 4.2–5.4)
RBC # FLD: NORMAL CELLS/UL
SIGNIFICANT IND 70042: NORMAL
SIGNIFICANT IND 70042: NORMAL
SITE SITE: NORMAL
SITE SITE: NORMAL
SODIUM SERPL-SCNC: 136 MMOL/L (ref 135–145)
SOURCE SOURCE: NORMAL
SOURCE SOURCE: NORMAL
WBC # BLD AUTO: 4.3 K/UL (ref 4.8–10.8)
WBC OTHER NFR FLD: 2 %

## 2024-12-27 PROCEDURE — A9270 NON-COVERED ITEM OR SERVICE: HCPCS

## 2024-12-27 PROCEDURE — 88342 IMHCHEM/IMCYTCHM 1ST ANTB: CPT

## 2024-12-27 PROCEDURE — A9270 NON-COVERED ITEM OR SERVICE: HCPCS | Performed by: NURSE PRACTITIONER

## 2024-12-27 PROCEDURE — 0W993ZZ DRAINAGE OF RIGHT PLEURAL CAVITY, PERCUTANEOUS APPROACH: ICD-10-PCS | Performed by: NURSE PRACTITIONER

## 2024-12-27 PROCEDURE — 87070 CULTURE OTHR SPECIMN AEROBIC: CPT

## 2024-12-27 PROCEDURE — 99232 SBSQ HOSP IP/OBS MODERATE 35: CPT | Mod: GC | Performed by: STUDENT IN AN ORGANIZED HEALTH CARE EDUCATION/TRAINING PROGRAM

## 2024-12-27 PROCEDURE — 700102 HCHG RX REV CODE 250 W/ 637 OVERRIDE(OP)

## 2024-12-27 PROCEDURE — 89051 BODY FLUID CELL COUNT: CPT

## 2024-12-27 PROCEDURE — 87102 FUNGUS ISOLATION CULTURE: CPT

## 2024-12-27 PROCEDURE — C1729 CATH, DRAINAGE: HCPCS

## 2024-12-27 PROCEDURE — 700111 HCHG RX REV CODE 636 W/ 250 OVERRIDE (IP)

## 2024-12-27 PROCEDURE — 87205 SMEAR GRAM STAIN: CPT

## 2024-12-27 PROCEDURE — 82945 GLUCOSE OTHER FLUID: CPT

## 2024-12-27 PROCEDURE — 88112 CYTOPATH CELL ENHANCE TECH: CPT

## 2024-12-27 PROCEDURE — 83986 ASSAY PH BODY FLUID NOS: CPT

## 2024-12-27 PROCEDURE — 88341 IMHCHEM/IMCYTCHM EA ADD ANTB: CPT | Mod: 91

## 2024-12-27 PROCEDURE — 88305 TISSUE EXAM BY PATHOLOGIST: CPT

## 2024-12-27 PROCEDURE — 99233 SBSQ HOSP IP/OBS HIGH 50: CPT | Performed by: NURSE PRACTITIONER

## 2024-12-27 PROCEDURE — 700101 HCHG RX REV CODE 250

## 2024-12-27 PROCEDURE — 80503 PATH CLIN CONSLTJ SF 5-20: CPT

## 2024-12-27 PROCEDURE — 83615 LACTATE (LD) (LDH) ENZYME: CPT

## 2024-12-27 PROCEDURE — 700102 HCHG RX REV CODE 250 W/ 637 OVERRIDE(OP): Performed by: NURSE PRACTITIONER

## 2024-12-27 PROCEDURE — 82962 GLUCOSE BLOOD TEST: CPT

## 2024-12-27 PROCEDURE — 770004 HCHG ROOM/CARE - ONCOLOGY PRIVATE *

## 2024-12-27 PROCEDURE — 36415 COLL VENOUS BLD VENIPUNCTURE: CPT

## 2024-12-27 PROCEDURE — 87015 SPECIMEN INFECT AGNT CONCNTJ: CPT | Mod: 91

## 2024-12-27 PROCEDURE — 88360 TUMOR IMMUNOHISTOCHEM/MANUAL: CPT | Mod: 91

## 2024-12-27 PROCEDURE — 87116 MYCOBACTERIA CULTURE: CPT

## 2024-12-27 PROCEDURE — 82150 ASSAY OF AMYLASE: CPT

## 2024-12-27 PROCEDURE — 84157 ASSAY OF PROTEIN OTHER: CPT

## 2024-12-27 PROCEDURE — 80053 COMPREHEN METABOLIC PANEL: CPT

## 2024-12-27 PROCEDURE — 85025 COMPLETE CBC W/AUTO DIFF WBC: CPT

## 2024-12-27 PROCEDURE — 87206 SMEAR FLUORESCENT/ACID STAI: CPT

## 2024-12-27 RX ORDER — AMOXICILLIN 250 MG
2 CAPSULE ORAL 2 TIMES DAILY
Status: DISCONTINUED | OUTPATIENT
Start: 2024-12-27 | End: 2024-12-27

## 2024-12-27 RX ORDER — METOCLOPRAMIDE HYDROCHLORIDE 5 MG/ML
10 INJECTION INTRAMUSCULAR; INTRAVENOUS EVERY 6 HOURS
Status: DISCONTINUED | OUTPATIENT
Start: 2024-12-27 | End: 2025-01-02

## 2024-12-27 RX ORDER — POLYETHYLENE GLYCOL 3350 17 G/17G
1 POWDER, FOR SOLUTION ORAL
Status: DISCONTINUED | OUTPATIENT
Start: 2024-12-27 | End: 2024-12-27

## 2024-12-27 RX ORDER — AMOXICILLIN 250 MG
2 CAPSULE ORAL 2 TIMES DAILY
Status: DISCONTINUED | OUTPATIENT
Start: 2024-12-27 | End: 2024-12-30

## 2024-12-27 RX ORDER — CALCIUM CARBONATE 500 MG/1
500 TABLET, CHEWABLE ORAL 3 TIMES DAILY PRN
Status: DISCONTINUED | OUTPATIENT
Start: 2024-12-27 | End: 2025-01-04

## 2024-12-27 RX ORDER — ROPINIROLE 1 MG/1
1 TABLET, FILM COATED ORAL ONCE
Status: COMPLETED | OUTPATIENT
Start: 2024-12-27 | End: 2024-12-27

## 2024-12-27 RX ORDER — POLYETHYLENE GLYCOL 3350 17 G/17G
1 POWDER, FOR SOLUTION ORAL
Status: DISCONTINUED | OUTPATIENT
Start: 2024-12-27 | End: 2024-12-30

## 2024-12-27 RX ADMIN — METOCLOPRAMIDE HYDROCHLORIDE 10 MG: 5 INJECTION INTRAMUSCULAR; INTRAVENOUS at 12:26

## 2024-12-27 RX ADMIN — ONDANSETRON 4 MG: 4 TABLET, ORALLY DISINTEGRATING ORAL at 10:34

## 2024-12-27 RX ADMIN — HEPARIN SODIUM 5000 UNITS: 5000 INJECTION, SOLUTION INTRAVENOUS; SUBCUTANEOUS at 14:48

## 2024-12-27 RX ADMIN — CARVEDILOL 3.12 MG: 3.12 TABLET, FILM COATED ORAL at 17:54

## 2024-12-27 RX ADMIN — HYDROCODONE BITARTRATE AND ACETAMINOPHEN 1 TABLET: 5; 325 TABLET ORAL at 03:23

## 2024-12-27 RX ADMIN — LOSARTAN POTASSIUM 100 MG: 50 TABLET, FILM COATED ORAL at 04:22

## 2024-12-27 RX ADMIN — DICLOFENAC SODIUM 2 G: 10 GEL TOPICAL at 20:02

## 2024-12-27 RX ADMIN — ANTACID TABLETS 500 MG: 500 TABLET, CHEWABLE ORAL at 04:22

## 2024-12-27 RX ADMIN — ROPINIROLE HYDROCHLORIDE 1 MG: 1 TABLET, FILM COATED ORAL at 21:43

## 2024-12-27 RX ADMIN — HYDROCODONE BITARTRATE AND ACETAMINOPHEN 1 TABLET: 5; 325 TABLET ORAL at 13:22

## 2024-12-27 RX ADMIN — SENNOSIDES AND DOCUSATE SODIUM 2 TABLET: 50; 8.6 TABLET ORAL at 14:49

## 2024-12-27 RX ADMIN — ROPINIROLE HYDROCHLORIDE 1 MG: 1 TABLET, FILM COATED ORAL at 13:22

## 2024-12-27 RX ADMIN — DICLOFENAC SODIUM 2 G: 10 GEL TOPICAL at 04:22

## 2024-12-27 RX ADMIN — ACETAMINOPHEN 650 MG: 325 TABLET ORAL at 06:34

## 2024-12-27 RX ADMIN — HYDROCODONE BITARTRATE AND ACETAMINOPHEN 2 TABLET: 5; 325 TABLET ORAL at 19:44

## 2024-12-27 RX ADMIN — ANTACID TABLETS 500 MG: 500 TABLET, CHEWABLE ORAL at 11:13

## 2024-12-27 RX ADMIN — LIDOCAINE 1 PATCH: 4 PATCH TOPICAL at 09:42

## 2024-12-27 RX ADMIN — CARVEDILOL 3.12 MG: 3.12 TABLET, FILM COATED ORAL at 04:23

## 2024-12-27 RX ADMIN — FERROUS SULFATE TAB 325 MG (65 MG ELEMENTAL FE) 325 MG: 325 (65 FE) TAB at 09:42

## 2024-12-27 ASSESSMENT — PAIN DESCRIPTION - PAIN TYPE
TYPE: ACUTE PAIN

## 2024-12-27 ASSESSMENT — ENCOUNTER SYMPTOMS: BACK PAIN: 1

## 2024-12-27 NOTE — CONSULTS
Neurosurgery Consult Note    Patient: Asha Hannon    MRN: [id number]    Date of Consultation: 12/27/2024    Reason for Consultation: Spinal Mets    Referring Physician: [facility]    History of Present Illness:  64 yr old female with known Breast CA. Presents with back pain and a few days of subjective weakness in BL LE. Able to ambulate. Getting treated for Breast Ca however not started yet. Denies loss of bowel or bladder, no other complaints at this time. Obtained thoracic thoracentesis today.    Review of Systems:  Constitutional: Denies fevers, chills, night sweats, or weight changes  Eyes: Denies changes in vision, or eye pain  Ears/Nose/Throat/Mouth: Denies nasal congestion or sore throat   Cardiovascular: Denies chest pain or palpitations   Respiratory: Denies shortness of breath, or cough  Gastrointestinal/Hepatic: Denies abdominal pain, nausea, vomiting, diarrhea, or constipation  Genitourinary: Denies dysuria, frequency, or incontinence  Musculoskeletal/Rheum: Denies joint pain or swelling   Skin: Denies rash  Neurological: Denies headache, confusion, memory loss, does complain of focal weakness in BL LE  Psychiatric: Denies mood disorder   Endocrine: Denies hx of diabetes or thyroid dysfunction  Heme/Oncology/Lymph Nodes: Denies enlarged lymph nodes, bruising, or known bleeding disorder  Allergic/Immunologic: Denies hx of autoimmune disorder      Medications:  Current Facility-Administered Medications   Medication Dose Route Frequency Provider Last Rate Last Admin    diclofenac sodium (Voltaren) 1 % gel 2 g  2 g Topical 4X/DAY PRN Katherine Brown M.D.   2 g at 12/27/24 0422    metoclopramide (Reglan) injection 10 mg  10 mg Intravenous Q6HRS Katherine Brown M.D.   10 mg at 12/27/24 1226    calcium carbonate (Tums) chewable tab 500 mg  500 mg Oral TID PRN Katherine Brown M.D.   500 mg at 12/27/24 1113    [Held by provider] heparin injection 5,000 Units  5,000 Units Subcutaneous  "Q8HRS Yosi Espino M.D.   5,000 Units at 12/26/24 0550    ondansetron (Zofran) syringe/vial injection 4 mg  4 mg Intravenous Q4HRS PRMOISES Nieto M.D.        ondansetron (Zofran ODT) dispertab 4 mg  4 mg Oral Q4HRS PRN Abdulaziz Nieto M.D.   4 mg at 12/27/24 1034    carvedilol (Coreg) tablet 3.125 mg  3.125 mg Oral BID Abdulaziz Nieto M.D.   3.125 mg at 12/27/24 0423    losartan (Cozaar) tablet 100 mg  100 mg Oral Q DAY Abdulaziz Nieto M.D.   100 mg at 12/27/24 0422    HYDROcodone-acetaminophen (Norco) 5-325 MG per tablet 1 Tablet  1 Tablet Oral Q6HRS PRN Abdulaziz Nieto M.D.   1 Tablet at 12/27/24 1322    Or    HYDROcodone-acetaminophen (Norco) 5-325 MG per tablet 2 Tablet  2 Tablet Oral Q6HRS PRN Abdulaziz Nieto M.D.   1 Tablet at 12/26/24 0431    senna-docusate (Pericolace Or Senokot S) 8.6-50 MG per tablet 2 Tablet  2 Tablet Oral Q EVENING Yosi Espino M.D.   1 Tablet at 12/26/24 1730    And    polyethylene glycol/lytes (Miralax) Packet 1 Packet  1 Packet Oral QDAY PRN Yosi Espino M.D.   1 Packet at 12/26/24 1440    lidocaine (Asperflex) 4 % patch 1 Patch  1 Patch Transdermal Q24HR Yosi Espino M.D.   1 Patch at 12/27/24 0942    acetaminophen (Tylenol) tablet 650 mg  650 mg Oral Q4HRS PRN Yosi Espino M.D.   650 mg at 12/27/24 0634    ferrous sulfate tablet 325 mg  325 mg Oral QDAY with Breakfast Yosi Espino M.D.   325 mg at 12/27/24 0942    ROPINIRole (Requip) tablet 1 mg  1 mg Oral QHS YESIKA Roman.   1 mg at 12/26/24 2027    fentaNYL (Sublimaze) injection 25 mcg  25 mcg Intravenous Q HOUR PRN Katherine Brown M.D.   25 mcg at 12/26/24 2309    Pharmacy Consult Request ...Pain Management Review 1 Each  1 Each Other PHARMACY TO DOSE Abdulaziz Nieto M.D.        calcitonin (salmon) (Miacalcin) nasal spray 200 Units  1 [place] Nasal DAILY Abdulaziz Nieto M.D.           Allergies:  Allergies   Allergen Reactions    Demerol Rash     \"was told this during " "surgery\"  Other reaction(s): rash       Past Medical History:  Past Medical History:   Diagnosis Date    Anxiety     Breast cancer metastasized to bone, unspecified laterality (HCC) 12/25/2024    Depression     Diabetes (HCC)     HLD (hyperlipidemia)     HTN (hypertension)     Hypothyroidism        Past Surgical History:  Past Surgical History:   Procedure Laterality Date    CHOLECYSTECTOMY      OTHER      mastoid bone       Family History:  Family History   Problem Relation Age of Onset    Lung Cancer Mother     Uterine cancer Sister     Cancer Maternal Grandfather     Stomach Cancer Paternal Grandmother        Social History:  Social History     Socioeconomic History    Marital status: Legally      Spouse name: Not on file    Number of children: Not on file    Years of education: Not on file    Highest education level: Not on file   Occupational History    Not on file   Tobacco Use    Smoking status: Never    Smokeless tobacco: Never   Vaping Use    Vaping status: Never Used   Substance and Sexual Activity    Alcohol use: Yes     Comment: occas    Drug use: Yes     Types: Marijuana     Comment: CBD/THC gummies    Sexual activity: Not on file   Other Topics Concern    Not on file   Social History Narrative    Not on file     Social Drivers of Health     Financial Resource Strain: Not on file   Food Insecurity: Patient Declined (12/26/2024)    Hunger Vital Sign     Worried About Running Out of Food in the Last Year: Patient declined     Ran Out of Food in the Last Year: Patient declined   Transportation Needs: Patient Declined (12/26/2024)    PRAPARE - Transportation     Lack of Transportation (Medical): Patient declined     Lack of Transportation (Non-Medical): Patient declined   Physical Activity: Not on file   Stress: Not on file   Social Connections: Not on file   Intimate Partner Violence: Patient Declined (12/26/2024)    Humiliation, Afraid, Rape, and Kick questionnaire     Fear of Current or " Ex-Partner: Patient declined     Emotionally Abused: Patient declined     Physically Abused: Patient declined     Sexually Abused: Patient declined   Housing Stability: Patient Declined (12/26/2024)    Housing Stability Vital Sign     Unable to Pay for Housing in the Last Year: Patient declined     Number of Times Moved in the Last Year: 1     Homeless in the Last Year: Patient declined       Physical Examination:  AOx3. Reflexes and motor strength normal and symmetric. Cranial nerves 2-12 and sensation grossly intact.  Some subjective weakness in BL LE  Good strength as measured while sitting on edge of bed  Denies numbness in BL LE  Band like pain in thoracic and chest wall around T5    Labs:  Recent Labs     12/25/24  1708 12/26/24  0334 12/27/24  0253   WBC 5.1 5.1 4.3*   RBC 4.29 4.10* 4.07*   HEMOGLOBIN 11.9* 11.7* 11.7*   HEMATOCRIT 36.5* 35.9* 36.0*   MCV 85.1 87.6 88.5   MCH 27.7 28.5 28.7   MCHC 32.6 32.6 32.5   RDW 51.8* 53.8* 55.1*   PLATELETCT 250 231 197   MPV 9.1 8.8* 9.1     Recent Labs     12/25/24  1708 12/26/24  0334 12/27/24  0253   SODIUM 137 139 136   POTASSIUM 3.6 3.7 3.7   CHLORIDE 99 100 101   CO2 25 26 23   GLUCOSE 143* 128* 149*   BUN 9 9 9   CREATININE 0.65 0.71 0.61   CALCIUM 9.4 9.3 9.2               Imaging:  CT L/T spine: 1. Extensive metastatic disease.  2. There is new pathologic compression fracture deformity of T5.    Assessment and Plan:  T5 pathologic fracture  Extensive spinal bony mets   Hx of Breast CA  Back pain  LE Weakness    64 yr old female with extensive metastatic disease.  Awaiting MRI C/T/L spine to assess epidural metastatic mets. May need decompressive surgery if mets to spine with cord compression. T5 level suspicious for mets with extension into the spinal canal.  Pain control  Further recs to follow post MRIs  Please call with any questions.   PT/OT   SCD/IS      Karthik Lowery D.O.

## 2024-12-27 NOTE — CARE PLAN
The patient is Watcher - Medium risk of patient condition declining or worsening    Shift Goals  Clinical Goals: edward jose with pt, POC  Patient Goals: discuss POC  Family Goals: understand POC    Progress made toward(s) clinical / shift goals:    Problem: Knowledge Deficit - Standard  Goal: Patient and family/care givers will demonstrate understanding of plan of care, disease process/condition, diagnostic tests and medications  Outcome: Progressing  Note: Pt is anxious and stressed about POC. Pt is worried that by staying NPO for the thoracentesis it will cause her more debilitation. Pt is worried about dehydration and for her sugar to drop. This RN educated pt on how I will monitor her labs in the morning to make sure she is not dehydrate and that her glucose is stable. This RN contacted provider on pt concern. Provider pushed her NPO status to 0200.      Problem: Pain - Standard  Goal: Alleviation of pain or a reduction in pain to the patient’s comfort goal  Outcome: Progressing  Note: Pt says the the Norco with fentanyl for breakthrough is helpful. Pt did request for Diclofenace topical to  help with her back pain. Order was placed by provider.        Patient is not progressing towards the following goals:

## 2024-12-27 NOTE — PROGRESS NOTES
FAMILY MEDICINE PROGRESS NOTE          PATIENT ID:  NAME:  Asha Hannon  MRN:               [id number]  YOB: 1960    Date of Admission: 12/25/2024     Attending: Mark Butts M.d.     Resident: Yosi Espino M.D.    Primary Care Physician:  Elvis Russell M.D.      SUBJECTIVE:   No acute events overnight, patient is upset about not getting thoracentesis yesterday. Still short of breath and in back pain. No fever, chills.    Patient states she went to breast surgeon 2 years ago to get resection. States she was supposed to be on SERM prior to resection and states she was not. Now with metastatic cancer.     OBJECTIVE:  Temp:  [36.5 °C (97.7 °F)-36.8 °C (98.2 °F)] 36.5 °C (97.7 °F)  Pulse:  [68-77] 77  Resp:  [15-20] 15  BP: (123-158)/(57-76) 158/76  SpO2:  [93 %-98 %] 98 %    No intake or output data in the 24 hours ending 12/26/24 0548    PHYSICAL EXAM:  Physical Exam  Constitutional:       General: She is not in acute distress.     Appearance: Normal appearance. She is not toxic-appearing or diaphoretic.   HENT:      Head: Normocephalic and atraumatic.      Right Ear: External ear normal. There is no impacted cerumen.      Nose: Nose normal. No congestion.      Mouth/Throat:      Mouth: Mucous membranes are moist.      Pharynx: No oropharyngeal exudate.   Eyes:      General:         Right eye: No discharge.         Left eye: No discharge.      Extraocular Movements: Extraocular movements intact.   Cardiovascular:      Rate and Rhythm: Normal rate and regular rhythm.      Pulses: Normal pulses.      Heart sounds: No murmur heard.  Pulmonary:      Breath sounds: No wheezing.      Comments: Decreased lung sounds at base R>L, improved at apices   Abdominal:      General: Abdomen is flat. There is no distension.      Palpations: Abdomen is soft.      Tenderness: There is no abdominal tenderness.   Musculoskeletal:         General: Normal range of motion.      Cervical back: Normal  range of motion. No rigidity.      Right lower leg: Edema present.      Left lower leg: Edema present.      Comments: 5 out of 5 strength in upper and lower extremities   Neurological:      General: No focal deficit present.      Mental Status: She is alert and oriented to person, place, and time.      Cranial Nerves: No cranial nerve deficit.      Motor: No weakness.      Comments: 2+ Achilles reflex.  Sensation intact   Psychiatric:         Mood and Affect: Mood normal.         Behavior: Behavior normal.         LABS:  Reviewed    IMAGING:  Reviewed    CULTURES:   Reviewed    MEDS:  Reviewed    ASSESSMENT/PLAN:  64 y.o. female admitted for compression fracture of vertebrae  * Malignant neoplasm of breast (HCC)- (present on admission)  Assessment & Plan  Extensive metastatic disease to lungs, liver, and bones. Followed by Dr. Tabor, Oncology, and Dr. Velez, Rad Onc. Pt has declined surgical management in the past, currently on oral chemotherapy. Large fungating mass of the left breast, followed by wound care. Dr. Tabor consulted, recommends patient follow in 1 week for discussion of different cancer treatment regimen, possible further radiation if recommended by Rad Onc.   - Admit to med/onc floor  - Lidocaine patch and hydrocodone for pain management while inpatient. Cannot do fentanyl on floor   - Pain regimen, pharmacy consult  - Palliative care spoke with patient 12/26, patient deferred GOC discussion. Appreciate palliative care service's guidance   - Wound care consulted for fungating left breast mass     Restless leg syndrome  Assessment & Plan  Patient reports a history of restless leg syndrome well-controlled on ropinirole although she does not know the dosage. Has iron deficient anemia.  - Ropinirole 1 mg nightly and will titrate up  - Iron supplementation ordered    Constipation  Assessment & Plan  Chronic.  Patient reports it is worse with her chronic opioid use.  - Bowel regimen  ordered, will titrate to 1-2 soft stools daily    Weakness- (present on admission)  Assessment & Plan  Pt reporting progressive weakness since discharge in September, now becoming weak even with ambulation to the restroom. Not requiring any assistive devices. Pt requesting bedside commode due to difficulty with frequent ambulation to restroom  - PT/OT consults    Compression fracture of T5 vertebra (HCC)- (present on admission)  Assessment & Plan  Noted on CT imaging, pathologic fracture secondary to cancer.Neurosurgery, consulted appreciate recommendations.  - full spine MRI  - Pain regimen ordered, pharmacy consult  - Calcitonin for compression-fracture-related pain control  - Follow neurosurgery recs    Cancer related pain- (present on admission)  Assessment & Plan  Pain regimen ordered, malignant neoplasm of breast for plan.     Bilateral pleural effusion- (present on admission)  Assessment & Plan  Bilateral moderate sized pleural effusions seen on admission CT. Hx of same on prior admission in September 2024. At that time, IR was consulted for thoracentesis and this could not be completed due to loculations. Pulmonology was then consulted, and they noted her hypoxia may be in part due to shunting from her liver mass.  Patient reports subsequently she went to [facility] who performed thoracentesis on the right.  Patient reports thoracentesis was consistent with malignant effusion though records not available.  Pt on 2 L NC at discharge in 09/2024, requiring 3 L NC to maintain saturations on admission which is stable.  - Continue O2 supplementation, goal sats >90%.   - Thoracentesis 12/27 scheduled    Acute on chronic hypoxic respiratory failure (HCC)- (present on admission)  Assessment & Plan  Likely related to bilateral pleural effusion. In addition, see by pulmonology at last admission and thought to be component of hepatic shunting. Patient with new bilateral upper lobe infiltrates on CTAP. Without  clinical pneumonia and procal negative. Troponin peaked.   -CTM, titrate oxygen to goal    Hypertension- (present on admission)  Assessment & Plan  Continue home carvedilol, losartan    Diabetes mellitus, type 2 (HCC)- (present on admission)  Assessment & Plan  Diet controlled. Last A1c 7.1 in September 2024.  - Trend blood glucose on chem panel         Core Measures:  Fluids: PO  Lines: PIV  Abx: None  Diet: diabetic  PPX: SCDs/TEDs and heparin ppx    CODE Status: Full Code      Disposition  Patient is not medically cleared for discharge.   Anticipate discharge to to home with close outpatient follow-up.  I have placed the appropriate orders for post-discharge needs.    Yosi Espino M.D.

## 2024-12-27 NOTE — WOUND TEAM
[facility]  Inpatient Services  Wound and Skin Care Brief Evaluation    Admission Date: 12/25/2024     Last order of IP CONSULT TO WOUND CARE was found on 12/26/2024 from Hospital Encounter on 12/25/2024     HPI, PMH, SH: Reviewed    Chief Complaint   Patient presents with    Shortness of Breath     Pt c/o shortness of breath over the past couple of days. Denies new fever.  Pt wears 1.5L NC at baseline.  Pt has hx breast cancer with mets to the lungs. Currently taking PO chemotherapy pills.     Extremity Weakness     Pt states she has progressively been feeling weaker over the past couple of days to that point where she cannot ambulate.     Abdominal Pain     Pt also c/o epigastric pain and nausea.      Diagnosis: Breast cancer metastasized to bone, unspecified laterality (HCC) [C50.919, C79.51]    Unit where seen by Wound Team: R323/00     Wound consult placed regarding L breast. Chart and images reviewed. This clinician in to assess patient. Patient pleasant and agreeable.  Area is resolved, no interventions indicated.    No pressure injuries or advanced wound care needs identified. Wound consult completed. Wound team signing off, re-consult if patient has further advanced wound care needs.         PREVENTATIVE INTERVENTIONS:    Q shift Raj - performed per nursing policy  Q shift pressure point assessments - performed per nursing policy

## 2024-12-27 NOTE — PROGRESS NOTES
Neurosurgery Progress Note    Subjective:  63 yo female with known h/o bilateral breast cancer since 2019 but apparently had delay in treatment for various reasons until .  Has completed some hormone therapy and radiation per patient.  Presented with worsening thoracic spine pain and notes some right thigh numbness.  CT's of the thoracic and lumbar spine show diffuse spinal mets with T5 pathologic fx and CT CAP shows new upper lung infiltrates with bilateral pleural effusions with worsening hepatic mets and new abdominal lymphadenopathy in addition to known thoracic adenopathy.  Denies saddle anesthesia or loss of control over B&B.  Patient has some SOB with pleural effusions and states Lasix has not helped with this before, and is requesting thoracentesis prior to obtaining MRI's as she does not feel she could lay flat for these.     MRI's of spinal axis are ordered and pending still.     Exam:  Calm and cooperative with examination.  Speech eloquent and clear.  A&O x3  CN 2-12 grossly intact.  PERRL, EOMI  Motor 4/5 bilateral EHL's remainder 5/5.  Bilateral UE and LE sensory exam with decreased right L3 sensory only.    BP  Min: 123/57  Max: 164/94  Pulse  Av.6  Min: 68  Max: 77  Resp  Av.4  Min: 15  Max: 20  Temp  Av.6 °C (97.9 °F)  Min: 36.5 °C (97.7 °F)  Max: 36.8 °C (98.2 °F)  SpO2  Av %  Min: 93 %  Max: 98 %    No data recorded    Recent Labs     24  1708 24  0334 24  0253   WBC 5.1 5.1 4.3*   RBC 4.29 4.10* 4.07*   HEMOGLOBIN 11.9* 11.7* 11.7*   HEMATOCRIT 36.5* 35.9* 36.0*   MCV 85.1 87.6 88.5   MCH 27.7 28.5 28.7   MCHC 32.6 32.6 32.5   RDW 51.8* 53.8* 55.1*   PLATELETCT 250 231 197   MPV 9.1 8.8* 9.1     Recent Labs     24  1708 24  0334 24  0253   SODIUM 137 139 136   POTASSIUM 3.6 3.7 3.7   CHLORIDE 99 100 101   CO2 25 26 23   GLUCOSE 143* 128* 149*   BUN 9 9 9   CREATININE 0.65 0.71 0.61   CALCIUM 9.4 9.3 9.2               Intake/Output                          12/26/24 0700 - 12/27/24 0659 12/27/24 0700 - 12/28/24 0659     0700-1859 1900-0659 Total 0700-1859 1900-0659 Total                 Intake    Total Intake -- -- -- -- -- --       Output    Urine  --  -- --  --  -- --    Number of Times Voided 3 x 2 x 5 x -- -- --    Total Output -- -- -- -- -- --       Net I/O     -- -- -- -- -- --            No intake or output data in the 24 hours ending 12/27/24 0925          diclofenac sodium  2 g 4X/DAY PRN    metoclopramide  10 mg Q6HRS    calcium carbonate  500 mg TID PRN    [Held by provider] heparin  5,000 Units Q8HRS    ondansetron  4 mg Q4HRS PRN    ondansetron  4 mg Q4HRS PRN    carvedilol  3.125 mg BID    losartan  100 mg Q DAY    HYDROcodone-acetaminophen  1 Tablet Q6HRS PRN    Or    HYDROcodone-acetaminophen  2 Tablet Q6HRS PRN    senna-docusate  2 Tablet Q EVENING    And    polyethylene glycol/lytes  1 Packet QDAY PRN    lidocaine  1 Patch Q24HR    acetaminophen  650 mg Q4HRS PRN    ferrous sulfate  325 mg QDAY with Breakfast    ROPINIRole  1 mg QHS    fentaNYL  25 mcg Q HOUR PRN    Pharmacy Consult Request  1 Each PHARMACY TO DOSE    calcitonin (salmon)  1 Saline DAILY       Assessment and Plan:  Hospital day # 3  Known breast cancer with worsening spinal mets and other mets.  Needs MRI's full spinal axis with and without contrast, and will likely need thoracentesis prior to undergoing these.  Continue medical care.  Dr. Lowery to fully evaluate after MRI's completed.  Following.      Chemical prophylactic DVT therapy: No  Start date/time: TBD     Brain Compression: No

## 2024-12-27 NOTE — CONSULTS
"MRN: [id number]  Date of palliative consult: 12/26/24  Reason for consult: ACP \" worsening metastatic disease, patient and family in need of additional GOC discussion given evolving symptoms and potential for either more aggressive surgical management versus more palliative approach.\"  Referring provider: Dr. Nieto  Location of consult: [facility]  Additional consulting services: Neurosurgery 12/25, oncologist Dr. Tabor in to see patient after my visit; provided update    HPI:   Asha Hannon is a 64 y.o. female  with past medical history significant for metastatic breast cancer to lungs, liver and bone followed outpatient by Dr. Tabor, fungating mass of left breast followed by wound care with sporadic follow-up due to social dynamics, chronic pleural effusions, and associated acute on chronic hypoxic respiratory failure admitted 12/25/2024 with shortness of breath, progressive extreme weakness, and abdominal pain.  CT imaging obtained and patient has extensive metastatic disease including T5 vertebral compression fracture.    Additional history: Syndrome, anxiety, depression, diabetes, HLD, HTN, and hypothyroidism    Home Medications    Medication Sig Taking? Last Dose Authorizing Provider   carvedilol (COREG) 3.125 MG Tab Take 3.125 mg by mouth 2 times a day. Yes 12/25/2024 Morning Physician Outpatient   HYDROcodone-acetaminophen (NORCO) 5-325 MG Tab per tablet Take 1 Tablet by mouth every four hours as needed. Yes 12/25/2024 Morning Physician Outpatient   losartan (COZAAR) 100 MG Tab Take 1 tablet by mouth daily Yes 12/25/2024 Morning Elvis Russell M.D.   letrozole (FEMARA) 2.5 MG Tab Take 1 Tablet by mouth every day. Yes New Rx Cm Tabor M.D.   Abemaciclib 150 MG Tab Take 150 mg by mouth 2 times a day. Yes 12/25/2024 Morning Cm Tabor M.D.     Medication Allergy/Sensitivities:  Allergies   Allergen Reactions    Demerol Rash     \"was told this during surgery\"  Other " reaction(s): rash       ROS:    Review of Systems   Unable to perform ROS: Other     PE:   Recent vital signs  BMI: Body mass index is 32.85 kg/m².    Temp (24hrs), Av.4 °C (97.5 °F), Min:36.1 °C (97 °F), Max:36.6 °C (97.8 °F)  Temperature: 36.5 °C (97.7 °F)  Pulse  Av.4  Min: 63  Max: 78   Blood Pressure : (!) 150/66       Physical Exam  Constitutional:       General: She is sleeping. She is not in acute distress.     Appearance: She is obese.   Pulmonary:      Effort: No respiratory distress.       Recent Labs     24  1708 24  0334   SODIUM 137 139   POTASSIUM 3.6 3.7   CHLORIDE 99 100   CO2 25 26   GLUCOSE 143* 128*   BUN 9 9   CREATININE 0.65 0.71   CALCIUM 9.4 9.3     Recent Labs     24  1708 24  0334   WBC 5.1 5.1   RBC 4.29 4.10*   HEMOGLOBIN 11.9* 11.7*   HEMATOCRIT 36.5* 35.9*   MCV 85.1 87.6   MCH 27.7 28.5   MCHC 32.6 32.6   RDW 51.8* 53.8*   PLATELETCT 250 231   MPV 9.1 8.8*     ASSESSMENT/PLAN WITH SHARED DECISION MAKING:   Medications reviewed. Labs Reviewed.   Pertinent imaging reviewed.    PHYSICAL ASPECTS OF CARE  Palliative Performance Scale: 50%    # Breast cancer to lungs, liver, and bones  # Compression fracture T5 vertebra; neurosurgery following and pending MRI  # Cancer associated pain; basic pain regiment ordered by primary team  # Bilateral pleural effusions pending IR consult for possible thoracentesis  # Acute on chronic hypoxic respiratory failure  # Weakness  # Restless leg syndrome   Medications reviewed; increase ropinirole from 0.25 mg to 1 mg p.o. nightly; patient tolerated 1 mg dose last night    SOCIAL ASPECTS OF CARE  Patient lives locally with her daughter Juana who provides transportation.  Patient is .    SPIRITUAL ASPECTS OF CARE   Deferred.    GOALS OF CARE/SERIOUS ILLNESS CONVERSATION  Deferred per patient request.  She reports she is very tired and has hardly slept.  Offered to return tomorrow which she would appreciate.  She only  asked for increase in her restless leg medication.  Updated oncologist Dr. Chang.    Code Status: Full    ACP Documents: None on file; patient recently saw Dr. Reagan in oncology palliative clinic outpatient and declined completion of ACP documents    Interval diagnostic studies and medical documentation entries pertinent to this case were reviewed independently by me. This patient has at least one acute or chronic illness or injury that poses a threat to life or bodily function. This patient suffers from a high risk of morbidity from additional invasive diagnostic testing or intensive treatment. Discussion of recommendations and coordination of care undertaken with primary provider/treatment team.    YESIKA Rae.  Palliative Care Nurse Practitioner  [phone]                normal...

## 2024-12-28 LAB
ANION GAP SERPL CALC-SCNC: 8 MMOL/L (ref 7–16)
BASOPHILS # BLD AUTO: 1 % (ref 0–1.8)
BASOPHILS # BLD: 0.04 K/UL (ref 0–0.12)
BUN SERPL-MCNC: 7 MG/DL (ref 8–22)
CALCIUM SERPL-MCNC: 9.6 MG/DL (ref 8.5–10.5)
CHLORIDE SERPL-SCNC: 102 MMOL/L (ref 96–112)
CO2 SERPL-SCNC: 30 MMOL/L (ref 20–33)
CREAT SERPL-MCNC: 0.7 MG/DL (ref 0.5–1.4)
EOSINOPHIL # BLD AUTO: 0.1 K/UL (ref 0–0.51)
EOSINOPHIL NFR BLD: 2.5 % (ref 0–6.9)
ERYTHROCYTE [DISTWIDTH] IN BLOOD BY AUTOMATED COUNT: 55 FL (ref 35.9–50)
GFR SERPLBLD CREATININE-BSD FMLA CKD-EPI: 96 ML/MIN/1.73 M 2
GLUCOSE SERPL-MCNC: 142 MG/DL (ref 65–99)
HCT VFR BLD AUTO: 35.2 % (ref 37–47)
HGB BLD-MCNC: 11.3 G/DL (ref 12–16)
IMM GRANULOCYTES # BLD AUTO: 0.02 K/UL (ref 0–0.11)
IMM GRANULOCYTES NFR BLD AUTO: 0.5 % (ref 0–0.9)
LYMPHOCYTES # BLD AUTO: 0.72 K/UL (ref 1–4.8)
LYMPHOCYTES NFR BLD: 17.9 % (ref 22–41)
MCH RBC QN AUTO: 28.3 PG (ref 27–33)
MCHC RBC AUTO-ENTMCNC: 32.1 G/DL (ref 32.2–35.5)
MCV RBC AUTO: 88 FL (ref 81.4–97.8)
MONOCYTES # BLD AUTO: 0.54 K/UL (ref 0–0.85)
MONOCYTES NFR BLD AUTO: 13.4 % (ref 0–13.4)
MYCOBACTERIUM SPEC CULT: NORMAL
NEUTROPHILS # BLD AUTO: 2.6 K/UL (ref 1.82–7.42)
NEUTROPHILS NFR BLD: 64.7 % (ref 44–72)
NRBC # BLD AUTO: 0 K/UL
NRBC BLD-RTO: 0 /100 WBC (ref 0–0.2)
PLATELET # BLD AUTO: 219 K/UL (ref 164–446)
PMV BLD AUTO: 9.2 FL (ref 9–12.9)
POTASSIUM SERPL-SCNC: 4 MMOL/L (ref 3.6–5.5)
RBC # BLD AUTO: 4 M/UL (ref 4.2–5.4)
RHODAMINE-AURAMINE STN SPEC: NORMAL
RHODAMINE-AURAMINE STN SPEC: NORMAL
SIGNIFICANT IND 70042: NORMAL
SIGNIFICANT IND 70042: NORMAL
SITE SITE: NORMAL
SITE SITE: NORMAL
SODIUM SERPL-SCNC: 140 MMOL/L (ref 135–145)
SOURCE SOURCE: NORMAL
SOURCE SOURCE: NORMAL
WBC # BLD AUTO: 4 K/UL (ref 4.8–10.8)

## 2024-12-28 PROCEDURE — A9270 NON-COVERED ITEM OR SERVICE: HCPCS | Performed by: NURSE PRACTITIONER

## 2024-12-28 PROCEDURE — 36415 COLL VENOUS BLD VENIPUNCTURE: CPT

## 2024-12-28 PROCEDURE — 80048 BASIC METABOLIC PNL TOTAL CA: CPT

## 2024-12-28 PROCEDURE — 700111 HCHG RX REV CODE 636 W/ 250 OVERRIDE (IP)

## 2024-12-28 PROCEDURE — 99232 SBSQ HOSP IP/OBS MODERATE 35: CPT | Performed by: STUDENT IN AN ORGANIZED HEALTH CARE EDUCATION/TRAINING PROGRAM

## 2024-12-28 PROCEDURE — 700111 HCHG RX REV CODE 636 W/ 250 OVERRIDE (IP): Mod: JZ

## 2024-12-28 PROCEDURE — A9270 NON-COVERED ITEM OR SERVICE: HCPCS

## 2024-12-28 PROCEDURE — 700102 HCHG RX REV CODE 250 W/ 637 OVERRIDE(OP): Performed by: NURSE PRACTITIONER

## 2024-12-28 PROCEDURE — 85025 COMPLETE CBC W/AUTO DIFF WBC: CPT

## 2024-12-28 PROCEDURE — 700102 HCHG RX REV CODE 250 W/ 637 OVERRIDE(OP)

## 2024-12-28 PROCEDURE — 770004 HCHG ROOM/CARE - ONCOLOGY PRIVATE *

## 2024-12-28 PROCEDURE — 700101 HCHG RX REV CODE 250

## 2024-12-28 RX ORDER — ROPINIROLE 1 MG/1
1 TABLET, FILM COATED ORAL 2 TIMES DAILY PRN
Status: DISCONTINUED | OUTPATIENT
Start: 2024-12-28 | End: 2025-01-04

## 2024-12-28 RX ORDER — LORAZEPAM 2 MG/ML
2 INJECTION INTRAMUSCULAR
Status: COMPLETED | OUTPATIENT
Start: 2024-12-28 | End: 2024-12-28

## 2024-12-28 RX ORDER — ROPINIROLE 1 MG/1
1 TABLET, FILM COATED ORAL
Status: DISCONTINUED | OUTPATIENT
Start: 2024-12-28 | End: 2024-12-28

## 2024-12-28 RX ORDER — LANOLIN ALCOHOL/MO/W.PET/CERES
400 CREAM (GRAM) TOPICAL DAILY
Status: DISCONTINUED | OUTPATIENT
Start: 2024-12-28 | End: 2025-01-04

## 2024-12-28 RX ORDER — METOCLOPRAMIDE HYDROCHLORIDE 5 MG/ML
10 INJECTION INTRAMUSCULAR; INTRAVENOUS
Status: COMPLETED | OUTPATIENT
Start: 2024-12-28 | End: 2024-12-28

## 2024-12-28 RX ADMIN — ROPINIROLE HYDROCHLORIDE 1 MG: 1 TABLET, FILM COATED ORAL at 13:59

## 2024-12-28 RX ADMIN — CARVEDILOL 3.12 MG: 3.12 TABLET, FILM COATED ORAL at 16:44

## 2024-12-28 RX ADMIN — SENNOSIDES AND DOCUSATE SODIUM 2 TABLET: 50; 8.6 TABLET ORAL at 16:34

## 2024-12-28 RX ADMIN — HYDROCODONE BITARTRATE AND ACETAMINOPHEN 2 TABLET: 5; 325 TABLET ORAL at 18:42

## 2024-12-28 RX ADMIN — DICLOFENAC SODIUM 2 G: 10 GEL TOPICAL at 02:11

## 2024-12-28 RX ADMIN — ANTACID TABLETS 500 MG: 500 TABLET, CHEWABLE ORAL at 02:07

## 2024-12-28 RX ADMIN — METOCLOPRAMIDE HYDROCHLORIDE 10 MG: 5 INJECTION INTRAMUSCULAR; INTRAVENOUS at 16:33

## 2024-12-28 RX ADMIN — DICLOFENAC SODIUM 2 G: 10 GEL TOPICAL at 19:41

## 2024-12-28 RX ADMIN — ROPINIROLE HYDROCHLORIDE 1 MG: 1 TABLET, FILM COATED ORAL at 22:15

## 2024-12-28 RX ADMIN — LOSARTAN POTASSIUM 100 MG: 50 TABLET, FILM COATED ORAL at 04:45

## 2024-12-28 RX ADMIN — FENTANYL CITRATE 25 MCG: 50 INJECTION, SOLUTION INTRAMUSCULAR; INTRAVENOUS at 19:40

## 2024-12-28 RX ADMIN — FERROUS SULFATE TAB 325 MG (65 MG ELEMENTAL FE) 325 MG: 325 (65 FE) TAB at 06:27

## 2024-12-28 RX ADMIN — FENTANYL CITRATE 25 MCG: 50 INJECTION, SOLUTION INTRAMUSCULAR; INTRAVENOUS at 23:08

## 2024-12-28 RX ADMIN — CARVEDILOL 3.12 MG: 3.12 TABLET, FILM COATED ORAL at 04:45

## 2024-12-28 RX ADMIN — CALCITONIN SALMON 200 UNITS: 200 SPRAY, METERED NASAL at 04:48

## 2024-12-28 RX ADMIN — METOCLOPRAMIDE HYDROCHLORIDE 10 MG: 5 INJECTION INTRAMUSCULAR; INTRAVENOUS at 22:16

## 2024-12-28 RX ADMIN — ROPINIROLE HYDROCHLORIDE 1 MG: 1 TABLET, FILM COATED ORAL at 23:03

## 2024-12-28 RX ADMIN — SENNOSIDES AND DOCUSATE SODIUM 2 TABLET: 50; 8.6 TABLET ORAL at 04:45

## 2024-12-28 RX ADMIN — LORAZEPAM 2 MG: 2 INJECTION INTRAMUSCULAR; INTRAVENOUS at 23:07

## 2024-12-28 RX ADMIN — HYDROCODONE BITARTRATE AND ACETAMINOPHEN 2 TABLET: 5; 325 TABLET ORAL at 10:08

## 2024-12-28 RX ADMIN — METOCLOPRAMIDE HYDROCHLORIDE 10 MG: 5 INJECTION INTRAMUSCULAR; INTRAVENOUS at 11:03

## 2024-12-28 RX ADMIN — DICLOFENAC SODIUM 2 G: 10 GEL TOPICAL at 10:08

## 2024-12-28 RX ADMIN — LIDOCAINE 1 PATCH: 4 PATCH TOPICAL at 10:07

## 2024-12-28 RX ADMIN — METOCLOPRAMIDE HYDROCHLORIDE 10 MG: 5 INJECTION INTRAMUSCULAR; INTRAVENOUS at 23:07

## 2024-12-28 RX ADMIN — HYDROCODONE BITARTRATE AND ACETAMINOPHEN 2 TABLET: 5; 325 TABLET ORAL at 04:14

## 2024-12-28 RX ADMIN — Medication 400 MG: at 11:03

## 2024-12-28 RX ADMIN — ONDANSETRON 4 MG: 4 TABLET, ORALLY DISINTEGRATING ORAL at 16:34

## 2024-12-28 RX ADMIN — HEPARIN SODIUM 5000 UNITS: 5000 INJECTION, SOLUTION INTRAVENOUS; SUBCUTANEOUS at 22:16

## 2024-12-28 ASSESSMENT — PAIN DESCRIPTION - PAIN TYPE
TYPE: ACUTE PAIN

## 2024-12-28 NOTE — CARE PLAN
The patient is Watcher - Medium risk of patient condition declining or worsening    Shift Goals  Clinical Goals: MRI  Patient Goals: pain control, rest  Family Goals: POC    Progress made toward(s) clinical / shift goals:        Problem: Knowledge Deficit - Standard  Goal: Patient and family/care givers will demonstrate understanding of plan of care, disease process/condition, diagnostic tests and medications  Outcome: Progressing  Note: AOx4, patient understands plan of care, all questions answered at this time.      Problem: Skin Integrity  Goal: Skin integrity is maintained or improved  Outcome: Progressing     Problem: Fall Risk  Goal: Patient will remain free from falls  Outcome: Progressing     Problem: Pain - Standard  Goal: Alleviation of pain or a reduction in pain to the patient’s comfort goal  Outcome: Progressing

## 2024-12-28 NOTE — PROGRESS NOTES
FAMILY MEDICINE PROGRESS NOTE          PATIENT ID:  NAME:  Asha Hannon  MRN:               [id number]  YOB: 1960    Date of Admission: 12/25/2024     Attending: Mark Butts M.d.     Resident: Familia Sal D.O.     Primary Care Physician:  Elvis Russell M.D.    SUBJECTIVE:   No acute events overnight. Had thoracentesis yesterday, feels she is able to take deeper breaths today and pain is okay right now. Reports some anticipation regarding MRI and having to lay flat for that as it causes her a lot of pain. Has had to be under anesthesia in the past to tolerate before.     OBJECTIVE:  Temp:  [36.4 °C (97.5 °F)-37.3 °C (99.2 °F)] 36.4 °C (97.5 °F)  Pulse:  [62-74] 62  Resp:  [14-17] 17  BP: (113-139)/(55-63) 113/55  SpO2:  [94 %-99 %] 94 %    No intake or output data in the 24 hours ending 12/26/24 0548    PHYSICAL EXAM:  Physical Exam  Constitutional:       General: She is not in acute distress.     Appearance: Normal appearance. She is not toxic-appearing or diaphoretic.   HENT:      Head: Normocephalic and atraumatic.      Right Ear: External ear normal. There is no impacted cerumen.      Nose: Nose normal. No congestion.      Mouth/Throat:      Mouth: Mucous membranes are moist.      Pharynx: No oropharyngeal exudate.   Eyes:      General:         Right eye: No discharge.         Left eye: No discharge.      Extraocular Movements: Extraocular movements intact.   Cardiovascular:      Rate and Rhythm: Normal rate and regular rhythm.      Pulses: Normal pulses.      Heart sounds: No murmur heard.  Pulmonary:      Breath sounds: No wheezing.      Comments: Good air movement throughout. Bandage to right lower lateral thoracic wall where thoracentesis was completed.   Abdominal:      General: Abdomen is flat. There is no distension.      Palpations: Abdomen is soft.      Tenderness: There is no abdominal tenderness.   Musculoskeletal:         General: Normal range of motion.       Cervical back: Normal range of motion. No rigidity.      Right lower leg: Edema present.      Left lower leg: Edema present.      Comments: 5 out of 5 strength in upper and lower extremities   Neurological:      General: No focal deficit present.      Mental Status: She is alert and oriented to person, place, and time.      Cranial Nerves: No cranial nerve deficit.      Motor: No weakness.   Psychiatric:         Mood and Affect: Mood normal.         Behavior: Behavior normal.         LABS:  Reviewed    IMAGING:  Reviewed    CULTURES:   Reviewed    MEDS:  Reviewed    ASSESSMENT/PLAN:  64 y.o. female admitted for compression fracture of vertebrae  * Malignant neoplasm of breast (HCC)- (present on admission)  Assessment & Plan  Extensive metastatic disease to lungs, liver, and bones. Followed by Dr. Tabor, Oncology, and Dr. Velez, Rad Onc. Pt has declined surgical management in the past, currently on oral chemotherapy. Large fungating mass of the left breast, followed by wound care. Dr. Tabor consulted, recommends patient follow in 1 week for discussion of different cancer treatment regimen, possible further radiation if recommended by Rad Onc.    Plan:  - Pending full spine MRI after thoracentesis, follow recs per neurosurgery   - Can plan for ativan for MRI   - Wound care consulted for fungating left breast mass   - Lidocaine patch and hydrocodone for pain management while inpatient with breakthrough fentanyl  - Palliative care consulted, patient deferring GOC discussion until MRI has returned.   - Follow up with Dr. Tabor in 1 week following discharge        Compression fracture of T5 vertebra (HCC)- (present on admission)  Assessment & Plan  Noted on CT imaging, pathologic fracture secondary to cancer.Neurosurgery, consulted appreciate recommendations.  - full spine MRI  - Pain regimen ordered, pharmacy consult  - Calcitonin for compression-fracture-related pain control  - Follow  neurosurgery recs  - Per oncology would likely proceed with radiation pending what imaging shows     Bilateral pleural effusion- (present on admission)  Assessment & Plan  Bilateral moderate sized pleural effusions seen on admission CT. Hx of same on prior admission in September 2024. At that time, IR was consulted for thoracentesis and this could not be completed due to loculations. Pulmonology was then consulted, and they noted her hypoxia may be in part due to shunting from her liver mass.  Patient reports subsequently she went to [facility] who performed thoracentesis on the right.  Patient reports thoracentesis was consistent with malignant effusion though records not available.  Pt on 2 L NC at discharge in 09/2024, requiring 3 L NC to maintain saturations on admission which is stable.    Plan:  - Continue O2 supplementation, goal sats >90%.   - S/p thoracentesis 12/27  - Malignant cells seen per pathology and exudative effusion with     - Follow fluid cultures    Acute on chronic hypoxic respiratory failure (HCC)- (present on admission)  Assessment & Plan  Likely related to bilateral pleural effusion. In addition, see by pulmonology at last admission and thought to be component of hepatic shunting. Patient with new bilateral upper lobe infiltrates on CTAP. Without clinical pneumonia and procal negative. Troponin peaked.   -CTM, titrate oxygen to goal    Restless leg syndrome- (present on admission)  Assessment & Plan  Patient reports a history of restless leg syndrome well-controlled on ropinirole although she does not know the dosage. Has iron deficient anemia.  - Ropinirole 1 mg nightly and will titrate up  - Iron supplementation ordered    Constipation- (present on admission)  Assessment & Plan  Chronic.  Patient reports it is worse with her chronic opioid use.  - Bowel regimen ordered, will titrate to 1-2 soft stools daily    Weakness- (present on admission)  Assessment & Plan  Pt reporting  progressive weakness since discharge in September, now becoming weak even with ambulation to the restroom. Not requiring any assistive devices. Pt requesting bedside commode due to difficulty with frequent ambulation to restroom  - PT/OT consults    Cancer related pain- (present on admission)  Assessment & Plan  Pain regimen ordered, malignant neoplasm of breast for plan.     Hypertension- (present on admission)  Assessment & Plan  Continue home carvedilol, losartan    Diabetes mellitus, type 2 (HCC)- (present on admission)  Assessment & Plan  Diet controlled. Last A1c 7.1 in September 2024.  - Trend blood glucose on chem panel         Core Measures:  Fluids: PO  Lines: PIV  Abx: None  Diet: diabetic  PPX: SCDs/TEDs and heparin ppx    CODE Status: Full Code      Disposition  Patient is not medically cleared for discharge.   Anticipate discharge to to home with close outpatient follow-up.  I have placed the appropriate orders for post-discharge needs.    Familia Sal DO, PGY-2  [facility]

## 2024-12-28 NOTE — PROGRESS NOTES
"MRN: [id number]  Date of palliative consult: 24  Reason for consult: ACP \" worsening metastatic disease, patient and family in need of additional GOC discussion given evolving symptoms and potential for either more aggressive surgical management versus more palliative approach.\"  Referring provider: Dr. Nieto  Location of consult: [facility]  Additional consulting services: Neurosurgery , oncologist Dr. Tabor in to see patient after my visit; provided update    HPI:   Asha Hannon is a 64 y.o. female  with past medical history significant for metastatic breast cancer to lungs, liver and bone followed outpatient by Dr. Tabor, fungating mass of left breast followed by wound care with sporadic follow-up due to social dynamics, chronic pleural effusions, and associated acute on chronic hypoxic respiratory failure admitted 2024 with shortness of breath, progressive extreme weakness, and abdominal pain.  CT imaging obtained and patient has extensive metastatic disease including T5 vertebral compression fracture. Pending MRI and neurosurgery consult.     Additional history: Restless leg syndrome, anxiety, depression, diabetes, HLD, HTN, and hypothyroidism    Interval History:  : Discussed with Dr. Tabor via VOLTE - shared with primary team Dr. Espino; pending MRI to evaluate spinal mets/disease. Pain well controlled. Patient appreciative of increase in ropinirole last night.  She reports her pain is well-managed with Norco and IV fentanyl.  Patient would like to avoid oxycodone and Dilaudid due to extreme severe nausea reported on prior administrations.  She confirms she is intolerant of Demerol.    ROS:    Review of Systems   Unable to perform ROS: Other   Musculoskeletal:  Positive for back pain.     PE:   Recent vital signs  BMI: Body mass index is 32.85 kg/m².    Temp (24hrs), Av.8 °C (98.3 °F), Min:36.5 °C (97.7 °F), Max:37.3 °C (99.2 °F)  Temperature: 37.3 °C " (99.2 °F)  Pulse  Av.6  Min: 63  Max: 78   Blood Pressure : 130/63       Physical Exam  Constitutional:       General: She is sleeping. She is not in acute distress.     Appearance: She is obese.      Comments: Laying on right side; granddaughter at bedside but left room during visit   Pulmonary:      Effort: No respiratory distress.   Skin:     Coloration: Skin is pale.      Findings: Bruising (BLE and arms) present.   Neurological:      Mental Status: She is oriented to person, place, and time.      Comments: Oriented to event       Recent Labs     24  0253   SODIUM 137 139 136   POTASSIUM 3.6 3.7 3.7   CHLORIDE 99 100 101   CO2    GLUCOSE 143* 128* 149*   BUN 9 9 9   CREATININE 0.65 0.71 0.61   CALCIUM 9.4 9.3 9.2     Recent Labs     24  0253   WBC 5.1 5.1 4.3*   RBC 4.29 4.10* 4.07*   HEMOGLOBIN 11.9* 11.7* 11.7*   HEMATOCRIT 36.5* 35.9* 36.0*   MCV 85.1 87.6 88.5   MCH 27.7 28.5 28.7   MCHC 32.6 32.6 32.5   RDW 51.8* 53.8* 55.1*   PLATELETCT 250 231 197   MPV 9.1 8.8* 9.1     ASSESSMENT/PLAN WITH SHARED DECISION MAKING:   ReviewPertinent imaging reviewed.    PHYSICAL ASPECTS OF CARE  Palliative Performance Scale: 50%    # Breast cancer to lungs, liver, and bones  # Compression fracture T5 vertebra; neurosurgery following and pending MRI  # Cancer associated pain; basic pain regiment ordered by primary team  PDMP reviewed score 220   MEDD ~ 19 mg  continue Norco and IV fentanyl as needed  # Bilateral pleural effusions pending IR consult for possible thoracentesis  # Acute on chronic hypoxic respiratory failure  # Weakness  # Restless leg syndrome   Medications reviewed; increase ropinirole from 0.25 mg to 1 mg p.o. nightly; patient tolerated 1 mg dose last night    SOCIAL ASPECTS OF CARE  Patient lives locally with her daughter Juana who provides transportation.  Patient is .    SPIRITUAL ASPECTS OF CARE   Jewish;  deferred at this time.    GOALS OF CARE/SERIOUS ILLNESS CONVERSATION  12/26 Deferred per patient request.  She reports she is very tired and has hardly slept.  Offered to return tomorrow which she would appreciate.  She only asked for increase in her restless leg medication.  Updated oncologist Dr. Tabor.     12/27 Provided overview of palliative care services including symptom management and discussions regarding goals of care in collaboration with patient's care team.  She has not completed an advance directive or advance care planning documents.  Confirmed palliative care can assist her if she wishes to complete such documents outlining her healthcare wishes for the future and explore important values.  Patient would like to defer goals of care discussion until MRI results are back.  Also encouraged her to continue discussing with her oncologist.  Confirmed palliative care can provide an additional layer of support depending on her needs.  She confirms she will reach out and was okay with me following up next week if I do not hear from her.  She has palliative care contact information from yesterday.  Encouraged her to reach out with any questions or needs    Code Status: Full    ACP Documents: None on file.    Interval diagnostic studies and medical documentation entries pertinent to this case were reviewed independently by me. This patient has at least one acute or chronic illness or injury that poses a threat to life or bodily function. This patient suffers from a high risk of morbidity from additional invasive diagnostic testing or intensive treatment. Discussion of recommendations and coordination of care undertaken with primary provider/treatment team.    YESIKA Rae.  Palliative Care Nurse Practitioner  [phone]    Learner present: Chichi MAY

## 2024-12-28 NOTE — CARE PLAN
The patient is Watcher - Medium risk of patient condition declining or worsening    Shift Goals  Clinical Goals: MRI scheduled, pain control,  Patient Goals: pain control, rest  Family Goals: POC    Progress made toward(s) clinical / shift goals:  Pt is alert and orientated. 0-10 pain scale used to evaluate pain level. Pt has reported pain during this shift and appropriate medication administered for pain see MAR. Pt being reassessed for pain as needed. Pt calls appropriately. MRI yet to be scheduled.     Patient is not progressing towards the following goals:NA

## 2024-12-29 LAB
ALBUMIN SERPL BCP-MCNC: 3.4 G/DL (ref 3.2–4.9)
ALBUMIN/GLOB SERPL: 1.2 G/DL
ALP SERPL-CCNC: 79 U/L (ref 30–99)
ALT SERPL-CCNC: 53 U/L (ref 2–50)
ANION GAP SERPL CALC-SCNC: 10 MMOL/L (ref 7–16)
AST SERPL-CCNC: 122 U/L (ref 12–45)
BASOPHILS # BLD AUTO: 1.5 % (ref 0–1.8)
BASOPHILS # BLD: 0.06 K/UL (ref 0–0.12)
BILIRUB SERPL-MCNC: 0.3 MG/DL (ref 0.1–1.5)
BUN SERPL-MCNC: 9 MG/DL (ref 8–22)
CALCIUM ALBUM COR SERPL-MCNC: 9.7 MG/DL (ref 8.5–10.5)
CALCIUM SERPL-MCNC: 9.2 MG/DL (ref 8.5–10.5)
CHLORIDE SERPL-SCNC: 102 MMOL/L (ref 96–112)
CO2 SERPL-SCNC: 29 MMOL/L (ref 20–33)
CREAT SERPL-MCNC: 0.64 MG/DL (ref 0.5–1.4)
EOSINOPHIL # BLD AUTO: 0.08 K/UL (ref 0–0.51)
EOSINOPHIL NFR BLD: 2 % (ref 0–6.9)
ERYTHROCYTE [DISTWIDTH] IN BLOOD BY AUTOMATED COUNT: 55.6 FL (ref 35.9–50)
GFR SERPLBLD CREATININE-BSD FMLA CKD-EPI: 98 ML/MIN/1.73 M 2
GLOBULIN SER CALC-MCNC: 2.8 G/DL (ref 1.9–3.5)
GLUCOSE SERPL-MCNC: 113 MG/DL (ref 65–99)
HCT VFR BLD AUTO: 34.9 % (ref 37–47)
HGB BLD-MCNC: 11.2 G/DL (ref 12–16)
IMM GRANULOCYTES # BLD AUTO: 0.01 K/UL (ref 0–0.11)
IMM GRANULOCYTES NFR BLD AUTO: 0.2 % (ref 0–0.9)
LYMPHOCYTES # BLD AUTO: 0.73 K/UL (ref 1–4.8)
LYMPHOCYTES NFR BLD: 18 % (ref 22–41)
MCH RBC QN AUTO: 28.5 PG (ref 27–33)
MCHC RBC AUTO-ENTMCNC: 32.1 G/DL (ref 32.2–35.5)
MCV RBC AUTO: 88.8 FL (ref 81.4–97.8)
MONOCYTES # BLD AUTO: 0.51 K/UL (ref 0–0.85)
MONOCYTES NFR BLD AUTO: 12.6 % (ref 0–13.4)
NEUTROPHILS # BLD AUTO: 2.66 K/UL (ref 1.82–7.42)
NEUTROPHILS NFR BLD: 65.7 % (ref 44–72)
NRBC # BLD AUTO: 0 K/UL
NRBC BLD-RTO: 0 /100 WBC (ref 0–0.2)
PLATELET # BLD AUTO: 223 K/UL (ref 164–446)
PMV BLD AUTO: 9 FL (ref 9–12.9)
POTASSIUM SERPL-SCNC: 3.9 MMOL/L (ref 3.6–5.5)
PROT SERPL-MCNC: 6.2 G/DL (ref 6–8.2)
RBC # BLD AUTO: 3.93 M/UL (ref 4.2–5.4)
SODIUM SERPL-SCNC: 141 MMOL/L (ref 135–145)
WBC # BLD AUTO: 4.1 K/UL (ref 4.8–10.8)

## 2024-12-29 PROCEDURE — A9270 NON-COVERED ITEM OR SERVICE: HCPCS

## 2024-12-29 PROCEDURE — 700102 HCHG RX REV CODE 250 W/ 637 OVERRIDE(OP)

## 2024-12-29 PROCEDURE — 700111 HCHG RX REV CODE 636 W/ 250 OVERRIDE (IP)

## 2024-12-29 PROCEDURE — 770004 HCHG ROOM/CARE - ONCOLOGY PRIVATE *

## 2024-12-29 PROCEDURE — 700111 HCHG RX REV CODE 636 W/ 250 OVERRIDE (IP): Mod: JZ

## 2024-12-29 PROCEDURE — 36415 COLL VENOUS BLD VENIPUNCTURE: CPT

## 2024-12-29 PROCEDURE — A9270 NON-COVERED ITEM OR SERVICE: HCPCS | Performed by: NURSE PRACTITIONER

## 2024-12-29 PROCEDURE — 700101 HCHG RX REV CODE 250

## 2024-12-29 PROCEDURE — 700102 HCHG RX REV CODE 250 W/ 637 OVERRIDE(OP): Performed by: NURSE PRACTITIONER

## 2024-12-29 PROCEDURE — 85025 COMPLETE CBC W/AUTO DIFF WBC: CPT

## 2024-12-29 PROCEDURE — 80053 COMPREHEN METABOLIC PANEL: CPT

## 2024-12-29 PROCEDURE — 99232 SBSQ HOSP IP/OBS MODERATE 35: CPT | Mod: GC | Performed by: STUDENT IN AN ORGANIZED HEALTH CARE EDUCATION/TRAINING PROGRAM

## 2024-12-29 RX ADMIN — METOCLOPRAMIDE HYDROCHLORIDE 10 MG: 5 INJECTION INTRAMUSCULAR; INTRAVENOUS at 05:25

## 2024-12-29 RX ADMIN — LIDOCAINE 1 PATCH: 4 PATCH TOPICAL at 11:30

## 2024-12-29 RX ADMIN — LOSARTAN POTASSIUM 100 MG: 50 TABLET, FILM COATED ORAL at 05:25

## 2024-12-29 RX ADMIN — HYDROCODONE BITARTRATE AND ACETAMINOPHEN 2 TABLET: 5; 325 TABLET ORAL at 02:29

## 2024-12-29 RX ADMIN — FENTANYL CITRATE 25 MCG: 50 INJECTION, SOLUTION INTRAMUSCULAR; INTRAVENOUS at 21:29

## 2024-12-29 RX ADMIN — ROPINIROLE HYDROCHLORIDE 1 MG: 1 TABLET, FILM COATED ORAL at 17:59

## 2024-12-29 RX ADMIN — ROPINIROLE HYDROCHLORIDE 1 MG: 1 TABLET, FILM COATED ORAL at 21:30

## 2024-12-29 RX ADMIN — SENNOSIDES AND DOCUSATE SODIUM 2 TABLET: 50; 8.6 TABLET ORAL at 18:48

## 2024-12-29 RX ADMIN — DICLOFENAC SODIUM 2 G: 10 GEL TOPICAL at 02:30

## 2024-12-29 RX ADMIN — ROPINIROLE HYDROCHLORIDE 1 MG: 1 TABLET, FILM COATED ORAL at 07:17

## 2024-12-29 RX ADMIN — FENTANYL CITRATE 25 MCG: 50 INJECTION, SOLUTION INTRAMUSCULAR; INTRAVENOUS at 19:54

## 2024-12-29 RX ADMIN — METOCLOPRAMIDE HYDROCHLORIDE 10 MG: 5 INJECTION INTRAMUSCULAR; INTRAVENOUS at 17:51

## 2024-12-29 RX ADMIN — METOCLOPRAMIDE HYDROCHLORIDE 10 MG: 5 INJECTION INTRAMUSCULAR; INTRAVENOUS at 11:30

## 2024-12-29 RX ADMIN — FENTANYL CITRATE 25 MCG: 50 INJECTION, SOLUTION INTRAMUSCULAR; INTRAVENOUS at 03:45

## 2024-12-29 RX ADMIN — SENNOSIDES AND DOCUSATE SODIUM 2 TABLET: 50; 8.6 TABLET ORAL at 05:25

## 2024-12-29 RX ADMIN — Medication 400 MG: at 05:24

## 2024-12-29 RX ADMIN — DICLOFENAC SODIUM 2 G: 10 GEL TOPICAL at 18:49

## 2024-12-29 RX ADMIN — FENTANYL CITRATE 25 MCG: 50 INJECTION, SOLUTION INTRAMUSCULAR; INTRAVENOUS at 13:38

## 2024-12-29 RX ADMIN — HYDROCODONE BITARTRATE AND ACETAMINOPHEN 2 TABLET: 5; 325 TABLET ORAL at 11:29

## 2024-12-29 RX ADMIN — CARVEDILOL 3.12 MG: 3.12 TABLET, FILM COATED ORAL at 17:51

## 2024-12-29 RX ADMIN — FERROUS SULFATE TAB 325 MG (65 MG ELEMENTAL FE) 325 MG: 325 (65 FE) TAB at 07:17

## 2024-12-29 RX ADMIN — HYDROCODONE BITARTRATE AND ACETAMINOPHEN 2 TABLET: 5; 325 TABLET ORAL at 18:47

## 2024-12-29 RX ADMIN — CARVEDILOL 3.12 MG: 3.12 TABLET, FILM COATED ORAL at 05:25

## 2024-12-29 ASSESSMENT — PAIN DESCRIPTION - PAIN TYPE
TYPE: ACUTE PAIN

## 2024-12-29 NOTE — PROGRESS NOTES
Pt arrived to room w/ transport, notified by transport that pt fell when he attempted to get pt out of wheelchair w/o assistance from staff. When I was notified of pt in room, pt was already in bed.   Pt stated her legs felt weak and gave out, leading to fall. Post-fall assessment completed, vitals stable, neuro at baseline. Daughter at bedside witnessed fall. [facility] resident notified. Pharmacy notified.

## 2024-12-29 NOTE — CARE PLAN
The patient is Watcher - Medium risk of patient condition declining or worsening    Shift Goals  Clinical Goals: Pt will have MRI done and pain managed at 3/10 or less throughout the end of my shift  Patient Goals: Pain management  Family Goals: MIMI    Progress made toward(s) clinical / shift goals:    Problem: Skin Integrity  Goal: Skin integrity is maintained or improved  Outcome: Progressing  Note: Skin integrity maintained w/ silicone O2 tubing, grey ear foams, pillows, and ambulating pt to bathroom      Problem: Fall Risk  Goal: Patient will remain free from falls  Outcome: Progressing  Note: Pt scored as a LOW FALL RISK at initial assessment, but pt experienced a fall w/ transport and is now a MODERATE FALL RISK      Problem: Pain - Standard  Goal: Alleviation of pain or a reduction in pain to the patient’s comfort goal  Outcome: Progressing  Note: Pt calls for pain medication consistently and states there is no relief of pain after pharmacological and non-pharm interventions.        Patient is not progressing towards the following goals:

## 2024-12-29 NOTE — PROGRESS NOTES
Neurosurgery Progress Note    Subjective:  63 yo female with known h/o bilateral breast cancer since 2019 but apparently had delay in treatment for various reasons until .  Has completed some hormone therapy and radiation per patient.  Presented with worsening thoracic spine pain and notes some right thigh numbness.  CT's of the thoracic and lumbar spine show diffuse spinal mets with T5 pathologic fx and CT CAP shows new upper lung infiltrates with bilateral pleural effusions with worsening hepatic mets and new abdominal lymphadenopathy in addition to known thoracic adenopathy.  Denies saddle anesthesia or loss of control over B&B.  Attempted to get MRI's with some sedation yesterday, but was still too painful for patient, so will need general anesthesia for these.  Patient did have an assisted fall to floor while getting back to bed following sedation for attempted MRI yesterday, denies new pain per family who witnessed this.  Patient resting comfortably this AM.         Exam:  Calm and cooperative with examination.  Speech eloquent and clear.  A&O x3  CN 2-12 grossly intact.  PERRL, EOMI  Motor 4/5 bilateral EHL's remainder 5/5.  Bilateral UE and LE sensory exam with decreased right L3 sensory only.    BP  Min: 110/56  Max: 139/56  Pulse  Av.2  Min: 63  Max: 72  Resp  Avg: 15.8  Min: 15  Max: 17  Temp  Av.5 °C (97.7 °F)  Min: 36.1 °C (97 °F)  Max: 37.2 °C (99 °F)  SpO2  Av.5 %  Min: 92 %  Max: 98 %    No data recorded    Recent Labs     24  0253 24  0504 24  0454   WBC 4.3* 4.0* 4.1*   RBC 4.07* 4.00* 3.93*   HEMOGLOBIN 11.7* 11.3* 11.2*   HEMATOCRIT 36.0* 35.2* 34.9*   MCV 88.5 88.0 88.8   MCH 28.7 28.3 28.5   MCHC 32.5 32.1* 32.1*   RDW 55.1* 55.0* 55.6*   PLATELETCT 197 219 223   MPV 9.1 9.2 9.0     Recent Labs     24  0253 24  0504 24  0454   SODIUM 136 140 141   POTASSIUM 3.7 4.0 3.9   CHLORIDE 101 102 102   CO2 23 30 29   GLUCOSE 149* 142* 113*   BUN 9  7* 9   CREATININE 0.61 0.70 0.64   CALCIUM 9.2 9.6 9.2               Intake/Output                         12/28/24 0700 - 12/29/24 0659 12/29/24 0700 - 12/30/24 0659     0700-1859 1900-0659 Total 0700-1859 1900-0659 Total                 Intake    Total Intake -- -- -- -- -- --       Output    Urine  --  -- --  --  -- --    Number of Times Voided -- 2 x 2 x 1 x -- 1 x    Stool  --  -- --  --  -- --    Number of Times Stooled -- 0 x 0 x -- -- --    Total Output -- -- -- -- -- --       Net I/O     -- -- -- -- -- --            No intake or output data in the 24 hours ending 12/29/24 0848          magnesium oxide  400 mg DAILY    ROPINIRole  1 mg BID PRN    diclofenac sodium  2 g 4X/DAY PRN    metoclopramide  10 mg Q6HRS    calcium carbonate  500 mg TID PRN    senna-docusate  2 Tablet BID    And    polyethylene glycol/lytes  1 Packet QDAY PRN    heparin  5,000 Units Q8HRS    ondansetron  4 mg Q4HRS PRN    ondansetron  4 mg Q4HRS PRN    carvedilol  3.125 mg BID    losartan  100 mg Q DAY    HYDROcodone-acetaminophen  1 Tablet Q6HRS PRN    Or    HYDROcodone-acetaminophen  2 Tablet Q6HRS PRN    lidocaine  1 Patch Q24HR    acetaminophen  650 mg Q4HRS PRN    ferrous sulfate  325 mg QDAY with Breakfast    ROPINIRole  1 mg QHS    fentaNYL  25 mcg Q HOUR PRN    Pharmacy Consult Request  1 Each PHARMACY TO DOSE    calcitonin (salmon)  1 [place] DAILY       Assessment and Plan:  Hospital day # 5  Known breast cancer with worsening spinal mets and other mets.  Needs MRI's full spinal axis with and without contrast, and will need general anesthesia to complete.   Continue medical care.  Dr. Lowery has seen and plans to fully evaluate after MRI's completed.  Following.      Chemical prophylactic DVT therapy: No  Start date/time: TBD     Brain Compression: No

## 2024-12-29 NOTE — PROGRESS NOTES
FAMILY MEDICINE PROGRESS NOTE          PATIENT ID:  NAME:  Asha Hannon  MRN:               [id number]  YOB: 1960    Date of Admission: 12/25/2024     Attending: Mark Butts M.d.     Resident: Yosi Espino M.D.    Primary Care Physician:  Elvis Russell M.D.      SUBJECTIVE:   Patient fell overnight with feeling of legs giving out. Did not hit head. Neurologically intact after fall per night resident.    Patient reports fall happened when she still had oxycodone in her system and does not remember falling.  Denies new pain. Reports she does not feel short of breath or have chest pain.  No lightheadedness, dizziness, change of vision, nausea, vomiting, new or focal weakness.    OBJECTIVE:  Temp:  [36.1 °C (97 °F)-37.2 °C (99 °F)] 36.1 °C (97 °F)  Pulse:  [63-72] 63  Resp:  [15-17] 17  BP: (110-139)/(56-70) 139/56  SpO2:  [92 %-98 %] 92 %    No intake or output data in the 24 hours ending 12/26/24 0548    PHYSICAL EXAM:  Physical Exam  Constitutional:       General: She is not in acute distress.     Appearance: Normal appearance. She is not toxic-appearing or diaphoretic.   HENT:      Head: Normocephalic and atraumatic.      Right Ear: External ear normal. There is no impacted cerumen.      Nose: Nose normal. No congestion.      Mouth/Throat:      Mouth: Mucous membranes are moist.      Pharynx: No oropharyngeal exudate.   Eyes:      General:         Right eye: No discharge.         Left eye: No discharge.      Extraocular Movements: Extraocular movements intact.   Cardiovascular:      Rate and Rhythm: Normal rate and regular rhythm.      Pulses: Normal pulses.      Heart sounds: No murmur heard.  Pulmonary:      Effort: Pulmonary effort is normal.      Breath sounds: No wheezing.      Comments: Decreased lung sounds at base bilateral  Abdominal:      General: Abdomen is flat. There is no distension.      Palpations: Abdomen is soft.      Tenderness: There is no abdominal  tenderness.   Musculoskeletal:         General: No tenderness. Normal range of motion.      Cervical back: Normal range of motion. No rigidity.      Comments: 5 out of 5 strength in upper and lower extremities   Neurological:      General: No focal deficit present.      Mental Status: She is alert and oriented to person, place, and time.      Cranial Nerves: No cranial nerve deficit.      Motor: No weakness.   Psychiatric:         Mood and Affect: Mood normal.         Behavior: Behavior normal.         LABS:  Reviewed    IMAGING:  Reviewed    CULTURES:   Reviewed    MEDS:  Reviewed    ASSESSMENT/PLAN:  64 y.o. female admitted for pathologic compression fracture of vertebrae with pleural effusion   * Malignant neoplasm of breast (HCC)- (present on admission)  Assessment & Plan  Extensive metastatic disease to lungs, liver, and bones. Followed by Dr. Tabor, Oncology, and Dr. Velez, Rad Onc. Pt has declined surgical management in the past, currently on oral chemotherapy. Large fungating mass of the left breast, followed by wound care. Dr. Tabor consulted, recommends patient follow in 1 week for discussion of different cancer treatment regimen, possible further radiation if recommended by Rad Onc.  - Pending full spine/brain MRI, ordered with anesthesia as could not tolerate on 12/28. Follow recs per neurosurgery   - Wound care consulted for fungating left breast mass   - Lidocaine patch and hydrocodone for pain management while inpatient with breakthrough fentanyl  - Palliative care consulted, patient deferring GOC discussion until MRI has returned.   - Follow up with Dr. Tabor in 1 week following discharge        Restless leg syndrome- (present on admission)  Assessment & Plan  Patient reports a history of restless leg syndrome well-controlled on ropinirole although she does not know the dosage. Has iron deficient anemia.  - Ropinirole 1 mg nightly and will titrate up  - Iron supplementation  ordered    Constipation- (present on admission)  Assessment & Plan  Chronic.  Patient reports it is worse with her chronic opioid use.  - Bowel regimen ordered, will titrate to 1-2 soft stools daily    Weakness- (present on admission)  Assessment & Plan  Pt reporting progressive weakness since discharge in September, now becoming weak even with ambulation to the restroom. Not requiring any assistive devices. Pt requesting bedside commode due to difficulty with frequent ambulation to restroom  - PT/OT consults    Compression fracture of T5 vertebra (HCC)- (present on admission)  Assessment & Plan  Noted on CT imaging, pathologic fracture secondary to cancer.Neurosurgery, consulted appreciate recommendations.  - full spine/brain MRI  - Pain regimen ordered, pharmacy consult  - Calcitonin for compression-fracture-related pain control  - Follow neurosurgery recs  - Per oncology would likely proceed with radiation pending what imaging shows     Cancer related pain- (present on admission)  Assessment & Plan  Pain regimen ordered, malignant neoplasm of breast for plan.     Bilateral pleural effusion- (present on admission)  Assessment & Plan  Bilateral moderate sized pleural effusions seen on admission CT. Hx of same on prior admission in September 2024. At that time, IR was consulted for thoracentesis and this could not be completed due to loculations. Pulmonology was then consulted, and they noted her hypoxia may be in part due to shunting from her liver mass.  Patient reports subsequently she went to [facility] who performed thoracentesis on the right.  Patient reports thoracentesis was consistent with malignant effusion though records not available.  Pt on 2 L NC at discharge in 09/2024, requiring 3 L NC to maintain saturations on admission which is stable to slightly improving.   - Continue O2 supplementation, goal sats >90%.   - S/p thoracentesis 12/27  - Malignant cells seen per pathology and exudative effusion  with     - Follow fluid cultures    Acute on chronic hypoxic respiratory failure (HCC)- (present on admission)  Assessment & Plan  Likely related to bilateral pleural effusion. In addition, see by pulmonology at last admission and thought to be component of hepatic shunting. Patient with new bilateral upper lobe infiltrates on CTAP. Without clinical pneumonia and procal negative. Troponin peaked.   -CTM, titrate oxygen to goal    Hypertension- (present on admission)  Assessment & Plan  Continue home carvedilol, losartan    Diabetes mellitus, type 2 (HCC)- (present on admission)  Assessment & Plan  Diet controlled. Last A1c 7.1 in September 2024.  - Trend blood glucose on chem panel         Core Measures:  Fluids: PO  Lines: PIV  Abx: None  Diet: diabetic  PPX: SCDs/TEDs and heparin ppx    CODE Status: Full Code    Disposition  Patient is not medically cleared for discharge, pending MRI with sedation of spine/brain.   Anticipate discharge to to home with close outpatient follow-up.  I have placed the appropriate orders for post-discharge needs.    Yosi Espino M.D.

## 2024-12-30 PROBLEM — R10.13 EPIGASTRIC PAIN: Status: ACTIVE | Noted: 2024-12-30

## 2024-12-30 LAB
ALBUMIN SERPL BCP-MCNC: 3.9 G/DL (ref 3.2–4.9)
ALBUMIN/GLOB SERPL: 1.4 G/DL
ALP SERPL-CCNC: 90 U/L (ref 30–99)
ALT SERPL-CCNC: 59 U/L (ref 2–50)
ANION GAP SERPL CALC-SCNC: 10 MMOL/L (ref 7–16)
AST SERPL-CCNC: 135 U/L (ref 12–45)
BACTERIA FLD AEROBE CULT: NORMAL
BASOPHILS # BLD AUTO: 0.7 % (ref 0–1.8)
BASOPHILS # BLD: 0.04 K/UL (ref 0–0.12)
BILIRUB SERPL-MCNC: 0.4 MG/DL (ref 0.1–1.5)
BUN SERPL-MCNC: 9 MG/DL (ref 8–22)
CALCIUM ALBUM COR SERPL-MCNC: 9.6 MG/DL (ref 8.5–10.5)
CALCIUM SERPL-MCNC: 9.5 MG/DL (ref 8.5–10.5)
CHLORIDE SERPL-SCNC: 98 MMOL/L (ref 96–112)
CO2 SERPL-SCNC: 29 MMOL/L (ref 20–33)
CREAT SERPL-MCNC: 0.58 MG/DL (ref 0.5–1.4)
EOSINOPHIL # BLD AUTO: 0.11 K/UL (ref 0–0.51)
EOSINOPHIL NFR BLD: 2 % (ref 0–6.9)
ERYTHROCYTE [DISTWIDTH] IN BLOOD BY AUTOMATED COUNT: 55.5 FL (ref 35.9–50)
GFR SERPLBLD CREATININE-BSD FMLA CKD-EPI: 100 ML/MIN/1.73 M 2
GLOBULIN SER CALC-MCNC: 2.8 G/DL (ref 1.9–3.5)
GLUCOSE SERPL-MCNC: 116 MG/DL (ref 65–99)
GRAM STN SPEC: NORMAL
HCT VFR BLD AUTO: 37.9 % (ref 37–47)
HGB BLD-MCNC: 12.1 G/DL (ref 12–16)
IMM GRANULOCYTES # BLD AUTO: 0.02 K/UL (ref 0–0.11)
IMM GRANULOCYTES NFR BLD AUTO: 0.4 % (ref 0–0.9)
LYMPHOCYTES # BLD AUTO: 0.87 K/UL (ref 1–4.8)
LYMPHOCYTES NFR BLD: 15.5 % (ref 22–41)
MCH RBC QN AUTO: 28.5 PG (ref 27–33)
MCHC RBC AUTO-ENTMCNC: 31.9 G/DL (ref 32.2–35.5)
MCV RBC AUTO: 89.2 FL (ref 81.4–97.8)
MONOCYTES # BLD AUTO: 0.76 K/UL (ref 0–0.85)
MONOCYTES NFR BLD AUTO: 13.5 % (ref 0–13.4)
NEUTROPHILS # BLD AUTO: 3.81 K/UL (ref 1.82–7.42)
NEUTROPHILS NFR BLD: 67.9 % (ref 44–72)
NRBC # BLD AUTO: 0 K/UL
NRBC BLD-RTO: 0 /100 WBC (ref 0–0.2)
PLATELET # BLD AUTO: 268 K/UL (ref 164–446)
PMV BLD AUTO: 9 FL (ref 9–12.9)
POTASSIUM SERPL-SCNC: 4.3 MMOL/L (ref 3.6–5.5)
PROT SERPL-MCNC: 6.7 G/DL (ref 6–8.2)
RBC # BLD AUTO: 4.25 M/UL (ref 4.2–5.4)
SIGNIFICANT IND 70042: NORMAL
SITE SITE: NORMAL
SODIUM SERPL-SCNC: 137 MMOL/L (ref 135–145)
SOURCE SOURCE: NORMAL
WBC # BLD AUTO: 5.6 K/UL (ref 4.8–10.8)

## 2024-12-30 PROCEDURE — 700111 HCHG RX REV CODE 636 W/ 250 OVERRIDE (IP)

## 2024-12-30 PROCEDURE — A9270 NON-COVERED ITEM OR SERVICE: HCPCS | Performed by: NURSE PRACTITIONER

## 2024-12-30 PROCEDURE — 700102 HCHG RX REV CODE 250 W/ 637 OVERRIDE(OP)

## 2024-12-30 PROCEDURE — A9270 NON-COVERED ITEM OR SERVICE: HCPCS

## 2024-12-30 PROCEDURE — 700102 HCHG RX REV CODE 250 W/ 637 OVERRIDE(OP): Performed by: NURSE PRACTITIONER

## 2024-12-30 PROCEDURE — 700111 HCHG RX REV CODE 636 W/ 250 OVERRIDE (IP): Mod: JZ

## 2024-12-30 PROCEDURE — 770004 HCHG ROOM/CARE - ONCOLOGY PRIVATE *

## 2024-12-30 PROCEDURE — 80053 COMPREHEN METABOLIC PANEL: CPT

## 2024-12-30 PROCEDURE — 700101 HCHG RX REV CODE 250

## 2024-12-30 PROCEDURE — 85025 COMPLETE CBC W/AUTO DIFF WBC: CPT

## 2024-12-30 PROCEDURE — 99233 SBSQ HOSP IP/OBS HIGH 50: CPT | Mod: GC | Performed by: STUDENT IN AN ORGANIZED HEALTH CARE EDUCATION/TRAINING PROGRAM

## 2024-12-30 PROCEDURE — 97163 PT EVAL HIGH COMPLEX 45 MIN: CPT

## 2024-12-30 PROCEDURE — 36415 COLL VENOUS BLD VENIPUNCTURE: CPT

## 2024-12-30 RX ORDER — POLYETHYLENE GLYCOL 3350 17 G/17G
1 POWDER, FOR SOLUTION ORAL DAILY
Status: DISCONTINUED | OUTPATIENT
Start: 2024-12-30 | End: 2025-01-01

## 2024-12-30 RX ORDER — AMOXICILLIN 250 MG
2 CAPSULE ORAL 2 TIMES DAILY
Status: DISCONTINUED | OUTPATIENT
Start: 2024-12-30 | End: 2025-01-01

## 2024-12-30 RX ADMIN — Medication 400 MG: at 05:00

## 2024-12-30 RX ADMIN — CALCITONIN SALMON 200 UNITS: 200 SPRAY, METERED NASAL at 05:00

## 2024-12-30 RX ADMIN — LOSARTAN POTASSIUM 100 MG: 50 TABLET, FILM COATED ORAL at 05:00

## 2024-12-30 RX ADMIN — CARVEDILOL 3.12 MG: 3.12 TABLET, FILM COATED ORAL at 05:00

## 2024-12-30 RX ADMIN — METOCLOPRAMIDE HYDROCHLORIDE 10 MG: 5 INJECTION INTRAMUSCULAR; INTRAVENOUS at 23:33

## 2024-12-30 RX ADMIN — METOCLOPRAMIDE HYDROCHLORIDE 10 MG: 5 INJECTION INTRAMUSCULAR; INTRAVENOUS at 04:59

## 2024-12-30 RX ADMIN — FENTANYL CITRATE 25 MCG: 50 INJECTION, SOLUTION INTRAMUSCULAR; INTRAVENOUS at 21:10

## 2024-12-30 RX ADMIN — FENTANYL CITRATE 25 MCG: 50 INJECTION, SOLUTION INTRAMUSCULAR; INTRAVENOUS at 17:12

## 2024-12-30 RX ADMIN — HEPARIN SODIUM 5000 UNITS: 5000 INJECTION, SOLUTION INTRAVENOUS; SUBCUTANEOUS at 04:59

## 2024-12-30 RX ADMIN — FENTANYL CITRATE 25 MCG: 50 INJECTION, SOLUTION INTRAMUSCULAR; INTRAVENOUS at 04:59

## 2024-12-30 RX ADMIN — SENNOSIDES AND DOCUSATE SODIUM 2 TABLET: 50; 8.6 TABLET ORAL at 17:11

## 2024-12-30 RX ADMIN — SENNOSIDES AND DOCUSATE SODIUM 2 TABLET: 50; 8.6 TABLET ORAL at 05:00

## 2024-12-30 RX ADMIN — ROPINIROLE HYDROCHLORIDE 1 MG: 1 TABLET, FILM COATED ORAL at 21:10

## 2024-12-30 RX ADMIN — METOCLOPRAMIDE HYDROCHLORIDE 10 MG: 5 INJECTION INTRAMUSCULAR; INTRAVENOUS at 01:00

## 2024-12-30 RX ADMIN — LIDOCAINE HYDROCHLORIDE 30 ML: 20 SOLUTION ORAL; TOPICAL at 12:16

## 2024-12-30 RX ADMIN — ROPINIROLE HYDROCHLORIDE 1 MG: 1 TABLET, FILM COATED ORAL at 17:12

## 2024-12-30 RX ADMIN — HYDROCODONE BITARTRATE AND ACETAMINOPHEN 2 TABLET: 5; 325 TABLET ORAL at 03:21

## 2024-12-30 RX ADMIN — ONDANSETRON 4 MG: 2 INJECTION INTRAMUSCULAR; INTRAVENOUS at 23:33

## 2024-12-30 RX ADMIN — FENTANYL CITRATE 25 MCG: 50 INJECTION, SOLUTION INTRAMUSCULAR; INTRAVENOUS at 12:18

## 2024-12-30 RX ADMIN — METOCLOPRAMIDE HYDROCHLORIDE 10 MG: 5 INJECTION INTRAMUSCULAR; INTRAVENOUS at 12:22

## 2024-12-30 RX ADMIN — LIDOCAINE 1 PATCH: 4 PATCH TOPICAL at 12:17

## 2024-12-30 RX ADMIN — CARVEDILOL 3.12 MG: 3.12 TABLET, FILM COATED ORAL at 16:57

## 2024-12-30 RX ADMIN — METOCLOPRAMIDE HYDROCHLORIDE 10 MG: 5 INJECTION INTRAMUSCULAR; INTRAVENOUS at 16:57

## 2024-12-30 ASSESSMENT — PAIN DESCRIPTION - PAIN TYPE
TYPE: ACUTE PAIN
TYPE: ACUTE PAIN;CHRONIC PAIN
TYPE: ACUTE PAIN
TYPE: ACUTE PAIN;CHRONIC PAIN
TYPE: ACUTE PAIN;CHRONIC PAIN

## 2024-12-30 ASSESSMENT — GAIT ASSESSMENTS
GAIT LEVEL OF ASSIST: STANDBY ASSIST
DEVIATION: BRADYKINETIC;DECREASED HEEL STRIKE;DECREASED TOE OFF
DISTANCE (FEET): 10
ASSISTIVE DEVICE: FRONT WHEEL WALKER

## 2024-12-30 ASSESSMENT — COGNITIVE AND FUNCTIONAL STATUS - GENERAL
MOBILITY SCORE: 17
MOVING TO AND FROM BED TO CHAIR: A LITTLE
MOVING FROM LYING ON BACK TO SITTING ON SIDE OF FLAT BED: A LITTLE
TURNING FROM BACK TO SIDE WHILE IN FLAT BAD: A LITTLE
CLIMB 3 TO 5 STEPS WITH RAILING: A LOT
SUGGESTED CMS G CODE MODIFIER MOBILITY: CK
STANDING UP FROM CHAIR USING ARMS: A LITTLE
WALKING IN HOSPITAL ROOM: A LITTLE

## 2024-12-30 NOTE — PROGRESS NOTES
Neurosurgery Progress Note    Subjective:  65 yo female with known h/o bilateral breast cancer since  but apparently had delay in treatment for various reasons until .  Has completed some hormone therapy and radiation per patient.  Presented with worsening thoracic spine pain and notes some right thigh numbness.  CT's of the thoracic and lumbar spine show diffuse spinal mets with T5 pathologic fx and CT CAP shows new upper lung infiltrates with bilateral pleural effusions with worsening hepatic mets and new abdominal lymphadenopathy in addition to known thoracic adenopathy.  Denies saddle anesthesia or loss of control over B&B.  Attempted to get MRI's with some sedation , but was still too painful for patient, so will need general anesthesia for these.  Patient did have an assisted fall to floor while getting back to bed following sedation for attempted MRI , denies new pain per family who witnessed this.    No changes overnight.       Exam:  Calm and cooperative with examination.  Speech eloquent and clear.  A&O x3  CN 2-12 grossly intact.  PERRL, EOMI  Motor 4/5 bilateral EHL's remainder 5/5.  Bilateral UE and LE sensory exam with decreased right L3 sensory only.    BP  Min: 132/66  Max: 150/85  Pulse  Av.3  Min: 66  Max: 73  Resp  Av.5  Min: 16  Max: 22  Temp  Av.4 °C (97.6 °F)  Min: 36.2 °C (97.1 °F)  Max: 36.9 °C (98.5 °F)  SpO2  Av.8 %  Min: 94 %  Max: 96 %    No data recorded    Recent Labs     24  0504 24  0454 24  0319   WBC 4.0* 4.1* 5.6   RBC 4.00* 3.93* 4.25   HEMOGLOBIN 11.3* 11.2* 12.1   HEMATOCRIT 35.2* 34.9* 37.9   MCV 88.0 88.8 89.2   MCH 28.3 28.5 28.5   MCHC 32.1* 32.1* 31.9*   RDW 55.0* 55.6* 55.5*   PLATELETCT 219 223 268   MPV 9.2 9.0 9.0     Recent Labs     24  0504 24  0454 24  0319   SODIUM 140 141 137   POTASSIUM 4.0 3.9 4.3   CHLORIDE 102 102 98   CO2 30 29 29   GLUCOSE 142* 113* 116*   BUN 7* 9 9   CREATININE 0.70  0.64 0.58   CALCIUM 9.6 9.2 9.5               Intake/Output                         12/29/24 0700 - 12/30/24 0659 12/30/24 0700 - 12/31/24 0659     [id number] 1900-0659 Total 0700-1859 1900-0659 Total                 Intake    Total Intake -- -- -- -- -- --       Output    Urine  --  -- --  --  -- --    Number of Times Voided 1 x -- 1 x -- -- --    Stool  --  -- --  --  -- --    Number of Times Stooled -- 1 x 1 x -- -- --    Total Output -- -- -- -- -- --       Net I/O     -- -- -- -- -- --            No intake or output data in the 24 hours ending 12/30/24 0927          senna-docusate  2 Tablet BID    And    polyethylene glycol/lytes  1 Packet DAILY    magnesium oxide  400 mg DAILY    ROPINIRole  1 mg BID PRN    diclofenac sodium  2 g 4X/DAY PRN    metoclopramide  10 mg Q6HRS    calcium carbonate  500 mg TID PRN    heparin  5,000 Units Q8HRS    ondansetron  4 mg Q4HRS PRN    ondansetron  4 mg Q4HRS PRN    carvedilol  3.125 mg BID    losartan  100 mg Q DAY    HYDROcodone-acetaminophen  1 Tablet Q6HRS PRN    Or    HYDROcodone-acetaminophen  2 Tablet Q6HRS PRN    lidocaine  1 Patch Q24HR    acetaminophen  650 mg Q4HRS PRN    ferrous sulfate  325 mg QDAY with Breakfast    ROPINIRole  1 mg QHS    fentaNYL  25 mcg Q HOUR PRN    Pharmacy Consult Request  1 Each PHARMACY TO DOSE    calcitonin ([place])  1 [place] DAILY       Assessment and Plan:  Hospital day # 6  Known breast cancer with worsening spinal mets and other mets.  Needs MRI's full spinal axis with and without contrast, and will need general anesthesia to complete. Hopefully this gets done today.  Continue medical care.  Dr. Lowery has seen and plans to fully evaluate after MRI's completed.  Following.      Chemical prophylactic DVT therapy: No  Start date/time: TBD     Brain Compression: No

## 2024-12-30 NOTE — PROGRESS NOTES
Attending:   Dr. Ruby    Resident:   Sandra Long D.O.    PATIENT:   Asha Hannon; [id number]; 1960    ID:   64 y.o. female admitted for hypoxia secondary to bilateral pleural effusions as well as worsening pain and weakness secondary to malignant neoplasm of breast with mets to spine.    SUBJECTIVE:   No acute events overnight, patient states she has increased numbness in her waist as well as increased weakness in her lower extremities with more weakness on the left side.  She says she is having a lot of difficulty walking to and from the bathroom.  She does still also feel constipated however she did have a bowel movement this morning.    OBJECTIVE:  Vitals:    12/29/24 1606 12/29/24 2027 12/30/24 0425 12/30/24 0800   BP: 132/66 (!) 146/61 (!) 150/85 (!) 149/70   Pulse: 71 67 66 73   Resp: 16 16 16 (!) 22   Temp: 36.3 °C (97.3 °F) 36.2 °C (97.1 °F) 36.3 °C (97.3 °F) 36.9 °C (98.5 °F)   TempSrc: Temporal Temporal Temporal Temporal   SpO2: 94% 96% 94% 95%   Weight:       Height:         No intake or output data in the 24 hours ending 12/30/24 0833    PHYSICAL EXAM:  General: No acute distress, afebrile, resting comfortably  HEENT: NC/AT. EOMI.   Cardiovascular: RRR without murmurs. Normal capillary refill   Respiratory: Diminished lung sounds in lower lobes bilaterally  Abdomen: soft, nontender, nondistended, no masses  EXT:  sensation in tact in LE bilaterally  Skin: No erythema/lesions   Neuro: Non-focal    LABS:  Recent Labs     12/28/24  0504 12/29/24  0454 12/30/24  0319   WBC 4.0* 4.1* 5.6   RBC 4.00* 3.93* 4.25   HEMOGLOBIN 11.3* 11.2* 12.1   HEMATOCRIT 35.2* 34.9* 37.9   MCV 88.0 88.8 89.2   MCH 28.3 28.5 28.5   RDW 55.0* 55.6* 55.5*   PLATELETCT 219 223 268   MPV 9.2 9.0 9.0   NEUTSPOLYS 64.70 65.70 67.90   LYMPHOCYTES 17.90* 18.00* 15.50*   MONOCYTES 13.40 12.60 13.50*   EOSINOPHILS 2.50 2.00 2.00   BASOPHILS 1.00 1.50 0.70     Recent Labs     12/28/24  0504 12/29/24  0454 12/30/24  0319  "  SODIUM 140 141 137   POTASSIUM 4.0 3.9 4.3   CHLORIDE 102 102 98   CO2 30 29 29   BUN 7* 9 9   CREATININE 0.70 0.64 0.58   CALCIUM 9.6 9.2 9.5   ALBUMIN  --  3.4 3.9     Estimated GFR/CRCL = Estimated Creatinine Clearance: 118.3 mL/min (by C-G formula based on SCr of 0.58 mg/dL).  Recent Labs     12/28/24  0504 12/29/24  0454 12/30/24  0319   GLUCOSE 142* 113* 116*     Recent Labs     12/29/24  0454 12/30/24  0319   ASTSGOT 122* 135*   ALTSGPT 53* 59*   TBILIRUBIN 0.3 0.4   ALKPHOSPHAT 79 90   GLOBULIN 2.8 2.8             No results for input(s): \"INR\", \"APTT\", \"FIBRINOGEN\" in the last 72 hours.    Invalid input(s): \"DIMER\"      IMAGING:  US-THORACENTESIS PUNCTURE RIGHT   Final Result      1. Ultrasound guided right sided therapeutic and diagnostic thoracentesis.      2. 1000 mL of fluid withdrawn.      DX-CHEST-PORTABLE (1 VIEW)   Final Result      1.  Small persistent right pleural effusion which is decreased since recent thoracentesis.      2.  Small to moderate left pleural effusion.      3.  Bilateral perihilar and left lower lobe atelectasis and/or pneumonitis.      4.  Increased interstitial markings throughout the lung fields consistent with parenchymal scarring, and/or pulmonary edema.      CT-LSPINE W/O PLUS RECONS   Final Result         1. Diffuse metastatic disease.   2. No definite pathologic fracture.      CT-TSPINE W/O PLUS RECONS   Final Result         1. Extensive metastatic disease.   2. There is new pathologic compression fracture deformity of T5.      CT-CHEST,ABDOMEN,PELVIS WITH   Final Result         1. New bilateral upper lobe nodular infiltrates.   2. Moderate-sized bilateral pleural effusions.   3. Interval worsening of hepatic metastatic disease.   4. New abdominal lymphadenopathy. Thoracic adenopathy is again noted.   5. Diffuse osseous metastatic disease. There is a new compression fracture of T5 as reported on the prior spine CT.      DX-CHEST-PORTABLE (1 VIEW)   Final Result      1.  " Stable bibasilar atelectasis/consolidation and small bilateral pleural effusions.      2.  Stable cardiomegaly and interstitial edema.      MR-CERVICAL SPINE-WITH & W/O    (Results Pending)   MR-LUMBAR SPINE-WITH & W/O    (Results Pending)   MR-THORACIC SPINE-WITH & W/O    (Results Pending)   MR-BRAIN-WITH & W/O    (Results Pending)       MEDS:  Current Facility-Administered Medications   Medication Last Admin    senna-docusate (Pericolace Or Senokot S) 8.6-50 MG per tablet 2 Tablet      And    polyethylene glycol/lytes (Miralax) Packet 1 Packet      GI Cocktail (hyoscyamine-lidocaine-Maalox) oral susp cup 30 mL      magnesium oxide tablet 400 mg 400 mg at 12/30/24 0500    ROPINIRole (Requip) tablet 1 mg 1 mg at 12/29/24 1759    diclofenac sodium (Voltaren) 1 % gel 2 g 2 g at 12/29/24 1849    metoclopramide (Reglan) injection 10 mg 10 mg at 12/30/24 0459    calcium carbonate (Tums) chewable tab 500 mg 500 mg at 12/28/24 0207    heparin injection 5,000 Units 5,000 Units at 12/30/24 0459    ondansetron (Zofran) syringe/vial injection 4 mg      ondansetron (Zofran ODT) dispertab 4 mg 4 mg at 12/28/24 1634    carvedilol (Coreg) tablet 3.125 mg 3.125 mg at 12/30/24 0500    losartan (Cozaar) tablet 100 mg 100 mg at 12/30/24 0500    HYDROcodone-acetaminophen (Norco) 5-325 MG per tablet 1 Tablet 1 Tablet at 12/27/24 1322    Or    HYDROcodone-acetaminophen (Norco) 5-325 MG per tablet 2 Tablet 2 Tablet at 12/30/24 0321    lidocaine (Asperflex) 4 % patch 1 Patch 1 Patch at 12/29/24 1130    acetaminophen (Tylenol) tablet 650 mg 650 mg at 12/27/24 0634    ferrous sulfate tablet 325 mg 325 mg at 12/29/24 0717    ROPINIRole (Requip) tablet 1 mg 1 mg at 12/29/24 2130    fentaNYL (Sublimaze) injection 25 mcg 25 mcg at 12/30/24 0459    Pharmacy Consult Request ...Pain Management Review 1 Each      calcitonin (salmon) (Miacalcin) nasal spray 200 Units 200 Units at 12/30/24 0500       ASSESSMENT/PLAN:      * Malignant neoplasm of  "breast (HCC)- (present on admission)  Assessment & Plan  Extensive metastatic disease to lungs, liver, and bones. Followed by Dr. Tabor, Oncology, and Dr. Velez, Rad Onc. Pt has declined surgical management in the past, currently on oral chemotherapy. Large fungating mass of the left breast, followed by wound care. Dr. Tabor consulted, recommends patient follow in 1 week for discussion of different cancer treatment regimen, possible further radiation if recommended by Rad Onc.  - Pending full spine/brain MRI, ordered with anesthesia as could not tolerate on 12/28. Follow recs per neurosurgery   - Wound care consulted for fungating left breast mass   - Lidocaine patch and hydrocodone for pain management while inpatient with breakthrough fentanyl, will adjust regimen prior to discharge  - Palliative care consulted, patient deferring GOC discussion until MRI has returned.   - Follow up with Dr. Tabor in 1 week following discharge        Epigastric pain  Assessment & Plan  Pt is having epigastric \"burning\" abdominal pain. She states she gets this pain when she doesn't eat. She is not on a PPI at home.   - GI cocktail administered  - Will continue to monitor and start PPI if she continues to have worsening pain    Restless leg syndrome- (present on admission)  Assessment & Plan  Patient reports a history of restless leg syndrome well-controlled on ropinirole although she does not know the dosage. Has iron deficient anemia.  - Ropinirole 1 mg nightly and will titrate up  - Iron supplementation ordered    Constipation- (present on admission)  Assessment & Plan  Chronic.  Patient reports it is worse with her chronic opioid use.  - Bowel regimen scheduled    Weakness- (present on admission)  Assessment & Plan  Pt reporting progressive weakness since discharge in September, now becoming weak even with ambulation to the restroom. Not requiring any assistive devices. Pt requesting bedside commode due to " difficulty with frequent ambulation to restroom  - PT/OT consults    Compression fracture of T5 vertebra (HCC)- (present on admission)  Assessment & Plan  Noted on CT imaging, pathologic fracture secondary to cancer.Neurosurgery, consulted appreciate recommendations.  - full spine/brain MRI  - Pain regimen ordered, pharmacy consult  - Calcitonin for compression-fracture-related pain control  - Follow neurosurgery recs  - Per oncology would likely proceed with radiation pending what imaging shows     Cancer related pain- (present on admission)  Assessment & Plan  Pain regimen ordered, malignant neoplasm of breast for plan.     Bilateral pleural effusion- (present on admission)  Assessment & Plan  Bilateral moderate sized pleural effusions seen on admission CT. Hx of same on prior admission in September 2024. At that time, IR was consulted for thoracentesis and this could not be completed due to loculations. Pulmonology was then consulted, and they noted her hypoxia may be in part due to shunting from her liver mass.  Patient reports subsequently she went to [facility] who performed thoracentesis on the right.  Patient reports thoracentesis was consistent with malignant effusion though records not available.  Pt on 2 L NC at discharge in 09/2024, requiring 3 L NC to maintain saturations on admission which is stable to slightly improving.   - Continue O2 supplementation, goal sats >90%.   - S/p thoracentesis 12/27, will consider repeat therapeutic thoracentesis prior to discharge if pt is still having increased O2 demand  - Malignant cells seen per pathology and exudative effusion with     - Fluid cultures show moderate WBC but no organisms, fungal cultures also negative    Acute on chronic hypoxic respiratory failure (HCC)- (present on admission)  Assessment & Plan  Likely related to bilateral pleural effusion. In addition, see by pulmonology at last admission and thought to be component of hepatic  shunting. Patient with new bilateral upper lobe infiltrates on CTAP. Without clinical pneumonia and procal negative. Troponin peaked.   -CTM, titrate oxygen to goal  -Home O2 eval before discharge if pt is still requiring oxygen    Hypertension- (present on admission)  Assessment & Plan  Continue home carvedilol, losartan    Diabetes mellitus, type 2 (HCC)- (present on admission)  Assessment & Plan  Diet controlled. Last A1c 7.1 in September 2024.  - Trend blood glucose on chem panel           Core Measures:  Fluids: Oral hydration  Lines: PIV  Abx: None  Diet: NPO prior to MRI, will resume diet after MRI  PPX: Heparin    DISPO: In patient pending MRI, pt not medically cleared      CODE STATUS: Full    Sandra Long D.O.  PGY-1  [facility]

## 2024-12-30 NOTE — DIETARY
"Nutrition Services: Initial Assessment     Day 5 of admit. Asha Hannon is 64 y.o., female with admitting DX of Breast cancer metastasized to bone, unspecified laterality (HCC) [C50.919, C79.51].    Consult Received for: MST - Malnutrition Screening Tool    Current Hospital Problem list:   Malignant Neoplasm of breast   Epigastric pain   Restless leg syndrome   Constipation   Weakness   Compression fracture of T5 vertebra   Cancer related pain   Acute on chronic hypoxic respiratory failure   HTN   T2DM        Nutrition Assessment:      Height: 172.7 cm (5' 8\")  Weight: 98 kg (216 lb 0.8 oz)  Weight taken via: Bed Scale  BMI Calculated: 35.85 kg/m2  BMI Classification: Obesity Class 1     Wt Readings from Last 30 Encounters:   12/26/24 98 kg (216 lb 0.8 oz)   11/05/24 106 kg (234 lb)   10/30/24 106 kg (232 lb 14.7 oz)   10/28/24 105 kg (232 lb)   10/16/24 105 kg (231 lb)   09/21/24 107 kg (236 lb 5.3 oz)   08/28/24 108 kg (238 lb 8.6 oz)   08/21/24 108 kg (237 lb 14 oz)   08/07/24 108 kg (238 lb 12.1 oz)   07/30/24 109 kg (239 lb 3.2 oz)   06/19/24 112 kg (245 lb 13 oz)   06/05/24 111 kg (244 lb 7.8 oz)   12/22/23 108 kg (238 lb)   11/16/23 112 kg (247 lb)   10/26/23 110 kg (242 lb 9.6 oz)   09/19/23 108 kg (237 lb 15.8 oz)   09/18/23 108 kg (238 lb 12.8 oz)   08/22/23 109 kg (239 lb 3.2 oz)   08/23/23 108 kg (238 lb 10.4 oz)   08/21/23 109 kg (240 lb)   07/11/23 110 kg (243 lb 2.7 oz)   07/11/23 110 kg (242 lb 1 oz)   05/30/23 112 kg (247 lb)   04/25/23 112 kg (246 lb 4.1 oz)   03/21/23 (P) 113 kg (250 lb)   01/19/23 117 kg (258 lb)   12/28/22 117 kg (258 lb)   12/06/22 117 kg (258 lb)   08/26/22 113 kg (250 lb)   08/12/22 119 kg (262 lb)       Objective:   Pertinent Medical Hx: hx known breast cancer with metastasis to lungs and bones   Indication for Nutrition Support: Unable to meet nutrition needs orally  Enteral Access:  none noted  Pertinent Labs: BG high (116), iron low (33)   Pertinent Meds: " calcium carbonate, ferrous sulfate, magnesium oxide, reglan, senna  Skin/Wounds:  none noted  Food Allergies: NKFA  Last BM:  Not observed  12/30/24   Formula based on RD Eval: Glucerna 1.2 Primitivo if EN initiated at some point during hospital stay.       Current Diet Order/Intake:   Regular with Ensure Plus TID       Subjective:   Patient reported UBW: 223#  Dietary Recall/Energy Intake: % intake prior to most recent NPO status. This indicates likely Insufficient energy intake.   - Pt reported urinary discomfort at time of assessment waiting for a nurse to help her to the restroom.   - Pt requested I come back to interview her another time.        Nutrition Focused Physical Exam (NFPE)  Weight Loss: 9.6% BW loss in < 6 months. RD Suspects this change related to inadequate oral intake in the setting of s/s associated with cancer dx  Muscle Mass: Deferred due to pt request   Subcutaneous Fat: Deferred due to pt request  Fluid Accumulation: none noted  Reduced  Strength: N/A in acute care setting.    Nutrition Diagnosis:      Inadequate Oral Intake related to s/s associated with cancer dx  as evidenced by weight loss of 9.6% BW in <6 months.      Unable to fully assess for malnutrition at this time    Nutrition Interventions:      Consistent CHO diet to address high blood sugar levels  If intake <50% at meals over the next 2 days, trial Glucerna BID to support calorie and protein intake.  Weekly weights   Chart all meal ADLs to help facilitate cross-communication among the interdisciplinary team.   Patient aware of active plan of care as appropriate.     Nutrition Monitoring and Evaluation:      Monitor nutrition POC, goal for >=50% intake from meals and supplements.  Additional fluids per MD/DO  Monitor vital signs pertinent to nutrition.    RD following and will provide updated recommendations as indicated.    Xochilt Gutierrez R.D.                                     [facility] MALNUTRITION

## 2024-12-30 NOTE — CARE PLAN
The patient is Stable - Low risk of patient condition declining or worsening    Shift Goals  Clinical Goals: Safety  Patient Goals: Remain updated on POC  Family Goals: MIMI    Progress made toward(s) clinical / shift goals:      Problem: Knowledge Deficit - Standard  Goal: Patient and family/care givers will demonstrate understanding of plan of care, disease process/condition, diagnostic tests and medications  Outcome: Progressing     Problem: Skin Integrity  Goal: Skin integrity is maintained or improved  Outcome: Progressing     Problem: Fall Risk  Goal: Patient will remain free from falls  Outcome: Progressing     Problem: Pain - Standard  Goal: Alleviation of pain or a reduction in pain to the patient’s comfort goal  Outcome: Progressing

## 2024-12-30 NOTE — HOSPITAL COURSE
Patient presented on 12/26/24 due to worsening weakness, shortness of breath, and abdominal pain. Patient has history of bilateral breast cancer dx in 2022 with mets to lungs and bones. Has completed some chemotherapy an radiation. She was recently admitted in September for shortness of breath and since then continues to have progressively worsening weakness and difficulty ambulating at home. Patient is followed by Dr. Tabor Oncology.     In ED vital signs notable for hypoxia requiring 3 L NC. Chest xray indicated bilateral pleural effusions. CT chest/abdomen/pelvis and CT T and L spine howed new extensive metastases in lungs and spine as well as compression fracture of T5. Patient then admitted for further care.     In regarding to hypoxia in the setting of bilateral pleural effusion, IR performed thoracentesis. 1000 cc was removed from right pleural fluid collection. Fluid results:     For pain regimen patient requesting hydrocodone and fentanyl as she had GI upset with oxycodone and dilaudid. She was also started on calcitonin for compression fracture pain control. Neurosurgery was consulted and advised full spine MRI which required general anesthesia as patient could not tolerate MRI without full sedation.     Palliative care was also consulted, patient did not want to discuss palliative care until imaging was performed.

## 2024-12-30 NOTE — DISCHARGE PLANNING
Case Management Discharge Planning    Admission Date: 12/25/2024  GMLOS: 4.8  ALOS: 5    6-Clicks ADL Score: 19  6-Clicks Mobility Score: (P) 17  PT and/or OT Eval ordered: Yes  Post-acute Referrals Ordered: Yes  Post-acute Choice Obtained: Yes  Has referral(s) been sent to post-acute provider:  Yes      Anticipated Discharge Dispo: Discharge Disposition: Discharged to home/self care (01)    DME Needed: No    Action(s) Taken: Discussed with Resident team, pt is pending MRI with sedation at this time, it has been expedited to patient flow and CNU leadership. Neurosurgery continuing to follow.     Escalations Completed: None    Medically Clear: No    Next Steps: Pending MRI    Barriers to Discharge: Medical clearance    Is the patient up for discharge tomorrow: No

## 2024-12-30 NOTE — ASSESSMENT & PLAN NOTE
"Pt is having epigastric \"burning\" abdominal pain. She states she gets this pain when she doesn't eat. She is not on a PPI at home.   - GI cocktail administered  - Will continue to monitor and start PPI if she continues to have worsening pain  "

## 2024-12-30 NOTE — THERAPY
Physical Therapy   Initial Evaluation     Patient Name: Asha Hannon  Age:  64 y.o., Sex:  female  Medical Record #: [id number]  Today's Date: 12/30/2024     Precautions  Precautions: (P) Fall Risk;Spinal / Back Precautions   Comments: (P) T5 compression fracture    Assessment    64 y.o. female was admitted for SOB, increased pain, and increased weakness.  She was found to have acute on chronic respiratory failure and a T5 compression fracture in the setting of breast cancer with lung, liver, and bone mets, for which she is taking chemo pills.PMHx also includes HTN, DM, and RLS.  She lives with her daughter in a 1SH with 1 LOBO and was mod If or mobility with a FWW.  On eval, she presents with mid pain /p pre-medication, decreased activity tolerance and decreased balance with core weakness impairing her mobility.  She will benefit from continued acute PT services to address the above deficits in order to return home, where she would be alone for part of the day, safely.  Recommend post acute PT services.    Plan    Physical Therapy Initial Treatment Plan   Treatment Plan : (P) Bed Mobility, Equipment, Family / Caregiver Training, Gait Training, Manual Therapy, Neuro Re-Education / Balance, Self Care / Home Evaluation, Stair Training, Therapeutic Activities, Therapeutic Exercise  Treatment Frequency: (P) 4 Times per Week  Duration: (P) Until Therapy Goals Met    DC Equipment Recommendations: (P) Unable to determine at this time  Discharge Recommendations: (P) Recommend post-acute placement for additional physical therapy services prior to discharge home       Subjective    Pt reports being unable to lay flat on her back d/t pain.     Objective       12/30/24 1315   Initial Contact Note    Initial Contact Note Order Received and Verified, Physical Therapy Evaluation in Progress with Full Report to Follow.   Precautions   Precautions Fall Risk;Spinal / Back Precautions    Comments T5 compression fracture    Vitals   Pulse 69   Pulse Oximetry 95 %   O2 (LPM) 3   O2 Delivery Device Nasal Cannula   Vitals Comments /p ambulation   Pain 0 - 10 Group   Therapist Pain Assessment During Activity;2  (premedicated)   Prior Living Situation   Housing / Facility 1 Story House   Steps Into Home 1   Rail None   Bathroom Set up Bathtub / Shower Combination   Equipment Owned Front-Wheel Walker  (home O2- 1.5 - 2L)   Lives with - Patient's Self Care Capacity Adult Children  (daughter works part time)   Prior Level of Functional Mobility   Bed Mobility Independent   Transfer Status Independent   Ambulation Independent   Ambulation Distance community   Assistive Devices Used Front-Wheel Walker   Stairs Independent   Comments doesn't drive, doesn't work   History of Falls   History of Falls No   Date of Last Fall   (Pt reports no falls x1 year)   Cognition    Cognition / Consciousness WDL   Level of Consciousness Alert  (drowsy- pt was pre-medicated)   Active ROM Lower Body    Active ROM Lower Body  WDL   Strength Lower Body   Lower Body Strength  WDL   Sensation Lower Body   Lower Extremity Sensation   WDL   Balance Assessment   Sitting Balance (Static) Fair   Sitting Balance (Dynamic) Fair -   Standing Balance (Static) Fair -   Standing Balance (Dynamic) Fair -   Weight Shift Sitting Fair   Weight Shift Standing Fair   Comments with FWW   Bed Mobility    Supine to Sit Standby Assist   Sit to Supine Standby Assist   Scooting Standby Assist   Rolling Standby Assist   Comments bed flat, rail   Gait Analysis   Gait Level Of Assist Standby Assist   Assistive Device Front Wheel Walker   Distance (Feet) 10   # of Times Distance was Traveled 1   Deviation Bradykinetic;Decreased Heel Strike;Decreased Toe Off  (core instability- wobbly)   Weight Bearing Status FWB BLEs   Functional Mobility   Sit to Stand Standby Assist   Bed, Chair, Wheelchair Transfer Standby Assist   Tub / Shower Transfers Standby Assist   Transfer Method Stand Step    Mobility with FWW   Edema / Skin Assessment   Edema / Skin  Not Assessed   Short Term Goals    Short Term Goal # 1 Pt will perform supine < > sit SPV with bed flat, no rail in 6 visits in order to set up for upright mobility   Short Term Goal # 2 Pt will perform sit < > stand SPV in 6 visits in order to prepare for ambulation   Short Term Goal # 3 Pt will ambulate 150' SPV in 6 visits with FWW in order to return home safely   Short Term Goal # 4 Pt will ascend/ descend 1 step SPV in 6 visits in order to access her home   Education Group   Education Provided Role of Physical Therapist;Spine Precautions   Spine Precautions Patient Response Patient;Acceptance;Explanation;Demonstration;Action Demonstration;Reinforcement Needed   Role of Physical Therapist Patient Response Patient;Acceptance;Explanation;Action Demonstration   Physical Therapy Initial Treatment Plan    Treatment Plan  Bed Mobility;Equipment;Family / Caregiver Training;Gait Training;Manual Therapy;Neuro Re-Education / Balance;Self Care / Home Evaluation;Stair Training;Therapeutic Activities;Therapeutic Exercise   Treatment Frequency 4 Times per Week   Duration Until Therapy Goals Met   Problem List    Problems Impaired Bed Mobility;Impaired Transfers;Impaired Ambulation;Pain;Decreased Activity Tolerance  (spinal precautions)   Anticipated Discharge Equipment and Recommendations   DC Equipment Recommendations Unable to determine at this time   Discharge Recommendations Recommend post-acute placement for additional physical therapy services prior to discharge home   Interdisciplinary Plan of Care Collaboration   IDT Collaboration with  Nursing   Patient Position at End of Therapy In Bed;Call Light within Reach;Tray Table within Reach   Collaboration Comments RN updated   Session Information   Date / Session Number  12/30 -1 (1/4, 1/5/2025)

## 2024-12-30 NOTE — CARE PLAN
The patient is Stable - Low risk of patient condition declining or worsening    Shift Goals  Clinical Goals: Pain control, monitor O2 sats  Patient Goals: Pain control, MRI soon      Progress made toward(s) clinical / shift goals:     Problem: Knowledge Deficit - Standard  Goal: Patient and family/care givers will demonstrate understanding of plan of care, disease process/condition, diagnostic tests and medications  Outcome: Progressing     Problem: Skin Integrity  Goal: Skin integrity is maintained or improved  Outcome: Progressing     Problem: Fall Risk  Goal: Patient will remain free from falls  Outcome: Progressing     Problem: Pain - Standard  Goal: Alleviation of pain or a reduction in pain to the patient’s comfort goal  Outcome: Progressing

## 2024-12-31 ENCOUNTER — APPOINTMENT (OUTPATIENT)
Dept: RADIOLOGY | Facility: MEDICAL CENTER | Age: 64
End: 2024-12-31
Payer: MEDICARE

## 2024-12-31 ENCOUNTER — ANESTHESIA EVENT (OUTPATIENT)
Dept: RADIOLOGY | Facility: MEDICAL CENTER | Age: 64
End: 2024-12-31
Payer: MEDICARE

## 2024-12-31 ENCOUNTER — ANESTHESIA (OUTPATIENT)
Dept: RADIOLOGY | Facility: MEDICAL CENTER | Age: 64
End: 2024-12-31
Payer: MEDICARE

## 2024-12-31 VITALS
WEIGHT: 216.05 LBS | DIASTOLIC BLOOD PRESSURE: 58 MMHG | SYSTOLIC BLOOD PRESSURE: 104 MMHG | TEMPERATURE: 97.8 F | RESPIRATION RATE: 18 BRPM | HEIGHT: 68 IN | OXYGEN SATURATION: 92 % | HEART RATE: 85 BPM | BODY MASS INDEX: 32.74 KG/M2

## 2024-12-31 LAB
ALBUMIN SERPL BCP-MCNC: 4 G/DL (ref 3.2–4.9)
ALBUMIN/GLOB SERPL: 1.5 G/DL
ALP SERPL-CCNC: 93 U/L (ref 30–99)
ALT SERPL-CCNC: 59 U/L (ref 2–50)
ANION GAP SERPL CALC-SCNC: 11 MMOL/L (ref 7–16)
AST SERPL-CCNC: 148 U/L (ref 12–45)
BASOPHILS # BLD AUTO: 0.9 % (ref 0–1.8)
BASOPHILS # BLD: 0.06 K/UL (ref 0–0.12)
BILIRUB SERPL-MCNC: 0.6 MG/DL (ref 0.1–1.5)
BUN SERPL-MCNC: 7 MG/DL (ref 8–22)
CALCIUM ALBUM COR SERPL-MCNC: 9.5 MG/DL (ref 8.5–10.5)
CALCIUM SERPL-MCNC: 9.5 MG/DL (ref 8.5–10.5)
CHLORIDE SERPL-SCNC: 98 MMOL/L (ref 96–112)
CO2 SERPL-SCNC: 30 MMOL/L (ref 20–33)
CREAT SERPL-MCNC: 0.54 MG/DL (ref 0.5–1.4)
EOSINOPHIL # BLD AUTO: 0.03 K/UL (ref 0–0.51)
EOSINOPHIL NFR BLD: 0.4 % (ref 0–6.9)
ERYTHROCYTE [DISTWIDTH] IN BLOOD BY AUTOMATED COUNT: 55.7 FL (ref 35.9–50)
FUNGUS SPEC CULT: NORMAL
FUNGUS SPEC FUNGUS STN: NORMAL
GFR SERPLBLD CREATININE-BSD FMLA CKD-EPI: 102 ML/MIN/1.73 M 2
GLOBULIN SER CALC-MCNC: 2.7 G/DL (ref 1.9–3.5)
GLUCOSE SERPL-MCNC: 134 MG/DL (ref 65–99)
HCT VFR BLD AUTO: 38.5 % (ref 37–47)
HGB BLD-MCNC: 12.4 G/DL (ref 12–16)
IMM GRANULOCYTES # BLD AUTO: 0.03 K/UL (ref 0–0.11)
IMM GRANULOCYTES NFR BLD AUTO: 0.4 % (ref 0–0.9)
LYMPHOCYTES # BLD AUTO: 0.89 K/UL (ref 1–4.8)
LYMPHOCYTES NFR BLD: 13 % (ref 22–41)
MCH RBC QN AUTO: 28.1 PG (ref 27–33)
MCHC RBC AUTO-ENTMCNC: 32.2 G/DL (ref 32.2–35.5)
MCV RBC AUTO: 87.3 FL (ref 81.4–97.8)
MONOCYTES # BLD AUTO: 0.78 K/UL (ref 0–0.85)
MONOCYTES NFR BLD AUTO: 11.4 % (ref 0–13.4)
NEUTROPHILS # BLD AUTO: 5.08 K/UL (ref 1.82–7.42)
NEUTROPHILS NFR BLD: 73.9 % (ref 44–72)
NRBC # BLD AUTO: 0 K/UL
NRBC BLD-RTO: 0 /100 WBC (ref 0–0.2)
PLATELET # BLD AUTO: 296 K/UL (ref 164–446)
PMV BLD AUTO: 9.2 FL (ref 9–12.9)
POTASSIUM SERPL-SCNC: 4.4 MMOL/L (ref 3.6–5.5)
PROT SERPL-MCNC: 6.7 G/DL (ref 6–8.2)
RBC # BLD AUTO: 4.41 M/UL (ref 4.2–5.4)
SIGNIFICANT IND 70042: NORMAL
SITE SITE: NORMAL
SODIUM SERPL-SCNC: 139 MMOL/L (ref 135–145)
SOURCE SOURCE: NORMAL
WBC # BLD AUTO: 6.9 K/UL (ref 4.8–10.8)

## 2024-12-31 PROCEDURE — 700102 HCHG RX REV CODE 250 W/ 637 OVERRIDE(OP): Performed by: NURSE PRACTITIONER

## 2024-12-31 PROCEDURE — 700111 HCHG RX REV CODE 636 W/ 250 OVERRIDE (IP): Mod: JZ | Performed by: ANESTHESIOLOGY

## 2024-12-31 PROCEDURE — 700101 HCHG RX REV CODE 250

## 2024-12-31 PROCEDURE — 700105 HCHG RX REV CODE 258: Performed by: ANESTHESIOLOGY

## 2024-12-31 PROCEDURE — 700111 HCHG RX REV CODE 636 W/ 250 OVERRIDE (IP): Mod: JZ

## 2024-12-31 PROCEDURE — 700102 HCHG RX REV CODE 250 W/ 637 OVERRIDE(OP)

## 2024-12-31 PROCEDURE — A9579 GAD-BASE MR CONTRAST NOS,1ML: HCPCS | Mod: JZ

## 2024-12-31 PROCEDURE — A9270 NON-COVERED ITEM OR SERVICE: HCPCS | Performed by: NURSE PRACTITIONER

## 2024-12-31 PROCEDURE — A9270 NON-COVERED ITEM OR SERVICE: HCPCS

## 2024-12-31 PROCEDURE — 160035 HCHG PACU - 1ST 60 MINS PHASE I

## 2024-12-31 PROCEDURE — 700117 HCHG RX CONTRAST REV CODE 255: Mod: JZ

## 2024-12-31 PROCEDURE — 700101 HCHG RX REV CODE 250: Performed by: ANESTHESIOLOGY

## 2024-12-31 PROCEDURE — 72156 MRI NECK SPINE W/O & W/DYE: CPT

## 2024-12-31 PROCEDURE — 80053 COMPREHEN METABOLIC PANEL: CPT

## 2024-12-31 PROCEDURE — 700111 HCHG RX REV CODE 636 W/ 250 OVERRIDE (IP)

## 2024-12-31 PROCEDURE — 85025 COMPLETE CBC W/AUTO DIFF WBC: CPT

## 2024-12-31 PROCEDURE — 99232 SBSQ HOSP IP/OBS MODERATE 35: CPT | Mod: GC | Performed by: STUDENT IN AN ORGANIZED HEALTH CARE EDUCATION/TRAINING PROGRAM

## 2024-12-31 PROCEDURE — 36415 COLL VENOUS BLD VENIPUNCTURE: CPT

## 2024-12-31 PROCEDURE — 160002 HCHG RECOVERY MINUTES (STAT)

## 2024-12-31 PROCEDURE — 770004 HCHG ROOM/CARE - ONCOLOGY PRIVATE *

## 2024-12-31 RX ORDER — DIPHENHYDRAMINE HYDROCHLORIDE 50 MG/ML
12.5 INJECTION INTRAMUSCULAR; INTRAVENOUS
Status: DISCONTINUED | OUTPATIENT
Start: 2024-12-31 | End: 2024-12-31 | Stop reason: HOSPADM

## 2024-12-31 RX ORDER — LIDOCAINE HYDROCHLORIDE 20 MG/ML
INJECTION, SOLUTION INFILTRATION; PERINEURAL PRN
Status: DISCONTINUED | OUTPATIENT
Start: 2024-12-31 | End: 2024-12-31 | Stop reason: SURG

## 2024-12-31 RX ORDER — ONDANSETRON 2 MG/ML
4 INJECTION INTRAMUSCULAR; INTRAVENOUS
Status: DISCONTINUED | OUTPATIENT
Start: 2024-12-31 | End: 2024-12-31 | Stop reason: HOSPADM

## 2024-12-31 RX ORDER — HYDRALAZINE HYDROCHLORIDE 20 MG/ML
5 INJECTION INTRAMUSCULAR; INTRAVENOUS
Status: DISCONTINUED | OUTPATIENT
Start: 2024-12-31 | End: 2024-12-31 | Stop reason: HOSPADM

## 2024-12-31 RX ORDER — SODIUM CHLORIDE, SODIUM LACTATE, POTASSIUM CHLORIDE, CALCIUM CHLORIDE 600; 310; 30; 20 MG/100ML; MG/100ML; MG/100ML; MG/100ML
INJECTION, SOLUTION INTRAVENOUS
Status: DISCONTINUED | OUTPATIENT
Start: 2024-12-31 | End: 2024-12-31 | Stop reason: SURG

## 2024-12-31 RX ORDER — HALOPERIDOL 5 MG/ML
1 INJECTION INTRAMUSCULAR
Status: DISCONTINUED | OUTPATIENT
Start: 2024-12-31 | End: 2024-12-31 | Stop reason: HOSPADM

## 2024-12-31 RX ORDER — EPHEDRINE SULFATE 50 MG/ML
INJECTION, SOLUTION INTRAVENOUS PRN
Status: DISCONTINUED | OUTPATIENT
Start: 2024-12-31 | End: 2024-12-31 | Stop reason: SURG

## 2024-12-31 RX ORDER — LABETALOL HYDROCHLORIDE 5 MG/ML
5 INJECTION, SOLUTION INTRAVENOUS
Status: DISCONTINUED | OUTPATIENT
Start: 2024-12-31 | End: 2024-12-31 | Stop reason: HOSPADM

## 2024-12-31 RX ORDER — EPHEDRINE SULFATE 50 MG/ML
5 INJECTION, SOLUTION INTRAVENOUS
Status: DISCONTINUED | OUTPATIENT
Start: 2024-12-31 | End: 2024-12-31 | Stop reason: HOSPADM

## 2024-12-31 RX ORDER — DEXAMETHASONE SODIUM PHOSPHATE 4 MG/ML
INJECTION, SOLUTION INTRA-ARTICULAR; INTRALESIONAL; INTRAMUSCULAR; INTRAVENOUS; SOFT TISSUE PRN
Status: DISCONTINUED | OUTPATIENT
Start: 2024-12-31 | End: 2024-12-31 | Stop reason: SURG

## 2024-12-31 RX ORDER — ALBUTEROL SULFATE 5 MG/ML
2.5 SOLUTION RESPIRATORY (INHALATION)
Status: DISCONTINUED | OUTPATIENT
Start: 2024-12-31 | End: 2024-12-31 | Stop reason: HOSPADM

## 2024-12-31 RX ORDER — PHENYLEPHRINE HYDROCHLORIDE 10 MG/ML
INJECTION, SOLUTION INTRAMUSCULAR; INTRAVENOUS; SUBCUTANEOUS PRN
Status: DISCONTINUED | OUTPATIENT
Start: 2024-12-31 | End: 2024-12-31 | Stop reason: SURG

## 2024-12-31 RX ORDER — SODIUM CHLORIDE, SODIUM LACTATE, POTASSIUM CHLORIDE, CALCIUM CHLORIDE 600; 310; 30; 20 MG/100ML; MG/100ML; MG/100ML; MG/100ML
INJECTION, SOLUTION INTRAVENOUS CONTINUOUS
Status: DISCONTINUED | OUTPATIENT
Start: 2024-12-31 | End: 2024-12-31 | Stop reason: HOSPADM

## 2024-12-31 RX ADMIN — EPHEDRINE SULFATE 10 MG: 50 INJECTION, SOLUTION INTRAVENOUS at 09:59

## 2024-12-31 RX ADMIN — FENTANYL CITRATE 50 MCG: 50 INJECTION, SOLUTION INTRAMUSCULAR; INTRAVENOUS at 13:15

## 2024-12-31 RX ADMIN — ROPINIROLE HYDROCHLORIDE 1 MG: 1 TABLET, FILM COATED ORAL at 22:32

## 2024-12-31 RX ADMIN — FENTANYL CITRATE 25 MCG: 50 INJECTION, SOLUTION INTRAMUSCULAR; INTRAVENOUS at 00:38

## 2024-12-31 RX ADMIN — FENTANYL CITRATE 25 MCG: 50 INJECTION, SOLUTION INTRAMUSCULAR; INTRAVENOUS at 04:52

## 2024-12-31 RX ADMIN — PHENYLEPHRINE HYDROCHLORIDE 100 MCG: 10 INJECTION INTRAVENOUS at 10:00

## 2024-12-31 RX ADMIN — Medication 400 MG: at 04:57

## 2024-12-31 RX ADMIN — PHENYLEPHRINE HYDROCHLORIDE 100 MCG: 10 INJECTION INTRAVENOUS at 10:41

## 2024-12-31 RX ADMIN — CARVEDILOL 3.12 MG: 3.12 TABLET, FILM COATED ORAL at 04:57

## 2024-12-31 RX ADMIN — HEPARIN SODIUM 5000 UNITS: 5000 INJECTION, SOLUTION INTRAVENOUS; SUBCUTANEOUS at 22:30

## 2024-12-31 RX ADMIN — PHENYLEPHRINE HYDROCHLORIDE 200 MCG: 10 INJECTION INTRAVENOUS at 10:55

## 2024-12-31 RX ADMIN — SODIUM CHLORIDE, POTASSIUM CHLORIDE, SODIUM LACTATE AND CALCIUM CHLORIDE: 600; 310; 30; 20 INJECTION, SOLUTION INTRAVENOUS at 09:45

## 2024-12-31 RX ADMIN — HYDROCODONE BITARTRATE AND ACETAMINOPHEN 2 TABLET: 5; 325 TABLET ORAL at 13:19

## 2024-12-31 RX ADMIN — DICLOFENAC SODIUM 2 G: 10 GEL TOPICAL at 00:49

## 2024-12-31 RX ADMIN — LOSARTAN POTASSIUM 100 MG: 50 TABLET, FILM COATED ORAL at 04:57

## 2024-12-31 RX ADMIN — HEPARIN SODIUM 5000 UNITS: 5000 INJECTION, SOLUTION INTRAVENOUS; SUBCUTANEOUS at 04:52

## 2024-12-31 RX ADMIN — METOCLOPRAMIDE HYDROCHLORIDE 10 MG: 5 INJECTION INTRAMUSCULAR; INTRAVENOUS at 14:53

## 2024-12-31 RX ADMIN — PHENYLEPHRINE HYDROCHLORIDE 200 MCG: 10 INJECTION INTRAVENOUS at 12:08

## 2024-12-31 RX ADMIN — EPHEDRINE SULFATE 10 MG: 50 INJECTION, SOLUTION INTRAVENOUS at 10:11

## 2024-12-31 RX ADMIN — PROPOFOL 150 MG: 10 INJECTION, EMULSION INTRAVENOUS at 09:51

## 2024-12-31 RX ADMIN — PHENYLEPHRINE HYDROCHLORIDE 200 MCG: 10 INJECTION INTRAVENOUS at 11:13

## 2024-12-31 RX ADMIN — FENTANYL CITRATE 50 MCG: 50 INJECTION, SOLUTION INTRAMUSCULAR; INTRAVENOUS at 13:03

## 2024-12-31 RX ADMIN — ONDANSETRON 4 MG: 2 INJECTION INTRAMUSCULAR; INTRAVENOUS at 04:53

## 2024-12-31 RX ADMIN — CALCITONIN SALMON 200 UNITS: 200 SPRAY, METERED NASAL at 04:57

## 2024-12-31 RX ADMIN — HYDROCODONE BITARTRATE AND ACETAMINOPHEN 2 TABLET: 5; 325 TABLET ORAL at 20:11

## 2024-12-31 RX ADMIN — FENTANYL CITRATE 25 MCG: 50 INJECTION, SOLUTION INTRAMUSCULAR; INTRAVENOUS at 02:50

## 2024-12-31 RX ADMIN — METOCLOPRAMIDE HYDROCHLORIDE 10 MG: 5 INJECTION INTRAMUSCULAR; INTRAVENOUS at 04:53

## 2024-12-31 RX ADMIN — ONDANSETRON 4 MG: 2 INJECTION INTRAMUSCULAR; INTRAVENOUS at 09:00

## 2024-12-31 RX ADMIN — FENTANYL CITRATE 25 MCG: 50 INJECTION, SOLUTION INTRAMUSCULAR; INTRAVENOUS at 22:23

## 2024-12-31 RX ADMIN — LIDOCAINE 1 PATCH: 4 PATCH TOPICAL at 14:53

## 2024-12-31 RX ADMIN — LIDOCAINE HYDROCHLORIDE 60 MG: 20 INJECTION, SOLUTION INFILTRATION; PERINEURAL at 09:51

## 2024-12-31 RX ADMIN — PHENYLEPHRINE HYDROCHLORIDE 100 MCG: 10 INJECTION INTRAVENOUS at 10:43

## 2024-12-31 RX ADMIN — GADOTERIDOL 20 ML: 279.3 INJECTION, SOLUTION INTRAVENOUS at 12:37

## 2024-12-31 RX ADMIN — DEXAMETHASONE SODIUM PHOSPHATE 4 MG: 4 INJECTION, SOLUTION INTRAMUSCULAR; INTRAVENOUS at 09:51

## 2024-12-31 ASSESSMENT — PAIN DESCRIPTION - PAIN TYPE
TYPE: ACUTE PAIN

## 2024-12-31 ASSESSMENT — PAIN SCALES - GENERAL: PAIN_LEVEL: 6

## 2024-12-31 NOTE — ANESTHESIA PROCEDURE NOTES
Airway    Date/Time: 12/31/2024 9:52 AM    Performed by: Lex Hart M.D.  Authorized by: Lex Hart M.D.    Location:  OR  Urgency:  Elective  Indications for Airway Management:  Anesthesia      Spontaneous Ventilation: absent    Sedation Level:  Deep  Preoxygenated: Yes    Mask Difficulty Assessment:  0 - not attempted  Final Airway Type:  Supraglottic airway  Final Supraglottic Airway:  Standard LMA    SGA Size:  4  Number of Attempts at Approach:  1  Number of Other Approaches Attempted:  0

## 2024-12-31 NOTE — PROGRESS NOTES
1228: Pt arrived from MRI under anesthesia. Pt is awake. Cardiac rhythm appears to be SR.     1344: Report to RIYA Christie.     1407: Pt returned to room via gurney with transport.

## 2024-12-31 NOTE — PROGRESS NOTES
1445: Staff assist called to room. Per pt, pt was getting to bedside commode with daughter when she twisted her hips and feet did not move and the front wheel walker slipped out from under her. This caused her to fall to the floor hitting her shoulder on the side of the bed and then the ground which she said had protected her head from hitting the ground. When assisting the patient back to bed, pt was able to easily bend both knees to chest to assist with standing up. Assistance of standing up from the ground required two people to minimally assist by hooking under patients armpits. Pt then pivoted to the bed. Pt complaining of generalized pain, which had been noted baseline throughout shift, but shoulder pain relating to fall. Patient does have full range of motion to shoulder. MD Shaver notified. MD Long to bedside. Charge RN notified. Pharmacy notified. Bed in low and locked position. Call light within reach. Wrist band on. Fall risk signed was on door.    Patient had been refusing bed alarm prior to fall. Educated on usage of bed alarm. Bed alarm then placed under patient following fall. Patient become irritated as she did not want alarm to be under her as it makes her physically uncomfortable. Educated that bed alarm does have to be on.

## 2024-12-31 NOTE — PROGRESS NOTES
MRIs of spine and Brain reviewed.   Has extension of tumor in T5 into epidural space with spinal cord compression.   May need operative intervention at T5 for decompression. Will discuss with the patient her options with final recs to follow .

## 2024-12-31 NOTE — PROGRESS NOTES
Attending:   Dr. Ruby    Resident:   Sandra Long D.O.    PATIENT:   Asha Hannon; [id number]; 1960    ID:   64 y.o. female admitted for hypoxia secondary to bilateral pleural effusions as well as worsening pain and weakness secondary to malignant neoplasm of breast with mets to spine.     SUBJECTIVE:   No acute events overnight, patient says she is feeling all right but is having some pain in her right leg along the back of the thigh that started after a fall yesterday.  She says the pain is not bad and the leg is mostly numb, which has been an ongoing problem.  She does have some bruising on the leg however she says this bruises were not new.  She is still requiring oxygen.    OBJECTIVE:  Vitals:    12/31/24 1342 12/31/24 1346 12/31/24 1430 12/31/24 1500   BP: (!) 185/90 (!) 165/75 124/77 135/68   Pulse: 94 88 92 87   Resp: (!) 25 17 18 18   Temp:   36.1 °C (97 °F) 36.2 °C (97.2 °F)   TempSrc:   Temporal Temporal   SpO2: 93% 93%  93%   Weight:       Height:           Intake/Output Summary (Last 24 hours) at 12/31/2024 1501  Last data filed at 12/31/2024 1229  Gross per 24 hour   Intake 700 ml   Output 0 ml   Net 700 ml       PHYSICAL EXAM:  General: No acute distress, afebrile, resting comfortably  HEENT: NC/AT. EOMI.   Cardiovascular: RRR without murmurs. Normal capillary refill   Respiratory: CTAB  Abdomen: soft, nontender, nondistended, no masses  EXT:  ORELLANA, no edema  Skin: inversion of breast tissue with erythema lateral to left nipple extending into armpit, unchanged from previous images  Neuro: Non-focal    LABS:  Recent Labs     12/29/24  0454 12/30/24  0319 12/31/24  0502   WBC 4.1* 5.6 6.9   RBC 3.93* 4.25 4.41   HEMOGLOBIN 11.2* 12.1 12.4   HEMATOCRIT 34.9* 37.9 38.5   MCV 88.8 89.2 87.3   MCH 28.5 28.5 28.1   RDW 55.6* 55.5* 55.7*   PLATELETCT 223 268 296   MPV 9.0 9.0 9.2   NEUTSPOLYS 65.70 67.90 73.90*   LYMPHOCYTES 18.00* 15.50* 13.00*   MONOCYTES 12.60 13.50* 11.40   EOSINOPHILS  "2.00 2.00 0.40   BASOPHILS 1.50 0.70 0.90     Recent Labs     12/29/24  0454 12/30/24  0319 12/31/24  0502   SODIUM 141 137 139   POTASSIUM 3.9 4.3 4.4   CHLORIDE 102 98 98   CO2 29 29 30   BUN 9 9 7*   CREATININE 0.64 0.58 0.54   CALCIUM 9.2 9.5 9.5   ALBUMIN 3.4 3.9 4.0     Estimated GFR/CRCL = Estimated Creatinine Clearance: 127.1 mL/min (by C-G formula based on SCr of 0.54 mg/dL).  Recent Labs     12/29/24 0454 12/30/24 0319 12/31/24  0502   GLUCOSE 113* 116* 134*     Recent Labs     12/29/24 0454 12/30/24 0319 12/31/24  0502   ASTSGOT 122* 135* 148*   ALTSGPT 53* 59* 59*   TBILIRUBIN 0.3 0.4 0.6   ALKPHOSPHAT 79 90 93   GLOBULIN 2.8 2.8 2.7             No results for input(s): \"INR\", \"APTT\", \"FIBRINOGEN\" in the last 72 hours.    Invalid input(s): \"DIMER\"      IMAGING:  US-THORACENTESIS PUNCTURE RIGHT   Final Result   Addendum (preliminary) 1 of 1 12/27/2024 10:59 AM      HISTORY/REASON FOR EXAM:  Shortness of breath         TECHNIQUE/EXAM DESCRIPTION:   Ultrasound-guided thoracentesis.      Indication:  RIGHT pleural fluid collection.      COMPARISON:  None-however patient states history of right-sided    thoracentesis September 2024.      PROCEDURE:     Informed consent was obtained. A timeout was taken. A right    pleural effusion was localized with real-time ultrasound guidance. The    right posterior chest wall was prepped and draped in a sterile manner.    Following local anesthesia with 1%    lidocaine, and under live ultrasound guidance a 5 Sami Yueh pigtail    catheter was advanced into the pleural space with trocar technique and    pleural fluid was drained. The patient tolerated the procedure well    without evidence of complication. A post    thoracentesis chest radiograph is forthcoming.      FINDINGS:      Fluid was sent to the laboratory.      Fluid character: serosanguinous         1. Ultrasound guided right sided therapeutic and diagnostic thoracentesis.      2. 1000 mL of fluid " withdrawn.      Final      1. Ultrasound guided right sided therapeutic and diagnostic thoracentesis.      2. 1000 mL of fluid withdrawn.      DX-CHEST-PORTABLE (1 VIEW)   Final Result      1.  Small persistent right pleural effusion which is decreased since recent thoracentesis.      2.  Small to moderate left pleural effusion.      3.  Bilateral perihilar and left lower lobe atelectasis and/or pneumonitis.      4.  Increased interstitial markings throughout the lung fields consistent with parenchymal scarring, and/or pulmonary edema.      CT-LSPINE W/O PLUS RECONS   Final Result         1. Diffuse metastatic disease.   2. No definite pathologic fracture.      CT-TSPINE W/O PLUS RECONS   Final Result         1. Extensive metastatic disease.   2. There is new pathologic compression fracture deformity of T5.      CT-CHEST,ABDOMEN,PELVIS WITH   Final Result         1. New bilateral upper lobe nodular infiltrates.   2. Moderate-sized bilateral pleural effusions.   3. Interval worsening of hepatic metastatic disease.   4. New abdominal lymphadenopathy. Thoracic adenopathy is again noted.   5. Diffuse osseous metastatic disease. There is a new compression fracture of T5 as reported on the prior spine CT.      DX-CHEST-PORTABLE (1 VIEW)   Final Result      1.  Stable bibasilar atelectasis/consolidation and small bilateral pleural effusions.      2.  Stable cardiomegaly and interstitial edema.      MR-CERVICAL SPINE-WITH & W/O    (Results Pending)   MR-LUMBAR SPINE-WITH & W/O    (Results Pending)   MR-THORACIC SPINE-WITH & W/O    (Results Pending)   MR-BRAIN-WITH & W/O    (Results Pending)       MEDS:  Current Facility-Administered Medications   Medication Last Admin    senna-docusate (Pericolace Or Senokot S) 8.6-50 MG per tablet 2 Tablet 2 Tablet at 12/30/24 1711    And    polyethylene glycol/lytes (Miralax) Packet 1 Packet      magnesium oxide tablet 400 mg 400 mg at 12/31/24 0457    ROPINIRole (Requip) tablet 1 mg 1 mg  at 12/30/24 1712    diclofenac sodium (Voltaren) 1 % gel 2 g 2 g at 12/31/24 0049    metoclopramide (Reglan) injection 10 mg 10 mg at 12/31/24 1453    calcium carbonate (Tums) chewable tab 500 mg 500 mg at 12/28/24 0207    heparin injection 5,000 Units 5,000 Units at 12/31/24 0452    ondansetron (Zofran) syringe/vial injection 4 mg 4 mg at 12/31/24 0900    ondansetron (Zofran ODT) dispertab 4 mg 4 mg at 12/28/24 1634    carvedilol (Coreg) tablet 3.125 mg 3.125 mg at 12/31/24 0457    losartan (Cozaar) tablet 100 mg 100 mg at 12/31/24 0457    HYDROcodone-acetaminophen (Norco) 5-325 MG per tablet 1 Tablet 1 Tablet at 12/27/24 1322    Or    HYDROcodone-acetaminophen (Norco) 5-325 MG per tablet 2 Tablet 2 Tablet at 12/31/24 1319    lidocaine (Asperflex) 4 % patch 1 Patch 1 Patch at 12/31/24 1453    acetaminophen (Tylenol) tablet 650 mg 650 mg at 12/27/24 0634    ferrous sulfate tablet 325 mg 325 mg at 12/29/24 0717    ROPINIRole (Requip) tablet 1 mg 1 mg at 12/30/24 2110    fentaNYL (Sublimaze) injection 25 mcg 25 mcg at 12/31/24 0452    Pharmacy Consult Request ...Pain Management Review 1 Each      calcitonin (salmon) (Miacalcin) nasal spray 200 Units 200 Units at 12/31/24 0457       ASSESSMENT/PLAN:    Problem   Epigastric Pain   Compression Fracture of T5 Vertebra (Hcc)   Constipation   Acute On Chronic Hypoxic Respiratory Failure (Hcc)   Bilateral Pleural Effusion   Malignant Neoplasm of Breast (Hcc)    Bilateral breast cancer diagnosed in 2019  Patient has established with a surgeon Dr Arteaga.  She has delayed surgery due to personal hindrances, particularly family stress at home.  Patient recently had a follow-up visit with her surgeon, Dr. Arteaga, who ordered repeat CT and bone scan.  The patient is hoping to schedule resection soon, but has dealt with unfortunate personal discord between herself and her surgeon. She is here today to request an urgent referral for a second opinion to a surgeon who  "operates outside of the local group. Specifics of this referral are noted in the referral order.     3/21/23: Patient requesting referral to radiation oncologist. States she has seen a surgeon in [place] that wants to have radiation therapy done prior to surgical intervention. Scheduled for PET scan and MRI, as ordered by surgery. She does not have an oncologist managing her cancer. States that she saw Dr. Andersen in the past and was not happy with her care there.         * Malignant neoplasm of breast (HCC)- (present on admission)  Assessment & Plan  Extensive metastatic disease to lungs, liver, and bones. Followed by Dr. Tabor, Oncology, and Dr. Velez, Rad Onc. Pt has declined surgical management in the past, currently on oral chemotherapy. Large fungating mass of the left breast. Wound care has signed off. Dr. Tabor consulted, recommends patient follow in 1 week for discussion of different cancer treatment regimen, possible further radiation if recommended by Rad Onc.  - Full spine/brain MRI, completed 12/31, will follow results    - Wound care consulted for fungating left breast mass, signed off  - Lidocaine patch and hydrocodone for pain management while inpatient with breakthrough fentanyl, will adjust regimen prior to discharge  - Palliative care consulted, patient deferring GOC discussion until MRI has returned.   - Follow up with Dr. Tabor in 1 week following discharge        Epigastric pain  Assessment & Plan  Pt is having epigastric \"burning\" abdominal pain. She states she gets this pain when she doesn't eat. She is not on a PPI at home.   - GI cocktail administered  - Will continue to monitor and start PPI if she continues to have worsening pain    Restless leg syndrome- (present on admission)  Assessment & Plan  Patient reports a history of restless leg syndrome well-controlled on ropinirole although she does not know the dosage. Has iron deficient anemia.  - Ropinirole 1 mg " nightly and will titrate up  - Iron supplementation ordered    Constipation- (present on admission)  Assessment & Plan  Chronic.  Patient reports it is worse with her chronic opioid use.  - Bowel regimen scheduled    Weakness- (present on admission)  Assessment & Plan  Pt reporting progressive weakness since discharge in September, now becoming weak even with ambulation to the restroom. Not requiring any assistive devices. Pt requesting bedside commode due to difficulty with frequent ambulation to restroom  - PT/OT consults    Compression fracture of T5 vertebra (HCC)- (present on admission)  Assessment & Plan  Noted on CT imaging, pathologic fracture secondary to cancer. Neurosurgery, consulted appreciate recommendations.  - full spine/brain MRI completed 12/31, will follow up results  - Pain regimen ordered, pharmacy consult  - Calcitonin for compression-fracture-related pain control  - Follow neurosurgery recs  - Per oncology would likely proceed with radiation pending what imaging shows     Cancer related pain- (present on admission)  Assessment & Plan  Pain regimen ordered, malignant neoplasm of breast for plan.     Bilateral pleural effusion- (present on admission)  Assessment & Plan  Bilateral moderate sized pleural effusions seen on admission CT. Hx of same on prior admission in September 2024. At that time, IR was consulted for thoracentesis and this could not be completed due to loculations. Pulmonology was then consulted, and they noted her hypoxia may be in part due to shunting from her liver mass.  Patient reports subsequently she went to [facility] who performed thoracentesis on the right.  Patient reports thoracentesis was consistent with malignant effusion though records not available.  Pt on 2 L NC at discharge in 09/2024, requiring 3 L NC to maintain saturations on admission which is stable to slightly improving.   - Continue O2 supplementation, goal sats >90%.   - S/p thoracentesis 12/27,  will consider repeat therapeutic thoracentesis prior to discharge if pt is still having increased O2 demand  - Malignant cells seen per pathology and exudative effusion with     - Fluid cultures show moderate WBC but no organisms, fungal cultures also negative    Acute on chronic hypoxic respiratory failure (HCC)- (present on admission)  Assessment & Plan  Likely related to bilateral pleural effusion. In addition, see by pulmonology at last admission and thought to be component of hepatic shunting. Patient with new bilateral upper lobe infiltrates on CTAP. Without clinical pneumonia and procal negative. Troponin peaked.   -CTM, titrate oxygen to goal  -Home O2 eval before discharge if pt is still requiring oxygen    Hypertension- (present on admission)  Assessment & Plan  Continue home carvedilol, losartan    Diabetes mellitus, type 2 (HCC)- (present on admission)  Assessment & Plan  Diet controlled. Last A1c 7.1 in September 2024.  - Trend blood glucose on chem panel           Core Measures:  Fluids: None  Lines: PIV  Abx: None  Diet: Regular  PPX: Heparin    DISPO: In patient for management of metastatic breast cancer and acute hypoxic respiratory failure      CODE STATUS: Full    Sandra Long D.O.  PGY-1  [facility]

## 2024-12-31 NOTE — ANESTHESIA TIME REPORT
Anesthesia Start and Stop Event Times       Date Time Event    12/31/2024 0943 Ready for Procedure     0945 Anesthesia Start     1229 Anesthesia Stop          Responsible Staff  12/31/24      Name Role Begin End    Lex Hart M.D. Anesth 0945 1229          Overtime Reason:  no overtime (within assigned shift)    Comments:

## 2024-12-31 NOTE — ANESTHESIA POSTPROCEDURE EVALUATION
Patient: Asha Hannon    Procedure Summary       Date: 12/31/24 Room / Location: [facility]    Anesthesia Start: 0945 Anesthesia Stop: 1229    Procedure: MR-CERVICAL SPINE-WITH & W/O Diagnosis:       Breast cancer metastasized to bone, unspecified laterality (HCC)      Breast cancer metastasized to bone, unspecified laterality (HCC)      (SHORTNESS OF BREATH, EXTREMITY WEAKNESS, ABDOMINAL PAIN)      (Metastatic breast cancer, known T5 compression fracture on CT)    Scheduled Providers: Lex Hart M.D. Responsible Provider: Lex Hart M.D.    Anesthesia Type: general ASA Status: 3            Final Anesthesia Type: general  Last vitals  BP   Blood Pressure : (!) 175/85    Temp   36.6 °C (97.8 °F)    Pulse   92   Resp   18    SpO2   92 %      Anesthesia Post Evaluation    Patient location during evaluation: PACU  Patient participation: complete - patient participated  Level of consciousness: sleepy but conscious  Pain score: 6    Airway patency: patent  Anesthetic complications: no  Cardiovascular status: hemodynamically stable  Respiratory status: acceptable  Hydration status: euvolemic    PONV: none          There were no known notable events for this encounter.

## 2024-12-31 NOTE — CARE PLAN
The patient is Watcher - Medium risk of patient condition declining or worsening    Shift Goals  Clinical Goals: Patient will remain free of falls. Nausea/pain control  Patient Goals: Pain/nausea control, rest  Family Goals: Not present    Progress made toward(s) clinical / shift goals:    Problem: Knowledge Deficit - Standard  Goal: Patient and family/care givers will demonstrate understanding of plan of care, disease process/condition, diagnostic tests and medications  Outcome: Progressing     Problem: Fall Risk  Goal: Patient will remain free from falls  Outcome: Progressing     Problem: Pain - Standard  Goal: Alleviation of pain or a reduction in pain to the patient’s comfort goal  Outcome: Progressing       Patient is not progressing towards the following goals:

## 2024-12-31 NOTE — PROGRESS NOTES
Neurosurgery Progress Note    Subjective:  65 yo female with known h/o bilateral breast cancer since  but apparently had delay in treatment for various reasons until .  Has completed some hormone therapy and radiation per patient.  Presented with worsening thoracic spine pain and notes some right thigh numbness.  CT's of the thoracic and lumbar spine show diffuse spinal mets with T5 pathologic fx and CT CAP shows new upper lung infiltrates with bilateral pleural effusions with worsening hepatic mets and new abdominal lymphadenopathy in addition to known thoracic adenopathy.  Denies saddle anesthesia or loss of control over B&B.  Attempted to get MRI's with some sedation , but was still too painful for patient, so will need general anesthesia for these.  Patient did have an assisted fall to floor while getting back to bed following sedation for attempted MRI , denies new pain per family who witnessed this.    No changes overnight.       Exam:  Calm and cooperative with examination.  Speech eloquent and clear.  A&O x3  CN 2-12 grossly intact.  PERRL, EOMI  Motor 4/5 bilateral EHL's remainder 5/5.  Bilateral UE and LE sensory exam with decreased right L3 sensory only.    BP  Min: 126/68  Max: 163/96  Pulse  Av.4  Min: 69  Max: 85  Resp  Av.5  Min: 18  Max: 20  Temp  Av.7 °C (98.1 °F)  Min: 36.5 °C (97.7 °F)  Max: 36.8 °C (98.3 °F)  SpO2  Av.2 %  Min: 94 %  Max: 95 %    No data recorded    Recent Labs     24  0454 24  0502   WBC 4.1* 5.6 6.9   RBC 3.93* 4.25 4.41   HEMOGLOBIN 11.2* 12.1 12.4   HEMATOCRIT 34.9* 37.9 38.5   MCV 88.8 89.2 87.3   MCH 28.5 28.5 28.1   MCHC 32.1* 31.9* 32.2   RDW 55.6* 55.5* 55.7*   PLATELETCT 223 268 296   MPV 9.0 9.0 9.2     Recent Labs     24  0454 24  0502   SODIUM 141 137 139   POTASSIUM 3.9 4.3 4.4   CHLORIDE 102 98 98   CO2 29 29 30   GLUCOSE 113* 116* 134*   BUN 9 9 7*   CREATININE 0.64 0.58  0.54   CALCIUM 9.2 9.5 9.5               Intake/Output                         12/30/24 0700 - 12/31/24 0659 12/31/24 0700 - 01/01/25 0659     [id number] 1900-0659 Total 0700-1859 1900-0659 Total                 Intake    Total Intake -- -- -- -- -- --       Output    Urine  --  -- --  --  -- --    Number of Times Voided 12 x 4 x 16 x -- -- --    Stool  --  -- --  --  -- --    Number of Times Stooled 1 x -- 1 x -- -- --    Total Output -- -- -- -- -- --       Net I/O     -- -- -- -- -- --            No intake or output data in the 24 hours ending 12/31/24 0914          senna-docusate  2 Tablet BID    And    polyethylene glycol/lytes  1 Packet DAILY    magnesium oxide  400 mg DAILY    ROPINIRole  1 mg BID PRN    diclofenac sodium  2 g 4X/DAY PRN    metoclopramide  10 mg Q6HRS    calcium carbonate  500 mg TID PRN    heparin  5,000 Units Q8HRS    ondansetron  4 mg Q4HRS PRN    ondansetron  4 mg Q4HRS PRN    carvedilol  3.125 mg BID    losartan  100 mg Q DAY    HYDROcodone-acetaminophen  1 Tablet Q6HRS PRN    Or    HYDROcodone-acetaminophen  2 Tablet Q6HRS PRN    lidocaine  1 Patch Q24HR    acetaminophen  650 mg Q4HRS PRN    ferrous sulfate  325 mg QDAY with Breakfast    ROPINIRole  1 mg QHS    fentaNYL  25 mcg Q HOUR PRN    Pharmacy Consult Request  1 Each PHARMACY TO DOSE    calcitonin (salmon)  1 [place] DAILY       Assessment and Plan:  Hospital day # 7  Known breast cancer with worsening spinal mets and other mets.  Needs MRI's full spinal axis with and without contrast, and will need general anesthesia to complete. Hopefully this gets done today.  Continue medical care.  Dr. Lowery has seen and plans to fully evaluate after MRI's completed.  Following.      Chemical prophylactic DVT therapy: No  Start date/time: TBD     Brain Compression: No

## 2024-12-31 NOTE — PROGRESS NOTES
Pt presents to MRI1. Anesthesia care provided by .Pt transferred to MRI table in Supine position. Patient underwent a MRI brain/ MRI total spine. All vital signs monitoring and medication administration by anesthesia services - See anesthesia flowsheet for details. Report called to PACU RIYA Ibrahim. Pt transported by chantal with RN to PACU.

## 2024-12-31 NOTE — ANESTHESIA PREPROCEDURE EVALUATION
Date/Time: 12/31/24 0830    Scheduled providers: Lex Hart M.D.    Procedure: MR-CERVICAL SPINE-WITH & W/O    Diagnosis:       Breast cancer metastasized to bone, unspecified laterality (HCC) [C50.919, C79.51]      Breast cancer metastasized to bone, unspecified laterality (HCC) [C50.919, C79.51]    Indications:       SHORTNESS OF BREATH, EXTREMITY WEAKNESS, ABDOMINAL PAIN      Metastatic breast cancer, known T5 compression fracture on CT    Location: [facility] - MRI - [facility]            Relevant Problems   ANESTHESIA   (positive) Moderate obstructive sleep apnea-hypopnea syndrome      PULMONARY   (positive) Shortness of breath      CARDIAC   (positive) Hypertension         (positive) Metastasis to liver (HCC)      ENDO   (positive) Diabetes mellitus, type 2 (HCC)      Other   (positive) Acute on chronic hypoxic respiratory failure (HCC)   (positive) Bilateral pleural effusion   (positive) Cardiomegaly   (positive) Compression fracture of T5 vertebra (HCC)   (positive) Lumbar radiculopathy   (positive) Metastasis to bone (HCC)   (positive) Metastasis to lung (HCC)   (positive) Obesity   (positive) Snoring       Physical Exam    Airway   Mallampati: II  TM distance: >3 FB  Neck ROM: full       Cardiovascular - normal exam  Rhythm: regular  Rate: normal  (-) murmur     Dental - normal exam           Pulmonary - normal exam  Breath sounds clear to auscultation     Abdominal    Neurological - normal exam                   Anesthesia Plan    ASA 3 (metastatic cancer, respiratory insufficiency.)       Plan - general       Airway plan will be LMA          Induction: intravenous    Postoperative Plan: Postoperative administration of opioids is intended.    Pertinent diagnostic labs and testing reviewed    Informed Consent:    Anesthetic plan and risks discussed with patient.    Use of blood products discussed with: patient whom consented to blood products.

## 2025-01-01 ENCOUNTER — APPOINTMENT (OUTPATIENT)
Dept: RADIOLOGY | Facility: MEDICAL CENTER | Age: 65
DRG: 456 | End: 2025-01-01
Attending: INTERNAL MEDICINE
Payer: MEDICARE

## 2025-01-01 ENCOUNTER — APPOINTMENT (OUTPATIENT)
Dept: RADIOLOGY | Facility: MEDICAL CENTER | Age: 65
DRG: 456 | End: 2025-01-01
Attending: NEUROLOGICAL SURGERY
Payer: MEDICARE

## 2025-01-01 ENCOUNTER — APPOINTMENT (OUTPATIENT)
Dept: RADIOLOGY | Facility: MEDICAL CENTER | Age: 65
DRG: 456 | End: 2025-01-01
Attending: FAMILY MEDICINE
Payer: MEDICARE

## 2025-01-01 ENCOUNTER — APPOINTMENT (OUTPATIENT)
Dept: RADIOLOGY | Facility: MEDICAL CENTER | Age: 65
DRG: 456 | End: 2025-01-01
Attending: EMERGENCY MEDICINE
Payer: MEDICARE

## 2025-01-01 ENCOUNTER — APPOINTMENT (OUTPATIENT)
Dept: RADIOLOGY | Facility: MEDICAL CENTER | Age: 65
DRG: 456 | End: 2025-01-01
Attending: NURSE PRACTITIONER
Payer: MEDICARE

## 2025-01-01 ENCOUNTER — ANESTHESIA (OUTPATIENT)
Dept: SURGERY | Facility: MEDICAL CENTER | Age: 65
End: 2025-01-01
Payer: MEDICARE

## 2025-01-01 ENCOUNTER — HOSPICE ADMISSION (OUTPATIENT)
Dept: HOSPICE | Facility: HOSPICE | Age: 65
End: 2025-01-01
Payer: MEDICARE

## 2025-01-01 ENCOUNTER — APPOINTMENT (OUTPATIENT)
Dept: RADIOLOGY | Facility: MEDICAL CENTER | Age: 65
DRG: 456 | End: 2025-01-01
Attending: HOSPITALIST
Payer: MEDICARE

## 2025-01-01 ENCOUNTER — APPOINTMENT (OUTPATIENT)
Dept: RADIOLOGY | Facility: MEDICAL CENTER | Age: 65
DRG: 456 | End: 2025-01-01
Attending: RADIOLOGY
Payer: MEDICARE

## 2025-01-01 ENCOUNTER — APPOINTMENT (OUTPATIENT)
Dept: RADIOLOGY | Facility: MEDICAL CENTER | Age: 65
DRG: 456 | End: 2025-01-01
Attending: STUDENT IN AN ORGANIZED HEALTH CARE EDUCATION/TRAINING PROGRAM
Payer: MEDICARE

## 2025-01-01 ENCOUNTER — APPOINTMENT (OUTPATIENT)
Dept: RADIOLOGY | Facility: MEDICAL CENTER | Age: 65
End: 2025-01-01
Attending: NEUROLOGICAL SURGERY
Payer: MEDICARE

## 2025-01-01 ENCOUNTER — APPOINTMENT (OUTPATIENT)
Dept: RADIOLOGY | Facility: MEDICAL CENTER | Age: 65
DRG: 456 | End: 2025-01-01
Payer: MEDICARE

## 2025-01-01 ENCOUNTER — ANESTHESIA EVENT (OUTPATIENT)
Dept: SURGERY | Facility: MEDICAL CENTER | Age: 65
End: 2025-01-01
Payer: MEDICARE

## 2025-01-01 LAB
GLUCOSE BLD STRIP.AUTO-MCNC: 102 MG/DL (ref 65–99)
INR PPP: 0.96 (ref 0.87–1.13)
PROTHROMBIN TIME: 12.8 SEC (ref 12–14.6)

## 2025-01-01 PROCEDURE — 00NX0ZZ RELEASE THORACIC SPINAL CORD, OPEN APPROACH: ICD-10-PCS | Performed by: NEUROLOGICAL SURGERY

## 2025-01-01 PROCEDURE — 700111 HCHG RX REV CODE 636 W/ 250 OVERRIDE (IP): Performed by: ANESTHESIOLOGY

## 2025-01-01 PROCEDURE — C1713 ANCHOR/SCREW BN/BN,TIS/BN: HCPCS | Performed by: NEUROLOGICAL SURGERY

## 2025-01-01 PROCEDURE — 71045 X-RAY EXAM CHEST 1 VIEW: CPT

## 2025-01-01 PROCEDURE — 160031 HCHG SURGERY MINUTES - 1ST 30 MINS LEVEL 5: Performed by: NEUROLOGICAL SURGERY

## 2025-01-01 PROCEDURE — 88307 TISSUE EXAM BY PATHOLOGIST: CPT

## 2025-01-01 PROCEDURE — 95940 IONM IN OPERATNG ROOM 15 MIN: CPT | Performed by: NEUROLOGICAL SURGERY

## 2025-01-01 PROCEDURE — 700111 HCHG RX REV CODE 636 W/ 250 OVERRIDE (IP)

## 2025-01-01 PROCEDURE — 160009 HCHG ANES TIME/MIN: Performed by: NEUROLOGICAL SURGERY

## 2025-01-01 PROCEDURE — 700102 HCHG RX REV CODE 250 W/ 637 OVERRIDE(OP): Performed by: NEUROLOGICAL SURGERY

## 2025-01-01 PROCEDURE — 700102 HCHG RX REV CODE 250 W/ 637 OVERRIDE(OP)

## 2025-01-01 PROCEDURE — 88360 TUMOR IMMUNOHISTOCHEM/MANUAL: CPT | Mod: 91

## 2025-01-01 PROCEDURE — 88311 DECALCIFY TISSUE: CPT

## 2025-01-01 PROCEDURE — 700105 HCHG RX REV CODE 258: Performed by: ANESTHESIOLOGY

## 2025-01-01 PROCEDURE — A9270 NON-COVERED ITEM OR SERVICE: HCPCS

## 2025-01-01 PROCEDURE — 160035 HCHG PACU - 1ST 60 MINS PHASE I: Performed by: NEUROLOGICAL SURGERY

## 2025-01-01 PROCEDURE — A9270 NON-COVERED ITEM OR SERVICE: HCPCS | Performed by: NEUROLOGICAL SURGERY

## 2025-01-01 PROCEDURE — 160036 HCHG PACU - EA ADDL 30 MINS PHASE I: Performed by: NEUROLOGICAL SURGERY

## 2025-01-01 PROCEDURE — 71275 CT ANGIOGRAPHY CHEST: CPT

## 2025-01-01 PROCEDURE — 700111 HCHG RX REV CODE 636 W/ 250 OVERRIDE (IP): Mod: JZ | Performed by: NEUROLOGICAL SURGERY

## 2025-01-01 PROCEDURE — 700101 HCHG RX REV CODE 250: Performed by: ANESTHESIOLOGY

## 2025-01-01 PROCEDURE — 700101 HCHG RX REV CODE 250: Performed by: NEUROLOGICAL SURGERY

## 2025-01-01 PROCEDURE — 160002 HCHG RECOVERY MINUTES (STAT): Performed by: NEUROLOGICAL SURGERY

## 2025-01-01 PROCEDURE — 160042 HCHG SURGERY MINUTES - EA ADDL 1 MIN LEVEL 5: Performed by: NEUROLOGICAL SURGERY

## 2025-01-01 PROCEDURE — 700105 HCHG RX REV CODE 258: Performed by: NEUROLOGICAL SURGERY

## 2025-01-01 PROCEDURE — C1729 CATH, DRAINAGE: HCPCS

## 2025-01-01 PROCEDURE — 95939 C MOTOR EVOKED UPR&LWR LIMBS: CPT | Performed by: NEUROLOGICAL SURGERY

## 2025-01-01 PROCEDURE — 110371 HCHG SHELL REV 272: Performed by: NEUROLOGICAL SURGERY

## 2025-01-01 PROCEDURE — 95938 SOMATOSENSORY TESTING: CPT | Performed by: NEUROLOGICAL SURGERY

## 2025-01-01 PROCEDURE — 770004 HCHG ROOM/CARE - ONCOLOGY PRIVATE *

## 2025-01-01 PROCEDURE — 85610 PROTHROMBIN TIME: CPT

## 2025-01-01 PROCEDURE — 700101 HCHG RX REV CODE 250

## 2025-01-01 PROCEDURE — 72070 X-RAY EXAM THORAC SPINE 2VWS: CPT

## 2025-01-01 PROCEDURE — 99232 SBSQ HOSP IP/OBS MODERATE 35: CPT | Mod: GC | Performed by: STUDENT IN AN ORGANIZED HEALTH CARE EDUCATION/TRAINING PROGRAM

## 2025-01-01 PROCEDURE — 502000 HCHG MISC OR IMPLANTS RC 0278: Performed by: NEUROLOGICAL SURGERY

## 2025-01-01 PROCEDURE — 36415 COLL VENOUS BLD VENIPUNCTURE: CPT

## 2025-01-01 PROCEDURE — 95937 NEUROMUSCULAR JUNCTION TEST: CPT | Performed by: NEUROLOGICAL SURGERY

## 2025-01-01 PROCEDURE — 88342 IMHCHEM/IMCYTCHM 1ST ANTB: CPT

## 2025-01-01 PROCEDURE — 110454 HCHG SHELL REV 250: Performed by: NEUROLOGICAL SURGERY

## 2025-01-01 PROCEDURE — 160048 HCHG OR STATISTICAL LEVEL 1-5: Performed by: NEUROLOGICAL SURGERY

## 2025-01-01 PROCEDURE — 502240 HCHG MISC OR SUPPLY RC 0272: Performed by: NEUROLOGICAL SURGERY

## 2025-01-01 PROCEDURE — 95861 NEEDLE EMG 2 EXTREMITIES: CPT | Performed by: NEUROLOGICAL SURGERY

## 2025-01-01 PROCEDURE — 82962 GLUCOSE BLOOD TEST: CPT

## 2025-01-01 PROCEDURE — 0RG70K1 FUSION OF 2 TO 7 THORACIC VERTEBRAL JOINTS WITH NONAUTOLOGOUS TISSUE SUBSTITUTE, POSTERIOR APPROACH, POSTERIOR COLUMN, OPEN APPROACH: ICD-10-PCS | Performed by: NEUROLOGICAL SURGERY

## 2025-01-01 DEVICE — SCREW SET CD HORIZON SOLERAL VOYAGER 5/6MM (1EA): Type: IMPLANTABLE DEVICE | Site: BACK | Status: FUNCTIONAL

## 2025-01-01 DEVICE — IMPLANTABLE DEVICE: Type: IMPLANTABLE DEVICE | Site: BACK | Status: FUNCTIONAL

## 2025-01-01 DEVICE — PUTTY GRAFTON 5CC: Type: IMPLANTABLE DEVICE | Site: BACK | Status: FUNCTIONAL

## 2025-01-01 RX ORDER — VANCOMYCIN HYDROCHLORIDE 1 G/20ML
INJECTION, POWDER, LYOPHILIZED, FOR SOLUTION INTRAVENOUS
Status: COMPLETED | OUTPATIENT
Start: 2025-01-01 | End: 2025-01-01

## 2025-01-01 RX ORDER — SODIUM CHLORIDE 9 MG/ML
INJECTION, SOLUTION INTRAVENOUS CONTINUOUS
Status: ACTIVE | OUTPATIENT
Start: 2025-01-01 | End: 2025-01-02

## 2025-01-01 RX ORDER — AMOXICILLIN 250 MG
1 CAPSULE ORAL
Status: DISCONTINUED | OUTPATIENT
Start: 2025-01-01 | End: 2025-01-04

## 2025-01-01 RX ORDER — OXYCODONE HYDROCHLORIDE 5 MG/1
5 TABLET ORAL
Status: DISCONTINUED | OUTPATIENT
Start: 2025-01-01 | End: 2025-01-03

## 2025-01-01 RX ORDER — HYDRALAZINE HYDROCHLORIDE 20 MG/ML
5 INJECTION INTRAMUSCULAR; INTRAVENOUS
Status: DISCONTINUED | OUTPATIENT
Start: 2025-01-01 | End: 2025-01-01 | Stop reason: HOSPADM

## 2025-01-01 RX ORDER — PHENYLEPHRINE HYDROCHLORIDE 10 MG/ML
INJECTION, SOLUTION INTRAMUSCULAR; INTRAVENOUS; SUBCUTANEOUS PRN
Status: DISCONTINUED | OUTPATIENT
Start: 2025-01-01 | End: 2025-01-01 | Stop reason: SURG

## 2025-01-01 RX ORDER — METHOCARBAMOL 750 MG/1
750 TABLET, FILM COATED ORAL EVERY 8 HOURS PRN
Status: DISCONTINUED | OUTPATIENT
Start: 2025-01-01 | End: 2025-01-04

## 2025-01-01 RX ORDER — SODIUM CHLORIDE, SODIUM LACTATE, POTASSIUM CHLORIDE, CALCIUM CHLORIDE 600; 310; 30; 20 MG/100ML; MG/100ML; MG/100ML; MG/100ML
INJECTION, SOLUTION INTRAVENOUS CONTINUOUS
Status: DISCONTINUED | OUTPATIENT
Start: 2025-01-01 | End: 2025-01-01 | Stop reason: HOSPADM

## 2025-01-01 RX ORDER — POLYETHYLENE GLYCOL 3350 17 G/17G
1 POWDER, FOR SOLUTION ORAL 2 TIMES DAILY PRN
Status: DISCONTINUED | OUTPATIENT
Start: 2025-01-01 | End: 2025-01-04

## 2025-01-01 RX ORDER — OXYCODONE HCL 5 MG/5 ML
10 SOLUTION, ORAL ORAL
Status: DISCONTINUED | OUTPATIENT
Start: 2025-01-01 | End: 2025-01-01 | Stop reason: HOSPADM

## 2025-01-01 RX ORDER — OXYCODONE HYDROCHLORIDE 10 MG/1
10 TABLET ORAL
Status: DISCONTINUED | OUTPATIENT
Start: 2025-01-01 | End: 2025-01-03

## 2025-01-01 RX ORDER — BISACODYL 10 MG
10 SUPPOSITORY, RECTAL RECTAL
Status: DISCONTINUED | OUTPATIENT
Start: 2025-01-01 | End: 2025-01-08

## 2025-01-01 RX ORDER — AMOXICILLIN 250 MG
1 CAPSULE ORAL NIGHTLY
Status: DISCONTINUED | OUTPATIENT
Start: 2025-01-01 | End: 2025-01-04

## 2025-01-01 RX ORDER — ALBUTEROL SULFATE 5 MG/ML
2.5 SOLUTION RESPIRATORY (INHALATION)
Status: DISCONTINUED | OUTPATIENT
Start: 2025-01-01 | End: 2025-01-01 | Stop reason: HOSPADM

## 2025-01-01 RX ORDER — KETOROLAC TROMETHAMINE 15 MG/ML
15 INJECTION, SOLUTION INTRAMUSCULAR; INTRAVENOUS EVERY 6 HOURS
Status: DISCONTINUED | OUTPATIENT
Start: 2025-01-02 | End: 2025-01-03

## 2025-01-01 RX ORDER — OXYCODONE HCL 5 MG/5 ML
5 SOLUTION, ORAL ORAL
Status: DISCONTINUED | OUTPATIENT
Start: 2025-01-01 | End: 2025-01-01 | Stop reason: HOSPADM

## 2025-01-01 RX ORDER — IBUPROFEN 800 MG/1
800 TABLET, FILM COATED ORAL 3 TIMES DAILY PRN
Status: DISCONTINUED | OUTPATIENT
Start: 2025-01-04 | End: 2025-01-03

## 2025-01-01 RX ORDER — ACETAMINOPHEN 500 MG
1000 TABLET ORAL EVERY 6 HOURS PRN
Status: DISCONTINUED | OUTPATIENT
Start: 2025-01-07 | End: 2025-01-03

## 2025-01-01 RX ORDER — HYDROMORPHONE HYDROCHLORIDE 1 MG/ML
0.2 INJECTION, SOLUTION INTRAMUSCULAR; INTRAVENOUS; SUBCUTANEOUS
Status: DISCONTINUED | OUTPATIENT
Start: 2025-01-01 | End: 2025-01-01 | Stop reason: HOSPADM

## 2025-01-01 RX ORDER — ACETAMINOPHEN 500 MG
1000 TABLET ORAL EVERY 6 HOURS
Status: DISCONTINUED | OUTPATIENT
Start: 2025-01-02 | End: 2025-01-03

## 2025-01-01 RX ORDER — EPHEDRINE SULFATE 50 MG/ML
INJECTION, SOLUTION INTRAVENOUS PRN
Status: DISCONTINUED | OUTPATIENT
Start: 2025-01-01 | End: 2025-01-01 | Stop reason: SURG

## 2025-01-01 RX ORDER — CEFAZOLIN SODIUM 1 G/3ML
INJECTION, POWDER, FOR SOLUTION INTRAMUSCULAR; INTRAVENOUS
Status: DISCONTINUED | OUTPATIENT
Start: 2025-01-01 | End: 2025-01-01 | Stop reason: HOSPADM

## 2025-01-01 RX ORDER — HYDROMORPHONE HYDROCHLORIDE 1 MG/ML
0.5 INJECTION, SOLUTION INTRAMUSCULAR; INTRAVENOUS; SUBCUTANEOUS
Status: DISCONTINUED | OUTPATIENT
Start: 2025-01-01 | End: 2025-01-01 | Stop reason: HOSPADM

## 2025-01-01 RX ORDER — HYDROMORPHONE HYDROCHLORIDE 2 MG/ML
INJECTION, SOLUTION INTRAMUSCULAR; INTRAVENOUS; SUBCUTANEOUS PRN
Status: DISCONTINUED | OUTPATIENT
Start: 2025-01-01 | End: 2025-01-03 | Stop reason: HOSPADM

## 2025-01-01 RX ORDER — HEPARIN SODIUM 5000 [USP'U]/ML
5000 INJECTION, SOLUTION INTRAVENOUS; SUBCUTANEOUS EVERY 8 HOURS
Status: DISCONTINUED | OUTPATIENT
Start: 2025-01-03 | End: 2025-01-04

## 2025-01-01 RX ORDER — ONDANSETRON 2 MG/ML
4 INJECTION INTRAMUSCULAR; INTRAVENOUS
Status: DISCONTINUED | OUTPATIENT
Start: 2025-01-01 | End: 2025-01-01 | Stop reason: HOSPADM

## 2025-01-01 RX ORDER — SODIUM CHLORIDE, SODIUM LACTATE, POTASSIUM CHLORIDE, CALCIUM CHLORIDE 600; 310; 30; 20 MG/100ML; MG/100ML; MG/100ML; MG/100ML
INJECTION, SOLUTION INTRAVENOUS
Status: DISCONTINUED | OUTPATIENT
Start: 2025-01-01 | End: 2025-01-01 | Stop reason: SURG

## 2025-01-01 RX ORDER — DOCUSATE SODIUM 100 MG/1
100 CAPSULE, LIQUID FILLED ORAL 2 TIMES DAILY
Status: DISCONTINUED | OUTPATIENT
Start: 2025-01-01 | End: 2025-01-04

## 2025-01-01 RX ORDER — ECHINACEA PURPUREA EXTRACT 125 MG
2 TABLET ORAL
Status: DISCONTINUED | OUTPATIENT
Start: 2025-01-01 | End: 2025-01-26

## 2025-01-01 RX ORDER — DEXAMETHASONE SODIUM PHOSPHATE 4 MG/ML
INJECTION, SOLUTION INTRA-ARTICULAR; INTRALESIONAL; INTRAMUSCULAR; INTRAVENOUS; SOFT TISSUE PRN
Status: DISCONTINUED | OUTPATIENT
Start: 2025-01-01 | End: 2025-01-01 | Stop reason: SURG

## 2025-01-01 RX ORDER — ONDANSETRON 2 MG/ML
INJECTION INTRAMUSCULAR; INTRAVENOUS PRN
Status: DISCONTINUED | OUTPATIENT
Start: 2025-01-01 | End: 2025-01-01 | Stop reason: SURG

## 2025-01-01 RX ORDER — BUPIVACAINE HYDROCHLORIDE AND EPINEPHRINE 5; 5 MG/ML; UG/ML
INJECTION, SOLUTION PERINEURAL
Status: DISCONTINUED | OUTPATIENT
Start: 2025-01-01 | End: 2025-01-01 | Stop reason: HOSPADM

## 2025-01-01 RX ORDER — HALOPERIDOL 5 MG/ML
1 INJECTION INTRAMUSCULAR
Status: DISCONTINUED | OUTPATIENT
Start: 2025-01-01 | End: 2025-01-01 | Stop reason: HOSPADM

## 2025-01-01 RX ORDER — DIPHENHYDRAMINE HYDROCHLORIDE 50 MG/ML
12.5 INJECTION INTRAMUSCULAR; INTRAVENOUS
Status: DISCONTINUED | OUTPATIENT
Start: 2025-01-01 | End: 2025-01-01 | Stop reason: HOSPADM

## 2025-01-01 RX ORDER — MIDAZOLAM HYDROCHLORIDE 1 MG/ML
INJECTION INTRAMUSCULAR; INTRAVENOUS PRN
Status: DISCONTINUED | OUTPATIENT
Start: 2025-01-01 | End: 2025-01-02

## 2025-01-01 RX ORDER — EPHEDRINE SULFATE 50 MG/ML
5 INJECTION, SOLUTION INTRAVENOUS
Status: DISCONTINUED | OUTPATIENT
Start: 2025-01-01 | End: 2025-01-01 | Stop reason: HOSPADM

## 2025-01-01 RX ORDER — HYDROMORPHONE HYDROCHLORIDE 1 MG/ML
0.4 INJECTION, SOLUTION INTRAMUSCULAR; INTRAVENOUS; SUBCUTANEOUS
Status: DISCONTINUED | OUTPATIENT
Start: 2025-01-01 | End: 2025-01-01 | Stop reason: HOSPADM

## 2025-01-01 RX ORDER — CEFAZOLIN SODIUM 1 G/3ML
INJECTION, POWDER, FOR SOLUTION INTRAMUSCULAR; INTRAVENOUS PRN
Status: DISCONTINUED | OUTPATIENT
Start: 2025-01-01 | End: 2025-01-01 | Stop reason: SURG

## 2025-01-01 RX ORDER — HYDROMORPHONE HYDROCHLORIDE 1 MG/ML
0.5 INJECTION, SOLUTION INTRAMUSCULAR; INTRAVENOUS; SUBCUTANEOUS
Status: DISCONTINUED | OUTPATIENT
Start: 2025-01-01 | End: 2025-01-03

## 2025-01-01 RX ORDER — LIDOCAINE HYDROCHLORIDE 20 MG/ML
INJECTION, SOLUTION EPIDURAL; INFILTRATION; INTRACAUDAL; PERINEURAL PRN
Status: DISCONTINUED | OUTPATIENT
Start: 2025-01-01 | End: 2025-01-01 | Stop reason: SURG

## 2025-01-01 RX ORDER — MEPERIDINE HYDROCHLORIDE 25 MG/ML
12.5 INJECTION INTRAMUSCULAR; INTRAVENOUS; SUBCUTANEOUS
Status: DISCONTINUED | OUTPATIENT
Start: 2025-01-01 | End: 2025-01-01 | Stop reason: HOSPADM

## 2025-01-01 RX ORDER — TRANEXAMIC ACID 100 MG/ML
INJECTION, SOLUTION INTRAVENOUS PRN
Status: DISCONTINUED | OUTPATIENT
Start: 2025-01-01 | End: 2025-01-01 | Stop reason: SURG

## 2025-01-01 RX ORDER — MORPHINE SULFATE 15 MG/1
15 TABLET, FILM COATED, EXTENDED RELEASE ORAL EVERY 12 HOURS
Status: DISCONTINUED | OUTPATIENT
Start: 2025-01-01 | End: 2025-01-04

## 2025-01-01 RX ADMIN — ROPINIROLE HYDROCHLORIDE 1 MG: 1 TABLET, FILM COATED ORAL at 08:35

## 2025-01-01 RX ADMIN — TRANEXAMIC ACID 1000 MG: 100 INJECTION, SOLUTION INTRAVENOUS at 16:40

## 2025-01-01 RX ADMIN — DOCUSATE SODIUM 100 MG: 100 CAPSULE, LIQUID FILLED ORAL at 22:54

## 2025-01-01 RX ADMIN — PHENYLEPHRINE HYDROCHLORIDE 200 MCG: 10 INJECTION INTRAVENOUS at 16:23

## 2025-01-01 RX ADMIN — CEFAZOLIN 2 G: 1 INJECTION, POWDER, FOR SOLUTION INTRAMUSCULAR; INTRAVENOUS at 16:18

## 2025-01-01 RX ADMIN — DICLOFENAC SODIUM 2 G: 10 GEL TOPICAL at 01:34

## 2025-01-01 RX ADMIN — DEXAMETHASONE SODIUM PHOSPHATE 10 MG: 4 INJECTION INTRA-ARTICULAR; INTRALESIONAL; INTRAMUSCULAR; INTRAVENOUS; SOFT TISSUE at 16:18

## 2025-01-01 RX ADMIN — FENTANYL CITRATE 25 MCG: 50 INJECTION, SOLUTION INTRAMUSCULAR; INTRAVENOUS at 09:49

## 2025-01-01 RX ADMIN — FENTANYL CITRATE 25 MCG: 50 INJECTION, SOLUTION INTRAMUSCULAR; INTRAVENOUS at 12:39

## 2025-01-01 RX ADMIN — LIDOCAINE 1 PATCH: 4 PATCH TOPICAL at 09:35

## 2025-01-01 RX ADMIN — Medication 100 MG: at 16:15

## 2025-01-01 RX ADMIN — Medication 2 SPRAY: at 06:00

## 2025-01-01 RX ADMIN — PROPOFOL 100 MCG/KG/MIN: 10 INJECTION, EMULSION INTRAVENOUS at 16:39

## 2025-01-01 RX ADMIN — ONDANSETRON 4 MG: 2 INJECTION INTRAMUSCULAR; INTRAVENOUS at 22:45

## 2025-01-01 RX ADMIN — METOCLOPRAMIDE HYDROCHLORIDE 10 MG: 5 INJECTION INTRAMUSCULAR; INTRAVENOUS at 23:31

## 2025-01-01 RX ADMIN — ONDANSETRON 4 MG: 2 INJECTION INTRAMUSCULAR; INTRAVENOUS at 19:17

## 2025-01-01 RX ADMIN — LIDOCAINE HYDROCHLORIDE 60 MG: 20 INJECTION, SOLUTION EPIDURAL; INFILTRATION; INTRACAUDAL; PERINEURAL at 16:15

## 2025-01-01 RX ADMIN — SODIUM CHLORIDE 600 MCG/KG/HR: 9 INJECTION, SOLUTION INTRAVENOUS at 16:39

## 2025-01-01 RX ADMIN — FENTANYL CITRATE 25 MCG: 50 INJECTION, SOLUTION INTRAMUSCULAR; INTRAVENOUS at 23:09

## 2025-01-01 RX ADMIN — CALCITONIN SALMON 200 UNITS: 200 SPRAY, METERED NASAL at 08:36

## 2025-01-01 RX ADMIN — MORPHINE SULFATE 15 MG: 15 TABLET, FILM COATED, EXTENDED RELEASE ORAL at 10:48

## 2025-01-01 RX ADMIN — FENTANYL CITRATE 25 MCG: 50 INJECTION, SOLUTION INTRAMUSCULAR; INTRAVENOUS at 08:13

## 2025-01-01 RX ADMIN — Medication 400 MG: at 05:53

## 2025-01-01 RX ADMIN — CARVEDILOL 3.12 MG: 3.12 TABLET, FILM COATED ORAL at 05:49

## 2025-01-01 RX ADMIN — FENTANYL CITRATE 25 MCG: 50 INJECTION, SOLUTION INTRAMUSCULAR; INTRAVENOUS at 02:10

## 2025-01-01 RX ADMIN — SENNOSIDES AND DOCUSATE SODIUM 1 TABLET: 50; 8.6 TABLET ORAL at 22:54

## 2025-01-01 RX ADMIN — PHENYLEPHRINE HYDROCHLORIDE 200 MCG: 10 INJECTION INTRAVENOUS at 16:29

## 2025-01-01 RX ADMIN — EPHEDRINE SULFATE 15 MG: 50 INJECTION, SOLUTION INTRAVENOUS at 16:48

## 2025-01-01 RX ADMIN — HYDROMORPHONE HYDROCHLORIDE 2 MG: 2 INJECTION INTRAMUSCULAR; INTRAVENOUS; SUBCUTANEOUS at 16:15

## 2025-01-01 RX ADMIN — PHENYLEPHRINE HYDROCHLORIDE 200 MCG: 10 INJECTION INTRAVENOUS at 16:43

## 2025-01-01 RX ADMIN — PHENOL 1 SPRAY: 1.5 LIQUID ORAL at 09:35

## 2025-01-01 RX ADMIN — PROPOFOL 120 MG: 10 INJECTION, EMULSION INTRAVENOUS at 16:15

## 2025-01-01 RX ADMIN — SODIUM CHLORIDE: 9 INJECTION, SOLUTION INTRAVENOUS at 22:30

## 2025-01-01 RX ADMIN — METOCLOPRAMIDE HYDROCHLORIDE 10 MG: 5 INJECTION INTRAMUSCULAR; INTRAVENOUS at 08:14

## 2025-01-01 RX ADMIN — MIDAZOLAM HYDROCHLORIDE 2 MG: 1 INJECTION, SOLUTION INTRAMUSCULAR; INTRAVENOUS at 16:10

## 2025-01-01 RX ADMIN — KETOROLAC TROMETHAMINE 15 MG: 15 INJECTION, SOLUTION INTRAMUSCULAR; INTRAVENOUS at 23:08

## 2025-01-01 RX ADMIN — SODIUM CHLORIDE, POTASSIUM CHLORIDE, SODIUM LACTATE AND CALCIUM CHLORIDE: 600; 310; 30; 20 INJECTION, SOLUTION INTRAVENOUS at 16:10

## 2025-01-01 RX ADMIN — PHENYLEPHRINE HYDROCHLORIDE 1 MCG/KG/MIN: 10 INJECTION INTRAVENOUS at 16:57

## 2025-01-01 ASSESSMENT — PAIN DESCRIPTION - PAIN TYPE
TYPE: ACUTE PAIN
TYPE: ACUTE PAIN;SURGICAL PAIN
TYPE: ACUTE PAIN
TYPE: ACUTE PAIN

## 2025-01-01 NOTE — PROGRESS NOTES
Patient seen and examined, NAD.  Continues to have some weakness in bilateral lower extremities however no loss of bowel or bladder function.  Sensory appears to be intact according to patient.  Hyperreflexic in bilateral lower extremities  Clearly myelopathic  Had a recent fall coming back from the commode.    MRI of the brain/cervical/thoracic/lumbar with and without contrast all reviewed.  Patient does have evidence of spinal cord compression with epidural encroachment and extension of the T5 vertebral body metastatic lesion.  This is causing spinal cord compression with stenosis at the T5 level.  At this time I do believe the patient would benefit from operative intervention in the form of decompressive laminectomy from T4-T6 with percutaneous screw kaiser fixation from T4-T6 as well.  Will attempt to schedule the patient for 1/1/2025.  N.p.o. after midnight

## 2025-01-01 NOTE — CARE PLAN
The patient is Watcher - Medium risk of patient condition declining or worsening    Shift Goals  Clinical Goals: npo for mri, nausea/pain control, fall precautions  Patient Goals: pain and nausea control, eat  Family Goals: comfort and eat    Progress made toward(s) clinical / shift goals:  yes    Pt aaox4.  Npo for mri with sedation.  Up with sba/fww/bsc - states she feels her legs will given out.  Piv patent.  Reported nausea/zofran with relief.  Reports right hip/leg pain - prn pain meds with tolerable pain control.  Fall precautions in place - bed alarm on.  Call light within reach and family    Problem: Knowledge Deficit - Standard  Goal: Patient and family/care givers will demonstrate understanding of plan of care, disease process/condition, diagnostic tests and medications  Outcome: Progressing     Problem: Skin Integrity  Goal: Skin integrity is maintained or improved  Outcome: Progressing     Problem: Fall Risk  Goal: Patient will remain free from falls  Outcome: Progressing     Problem: Pain - Standard  Goal: Alleviation of pain or a reduction in pain to the patient’s comfort goal  Outcome: Progressing

## 2025-01-01 NOTE — PROGRESS NOTES
Bedside report received, Assume care from haile RITTER.     A&O x 4, Able to make needs known.  Pain Assessment: 7/10 to right leg . Medication provided per MAR .     Bed in low position and locked, pt resting comfortably now, call light with in reach, all needs met at this time. Interventions will be executed per plan of care. Educate patient    815  pain medication given.  Educate patient that she will be NPO midnight and will have procedure tomorrow.

## 2025-01-01 NOTE — ANESTHESIA PREPROCEDURE EVALUATION
" Case: [id number] Date/Time: 01/01/25 1515    Procedures:       LAMINECTOMY, SPINE, THORACIC. T4-T6      FUSION, SPINE, THORACIC APPROACH (Spine Thoracic)    Anesthesia type: General    Location: [place] [id number] / SURGERY [facility]    Surgeons: Karthik Lowery D.O.            Relevant Problems   ANESTHESIA   (positive) Moderate obstructive sleep apnea-hypopnea syndrome      PULMONARY   (positive) Shortness of breath      CARDIAC   (positive) Hypertension         (positive) Metastasis to liver (HCC)      ENDO   (positive) Diabetes mellitus, type 2 (HCC)      Other   (positive) Acute on chronic hypoxic respiratory failure (HCC)   (positive) Compression fracture of T5 vertebra (HCC)   (positive) Obesity     Anes H&P:  PAST MEDICAL HISTORY:   64 y.o. female who presents for Procedure(s):  LAMINECTOMY, SPINE, THORACIC. T4-T6  FUSION, SPINE, THORACIC APPROACH.  She has current and past medical problems significant for:    Past Medical History:   Diagnosis Date    Anxiety     Breast cancer metastasized to bone, unspecified laterality (HCC) 12/25/2024    Depression     Diabetes (HCC)     HLD (hyperlipidemia)     HTN (hypertension)     Hypothyroidism        SMOKING/ALCOHOL/RECREATIONAL DRUG USE:  Social History     Tobacco Use    Smoking status: Never    Smokeless tobacco: Never   Vaping Use    Vaping status: Never Used   Substance Use Topics    Alcohol use: Yes     Comment: occas    Drug use: Yes     Types: Marijuana     Comment: CBD/THC gummies     Social History     Substance and Sexual Activity   Drug Use Yes    Types: Marijuana    Comment: CBD/THC gummies       PAST SURGICAL HISTORY:  Past Surgical History:   Procedure Laterality Date    CHOLECYSTECTOMY      OTHER      mastoid bone       ALLERGIES:   Allergies   Allergen Reactions    Demerol Rash     \"was told this during surgery\"  Other reaction(s): rash       MEDICATIONS:  No current facility-administered medications on file prior to encounter.     Current Outpatient " Medications on File Prior to Encounter   Medication Sig Dispense Refill    carvedilol (COREG) 3.125 MG Tab Take 3.125 mg by mouth 2 times a day.      HYDROcodone-acetaminophen (NORCO) 5-325 MG Tab per tablet Take 1 Tablet by mouth every four hours as needed.      losartan (COZAAR) 100 MG Tab Take 1 tablet by mouth daily 90 Tablet 3    letrozole (FEMARA) 2.5 MG Tab Take 1 Tablet by mouth every day. 30 Tablet 5    Abemaciclib 150 MG Tab Take 150 mg by mouth 2 times a day. 30 Tablet 2       LABS:  Lab Results   Component Value Date/Time    HEMOGLOBIN 12.4 12/31/2024 0502    HEMATOCRIT 38.5 12/31/2024 0502    WBC 6.9 12/31/2024 0502     Lab Results   Component Value Date/Time    SODIUM 139 12/31/2024 0502    POTASSIUM 4.4 12/31/2024 0502    CHLORIDE 98 12/31/2024 0502    CO2 30 12/31/2024 0502    GLUCOSE 134 (H) 12/31/2024 0502    BUN 7 (L) 12/31/2024 0502    CALCIUM 9.5 12/31/2024 0502         PREVIOUS ANESTHETICS: See EMR  __________________________________________      Physical Exam    Airway   Mallampati: I  TM distance: >3 FB  Neck ROM: full       Cardiovascular - normal exam  Rhythm: regular  Rate: normal  (-) murmur     Dental - normal exam      Very poor dentition     Pulmonary - normal exam  Breath sounds clear to auscultation     Abdominal   (+) obese     Neurological - normal exam                   Anesthesia Plan    ASA 3   ASA physical status 3 criteria: hypertension - poorly controlled    Plan - general       Airway plan will be ETT          Induction: intravenous    Postoperative Plan: Postoperative administration of opioids is intended.    Pertinent diagnostic labs and testing reviewed    Informed Consent:    Anesthetic plan and risks discussed with patient.    Use of blood products discussed with: patient whom consented to blood products.

## 2025-01-01 NOTE — PROGRESS NOTES
[facility] MEDICINE PROGRESS NOTE      Attending:   Mark Butts M.D.     Resident:   Griselda Shaver M.D. PGY-3 [employer]      PATIENT:   Asha Hannon; [id number]; 1960    ID:   64 y.o. female with past medical history of metastatic breast cancer admitted for acute hypoxic respiratory failure and worsening weakness and pain. Found to have pathologic fracture of the thoracic spine and spinal compression. Plan for surgery 1/1/25.     SUBJECTIVE:   No acute events overnight, NPO overnight. Patient reports continual persistent left lower extremity discomfort and pain. Pain is making her restless. Fentanyl will momentarily assist but then quickly wears off. Discussed staring MS contin, patient is willing to try medication. She also is requesting that medical team talk to her oncologist to update regarding surgery and MRI findings.     OBJECTIVE:  Vitals:    12/31/24 1904 12/31/24 2011 01/01/25 0023 01/01/25 0404   BP: 104/58  112/66 115/71   Pulse: 85  83 83   Resp: 18 18 17 18   Temp: 36.6 °C (97.8 °F)  36.4 °C (97.5 °F) 36.3 °C (97.3 °F)   TempSrc: Temporal  Temporal Temporal   SpO2: 92%  96% 94%   Weight:       Height:           Intake/Output Summary (Last 24 hours) at 1/1/2025 0602  Last data filed at 12/31/2024 1229  Gross per 24 hour   Intake 700 ml   Output 400 ml   Net 300 ml       PHYSICAL EXAM:  General: No acute distress, afebrile, uncomfortable appearing, restless   HEENT: NC/AT. EOMI.   Cardiovascular: RRR without murmurs. Normal capillary refill   Respiratory: diminished breath sounds in lower lung fields   Abdomen: soft, nontender, nondistended, no masses  EXT:  ORELLANA, no edema  Skin: No erythema/lesions   Neuro: Non-focal    LABS:  Recent Labs     12/30/24  0319 12/31/24  0502   WBC 5.6 6.9   RBC 4.25 4.41   HEMOGLOBIN 12.1 12.4   HEMATOCRIT 37.9 38.5   MCV 89.2 87.3   MCH 28.5 28.1   RDW 55.5* 55.7*   PLATELETCT 268 296   MPV 9.0 9.2   NEUTSPOLYS 67.90 73.90*   LYMPHOCYTES 15.50* 13.00*    MONOCYTES 13.50* 11.40   EOSINOPHILS 2.00 0.40   BASOPHILS 0.70 0.90     Recent Labs     12/30/24  0319 12/31/24  0502   SODIUM 137 139   POTASSIUM 4.3 4.4   CHLORIDE 98 98   CO2 29 30   BUN 9 7*   CREATININE 0.58 0.54   CALCIUM 9.5 9.5   ALBUMIN 3.9 4.0     Estimated GFR/CRCL = Estimated Creatinine Clearance: 127.1 mL/min (by C-G formula based on SCr of 0.54 mg/dL).  Recent Labs     12/30/24 0319 12/31/24  0502   GLUCOSE 116* 134*     Recent Labs     12/30/24 0319 12/31/24  0502   ASTSGOT 135* 148*   ALTSGPT 59* 59*   TBILIRUBIN 0.4 0.6   ALKPHOSPHAT 90 93   GLOBULIN 2.8 2.7       IMAGING:  MR-CERVICAL SPINE-WITH & W/O   Final Result         1.  No evidence of metastatic disease to the cervical spine.      MR-LUMBAR SPINE-WITH & W/O   Final Result   Impression:      Multiple bony metastatic lesions as described above, most prominent along the inferior endplate of L1.      Degenerative moderate canal stenosis at L4-5 with cauda equina compression. Epidural thickening and enhancement is noted in this region with severe bilateral facet degeneration and enhancement along the bilateral facet joints. This is felt to be    degenerative in origin.      MR-THORACIC SPINE-WITH & W/O   Final Result         Multiple metastatic lesions of the thoracic spine as described above.      Pathologic compression fracture at T5 with 50% loss of height and posterior cortical retropulsion with circumferential epidural thickening representing epidural extension of metastatic disease. This causes severe canal stenosis with cord compression.      Metastatic disease also involves the posterior elements and spinous processes at T4, T5.      Pre and paravertebral metastatic soft tissue infiltration at T4-T5 noted.         MR-BRAIN-WITH & W/O   Final Result         Multiple nodular enhancing metastatic lesions involving the cranium as described above.      The largest lesion is in the right posterior temporal/parietal occipital region and  demonstrates inner table cortical breakthrough with epidural extension and adjacent mild dural thickening and enhancement.      US-THORACENTESIS PUNCTURE RIGHT   Final Result   Addendum (preliminary) 1 of 1 12/27/2024 10:59 AM      HISTORY/REASON FOR EXAM:  Shortness of breath         TECHNIQUE/EXAM DESCRIPTION:   Ultrasound-guided thoracentesis.      Indication:  RIGHT pleural fluid collection.      COMPARISON:  None-however patient states history of right-sided    thoracentesis September 2024.      PROCEDURE:     Informed consent was obtained. A timeout was taken. A right    pleural effusion was localized with real-time ultrasound guidance. The    right posterior chest wall was prepped and draped in a sterile manner.    Following local anesthesia with 1%    lidocaine, and under live ultrasound guidance a 5 [employer] pigtail    catheter was advanced into the pleural space with trocar technique and    pleural fluid was drained. The patient tolerated the procedure well    without evidence of complication. A post    thoracentesis chest radiograph is forthcoming.      FINDINGS:      Fluid was sent to the laboratory.      Fluid character: serosanguinous         1. Ultrasound guided right sided therapeutic and diagnostic thoracentesis.      2. 1000 mL of fluid withdrawn.      Final      1. Ultrasound guided right sided therapeutic and diagnostic thoracentesis.      2. 1000 mL of fluid withdrawn.      DX-CHEST-PORTABLE (1 VIEW)   Final Result      1.  Small persistent right pleural effusion which is decreased since recent thoracentesis.      2.  Small to moderate left pleural effusion.      3.  Bilateral perihilar and left lower lobe atelectasis and/or pneumonitis.      4.  Increased interstitial markings throughout the lung fields consistent with parenchymal scarring, and/or pulmonary edema.      CT-LSPINE W/O PLUS RECONS   Final Result         1. Diffuse metastatic disease.   2. No definite pathologic fracture.       CT-TSPINE W/O PLUS RECONS   Final Result         1. Extensive metastatic disease.   2. There is new pathologic compression fracture deformity of T5.      CT-CHEST,ABDOMEN,PELVIS WITH   Final Result         1. New bilateral upper lobe nodular infiltrates.   2. Moderate-sized bilateral pleural effusions.   3. Interval worsening of hepatic metastatic disease.   4. New abdominal lymphadenopathy. Thoracic adenopathy is again noted.   5. Diffuse osseous metastatic disease. There is a new compression fracture of T5 as reported on the prior spine CT.      DX-CHEST-PORTABLE (1 VIEW)   Final Result      1.  Stable bibasilar atelectasis/consolidation and small bilateral pleural effusions.      2.  Stable cardiomegaly and interstitial edema.      DX-PORTABLE FLUORO > 1 HOUR    (Results Pending)   DX-THORACIC SPINE-2 VIEWS    (Results Pending)       MEDS:  Current Facility-Administered Medications   Medication Last Admin    benzocaine-menthol (Cepacol) lozenge 1 Lozenge      sore throat spray (Chloraseptic) 1 Spray      sodium chloride (Ocean) 0.65 % nasal spray 2 Spray 2 Spray at 01/01/25 0600    senna-docusate (Pericolace Or Senokot S) 8.6-50 MG per tablet 2 Tablet 2 Tablet at 12/30/24 1711    And    polyethylene glycol/lytes (Miralax) Packet 1 Packet      magnesium oxide tablet 400 mg 400 mg at 01/01/25 0553    ROPINIRole (Requip) tablet 1 mg 1 mg at 12/30/24 1712    diclofenac sodium (Voltaren) 1 % gel 2 g 2 g at 01/01/25 0134    metoclopramide (Reglan) injection 10 mg 10 mg at 01/01/25 0553    calcium carbonate (Tums) chewable tab 500 mg 500 mg at 12/28/24 0207    [Held by provider] heparin injection 5,000 Units 5,000 Units at 12/31/24 2230    ondansetron (Zofran) syringe/vial injection 4 mg 4 mg at 12/31/24 0900    ondansetron (Zofran ODT) dispertab 4 mg 4 mg at 12/28/24 1634    carvedilol (Coreg) tablet 3.125 mg 3.125 mg at 01/01/25 0549    losartan (Cozaar) tablet 100 mg 100 mg at 12/31/24 3000     HYDROcodone-acetaminophen (Norco) 5-325 MG per tablet 1 Tablet 1 Tablet at 12/27/24 1322    Or    HYDROcodone-acetaminophen (Norco) 5-325 MG per tablet 2 Tablet 2 Tablet at 12/31/24 2011    lidocaine (Asperflex) 4 % patch 1 Patch 1 Patch at 12/31/24 1453    acetaminophen (Tylenol) tablet 650 mg 650 mg at 12/27/24 0634    ferrous sulfate tablet 325 mg 325 mg at 12/29/24 0717    ROPINIRole (Requip) tablet 1 mg 1 mg at 12/31/24 2232    fentaNYL (Sublimaze) injection 25 mcg 25 mcg at 01/01/25 0210    Pharmacy Consult Request ...Pain Management Review 1 Each      calcitonin (salmon) (Miacalcin) nasal spray 200 Units 200 Units at 12/31/24 0457       ASSESSMENT/PLAN:   64 y.o. female with past medical history of metastatic breast cancer admitted for acute hypoxic respiratory failure and worsening weakness and pain. Found to have pathologic fracture of the thoracic spine and spinal compression. Plan for decompressive laminectomy and fixation 1/1/25.     * Malignant neoplasm of breast (HCC)- (present on admission)  Assessment & Plan  Extensive metastatic disease to lungs, liver, and bones. Fungating left breast mass. Followed by Dr. Tabor, Oncology, and Dr. Velez, Rad Onc. Pt has declined surgical management in the past, recently on oral chemotherapy. Large fungating mass of the left breast. Wound care has signed off. Dr. Tabor consulted, recommends patient follow in 1 week for discussion of different cancer treatment regimen, possible further radiation if recommended by Rad Onc.   - Full spine/brain MRI, completed 12/31, multiple metastatic brain lesions, pathologic compression fracture of T5 with severe canal stenosis with cord compression. Moderate canal stenosis at L4-L5 with cauda equina compression   - Lidocaine patch, Norco 5-325 mg q4 hours prn, IV fentanyl 25 mcg q4 h for break through pain. Norco and fentanyl at request of patient as she could not tolerate oxycodone and dilaudid previously.  Trialing MS contin 15 mg BID 1/1/25  - Palliative care consulted, patient deferring GOC discussion   - Re consulted oncology regarding new imaging findings         Compression fracture of T5 vertebra (HCC)- (present on admission)  Assessment & Plan  Noted on CT imaging, pathologic fracture secondary to cancer. Full spine/brain MRI completed 12/31: multiple metastatic brain lesions, pathologic compression fracture of T5 with severe canal stenosis with cord compression. Moderate canal stenosis at L4-L5 with cauda equina compression   - Pain regimen ordered, pharmacy consult  - Calcitonin for compression-fracture-related pain control  - Follow neurosurgery recs  - Reconsulted oncology given new MRI findings     Acute on chronic hypoxic respiratory failure (HCC)- (present on admission)  Assessment & Plan  Likely related to bilateral pleural effusion. Effusion fluid with malignant fluid. In addition, see by pulmonology at last admission and thought to be component of hepatic shunting. Patient with new bilateral upper lobe infiltrates on CTAP. Without clinical pneumonia and procal negative. Troponin peaked.   -CTM, titrate oxygen to goal  - Plan to repeat US thoracentesis prior to discharge for therapeutic relief   -Home O2 eval before discharge if pt is still requiring oxygen    Weakness- (present on admission)  Assessment & Plan  Pt reporting progressive weakness since discharge in September, now becoming weak even with ambulation to the restroom. Not requiring any assistive devices. Pt requesting bedside commode due to difficulty with frequent ambulation to restroom  - Per patient, neurosurgery is  requesting rehab placement after surgery. Will place PM&R consult     Cancer related pain- (present on admission)  Assessment & Plan  Lidocaine patch, Norco 5-325 mg q4 hours prn, IV fentanyl 25 mcg q4 h for break through pain. Norco and fentanyl at request of patient as she could not tolerate oxycodone and dilaudid  "previously.   Plan:   - Pt agreeable to starting MS contin 15 mg BID, continue IV fentanyl 25 mcg for break through pain and norco q4 hours prn for moderate pain     Bilateral pleural effusion- (present on admission)  Assessment & Plan  Bilateral moderate sized pleural effusions seen on admission CT. Hx of same on prior admission in September 2024. At that time, IR was consulted for thoracentesis and this could not be completed due to loculations. Pulmonology was then consulted, and they noted her hypoxia may be in part due to shunting from her liver mass.  Patient reports subsequently she went to [facility] who performed thoracentesis on the right.  Patient reports thoracentesis was consistent with malignant effusion though records not available.  Pt on 2 L NC at discharge in 09/2024, requiring 3 L NC to maintain saturations on admission which is stable to slightly improving.   - Continue O2 supplementation, goal sats >90%.   - S/p thoracentesis 12/27, Fluid results: Metastatic carcinoma consistent with breast origin ER: Negative, <1% NH: Negative, <1%   - Fluid cultures show moderate WBC but no organisms, fungal cultures also negative  - Plan to repeat US thoracentesis prior to discharge for therapeutic relief     Epigastric pain  Assessment & Plan  Pt is having epigastric \"burning\" abdominal pain when NPO for MRI. She states she gets this pain when she doesn't eat. She is not on a PPI at home.   - GI cocktail administered with relief of symptoms.   - Will continue to monitor and start PPI if she continues to have worsening pain    Restless leg syndrome- (present on admission)  Assessment & Plan  Patient reports a history of restless leg syndrome well-controlled on ropinirole although she does not know the dosage. Has iron deficient anemia. RLS maybe due to compression of spinal cord.   - Ropinirole 1 mg nightly and will titrate up  - Iron supplementation ordered  - May change RLC and pain regimen post op " from spinal surgery     Constipation- (present on admission)  Assessment & Plan  Chronic.  Patient reports it is worse with her chronic opioid use.  - Bowel regimen scheduled    Hypertension- (present on admission)  Assessment & Plan  BP has been controlled. Continue home carvedilol, losartan    Diabetes mellitus, type 2 (HCC)- (present on admission)  Assessment & Plan  Diet controlled. Last A1c 7.1 in September 2024. Not on medications currently.   - Trend blood glucose on chem panel and provide correction as needed        Core Measures:  Fluids: None  Lines: PIV  Abx: None  Diet: Regular  PPX: Heparin     DISPO: In patient for management of metastatic breast cancer and acute hypoxic respiratory failure    Disposition  Patient is not medically cleared for discharge.     I have personally seen and examined the patient at bedside. I discussed the plan of care with patient and family, Mark Butts M.D.     CODE Status: Full Code    Griselda Shaver M.D. PGY-3  [facility]

## 2025-01-01 NOTE — THERAPY
Physical Therapy Contact Note    Patient Name: Asha Hannon  Age:  64 y.o., Sex:  female  Medical Record #: [id number]  Today's Date: 1/1/2025 01/01/25 0748   Treatment Variance   Reason For Missed Therapy Medical - Other (Please Comment)   Interdisciplinary Plan of Care Collaboration   Collaboration Comments Pt is scheduled for T4-T6 lami with fixation today with neurosurgery.  PT will follow up post op.   Session Information   Date / Session Number  1/1- surgery today

## 2025-01-01 NOTE — CARE PLAN
The patient is Watcher - Medium risk of patient condition declining or worsening    Shift Goals  Clinical Goals: npo for sx, IV placed, pain control, fall precautions  Patient Goals: pain control and surgery.  Family Goals: pain control    Progress made toward(s) clinical / shift goals:  yes    Patient is not progressing towards the following goals:      Problem: Knowledge Deficit - Standard  Goal: Patient and family/care givers will demonstrate understanding of plan of care, disease process/condition, diagnostic tests and medications  Outcome: Progressing     Problem: Skin Integrity  Goal: Skin integrity is maintained or improved  Outcome: Progressing     Problem: Fall Risk  Goal: Patient will remain free from falls  Outcome: Progressing     Problem: Knowledge Deficit - Standard  Goal: Patient and family/care givers will demonstrate understanding of plan of care, disease process/condition, diagnostic tests and medications  Outcome: Progressing     Problem: Skin Integrity  Goal: Skin integrity is maintained or improved  Outcome: Progressing     Problem: Fall Risk  Goal: Patient will remain free from falls  Outcome: Progressing     Problem: Pain - Standard  Goal: Alleviation of pain or a reduction in pain to the patient’s comfort goal  Outcome: Not Met   Pain mot met - meds being adjusted and pt to have sx today.

## 2025-01-02 ENCOUNTER — APPOINTMENT (OUTPATIENT)
Dept: RADIOLOGY | Facility: MEDICAL CENTER | Age: 65
End: 2025-01-02
Payer: MEDICARE

## 2025-01-02 ENCOUNTER — HOSPITAL ENCOUNTER (OUTPATIENT)
Dept: RADIOLOGY | Facility: MEDICAL CENTER | Age: 65
End: 2025-01-02

## 2025-01-02 LAB
ALBUMIN SERPL BCP-MCNC: 3.2 G/DL (ref 3.2–4.9)
ANION GAP SERPL CALC-SCNC: 10 MMOL/L (ref 7–16)
BUN SERPL-MCNC: 14 MG/DL (ref 8–22)
CALCIUM ALBUM COR SERPL-MCNC: 9.5 MG/DL (ref 8.5–10.5)
CALCIUM SERPL-MCNC: 8.9 MG/DL (ref 8.5–10.5)
CHLORIDE SERPL-SCNC: 98 MMOL/L (ref 96–112)
CO2 SERPL-SCNC: 28 MMOL/L (ref 20–33)
CREAT SERPL-MCNC: 0.77 MG/DL (ref 0.5–1.4)
EKG IMPRESSION: NORMAL
ERYTHROCYTE [DISTWIDTH] IN BLOOD BY AUTOMATED COUNT: 58.2 FL (ref 35.9–50)
GFR SERPLBLD CREATININE-BSD FMLA CKD-EPI: 85 ML/MIN/1.73 M 2
GLUCOSE BLD STRIP.AUTO-MCNC: 131 MG/DL (ref 65–99)
GLUCOSE BLD STRIP.AUTO-MCNC: 151 MG/DL (ref 65–99)
GLUCOSE BLD STRIP.AUTO-MCNC: 166 MG/DL (ref 65–99)
GLUCOSE BLD STRIP.AUTO-MCNC: 169 MG/DL (ref 65–99)
GLUCOSE BLD STRIP.AUTO-MCNC: 193 MG/DL (ref 65–99)
GLUCOSE SERPL-MCNC: 237 MG/DL (ref 65–99)
HCT VFR BLD AUTO: 32 % (ref 37–47)
HGB BLD-MCNC: 10.2 G/DL (ref 12–16)
MCH RBC QN AUTO: 28.7 PG (ref 27–33)
MCHC RBC AUTO-ENTMCNC: 31.9 G/DL (ref 32.2–35.5)
MCV RBC AUTO: 90.1 FL (ref 81.4–97.8)
PATHOLOGY CONSULT NOTE: NORMAL
PHOSPHATE SERPL-MCNC: 4.2 MG/DL (ref 2.5–4.5)
PLATELET # BLD AUTO: 287 K/UL (ref 164–446)
PMV BLD AUTO: 9.3 FL (ref 9–12.9)
POTASSIUM SERPL-SCNC: 4.7 MMOL/L (ref 3.6–5.5)
RBC # BLD AUTO: 3.55 M/UL (ref 4.2–5.4)
SODIUM SERPL-SCNC: 136 MMOL/L (ref 135–145)
WBC # BLD AUTO: 6.7 K/UL (ref 4.8–10.8)

## 2025-01-02 PROCEDURE — 700102 HCHG RX REV CODE 250 W/ 637 OVERRIDE(OP)

## 2025-01-02 PROCEDURE — 700111 HCHG RX REV CODE 636 W/ 250 OVERRIDE (IP): Mod: TB | Performed by: NEUROLOGICAL SURGERY

## 2025-01-02 PROCEDURE — 99233 SBSQ HOSP IP/OBS HIGH 50: CPT | Mod: GC | Performed by: FAMILY MEDICINE

## 2025-01-02 PROCEDURE — 71045 X-RAY EXAM CHEST 1 VIEW: CPT

## 2025-01-02 PROCEDURE — A9270 NON-COVERED ITEM OR SERVICE: HCPCS

## 2025-01-02 PROCEDURE — A9270 NON-COVERED ITEM OR SERVICE: HCPCS | Performed by: NEUROLOGICAL SURGERY

## 2025-01-02 PROCEDURE — 770004 HCHG ROOM/CARE - ONCOLOGY PRIVATE *

## 2025-01-02 PROCEDURE — 700102 HCHG RX REV CODE 250 W/ 637 OVERRIDE(OP): Performed by: FAMILY MEDICINE

## 2025-01-02 PROCEDURE — 93005 ELECTROCARDIOGRAM TRACING: CPT | Mod: TC

## 2025-01-02 PROCEDURE — 0W9B3ZZ DRAINAGE OF LEFT PLEURAL CAVITY, PERCUTANEOUS APPROACH: ICD-10-PCS | Performed by: NURSE PRACTITIONER

## 2025-01-02 PROCEDURE — 700105 HCHG RX REV CODE 258

## 2025-01-02 PROCEDURE — 36415 COLL VENOUS BLD VENIPUNCTURE: CPT

## 2025-01-02 PROCEDURE — 700111 HCHG RX REV CODE 636 W/ 250 OVERRIDE (IP): Mod: JZ

## 2025-01-02 PROCEDURE — 80069 RENAL FUNCTION PANEL: CPT

## 2025-01-02 PROCEDURE — A9270 NON-COVERED ITEM OR SERVICE: HCPCS | Performed by: FAMILY MEDICINE

## 2025-01-02 PROCEDURE — 85027 COMPLETE CBC AUTOMATED: CPT

## 2025-01-02 PROCEDURE — 700111 HCHG RX REV CODE 636 W/ 250 OVERRIDE (IP)

## 2025-01-02 PROCEDURE — 93010 ELECTROCARDIOGRAM REPORT: CPT | Performed by: STUDENT IN AN ORGANIZED HEALTH CARE EDUCATION/TRAINING PROGRAM

## 2025-01-02 PROCEDURE — 700102 HCHG RX REV CODE 250 W/ 637 OVERRIDE(OP): Performed by: NEUROLOGICAL SURGERY

## 2025-01-02 PROCEDURE — 82962 GLUCOSE BLOOD TEST: CPT

## 2025-01-02 RX ORDER — SODIUM CHLORIDE 9 MG/ML
500 INJECTION, SOLUTION INTRAVENOUS ONCE
Status: COMPLETED | OUTPATIENT
Start: 2025-01-02 | End: 2025-01-02

## 2025-01-02 RX ORDER — LORAZEPAM 1 MG/1
0.25 TABLET ORAL EVERY 4 HOURS PRN
Status: DISCONTINUED | OUTPATIENT
Start: 2025-01-02 | End: 2025-01-04

## 2025-01-02 RX ORDER — METOCLOPRAMIDE 10 MG/1
10 TABLET ORAL EVERY 6 HOURS
Status: DISCONTINUED | OUTPATIENT
Start: 2025-01-02 | End: 2025-01-04

## 2025-01-02 RX ADMIN — Medication 5 MG: at 01:07

## 2025-01-02 RX ADMIN — FENTANYL CITRATE 25 MCG: 50 INJECTION, SOLUTION INTRAMUSCULAR; INTRAVENOUS at 03:52

## 2025-01-02 RX ADMIN — CARVEDILOL 3.12 MG: 3.12 TABLET, FILM COATED ORAL at 05:43

## 2025-01-02 RX ADMIN — OXYCODONE 5 MG: 5 TABLET ORAL at 20:21

## 2025-01-02 RX ADMIN — FENTANYL CITRATE 25 MCG: 50 INJECTION, SOLUTION INTRAMUSCULAR; INTRAVENOUS at 01:23

## 2025-01-02 RX ADMIN — METHOCARBAMOL 750 MG: 750 TABLET ORAL at 02:45

## 2025-01-02 RX ADMIN — METHOCARBAMOL 750 MG: 750 TABLET ORAL at 20:27

## 2025-01-02 RX ADMIN — KETOROLAC TROMETHAMINE 15 MG: 15 INJECTION, SOLUTION INTRAMUSCULAR; INTRAVENOUS at 17:57

## 2025-01-02 RX ADMIN — SODIUM CHLORIDE 500 ML: 9 INJECTION, SOLUTION INTRAVENOUS at 10:15

## 2025-01-02 RX ADMIN — PHENOL 1 SPRAY: 1.5 LIQUID ORAL at 00:49

## 2025-01-02 RX ADMIN — Medication 400 MG: at 05:43

## 2025-01-02 RX ADMIN — MORPHINE SULFATE 15 MG: 15 TABLET, FILM COATED, EXTENDED RELEASE ORAL at 05:42

## 2025-01-02 RX ADMIN — Medication 5 MG: at 20:21

## 2025-01-02 RX ADMIN — METOCLOPRAMIDE HYDROCHLORIDE 10 MG: 5 INJECTION INTRAMUSCULAR; INTRAVENOUS at 05:42

## 2025-01-02 RX ADMIN — CALCITONIN SALMON 200 UNITS: 200 SPRAY, METERED NASAL at 05:50

## 2025-01-02 RX ADMIN — KETOROLAC TROMETHAMINE 15 MG: 15 INJECTION, SOLUTION INTRAMUSCULAR; INTRAVENOUS at 11:04

## 2025-01-02 RX ADMIN — SENNOSIDES AND DOCUSATE SODIUM 1 TABLET: 50; 8.6 TABLET ORAL at 20:22

## 2025-01-02 RX ADMIN — KETOROLAC TROMETHAMINE 15 MG: 15 INJECTION, SOLUTION INTRAMUSCULAR; INTRAVENOUS at 05:41

## 2025-01-02 RX ADMIN — ONDANSETRON 4 MG: 2 INJECTION INTRAMUSCULAR; INTRAVENOUS at 01:45

## 2025-01-02 RX ADMIN — SODIUM CHLORIDE 500 ML: 9 INJECTION, SOLUTION INTRAVENOUS at 07:55

## 2025-01-02 RX ADMIN — DOCUSATE SODIUM 100 MG: 100 CAPSULE, LIQUID FILLED ORAL at 05:42

## 2025-01-02 RX ADMIN — METOCLOPRAMIDE 10 MG: 10 TABLET ORAL at 17:58

## 2025-01-02 RX ADMIN — HYDROMORPHONE HYDROCHLORIDE 0.5 MG: 1 INJECTION, SOLUTION INTRAMUSCULAR; INTRAVENOUS; SUBCUTANEOUS at 22:10

## 2025-01-02 RX ADMIN — METOCLOPRAMIDE HYDROCHLORIDE 10 MG: 5 INJECTION INTRAMUSCULAR; INTRAVENOUS at 11:04

## 2025-01-02 RX ADMIN — ONDANSETRON 4 MG: 4 TABLET, ORALLY DISINTEGRATING ORAL at 20:27

## 2025-01-02 RX ADMIN — LORAZEPAM 0.25 MG: 0.5 TABLET ORAL at 14:17

## 2025-01-02 RX ADMIN — LOSARTAN POTASSIUM 50 MG: 50 TABLET, FILM COATED ORAL at 05:42

## 2025-01-02 ASSESSMENT — PAIN DESCRIPTION - PAIN TYPE
TYPE: ACUTE PAIN;SURGICAL PAIN
TYPE: ACUTE PAIN
TYPE: ACUTE PAIN;CHRONIC PAIN
TYPE: ACUTE PAIN
TYPE: ACUTE PAIN;SURGICAL PAIN
TYPE: ACUTE PAIN;SURGICAL PAIN
TYPE: ACUTE PAIN;CHRONIC PAIN
TYPE: ACUTE PAIN;SURGICAL PAIN
TYPE: ACUTE PAIN

## 2025-01-02 ASSESSMENT — ENCOUNTER SYMPTOMS
SHORTNESS OF BREATH: 0
WEAKNESS: 1
VOMITING: 0
DIZZINESS: 1
DIAPHORESIS: 0

## 2025-01-02 NOTE — PROGRESS NOTES
Neurosurgery Progress Note    Subjective:  65 yo female with known h/o bilateral breast cancer since 2019 but apparently had delay in treatment for various reasons until .  Has completed some hormone therapy and radiation per patient.  Presented with worsening thoracic spine pain and notes some right thigh numbness.  CT's of the thoracic and lumbar spine show diffuse spinal mets with T5 pathologic fx and CT CAP shows new upper lung infiltrates with bilateral pleural effusions with worsening hepatic mets and new abdominal lymphadenopathy in addition to known thoracic adenopathy.  Denies saddle anesthesia or loss of control over B&B.    POD#1 T4-6 laminectomy and perc fusion    Path pending  Drain with scant ouput      Exam:  Calm and cooperative with examination.  Speech eloquent and clear.  A&O x3  CN 2-12 grossly intact.  PERRL, EOMI  Motor 4/5 bilateral EHL's remainder 5/5.  Bilateral UE and LE sensory exam with decreased right L3 sensory only.    BP  Min: 73/40  Max: 174/74  Pulse  Av.8  Min: 66  Max: 85  Resp  Av.7  Min: 8  Max: 16  Temp  Av.6 °C (97.9 °F)  Min: 36.3 °C (97.3 °F)  Max: 36.9 °C (98.5 °F)  SpO2  Av.7 %  Min: 77 %  Max: 97 %    No data recorded    Recent Labs     24  0502 25  0150   WBC 6.9 6.7   RBC 4.41 3.55*   HEMOGLOBIN 12.4 10.2*   HEMATOCRIT 38.5 32.0*   MCV 87.3 90.1   MCH 28.1 28.7   MCHC 32.2 31.9*   RDW 55.7* 58.2*   PLATELETCT 296 287   MPV 9.2 9.3     Recent Labs     24  0502 25  0150   SODIUM 139 136   POTASSIUM 4.4 4.7   CHLORIDE 98 98   CO2 30 28   GLUCOSE 134* 237*   BUN 7* 14   CREATININE 0.54 0.77   CALCIUM 9.5 8.9     Recent Labs     25  0737   INR 0.96           Intake/Output                         25 0700 - 25 0659 25 0700 - 25 0659     [id number] [id number] Total [id number] 8234-6175 Total                 Intake    P.O.  0  -- 0  --  -- --    P.O. 0 -- 0 -- -- --    I.V.  400  -- 400  --  -- --     Volume (mL) (Lactated Ringers) 400 -- 400 -- -- --    Total Intake 400 -- 400 -- -- --       Output    Urine  200  600 800  --  -- --    Number of Times Voided 1 x -- 1 x -- -- --    Urine Void (mL) 200 -- 200 -- -- --    Output (mL) (Urethral Catheter Non-latex 16 Fr.) -- 600 600 -- -- --    Drains  --  0 0  --  -- --    Output (mL) (Closed/Suction Drain 1 Superior Back Hemovac 10 Fr.) -- 0 0 -- -- --    Total Output 200 600 800 -- -- --       Net I/O     200 -600 -400 -- -- --              Intake/Output Summary (Last 24 hours) at 1/2/2025 1045  Last data filed at 1/2/2025 0415  Gross per 24 hour   Intake 400 ml   Output 800 ml   Net -400 ml             melatonin  5 mg HS PRN    benzocaine-menthol  1 Lozenge Q2HRS PRN    sore throat spray  1 Spray Q2HRS PRN    sodium chloride  2 Spray Q2HRS PRN    morphine ER  15 mg Q12HRS    ceFAZolin  2 g Once    Pharmacy Consult Request  1 Each PHARMACY TO DOSE    MD ALERT...DO NOT ADMINISTER NSAIDS or ASPIRIN unless ORDERED By Neurosurgery  1 Each PRN    docusate sodium  100 mg BID    senna-docusate  1 Tablet Nightly    senna-docusate  1 Tablet Q24HRS PRN    polyethylene glycol/lytes  1 Packet BID PRN    magnesium hydroxide  30 mL QDAY PRN    bisacodyl  10 mg Q24HRS PRN    NS   Continuous    [START ON 1/3/2025] heparin  5,000 Units Q8HRS    ketorolac  15 mg Q6HRS    Followed by    [START ON 1/4/2025] ibuprofen  800 mg TID PRN    oxyCODONE immediate-release  5 mg Q3HRS PRN    Or    oxyCODONE immediate-release  10 mg Q3HRS PRN    Or    HYDROmorphone  0.5 mg Q3HRS PRN    methocarbamol  750 mg Q8HRS PRN    acetaminophen  1,000 mg Q6HRS    Followed by    [START ON 1/7/2025] acetaminophen  1,000 mg Q6HRS PRN    magnesium oxide  400 mg DAILY    ROPINIRole  1 mg BID PRN    metoclopramide  10 mg Q6HRS    calcium carbonate  500 mg TID PRN    ondansetron  4 mg Q4HRS PRN    ondansetron  4 mg Q4HRS PRN    [Held by provider] carvedilol  3.125 mg BID    [Held by provider] losartan  100 mg Q  DAY    lidocaine  1 Patch Q24HR    ferrous sulfate  325 mg QDAY with Breakfast    ROPINIRole  1 mg QHS    fentaNYL  25 mcg Q HOUR PRN    calcitonin (salmon)  1 Edwards DAILY       Assessment and Plan:  Hospital day # 8  POD#1  Known breast cancer with worsening spinal mets and other mets.  Path pending  OK to d/c Hemovac today  Continue medical care.  Following.      Chemical prophylactic DVT therapy:yes Lovenox 40mg QD  Start date/time: 1/2

## 2025-01-02 NOTE — PROGRESS NOTES
1942: Pt arrived from OR post surgery under anesthesia. Pt is asleep. Site dressing is CDI with hemovac to compression suction. Cardiac rhythm appears to be SR. Pt required some bag-valve mask ventilation.     1957: Pt breathing spontaneously.     2030: Pt arousable to voice; sedated and lethargic. Requires painful stimulation to stay awake, but able to move everything.     2100: Pt arousable to voice; follows commands. Good strength to all extremities. Finger stick blood glucose 151.     2130: Pt is arousable to voice; oriented.   Report Mark Villela RN.     1000: Pt back to room via bed with transport. On 5L; tank is over half full.

## 2025-01-02 NOTE — PROGRESS NOTES
Bedside report received at 1020 from RIYA Arango.     Patient is A&O x 4.  Patient reports 7/10 pain. Pain medication administered.  Patient is a one person assist.  Bed locked and in lowest position.  Call light is within reach. All questions answered at this time. Hourly rounding in place.

## 2025-01-02 NOTE — CARE PLAN
The patient is Watcher - Medium risk of patient condition declining or worsening    Shift Goals  Clinical Goals: Monitor BP and O2 sats, pain management  Patient Goals: Pain management, comfort  Family Goals: Updated patient's status    Progress made toward(s) clinical / shift goals:  BP and O2 monitored throughout shift. Pain managed with scheduled pain medication per MAR. Patient remained free from falls throughout shift. All fall precautions in place. Bed locked and in lowest position, call light remained in reach. Bed alarm on. POC discussed with patient, all needs met this shift. Safety education provided.       Patient is not progressing towards the following goals:

## 2025-01-02 NOTE — OR SURGEON
Immediate Post OP Note    PreOp Diagnosis:   T5 pathologic compression fracture  Epidural metastasis T5  Cord compression T5  History of breast cancer with metastasis      PostOp Diagnosis:   T5 pathologic compression fracture  Epidural metastasis T5  Cord compression T5  History of breast cancer with metastasis      Procedure(s):  LAMINECTOMY, SPINE, THORACIC. T4-T6 - Wound Class: Clean  FUSION, SPINE, THORACIC APPROACH - Wound Class: Clean    Surgeon(s):  Karthik Lowery D.O.    Anesthesiologist/Type of Anesthesia:  Anesthesiologist: Hernán Blair M.D./General    Surgical Staff:  Circulator: Teofilo Maguire R.N.  Relief Circulator: Anila Ramirez R.N.  Scrub Person: Harrison Sun  X-Ray Technologist: Juancho Chávez    Specimens removed if any:  ID Type Source Tests Collected by Time Destination   A : T5 vertebral body tumor Mass Bone PATHOLOGY SPECIMEN Karthik Lowery D.O. 1/1/2025  5:45 PM    B : T5 Spinous process Bone Bone PATHOLOGY SPECIMEN Karthik Lowery D.O. 1/1/2025  6:00 PM        Estimated Blood Loss: 200 cc    Findings: Soft bony metastasis into the vertebral body of T5 as well as spinous process in all posterior elements of the T5 vertebral body.  Great decompression obtained.  Specimen sent off to pathology.  No complication no issues.  Hemovac drain placed.    Complications: None        1/1/2025 7:55 PM Karthik Lowery D.O.

## 2025-01-02 NOTE — CARE PLAN
Problem: Knowledge Deficit - Standard  Goal: Patient and family/care givers will demonstrate understanding of plan of care, disease process/condition, diagnostic tests and medications  Outcome: Progressing     Problem: Skin Integrity  Goal: Skin integrity is maintained or improved  Outcome: Progressing     Problem: Fall Risk  Goal: Patient will remain free from falls  Outcome: Progressing     Problem: Pain - Standard  Goal: Alleviation of pain or a reduction in pain to the patient’s comfort goal  Outcome: Progressing    The patient is Stable - Low risk of patient condition declining or worsening    Shift Goals  Clinical Goals: Post op v/s monitoring, Post op pain manageemnt, safety  Patient Goals: Monitored v/s, pain control, comfort  Family Goals: Updated patient's status    Progress made toward(s) clinical / shift goals: Monitored post op v/s per protocol- see flowsheet. Administered pain medication as ordered per MAR. Verbalized understanding towards the side effects of the medication provided. Placed bed in a lowest possible position, placed bed side table and call bell within reach, side rails up x2, kept safe and comfortably.

## 2025-01-02 NOTE — ANESTHESIA TIME REPORT
Anesthesia Start and Stop Event Times       Date Time Event    1/1/2025 1553 Ready for Procedure     1610 Anesthesia Start     1953 Anesthesia Stop          Responsible Staff  01/01/25      Name Role Begin End    Hernán Blair M.D. Anesth 1610 1953          Overtime Reason:  no overtime (within assigned shift)    Comments:

## 2025-01-02 NOTE — PROGRESS NOTES
[facility] MEDICINE PROGRESS NOTE    Attending:   Eduardo Muniz M.d.    Resident:   Griselda Shaver M.D.    PATIENT:   Asha Hannon; [id number]; 1960    ID:   64 y.o. female with past medical history of metastatic breast cancer admitted for acute hypoxic respiratory failure and worsening weakness and pain. Found to have pathologic fracture of the thoracic spine and spinal compression s/p laminectomy 1/1/25.     SUBJECTIVE:   Patient now status post laminectomy. Nursing staff paged me that patient was complaining of feeling nauseas and dizzy this morning with low blood pressure. Patient at bedside is pale and drowsy appearing. She states that her pain is mostly in her back now and her left leg discomfort has resolved since surgery. Has had minimal PO intake. Feels that MS contin is helping with overall pain.     OBJECTIVE:  Vitals:    01/01/25 2343 01/02/25 0143 01/02/25 0249 01/02/25 0403   BP: 120/60 134/59 109/62 120/59   Pulse: 66 70 75 81   Resp: 16 16 16 16   Temp: 36.3 °C (97.4 °F) 36.6 °C (97.8 °F) 36.9 °C (98.5 °F) 36.9 °C (98.4 °F)   TempSrc: Temporal Temporal Temporal Temporal   SpO2: 97% 95% 92% 94%   Weight:       Height:           Intake/Output Summary (Last 24 hours) at 1/2/2025 0636  Last data filed at 1/2/2025 0415  Gross per 24 hour   Intake 400 ml   Output 800 ml   Net -400 ml       PHYSICAL EXAM:  General: pale, drowsy appearing   HEENT: NC/AT. EOMI.   Cardiovascular: RRR without murmurs. Normal capillary refill   Respiratory: CTAB  Abdomen: soft, nontender, nondistended, no masses  EXT:  ORELLANA, no edema  Skin: No erythema/lesions, hemovac present near spinal surgical site, large bandage covering surgical site on the upper back   Neuro: Non-focal    LABS:  Recent Labs     12/31/24  0502 01/02/25  0150   WBC 6.9 6.7   RBC 4.41 3.55*   HEMOGLOBIN 12.4 10.2*   HEMATOCRIT 38.5 32.0*   MCV 87.3 90.1   MCH 28.1 28.7   RDW 55.7* 58.2*   PLATELETCT 296 287   MPV 9.2 9.3   NEUTSPOLYS 73.90*   --    LYMPHOCYTES 13.00*  --    MONOCYTES 11.40  --    EOSINOPHILS 0.40  --    BASOPHILS 0.90  --      Recent Labs     12/31/24  0502 01/02/25  0150   SODIUM 139 136   POTASSIUM 4.4 4.7   CHLORIDE 98 98   CO2 30 28   BUN 7* 14   CREATININE 0.54 0.77   CALCIUM 9.5 8.9   ALBUMIN 4.0  --      Estimated GFR/CRCL = Estimated Creatinine Clearance: 89.1 mL/min (by C-G formula based on SCr of 0.77 mg/dL).  Recent Labs     12/31/24  0502 01/02/25  0150   GLUCOSE 134* 237*     Recent Labs     12/31/24  0502 01/01/25  0737   ASTSGOT 148*  --    ALTSGPT 59*  --    TBILIRUBIN 0.6  --    ALKPHOSPHAT 93  --    GLOBULIN 2.7  --    INR  --  0.96             Recent Labs     01/01/25  0737   INR 0.96         IMAGING:  MR-CERVICAL SPINE-WITH & W/O   Final Result         1.  No evidence of metastatic disease to the cervical spine.      MR-LUMBAR SPINE-WITH & W/O   Final Result   Impression:      Multiple bony metastatic lesions as described above, most prominent along the inferior endplate of L1.      Degenerative moderate canal stenosis at L4-5 with cauda equina compression. Epidural thickening and enhancement is noted in this region with severe bilateral facet degeneration and enhancement along the bilateral facet joints. This is felt to be    degenerative in origin.      MR-THORACIC SPINE-WITH & W/O   Final Result         Multiple metastatic lesions of the thoracic spine as described above.      Pathologic compression fracture at T5 with 50% loss of height and posterior cortical retropulsion with circumferential epidural thickening representing epidural extension of metastatic disease. This causes severe canal stenosis with cord compression.      Metastatic disease also involves the posterior elements and spinous processes at T4, T5.      Pre and paravertebral metastatic soft tissue infiltration at T4-T5 noted.         MR-BRAIN-WITH & W/O   Final Result         Multiple nodular enhancing metastatic lesions involving the cranium as  described above.      The largest lesion is in the right posterior temporal/parietal occipital region and demonstrates inner table cortical breakthrough with epidural extension and adjacent mild dural thickening and enhancement.      US-THORACENTESIS PUNCTURE RIGHT   Final Result   Addendum (preliminary) 1 of 1 12/27/2024 10:59 AM      HISTORY/REASON FOR EXAM:  Shortness of breath         TECHNIQUE/EXAM DESCRIPTION:   Ultrasound-guided thoracentesis.      Indication:  RIGHT pleural fluid collection.      COMPARISON:  None-however patient states history of right-sided    thoracentesis September 2024.      PROCEDURE:     Informed consent was obtained. A timeout was taken. A right    pleural effusion was localized with real-time ultrasound guidance. The    right posterior chest wall was prepped and draped in a sterile manner.    Following local anesthesia with 1%    lidocaine, and under live ultrasound guidance a 5 Citizen of [place] Yueh pigtail    catheter was advanced into the pleural space with trocar technique and    pleural fluid was drained. The patient tolerated the procedure well    without evidence of complication. A post    thoracentesis chest radiograph is forthcoming.      FINDINGS:      Fluid was sent to the laboratory.      Fluid character: serosanguinous         1. Ultrasound guided right sided therapeutic and diagnostic thoracentesis.      2. 1000 mL of fluid withdrawn.      Final      1. Ultrasound guided right sided therapeutic and diagnostic thoracentesis.      2. 1000 mL of fluid withdrawn.      DX-CHEST-PORTABLE (1 VIEW)   Final Result      1.  Small persistent right pleural effusion which is decreased since recent thoracentesis.      2.  Small to moderate left pleural effusion.      3.  Bilateral perihilar and left lower lobe atelectasis and/or pneumonitis.      4.  Increased interstitial markings throughout the lung fields consistent with parenchymal scarring, and/or pulmonary edema.      CT-PINE W/O  PLUS RECONS   Final Result         1. Diffuse metastatic disease.   2. No definite pathologic fracture.      CT-TSPINE W/O PLUS RECONS   Final Result         1. Extensive metastatic disease.   2. There is new pathologic compression fracture deformity of T5.      CT-CHEST,ABDOMEN,PELVIS WITH   Final Result         1. New bilateral upper lobe nodular infiltrates.   2. Moderate-sized bilateral pleural effusions.   3. Interval worsening of hepatic metastatic disease.   4. New abdominal lymphadenopathy. Thoracic adenopathy is again noted.   5. Diffuse osseous metastatic disease. There is a new compression fracture of T5 as reported on the prior spine CT.      DX-CHEST-PORTABLE (1 VIEW)   Final Result      1.  Stable bibasilar atelectasis/consolidation and small bilateral pleural effusions.      2.  Stable cardiomegaly and interstitial edema.      DX-PORTABLE FLUORO > 1 HOUR    (Results Pending)   DX-THORACIC SPINE-2 VIEWS    (Results Pending)       MEDS:  Current Facility-Administered Medications   Medication Last Admin    melatonin tablet 5 mg      benzocaine-menthol (Cepacol) lozenge 1 Lozenge      sore throat spray (Chloraseptic) 1 Spray 1 Spray at 01/02/25 0049    sodium chloride (Ocean) 0.65 % nasal spray 2 Spray 2 Spray at 01/01/25 0600    morphine ER (Ms Contin) tablet 15 mg 15 mg at 01/02/25 0542    ceFAZolin (Ancef) 2 g in  mL IVPB      Pharmacy Consult Request ...Pain Management Review 1 Each      MD ALERT...DO NOT ADMINISTER NSAIDS or ASPIRIN unless ORDERED By Neurosurgery 1 Each      docusate sodium (Colace) capsule 100 mg 100 mg at 01/02/25 0542    senna-docusate (Pericolace Or Senokot S) 8.6-50 MG per tablet 1 Tablet 1 Tablet at 01/01/25 2254    senna-docusate (Pericolace Or Senokot S) 8.6-50 MG per tablet 1 Tablet      polyethylene glycol/lytes (Miralax) Packet 1 Packet      magnesium hydroxide (Milk Of Magnesia) suspension 30 mL      bisacodyl (Dulcolax) suppository 10 mg      NS infusion New Bag at  01/01/25 2230    [START ON 1/3/2025] heparin injection 5,000 Units      ketorolac (Toradol) 15 MG/ML injection 15 mg 15 mg at 01/02/25 0541    Followed by    [START ON 1/4/2025] ibuprofen (Motrin) tablet 800 mg      oxyCODONE immediate-release (Roxicodone) tablet 5 mg      Or    oxyCODONE immediate release (Roxicodone) tablet 10 mg      Or    HYDROmorphone (Dilaudid) injection 0.5 mg      methocarbamol (Robaxin) tablet 750 mg 750 mg at 01/02/25 0245    acetaminophen (Tylenol) tablet 1,000 mg      Followed by    [START ON 1/7/2025] acetaminophen (Tylenol) tablet 1,000 mg      magnesium oxide tablet 400 mg 400 mg at 01/02/25 0543    ROPINIRole (Requip) tablet 1 mg 1 mg at 01/01/25 0835    metoclopramide (Reglan) injection 10 mg 10 mg at 01/02/25 0542    calcium carbonate (Tums) chewable tab 500 mg 500 mg at 12/28/24 0207    ondansetron (Zofran) syringe/vial injection 4 mg 4 mg at 01/02/25 0145    ondansetron (Zofran ODT) dispertab 4 mg 4 mg at 12/28/24 1634    carvedilol (Coreg) tablet 3.125 mg 3.125 mg at 01/02/25 0543    losartan (Cozaar) tablet 100 mg 100 mg at 01/02/25 0542    lidocaine (Asperflex) 4 % patch 1 Patch 1 Patch at 01/01/25 0935    ferrous sulfate tablet 325 mg 325 mg at 12/29/24 0717    ROPINIRole (Requip) tablet 1 mg 1 mg at 12/31/24 2232    fentaNYL (Sublimaze) injection 25 mcg 25 mcg at 01/02/25 0352    calcitonin (salmon) (Miacalcin) nasal spray 200 Units 200 Units at 01/02/25 0550     Facility-Administered Medications Ordered in Other Encounters   Medication Last Admin    HYDROmorphone (Dilaudid) injection 2 mg at 01/01/25 1615    midazolam (Versed) injection 2 mg at 01/01/25 1610       ASSESSMENT/PLAN:   64 y.o. female with past medical history of metastatic breast cancer admitted for acute hypoxic respiratory failure and worsening weakness and pain. Found to have pathologic fracture of the thoracic spine and spinal compression s/p decompressive laminectomy and fixation 1/1/25.     Hypotensive  episode  Patient with prolonged hypotension and dizziness 1/2/25 in the AM. Patient's blood pressure was gradually responsive to NS bolus. Patient with minimal urine output this AM. Patient also with minimal PO intake for the past day and received MS Contin, fentanyl, and blood pressure medications this morning. Likely due to hypovolemia and medication side effect.   Plan:   - Patient to stay on  ml/hr for now. Also to hold blood pressure medications for now. Will closely monitor urine output with spivey catheter. Close monitoring of blood pressure.     * Malignant neoplasm of breast (HCC)- (present on admission)  Assessment & Plan  Extensive metastatic disease to lungs, liver, and bones. Fungating left breast mass. Followed by Dr. Tabor, Oncology, and Dr. Velez, Rad Onc. Pt has declined surgical management in the past, recently on oral chemotherapy. Large fungating mass of the left breast. Wound care has signed off. Dr. Tabor consulted, recommends patient follow in 1 week for discussion of different cancer treatment regimen, possible further radiation if recommended by Rad Onc.   - Full spine/brain MRI, completed 12/31, multiple metastatic brain lesions, pathologic compression fracture of T5 with severe canal stenosis with cord compression. Moderate canal stenosis at L4-L5 with cauda equina compression   - Lidocaine patch, Norco 5-325 mg q4 hours prn, IV fentanyl 25 mcg q4 h for break through pain. Norco and fentanyl at request of patient as she could not tolerate oxycodone and dilaudid previously. Trialing MS contin 15 mg BID 1/1/25  - Palliative care consulted, patient deferring GOC discussion   - Updated patient's oncologist regarding findings on 1/1/25        Compression fracture of T5 vertebra (HCC)- (present on admission)  Assessment & Plan  Noted on CT imaging, pathologic fracture secondary to cancer. Full spine/brain MRI completed 12/31: multiple metastatic brain lesions, pathologic  compression fracture of T5 with severe canal stenosis with cord compression. Moderate canal stenosis at L4-L5 with cauda equina compression   - Pain regimen ordered, pharmacy consult  - Calcitonin for compression-fracture-related pain control  - Follow neurosurgery recs  - Updated patient's oncologist on 1/1/25 regarding findings and treatment     Acute on chronic hypoxic respiratory failure (HCC)- (present on admission)  Assessment & Plan  Likely related to bilateral pleural effusion. Effusion fluid with malignant fluid. In addition, see by pulmonology at last admission and thought to be component of hepatic shunting. Patient with new bilateral upper lobe infiltrates on CTAP. Without clinical pneumonia and procal negative. Troponin peaked.   -CTM, titrate oxygen to goal  - Plan to repeat US thoracentesis prior to discharge for therapeutic relief   -Home O2 eval before discharge if pt is still requiring oxygen    Weakness- (present on admission)  Assessment & Plan  Pt reporting progressive weakness since discharge in September, now becoming weak even with ambulation to the restroom. Not requiring any assistive devices. Pt requesting bedside commode due to difficulty with frequent ambulation to restroom  - Per patient, neurosurgery is  requesting rehab placement after surgery. PM&R consult  was placed    Cancer related pain- (present on admission)  Assessment & Plan  Lidocaine patch, Norco 5-325 mg q4 hours prn, IV fentanyl 25 mcg q4 h for break through pain. Norco and fentanyl at request of patient as she could not tolerate oxycodone and dilaudid previously.   Plan:   - Pt agreeable to starting MS contin 15 mg BID, continue IV fentanyl 25 mcg for break through pain and norco q4 hours prn for moderate pain     Bilateral pleural effusion- (present on admission)  Assessment & Plan  Bilateral moderate sized pleural effusions seen on admission CT. Hx of same on prior admission in September 2024. At that time, IR was  "consulted for thoracentesis and this could not be completed due to loculations. Pulmonology was then consulted, and they noted her hypoxia may be in part due to shunting from her liver mass.  Patient reports subsequently she went to [facility] who performed thoracentesis on the right.  Patient reports thoracentesis was consistent with malignant effusion though records not available.  Pt on 2 L NC at discharge in 09/2024, requiring 3 L NC to maintain saturations on admission which is stable to slightly improving.   - Continue O2 supplementation, goal sats >90%.   - S/p thoracentesis 12/27, Fluid results: Metastatic carcinoma consistent with breast origin ER: Negative, <1% [place]: Negative, <1%   - Fluid cultures show moderate WBC but no organisms, fungal cultures also negative  - Plan to repeat US thoracentesis prior to discharge for therapeutic relief     Epigastric pain  Assessment & Plan  Pt is having epigastric \"burning\" abdominal pain when NPO for MRI. She states she gets this pain when she doesn't eat. She is not on a PPI at home.   - GI cocktail administered with relief of symptoms.   - Will continue to monitor and start PPI if she continues to have worsening pain    Restless leg syndrome- (present on admission)  Assessment & Plan  Patient reports a history of restless leg syndrome well-controlled on ropinirole although she does not know the dosage. Has iron deficient anemia. RLS maybe due to compression of spinal cord.   - Ropinirole 1 mg nightly and will titrate up  - Iron supplementation ordered  - May change RLC and pain regimen post op from spinal surgery     Constipation- (present on admission)  Assessment & Plan  Chronic.  Patient reports it is worse with her chronic opioid use.  - Bowel regimen scheduled    Hypertension- (present on admission)  Assessment & Plan  BP has been controlled. Continue home carvedilol, losartan    Diabetes mellitus, type 2 (HCC)- (present on admission)  Assessment & " Plan  Diet controlled. Last A1c 7.1 in September 2024. Not on medications currently.   - Trend blood glucose on chem panel and provide correction as needed        Core Measures:  Fluids: NS 1 L bolus X1,  mL/hr   Lines: Peripheral IV for intravenous access  Abx: None   Diet: regular diet  PPX: heparin ppx    Disposition  Patient is medically cleared for discharge.     I have personally seen and examined the patient at bedside. I discussed the plan of care with patient and Eduardo Muniz M.D.     CODE Status: Full Code    Griselda Shaver M.D. PGY-3  [facility]

## 2025-01-02 NOTE — THERAPY
Physical Therapy Contact Note    Patient Name: Asha Hannon  Age:  64 y.o., Sex:  female  Medical Record #: [id number]  Today's Date: 1/2/2025 01/02/25 0932   Treatment Variance   Reason For Missed Therapy Medical - Patient Is Not Medically Stable   Interdisciplinary Plan of Care Collaboration   IDT Collaboration with  Nursing   Collaboration Comments New order received.  Pt is now s/p T4-T6 lami and fusion.  Per nurse, pt is currently hypotensive.  PT will follow up as appropriate.   Session Information   Date / Session Number  12/30 -1 (1/4, 1/5/25) 1/1- sx, 1/2 hold

## 2025-01-02 NOTE — NON-PROVIDER
Formerly [facility] FAMILY MEDICINE PROGRESS NOTE - STUDENT NOTE     Attending:   Eduardo Muniz MD    Senior Resident:  Griselda Shaver MD    PATIENT:   Asha Hannon; [id number]; 1960    CC: 64 y.o. female with past medical history of metastatic breast cancer admitted for acute hypoxic respiratory failure and worsening weakness and pain. Found to have pathologic fracture of the thoracic spine and spinal compression.       HOSPITAL COURSE: Pt admitted 12/25 for acute respiratory failure 2/2 bilateral pleural effusions and was found to have worsening pain and weakness 2/2 spinal metastases of malignant breast cancer.   12/27 Pt deferred goals of care discussion with palliative pending MRI results and discussion with oncologist, is OK with palliative following up with her  12/28 Had thoracentesis done for SOB due to b/l pleural effusions, pt stated breathing better. Previously underwent Lasix diuresis for pleural effusions in past hospitalizations and did not feel it helped, prefers thoracentesis.  12/29 Pt had assisted fall to floor following sedation without new pain, no head trauma.  12/30 Pt had unassisted fall w/ family, no neurological changes or trauma noted.  1/1 Underwent laminectomy and fusion for compression fractures 2/2 bony metastasis. Surgeon noted great decompression obtained without issues.      INTERVAL UPDATES  BP 74/32 at 0739. Interview and exam at 0745 limited by pt's feeling of weakness and dizziness given lowered BP, nurse at bedside administering NS bolus to manage BP. Pt did report improvement of pain with MS contin and improved constipation prior to deferring physical examination and stating she feels she cannot communicate adequately due to weakness.   At bedside visit 1000, pt was in less distress and able to put herself into a comfortable position. She reaffirms that pain control has improved, and is agreeable to focus on using PO pain medication over IV fentanyl to optimize regimen. She is  working on increasing PO intake to better tolerate PO medications. She feels that her back pain has lessened after her operation.        Review of Systems   Constitutional:  Positive for malaise/fatigue. Negative for diaphoresis.   Respiratory:  Negative for shortness of breath.    Gastrointestinal:  Negative for vomiting.   Genitourinary:  Negative for dysuria, hematuria and urgency.   Neurological:  Positive for dizziness and weakness.   ROS was limited by pt clinical status and issues communicating.      OBJECTIVE:  Vitals:    01/01/25 2343 01/02/25 0143 01/02/25 0249 01/02/25 0403   BP: 120/60 134/59 109/62 120/59   Pulse: 66 70 75 81   Resp: 16 16 16 16   Temp: 36.3 °C (97.4 °F) 36.6 °C (97.8 °F) 36.9 °C (98.5 °F) 36.9 °C (98.4 °F)   TempSrc: Temporal Temporal Temporal Temporal   SpO2: 97% 95% 92% 94%   Weight:       Height:           Intake/Output Summary (Last 24 hours) at 1/2/2025 0641  Last data filed at 1/2/2025 0415  Gross per 24 hour   Intake 400 ml   Output 800 ml   Net -400 ml       PHYSICAL EXAM:   General: Pt is mildly distressed due to weakness, pt is sitting up in bed and conversating slowly in short sentences with eyes closed 0745. Afebrile.  HEENT: NC/AT.   Cardiovascular: Declined  Respiratory: Declined  Abdomen: Declined  EXT:  ORELLANA  Skin: Declined  Neuro: Pt is alert and oriented to self, place and time but is in acute distress.       LABS:  Recent Labs     12/31/24  0502 01/02/25  0150   WBC 6.9 6.7   RBC 4.41 3.55*   HEMOGLOBIN 12.4 10.2*   HEMATOCRIT 38.5 32.0*   MCV 87.3 90.1   MCH 28.1 28.7   RDW 55.7* 58.2*   PLATELETCT 296 287   MPV 9.2 9.3   NEUTSPOLYS 73.90*  --    LYMPHOCYTES 13.00*  --    MONOCYTES 11.40  --    EOSINOPHILS 0.40  --    BASOPHILS 0.90  --      Recent Labs     12/31/24  0502 01/02/25  0150   SODIUM 139 136   POTASSIUM 4.4 4.7   CHLORIDE 98 98   CO2 30 28   BUN 7* 14   CREATININE 0.54 0.77   CALCIUM 9.5 8.9   ALBUMIN 4.0  --      Estimated GFR/CRCL = Estimated Creatinine  Clearance: 89.1 mL/min (by C-G formula based on SCr of 0.77 mg/dL).  Recent Labs     12/31/24  0502 01/02/25  0150   GLUCOSE 134* 237*     Recent Labs     12/31/24  0502 01/01/25  0737   ASTSGOT 148*  --    ALTSGPT 59*  --    TBILIRUBIN 0.6  --    ALKPHOSPHAT 93  --    GLOBULIN 2.7  --    INR  --  0.96             Recent Labs     01/01/25  0737   INR 0.96       IMAGING:   MR-CERVICAL SPINE-WITH & W/O   Final Result         1.  No evidence of metastatic disease to the cervical spine.      MR-LUMBAR SPINE-WITH & W/O   Final Result   Impression:      Multiple bony metastatic lesions as described above, most prominent along the inferior endplate of L1.      Degenerative moderate canal stenosis at L4-5 with cauda equina compression. Epidural thickening and enhancement is noted in this region with severe bilateral facet degeneration and enhancement along the bilateral facet joints. This is felt to be    degenerative in origin.      MR-THORACIC SPINE-WITH & W/O   Final Result         Multiple metastatic lesions of the thoracic spine as described above.      Pathologic compression fracture at T5 with 50% loss of height and posterior cortical retropulsion with circumferential epidural thickening representing epidural extension of metastatic disease. This causes severe canal stenosis with cord compression.      Metastatic disease also involves the posterior elements and spinous processes at T4, T5.      Pre and paravertebral metastatic soft tissue infiltration at T4-T5 noted.         MR-BRAIN-WITH & W/O   Final Result         Multiple nodular enhancing metastatic lesions involving the cranium as described above.      The largest lesion is in the right posterior temporal/parietal occipital region and demonstrates inner table cortical breakthrough with epidural extension and adjacent mild dural thickening and enhancement.      US-THORACENTESIS PUNCTURE RIGHT   Final Result   Addendum (preliminary) 1 of 1 12/27/2024 10:59  AM      HISTORY/REASON FOR EXAM:  Shortness of breath         TECHNIQUE/EXAM DESCRIPTION:   Ultrasound-guided thoracentesis.      Indication:  RIGHT pleural fluid collection.      COMPARISON:  None-however patient states history of right-sided    thoracentesis September 2024.      PROCEDURE:     Informed consent was obtained. A timeout was taken. A right    pleural effusion was localized with real-time ultrasound guidance. The    right posterior chest wall was prepped and draped in a sterile manner.    Following local anesthesia with 1%    lidocaine, and under live ultrasound guidance a 5 Amharic Yueh pigtail    catheter was advanced into the pleural space with trocar technique and    pleural fluid was drained. The patient tolerated the procedure well    without evidence of complication. A post    thoracentesis chest radiograph is forthcoming.      FINDINGS:      Fluid was sent to the laboratory.      Fluid character: serosanguinous         1. Ultrasound guided right sided therapeutic and diagnostic thoracentesis.      2. 1000 mL of fluid withdrawn.      Final      1. Ultrasound guided right sided therapeutic and diagnostic thoracentesis.      2. 1000 mL of fluid withdrawn.      DX-CHEST-PORTABLE (1 VIEW)   Final Result      1.  Small persistent right pleural effusion which is decreased since recent thoracentesis.      2.  Small to moderate left pleural effusion.      3.  Bilateral perihilar and left lower lobe atelectasis and/or pneumonitis.      4.  Increased interstitial markings throughout the lung fields consistent with parenchymal scarring, and/or pulmonary edema.      CT-LSPINE W/O PLUS RECONS   Final Result         1. Diffuse metastatic disease.   2. No definite pathologic fracture.      CT-TSPINE W/O PLUS RECONS   Final Result         1. Extensive metastatic disease.   2. There is new pathologic compression fracture deformity of T5.      CT-CHEST,ABDOMEN,PELVIS WITH   Final Result         1. New bilateral  upper lobe nodular infiltrates.   2. Moderate-sized bilateral pleural effusions.   3. Interval worsening of hepatic metastatic disease.   4. New abdominal lymphadenopathy. Thoracic adenopathy is again noted.   5. Diffuse osseous metastatic disease. There is a new compression fracture of T5 as reported on the prior spine CT.      DX-CHEST-PORTABLE (1 VIEW)   Final Result      1.  Stable bibasilar atelectasis/consolidation and small bilateral pleural effusions.      2.  Stable cardiomegaly and interstitial edema.      DX-PORTABLE FLUORO > 1 HOUR    (Results Pending)   DX-THORACIC SPINE-2 VIEWS    (Results Pending)       CULTURES:   Results       Procedure Component Value Units Date/Time    Fungal Culture [[id number]] Collected: 12/27/24 1215    Order Status: Completed Specimen: Body Fluid Updated: 12/31/24 0949     Significant Indicator NEG     Source BF     Site Thoracentesis Fluid     Culture Result No fungal growth to date.     Fungal Smear Results No fungal elements seen.    FLUID CULTURE W/GRAM STAIN [[id number]] Collected: 12/27/24 1215    Order Status: Completed Specimen: Body Fluid Updated: 12/31/24 0949     Significant Indicator NEG     Source BF     Site Thoracentesis Fluid     Culture Result No growth at 72 hours.     Gram Stain Result Moderate WBCs.  No organisms seen.      AFB Culture [[id number]] Collected: 12/27/24 1215    Order Status: Completed Specimen: Body Fluid Updated: 12/31/24 0949     Significant Indicator NEG     Source BF     Site Thoracentesis Fluid     Culture Result Culture in progress.     AFB Smear Results No acid fast bacilli seen.    Fungal Smear [[id number]] Collected: 12/27/24 1215    Order Status: Completed Specimen: Body Fluid Updated: 12/28/24 1736     Significant Indicator NEG     Source BF     Site Thoracentesis Fluid     Fungal Smear Results No fungal elements seen.    GRAM STAIN [[id number]] Collected: 12/27/24 1215    Order Status: Completed Specimen: Body Fluid Updated: 12/28/24  1736     Significant Indicator .     Source BF     Site Thoracentesis Fluid     Gram Stain Result Moderate WBCs.  No organisms seen.      Acid Fast Stain [[id number]] Collected: 12/27/24 1215    Order Status: Completed Specimen: Body Fluid Updated: 12/28/24 1736     Significant Indicator NEG     Source BF     Site Thoracentesis Fluid     AFB Smear Results No acid fast bacilli seen.    FLUID CULTURE W/GRAM STAIN [[id number]]     Order Status: No result Specimen: Body Fluid from Thoracentesis Fluid     AFB CULTURE [[id number]]     Order Status: No result Specimen: Body Fluid from Thoracentesis Fluid     FUNGAL CULTURE [[id number]]     Order Status: No result Specimen: Body Fluid from Thoracentesis Fluid             MEDS:  Current Facility-Administered Medications   Medication Last Admin    melatonin tablet 5 mg      benzocaine-menthol (Cepacol) lozenge 1 Lozenge      sore throat spray (Chloraseptic) 1 Spray 1 Spray at 01/02/25 0049    sodium chloride (Ocean) 0.65 % nasal spray 2 Spray 2 Spray at 01/01/25 0600    morphine ER (Ms Contin) tablet 15 mg 15 mg at 01/02/25 0542    ceFAZolin (Ancef) 2 g in  mL IVPB      Pharmacy Consult Request ...Pain Management Review 1 Each      MD ALERT...DO NOT ADMINISTER NSAIDS or ASPIRIN unless ORDERED By Neurosurgery 1 Each      docusate sodium (Colace) capsule 100 mg 100 mg at 01/02/25 0542    senna-docusate (Pericolace Or Senokot S) 8.6-50 MG per tablet 1 Tablet 1 Tablet at 01/01/25 2254    senna-docusate (Pericolace Or Senokot S) 8.6-50 MG per tablet 1 Tablet      polyethylene glycol/lytes (Miralax) Packet 1 Packet      magnesium hydroxide (Milk Of Magnesia) suspension 30 mL      bisacodyl (Dulcolax) suppository 10 mg      NS infusion New Bag at 01/01/25 2230    [START ON 1/3/2025] heparin injection 5,000 Units      ketorolac (Toradol) 15 MG/ML injection 15 mg 15 mg at 01/02/25 0541    Followed by    [START ON 1/4/2025] ibuprofen (Motrin) tablet 800 mg      oxyCODONE  immediate-release (Roxicodone) tablet 5 mg      Or    oxyCODONE immediate release (Roxicodone) tablet 10 mg      Or    HYDROmorphone (Dilaudid) injection 0.5 mg      methocarbamol (Robaxin) tablet 750 mg 750 mg at 01/02/25 0245    acetaminophen (Tylenol) tablet 1,000 mg      Followed by    [START ON 1/7/2025] acetaminophen (Tylenol) tablet 1,000 mg      magnesium oxide tablet 400 mg 400 mg at 01/02/25 0543    ROPINIRole (Requip) tablet 1 mg 1 mg at 01/01/25 0835    metoclopramide (Reglan) injection 10 mg 10 mg at 01/02/25 0542    calcium carbonate (Tums) chewable tab 500 mg 500 mg at 12/28/24 0207    ondansetron (Zofran) syringe/vial injection 4 mg 4 mg at 01/02/25 0145    ondansetron (Zofran ODT) dispertab 4 mg 4 mg at 12/28/24 1634    carvedilol (Coreg) tablet 3.125 mg 3.125 mg at 01/02/25 0543    losartan (Cozaar) tablet 100 mg 100 mg at 01/02/25 0542    lidocaine (Asperflex) 4 % patch 1 Patch 1 Patch at 01/01/25 0935    ferrous sulfate tablet 325 mg 325 mg at 12/29/24 0717    ROPINIRole (Requip) tablet 1 mg 1 mg at 12/31/24 2232    fentaNYL (Sublimaze) injection 25 mcg 25 mcg at 01/02/25 0352    calcitonin (salmon) (Miacalcin) nasal spray 200 Units 200 Units at 01/02/25 0550     Facility-Administered Medications Ordered in Other Encounters   Medication Last Admin    HYDROmorphone (Dilaudid) injection 2 mg at 01/01/25 1615    midazolam (Versed) injection 2 mg at 01/01/25 1610       ASSESSMENT/PLAN:   64 y.o. female with PMHx of metastatic breast cancer admitted for acute hypoxic respiratory failure 2/2 b/l pleural effusions and worsening weakness and pain. Found to have pathologic fracture of the thoracic spine and spinal compression. Decompressive laminectomy and fixation performed 1/1/25 with no complications.    * Hypotension  Assessment & Plan  Pt was found to be hypotensive to 74/32 at 0739. Prior to this episode, pt had been NPO for surgery, underwent operation, received regular antihypertensive medications  and opioid pain medication. Hypotension likely 2/2 low volume status following NPO/surgery.  Plan:   - 1L NS bolus given 0745   - Additionally 500 NS bolus given 2 hours later for persistent hypotension  - At 1000, pt BP had improved to 97/49  - Continue to monitor BP  - Hold antihypertensives (carvelidol, losartan)  - Switching to PO hydrocodone over IV fentanyl to optimize pain control and prevent further hypotension due to short-action  - Encourage oral intake to prevent further volume depletion/increased risk of hypotension from opioids in context of hypovolemia   - If hypotension is refractory to fluid repletion, consider ICU consult/pressors     * Malignant neoplasm of breast (HCC)- (present on admission)  Assessment & Plan  Extensive metastatic disease to lungs, liver, brain, and bones. Fungating left breast mass. Followed by Dr. Tabor, Oncology, and Dr. Velez, Rad Onc. Pt has declined surgical management in the past, recently on oral chemotherapy. Wound care has signed off. Dr. Tabor consulted, recommends patient follow in 1 week for discussion of different cancer treatment regimen, possible further radiation if recommended by Rad Onc.   - Full spine/brain MRI, completed 12/31, multiple metastatic brain lesions, pathologic compression fracture of T5 with severe canal stenosis with cord compression. Moderate canal stenosis at L4-L5 with cauda equina compression prior to laminectomy and decompression on 1/1/24  - See cancer related pain below for pain management  - Palliative care consulted, patient deferring GOC discussion until after consultation with oncolgist  - Re consulted oncology regarding new imaging findings, awaiting recommendation        Compression fracture of T5 vertebra (HCC)- (present on admission)  Assessment & Plan  Noted on CT imaging, pathologic fracture secondary to cancer. Full spine/brain MRI completed 12/31: multiple metastatic brain lesions, pathologic compression  fracture of T5 with severe canal stenosis with cord compression. Moderate canal stenosis at L4-L5 with cauda equina compression   - Pain regimen ordered, pharmacy consult  - Calcitonin for compression-fracture-related pain control  - Neurosurgery performed decompression and laminectomy 1/1 with no complications  - Reconsulted oncology given new MRI findings      Acute on chronic hypoxic respiratory failure (HCC)- (present on admission)  Assessment & Plan  Likely related to bilateral pleural effusion. Effusion fluid with malignant fluid. In addition, seen by pulmonology at last admission and thought to be component of hepatic shunting. CTAP showed new bilateral upper lobe infiltrates without pneumonia, procal negative. Troponin peaked.   - CTM, titrate oxygen to goal. As of 1/2 pt is on 4L NC, above home baseline  - Plan to repeat US thoracentesis prior to discharge for therapeutic relief   - Home O2 eval before discharge for oxygen needs     Weakness- (present on admission)  Assessment & Plan  Pt reporting progressive weakness since discharge in September, now becoming weak even with ambulation to the restroom. Did not require assistive devices previously. Pt requesting bedside commode due to difficulty with frequent ambulation to restroom  - Per patient, neurosurgery is  requesting rehab placement after surgery.   - Consult PM&R. PT noted they will follow up with pt post-op yesterday.     Cancer related pain- (present on admission)  Assessment & Plan  Pt has extensive disease and metastases to brain, liver, and bones.   - Lidocaine patch, Norco 5-325 mg q4 hours prn, PO hydromorphone for break through pain. Norco at request of patient as she could not tolerate oxycodone and dilaudid previously due to GI upset.   - Continue MS contin 15 mg BID    Plan:   - Pt is agreeable to trial of PO Dilaudid now that she is no longer NPO and will take medications with food. If she does not tolerate PO meds, can consider  "switching back to previous regimen of IV Fentanyl for breakthrough pain     Bilateral pleural effusion- (present on admission)  Assessment & Plan  Bilateral moderate sized pleural effusions seen on admission CT. Hx of same on prior admission in September 2024. At that time, IR was consulted for thoracentesis and this could not be completed due to loculations. Pulmonology was then consulted, and they noted her hypoxia may be in part due to shunting from her liver mass.  Patient reports subsequently she went to [facility] who performed thoracentesis on the right.  Patient reports thoracentesis was consistent with malignant effusion though records not available.  Pt on 2 L NC at discharge in 09/2024, now requiring 4L NC post-operatively 1/2 from 3L on admission.  - Continue O2 supplementation, goal sats >90%.   - S/p thoracentesis 12/27, Fluid results: Metastatic carcinoma consistent with breast origin ER: Negative, <1% GA: Negative, <1%   - Fluid cultures show moderate WBC but no organisms, fungal cultures also negative  - Plan to repeat US thoracentesis prior to discharge for therapeutic relief      Epigastric pain  Assessment & Plan  Pt is having epigastric \"burning\" abdominal pain when NPO for MRI. She states she gets this pain when she doesn't eat. She is not on a PPI at home.   - GI cocktail administered with relief of symptoms.   - Will continue to monitor and start PPI if she continues to have worsening pain  - Pt reports some pain after surgery but would like to try increasing PO intake before addressing epigastric pain with any PPI.  - metoclopramide, ondansetron PRN      Restless leg syndrome- (present on admission)  Assessment & Plan  Patient reports a history of restless leg syndrome well-controlled on ropinirole although she does not know the dosage. Has iron deficiency anemia. RLS possibly due to compression of spinal cord.   - Ropinirole 1 mg nightly and will titrate up  - Iron supplementation " ordered     Constipation- (present on admission)  Assessment & Plan  Chronic.  Patient reports it is worse with her chronic opioid use.  - Bowel regimen scheduled     Hypertension- (present on admission)  Assessment & Plan  Hold home hypertensive medications for now given hypotension in setting of increased opioid usage, minimal PO intake and volume depletion, and acute illness. See hypotension above.  - Reassess need for hypertensives upon d/c     Diabetes mellitus, type 2 (HCC)- (present on admission)  Assessment & Plan  Diet controlled. Last A1c 7.1 in September 2024. Not on medications currently.   - Trend blood glucose on chem panel and provide correction as needed  - Elevated glucose 1/2 likely 2/2 s/p laminectomy, decompression     Core Measures:   Fluids: NS   Lines: PIV  Abx: None  Diet: Regular  PPX: Heparin  Code Status: Full Code    Disposition: Continued inpatient management of acute respiratory failure and metastatic breast cancer c/b pathologic thoracic spine fracture s/p laminectomy, decompression.     Meenu Burnett, Medical Student   [facility]

## 2025-01-02 NOTE — ANESTHESIA PROCEDURE NOTES
[facility] Infectious Disease Consult Note    Name: Nayely Henson MRN: [id number] Today: 2024  : 1970 Age: 53 year old female Admit: 2024  Unit:       Primary care physician: Gonzalez Case MD  Inpatient consult to Infectious Diseases  Consult performed by: Ruth Sullivan MD  Consult ordered by: Yadira Hernandez MD           SUBJECTIVE:  HPI    Nayely Henson is a 53 year old female with history of recurrent UTI. Pt presented to ED yesterday w/ 1d LLQ pain and fatigue. Symptoms started yesterday. Pt afebrile since arrival. Labs w/o leukcotysosi, gomez. UA w/ blood, rbc, wbc, bacteria. Imaging independently reviewed, findings per report.    Prior urine cultures reviewed, citro koseri 2021, MDR e coli 2023. Per OSH ID note, esbl e coli PN and bacteremia 2023. Per urine culture in care everywhere  esbl e coli. Per ED note was discharged on keflex. Pt reports she was then called and the antibiotic switched but she does not recall to what. Note encounters in care everywhere from 1/10 but unable to determine what she received. Notes the antibiotic helped and her symptoms improved.    When seen today she endorses having had urinary frequency for about 5d followed by dysuria. Denies fever/chills/seats.     History    Past Medical History:   has a past medical history of Anxiety, Cervical polyp, Diverticulitis, Diverticulitis, Essential (primary) hypertension, Gallstones (2021), History of mammogram (2019), Motion sickness, Ocular migraine, Pregnancy, Rosacea, Thyroid condition, and Uterine fibroid.    She has no past medical history of Delayed emergence from anesthesia, Difficult intubation, Failed moderate sedation during procedure, Malignant hyperthermia, or PONV (postoperative nausea and vomiting).    Surgical History:   has a past surgical history that includes  section, low transverse;  section, low transverse; Dilation and  Airway    Date/Time: 1/1/2025 4:16 PM    Performed by: Hernán Blair M.D.  Authorized by: Hernán Blair M.D.    Location:  OR  Urgency:  Elective  Difficult Airway: No    Indications for Airway Management:  Anesthesia      Spontaneous Ventilation: absent    Sedation Level:  Deep  Preoxygenated: Yes    Patient Position:  Sniffing  Mask Difficulty Assessment:  0 - not attempted  Final Airway Type:  Endotracheal airway  Final Endotracheal Airway:  ETT  Cuffed: Yes    Technique Used for Successful ETT Placement:  Direct laryngoscopy    Insertion Site:  Oral  Blade Type:  Bojorquez  Laryngoscope Blade/Videolaryngoscope Blade Size:  2  ETT Size (mm):  7.5  Measured from:  Teeth  ETT to Teeth (cm):  21  Placement Verified by: auscultation and capnometry    Cormack-Lehane Classification:  Grade I - full view of glottis  Number of Attempts at Approach:  1         curettage of uterus (10/08/2018); Hysteroscopy (10/08/2018); Endometrial biopsy (10/03/2017); Endometrial ablation;  section, classic; and Cholecystectomy.    Family History:  family history includes Alcohol Abuse in her brother, father, and sister; Bipolar disorder in her sister; Cancer, Breast in her mother; Cardiomyopathy in her mother; Coronary Artery Disease in her maternal grandmother, mother, paternal grandfather, and paternal uncle.     Social History:   reports that she has never smoked. She has never used smokeless tobacco. She reports that she does not drink alcohol and does not use drugs.    Review of Systems:  Review of Systems   Constitutional:  Negative for chills, diaphoresis, fatigue and fever.   HENT:  Negative for congestion, rhinorrhea and sore throat.    Respiratory:  Negative for cough, shortness of breath and wheezing.    Cardiovascular:  Negative for chest pain.   Gastrointestinal:  Negative for abdominal pain, diarrhea, nausea and vomiting.   Genitourinary:  Positive for dysuria and frequency. Negative for flank pain.   Musculoskeletal:  Negative for arthralgias and back pain.   Skin:  Negative for rash and wound.   Neurological:  Negative for dizziness, weakness and headaches.   Hematological:  Negative for adenopathy.   Psychiatric/Behavioral:  Negative for confusion.         Home Meds:  Prior to Admission medications    Medication Sig Start Date End Date Taking? Authorizing Provider   levothyroxine 25 MCG tablet Take 62.5 mcg by mouth every morning. Take ½ tablet (12.5mcg) by mouth, along with one 50mcg tablet for a total of  62.5mcg daily.   Yes Provider, Outside   melatonin 3 MG 3 mg nightly as needed (sleep).   Yes Provider, Outside   Synthroid 50 MCG tablet TAKE 1 TABLET BY MOUTH EVERY DAY  Patient taking differently: Take 62.5 mcg by mouth every morning. Take 1 tablet (50mcg) by mouth, along with ½ of the 25mcg tablet for a total of  62.5 mcg daily. 23  Yes Kisha  MD Dominique   aspirin (ECOTRIN) 81 MG EC tablet Take 1 tablet by mouth every other day.   Yes Provider, Outside   Multiple Vitamins-Minerals (vitamin - therapeutic multivitamins w/minerals) tablet Take 1 tablet by mouth daily.   Yes Provider, Outside   Cholecalciferol (vitamin D) 50 mcg (2,000 units) capsule Take 100 mcg by mouth daily.   Yes Provider, Outside   B Complex Vitamins (VITAMIN B COMPLEX PO) Take 1 tablet by mouth daily.   Yes Provider, Outside   Omega-3 Fatty Acids (Fish Oil) 1200 MG capsule Take 1,200 mg by mouth daily.   Yes Provider, Outside   sertraline (ZOLOFT) 50 MG tablet Take 50 mg by mouth daily. 9/21/21  Yes Provider, Outside   levothyroxine (Synthroid) 25 MCG tablet Take 1 tablet by mouth daily. 11/30/23 2/4/24  Zac Dyer MD   HYDROcodone-acetaminophen (NORCO) 5-325 MG per tablet Take 1 tablet by mouth every 6 hours as needed for Pain. 6/29/22 2/4/24  Yahaira Plaza PA-C        Allergies:  ALLERGIES:  No Known Allergies    Inpatient Meds  Scheduled Meds:  Current Facility-Administered Medications   Medication Dose Route Frequency Provider Last Rate Last Admin    sodium chloride 0.9 % injection 2 mL  2 mL Intracatheter 2 times per day Yadira Hernandez MD   2 mL at 02/04/24 0914    enoxaparin (LOVENOX) injection 40 mg  40 mg Subcutaneous Daily Yadira Hernandez MD        aspirin chewable 81 mg  81 mg Oral Daily Yadira Hernandez MD   81 mg at 02/04/24 0914    cholecalciferol (VITAMIN D) tablet 50 mcg  50 mcg Oral Daily Yadira Hernandez MD   50 mcg at 02/04/24 0913    sertraline (ZOLOFT) tablet 25 mg  25 mg Oral Daily Yadira Hernandez MD        meropenem (MERREM) 500 mg in sodium chloride 0.9 % 100 mL IVPB  500 mg Intravenous Q6H Yadira Hernandez  mL/hr at 02/04/24 0914 500 mg at 02/04/24 0914    levothyroxine (SYNTHROID, LEVOTHROID) tablet 25 mcg  25 mcg Oral QAM AC Yadira Hernandez MD        ibuprofen (MOTRIN) tablet 600 mg  600 mg Oral Once Delmar Macias DO         Infused Meds:  Current  Facility-Administered Medications   Medication Dose Route Frequency Provider Last Rate Last Admin     PRN Meds:  Current Facility-Administered Medications   Medication Dose Route Frequency Provider Last Rate Last Admin    sodium chloride 0.9 % flush bag 25 mL  25 mL Intravenous PRN Yadira Hernandez MD        acetaminophen (TYLENOL) tablet 650 mg  650 mg Oral Q4H PRN Yadira Hernandez MD        Or    acetaminophen (TYLENOL) suppository 650 mg  650 mg Rectal Q4H PRN Yadira Hernandez MD        polyethylene glycol (MIRALAX) packet 17 g  17 g Oral Daily PRN Yadira Hernandez MD        docusate sodium-sennosides (SENOKOT S) 50-8.6 MG 2 tablet  2 tablet Oral BID PRN Yadira Hernandez MD        bisacodyl (DULCOLAX) suppository 10 mg  10 mg Rectal Daily PRN Yadira Hernandez MD        magnesium hydroxide (MILK OF MAGNESIA) 400 MG/5ML suspension 30 mL  30 mL Oral Daily PRN Yadira Hernandez MD        ondansetron (ZOFRAN ODT) disintegrating tablet 4 mg  4 mg Oral Q12H PRN Yadira Hernandez MD        Or    ondansetron (ZOFRAN) injection 4 mg  4 mg Intravenous Q12H PRN Yadira Hernandez MD        HYDROcodone-acetaminophen (NORCO) 5-325 MG per tablet 1 tablet  1 tablet Oral Q4H PRN Yadira Hernandez MD        oxyCODONE (IMM REL) (ROXICODONE) tablet 10 mg  10 mg Oral Q4H PRN Yadira Hernandez MD             OBJECTIVE:  Vitals:  Visit Vitals  /67 (BP Location: RUE - Right upper extremity)   Pulse (!) 60   Temp 98.4 °F (36.9 °C)   Resp 16   Ht 5' 5\" (1.651 m)   Wt 52.2 kg (115 lb)   LMP 07/09/2021 Comment: Patient had ablation 10/8/2019   SpO2 96%   BMI 19.14 kg/m²        In's and Out's    Intake/Output Summary (Last 24 hours) at 2/4/2024 1130  Last data filed at 2/4/2024 0500  Gross per 24 hour   Intake 100 ml   Output --   Net 100 ml        Lines  Acute Pain Anterior;Left Elbow (Active)   No Onset Date or Onset Time found.   Descriptor(s): Anterior;Left  Pain Location: Elbow   Number of days:        Acute Pain Abdominal Upper Quad(s) (Active)   No Onset Date or Onset Time found.   Pain  Location: Abdominal Upper Quad(s)   Number of days:        Acute Pain Head (Active)   No Onset Date or Onset Time found.   Pain Location: Head   Number of days:        Physical Exam:  Physical Exam  Vitals reviewed.   Constitutional:       Appearance: She is well-developed.   HENT:      Head: Normocephalic.      Mouth/Throat:      Pharynx: No oropharyngeal exudate.      Neck: Neck supple.   Eyes:      General: No scleral icterus.        Right eye: No discharge.         Left eye: No discharge.      Conjunctiva/sclera: Conjunctivae normal.   Cardiovascular:      Rate and Rhythm: Normal rate and regular rhythm.      Heart sounds: Normal heart sounds. No murmur heard.  Pulmonary:      Effort: Pulmonary effort is normal. No respiratory distress.      Breath sounds: Normal breath sounds.   Abdominal:      General: Bowel sounds are normal. There is no distension.      Palpations: Abdomen is soft.      Tenderness: There is no abdominal tenderness. There is no right CVA tenderness or left CVA tenderness.   Musculoskeletal:         General: No swelling.   Lymphadenopathy:      Cervical: No cervical adenopathy.   Skin:     General: Skin is warm and dry.      Findings: No rash.   Neurological:      Mental Status: She is alert and oriented to person, place, and time.   Psychiatric:         Behavior: Behavior normal.          Labs:  Admission on 02/04/2024   Component Date Value Ref Range Status    Sodium 02/03/2024 137  135 - 145 mmol/L Final    Potassium 02/03/2024 3.7  3.4 - 5.1 mmol/L Final    Chloride 02/03/2024 103  97 - 110 mmol/L Final    Carbon Dioxide 02/03/2024 28  21 - 32 mmol/L Final    Anion Gap 02/03/2024 10  7 - 19 mmol/L Final    Glucose 02/03/2024 158 (H)  70 - 99 mg/dL Final    BUN 02/03/2024 14  6 - 20 mg/dL Final    Creatinine 02/03/2024 0.76  0.51 - 0.95 mg/dL Final    Glomerular Filtration Rate 02/03/2024 >90  >=60 Final    eGFR results = or >60 mL/min/1.73m2 = Normal kidney function. Estimated GFR  calculated using the CKD-EPI-R (2021) equation that does not include race in the creatinine calculation.    BUN/Cr 02/03/2024 18  7 - 25 Final    Calcium 02/03/2024 9.0  8.4 - 10.2 mg/dL Final    Bilirubin, Total 02/03/2024 0.6  0.2 - 1.0 mg/dL Final    GOT/AST 02/03/2024 24  <=37 Units/L Final    GPT/ALT 02/03/2024 40  <64 Units/L Final    Alkaline Phosphatase 02/03/2024 86  45 - 117 Units/L Final    Albumin 02/03/2024 3.7  3.6 - 5.1 g/dL Final    Protein, Total 02/03/2024 7.2  6.4 - 8.2 g/dL Final    Globulin 02/03/2024 3.5  2.0 - 4.0 g/dL Final    A/G Ratio 02/03/2024 1.1  1.0 - 2.4 Final    Lipase 02/03/2024 53  15 - 77 Units/L Final    COLOR, URINALYSIS 02/03/2024 Straw   Final    APPEARANCE, URINALYSIS 02/03/2024 Hazy   Final    GLUCOSE, URINALYSIS 02/03/2024 Negative  Negative mg/dL Final    BILIRUBIN, URINALYSIS 02/03/2024 Negative  Negative Final    KETONES, URINALYSIS 02/03/2024 Negative  Negative mg/dL Final    SPECIFIC GRAVITY, URINALYSIS 02/03/2024 1.010  1.005 - 1.030 Final    OCCULT BLOOD, URINALYSIS 02/03/2024 Moderate (A)  Negative Final    PH, URINALYSIS 02/03/2024 6.0  5.0 - 7.0 Final    PROTEIN, URINALYSIS 02/03/2024 Negative  Negative mg/dL Final    UROBILINOGEN, URINALYSIS 02/03/2024 0.2  0.2, 1.0 mg/dL Final    NITRITE, URINALYSIS 02/03/2024 Negative  Negative Final    LEUKOCYTE ESTERASE, URINALYSIS 02/03/2024 Large (A)  Negative Final    SQUAMOUS EPITHELIAL, URINALYSIS 02/03/2024 1 to 5  None Seen, 1 to 5 /hpf Final    ERYTHROCYTES, URINALYSIS 02/03/2024 11 to 25 (A)  None Seen, 1 to 2 /hpf Final    LEUKOCYTES, URINALYSIS 02/03/2024 >100 (A)  None Seen, 1 to 5 /hpf Final    BACTERIA, URINALYSIS 02/03/2024 Moderate (A)  None Seen /hpf Final    HYALINE CASTS, URINALYSIS 02/03/2024 None Seen  None Seen, 1 to 5 /lpf Final    WBC 02/03/2024 4.7  4.2 - 11.0 K/mcL Final    RBC 02/03/2024 4.34  4.00 - 5.20 mil/mcL Final    HGB 02/03/2024 12.9  12.0 - 15.5 g/dL Final    HCT 02/03/2024 37.9  36.0 -  46.5 % Final    MCV 02/03/2024 87.3  78.0 - 100.0 fl Final    MCH 02/03/2024 29.7  26.0 - 34.0 pg Final    MCHC 02/03/2024 34.0  32.0 - 36.5 g/dL Final    RDW-CV 02/03/2024 11.9  11.0 - 15.0 % Final    RDW-SD 02/03/2024 38.1 (L)  39.0 - 50.0 fL Final    PLT 02/03/2024 212  140 - 450 K/mcL Final    NRBC 02/03/2024 0  <=0 /100 WBC Final    Neutrophil, Percent 02/03/2024 63  % Final    Lymphocytes, Percent 02/03/2024 26  % Final    Mono, Percent 02/03/2024 7  % Final    Eosinophils, Percent 02/03/2024 3  % Final    Basophils, Percent 02/03/2024 1  % Final    Immature Granulocytes 02/03/2024 0  % Final    Absolute Neutrophils 02/03/2024 3.0  1.8 - 7.7 K/mcL Final    Absolute Lymphocytes 02/03/2024 1.2  1.0 - 4.0 K/mcL Final    Absolute Monocytes 02/03/2024 0.3  0.3 - 0.9 K/mcL Final    Absolute Eosinophils  02/03/2024 0.1  0.0 - 0.5 K/mcL Final    Absolute Basophils 02/03/2024 0.0  0.0 - 0.3 K/mcL Final    Absolute Immature Granulocytes 02/03/2024 0.0  0.0 - 0.2 K/mcL Final    Urine, Bacterial Culture 02/03/2024 Culture in progress.   Preliminary    Culture, Blood or Bone Marrow 02/04/2024 No Growth <24 hours   Preliminary    Culture, Blood or Bone Marrow 02/04/2024 No Growth <24 hours   Preliminary    Extra Tube 02/04/2024 Hold for Add Ons   Final    Sodium 02/04/2024 140  135 - 145 mmol/L Final    Potassium 02/04/2024 3.6  3.4 - 5.1 mmol/L Final    Chloride 02/04/2024 108  97 - 110 mmol/L Final    Carbon Dioxide 02/04/2024 27  21 - 32 mmol/L Final    Anion Gap 02/04/2024 9  7 - 19 mmol/L Final    Glucose 02/04/2024 92  70 - 99 mg/dL Final    BUN 02/04/2024 13  6 - 20 mg/dL Final    Creatinine 02/04/2024 0.66  0.51 - 0.95 mg/dL Final    Glomerular Filtration Rate 02/04/2024 >90  >=60 Final    eGFR results = or >60 mL/min/1.73m2 = Normal kidney function. Estimated GFR calculated using the CKD-EPI-R (2021) equation that does not include race in the creatinine calculation.    BUN/Cr 02/04/2024 20  7 - 25 Final    Calcium  02/04/2024 8.1 (L)  8.4 - 10.2 mg/dL Final    Magnesium 02/04/2024 2.1  1.7 - 2.4 mg/dL Final    WBC 02/04/2024 6.0  4.2 - 11.0 K/mcL Final    RBC 02/04/2024 4.21  4.00 - 5.20 mil/mcL Final    HGB 02/04/2024 12.4  12.0 - 15.5 g/dL Final    HCT 02/04/2024 36.4  36.0 - 46.5 % Final    MCV 02/04/2024 86.5  78.0 - 100.0 fl Final    MCH 02/04/2024 29.5  26.0 - 34.0 pg Final    MCHC 02/04/2024 34.1  32.0 - 36.5 g/dL Final    RDW-CV 02/04/2024 12.0  11.0 - 15.0 % Final    RDW-SD 02/04/2024 38.1 (L)  39.0 - 50.0 fL Final    PLT 02/04/2024 199  140 - 450 K/mcL Final    NRBC 02/04/2024 0  <=0 /100 WBC Final    Neutrophil, Percent 02/04/2024 64  % Final    Lymphocytes, Percent 02/04/2024 25  % Final    Mono, Percent 02/04/2024 8  % Final    Eosinophils, Percent 02/04/2024 2  % Final    Basophils, Percent 02/04/2024 1  % Final    Immature Granulocytes 02/04/2024 0  % Final    Absolute Neutrophils 02/04/2024 3.8  1.8 - 7.7 K/mcL Final    Absolute Lymphocytes 02/04/2024 1.5  1.0 - 4.0 K/mcL Final    Absolute Monocytes 02/04/2024 0.5  0.3 - 0.9 K/mcL Final    Absolute Eosinophils  02/04/2024 0.1  0.0 - 0.5 K/mcL Final    Absolute Basophils 02/04/2024 0.0  0.0 - 0.3 K/mcL Final    Absolute Immature Granulocytes 02/04/2024 0.0  0.0 - 0.2 K/mcL Final       Microbiology:  No results found for: \"ORGANISM\"    Antibiotics:  [unfilled]    Radiology:  CT ABDOMEN PELVIS W CONTRAST    Result Date: 2/4/2024  Patient: MARIA L LAWSON  Time Out: 05:24 Exam(s): CT ABDOMEN + PELVIS With Contrast IV Amt: 70ml [id number] EXAM:   CT Abdomen and Pelvis With Intravenous Contrast CLINICAL HISTORY:    Reason for exam: L-sided abd pain; hx recurrent UTIs; Mild L hydro. TECHNIQUE:   Axial computed tomography images of the abdomen and pelvis with intravenous contrast.  Dose reduction techniques were utilized. CONTRAST:   Patient received 70ml [id number] of IV contrast  COMPARISON:   April 19, 2022 FINDINGS:   Lung bases:  Unremarkable.  No mass.  No  consolidation.  ABDOMEN:   Liver:  Unremarkable.  No mass.   Gallbladder and bile ducts:  Cholecystectomy.  No ductal dilation.   Pancreas:  Unremarkable.  No mass.  No ductal dilation.   Spleen:  Unremarkable.  No splenomegaly.   Adrenals:  Unremarkable.  No mass.   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  No urinary tract calculus or obstruction.   Stomach and bowel:  Generalized increase in stool throughout the colon likely representing constipation.  No obstruction.  No mucosal thickening.  PELVIS:   Appendix:  No findings to suggest acute appendicitis.   Bladder:  Unremarkable.  No mass.   Reproductive:  Unremarkable as visualized.  ABDOMEN and PELVIS:   Intraperitoneal space:  Unremarkable.  No free air.  No significant fluid collection.   Bones/joints:  No acute fracture.  No dislocation.   Soft tissues:  Unremarkable.   Vasculature:  Unremarkable.  No abdominal aortic aneurysm.   Lymph nodes:  Unremarkable.  No enlarged lymph nodes.       No acute findings in the abdomen or pelvis. Generalized increase in stool throughout the colon likely representing constipation. Electronically signed by Yuniel Salazar MD on 02 04 24 at 05:24        Procedures:    No valid procedures specified.      Assessment and Plan:  Impression  Concern for UTI  H/o esbl e coli uti    We were asked to see Ms Henson for concern for UTI. She presented w/ symptoms of UTI. Recently dg w/ esbl e coli uti last month. Per patient empiric antibiotic was switched based on culture result, and symptoms improved, therefore presume recent infection was treated apprpriately; however, we do not know what antibiotic she received. Given stability and likely adequate treatment of prior infection recommend deescalating to zosyn and monitoring.    Recommendations  Stop meropenem  Start zosyn  Follow urine culture  Follow blood cultures  Monitor clinical, wbc, temp    D/w Dr Tae Sullivan MD   2/4/2024

## 2025-01-02 NOTE — DISCHARGE PLANNING
[facility] Transitional Care Coordination    Referral from: Dr. Lowery    Insurance Provider on Facesheet: MCR/GORDY FFS    Potential Rehab Diagnosis: NTSCI    Chart review indicates patient may have on going medical management and may have therapy needs to possibly meet inpatient rehab facility criteria with the goal of returning to community.    D/C support will need to be verified: Daughter    Physiatry consultation pended per protocol. Following for GUILHERME mike.      Thank you for the referral.

## 2025-01-03 ENCOUNTER — APPOINTMENT (OUTPATIENT)
Dept: RADIOLOGY | Facility: MEDICAL CENTER | Age: 65
End: 2025-01-03
Payer: MEDICARE

## 2025-01-03 ENCOUNTER — TELEPHONE (OUTPATIENT)
Dept: MEDICAL GROUP | Facility: CLINIC | Age: 65
End: 2025-01-03
Payer: MEDICARE

## 2025-01-03 PROBLEM — J93.9 PNEUMOTHORAX: Status: ACTIVE | Noted: 2025-01-03

## 2025-01-03 PROBLEM — J96.02 ACUTE HYPERCAPNIC RESPIRATORY FAILURE (HCC): Status: ACTIVE | Noted: 2025-01-03

## 2025-01-03 LAB
AMMONIA PLAS-SCNC: 74 UMOL/L (ref 11–45)
ANION GAP SERPL CALC-SCNC: 7 MMOL/L (ref 7–16)
ARTERIAL PATENCY WRIST A: ABNORMAL
BASE EXCESS BLDA CALC-SCNC: 0 MMOL/L (ref -4–3)
BASE EXCESS BLDA CALC-SCNC: 2 MMOL/L (ref -4–3)
BASE EXCESS BLDA CALC-SCNC: 5 MMOL/L (ref -4–3)
BASOPHILS # BLD AUTO: 0.5 % (ref 0–1.8)
BASOPHILS # BLD: 0.07 K/UL (ref 0–0.12)
BODY TEMPERATURE: 37.3 CENTIGRADE
BODY TEMPERATURE: ABNORMAL DEGREES
BODY TEMPERATURE: ABNORMAL DEGREES
BUN SERPL-MCNC: 20 MG/DL (ref 8–22)
CALCIUM SERPL-MCNC: 8.3 MG/DL (ref 8.5–10.5)
CHLORIDE SERPL-SCNC: 98 MMOL/L (ref 96–112)
CO2 BLDA-SCNC: 33 MMOL/L (ref 32–48)
CO2 BLDA-SCNC: 37 MMOL/L (ref 32–48)
CO2 SERPL-SCNC: 29 MMOL/L (ref 20–33)
CREAT SERPL-MCNC: 0.83 MG/DL (ref 0.5–1.4)
DELSYS IDSYS: ABNORMAL
DELSYS IDSYS: ABNORMAL
EOSINOPHIL # BLD AUTO: 0.01 K/UL (ref 0–0.51)
EOSINOPHIL NFR BLD: 0.1 % (ref 0–6.9)
ERYTHROCYTE [DISTWIDTH] IN BLOOD BY AUTOMATED COUNT: 58.6 FL (ref 35.9–50)
ERYTHROCYTE [DISTWIDTH] IN BLOOD BY AUTOMATED COUNT: 58.7 FL (ref 35.9–50)
GFR SERPLBLD CREATININE-BSD FMLA CKD-EPI: 78 ML/MIN/1.73 M 2
GLUCOSE BLD STRIP.AUTO-MCNC: 159 MG/DL (ref 65–99)
GLUCOSE BLD STRIP.AUTO-MCNC: 192 MG/DL (ref 65–99)
GLUCOSE SERPL-MCNC: 138 MG/DL (ref 65–99)
HCO3 BLDA-SCNC: 31.3 MMOL/L (ref 21–28)
HCO3 BLDA-SCNC: 32 MMOL/L (ref 21–28)
HCO3 BLDA-SCNC: 34.1 MMOL/L (ref 21–28)
HCT VFR BLD AUTO: 28.8 % (ref 37–47)
HCT VFR BLD AUTO: 33.9 % (ref 37–47)
HGB BLD-MCNC: 10.7 G/DL (ref 12–16)
HGB BLD-MCNC: 9.2 G/DL (ref 12–16)
IMM GRANULOCYTES # BLD AUTO: 0.18 K/UL (ref 0–0.11)
IMM GRANULOCYTES NFR BLD AUTO: 1.2 % (ref 0–0.9)
INHALED O2 FLOW RATE: ABNORMAL L/MIN
LACTATE BLD-SCNC: 0.9 MMOL/L (ref 0.5–2)
LACTATE BLD-SCNC: 1.2 MMOL/L (ref 0.5–2)
LACTATE SERPL-SCNC: 1.2 MMOL/L (ref 0.5–2)
LPM ILPM: 15 LPM
LYMPHOCYTES # BLD AUTO: 1.87 K/UL (ref 1–4.8)
LYMPHOCYTES NFR BLD: 12.8 % (ref 22–41)
MCH RBC QN AUTO: 28.8 PG (ref 27–33)
MCH RBC QN AUTO: 28.8 PG (ref 27–33)
MCHC RBC AUTO-ENTMCNC: 31.6 G/DL (ref 32.2–35.5)
MCHC RBC AUTO-ENTMCNC: 31.9 G/DL (ref 32.2–35.5)
MCV RBC AUTO: 90.3 FL (ref 81.4–97.8)
MCV RBC AUTO: 91.1 FL (ref 81.4–97.8)
MONOCYTES # BLD AUTO: 2.1 K/UL (ref 0–0.85)
MONOCYTES NFR BLD AUTO: 14.4 % (ref 0–13.4)
NEUTROPHILS # BLD AUTO: 10.33 K/UL (ref 1.82–7.42)
NEUTROPHILS NFR BLD: 71 % (ref 44–72)
NRBC # BLD AUTO: 0.02 K/UL
NRBC BLD-RTO: 0.1 /100 WBC (ref 0–0.2)
PCO2 BLDA: 55.2 MMHG (ref 32–48)
PCO2 BLDA: 96.7 MMHG (ref 32–48)
PCO2 BLDA: 99.9 MMHG (ref 32–48)
PCO2 TEMP ADJ BLDA: 98 MMHG (ref 32–48)
PCO2 TEMP ADJ BLDA: >100 MMHG (ref 32–48)
PH BLDA: 7.13 [PH] (ref 7.35–7.45)
PH BLDA: 7.14 [PH] (ref 7.35–7.45)
PH BLDA: 7.36 [PH] (ref 7.35–7.45)
PH TEMP ADJ BLDA: 7.13 [PH] (ref 7.35–7.45)
PH TEMP ADJ BLDA: 7.13 [PH] (ref 7.35–7.45)
PHOSPHATE SERPL-MCNC: 2.9 MG/DL (ref 2.5–4.5)
PLATELET # BLD AUTO: 302 K/UL (ref 164–446)
PLATELET # BLD AUTO: 467 K/UL (ref 164–446)
PLATELETS.RETICULATED NFR BLD AUTO: 2.5 % (ref 0.6–13.1)
PMV BLD AUTO: 9.1 FL (ref 9–12.9)
PMV BLD AUTO: 9.2 FL (ref 9–12.9)
PO2 BLDA: 233 MMHG (ref 83–108)
PO2 BLDA: 83 MMHG (ref 83–108)
PO2 BLDA: 92.1 MMHG (ref 83–108)
PO2 TEMP ADJ BLDA: 87 MMHG (ref 64–87)
PO2 TEMP ADJ BLDA: 93.8 MMHG (ref 64–87)
POTASSIUM SERPL-SCNC: 4.5 MMOL/L (ref 3.6–5.5)
RBC # BLD AUTO: 3.19 M/UL (ref 4.2–5.4)
RBC # BLD AUTO: 3.72 M/UL (ref 4.2–5.4)
SAO2 % BLDA: 100 % (ref 93–99)
SAO2 % BLDA: 91 % (ref 93–99)
SAO2 % BLDA: 93.8 % (ref 93–99)
SODIUM SERPL-SCNC: 134 MMOL/L (ref 135–145)
SPECIMEN DRAWN FROM PATIENT: ABNORMAL
SPECIMEN DRAWN FROM PATIENT: ABNORMAL
TROPONIN T SERPL-MCNC: 44 NG/L (ref 6–19)
WBC # BLD AUTO: 14.6 K/UL (ref 4.8–10.8)
WBC # BLD AUTO: 6.7 K/UL (ref 4.8–10.8)

## 2025-01-03 PROCEDURE — 700111 HCHG RX REV CODE 636 W/ 250 OVERRIDE (IP): Mod: JZ

## 2025-01-03 PROCEDURE — 51798 US URINE CAPACITY MEASURE: CPT

## 2025-01-03 PROCEDURE — 82140 ASSAY OF AMMONIA: CPT

## 2025-01-03 PROCEDURE — 83605 ASSAY OF LACTIC ACID: CPT

## 2025-01-03 PROCEDURE — 700111 HCHG RX REV CODE 636 W/ 250 OVERRIDE (IP): Performed by: NEUROLOGICAL SURGERY

## 2025-01-03 PROCEDURE — 99232 SBSQ HOSP IP/OBS MODERATE 35: CPT | Mod: GC | Performed by: FAMILY MEDICINE

## 2025-01-03 PROCEDURE — A9270 NON-COVERED ITEM OR SERVICE: HCPCS

## 2025-01-03 PROCEDURE — 700102 HCHG RX REV CODE 250 W/ 637 OVERRIDE(OP): Performed by: FAMILY MEDICINE

## 2025-01-03 PROCEDURE — A9270 NON-COVERED ITEM OR SERVICE: HCPCS | Performed by: NEUROLOGICAL SURGERY

## 2025-01-03 PROCEDURE — A9270 NON-COVERED ITEM OR SERVICE: HCPCS | Performed by: NURSE PRACTITIONER

## 2025-01-03 PROCEDURE — 85025 COMPLETE CBC W/AUTO DIFF WBC: CPT

## 2025-01-03 PROCEDURE — 97116 GAIT TRAINING THERAPY: CPT

## 2025-01-03 PROCEDURE — 770004 HCHG ROOM/CARE - ONCOLOGY PRIVATE *

## 2025-01-03 PROCEDURE — 700102 HCHG RX REV CODE 250 W/ 637 OVERRIDE(OP)

## 2025-01-03 PROCEDURE — 84484 ASSAY OF TROPONIN QUANT: CPT

## 2025-01-03 PROCEDURE — 700101 HCHG RX REV CODE 250

## 2025-01-03 PROCEDURE — 700101 HCHG RX REV CODE 250: Performed by: EMERGENCY MEDICINE

## 2025-01-03 PROCEDURE — 0W9930Z DRAINAGE OF RIGHT PLEURAL CAVITY WITH DRAINAGE DEVICE, PERCUTANEOUS APPROACH: ICD-10-PCS | Performed by: EMERGENCY MEDICINE

## 2025-01-03 PROCEDURE — 32551 INSERTION OF CHEST TUBE: CPT | Performed by: EMERGENCY MEDICINE

## 2025-01-03 PROCEDURE — A9270 NON-COVERED ITEM OR SERVICE: HCPCS | Performed by: PHYSICIAN ASSISTANT

## 2025-01-03 PROCEDURE — 36600 WITHDRAWAL OF ARTERIAL BLOOD: CPT

## 2025-01-03 PROCEDURE — 71045 X-RAY EXAM CHEST 1 VIEW: CPT

## 2025-01-03 PROCEDURE — 700111 HCHG RX REV CODE 636 W/ 250 OVERRIDE (IP)

## 2025-01-03 PROCEDURE — 85055 RETICULATED PLATELET ASSAY: CPT

## 2025-01-03 PROCEDURE — 85027 COMPLETE CBC AUTOMATED: CPT

## 2025-01-03 PROCEDURE — 99291 CRITICAL CARE FIRST HOUR: CPT | Mod: 25 | Performed by: EMERGENCY MEDICINE

## 2025-01-03 PROCEDURE — 80048 BASIC METABOLIC PNL TOTAL CA: CPT

## 2025-01-03 PROCEDURE — 93005 ELECTROCARDIOGRAM TRACING: CPT | Mod: TC | Performed by: FAMILY MEDICINE

## 2025-01-03 PROCEDURE — 700102 HCHG RX REV CODE 250 W/ 637 OVERRIDE(OP): Performed by: NEUROLOGICAL SURGERY

## 2025-01-03 PROCEDURE — 700102 HCHG RX REV CODE 250 W/ 637 OVERRIDE(OP): Performed by: PHYSICIAN ASSISTANT

## 2025-01-03 PROCEDURE — 82962 GLUCOSE BLOOD TEST: CPT | Mod: 91

## 2025-01-03 PROCEDURE — A9270 NON-COVERED ITEM OR SERVICE: HCPCS | Performed by: FAMILY MEDICINE

## 2025-01-03 PROCEDURE — 700102 HCHG RX REV CODE 250 W/ 637 OVERRIDE(OP): Performed by: NURSE PRACTITIONER

## 2025-01-03 PROCEDURE — 82803 BLOOD GASES ANY COMBINATION: CPT

## 2025-01-03 PROCEDURE — 84100 ASSAY OF PHOSPHORUS: CPT

## 2025-01-03 RX ORDER — HYDROCODONE BITARTRATE AND ACETAMINOPHEN 5; 325 MG/1; MG/1
1 TABLET ORAL EVERY 6 HOURS PRN
Status: DISCONTINUED | OUTPATIENT
Start: 2025-01-03 | End: 2025-01-03

## 2025-01-03 RX ORDER — LIDOCAINE HYDROCHLORIDE AND EPINEPHRINE 10; 10 MG/ML; UG/ML
20 INJECTION, SOLUTION INFILTRATION; PERINEURAL ONCE
Status: COMPLETED | OUTPATIENT
Start: 2025-01-03 | End: 2025-01-03

## 2025-01-03 RX ORDER — NALOXONE HYDROCHLORIDE 0.4 MG/ML
INJECTION, SOLUTION INTRAMUSCULAR; INTRAVENOUS; SUBCUTANEOUS
Status: COMPLETED
Start: 2025-01-03 | End: 2025-01-03

## 2025-01-03 RX ORDER — BISACODYL 10 MG
10 SUPPOSITORY, RECTAL RECTAL DAILY
Status: DISCONTINUED | OUTPATIENT
Start: 2025-01-03 | End: 2025-01-17

## 2025-01-03 RX ORDER — NALOXONE HYDROCHLORIDE 0.4 MG/ML
0.4 INJECTION, SOLUTION INTRAMUSCULAR; INTRAVENOUS; SUBCUTANEOUS ONCE
Status: COMPLETED | OUTPATIENT
Start: 2025-01-03 | End: 2025-01-03

## 2025-01-03 RX ORDER — NALOXONE HYDROCHLORIDE 0.4 MG/ML
0.2 INJECTION, SOLUTION INTRAMUSCULAR; INTRAVENOUS; SUBCUTANEOUS ONCE
Status: COMPLETED | OUTPATIENT
Start: 2025-01-03 | End: 2025-01-03

## 2025-01-03 RX ORDER — LABETALOL HYDROCHLORIDE 5 MG/ML
INJECTION, SOLUTION INTRAVENOUS
Status: ACTIVE
Start: 2025-01-03 | End: 2025-01-04

## 2025-01-03 RX ORDER — HYDROCODONE BITARTRATE AND ACETAMINOPHEN 10; 325 MG/1; MG/1
1 TABLET ORAL EVERY 6 HOURS PRN
Status: DISCONTINUED | OUTPATIENT
Start: 2025-01-03 | End: 2025-01-04

## 2025-01-03 RX ORDER — OMEPRAZOLE 20 MG/1
20 CAPSULE, DELAYED RELEASE ORAL DAILY
Status: DISCONTINUED | OUTPATIENT
Start: 2025-01-03 | End: 2025-01-04

## 2025-01-03 RX ADMIN — METOCLOPRAMIDE 10 MG: 10 TABLET ORAL at 01:50

## 2025-01-03 RX ADMIN — METOCLOPRAMIDE 10 MG: 10 TABLET ORAL at 11:51

## 2025-01-03 RX ADMIN — LIDOCAINE 1 PATCH: 4 PATCH TOPICAL at 11:43

## 2025-01-03 RX ADMIN — NALOXONE HYDROCHLORIDE 0.4 MG: 0.4 INJECTION, SOLUTION INTRAMUSCULAR; INTRAVENOUS; SUBCUTANEOUS at 19:55

## 2025-01-03 RX ADMIN — FENTANYL CITRATE 25 MCG: 50 INJECTION, SOLUTION INTRAMUSCULAR; INTRAVENOUS at 06:17

## 2025-01-03 RX ADMIN — OXYCODONE HYDROCHLORIDE 10 MG: 10 TABLET ORAL at 08:54

## 2025-01-03 RX ADMIN — METOCLOPRAMIDE 10 MG: 10 TABLET ORAL at 05:04

## 2025-01-03 RX ADMIN — ONDANSETRON 4 MG: 2 INJECTION INTRAMUSCULAR; INTRAVENOUS at 08:54

## 2025-01-03 RX ADMIN — LIDOCAINE HYDROCHLORIDE AND EPINEPHRINE 20 ML: 10; 10 INJECTION, SOLUTION INFILTRATION; PERINEURAL at 20:30

## 2025-01-03 RX ADMIN — LIDOCAINE HYDROCHLORIDE 15 ML: 20 SOLUTION ORAL; TOPICAL at 02:08

## 2025-01-03 RX ADMIN — BISACODYL 10 MG: 10 SUPPOSITORY RECTAL at 11:51

## 2025-01-03 RX ADMIN — OXYCODONE HYDROCHLORIDE 10 MG: 10 TABLET ORAL at 01:46

## 2025-01-03 RX ADMIN — NALOXONE HYDROCHLORIDE 0.2 MG: 0.4 INJECTION, SOLUTION INTRAMUSCULAR; INTRAVENOUS; SUBCUTANEOUS at 20:13

## 2025-01-03 RX ADMIN — HEPARIN SODIUM 5000 UNITS: 5000 INJECTION, SOLUTION INTRAVENOUS; SUBCUTANEOUS at 05:03

## 2025-01-03 RX ADMIN — METOCLOPRAMIDE 10 MG: 10 TABLET ORAL at 17:36

## 2025-01-03 RX ADMIN — HYDROCODONE BITARTRATE AND ACETAMINOPHEN 1 TABLET: 10; 325 TABLET ORAL at 13:09

## 2025-01-03 RX ADMIN — ROPINIROLE HYDROCHLORIDE 1 MG: 1 TABLET, FILM COATED ORAL at 23:24

## 2025-01-03 RX ADMIN — FENTANYL CITRATE 25 MCG: 50 INJECTION, SOLUTION INTRAMUSCULAR; INTRAVENOUS at 11:52

## 2025-01-03 RX ADMIN — OMEPRAZOLE 20 MG: 20 CAPSULE, DELAYED RELEASE ORAL at 11:50

## 2025-01-03 RX ADMIN — LORAZEPAM 0.25 MG: 0.5 TABLET ORAL at 14:36

## 2025-01-03 RX ADMIN — Medication 5 MG: at 23:20

## 2025-01-03 RX ADMIN — KETOROLAC TROMETHAMINE 15 MG: 15 INJECTION, SOLUTION INTRAMUSCULAR; INTRAVENOUS at 00:00

## 2025-01-03 RX ADMIN — FERROUS SULFATE TAB 325 MG (65 MG ELEMENTAL FE) 325 MG: 325 (65 FE) TAB at 08:54

## 2025-01-03 RX ADMIN — MORPHINE SULFATE 15 MG: 15 TABLET, FILM COATED, EXTENDED RELEASE ORAL at 17:23

## 2025-01-03 RX ADMIN — DOCUSATE SODIUM 100 MG: 100 CAPSULE, LIQUID FILLED ORAL at 17:24

## 2025-01-03 RX ADMIN — HYDROCODONE BITARTRATE AND ACETAMINOPHEN 1 TABLET: 10; 325 TABLET ORAL at 23:48

## 2025-01-03 RX ADMIN — Medication 5 MG: at 23:23

## 2025-01-03 RX ADMIN — MORPHINE SULFATE 15 MG: 15 TABLET, FILM COATED, EXTENDED RELEASE ORAL at 04:54

## 2025-01-03 RX ADMIN — KETOROLAC TROMETHAMINE 15 MG: 15 INJECTION, SOLUTION INTRAMUSCULAR; INTRAVENOUS at 06:00

## 2025-01-03 RX ADMIN — ANTACID TABLETS 500 MG: 500 TABLET, CHEWABLE ORAL at 01:37

## 2025-01-03 RX ADMIN — HEPARIN SODIUM 5000 UNITS: 5000 INJECTION, SOLUTION INTRAVENOUS; SUBCUTANEOUS at 17:24

## 2025-01-03 RX ADMIN — OXYCODONE HYDROCHLORIDE 10 MG: 10 TABLET ORAL at 04:53

## 2025-01-03 ASSESSMENT — PAIN DESCRIPTION - PAIN TYPE
TYPE: ACUTE PAIN
TYPE: ACUTE PAIN;CHRONIC PAIN
TYPE: SURGICAL PAIN
TYPE: ACUTE PAIN;CHRONIC PAIN
TYPE: ACUTE PAIN
TYPE: SURGICAL PAIN
TYPE: ACUTE PAIN;CHRONIC PAIN
TYPE: SURGICAL PAIN;ACUTE PAIN
TYPE: ACUTE PAIN

## 2025-01-03 ASSESSMENT — GAIT ASSESSMENTS
ASSISTIVE DEVICE: FRONT WHEEL WALKER
DEVIATION: ATAXIC
GAIT LEVEL OF ASSIST: CONTACT GUARD ASSIST

## 2025-01-03 ASSESSMENT — COGNITIVE AND FUNCTIONAL STATUS - GENERAL
MOVING FROM LYING ON BACK TO SITTING ON SIDE OF FLAT BED: A LITTLE
WALKING IN HOSPITAL ROOM: A LITTLE
MOVING TO AND FROM BED TO CHAIR: A LITTLE
CLIMB 3 TO 5 STEPS WITH RAILING: A LITTLE
TURNING FROM BACK TO SIDE WHILE IN FLAT BAD: A LITTLE
SUGGESTED CMS G CODE MODIFIER MOBILITY: CK
MOBILITY SCORE: 18
STANDING UP FROM CHAIR USING ARMS: A LITTLE

## 2025-01-03 ASSESSMENT — ENCOUNTER SYMPTOMS
FEVER: 0
DIZZINESS: 1
NAUSEA: 1
HEARTBURN: 1
VOMITING: 0
WEAKNESS: 1
SHORTNESS OF BREATH: 1
ABDOMINAL PAIN: 1
WHEEZING: 0
DIAPHORESIS: 0

## 2025-01-03 ASSESSMENT — PAIN SCALES - WONG BAKER
WONGBAKER_NUMERICALRESPONSE: HURTS JUST A LITTLE BIT
WONGBAKER_NUMERICALRESPONSE: HURTS JUST A LITTLE BIT

## 2025-01-03 NOTE — DISCHARGE PLANNING
Case Management Discharge Planning    Admission Date: 12/25/2024  GMLOS: 4.8  ALOS: 9    6-Clicks ADL Score: 19  6-Clicks Mobility Score: 18      Anticipated Discharge Dispo: Discharge Disposition: Discharged to home/self care (01)    DME Needed: No    Action(s) Taken: [facility] is following at this time. PMR awaiting for updated PT/OT notes, PT saw pt today and recommending Post acute placement. Neurosurgery continues to follow.     Escalations Completed: None    Medically Clear: No    Next Steps: Pending medical clearance.     Barriers to Discharge: Medical clearance    Is the patient up for discharge tomorrow: No

## 2025-01-03 NOTE — ASSESSMENT & PLAN NOTE
Pt developed worsening SOB on 1/2, chest xray was performed and showed a right apical pneumothorax. IR aware and recommended repeat CXR in the morning.   Plan:   - Repeat CXR 1/3 shows stable right pneumothorax, IR following

## 2025-01-03 NOTE — DIETARY
Nutrition Update: Follow-up for PO > 50%/ BG levels   Day 9 of admit.  Asha Hannon is a 64 y.o. female with admitting DX of Breast cancer metastasized to bone, unspecified laterality (HCC) [C50.919, C79.51].    Current Diet: Regular     Inadequate Oral Intake related to s/s associated with cancer dx  as evidenced by weight loss of 9.6% BW in <6 months.     Nutrition Dx Status: Ongoing or Resolved : ongoing     Patient's oral intake remains adequate, meeting > 75% of most meals over the past 4 days. The patient is currently on a regular diet. There was concern about an elevated blood glucose level of 137 mg/dL with a past medical history of type 2 diabetes. However, the MD stated that the patient is nearly at palliative care and would prefer to liberalize the diet at this time. Agreed with MD    Problem: Nutritional:  Goal: Achieve adequate nutritional intake  Description: Patient will consume > 50%  of meals  Outcome: goal met     RD following

## 2025-01-03 NOTE — ANESTHESIA POSTPROCEDURE EVALUATION
Patient: Asha Hannon    Procedure Summary       Date: 01/01/25 Room / Location: [facility] 07 / SURGERY [facility]    Anesthesia Start: 1610 Anesthesia Stop: 1953    Procedures:       LAMINECTOMY, SPINE, THORACIC. T4-T6 (Back)      FUSION, SPINE, THORACIC APPROACH (Back) Diagnosis: (spinal cord compression with stenosis at the T5 level)    Surgeons: Karthik Lowery D.O. Responsible Provider: Hernán Blair M.D.    Anesthesia Type: general ASA Status: 3            Final Anesthesia Type: general  Last vitals  BP   Blood Pressure : 105/51    Temp   36.6 °C (97.8 °F)    Pulse   90   Resp   16    SpO2   95 %      Anesthesia Post Evaluation    Patient location during evaluation: PACU  Patient participation: complete - patient participated  Level of consciousness: sleepy but conscious    Airway patency: patent  Anesthetic complications: no  Cardiovascular status: hemodynamically stable  Respiratory status: acceptable  Hydration status: balanced    PONV: none          No notable events documented.     Nurse Pain Score: 3 (NPRS)

## 2025-01-03 NOTE — THERAPY
"Physical Therapy   Daily Treatment     Patient Name: Asha Hannon  Age:  64 y.o., Sex:  female  Medical Record #: [id number]  Today's Date: 1/3/2025     Precautions  Precautions: (P) Fall Risk;Spinal / Back Precautions   Comments: (P) T4-T6 lami and fusion    Assessment    Pt was agreeable to PT session.  She is now s/p T4-T6 laminectomies and fusion with continued spinal precautions.  Pt now presents with ataxic gait and only mobilized x6 steps, but also reports she didn't sleep well.  PT will continue to follow while in house.  Recommend post acute PT services.     Plan    Treatment Plan Status: (P) Continue Current Treatment Plan  Type of Treatment: Bed Mobility, Equipment, Family / Caregiver Training, Gait Training, Manual Therapy, Neuro Re-Education / Balance, Self Care / Home Evaluation, Stair Training, Therapeutic Activities, Therapeutic Exercise  Treatment Frequency: 4 Times per Week  Treatment Duration: Until Therapy Goals Met    DC Equipment Recommendations: (P) Unable to determine at this time  Discharge Recommendations: (P) Recommend post-acute placement for additional physical therapy services prior to discharge home      Subjective    \"I'm in pain and nauseous.\"- nurse notified     Objective       01/03/25 0850   Precautions   Precautions Fall Risk;Spinal / Back Precautions    Comments T4-T6 lami and fusion   Vitals   Pulse 92   Blood Pressure  115/61   Pulse Oximetry 95 %   O2 (LPM) 4   O2 Delivery Device Nasal Cannula   Pain 0 - 10 Group   Therapist Pain Assessment During Activity;Nurse Notified  (thoracic back pain- surgical site)   Cognition    Cognition / Consciousness WDL   Level of Consciousness Alert   Comments drowsy- pt reports she didn't sleep well   Strength Lower Body   Lower Body Strength  X   Comments BLEs grossly 4-/5   Sensation Lower Body   Lower Extremity Sensation   WDL   Neurological Concerns   Comments ataxic gait   Balance   Sitting Balance (Static) Fair   Sitting " Balance (Dynamic) Fair -   Standing Balance (Static) Fair -   Standing Balance (Dynamic) Fair -   Weight Shift Sitting Fair   Weight Shift Standing Poor   Comments with FWW   Bed Mobility    Supine to Sit Standby Assist   Sit to Supine Standby Assist   Scooting Standby Assist   Skilled Intervention Verbal Cuing   Comments x2 trials, HOB slightly elevated, rail   Gait Analysis   Gait Level Of Assist Contact Guard Assist   Assistive Device Front Wheel Walker   Distance (Feet)   (6 steps)   # of Times Distance was Traveled 1   Deviation Ataxic  (forward flexed posture with increased BUE WB)   Weight Bearing Status FWB BLEs   Comments gait limited by pain and decreased activity tolerance   Functional Mobility   Sit to Stand Contact Guard Assist   Bed, Chair, Wheelchair Transfer Contact Guard Assist   Transfer Method Stand Step   Mobility with FWW   Short Term Goals    Short Term Goal # 1 Pt will perform supine < > sit SPV with bed flat, no rail in 6 visits in order to set up for upright mobility   Goal Outcome # 1 goal not met   Short Term Goal # 2 Pt will perform sit < > stand SPV in 6 visits in order to prepare for ambulation   Goal Outcome # 2 Goal not met   Short Term Goal # 3 Pt will ambulate 150' SPV in 6 visits with FWW in order to return home safely   Goal Outcome # 3 Goal not met   Short Term Goal # 4 Pt will ascend/ descend 1 step SPV in 6 visits in order to access her home   Goal Outcome # 4 Goal not met   Education Group   Spine Precautions Patient Response Patient;Acceptance;Explanation;Action Demonstration;Reinforcement Needed   Physical Therapy Treatment Plan   Physical Therapy Treatment Plan Continue Current Treatment Plan   Anticipated Discharge Equipment and Recommendations   DC Equipment Recommendations Unable to determine at this time   Discharge Recommendations Recommend post-acute placement for additional physical therapy services prior to discharge home   Interdisciplinary Plan of Care  Collaboration   IDT Collaboration with  Nursing   Patient Position at End of Therapy In Bed;Call Light within Reach;Tray Table within Reach;Family / Friend in Room  (daughter in the room)   Collaboration Comments RN updated   Session Information   Date / Session Number  1/3 -3 (2/4, 1/5) 1/1- sx, 1/2 hold

## 2025-01-03 NOTE — CARE PLAN
The patient is Unstable - High likelihood or risk of patient condition declining or worsening    Shift Goals  Clinical Goals: patient remains free from falls  Patient Goals: bed alarm off  Family Goals: maria ines    Progress made toward(s) clinical / shift goals:  Patient calls appropriately for assistance to bathroom and bsc. Free from falls at this time.    Patient is not progressing towards the following goals:      Problem: Pain - Standard  Goal: Alleviation of pain or a reduction in pain to the patient’s comfort goal  Description: Target End Date:  Prior to discharge or change in level of care    Document on Vitals flowsheet    1.  Document pain using the appropriate pain scale per order or unit policy  2.  Educate and implement non-pharmacologic comfort measures (i.e. relaxation, distraction, massage, cold/heat therapy, etc.)  3.  Pain management medications as ordered  4.  Reassess pain after pain med administration per policy  5.  If opiods administered assess patient's response to pain medication is appropriate per POSS sedation scale  6.  Follow pain management plan developed in collaboration with patient and interdisciplinary team (including palliative care or pain specialists if applicable)  Outcome: Not Progressing  Chronic pain and mets to spine causing pain with movement. Medicated per mar.

## 2025-01-03 NOTE — PROGRESS NOTES
Attending:   Dr. Eduardo Muniz     Resident:   Sandra Long D.O.    PATIENT:   Asha Hannon; [id number]; 1960    ID:   64 y.o. female with PMHx metastatic breast cancer admitted for acute hypoxic respiratory failure found to have worsening metastatic disease of the spine now s/p laminectomy 1/1/25.    SUBJECTIVE:   No acute events overnight, patient states that she still in a lot of pain particularly in her back, however it is a little bit better than yesterday.  She feels like the MS Contin is helping.  She is still having some difficulty breathing and is requiring oxygen.  She also has decreased appetite states she only ate a banana and some milk yesterday, however will give the Ensure supplements to try as she has had them in the past.    OBJECTIVE:  Vitals:    01/03/25 0349 01/03/25 0814 01/03/25 0850 01/03/25 1309   BP: 105/51 109/55 115/61 120/52   Pulse: 90 91 92 96   Resp: 16 16  16   Temp: 36.6 °C (97.8 °F) 37.3 °C (99.2 °F)  37.3 °C (99.1 °F)   TempSrc: Temporal Temporal  Temporal   SpO2: 95% 94% 95% 97%   Weight:       Height:           Intake/Output Summary (Last 24 hours) at 1/3/2025 0546  Last data filed at 1/3/2025 0349  Gross per 24 hour   Intake 400 ml   Output 325 ml   Net 75 ml       PHYSICAL EXAM:  General: Ill appearing, pale  HEENT: NC/AT. EOMI.   Cardiovascular: RRR without murmurs. Normal capillary refill   Respiratory: CTAB  Abdomen: soft, nontender, nondistended, no masses  EXT:  ORELLANA, no edema  Skin: No erythema/lesions, bandage covering surgical site, no signs of drainage  Neuro: Non-focal    LABS:  Recent Labs     01/02/25  0150 01/03/25  0014   WBC 6.7 6.7   RBC 3.55* 3.19*   HEMOGLOBIN 10.2* 9.2*   HEMATOCRIT 32.0* 28.8*   MCV 90.1 90.3   MCH 28.7 28.8   RDW 58.2* 58.7*   PLATELETCT 287 302   MPV 9.3 9.2     Recent Labs     01/02/25  0150 01/03/25  0014   SODIUM 136 134*   POTASSIUM 4.7 4.5   CHLORIDE 98 98   CO2 28 29   BUN 14 20   CREATININE 0.77 0.83   CALCIUM 8.9 8.3*    PHOSPHORUS 4.2  --    ALBUMIN 3.2  --      Estimated GFR/CRCL = Estimated Creatinine Clearance: 82.7 mL/min (by C-G formula based on SCr of 0.83 mg/dL).  Recent Labs     01/02/25  0150 01/03/25  0014   GLUCOSE 237* 138*     Recent Labs     01/01/25  0737   INR 0.96             Recent Labs     01/01/25  0737   INR 0.96         IMAGING:  DX-CHEST-PORTABLE (1 VIEW)   Final Result         1. Slightly decreased right pneumothorax.   2. Stable bilateral interstitial and airspace opacification.   3. Stable right pleural effusion.      DX-CHEST-PORTABLE (1 VIEW)   Final Result         1. Left pleural effusion has decreased in size status post thoracentesis. There is no left-sided pneumothorax.   2. There is persistent right basilar opacity with a new right apical pneumothorax.      Findings were communicated to the ordering provider at the time of dictation via Voalte.      US-THORACENTESIS PUNCTURE LEFT   Final Result      1. Ultrasound guided left sided therapeutic thoracentesis.      2. 950 mL of fluid withdrawn.      DX-CHEST-PORTABLE (1 VIEW)   Final Result      1. New postsurgical changes in the upper thoracic spine.   2. Worsening bilateral pleural effusions, pulmonary vascular congestion and interstitial edema.      DX-PORTABLE FLUORO > 1 HOUR   Final Result      Portable fluoroscopy utilized for 1 minute 19 seconds.      INTERPRETING LOCATION: [address] [facility], [id number]      DX-THORACIC SPINE-2 VIEWS   Final Result      Digitized intraoperative radiograph is submitted for review. This examination is not for diagnostic purpose but for guidance during a surgical procedure. Please see the patient's chart for full procedural details.         INTERPRETING LOCATION: [address] , [facility], [id number]      MR-CERVICAL SPINE-WITH & W/O   Final Result         1.  No evidence of metastatic disease to the cervical spine.      MR-LUMBAR SPINE-WITH & W/O   Final Result   Impression:      Multiple bony metastatic lesions as  described above, most prominent along the inferior endplate of L1.      Degenerative moderate canal stenosis at L4-5 with cauda equina compression. Epidural thickening and enhancement is noted in this region with severe bilateral facet degeneration and enhancement along the bilateral facet joints. This is felt to be    degenerative in origin.      MR-THORACIC SPINE-WITH & W/O   Final Result         Multiple metastatic lesions of the thoracic spine as described above.      Pathologic compression fracture at T5 with 50% loss of height and posterior cortical retropulsion with circumferential epidural thickening representing epidural extension of metastatic disease. This causes severe canal stenosis with cord compression.      Metastatic disease also involves the posterior elements and spinous processes at T4, T5.      Pre and paravertebral metastatic soft tissue infiltration at T4-T5 noted.         MR-BRAIN-WITH & W/O   Final Result         Multiple nodular enhancing metastatic lesions involving the cranium as described above.      The largest lesion is in the right posterior temporal/parietal occipital region and demonstrates inner table cortical breakthrough with epidural extension and adjacent mild dural thickening and enhancement.      US-THORACENTESIS PUNCTURE RIGHT   Final Result   Addendum (preliminary) 1 of 1 12/27/2024 10:59 AM      HISTORY/REASON FOR EXAM:  Shortness of breath         TECHNIQUE/EXAM DESCRIPTION:   Ultrasound-guided thoracentesis.      Indication:  RIGHT pleural fluid collection.      COMPARISON:  None-however patient states history of right-sided    thoracentesis September 2024.      PROCEDURE:     Informed consent was obtained. A timeout was taken. A right    pleural effusion was localized with real-time ultrasound guidance. The    right posterior chest wall was prepped and draped in a sterile manner.    Following local anesthesia with 1%    lidocaine, and under live ultrasound guidance  a 5 [place] Yueh pigtail    catheter was advanced into the pleural space with trocar technique and    pleural fluid was drained. The patient tolerated the procedure well    without evidence of complication. A post    thoracentesis chest radiograph is forthcoming.      FINDINGS:      Fluid was sent to the laboratory.      Fluid character: serosanguinous         1. Ultrasound guided right sided therapeutic and diagnostic thoracentesis.      2. 1000 mL of fluid withdrawn.      Final      1. Ultrasound guided right sided therapeutic and diagnostic thoracentesis.      2. 1000 mL of fluid withdrawn.      DX-CHEST-PORTABLE (1 VIEW)   Final Result      1.  Small persistent right pleural effusion which is decreased since recent thoracentesis.      2.  Small to moderate left pleural effusion.      3.  Bilateral perihilar and left lower lobe atelectasis and/or pneumonitis.      4.  Increased interstitial markings throughout the lung fields consistent with parenchymal scarring, and/or pulmonary edema.      CT-LSPINE W/O PLUS RECONS   Final Result         1. Diffuse metastatic disease.   2. No definite pathologic fracture.      CT-TSPINE W/O PLUS RECONS   Final Result         1. Extensive metastatic disease.   2. There is new pathologic compression fracture deformity of T5.      CT-CHEST,ABDOMEN,PELVIS WITH   Final Result         1. New bilateral upper lobe nodular infiltrates.   2. Moderate-sized bilateral pleural effusions.   3. Interval worsening of hepatic metastatic disease.   4. New abdominal lymphadenopathy. Thoracic adenopathy is again noted.   5. Diffuse osseous metastatic disease. There is a new compression fracture of T5 as reported on the prior spine CT.      DX-CHEST-PORTABLE (1 VIEW)   Final Result      1.  Stable bibasilar atelectasis/consolidation and small bilateral pleural effusions.      2.  Stable cardiomegaly and interstitial edema.          MEDS:  Current Facility-Administered Medications   Medication Last  Admin    omeprazole (PriLOSEC) capsule 20 mg 20 mg at 01/03/25 1150    HYDROcodone/acetaminophen (Norco)  MG per tablet 1 Tablet 1 Tablet at 01/03/25 1309    bisacodyl (Dulcolax) suppository 10 mg 10 mg at 01/03/25 1151    melatonin tablet 5 mg      melatonin tablet 5 mg 5 mg at 01/02/25 2021    LORazepam (Ativan) tablet 0.25 mg 0.25 mg at 01/02/25 1417    metoclopramide (Reglan) tablet 10 mg 10 mg at 01/03/25 1151    benzocaine-menthol (Cepacol) lozenge 1 Lozenge      sore throat spray (Chloraseptic) 1 Spray 1 Spray at 01/02/25 0049    sodium chloride (Ocean) 0.65 % nasal spray 2 Spray 2 Spray at 01/01/25 0600    morphine ER (Ms Contin) tablet 15 mg 15 mg at 01/03/25 0454    MD ALERT...DO NOT ADMINISTER NSAIDS or ASPIRIN unless ORDERED By Neurosurgery 1 Each      docusate sodium (Colace) capsule 100 mg 100 mg at 01/02/25 0542    senna-docusate (Pericolace Or Senokot S) 8.6-50 MG per tablet 1 Tablet 1 Tablet at 01/02/25 2022    senna-docusate (Pericolace Or Senokot S) 8.6-50 MG per tablet 1 Tablet      polyethylene glycol/lytes (Miralax) Packet 1 Packet      magnesium hydroxide (Milk Of Magnesia) suspension 30 mL      bisacodyl (Dulcolax) suppository 10 mg      heparin injection 5,000 Units 5,000 Units at 01/03/25 0503    methocarbamol (Robaxin) tablet 750 mg 750 mg at 01/02/25 2027    magnesium oxide tablet 400 mg 400 mg at 01/02/25 0543    ROPINIRole (Requip) tablet 1 mg 1 mg at 01/01/25 0835    calcium carbonate (Tums) chewable tab 500 mg 500 mg at 01/03/25 0137    ondansetron (Zofran) syringe/vial injection 4 mg 4 mg at 01/03/25 0854    ondansetron (Zofran ODT) dispertab 4 mg 4 mg at 01/02/25 2027    [Held by provider] carvedilol (Coreg) tablet 3.125 mg 3.125 mg at 01/02/25 0543    [Held by provider] losartan (Cozaar) tablet 100 mg 50 mg at 01/02/25 0542    lidocaine (Asperflex) 4 % patch 1 Patch 1 Patch at 01/03/25 1143    ferrous sulfate tablet 325 mg 325 mg at 01/03/25 0854    ROPINIRole (Requip) tablet  "1 mg 1 mg at 12/31/24 2232    fentaNYL (Sublimaze) injection 25 mcg 25 mcg at 01/03/25 1152       ASSESSMENT/PLAN:        * Malignant neoplasm of breast (HCC)- (present on admission)  Assessment & Plan  Extensive metastatic disease to lungs, liver, and bones. Fungating left breast mass. Followed by Dr. Tabor, Oncology, and Dr. Velez, Rad Onc. Pt has declined surgical management in the past, recently on oral chemotherapy. Large fungating mass of the left breast. Wound care has signed off. Dr. Tabor consulted, recommends patient follow in 1 week for discussion of different cancer treatment regimen, possible further radiation if recommended by Rad Onc.   - Full spine/brain MRI, completed 12/31, multiple metastatic brain lesions, pathologic compression fracture of T5 with severe canal stenosis with cord compression. Moderate canal stenosis at L4-L5 with cauda equina compression   - Lidocaine patch, Norco 5-325 mg q4 hours prn, IV fentanyl 25 mcg q4 h for break through pain. Norco and fentanyl at request of patient as she could not tolerate oxycodone and dilaudid previously. Trialing MS contin 15 mg BID 1/1/25  - Palliative care consulted, patient deferring GOC discussion   - Updated patient's oncologist regarding findings on 1/1/25   - Oncology will see patient         Pneumothorax  Assessment & Plan  Pt developed worsening SOB on 1/2, chest xray was performed and showed a right apical pneumothorax. IR aware and recommended repeat CXR in the morning.   Plan:   - Repeat CXR 1/3 shows stable right pneumothorax, IR following    Epigastric pain  Assessment & Plan  Pt is having epigastric \"burning\" abdominal pain when NPO for MRI. She states she gets this pain when she doesn't eat. She is not on a PPI at home.   - GI cocktail administered with relief of symptoms.   - Start Omeprazole 20mg daily    Restless leg syndrome- (present on admission)  Assessment & Plan  Patient reports a history of restless leg " syndrome well-controlled on ropinirole although she does not know the dosage. Has iron deficient anemia. RLS maybe due to compression of spinal cord.   - Ropinirole 1 mg nightly and will titrate up  - Iron supplementation ordered  - May change RLC and pain regimen post op from spinal surgery   - Started Melatonin PRN as patient has been having difficulty sleeping    Constipation- (present on admission)  Assessment & Plan  Chronic.  Patient reports it is worse with her chronic opioid use.  - Bowel regimen scheduled    Weakness- (present on admission)  Assessment & Plan  Pt reporting progressive weakness since discharge in September, now becoming weak even with ambulation to the restroom. Not requiring any assistive devices. Pt requesting bedside commode due to difficulty with frequent ambulation to restroom  - Per patient, neurosurgery is  requesting rehab placement after surgery. PM&R consult  was placed    Compression fracture of T5 vertebra (HCC)- (present on admission)  Assessment & Plan  Noted on CT imaging, pathologic fracture secondary to cancer. Full spine/brain MRI completed 12/31: multiple metastatic brain lesions, pathologic compression fracture of T5 with severe canal stenosis with cord compression. Moderate canal stenosis at L4-L5 with cauda equina compression   - Pain regimen ordered, pharmacy consult  - Calcitonin for compression-fracture-related pain control  - Follow neurosurgery recs   -Hemovac has been removed   -F/U outpatient in 2 weeks  - Updated patient's oncologist on 1/1/25 regarding findings and treatment     Cancer related pain- (present on admission)  Assessment & Plan  Lidocaine patch, Norco 5-325 mg q4 hours prn, IV fentanyl 25 mcg q4 h for break through pain. Norco and fentanyl at request of patient as she could not tolerate oxycodone and dilaudid previously.   Plan:   - Pt agreeable to starting MS contin 15 mg BID, continue IV fentanyl 25 mcg for break through pain and norco q4 hours  prn for moderate pain     Bilateral pleural effusion- (present on admission)  Assessment & Plan  Bilateral moderate sized pleural effusions seen on admission CT. Hx of same on prior admission in September 2024. At that time, IR was consulted for thoracentesis and this could not be completed due to loculations. Pulmonology was then consulted, and they noted her hypoxia may be in part due to shunting from her liver mass.  Patient reports subsequently she went to [facility] who performed thoracentesis on the right.  Patient reports thoracentesis was consistent with malignant effusion though records not available.  Pt on 2 L NC at discharge in 09/2024, requiring 3 L NC to maintain saturations on admission which is stable to slightly improving.   - Continue O2 supplementation, goal sats >90%.   - S/p thoracentesis 12/27 and therapeutic thoracentesis 1/2, Fluid results: Metastatic carcinoma consistent with breast origin ER: Negative, <1% IA: Negative, <1%   - Fluid cultures show moderate WBC but no organisms, fungal cultures also negative      Acute on chronic hypoxic respiratory failure (HCC)- (present on admission)  Assessment & Plan  Likely related to bilateral pleural effusion. Effusion fluid with malignant fluid. In addition, see by pulmonology at last admission and thought to be component of hepatic shunting. Patient with new bilateral upper lobe infiltrates on CTAP. Without clinical pneumonia and procal negative. Troponin peaked. Pt underwent left thoracentesis on 1/2.   -CTM, titrate oxygen to goal  -Home O2 eval before discharge if pt is still requiring oxygen    Hypertension- (present on admission)  Assessment & Plan  Pt had hypotension 1/2 that required fluid resuscitation. BP has improved, but still low at 109/55.   Plan:   - Hold home BP medication for now while BP stabilizes    Diabetes mellitus, type 2 (HCC)- (present on admission)  Assessment & Plan  Diet controlled. Last A1c 7.1 in September 2024.  Not on medications currently.   - Trend blood glucose on chem panel and provide correction as needed           Core Measures:  Fluids: Oral hydration  Lines: PIV  Abx: None  Diet: Regular with Ensure supplementation  PPX: Heparin    DISPO: In patient for management of cancer related pain, hypoxia and thoracic compression fracture s/p laminectomy. Pt not medically cleared for discharge.       CODE STATUS: Full Code    Sandra Long D.O.  PGY-1  [facility]

## 2025-01-03 NOTE — NON-PROVIDER
[facility] PROGRESS NOTE - STUDENT NOTE     Attending:   Eduardo Muniz MD    Senior Resident:  Griselda Shaver MD    PATIENT:   Asha Hannon; [id number]; 1960    CC: 64 y.o. female with past medical history of metastatic breast cancer admitted for acute hypoxic respiratory failure and worsening weakness and pain. Found to have pathologic fracture of the thoracic spine and spinal compression.       HOSPITAL COURSE: Pt admitted 12/25 for acute respiratory failure 2/2 bilateral pleural effusions and was found to have worsening pain and weakness 2/2 spinal metastases of malignant breast cancer.   12/27 Pt deferred goals of care discussion with palliative pending MRI results and discussion with oncologist, is OK with palliative following up with her  12/28 Had thoracentesis done for SOB due to b/l pleural effusions, pt stated breathing better. Previously underwent Lasix diuresis for pleural effusions in past hospitalizations and did not feel it helped, prefers thoracentesis.  12/29 Pt had assisted fall to floor following sedation without new pain, no head trauma.  12/30 Pt had unassisted fall w/ family, no neurological changes or trauma noted.  1/1 S/p laminectomy and fusion for compression fractures 2/2 bony metastasis. Surgeon noted great decompression obtained without issues.  1/2 Pt had increased SOB, CXR showed worsening pleural effusions. US thoracentesis ordered for symptomatic relief. Pt condition improved however CXR also showed R pneumothorax, pt being followed by IR.       INTERVAL UPDATES  No acute events overnight, pt reports continued SOB, discomfort, and breakthrough pain today. Pt was upset last night as she had an episode of 9/10 pain and was given Dilaudid after discussion yesterday about choosing PO [place] and d/c Dilaudid from her med list. She feels nauseous today and is reporting epigastric pain that she attributes to the Dilaudid dose. Pt reports that Ativan given for anxiety  during SOB episode yesterday was helpful. She also would like any medication that could assist with sleep as her discomfort is in part due to sleep positioning s/p laminectomy, decompression. Pt feels overall that her SOB is better following thoracentesis.        Review of Systems   Constitutional:  Positive for malaise/fatigue. Negative for diaphoresis and fever.   Respiratory:  Positive for shortness of breath. Negative for wheezing.    Gastrointestinal:  Positive for abdominal pain, heartburn and nausea. Negative for vomiting.   Genitourinary:  Negative for dysuria, hematuria and urgency.   Neurological:  Positive for dizziness and weakness.   ROS was limited by pt clinical status and issues communicating.      OBJECTIVE:  Vitals:    01/02/25 1643 01/02/25 1956 01/02/25 2322 01/03/25 0349   BP: 92/45 105/50 104/39 105/51   Pulse: 75 75 79 90   Resp: 16 16 16 16   Temp: 36.7 °C (98.1 °F) 36.6 °C (97.9 °F) 37.1 °C (98.8 °F) 36.6 °C (97.8 °F)   TempSrc: Oral Oral Oral Temporal   SpO2: 94% 96% 96% 95%   Weight:       Height:           Intake/Output Summary (Last 24 hours) at 1/2/2025 0641  Last data filed at 1/2/2025 0415  Gross per 24 hour   Intake 400 ml   Output 800 ml   Net -400 ml       PHYSICAL EXAM:   General: Pt appears uncomfortable, pt is sitting up in bed but moves frequently to seek more comfortable position and talks with eyes closed. Afebrile.  HEENT: NC/AT.   Cardiovascular: RRR  Respiratory: CTAB, could not appreciate abnormal breath sounds but exam limited in part due to patient's bandages s/p spinal surgery and positioning  Abdomen: Declined  EXT:  ORELLANA      LABS:  Recent Labs     01/02/25  0150 01/03/25  0014   WBC 6.7 6.7   RBC 3.55* 3.19*   HEMOGLOBIN 10.2* 9.2*   HEMATOCRIT 32.0* 28.8*   MCV 90.1 90.3   MCH 28.7 28.8   RDW 58.2* 58.7*   PLATELETCT 287 302   MPV 9.3 9.2     Recent Labs     01/02/25  0150 01/03/25  0014   SODIUM 136 134*   POTASSIUM 4.7 4.5   CHLORIDE 98 98   CO2 28 29   BUN 14 20    CREATININE 0.77 0.83   CALCIUM 8.9 8.3*   PHOSPHORUS 4.2  --    ALBUMIN 3.2  --      Estimated GFR/CRCL = Estimated Creatinine Clearance: 82.7 mL/min (by C-G formula based on SCr of 0.83 mg/dL).  Recent Labs     01/02/25  0150 01/03/25  0014   GLUCOSE 237* 138*     Recent Labs     01/01/25  0737   INR 0.96             Recent Labs     01/01/25  0737   INR 0.96       IMAGING:   DX-CHEST-PORTABLE (1 VIEW)   Final Result         1. Slightly decreased right pneumothorax.   2. Stable bilateral interstitial and airspace opacification.   3. Stable right pleural effusion.      DX-CHEST-PORTABLE (1 VIEW)   Final Result         1. Left pleural effusion has decreased in size status post thoracentesis. There is no left-sided pneumothorax.   2. There is persistent right basilar opacity with a new right apical pneumothorax.      Findings were communicated to the ordering provider at the time of dictation via Voalte.      US-THORACENTESIS PUNCTURE LEFT   Final Result      1. Ultrasound guided left sided therapeutic thoracentesis.      2. 950 mL of fluid withdrawn.      DX-CHEST-PORTABLE (1 VIEW)   Final Result      1. New postsurgical changes in the upper thoracic spine.   2. Worsening bilateral pleural effusions, pulmonary vascular congestion and interstitial edema.      DX-PORTABLE FLUORO > 1 HOUR   Final Result      Portable fluoroscopy utilized for 1 minute 19 seconds.      INTERPRETING LOCATION: [address] [facility], [address]      DX-THORACIC SPINE-2 VIEWS   Final Result      Digitized intraoperative radiograph is submitted for review. This examination is not for diagnostic purpose but for guidance during a surgical procedure. Please see the patient's chart for full procedural details.         INTERPRETING LOCATION: [address] , [place], [address]      MR-CERVICAL SPINE-WITH & W/O   Final Result         1.  No evidence of metastatic disease to the cervical spine.      MR-LUMBAR SPINE-WITH & W/O   Final Result   Impression:       Multiple bony metastatic lesions as described above, most prominent along the inferior endplate of L1.      Degenerative moderate canal stenosis at L4-5 with cauda equina compression. Epidural thickening and enhancement is noted in this region with severe bilateral facet degeneration and enhancement along the bilateral facet joints. This is felt to be    degenerative in origin.      MR-THORACIC SPINE-WITH & W/O   Final Result         Multiple metastatic lesions of the thoracic spine as described above.      Pathologic compression fracture at T5 with 50% loss of height and posterior cortical retropulsion with circumferential epidural thickening representing epidural extension of metastatic disease. This causes severe canal stenosis with cord compression.      Metastatic disease also involves the posterior elements and spinous processes at T4, T5.      Pre and paravertebral metastatic soft tissue infiltration at T4-T5 noted.         MR-BRAIN-WITH & W/O   Final Result         Multiple nodular enhancing metastatic lesions involving the cranium as described above.      The largest lesion is in the right posterior temporal/parietal occipital region and demonstrates inner table cortical breakthrough with epidural extension and adjacent mild dural thickening and enhancement.      US-THORACENTESIS PUNCTURE RIGHT   Final Result   Addendum (preliminary) 1 of 1 12/27/2024 10:59 AM      HISTORY/REASON FOR EXAM:  Shortness of breath         TECHNIQUE/EXAM DESCRIPTION:   Ultrasound-guided thoracentesis.      Indication:  RIGHT pleural fluid collection.      COMPARISON:  None-however patient states history of right-sided    thoracentesis September 2024.      PROCEDURE:     Informed consent was obtained. A timeout was taken. A right    pleural effusion was localized with real-time ultrasound guidance. The    right posterior chest wall was prepped and draped in a sterile manner.    Following local anesthesia with 1%     lidocaine, and under live ultrasound guidance a 5 [place] Yueh pigtail    catheter was advanced into the pleural space with trocar technique and    pleural fluid was drained. The patient tolerated the procedure well    without evidence of complication. A post    thoracentesis chest radiograph is forthcoming.      FINDINGS:      Fluid was sent to the laboratory.      Fluid character: serosanguinous         1. Ultrasound guided right sided therapeutic and diagnostic thoracentesis.      2. 1000 mL of fluid withdrawn.      Final      1. Ultrasound guided right sided therapeutic and diagnostic thoracentesis.      2. 1000 mL of fluid withdrawn.      DX-CHEST-PORTABLE (1 VIEW)   Final Result      1.  Small persistent right pleural effusion which is decreased since recent thoracentesis.      2.  Small to moderate left pleural effusion.      3.  Bilateral perihilar and left lower lobe atelectasis and/or pneumonitis.      4.  Increased interstitial markings throughout the lung fields consistent with parenchymal scarring, and/or pulmonary edema.      CT-LSPINE W/O PLUS RECONS   Final Result         1. Diffuse metastatic disease.   2. No definite pathologic fracture.      CT-TSPINE W/O PLUS RECONS   Final Result         1. Extensive metastatic disease.   2. There is new pathologic compression fracture deformity of T5.      CT-CHEST,ABDOMEN,PELVIS WITH   Final Result         1. New bilateral upper lobe nodular infiltrates.   2. Moderate-sized bilateral pleural effusions.   3. Interval worsening of hepatic metastatic disease.   4. New abdominal lymphadenopathy. Thoracic adenopathy is again noted.   5. Diffuse osseous metastatic disease. There is a new compression fracture of T5 as reported on the prior spine CT.      DX-CHEST-PORTABLE (1 VIEW)   Final Result      1.  Stable bibasilar atelectasis/consolidation and small bilateral pleural effusions.      2.  Stable cardiomegaly and interstitial edema.          CULTURES:   Results        Procedure Component Value Units Date/Time    Fungal Culture [[id number]] Collected: 12/27/24 1215    Order Status: Completed Specimen: Body Fluid Updated: 12/31/24 0949     Significant Indicator NEG     Source BF     Site Thoracentesis Fluid     Culture Result No fungal growth to date.     Fungal Smear Results No fungal elements seen.    FLUID CULTURE W/GRAM STAIN [[id number]] Collected: 12/27/24 1215    Order Status: Completed Specimen: Body Fluid Updated: 12/31/24 0949     Significant Indicator NEG     Source BF     Site Thoracentesis Fluid     Culture Result No growth at 72 hours.     Gram Stain Result Moderate WBCs.  No organisms seen.      AFB Culture [[id number]] Collected: 12/27/24 1215    Order Status: Completed Specimen: Body Fluid Updated: 12/31/24 0949     Significant Indicator NEG     Source BF     Site Thoracentesis Fluid     Culture Result Culture in progress.     AFB Smear Results No acid fast bacilli seen.    Fungal Smear [[id number]] Collected: 12/27/24 1215    Order Status: Completed Specimen: Body Fluid Updated: 12/28/24 1736     Significant Indicator NEG     Source BF     Site Thoracentesis Fluid     Fungal Smear Results No fungal elements seen.    GRAM STAIN [[id number]] Collected: 12/27/24 1215    Order Status: Completed Specimen: Body Fluid Updated: 12/28/24 1736     Significant Indicator .     Source BF     Site Thoracentesis Fluid     Gram Stain Result Moderate WBCs.  No organisms seen.      Acid Fast Stain [[id number]] Collected: 12/27/24 1215    Order Status: Completed Specimen: Body Fluid Updated: 12/28/24 1736     Significant Indicator NEG     Source BF     Site Thoracentesis Fluid     AFB Smear Results No acid fast bacilli seen.            MEDS:  Current Facility-Administered Medications   Medication Last Admin    melatonin tablet 5 mg 5 mg at 01/02/25 2021    LORazepam (Ativan) tablet 0.25 mg 0.25 mg at 01/02/25 1417    metoclopramide (Reglan) tablet 10 mg 10 mg at 01/03/25 6124     benzocaine-menthol (Cepacol) lozenge 1 Lozenge      sore throat spray (Chloraseptic) 1 Spray 1 Spray at 01/02/25 0049    sodium chloride (Ocean) 0.65 % nasal spray 2 Spray 2 Spray at 01/01/25 0600    morphine ER (Ms Contin) tablet 15 mg 15 mg at 01/03/25 0454    MD ALERT...DO NOT ADMINISTER NSAIDS or ASPIRIN unless ORDERED By Neurosurgery 1 Each      docusate sodium (Colace) capsule 100 mg 100 mg at 01/02/25 0542    senna-docusate (Pericolace Or Senokot S) 8.6-50 MG per tablet 1 Tablet 1 Tablet at 01/02/25 2022    senna-docusate (Pericolace Or Senokot S) 8.6-50 MG per tablet 1 Tablet      polyethylene glycol/lytes (Miralax) Packet 1 Packet      magnesium hydroxide (Milk Of Magnesia) suspension 30 mL      bisacodyl (Dulcolax) suppository 10 mg      heparin injection 5,000 Units 5,000 Units at 01/03/25 0503    ketorolac (Toradol) 15 MG/ML injection 15 mg 15 mg at 01/03/25 0600    Followed by    [START ON 1/4/2025] ibuprofen (Motrin) tablet 800 mg      oxyCODONE immediate-release (Roxicodone) tablet 5 mg 5 mg at 01/02/25 2021    Or    oxyCODONE immediate release (Roxicodone) tablet 10 mg 10 mg at 01/03/25 0453    Or    HYDROmorphone (Dilaudid) injection 0.5 mg 0.5 mg at 01/02/25 2210    methocarbamol (Robaxin) tablet 750 mg 750 mg at 01/02/25 2027    acetaminophen (Tylenol) tablet 1,000 mg      Followed by    [START ON 1/7/2025] acetaminophen (Tylenol) tablet 1,000 mg      magnesium oxide tablet 400 mg 400 mg at 01/02/25 0543    ROPINIRole (Requip) tablet 1 mg 1 mg at 01/01/25 0835    calcium carbonate (Tums) chewable tab 500 mg 500 mg at 01/03/25 0137    ondansetron (Zofran) syringe/vial injection 4 mg 4 mg at 01/02/25 0145    ondansetron (Zofran ODT) dispertab 4 mg 4 mg at 01/02/25 2027    [Held by provider] carvedilol (Coreg) tablet 3.125 mg 3.125 mg at 01/02/25 0543    [Held by provider] losartan (Cozaar) tablet 100 mg 50 mg at 01/02/25 0542    lidocaine (Asperflex) 4 % patch 1 Patch 1 Patch at 01/01/25 1147     ferrous sulfate tablet 325 mg 325 mg at 12/29/24 0717    ROPINIRole (Requip) tablet 1 mg 1 mg at 12/31/24 2232    fentaNYL (Sublimaze) injection 25 mcg 25 mcg at 01/03/25 0617     Facility-Administered Medications Ordered in Other Encounters   Medication Last Admin    HYDROmorphone (Dilaudid) injection 2 mg at 01/01/25 1615       ASSESSMENT/PLAN:   64 y.o. female with PMHx of metastatic breast cancer admitted for acute hypoxic respiratory failure 2/2 b/l pleural effusions and worsening weakness and pain. Found to have pathologic fracture of the thoracic spine and spinal compression. Decompressive laminectomy and fixation performed 1/1/25 with no complications.    * Hypotension  Assessment & Plan  Pt was found to be hypotensive to 74/32 yesterday 1/2. Hypotension likely 2/2 low volume status following NPO/surgery/antihypertensives/opioid pain medications. Today pt BP is more stable, last measured 115/61  Plan:   - 1L, 500cc bolus given yesterday with BP improving to 97/49  - Continue to monitor BP  - Continue to hold antihypertensives (carvelidol, losartan)  - See cancer-related pain below, avoiding IV fentanyl and favoring PO options  - Encourage oral intake to prevent further volume depletion/increased risk of hypotension from opioids in context of hypovolemia      * Malignant neoplasm of breast (HCC)- (present on admission)  Assessment & Plan  Extensive metastatic disease to lungs, liver, brain, and bones. Fungating left breast mass. Followed by Dr. Tabor, Oncology, and Dr. Velez, Rad Onc. Pt has declined surgical management in the past, recently on oral chemotherapy. Wound care has signed off. Dr. Tabor consulted, recommends patient follow in 1 week for discussion of different cancer treatment regimen, possible further radiation if recommended by Rad Onc.   - Full spine/brain MRI, completed 12/31, multiple metastatic brain lesions, pathologic compression fracture of T5 with severe canal stenosis  with cord compression. Moderate canal stenosis at L4-L5 with cauda equina compression prior to laminectomy and decompression on 1/1/24  - See cancer related pain below for pain management  - Palliative care consulted, patient deferring GOC discussion until after consultation with oncolgist  - Re consulted oncology regarding new imaging findings, awaiting recommendation     Acute on chronic hypoxic respiratory failure (HCC)- (present on admission)  Assessment & Plan  Likely related to bilateral pleural effusion. Effusion fluid with malignant fluid. In addition, seen by pulmonology at last admission and thought to be component of hepatic shunting. CTAP showed new bilateral upper lobe infiltrates without pneumonia, procal negative. Troponin peaked.   - CTM, titrate oxygen to goal. As of 1/3 pt is on 4L NC, above home baseline  - Thoracentesis performed 1/2/25 for worsening pleural effusion  - Consider repeat US thoracentesis prior to discharge for therapeutic relief    - Home O2 eval before discharge for oxygen needs    Compression fracture of T5 vertebra (HCC)- (present on admission)  Assessment & Plan  Noted on CT imaging, pathologic fracture secondary to cancer. Full spine/brain MRI completed 12/31: multiple metastatic brain lesions, pathologic compression fracture of T5 with severe canal stenosis with cord compression. Moderate canal stenosis at L4-L5 with cauda equina compression   - Calcitonin for compression-fracture-related pain control  - S/p decompression and laminectomy 1/1 with no complications  - Reconsulted oncology given new MRI findings     Weakness- (present on admission)  Assessment & Plan  Pt reporting progressive weakness since discharge in September, now becoming weak even with ambulation to the restroom. Did not require assistive devices previously. Pt requesting bedside commode due to difficulty with frequent ambulation to restroom  - Per patient, neurosurgery is requesting rehab placement after  surgery.   - Consult PM&R   - Family is supportive in ambulating and promoting use of incentive spirometer and movement as able.      Cancer related pain- (present on admission)  Assessment & Plan  Pt has extensive disease and metastases to brain, liver, and bones.   Plan:  - Lidocaine patch, Norco 5-325 mg q4 hours prn, PO hydrocodone for break through pain. Norco at request of patient as she could not tolerate oxycodone and dilaudid previously due to GI upset.   - Continue MS contin 15 mg BID     - Pt is agreeable to continue trial of PO hydrocodone If she does not tolerate PO meds, can consider switching back to previous regimen of IV Fentanyl for breakthrough pain  - D/c Dilaudid due to adverse effects      Bilateral pleural effusion- (present on admission)  Assessment & Plan  Bilateral moderate sized pleural effusions seen on admission CT. Hx of same on prior admission in September 2024. At that time, IR was consulted for thoracentesis and this could not be completed due to loculations. Pulmonology was then consulted, and they noted her hypoxia may be in part due to shunting from her liver mass.  Patient reports subsequently she went to [facility] who performed thoracentesis on the right.  Patient reports thoracentesis was consistent with malignant effusion though records not available.  Pt on 2 L NC at discharge in 09/2024, now requiring 4L NC post-operatively 1/2 from 3L on admission. Pleural effusions worsened 1/2/25 resulting in SOB for which repeat thoracentesis was performed.  - Continue O2 supplementation, goal sats >90%.   - S/p thoracentesis 12/27 and 12/2; 12/27 Fluid results: Metastatic carcinoma consistent with breast origin ER: Negative, <1% SC: Negative, <1%   - Fluid cultures show moderate WBC but no organisms, fungal cultures also negative  - Consider repeat US thoracentesis prior to discharge for therapeutic relief   CXR on 1/3 shows stable bilateral pleural effusions    "  Pneumothorax  Assessment & Plan  Pt was found to have pneumothorax following US thoracentesis on 1/2/25. IR is aware of this and is following.   - Monitor for now and consult with IR, as of 1/3/25 pneumothorax appears stable and not requiring further intervention    Epigastric pain  Assessment & Plan  Pt is having epigastric \"burning\" abdominal pain intermittently. She states she gets this pain when she doesn't eat and has had limited PO intake post-surgically. She is not on a PPI at home.   - GI cocktail administered with relief of symptoms when MRI was obtained, consider repeat GI cocktail if no improvement with PPI  - Start omeprazole 40mg daily  - metoclopramide, ondansetron PRN   - Consider phenergen to manage epigastric pain with benefit of anxiolytic/sedating properties if no reponse with PPI, for now trial of omeprazole     Restless leg syndrome- (present on admission)  Assessment & Plan  Patient reports a history of restless leg syndrome well-controlled on ropinirole although she does not know the dosage. Has iron deficiency anemia. RLS possibly due to compression of spinal cord.   - Ropinirole 1 mg nightly and will titrate up  - Iron supplementation ordered     Constipation- (present on admission)  Assessment & Plan  Chronic.  Patient reports it is worse with her chronic opioid use.  - Bowel regimen scheduled     Hypertension- (present on admission)  Assessment & Plan  Hold home hypertensive medications for now given hypotension in setting of increased opioid usage, minimal PO intake and volume depletion, and acute illness. See hypotension above.  - Reassess need for hypertensives upon d/c      Insomnia   Assessment & Plan  Likely 2/2 post-operative and cancer-related pain with secondary contributing factors such as epigastric pain and weakness. Pt reports that melatonin has helped her at home when she cannot sleep.  -  Melatonin 5 mg PM dose  - Consider anxiolytic or phenergen (see epigastric pain " above) to treat insomnia/epigastric pain or possible anxiety; further evaluation of anxiety symptoms may be indicated if continued distress and insomnia after pain levels and SOB are better controlled, pt felt Ativan was helpful during SOB episode but that she was not continuously anxious    Diabetes mellitus, type 2 (HCC)- (present on admission)  Assessment & Plan  Diet controlled. Last A1c 7.1 in September 2024. Not on medications currently.   - Trend blood glucose on chem panel and provide correction as needed  - Elevated glucose 1/2 likely 2/2 s/p laminectomy, decompression     Core Measures:   Fluids: NS   Lines: PIV  Abx: None  Diet: Regular  PPX: Heparin  Code Status: Full Code    Disposition: Continued inpatient management of acute respiratory failure and metastatic breast cancer c/b pathologic thoracic spine fracture s/p laminectomy, decompression.     Meenu Burnett, Medical Student   [facility]

## 2025-01-03 NOTE — PROGRESS NOTES
Neurosurgery Progress Note    Subjective:  63 yo female with known h/o bilateral breast cancer since 2019 but apparently had delay in treatment for various reasons until .  Has completed some hormone therapy and radiation per patient.  Presented with worsening thoracic spine pain and notes some right thigh numbness.  CT's of the thoracic and lumbar spine show diffuse spinal mets with T5 pathologic fx and CT CAP shows new upper lung infiltrates with bilateral pleural effusions with worsening hepatic mets and new abdominal lymphadenopathy in addition to known thoracic adenopathy.  Denies saddle anesthesia or loss of control over B&B.    POD#2 T4-6 laminectomy and perc fusion    Path pending  Drain out  C/o increased back pain today  +flatus no BM      Exam:  Calm and cooperative with examination.  Speech eloquent and clear.  A&O x3  CN 2-12 grossly intact.  PERRL, EOMI  Motor 4/5 bilateral EHL's remainder 5/5.  Bilateral UE and LE sensory exam with decreased right L3 sensory only.    BP  Min: 92/45  Max: 109/55  Pulse  Av  Min: 75  Max: 91  Resp  Av  Min: 16  Max: 16  Temp  Av.9 °C (98.4 °F)  Min: 36.6 °C (97.8 °F)  Max: 37.3 °C (99.2 °F)  SpO2  Av %  Min: 94 %  Max: 96 %    No data recorded    Recent Labs     25  0150 25  0014   WBC 6.7 6.7   RBC 3.55* 3.19*   HEMOGLOBIN 10.2* 9.2*   HEMATOCRIT 32.0* 28.8*   MCV 90.1 90.3   MCH 28.7 28.8   MCHC 31.9* 31.9*   RDW 58.2* 58.7*   PLATELETCT 287 302   MPV 9.3 9.2     Recent Labs     25  0150 25  0014   SODIUM 136 134*   POTASSIUM 4.7 4.5   CHLORIDE 98 98   CO2 28 29   GLUCOSE 237* 138*   BUN 14 20   CREATININE 0.77 0.83   CALCIUM 8.9 8.3*     Recent Labs     25  0737   INR 0.96           Intake/Output                         25 0700 - 25 0659 25 07 - 25 0659     3671-3705 7909-7709 Total 5705-8848 Total                 Intake    P.O.  --  400 400  --  -- --    P.O. -- 400 400 -- --  --    Total Intake -- 400 400 -- -- --       Output    Urine  150  175 325  --  -- --    Number of Times Voided -- 3 x 3 x -- -- --    Urine Void (mL) -- 175 175 -- -- --    Output (mL) ([REMOVED] Urethral Catheter Non-latex 16 Fr. 01/02/25 1900) 150 -- 150 -- -- --    Total Output 150 175 325 -- -- --       Net I/O     -150 225 75 -- -- --              Intake/Output Summary (Last 24 hours) at 1/3/2025 1050  Last data filed at 1/3/2025 0349  Gross per 24 hour   Intake 400 ml   Output 325 ml   Net 75 ml       $ Bladder Scan Results (mL): 164     omeprazole  20 mg DAILY    HYDROcodone-acetaminophen  2 Tablet Q6HRS PRN    bisacodyl  10 mg DAILY    melatonin  5 mg HS PRN    LORazepam  0.25 mg Q4HRS PRN    metoclopramide  10 mg Q6HRS    benzocaine-menthol  1 Lozenge Q2HRS PRN    sore throat spray  1 Spray Q2HRS PRN    sodium chloride  2 Spray Q2HRS PRN    morphine ER  15 mg Q12HRS    MD ALERT...DO NOT ADMINISTER NSAIDS or ASPIRIN unless ORDERED By Neurosurgery  1 Each PRN    docusate sodium  100 mg BID    senna-docusate  1 Tablet Nightly    senna-docusate  1 Tablet Q24HRS PRN    polyethylene glycol/lytes  1 Packet BID PRN    magnesium hydroxide  30 mL QDAY PRN    bisacodyl  10 mg Q24HRS PRN    heparin  5,000 Units Q8HRS    ketorolac  15 mg Q6HRS    Followed by    [START ON 1/4/2025] ibuprofen  800 mg TID PRN    methocarbamol  750 mg Q8HRS PRN    acetaminophen  1,000 mg Q6HRS    Followed by    [START ON 1/7/2025] acetaminophen  1,000 mg Q6HRS PRN    magnesium oxide  400 mg DAILY    ROPINIRole  1 mg BID PRN    calcium carbonate  500 mg TID PRN    ondansetron  4 mg Q4HRS PRN    ondansetron  4 mg Q4HRS PRN    [Held by provider] carvedilol  3.125 mg BID    [Held by provider] losartan  100 mg Q DAY    lidocaine  1 Patch Q24HR    ferrous sulfate  325 mg QDAY with Breakfast    ROPINIRole  1 mg QHS    fentaNYL  25 mcg Q HOUR PRN       Assessment and Plan:  Hospital day # 9  POD#2  Known breast cancer with worsening spinal mets  and other mets.  Path pending  Hemovac out  Continue medical care.  Staples out in 2 weeks  No further NS interventions planned.  Please call with any questions.  F/u with Dr. Lowery in 2 weeks, [phone]    Chemical prophylactic DVT therapy:yes Lovenox 40mg QD  Start date/time: 1/2

## 2025-01-03 NOTE — DISCHARGE PLANNING
943  DPA sent referral to [facility] SNF's. Per LMKENDRA Harp.

## 2025-01-03 NOTE — CARE PLAN
The patient is Stable - Low risk of patient condition declining or worsening    Shift Goals  Clinical Goals: patient remains free from falls  Patient Goals: bed alarm off  Family Goals: maria ines    Progress made toward(s) clinical / shift goals: remain free from falls T/O shift.    Patient is not progressing towards the following goals:

## 2025-01-03 NOTE — TELEPHONE ENCOUNTER
VOICEMAIL  1. Caller Name: [facility]                      Call Back Number: [phone]    2. Message: Osmar from [facility] called in regards to this patient's home health orders. They are asking if you can sign the home health orders for this patient once she is admitted to their facility. Please advise    3. Patient approves office to leave a detailed voicemail/MyChart message: N\A

## 2025-01-04 ENCOUNTER — APPOINTMENT (OUTPATIENT)
Dept: RADIOLOGY | Facility: MEDICAL CENTER | Age: 65
End: 2025-01-04
Attending: INTERNAL MEDICINE
Payer: MEDICARE

## 2025-01-04 PROBLEM — A41.9 SEPSIS (HCC): Status: ACTIVE | Noted: 2025-01-04

## 2025-01-04 PROBLEM — R65.21 SEPTIC SHOCK (HCC): Status: ACTIVE | Noted: 2025-01-04

## 2025-01-04 PROBLEM — J96.22 ACUTE ON CHRONIC RESPIRATORY FAILURE WITH HYPOXIA AND HYPERCAPNIA (HCC): Status: ACTIVE | Noted: 2025-01-03

## 2025-01-04 PROBLEM — J96.21 ACUTE ON CHRONIC RESPIRATORY FAILURE WITH HYPOXIA AND HYPERCAPNIA (HCC): Status: ACTIVE | Noted: 2025-01-03

## 2025-01-04 PROBLEM — M84.48XA PATHOLOGICAL FRACTURE OF VERTEBRA: Status: ACTIVE | Noted: 2024-12-26

## 2025-01-04 PROBLEM — J98.11 ATELECTASIS: Status: ACTIVE | Noted: 2025-01-04

## 2025-01-04 LAB
ALBUMIN SERPL BCP-MCNC: 3.4 G/DL (ref 3.2–4.9)
ALBUMIN/GLOB SERPL: 1.1 G/DL
ALP SERPL-CCNC: 121 U/L (ref 30–99)
ALT SERPL-CCNC: 72 U/L (ref 2–50)
ANION GAP SERPL CALC-SCNC: 7 MMOL/L (ref 7–16)
APPEARANCE UR: CLEAR
ARTERIAL PATENCY WRIST A: ABNORMAL
AST SERPL-CCNC: 216 U/L (ref 12–45)
BACTERIA #/AREA URNS HPF: ABNORMAL /HPF
BASE EXCESS BLDA CALC-SCNC: 1 MMOL/L (ref -4–3)
BASE EXCESS BLDA CALC-SCNC: 2 MMOL/L (ref -4–3)
BASE EXCESS BLDA CALC-SCNC: 4 MMOL/L (ref -4–3)
BASOPHILS # BLD AUTO: 0.5 % (ref 0–1.8)
BASOPHILS # BLD: 0.06 K/UL (ref 0–0.12)
BILIRUB SERPL-MCNC: 0.6 MG/DL (ref 0.1–1.5)
BILIRUB UR QL STRIP.AUTO: NEGATIVE
BODY TEMPERATURE: 37.2 CENTIGRADE
BODY TEMPERATURE: ABNORMAL DEGREES
BODY TEMPERATURE: ABNORMAL DEGREES
BREATHS SETTING VENT: 18
BUN SERPL-MCNC: 15 MG/DL (ref 8–22)
CALCIUM ALBUM COR SERPL-MCNC: 9.8 MG/DL (ref 8.5–10.5)
CALCIUM SERPL-MCNC: 9.3 MG/DL (ref 8.5–10.5)
CASTS URNS QL MICRO: >20 /LPF (ref 0–2)
CHLORIDE SERPL-SCNC: 99 MMOL/L (ref 96–112)
CO2 BLDA-SCNC: 31 MMOL/L (ref 32–48)
CO2 BLDA-SCNC: 38 MMOL/L (ref 32–48)
CO2 SERPL-SCNC: 30 MMOL/L (ref 20–33)
COLOR UR: ABNORMAL
CREAT SERPL-MCNC: 0.61 MG/DL (ref 0.5–1.4)
CYTOLOGY REG CYTOL: NORMAL
DELSYS IDSYS: ABNORMAL
DELSYS IDSYS: ABNORMAL
EKG IMPRESSION: NORMAL
EOSINOPHIL # BLD AUTO: 0 K/UL (ref 0–0.51)
EOSINOPHIL NFR BLD: 0 % (ref 0–6.9)
EPITHELIAL CELLS 1715: ABNORMAL /HPF (ref 0–5)
ERYTHROCYTE [DISTWIDTH] IN BLOOD BY AUTOMATED COUNT: 59 FL (ref 35.9–50)
FUNGUS SPEC FUNGUS STN: NORMAL
GFR SERPLBLD CREATININE-BSD FMLA CKD-EPI: 99 ML/MIN/1.73 M 2
GLOBULIN SER CALC-MCNC: 3.2 G/DL (ref 1.9–3.5)
GLUCOSE BLD STRIP.AUTO-MCNC: 105 MG/DL (ref 65–99)
GLUCOSE BLD STRIP.AUTO-MCNC: 114 MG/DL (ref 65–99)
GLUCOSE BLD STRIP.AUTO-MCNC: 118 MG/DL (ref 65–99)
GLUCOSE BLD STRIP.AUTO-MCNC: 162 MG/DL (ref 65–99)
GLUCOSE BLD STRIP.AUTO-MCNC: 192 MG/DL (ref 65–99)
GLUCOSE SERPL-MCNC: 174 MG/DL (ref 65–99)
GLUCOSE UR STRIP.AUTO-MCNC: NEGATIVE MG/DL
GRAM STN SPEC: NORMAL
HCO3 BLDA-SCNC: 29.2 MMOL/L (ref 21–28)
HCO3 BLDA-SCNC: 31 MMOL/L (ref 21–28)
HCO3 BLDA-SCNC: 34.7 MMOL/L (ref 21–28)
HCT VFR BLD AUTO: 32.7 % (ref 37–47)
HGB BLD-MCNC: 10.1 G/DL (ref 12–16)
HOROWITZ INDEX BLDA+IHG-RTO: 253 MM[HG]
HYALINE CAST   1831: PRESENT /LPF
IMM GRANULOCYTES # BLD AUTO: 0.12 K/UL (ref 0–0.11)
IMM GRANULOCYTES NFR BLD AUTO: 0.9 % (ref 0–0.9)
INHALED O2 FLOW RATE: 6 L/MIN
KETONES UR STRIP.AUTO-MCNC: 40 MG/DL
LACTATE BLD-SCNC: 1.2 MMOL/L (ref 0.5–2)
LACTATE BLD-SCNC: 1.3 MMOL/L (ref 0.5–2)
LACTATE SERPL-SCNC: 1.4 MMOL/L (ref 0.5–2)
LACTATE SERPL-SCNC: 1.7 MMOL/L (ref 0.5–2)
LACTATE SERPL-SCNC: 1.7 MMOL/L (ref 0.5–2)
LEUKOCYTE ESTERASE UR QL STRIP.AUTO: ABNORMAL
LPM ILPM: 15 LPM
LYMPHOCYTES # BLD AUTO: 1.24 K/UL (ref 1–4.8)
LYMPHOCYTES NFR BLD: 9.5 % (ref 22–41)
MAGNESIUM SERPL-MCNC: 1.9 MG/DL (ref 1.5–2.5)
MCH RBC QN AUTO: 28.5 PG (ref 27–33)
MCHC RBC AUTO-ENTMCNC: 30.9 G/DL (ref 32.2–35.5)
MCV RBC AUTO: 92.4 FL (ref 81.4–97.8)
MICRO URNS: ABNORMAL
MODE IMODE: ABNORMAL
MONOCYTES # BLD AUTO: 2.05 K/UL (ref 0–0.85)
MONOCYTES NFR BLD AUTO: 15.7 % (ref 0–13.4)
MYCOBACTERIUM SPEC CULT: NORMAL
NEUTROPHILS # BLD AUTO: 9.55 K/UL (ref 1.82–7.42)
NEUTROPHILS NFR BLD: 73.4 % (ref 44–72)
NITRITE UR QL STRIP.AUTO: NEGATIVE
NRBC # BLD AUTO: 0 K/UL
NRBC BLD-RTO: 0 /100 WBC (ref 0–0.2)
O2/TOTAL GAS SETTING VFR VENT: 100 %
PCO2 BLDA: 48.4 MMHG (ref 32–48)
PCO2 BLDA: 72.3 MMHG (ref 32–48)
PCO2 BLDA: >100 MMHG (ref 32–48)
PCO2 TEMP ADJ BLDA: 46.1 MMHG (ref 32–48)
PCO2 TEMP ADJ BLDA: 72.9 MMHG (ref 32–48)
PCO2 TEMP ADJ BLDA: >100 MMHG (ref 32–48)
PEEP END EXPIRATORY PRESSURE IPEEP: 8 CMH20
PH BLDA: 7.08 [PH] (ref 7.35–7.45)
PH BLDA: 7.24 [PH] (ref 7.35–7.45)
PH BLDA: 7.39 [PH] (ref 7.35–7.45)
PH TEMP ADJ BLDA: 7.09 [PH] (ref 7.35–7.45)
PH TEMP ADJ BLDA: 7.24 [PH] (ref 7.35–7.45)
PH TEMP ADJ BLDA: 7.41 [PH] (ref 7.35–7.45)
PH UR STRIP.AUTO: 5.5 [PH] (ref 5–8)
PLATELET # BLD AUTO: 399 K/UL (ref 164–446)
PMV BLD AUTO: 9 FL (ref 9–12.9)
PO2 BLDA: 253 MMHG (ref 83–108)
PO2 BLDA: 65.4 MMHG (ref 83–108)
PO2 BLDA: 79 MMHG (ref 83–108)
PO2 TEMP ADJ BLDA: 248 MMHG (ref 64–87)
PO2 TEMP ADJ BLDA: 66.3 MMHG (ref 64–87)
PO2 TEMP ADJ BLDA: 75 MMHG (ref 64–87)
POTASSIUM SERPL-SCNC: 4.8 MMOL/L (ref 3.6–5.5)
PROT SERPL-MCNC: 6.6 G/DL (ref 6–8.2)
PROT UR QL STRIP: 30 MG/DL
RBC # BLD AUTO: 3.54 M/UL (ref 4.2–5.4)
RBC # URNS HPF: ABNORMAL /HPF (ref 0–2)
RBC UR QL AUTO: ABNORMAL
RHODAMINE-AURAMINE STN SPEC: NORMAL
RHODAMINE-AURAMINE STN SPEC: NORMAL
SAO2 % BLDA: 100 % (ref 93–99)
SAO2 % BLDA: 87 % (ref 93–99)
SAO2 % BLDA: 87 % (ref 93–99)
SCCMEC + MECA PNL NOSE NAA+PROBE: NEGATIVE
SIGNIFICANT IND 70042: NORMAL
SITE SITE: NORMAL
SODIUM SERPL-SCNC: 136 MMOL/L (ref 135–145)
SOURCE SOURCE: NORMAL
SP GR UR STRIP.AUTO: 1.02
SPECIMEN DRAWN FROM PATIENT: ABNORMAL
SPECIMEN DRAWN FROM PATIENT: ABNORMAL
TIDAL VOLUME IVT: 400 ML
TRIGL SERPL-MCNC: 84 MG/DL (ref 0–149)
UROBILINOGEN UR STRIP.AUTO-MCNC: 1 EU/DL
WBC # BLD AUTO: 13 K/UL (ref 4.8–10.8)
WBC #/AREA URNS HPF: ABNORMAL /HPF

## 2025-01-04 PROCEDURE — 84478 ASSAY OF TRIGLYCERIDES: CPT

## 2025-01-04 PROCEDURE — 94799 UNLISTED PULMONARY SVC/PX: CPT

## 2025-01-04 PROCEDURE — 700102 HCHG RX REV CODE 250 W/ 637 OVERRIDE(OP): Performed by: INTERNAL MEDICINE

## 2025-01-04 PROCEDURE — 87070 CULTURE OTHR SPECIMN AEROBIC: CPT

## 2025-01-04 PROCEDURE — 700105 HCHG RX REV CODE 258

## 2025-01-04 PROCEDURE — 82803 BLOOD GASES ANY COMBINATION: CPT

## 2025-01-04 PROCEDURE — 83735 ASSAY OF MAGNESIUM: CPT

## 2025-01-04 PROCEDURE — 87641 MR-STAPH DNA AMP PROBE: CPT

## 2025-01-04 PROCEDURE — 700111 HCHG RX REV CODE 636 W/ 250 OVERRIDE (IP): Performed by: INTERNAL MEDICINE

## 2025-01-04 PROCEDURE — 302978 HCHG BRONCHOSCOPY-DIAGNOSTIC

## 2025-01-04 PROCEDURE — 81001 URINALYSIS AUTO W/SCOPE: CPT

## 2025-01-04 PROCEDURE — 82962 GLUCOSE BLOOD TEST: CPT

## 2025-01-04 PROCEDURE — 94002 VENT MGMT INPAT INIT DAY: CPT

## 2025-01-04 PROCEDURE — 31624 DX BRONCHOSCOPE/LAVAGE: CPT | Performed by: INTERNAL MEDICINE

## 2025-01-04 PROCEDURE — 31500 INSERT EMERGENCY AIRWAY: CPT

## 2025-01-04 PROCEDURE — 87116 MYCOBACTERIA CULTURE: CPT

## 2025-01-04 PROCEDURE — 0W9930Z DRAINAGE OF RIGHT PLEURAL CAVITY WITH DRAINAGE DEVICE, PERCUTANEOUS APPROACH: ICD-10-PCS | Performed by: INTERNAL MEDICINE

## 2025-01-04 PROCEDURE — 700101 HCHG RX REV CODE 250

## 2025-01-04 PROCEDURE — 99292 CRITICAL CARE ADDL 30 MIN: CPT | Mod: 25 | Performed by: INTERNAL MEDICINE

## 2025-01-04 PROCEDURE — 88112 CYTOPATH CELL ENHANCE TECH: CPT

## 2025-01-04 PROCEDURE — 700102 HCHG RX REV CODE 250 W/ 637 OVERRIDE(OP)

## 2025-01-04 PROCEDURE — 770022 HCHG ROOM/CARE - ICU (200)

## 2025-01-04 PROCEDURE — C1751 CATH, INF, PER/CENT/MIDLINE: HCPCS

## 2025-01-04 PROCEDURE — 700101 HCHG RX REV CODE 250: Performed by: INTERNAL MEDICINE

## 2025-01-04 PROCEDURE — 88305 TISSUE EXAM BY PATHOLOGIST: CPT

## 2025-01-04 PROCEDURE — 0B9F8ZX DRAINAGE OF RIGHT LOWER LUNG LOBE, VIA NATURAL OR ARTIFICIAL OPENING ENDOSCOPIC, DIAGNOSTIC: ICD-10-PCS | Performed by: INTERNAL MEDICINE

## 2025-01-04 PROCEDURE — 700117 HCHG RX CONTRAST REV CODE 255: Performed by: INTERNAL MEDICINE

## 2025-01-04 PROCEDURE — 87040 BLOOD CULTURE FOR BACTERIA: CPT | Mod: 91

## 2025-01-04 PROCEDURE — 87015 SPECIMEN INFECT AGNT CONCNTJ: CPT

## 2025-01-04 PROCEDURE — 83605 ASSAY OF LACTIC ACID: CPT

## 2025-01-04 PROCEDURE — 32551 INSERTION OF CHEST TUBE: CPT | Performed by: INTERNAL MEDICINE

## 2025-01-04 PROCEDURE — 36620 INSERTION CATHETER ARTERY: CPT | Performed by: NURSE PRACTITIONER

## 2025-01-04 PROCEDURE — C1729 CATH, DRAINAGE: HCPCS

## 2025-01-04 PROCEDURE — 03HY32Z INSERTION OF MONITORING DEVICE INTO UPPER ARTERY, PERCUTANEOUS APPROACH: ICD-10-PCS | Performed by: INTERNAL MEDICINE

## 2025-01-04 PROCEDURE — 87102 FUNGUS ISOLATION CULTURE: CPT

## 2025-01-04 PROCEDURE — 36620 INSERTION CATHETER ARTERY: CPT

## 2025-01-04 PROCEDURE — 87205 SMEAR GRAM STAIN: CPT | Mod: 91

## 2025-01-04 PROCEDURE — 70450 CT HEAD/BRAIN W/O DYE: CPT

## 2025-01-04 PROCEDURE — 87086 URINE CULTURE/COLONY COUNT: CPT

## 2025-01-04 PROCEDURE — 5A1945Z RESPIRATORY VENTILATION, 24-96 CONSECUTIVE HOURS: ICD-10-PCS | Performed by: INTERNAL MEDICINE

## 2025-01-04 PROCEDURE — 02HV33Z INSERTION OF INFUSION DEVICE INTO SUPERIOR VENA CAVA, PERCUTANEOUS APPROACH: ICD-10-PCS | Performed by: INTERNAL MEDICINE

## 2025-01-04 PROCEDURE — 80053 COMPREHEN METABOLIC PANEL: CPT

## 2025-01-04 PROCEDURE — 302214 INTUBATION BOX: Performed by: INTERNAL MEDICINE

## 2025-01-04 PROCEDURE — 99291 CRITICAL CARE FIRST HOUR: CPT | Mod: 25 | Performed by: INTERNAL MEDICINE

## 2025-01-04 PROCEDURE — 85025 COMPLETE CBC W/AUTO DIFF WBC: CPT

## 2025-01-04 PROCEDURE — 93010 ELECTROCARDIOGRAM REPORT: CPT | Performed by: INTERNAL MEDICINE

## 2025-01-04 PROCEDURE — 87206 SMEAR FLUORESCENT/ACID STAI: CPT

## 2025-01-04 PROCEDURE — 31645 BRNCHSC W/THER ASPIR 1ST: CPT | Performed by: INTERNAL MEDICINE

## 2025-01-04 PROCEDURE — 700111 HCHG RX REV CODE 636 W/ 250 OVERRIDE (IP): Performed by: NEUROLOGICAL SURGERY

## 2025-01-04 PROCEDURE — A9270 NON-COVERED ITEM OR SERVICE: HCPCS

## 2025-01-04 PROCEDURE — 0BH17EZ INSERTION OF ENDOTRACHEAL AIRWAY INTO TRACHEA, VIA NATURAL OR ARTIFICIAL OPENING: ICD-10-PCS | Performed by: INTERNAL MEDICINE

## 2025-01-04 PROCEDURE — 36556 INSERT NON-TUNNEL CV CATH: CPT | Performed by: NURSE PRACTITIONER

## 2025-01-04 PROCEDURE — 31500 INSERT EMERGENCY AIRWAY: CPT | Performed by: INTERNAL MEDICINE

## 2025-01-04 PROCEDURE — 36556 INSERT NON-TUNNEL CV CATH: CPT

## 2025-01-04 PROCEDURE — 700105 HCHG RX REV CODE 258: Performed by: INTERNAL MEDICINE

## 2025-01-04 PROCEDURE — 37799 UNLISTED PX VASCULAR SURGERY: CPT

## 2025-01-04 PROCEDURE — 32551 INSERTION OF CHEST TUBE: CPT

## 2025-01-04 PROCEDURE — A9270 NON-COVERED ITEM OR SERVICE: HCPCS | Performed by: INTERNAL MEDICINE

## 2025-01-04 PROCEDURE — 36600 WITHDRAWAL OF ARTERIAL BLOOD: CPT

## 2025-01-04 RX ORDER — MIDAZOLAM HYDROCHLORIDE 1 MG/ML
10 INJECTION INTRAMUSCULAR; INTRAVENOUS
Status: DISCONTINUED | OUTPATIENT
Start: 2025-01-04 | End: 2025-01-04

## 2025-01-04 RX ORDER — HYDROMORPHONE HYDROCHLORIDE 1 MG/ML
.5-2 INJECTION, SOLUTION INTRAMUSCULAR; INTRAVENOUS; SUBCUTANEOUS
Status: DISCONTINUED | OUTPATIENT
Start: 2025-01-04 | End: 2025-01-05

## 2025-01-04 RX ORDER — POLYETHYLENE GLYCOL 3350 17 G/17G
1 POWDER, FOR SOLUTION ORAL 2 TIMES DAILY PRN
Status: DISCONTINUED | OUTPATIENT
Start: 2025-01-04 | End: 2025-01-05

## 2025-01-04 RX ORDER — IPRATROPIUM BROMIDE AND ALBUTEROL SULFATE 2.5; .5 MG/3ML; MG/3ML
3 SOLUTION RESPIRATORY (INHALATION)
Status: DISCONTINUED | OUTPATIENT
Start: 2025-01-04 | End: 2025-01-26

## 2025-01-04 RX ORDER — ENOXAPARIN SODIUM 100 MG/ML
40 INJECTION SUBCUTANEOUS DAILY
Status: DISCONTINUED | OUTPATIENT
Start: 2025-01-04 | End: 2025-01-17

## 2025-01-04 RX ORDER — ROPINIROLE 1 MG/1
1 TABLET, FILM COATED ORAL 2 TIMES DAILY PRN
Status: DISCONTINUED | OUTPATIENT
Start: 2025-01-04 | End: 2025-01-05

## 2025-01-04 RX ORDER — ROCURONIUM BROMIDE 10 MG/ML
80 INJECTION, SOLUTION INTRAVENOUS ONCE
Status: COMPLETED | OUTPATIENT
Start: 2025-01-04 | End: 2025-01-04

## 2025-01-04 RX ORDER — SODIUM CHLORIDE, SODIUM LACTATE, POTASSIUM CHLORIDE, AND CALCIUM CHLORIDE .6; .31; .03; .02 G/100ML; G/100ML; G/100ML; G/100ML
30 INJECTION, SOLUTION INTRAVENOUS ONCE
Status: COMPLETED | OUTPATIENT
Start: 2025-01-04 | End: 2025-01-04

## 2025-01-04 RX ORDER — NOREPINEPHRINE BITARTRATE 0.03 MG/ML
INJECTION, SOLUTION INTRAVENOUS
Status: COMPLETED
Start: 2025-01-04 | End: 2025-01-04

## 2025-01-04 RX ORDER — AMOXICILLIN 250 MG
1 CAPSULE ORAL
Status: DISCONTINUED | OUTPATIENT
Start: 2025-01-04 | End: 2025-01-05

## 2025-01-04 RX ORDER — DOCUSATE SODIUM 50 MG/5ML
100 LIQUID ORAL 2 TIMES DAILY
Status: DISCONTINUED | OUTPATIENT
Start: 2025-01-04 | End: 2025-01-05

## 2025-01-04 RX ORDER — LANOLIN ALCOHOL/MO/W.PET/CERES
400 CREAM (GRAM) TOPICAL DAILY
Status: DISCONTINUED | OUTPATIENT
Start: 2025-01-04 | End: 2025-01-05

## 2025-01-04 RX ORDER — AMOXICILLIN 250 MG
1 CAPSULE ORAL NIGHTLY
Status: DISCONTINUED | OUTPATIENT
Start: 2025-01-04 | End: 2025-01-05

## 2025-01-04 RX ORDER — ETOMIDATE 2 MG/ML
20 INJECTION INTRAVENOUS ONCE
Status: COMPLETED | OUTPATIENT
Start: 2025-01-04 | End: 2025-01-04

## 2025-01-04 RX ORDER — HYDROCODONE BITARTRATE AND ACETAMINOPHEN 10; 325 MG/1; MG/1
1 TABLET ORAL EVERY 6 HOURS PRN
Status: DISCONTINUED | OUTPATIENT
Start: 2025-01-04 | End: 2025-01-05

## 2025-01-04 RX ORDER — CALCIUM CARBONATE 500 MG/1
500 TABLET, CHEWABLE ORAL 3 TIMES DAILY PRN
Status: DISCONTINUED | OUTPATIENT
Start: 2025-01-04 | End: 2025-01-05

## 2025-01-04 RX ORDER — DEXTROSE MONOHYDRATE 25 G/50ML
25 INJECTION, SOLUTION INTRAVENOUS
Status: DISCONTINUED | OUTPATIENT
Start: 2025-01-04 | End: 2025-01-07

## 2025-01-04 RX ORDER — FERROUS SULFATE 300 MG/5ML
300 LIQUID (ML) ORAL DAILY
Status: DISCONTINUED | OUTPATIENT
Start: 2025-01-05 | End: 2025-01-05

## 2025-01-04 RX ORDER — MAGNESIUM SULFATE HEPTAHYDRATE 40 MG/ML
2 INJECTION, SOLUTION INTRAVENOUS ONCE
Status: COMPLETED | OUTPATIENT
Start: 2025-01-04 | End: 2025-01-04

## 2025-01-04 RX ORDER — LANSOPRAZOLE 30 MG/1
30 TABLET, ORALLY DISINTEGRATING, DELAYED RELEASE ORAL DAILY
Status: DISCONTINUED | OUTPATIENT
Start: 2025-01-04 | End: 2025-01-04

## 2025-01-04 RX ORDER — ROPINIROLE 1 MG/1
1 TABLET, FILM COATED ORAL
Status: DISCONTINUED | OUTPATIENT
Start: 2025-01-04 | End: 2025-01-05

## 2025-01-04 RX ORDER — NALOXONE HYDROCHLORIDE 0.4 MG/ML
0.4 INJECTION, SOLUTION INTRAMUSCULAR; INTRAVENOUS; SUBCUTANEOUS ONCE
Status: COMPLETED | OUTPATIENT
Start: 2025-01-04 | End: 2025-01-04

## 2025-01-04 RX ORDER — METHOCARBAMOL 750 MG/1
750 TABLET, FILM COATED ORAL EVERY 8 HOURS PRN
Status: DISCONTINUED | OUTPATIENT
Start: 2025-01-04 | End: 2025-01-05

## 2025-01-04 RX ORDER — ONDANSETRON 4 MG/1
4 TABLET, ORALLY DISINTEGRATING ORAL EVERY 4 HOURS PRN
Status: DISCONTINUED | OUTPATIENT
Start: 2025-01-04 | End: 2025-01-05

## 2025-01-04 RX ORDER — NOREPINEPHRINE BITARTRATE 0.03 MG/ML
0-1 INJECTION, SOLUTION INTRAVENOUS CONTINUOUS
Status: DISCONTINUED | OUTPATIENT
Start: 2025-01-04 | End: 2025-01-06

## 2025-01-04 RX ORDER — FAMOTIDINE 20 MG/1
20 TABLET, FILM COATED ORAL EVERY 12 HOURS
Status: DISCONTINUED | OUTPATIENT
Start: 2025-01-04 | End: 2025-01-05

## 2025-01-04 RX ORDER — METOCLOPRAMIDE 10 MG/1
10 TABLET ORAL EVERY 6 HOURS
Status: DISCONTINUED | OUTPATIENT
Start: 2025-01-04 | End: 2025-01-05

## 2025-01-04 RX ORDER — INSULIN LISPRO 100 [IU]/ML
2-9 INJECTION, SOLUTION INTRAVENOUS; SUBCUTANEOUS EVERY 6 HOURS
Status: DISCONTINUED | OUTPATIENT
Start: 2025-01-04 | End: 2025-01-07

## 2025-01-04 RX ORDER — NOREPINEPHRINE BITARTRATE 0.03 MG/ML
0-1 INJECTION, SOLUTION INTRAVENOUS CONTINUOUS
Status: DISCONTINUED | OUTPATIENT
Start: 2025-01-04 | End: 2025-01-04

## 2025-01-04 RX ADMIN — VANCOMYCIN HYDROCHLORIDE 2500 MG: 5 INJECTION, POWDER, LYOPHILIZED, FOR SOLUTION INTRAVENOUS at 06:22

## 2025-01-04 RX ADMIN — HYDROCODONE BITARTRATE AND ACETAMINOPHEN 1 TABLET: 10; 325 TABLET ORAL at 19:37

## 2025-01-04 RX ADMIN — HYDROCODONE BITARTRATE AND ACETAMINOPHEN 1 TABLET: 10; 325 TABLET ORAL at 11:32

## 2025-01-04 RX ADMIN — IOHEXOL 80 ML: 350 INJECTION, SOLUTION INTRAVENOUS at 07:15

## 2025-01-04 RX ADMIN — ETOMIDATE 20 MG: 2 INJECTION, SOLUTION INTRAVENOUS at 02:20

## 2025-01-04 RX ADMIN — METOCLOPRAMIDE 10 MG: 10 TABLET ORAL at 17:06

## 2025-01-04 RX ADMIN — PROPOFOL 40 MCG/KG/MIN: 10 INJECTION, EMULSION INTRAVENOUS at 02:51

## 2025-01-04 RX ADMIN — MIDAZOLAM HYDROCHLORIDE 10 MG: 1 INJECTION, SOLUTION INTRAMUSCULAR; INTRAVENOUS at 02:48

## 2025-01-04 RX ADMIN — Medication 400 MG: at 05:18

## 2025-01-04 RX ADMIN — FAMOTIDINE 20 MG: 20 TABLET, FILM COATED ORAL at 17:06

## 2025-01-04 RX ADMIN — INSULIN LISPRO 2 UNITS: 100 INJECTION, SOLUTION INTRAVENOUS; SUBCUTANEOUS at 05:26

## 2025-01-04 RX ADMIN — ROPINIROLE HYDROCHLORIDE 1 MG: 1 TABLET, FILM COATED ORAL at 20:49

## 2025-01-04 RX ADMIN — FERROUS SULFATE TAB 325 MG (65 MG ELEMENTAL FE) 325 MG: 325 (65 FE) TAB at 08:51

## 2025-01-04 RX ADMIN — SODIUM CHLORIDE, POTASSIUM CHLORIDE, SODIUM LACTATE AND CALCIUM CHLORIDE 1000 ML: 600; 310; 30; 20 INJECTION, SOLUTION INTRAVENOUS at 05:15

## 2025-01-04 RX ADMIN — POLYETHYLENE GLYCOL 3350 1 PACKET: 17 POWDER, FOR SOLUTION ORAL at 11:32

## 2025-01-04 RX ADMIN — PROPOFOL 20 MCG/KG/MIN: 10 INJECTION, EMULSION INTRAVENOUS at 17:15

## 2025-01-04 RX ADMIN — PIPERACILLIN AND TAZOBACTAM 4.5 G: 4; .5 INJECTION, POWDER, FOR SOLUTION INTRAVENOUS at 09:01

## 2025-01-04 RX ADMIN — HEPARIN SODIUM 5000 UNITS: 5000 INJECTION, SOLUTION INTRAVENOUS; SUBCUTANEOUS at 02:35

## 2025-01-04 RX ADMIN — METOCLOPRAMIDE 10 MG: 10 TABLET ORAL at 05:18

## 2025-01-04 RX ADMIN — NOREPINEPHRINE BITARTRATE 0.21 MCG/KG/MIN: 1 INJECTION, SOLUTION, CONCENTRATE INTRAVENOUS at 04:56

## 2025-01-04 RX ADMIN — LIDOCAINE 1 PATCH: 4 PATCH TOPICAL at 10:20

## 2025-01-04 RX ADMIN — MAGNESIUM SULFATE HEPTAHYDRATE 2 G: 2 INJECTION, SOLUTION INTRAVENOUS at 09:03

## 2025-01-04 RX ADMIN — NALOXONE HYDROCHLORIDE 0.4 MG: 0.4 INJECTION, SOLUTION INTRAMUSCULAR; INTRAVENOUS; SUBCUTANEOUS at 01:30

## 2025-01-04 RX ADMIN — VANCOMYCIN HYDROCHLORIDE 1000 MG: 5 INJECTION, POWDER, LYOPHILIZED, FOR SOLUTION INTRAVENOUS at 18:39

## 2025-01-04 RX ADMIN — PIPERACILLIN AND TAZOBACTAM 4.5 G: 4; .5 INJECTION, POWDER, FOR SOLUTION INTRAVENOUS at 20:53

## 2025-01-04 RX ADMIN — ENOXAPARIN SODIUM 40 MG: 100 INJECTION SUBCUTANEOUS at 17:06

## 2025-01-04 RX ADMIN — NOREPINEPHRINE BITARTRATE 0.5 MCG/KG/MIN: 1 INJECTION, SOLUTION, CONCENTRATE INTRAVENOUS at 02:28

## 2025-01-04 RX ADMIN — METOCLOPRAMIDE 10 MG: 10 TABLET ORAL at 11:32

## 2025-01-04 RX ADMIN — SENNOSIDES AND DOCUSATE SODIUM 1 TABLET: 50; 8.6 TABLET ORAL at 20:50

## 2025-01-04 RX ADMIN — NOREPINEPHRINE BITARTRATE 0.5 MCG/KG/MIN: 0.03 INJECTION, SOLUTION INTRAVENOUS at 02:28

## 2025-01-04 RX ADMIN — DOCUSATE SODIUM 100 MG: 50 LIQUID ORAL at 17:06

## 2025-01-04 RX ADMIN — FAMOTIDINE 20 MG: 20 TABLET, FILM COATED ORAL at 05:18

## 2025-01-04 RX ADMIN — METOCLOPRAMIDE 10 MG: 10 TABLET ORAL at 23:29

## 2025-01-04 RX ADMIN — PROPOFOL 30 MCG/KG/MIN: 10 INJECTION, EMULSION INTRAVENOUS at 08:54

## 2025-01-04 RX ADMIN — ROCURONIUM BROMIDE 80 MG: 50 INJECTION, SOLUTION INTRAVENOUS at 02:21

## 2025-01-04 RX ADMIN — PIPERACILLIN AND TAZOBACTAM 4.5 G: 4; .5 INJECTION, POWDER, FOR SOLUTION INTRAVENOUS at 06:17

## 2025-01-04 RX ADMIN — HYDROMORPHONE HYDROCHLORIDE 1 MG: 1 INJECTION, SOLUTION INTRAMUSCULAR; INTRAVENOUS; SUBCUTANEOUS at 10:20

## 2025-01-04 RX ADMIN — DOCUSATE SODIUM 100 MG: 50 LIQUID ORAL at 05:17

## 2025-01-04 ASSESSMENT — PAIN DESCRIPTION - PAIN TYPE
TYPE: ACUTE PAIN
TYPE: ACUTE PAIN;CHRONIC PAIN
TYPE: ACUTE PAIN;CHRONIC PAIN
TYPE: ACUTE PAIN
TYPE: ACUTE PAIN;CHRONIC PAIN
TYPE: ACUTE PAIN;CHRONIC PAIN

## 2025-01-04 ASSESSMENT — FIBROSIS 4 INDEX: FIB4 SCORE: 4.15

## 2025-01-04 NOTE — CARE PLAN
The patient is Watcher - Medium risk of patient condition declining or worsening    Shift Goals  Clinical Goals: MAP>65, stable hemodynamics   Patient Goals: maria ines  Family Goals: maria ines    Progress made toward(s) clinical / shift goals:    Problem: Knowledge Deficit - Standard  Goal: Patient and family/care givers will demonstrate understanding of plan of care, disease process/condition, diagnostic tests and medications  Outcome: Progressing     Problem: Skin Integrity  Goal: Skin integrity is maintained or improved  Outcome: Progressing     Problem: Fall Risk  Goal: Patient will remain free from falls  Outcome: Progressing     Problem: Pain - Standard  Goal: Alleviation of pain or a reduction in pain to the patient’s comfort goal  Outcome: Progressing     Problem: Safety - Medical Restraint  Goal: Remains free of injury from restraints (Restraint for Interference with Medical Device)  Outcome: Progressing     Problem: Hemodynamics  Goal: Patient's hemodynamics, fluid balance and neurologic status will be stable or improve  Outcome: Progressing     Problem: Fluid Volume  Goal: Fluid volume balance will be maintained  Outcome: Progressing     Problem: Respiratory  Goal: Patient will achieve/maintain optimum respiratory ventilation and gas exchange  Outcome: Progressing     Problem: Mechanical Ventilation  Goal: Safe management of artificial airway and ventilation  Outcome: Progressing       Patient is not progressing towards the following goals:

## 2025-01-04 NOTE — CARE PLAN
"           [facility] [address] [facility] [address]    Phone:  [phone]    Fax:  [phone]       Thank You for choosing us for your health care visit. We are glad to serve you and happy to provide you with this summary of your visit. Please help us to ensure we have accurate records. If you find anything that needs to be changed, please let our staff know as soon as possible. Your Demographic Information     Patient Name Sex SYBIL Cordoba Female 1987       Ethnic Group Patient Race    Not of  or  Origin [address] [facility]      Your Visit Details     Date & Time Provider Department    2017 11:30 AM Cindy Naidu MD  [facility] [address]      Your Upcoming Appointment*(Max 10)     2017  1:30 PM [id number] [facility] Patient with ÓSCAR Rueda   [facility] ([address])    [address] [facility]   [phone]            2017 11:15 AM CST   Follow-up Visit with Jazmin Blake MD   [facility]    [address] [place] [address] Ave   [phone]              Your To Do List     Follow-Up    Return if symptoms worsen or fail to improve. Your Vitals Were     BP Pulse Height Weight LMP SpO2    108/58 64 5' 9"" (1.753 m) 127 lb (57.6 kg) 2016 99%    BMI Smoking Status                18.75 kg/m2 Never Smoker          Medications Prescribed or Re-Ordered Today     None      Your Current Medications Are        Disp Refills Start End    sotalol (BETAPACE) 120 MG tablet 60 tablet 3 2016     Sig - Route: Take 1 tablet by mouth every 12 hours. - Oral    Class: Eprescribe    naproxen (NAPROSYN) 500 MG tablet        Sig - Route:  Take 500 mg by mouth 2 times daily as " The patient is Watcher - Medium risk of patient condition declining or worsening    Shift Goals  Clinical Goals: patient remains free from falls  Patient Goals: bed alarm off  Family Goals: maria ines    Progress made toward(s) clinical / shift goals:    Problem: Skin Integrity  Goal: Skin integrity is maintained or improved  Outcome: Progressing     Problem: Fall Risk  Goal: Patient will remain free from falls  Outcome: Progressing     Problem: Pain - Standard  Goal: Alleviation of pain or a reduction in pain to the patient’s comfort goal  Outcome: Progressing     Problem: Safety - Medical Restraint  Goal: Remains free of injury from restraints (Restraint for Interference with Medical Device)  Outcome: Progressing     Problem: Hemodynamics  Goal: Patient's hemodynamics, fluid balance and neurologic status will be stable or improve  Outcome: Progressing     Problem: Urinary - Renal Perfusion  Goal: Ability to achieve and maintain adequate renal perfusion and functioning will improve  Outcome: Progressing     Problem: Mechanical Ventilation  Goal: Safe management of artificial airway and ventilation  Outcome: Progressing       Patient is not progressing towards the following goals:       needed for Pain. - Oral    Class: Historical Med    vitamin - therapeutic multivitamins w/minerals (CENTRUM SILVER,THERA-M) Tab        Sig - Route:  Take 1 tablet by mouth daily. - Oral    Class: Historical Med    NON FORMULARY        Sig: Copper IUD placed     Class: Historical Med    ALPRAZolam (XANAX) 0.25 MG tablet 20 tablet 0 2016     Sig: Take 1/2 to 1 tablet once a day as needed for anxiety      Allergies     No Known Allergies      Immunizations History as of 2017     Name Date    Gamma Globulin 2009, 2007    HPV QUAD 2009, 2009, 2008    Hepatitis B Adolescent 1/10/2000    Hepatitis B Adult 10/10/2007, 2006, 2006    Influenza 2009    Influenza A novel H1N1 2009    PPD 4/3/2013, 3/25/2013    Polio, ORAL 1/10/2000    Td:Adult type tetanus/diphtheria 1/10/2000    Tdap 2010, 1/10/2000      Problem List as of 2017     Atrial tachycardia, paroxysmal    Recurrent genital HSV (herpes simplex virus) infection    Bacterial vaginosis    Candidal vulvovaginitis    Other primary thrombocytopenia    Leukopenia    ITP (idiopathic thrombocytopenic purpura)    Graves disease    Possible pregnancy, not yet confirmed    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

## 2025-01-04 NOTE — ASSESSMENT & PLAN NOTE
Pathologic fracture of T5 with myelopathy on presentation  S/P T4-6 laminectomy with fusion and spinal decompression on 1/1/2025 by Dr. Lowery  multi-modal pain management  Fall Precautions  PT OT

## 2025-01-04 NOTE — PROGRESS NOTES
Rapid Response Summary     Rapid response called at 0116 for: Increased oxygen demand , Altered mental status , and Tachycardia     VS: Tachycardia , Hypertensive , and labored breathing with low SPO2   (See Vitals Flowsheet)  Additional info: CXR shows R pneumothorax has worsened  ABG show CO2 retention   MD Paged: Kenji Downs   Interventions:    Imaging/Tests: Chest X-ray   Labs: ABG    Medications: Narcan    Other: N/A  Disposition: Did not improve  with rapid response team interventions. Primary RN updated on plan of care. Patient transferred to ICU. and for intubation and new chest tube placement

## 2025-01-04 NOTE — PROGRESS NOTES
Time out: 0218   All agree.   Allergies: demerol  Verbal consent by Daughter to Dr. Ramos    0220: 20 mg of etomidate  0221: 80 mg rocuronium  0221: 300 mcg bon. BP 89/50 HR 83    7.5  ETT 23 at teeth. + color change, B/L breath sounds.      0222: BP 99/53  HR 83    0222: procedure end    0222: Bronch start  0225:  BP 75/38 HR 79    300 mcg bon  Spo2 89  0225: Bronch end    0227: Repeat 72/ 36  HR 80  300 mcg bon. SPO2 100    Levophed initiated at 0.30 mcg/kg/min per verbal order from Dr. Bartholomew.   1 L bolus LR     0229: 131/60 HR 79    0243: Prop started at 40 mg per Dr. Ramos  0247: Chest tube exchange with 20 fr. Serous fluid in CT pleur-evac.

## 2025-01-04 NOTE — PROGRESS NOTES
Critical Care Progress Note    Date of admission  12/25/2024    Chief Complaint  64 y.o. female with history of breast cancer with metastasis to brain, lungs, pleura, liver, spine, admitted for hypoxemic respiratory failure on 12/25 related to bilateral pleural effusions.  She underwent right-sided thoracentesis on 12/27.  Right-sided pneumothorax was identified on 1/2 and was managed medically.  She underwent left-sided thoracentesis on 1/2. In the evening of 1/3 her R sided PTX worsened and was treated with a chest tube by Dr. Shi and improved clinically.  Then early the next morning she decompensated again.  Her PTX had recurred and she developed hypoxia and hypercarbia. She was intubated and large bore chest tube was placed by Dr. Avalos.  She was also harrington-cultured, had a bronch and started on antibiotics.  She was hypotensive requiring pressors.     Hospital Course  No notes on file    Interval Problem Update  Reviewed last 24 hour events:  Neuro: q4h neuro, spontaneously moves lower. PERRL  HR: SR 70-90  SBP: levo 0.06  Tmax: 100.9  GI: NGT, NPO, BM 1/1  I/O: +1700  Lines: TLC, PIV, art  Mobility: level 1  Resp: VD 1, 20/400/8/60. 7.38/48/253/29   Vte: lovenox  PPI/H2:pepcid  Antibx: vanco, zosyn    Chest tubes x2  Pan cultures pending  WBC 13  Replete mag  LFTs trending up  Lactic 1.7    I spoke with her daughter, Jane, this afternoon.  She is understandably stressed and overwhelmed.  It sounds like she has a brother also, and their father is also doing very poorly. I asked if patient and her had ever talked about wishes for life support.  It sounds like patient would not want to be kept alive on life support without hope of improvement, but does want life support for reversible causes.  It does not sounds like they have been discussing the end of life and have primarily been focused on hormone therapy.  On review of onc notes from 10/24 she was told she has weeks to months to live.  Jane doesn't  feel like patient and her oncologist have seen eye-to-eye.  Patient's mother  of complications from chemotherapy so patient has not wanted to do chemotherapy.  Per Jane, she feels like she has been judged and not listened to well because of that decision. Jane was worried that we wouldn't take as good care of patient and do everything indicated to try and get her better because of her cancer and her prior decisions.  I reassured her that that is not the case and if patient wants to try and get better to try more hormone therapy we will proceed with full aggressive care.    Jane seemed pretty overwhelmed so I did not bring up code status again.  Ongoing [facility] conversations with palliative, oncology and her primary team will be helpful to set expectations and possibilities.       Review of Systems  ROS     Vital Signs for last 24 hours   Temp:  [36 °C (96.8 °F)-38.3 °C (100.9 °F)] 36 °C (96.8 °F)  Pulse:  [] 71  Resp:  [0-65] 20  BP: ()/() 131/58  SpO2:  [84 %-100 %] 100 %    Hemodynamic parameters for last 24 hours       Respiratory Information for the last 24 hours  Vent Mode: APVCMV  Rate (breaths/min): 20  Vt Target (mL): 400  PEEP/CPAP: 8  MAP: 13  Control VTE (exp VT): 397    Physical Exam   Physical Exam  Constitutional:       Interventions: She is sedated and intubated.   HENT:      Head: Normocephalic and atraumatic.   Eyes:      Pupils: Pupils are equal, round, and reactive to light.   Cardiovascular:      Rate and Rhythm: Normal rate and regular rhythm.   Pulmonary:      Effort: She is intubated.      Comments: Diminished on the R  2x chest tubes on the R.  Neither is bubbling  Abdominal:      General: Bowel sounds are normal.      Tenderness: There is no abdominal tenderness.   Musculoskeletal:      Right lower leg: Edema (trace) present.      Left lower leg: Edema (trace) present.   Neurological:      Comments: Deeply sedated during my exam         Medications  Current  Facility-Administered Medications   Medication Dose Route Frequency Provider Last Rate Last Admin    Respiratory Therapy Consult   Nebulization Continuous RT Americo Avalos M.D.        famotidine (Pepcid) tablet 20 mg  20 mg Enteral Tube Q12HRS Americo Avalos M.D.   20 mg at 01/04/25 0518    Or    famotidine (Pepcid) injection 20 mg  20 mg Intravenous Q12HRS Americo Avalos M.D. MD Alert...ICU Electrolyte Replacement per Pharmacy   Other PHARMACY TO DOSE Americo Avalos M.D.        lidocaine (Xylocaine) 1 % injection 2 mL  2 mL Tracheal Tube Q30 MIN PRN Americo Avalos M.D.        propofol (DIPRIVAN) injection  0-80 mcg/kg/min (Ideal) Intravenous Continuous Americo Avalos M.D. 15.3 mL/hr at 01/04/25 0251 40 mcg/kg/min at 01/04/25 0251    ipratropium-albuterol (DUONEB) nebulizer solution  3 mL Nebulization Q2HRS PRN (RT) Americo Avalos M.D.        HYDROmorphone (DILAUDID) 0.2 mg/mL in 50mL NS (Continuous Infusion)   Intravenous Continuous Americo Avalos M.D.        HYDROmorphone (Dilaudid) injection 0.5-2 mg  0.5-2 mg Intravenous Q HOUR PRN Americo Avalos M.D.        Pharmacy Consult: Enteral tube insertion - review meds/change route/product selection   Other PHARMACY TO DOSE Americo Avalos M.D.        ondansetron (Zofran ODT) dispertab 4 mg  4 mg Enteral Tube Q4HRS PRN Americo Avalos M.D.        magnesium hydroxide (Milk Of Magnesia) suspension 30 mL  30 mL Enteral Tube QDAY PRN Americo Avalos M.D.        methocarbamol (Robaxin) tablet 750 mg  750 mg Enteral Tube Q8HRS PRN Americo Avalos M.D.        metoclopramide (Reglan) tablet 10 mg  10 mg Enteral Tube Q6HRS Americo Avalos M.D.   10 mg at 01/04/25 0518    HYDROcodone/acetaminophen (Norco)  MG per tablet 1 Tablet  1 Tablet Enteral Tube Q6HRS PRN Americo Avalos M.D.        ROPINIRole (Requip) tablet 1 mg  1 mg Enteral Tube QHS Americo Phillip  Tacho Lynn M.D.        calcium carbonate (Tums) chewable tab 500 mg  500 mg Enteral Tube TID PRN Americo Avalos M.D.        magnesium oxide tablet 400 mg  400 mg Enteral Tube DAILY Americo Avalos M.D.   400 mg at 01/04/25 0518    ROPINIRole (Requip) tablet 1 mg  1 mg Enteral Tube BID PRN Americo Avalos M.D.        senna-docusate (Pericolace Or Senokot S) 8.6-50 MG per tablet 1 Tablet  1 Tablet Enteral Tube Nightly Americo Avalos M.D.        senna-docusate (Pericolace Or Senokot S) 8.6-50 MG per tablet 1 Tablet  1 Tablet Enteral Tube Q24HRS PRN Americo Avalos M.D.        polyethylene glycol/lytes (Miralax) Packet 1 Packet  1 Packet Enteral Tube BID PRN Americo Avalos M.D.        docusate sodium (Colace) oral solution 100 mg  100 mg Enteral Tube BID Americo Avalos M.D.   100 mg at 01/04/25 0517    enoxaparin (Lovenox) inj 40 mg  40 mg Subcutaneous DAILY AT 1800 Americo Avalos M.D.        norepinephrine (Levophed) 8 mg in 250 mL NS infusion (premix)  0-1 mcg/kg/min (Ideal) Intravenous Continuous Americo Avalos M.D. 10.8 mL/hr at 01/04/25 0537 0.09 mcg/kg/min at 01/04/25 0537    And    vasopressin (Vasostrict) 20 Units in  mL Infusion  0.03 Units/min Intravenous Continuous Americo Avalos M.D.        piperacillin-tazobactam (Zosyn) 4.5 g in  mL IVPB  4.5 g Intravenous Once Americo Avalos M.D. 200 mL/hr at 01/04/25 0617 4.5 g at 01/04/25 0617    And    piperacillin-tazobactam (Zosyn) 4.5 g in  mL IVPB  4.5 g Intravenous Q8HRS Americo Avalos M.D. MD Alert...Vancomycin per Pharmacy   Other PHARMACY TO DOSE Americo Avalos M.D.        insulin lispro (HumaLOG,AdmeLOG) subcutaneous injection  2-9 Units Subcutaneous Q6HRS Americo Avalos M.D.   2 Units at 01/04/25 0526    And    dextrose 50% (D50W) injection 25 g  25 g Intravenous Q15 MIN PRN Americo Avalos M.D.        vancomycin  (Vancocin) 2,500 mg in  mL IVPB  25 mg/kg Intravenous Once Americo Avalos M.D. 166.7 mL/hr at 01/04/25 0622 2,500 mg at 01/04/25 0622    vancomycin (Vancocin) 1,000 mg in  mL IVPB  1,000 mg Intravenous Q12HR Americo Avalos M.D.        bisacodyl (Dulcolax) suppository 10 mg  10 mg Rectal DAILY Neo Ceja, P.A.   10 mg at 01/03/25 1151    LABETALOL HCL 5 MG/ML IV SOLN             benzocaine-menthol (Cepacol) lozenge 1 Lozenge  1 Lozenge Mouth/Throat Q2HRS PRN Omar Saleh M.D.        sore throat spray (Chloraseptic) 1 Spray  1 Spray Mouth/Throat Q2HRS PRN Omar Saleh M.D.   1 Spray at 01/02/25 0049    sodium chloride (Ocean) 0.65 % nasal spray 2 Spray  2 Spray Nasal Q2HRS PRN Omar Saleh M.D.   2 Spray at 01/01/25 0600    MD ALERT...DO NOT ADMINISTER NSAIDS or ASPIRIN unless ORDERED By Neurosurgery 1 Each  1 Each Other PRN AQUILINO GutiérrezOMimi        bisacodyl (Dulcolax) suppository 10 mg  10 mg Rectal Q24HRS PRN Karthik Lowery D.O.        ondansetron (Zofran) syringe/vial injection 4 mg  4 mg Intravenous Q4HRS PRN Abdulaziz Nieto M.D.   4 mg at 01/03/25 0854    [Held by provider] carvedilol (Coreg) tablet 3.125 mg  3.125 mg Oral BID Abdulaziz Nieto M.D.   3.125 mg at 01/02/25 0543    [Held by provider] losartan (Cozaar) tablet 100 mg  100 mg Oral Q DAY Abdulaziz Nieto M.D.   50 mg at 01/02/25 0542    lidocaine (Asperflex) 4 % patch 1 Patch  1 Patch Transdermal Q24HR Yosi Espino M.D.   1 Patch at 01/03/25 1143    ferrous sulfate tablet 325 mg  325 mg Oral QDAY with Breakfast Yosi Espino M.D.   325 mg at 01/03/25 0854       Fluids    Intake/Output Summary (Last 24 hours) at 1/4/2025 0638  Last data filed at 1/4/2025 0600  Gross per 24 hour   Intake 983.13 ml   Output 1385 ml   Net -401.87 ml       Laboratory  Recent Labs     01/03/25  1947 01/03/25  1948 01/03/25  2115 01/04/25  0107 01/04/25  0143 01/04/25  0423   ASPBY76H  --  7.13*  --  7.24*  --   --     RENQBZ901G  --  96.7*  --  72.3*  --   --    QCCMK279K  --  92.1  --  65.4*  --   --    NSRH9DFA  --  93.8  --  87.0*  --   --    ARTHCO3  --  32*  --  31*  --   --    Q3RJDZMBZ  --  15L  --  6  --   --    ARTBE  --  0  --  2  --   --    ISTATAPH 7.142*  --  7.362  --  7.080* 7.389   ISTATAPCO2 99.9*  --  55.2*  --  >100.0* 48.4*   ISTATAPO2 83  --  233*  --  79* 253*   ISTATATCO2 37  --  33  --  38 31*   DAVLQGZ3OWH 91*  --  100*  --  87* 100*   ISTATARTHCO3 34.1*  --  31.3*  --  34.7* 29.2*   ISTATARTBE 2  --  5*  --  1 4*   ISTATTEMP 99.9 F  --  see below  --  97.3 F 96.6 F   ISTATFIO2  --   --   --   --   --  100   ISTATSPEC Arterial  --  Arterial  --  Arterial Arterial   ISTATAPHTC 7.132*  --   --   --  7.089* 7.405   NGUHCAGA5OZ 87  --   --   --  75 248*         [facility] [employer] [facility]     01/02/25 0150 01/03/25 0014 01/03/25  1946 01/04/25 0107   SODIUM 136 134*  --  136   POTASSIUM 4.7 4.5  --  4.8   CHLORIDE 98 98  --  99   CO2 28 29  --  30   BUN 14 20  --  15   CREATININE 0.77 0.83  --  0.61   MAGNESIUM  --   --   --  1.9   PHOSPHORUS 4.2  --  2.9  --    CALCIUM 8.9 8.3*  --  9.3     Recent Labs     01/02/25 0150 01/03/25 0014 01/04/25 0107   ALTSGPT  --   --  72*   ASTSGOT  --   --  216*   ALKPHOSPHAT  --   --  121*   TBILIRUBIN  --   --  0.6   GLUCOSE 237* 138* 174*     Recent Labs     01/03/25  0014 01/03/25 1946 01/04/25 0107   WBC 6.7 14.6* 13.0*   NEUTSPOLYS  --  71.00 73.40*   LYMPHOCYTES  --  12.80* 9.50*   MONOCYTES  --  14.40* 15.70*   EOSINOPHILS  --  0.10 0.00   BASOPHILS  --  0.50 0.50   ASTSGOT  --   --  216*   ALTSGPT  --   --  72*   ALKPHOSPHAT  --   --  121*   TBILIRUBIN  --   --  0.6     Recent Labs     01/01/25  0737 01/02/25  0150 01/03/25 0014 01/03/25 1946 01/04/25 0107   RBC  --    < > 3.19* 3.72* 3.54*   HEMOGLOBIN  --    < > 9.2* 10.7* 10.1*   HEMATOCRIT  --    < > 28.8* 33.9* 32.7*   PLATELETCT  --    < > 302 467* 399   PROTHROMBTM 12.8  --   --   --   --    INR 0.96  --   --    --   --     < > = values in this interval not displayed.       Imaging  CT:    Reviewed    Assessment/Plan  * Malignant neoplasm of breast (HCC)- (present on admission)  Assessment & Plan  Stage IV breast carcinoma with metastases to bones, brain, lungs, spine c/b malignant pleural effusions    Septic shock (HCC)  Assessment & Plan  This is Septic shock Not present on admission  SIRS criteria identified on my evaluation include: Tachycardia, with heart rate greater than 90 BPM, Tachypnea, with respirations greater than 20 per minute, and Leukocytosis, with WBC greater than 12,000  Clinical indicators of end organ dysfunction include Hypotension with systolic blood pressure less than 90 or MAP less than 65 and Acute Respiratory Failure - (mechanical ventilation or BiPap or PaO2/FiO2 ratio < 300)  Indicators of septic shock include: Sepsis present and persistent hypotension despite fluid resuscitation   Source is: unclear - possible pneumonia, urinary  Sepsis protocol initiated  Crystalloid Fluid Administration: Fluid resuscitation ordered per standard protocol - 30 mL/kg per current or ideal body weight  IV antibiotics as appropriate for source of sepsis  Reassessment: I have reassessed the patient's hemodynamic status    Culture blood, sputum and urine  Begin empiric PIP/Tazo and vancomycin  I am titrating norepinephrine to keep mean arterial pressure in 65    Acute on chronic respiratory failure with hypoxia and hypercapnia (HCC)  Assessment & Plan  Emergently intubated on 1/4  ABCDEF bundle  SAT/SBT as appropriate  Mobility level 1 for now    Pneumothorax  Assessment & Plan  Right pneumothorax  S/P pigtail catheter placement on 1/3/2025 with significant initial improvement - subsequently developed worsening right pneumothorax  S/P right closed tube thoracostomy on 1/4/2025 with 20 [place] chest tube with near complete resolution of right pneumothorax  Keep chest tube to suction at 20 cm of water    Restless leg  syndrome- (present on admission)  Assessment & Plan       Pathological fracture of vertebra- (present on admission)  Assessment & Plan  Pathologic fracture of T5 with myelopathy on presentation  S/P T4-6 laminectomy with fusion and spinal decompression on 1/1/2025 by Dr. Lowery    Cancer related pain- (present on admission)  Assessment & Plan  Continue analgesia    Bilateral pleural effusion- (present on admission)  Assessment & Plan  Malignant pleural effusions  S/P left thoracentesis on 1/2/2025  S/P right closed tube thoracostomy on 1/4/2025  Briefly discussed the idea of tunneled pleural catheters with daughter on 1/4, but explained that these are typically a palliative approach and tend to get infected over time    Primary hypertension- (present on admission)  Assessment & Plan  Currently in shock on vasopressor support  Hold carvedilol and losartan and resume when clinically appropriate    Diabetes mellitus, type 2 (HCC)- (present on admission)  Assessment & Plan  Glycohemoglobin 6.4  Sliding scale insulin         VTE:  Lovenox  Ulcer: H2 Antagonist  Lines: Central Line  Ongoing indication addressed, Arterial Line  Ongoing indication addressed, and Mojica Catheter  Ongoing indication addressed    I have performed a physical exam and reviewed and updated ROS and Plan today (1/4/2025). In review of yesterday's note (1/3/2025), there are no changes except as documented above.     Discussed patient condition and risk of morbidity and/or mortality with Family, RN, RT, Pharmacy, Charge nurse / hot rounds, and Patient  The patient remains critically ill on mechanical ventilation.  Critical care time = 40 minutes in directly providing and coordinating critical care and extensive data review.  No time overlap and excludes procedures.

## 2025-01-04 NOTE — PROCEDURES
Date of service:  1/4/2025    Title:  Closed tube thoracostomy    Indication:  Right pneumothorax    Narrative:    A time out was performed identifying the correct patient, correct procedure and correct location prior to this procedure.    The right chest was prepped with chlorhexidine and draped in the usual sterile fashion.  1% Xylocaine solution was used for topical anesthesia.  I made a small incision in the mid axillary line in the approximate fifth or sixth intercostal space.  I bluntly dissected into the right hemithorax.  Air exited the right hemithorax once it was entered.  I inserted my finger into the right hemithorax and easily palpated the right lung.  I then placed a 20 [place] chest tube in the right hemithorax and secured it into place with sutures.  The chest tube was attached to a brand [employer] new Pleur-evac system and placed at 20 cm of water suction.  A sterile dressing using Vaseline gauze was placed over the chest tube.  I personally reviewed a postprocedure CXR and this reveals the chest tube in appropriate position with resolution of her right pneumothorax.  The patient tolerated procedure quite nicely.  No complications were apparent.    This was an EMERGENT procedure.  It was medically necessary to perform this procedure emergently to prevent life threatening deterioration.    Americo Avalos MD  Pulmonary and Critical Care Medicine

## 2025-01-04 NOTE — PROCEDURES
Date of service:  1/4/2025    Title:  Emergent endotracheal intubation    Indication:  Respiratory failure    Narrative:    A time out was performed identifying the correct patient, correct procedure and correct location prior to this procedure.    The patient was sedated and paralyzed with 20 mg of IV etomidate and 80 mg of IV rocuronium.  A 7.5 [place]ian endotracheal tube was placed directly into the trachea using a # 4 Glidescope without difficulty or apparent complication.  The CO2 detector made the appropriate color change, bilateral symmetrical breath sounds are present and fogging is present in the endotracheal tube.  All of this confirms that the endotracheal tube is indeed in the patient's trachea.  The patient tolerated the procedure quite nicely.  No complications are apparent.    This was an EMERGENT procedure.  It was medically necessary to perform this procedure emergently to prevent life threatening deterioration.    Americo Avalos MD  Pulmonary and Critical Care Medicine

## 2025-01-04 NOTE — PROGRESS NOTES
Critical Care Progress Note    Date of admission  12/25/2024    Chief Complaint  64 y.o. female admitted 12/25/2024 with a pathologic fracture of T5 with associated myelopathy.  She has a history of stage IV breast carcinoma, primary hypertension, DM type II, obesity and restless leg syndrome.    Hospital Course      I had the pleasure of being asked to see this lady on the cancer nursing unit this evening.  She was seen in consultation by my partner Dr. Jordan Shi on 1/3/2025 at approximately 2100 hrs.  At that time she had hypercarbic respiratory failure and a right pneumothorax.  Dr. Shi placed her on BiPAP and placed a right pigtail catheter into her right hemithorax with near complete resolution of her right pneumothorax.  She was subsequently weaned off BiPAP and doing well on the cancer nursing unit.  I was then called emergently to the bedside in the wee hours this morning by the rapid response team.  This lady was obtunded and unresponsive.  She received naloxone with no improvement.  I obtained a stat CXR which revealed recurrence of a large right pneumothorax despite her right pigtail catheter in place.  I did not notice any kinks or obstruction in her right pigtail catheter.  I emergently transferred her to the intensive care unit and intubated her and initiated full mechanical ventilatory support.  I then placed a 20 Romanian chest tube into the right hemithorax via a closed tube thoracostomy.  Repeat CXR revealed near complete resolution of her right pneumothorax.  She was hypotensive and required vasopressor support.      Interval Problem Update  Reviewed last 24 hour events:      100.9  Sinus tachycardia heart rate 100  Respiratory rate 22      Review of Systems  Review of Systems   Unable to perform ROS: Acuity of condition        Vital Signs for last 24 hours   Temp:  [36 °C (96.8 °F)-38.3 °C (100.9 °F)] 36 °C (96.8 °F)  Pulse:  [] 75  Resp:  [0-65] 18  BP: ()/() 131/58  SpO2:   [84 %-100 %] 100 %    Hemodynamic parameters for last 24 hours       Respiratory Information for the last 24 hours  Vent Mode: APVCMV  Rate (breaths/min): 20  Vt Target (mL): 400  PEEP/CPAP: 8  MAP: 13  Control VTE (exp VT): 397    Physical Exam   Physical Exam  Constitutional:       Appearance: She is obese. She is ill-appearing.   HENT:      Head: Normocephalic.   Cardiovascular:      Comments: Sinus tachycardia  Pulmonary:      Comments: Very poor air movement with coarse crackles bilaterally  Abdominal:      General: There is no distension.      Tenderness: There is no abdominal tenderness.   Musculoskeletal:      Cervical back: Neck supple. No rigidity.   Skin:     General: Skin is warm.   Neurological:      Comments: She is obtunded and does not respond to painful stimuli         Medications  Current Facility-Administered Medications   Medication Dose Route Frequency Provider Last Rate Last Admin    Respiratory Therapy Consult   Nebulization Continuous RT Americo Avalos M.D.        famotidine (Pepcid) tablet 20 mg  20 mg Enteral Tube Q12HRS Americo Avalos M.D.        Or    famotidine (Pepcid) injection 20 mg  20 mg Intravenous Q12HRS Americo Avalos M.D. MD Alert...ICU Electrolyte Replacement per Pharmacy   Other PHARMACY TO DOSE Americo Avalos M.D.        lidocaine (Xylocaine) 1 % injection 2 mL  2 mL Tracheal Tube Q30 MIN PRN Americo Avalos M.D.        propofol (DIPRIVAN) injection  0-80 mcg/kg/min (Ideal) Intravenous Continuous Americo Avalos M.D. 15.3 mL/hr at 01/04/25 0251 40 mcg/kg/min at 01/04/25 0251    ipratropium-albuterol (DUONEB) nebulizer solution  3 mL Nebulization Q2HRS PRN (RT) Americo Avalos M.D.        HYDROmorphone (DILAUDID) 0.2 mg/mL in 50mL NS (Continuous Infusion)   Intravenous Continuous Americo Avalos M.D.        HYDROmorphone (Dilaudid) injection 0.5-2 mg  0.5-2 mg Intravenous Q HOUR PRN Americo Avalos M.D.         Pharmacy Consult: Enteral tube insertion - review meds/change route/product selection   Other PHARMACY TO DOSE Americo Avalos M.D.        ondansetron (Zofran ODT) dispertab 4 mg  4 mg Enteral Tube Q4HRS PRN Americo Avalos M.D.        magnesium hydroxide (Milk Of Magnesia) suspension 30 mL  30 mL Enteral Tube QDAY PRN Americo Avalos M.D.        methocarbamol (Robaxin) tablet 750 mg  750 mg Enteral Tube Q8HRS PRN Americo Avalos M.D.        metoclopramide (Reglan) tablet 10 mg  10 mg Enteral Tube Q6HRS Americo Avalos M.D.        HYDROcodone/acetaminophen (Norco)  MG per tablet 1 Tablet  1 Tablet Enteral Tube Q6HRS PRN Americo Avalos M.D.        ROPINIRole (Requip) tablet 1 mg  1 mg Enteral Tube QHS Americo Avalos M.D.        calcium carbonate (Tums) chewable tab 500 mg  500 mg Enteral Tube TID PRN Americo Avalos M.D.        magnesium oxide tablet 400 mg  400 mg Enteral Tube DAILY Americo Avalos M.D.        ROPINIRole (Requip) tablet 1 mg  1 mg Enteral Tube BID PRN Americo Avalos M.D.        senna-docusate (Pericolace Or Senokot S) 8.6-50 MG per tablet 1 Tablet  1 Tablet Enteral Tube Nightly Americo Avalos M.D.        senna-docusate (Pericolace Or Senokot S) 8.6-50 MG per tablet 1 Tablet  1 Tablet Enteral Tube Q24HRS PRN Americo Avalos M.D.        polyethylene glycol/lytes (Miralax) Packet 1 Packet  1 Packet Enteral Tube BID PRN Americo Avalos M.D.        docusate sodium (Colace) oral solution 100 mg  100 mg Enteral Tube BID Americo Avalos M.D.        enoxaparin (Lovenox) inj 40 mg  40 mg Subcutaneous DAILY AT 1800 Americo Avalos M.D.        LR (Bolus) infusion 1,917 mL  30 mL/kg (Ideal) Intravenous Once Americo Avalos M.D.        norepinephrine (Levophed) 8 mg in 250 mL NS infusion (premix)  0-1 mcg/kg/min (Ideal) Intravenous Nadya Avalos M.D. 25.2 mL/hr at  01/04/25 0457 0.21 mcg/kg/min at 01/04/25 0457    And    vasopressin (Vasostrict) 20 Units in  mL Infusion  0.03 Units/min Intravenous Continuous Americo Avalos M.D.        piperacillin-tazobactam (Zosyn) 4.5 g in  mL IVPB  4.5 g Intravenous Once Americo Avalos M.D.        And    piperacillin-tazobactam (Zosyn) 4.5 g in  mL IVPB  4.5 g Intravenous Q8HRS Americo Avalos M.D. MD Alert...Vancomycin per Pharmacy   Other PHARMACY TO DOSE Americo Avalos M.D.        insulin lispro (HumaLOG,AdmeLOG) subcutaneous injection  2-9 Units Subcutaneous Q6HRS Americo Avalos M.D.        And    dextrose 50% (D50W) injection 25 g  25 g Intravenous Q15 MIN PRN Americo Avalos M.D.        vancomycin (Vancocin) 2,500 mg in  mL IVPB  25 mg/kg Intravenous Once Americo Avalos M.D.        bisacodyl (Dulcolax) suppository 10 mg  10 mg Rectal DAILY Neo Ceja, P.A.   10 mg at 01/03/25 1151    LABETALOL HCL 5 MG/ML IV SOLN             benzocaine-menthol (Cepacol) lozenge 1 Lozenge  1 Lozenge Mouth/Throat Q2HRS PRN Omar Saleh M.D.        sore throat spray (Chloraseptic) 1 Spray  1 Spray Mouth/Throat Q2HRS PRN Omar Saleh M.D.   1 Spray at 01/02/25 0049    sodium chloride (Ocean) 0.65 % nasal spray 2 Spray  2 Spray Nasal Q2HRS PRN Omar Saleh M.D.   2 Spray at 01/01/25 0600    MD ALERT...DO NOT ADMINISTER NSAIDS or ASPIRIN unless ORDERED By Neurosurgery 1 Each  1 Each Other PRN MEENA Gutiérrez.O.        bisacodyl (Dulcolax) suppository 10 mg  10 mg Rectal Q24HRS PRN AQUILINO GutiérrezO.        ondansetron (Zofran) syringe/vial injection 4 mg  4 mg Intravenous Q4HRS PRN Abdulaziz Nieto M.D.   4 mg at 01/03/25 0854    [Held by provider] carvedilol (Coreg) tablet 3.125 mg  3.125 mg Oral BID Abdulaziz Nieto M.D.   3.125 mg at 01/02/25 0543    [Held by provider] losartan (Cozaar) tablet 100 mg  100 mg Oral Q DAY Abdulaziz Nieto M.D.   50 mg at  01/02/25 0542    lidocaine (Asperflex) 4 % patch 1 Patch  1 Patch Transdermal Q24HR Yosi Espino M.D.   1 Patch at 01/03/25 1143    ferrous sulfate tablet 325 mg  325 mg Oral QDAY with Breakfast Yosi Espino M.D.   325 mg at 01/03/25 0854       Fluids    Intake/Output Summary (Last 24 hours) at 1/4/2025 0511  Last data filed at 1/4/2025 0400  Gross per 24 hour   Intake 47.87 ml   Output 1220 ml   Net -1172.13 ml       Laboratory  Recent Labs     01/03/25 1947 01/03/25  1948 01/03/25  2115 01/04/25  0107 01/04/25  0143 01/04/25  0423   DHJPX28P  --  7.13*  --  7.24*  --   --    EVUIYU411P  --  96.7*  --  72.3*  --   --    [id number]  --  92.1  --  65.4*  --   --    QSJG7JZD  --  93.8  --  87.0*  --   --    ARTHCO3  --  32*  --  31*  --   --    J9ESJOGVI  --  15L  --  6  --   --    ARTBE  --  0  --  2  --   --    ISTATAPH 7.142*  --  7.362  --  7.080* 7.389   ISTATAPCO2 99.9*  --  55.2*  --  >100.0* 48.4*   ISTATAPO2 83  --  233*  --  79* 253*   ISTATATCO2 37  --  33  --  38 31*   PCZEMBQ0MAG 91*  --  100*  --  87* 100*   ISTATARTHCO3 34.1*  --  31.3*  --  34.7* 29.2*   ISTATARTBE 2  --  5*  --  1 4*   ISTATTEMP 99.9 F  --  see below  --  97.3 F 96.6 F   ISTATFIO2  --   --   --   --   --  100   ISTATSPEC Arterial  --  Arterial  --  Arterial Arterial   ISTATAPHTC 7.132*  --   --   --  7.089* 7.405   NTOHFYFF4MU 87  --   --   --  75 248*         Recent Labs     01/02/25 0150 01/03/25 0014 01/03/25 1946 01/04/25 0107   SODIUM 136 134*  --  136   POTASSIUM 4.7 4.5  --  4.8   CHLORIDE 98 98  --  99   CO2 28 29  --  30   BUN 14 20  --  15   CREATININE 0.77 0.83  --  0.61   MAGNESIUM  --   --   --  1.9   PHOSPHORUS 4.2  --  2.9  --    CALCIUM 8.9 8.3*  --  9.3     Recent Labs     01/02/25 0150 01/03/25 0014 01/04/25 0107   ALTSGPT  --   --  72*   ASTSGOT  --   --  216*   ALKPHOSPHAT  --   --  121*   TBILIRUBIN  --   --  0.6   GLUCOSE 237* 138* 174*     Recent Labs     01/03/25 0014 01/03/25 1946  01/04/25  0107   WBC 6.7 14.6* 13.0*   NEUTSPOLYS  --  71.00 73.40*   LYMPHOCYTES  --  12.80* 9.50*   MONOCYTES  --  14.40* 15.70*   EOSINOPHILS  --  0.10 0.00   BASOPHILS  --  0.50 0.50   ASTSGOT  --   --  216*   ALTSGPT  --   --  72*   ALKPHOSPHAT  --   --  121*   TBILIRUBIN  --   --  0.6     Recent Labs     01/01/25  0737 01/02/25  0150 01/03/25  0014 01/03/25  1946 01/04/25  0107   RBC  --    < > 3.19* 3.72* 3.54*   HEMOGLOBIN  --    < > 9.2* 10.7* 10.1*   HEMATOCRIT  --    < > 28.8* 33.9* 32.7*   PLATELETCT  --    < > 302 467* 399   PROTHROMBTM 12.8  --   --   --   --    INR 0.96  --   --   --   --     < > = values in this interval not displayed.       Imaging  X-Ray:  I have personally reviewed the images and compared with prior images. and My impression is: Near complete resolution of right pneumothorax following closed tube thoracostomy with right basilar greater than left basilar opacities.    Assessment/Plan  * Malignant neoplasm of breast (HCC)- (present on admission)  Assessment & Plan  Stage IV breast carcinoma with metastases to bones and malignant pleural effusions    Septic shock (HCC)  Assessment & Plan  This is Septic shock Not present on admission  SIRS criteria identified on my evaluation include: Tachycardia, with heart rate greater than 90 BPM, Tachypnea, with respirations greater than 20 per minute, and Leukocytosis, with WBC greater than 12,000  Clinical indicators of end organ dysfunction include Hypotension with systolic blood pressure less than 90 or MAP less than 65 and Acute Respiratory Failure - (mechanical ventilation or BiPap or PaO2/FiO2 ratio < 300)  Indicators of septic shock include: Sepsis present and persistent hypotension despite fluid resuscitation   Source is: unclear - possible pneumonia, urinary  Sepsis protocol initiated  Crystalloid Fluid Administration: Fluid resuscitation ordered per standard protocol - 30 mL/kg per current or ideal body weight  IV antibiotics as  appropriate for source of sepsis  Reassessment: I have reassessed the patient's hemodynamic status    Culture blood, sputum and urine  Begin empiric PIP/Tazo and vancomycin  I am titrating norepinephrine to keep mean arterial pressure in 65    Acute on chronic respiratory failure with hypoxia and hypercapnia (HCC)  Assessment & Plan  Emergently intubated on 1/4  ABCDEF bundle  SAT/SBT as appropriate  Mobility level 1 for now    Pneumothorax  Assessment & Plan  Right pneumothorax  S/P pigtail catheter placement on 1/3/2025 with significant initial improvement - subsequently developed worsening right pneumothorax  S/P right closed tube thoracostomy on 1/4/2025 with 20 Chinese chest tube with near complete resolution of right pneumothorax  Keep chest tube to suction at 20 cm of water    Pathological fracture of vertebra- (present on admission)  Assessment & Plan  Pathologic fracture of T5 with myelopathy on presentation  S/P T4-6 laminectomy with fusion and spinal decompression on 1/1/2025 by Dr. Lowery    Bilateral pleural effusion- (present on admission)  Assessment & Plan  Malignant pleural effusions  S/P left thoracentesis on 1/2/2025  S/P right closed tube thoracostomy on 1/4/2025    Primary hypertension- (present on admission)  Assessment & Plan  Currently in shock on vasopressor support  Hold carvedilol and losartan and resume when clinically appropriate    Diabetes mellitus, type 2 (HCC)- (present on admission)  Assessment & Plan  Glycohemoglobin 6.4  Sliding scale insulin    Restless leg syndrome- (present on admission)  Assessment & Plan       Cancer related pain- (present on admission)  Assessment & Plan  Continue analgesia         VTE:  Lovenox  Ulcer: H2 Antagonist  Lines: Central Line  Ongoing indication addressed and Mojica Catheter  Ongoing indication addressed    I have performed a physical exam and reviewed and updated ROS and Plan today (1/4/2025). In review of yesterday's note (1/3/2025), there are  no changes except as documented above.     I have assessed and reassessed her respiratory status with ventilator adjustments, airway mechanics, ventilator waveforms, blood pressure, hemodynamics, cardiovascular status with titration of norepinephrine and her neurologic status.  She is at increased risk for worsening respiratory, cardiovascular and CNS system dysfunction.    Discussed patient condition and risk of morbidity and/or mortality with Family, RN, and RT    The patient remains critically ill.  Critical care time = 135 minutes in directly providing and coordinating critical care and extensive data review.  No time overlap and excludes procedures.    Americo Avalos MD  Pulmonary and Critical Care Medicine

## 2025-01-04 NOTE — PROGRESS NOTES
"Pharmacy Vancomycin Kinetics Note for 1/4/2025     64 y.o. female on Vancomycin day # 1     Vancomycin Indication (AUC Dosing): Sepsis    Provider specified end date: 01/07/25    Active Antibiotics (From admission, onward)      Ordered     Ordering Provider       Sat Jan 4, 2025  5:30 AM    01/04/25 0530  vancomycin (Vancocin) 1,000 mg in  mL IVPB  (vancomycin (VANCOCIN) IV (LD + Maintenance))  EVERY 12 HOURS         Americo Avalos M.D.       Sat Jan 4, 2025  5:08 AM    01/04/25 0508  vancomycin (Vancocin) 2,500 mg in  mL IVPB  (vancomycin (VANCOCIN) IV (LD + Maintenance))  ONCE         Americo Avalos M.D.       Sat Jan 4, 2025  4:53 AM    01/04/25 0453  MD Alert...Vancomycin per Pharmacy  PHARMACY TO DOSE        Question:  Indication(s) for vancomycin?  Answer:  Unknown source of infection    Americo Avalos M.D.    01/04/25 0453  piperacillin-tazobactam (Zosyn) 4.5 g in  mL IVPB  (piperacillin-tazobactam (ZOSYN) IV (Extended-infusion) PANEL )  ONCE        Placed in \"And\" Linked Group    Americo Avalos M.D.    01/04/25 0453  piperacillin-tazobactam (Zosyn) 4.5 g in  mL IVPB  (piperacillin-tazobactam (ZOSYN) IV (Extended-infusion) PANEL )  EVERY 8 HOURS        Placed in \"And\" Linked Group    Americo Avalos M.D.            Dosing Weight: 96.2 kg (212 lb 1.3 oz)      Admission History: Admitted on 12/25/2024 for Breast cancer metastasized to bone, unspecified laterality (HCC) [C50.919, C79.51]  Pertinent history: Patient was a rapid response overnight requiring intubation. Hx of metastatic breast CA on chemotherapy. Empiric abx initiated.    Allergies:     Demerol     Pertinent cultures to date:     Results       Procedure Component Value Units Date/Time    MRSA By PCR (Amp) [[id number]]     Order Status: No result Specimen: Respirate from Nares     Urinalysis [[id number]] Collected: 01/04/25 0509    Order Status: Completed Specimen: Urine, Mojica Cath " Updated: 01/04/25 0522     Micro Urine Req Microscopic    Urine Culture [[id number]] Collected: 01/04/25 0509    Order Status: Sent Specimen: Urine, Mojica Cath     Blood Culture [[id number]]     Order Status: Sent Specimen: Blood from Peripheral     Blood Culture [[id number]]     Order Status: Sent Specimen: Blood from Line     URINALYSIS [[id number]] Collected: 01/04/25 0230    Order Status: Sent Specimen: Urine, Mojica Cath     Fungal Culture [[id number]] Collected: 12/27/24 1215    Order Status: Completed Specimen: Body Fluid Updated: 12/31/24 0949     Significant Indicator NEG     Source BF     Site Thoracentesis Fluid     Culture Result No fungal growth to date.     Fungal Smear Results No fungal elements seen.    FLUID CULTURE W/GRAM STAIN [[id number]] Collected: 12/27/24 1215    Order Status: Completed Specimen: Body Fluid Updated: 12/31/24 0949     Significant Indicator NEG     Source BF     Site Thoracentesis Fluid     Culture Result No growth at 72 hours.     Gram Stain Result Moderate WBCs.  No organisms seen.      AFB Culture [[id number]] Collected: 12/27/24 1215    Order Status: Completed Specimen: Body Fluid Updated: 12/31/24 0949     Significant Indicator NEG     Source BF     Site Thoracentesis Fluid     Culture Result Culture in progress.     AFB Smear Results No acid fast bacilli seen.    Fungal Smear [[id number]] Collected: 12/27/24 1215    Order Status: Completed Specimen: Body Fluid Updated: 12/28/24 1736     Significant Indicator NEG     Source BF     Site Thoracentesis Fluid     Fungal Smear Results No fungal elements seen.    GRAM STAIN [[id number]] Collected: 12/27/24 1215    Order Status: Completed Specimen: Body Fluid Updated: 12/28/24 1736     Significant Indicator .     Source BF     Site Thoracentesis Fluid     Gram Stain Result Moderate WBCs.  No organisms seen.      Acid Fast Stain [[id number]] Collected: 12/27/24 1215    Order Status: Completed Specimen: Body Fluid Updated: 12/28/24 1736      "Significant Indicator NEG     Source BF     Site Thoracentesis Fluid     AFB Smear Results No acid fast bacilli seen.    FLUID CULTURE W/GRAM STAIN [[id number]]     Order Status: No result Specimen: Body Fluid from Thoracentesis Fluid     AFB CULTURE [[id number]]     Order Status: No result Specimen: Body Fluid from Thoracentesis Fluid     FUNGAL CULTURE [[id number]]     Order Status: No result Specimen: Body Fluid from Thoracentesis Fluid     Urinalysis [[id number]]  (Abnormal) Collected: 24    Order Status: Completed Specimen: Urine Updated: 24     Color Yellow     Character Cloudy     Specific Gravity 1.014     Ph 5.5     Glucose Negative mg/dL      Ketones Trace mg/dL      Protein 30 mg/dL      Bilirubin Negative     Urobilinogen, Urine 1.0 EU/dL      Nitrite Negative     Leukocyte Esterase Moderate     Occult Blood Small     Micro Urine Req Microscopic            Labs:     Estimated Creatinine Clearance: 111.5 mL/min (by C-G formula based on SCr of 0.61 mg/dL).  Recent Labs     25  0150 25  0014 25  19425  010   WBC 6.7 6.7 14.6* 13.0*   NEUTSPOLYS  --   --  71.00 73.40*     Recent Labs     25  0150 25  0014 25  0107   BUN 14 20 15   CREATININE 0.77 0.83 0.61   ALBUMIN 3.2  --  3.4       Intake/Output Summary (Last 24 hours) at 2025 0531  Last data filed at 2025 0400  Gross per 24 hour   Intake 47.87 ml   Output 1220 ml   Net -1172.13 ml      /58   Pulse 75   Temp 36 °C (96.8 °F) (Temporal)   Resp 18   Ht 1.727 m (5' 8\")   Wt 96.2 kg (212 lb 1.3 oz)   SpO2 100%  Temp (24hrs), Av.1 °C (98.7 °F), Min:36 °C (96.8 °F), Max:38.3 °C (100.9 °F)      List concerns for Vancomycin clearance:     Age;Abnormal LFTs;Pressors/Hypotension;Obesity    Pharmacokinetics:     AUC kinetics:   Ke (hr ^-1): 0.104 hr^-1  Half life: 6.66 hr  Clearance: 4.408  Estimated TDD: 2204  Estimated Dose: 799  Estimated interval: 8.7    A/P:     -  Vancomycin " dose: 1000 mg IV q12h    -  Next vancomycin level(s):    -TBD     -  Predicted vancomycin AUC from initial AUC test calculator: 454 mg·hr/L    -  Comments: empiric abx for unknown source of infection. Renal indices at baseline however, patient being started on vasopressor support. Initiating AUC dosing but will monitor closely for need to change to trough dosing, based on clinical picture. Creatinine clearance estimated utilizing a Scr of 0.7 to standardize AUC calculation. Peak and trough to be determined once patient closer to steady state if abx are to continue. MRSA nares ordered for de-escalation purposes. Pharmacy to adjust dosing based on clearance concerns, levels and cultures.       [place] العلي, PharmD

## 2025-01-04 NOTE — PROGRESS NOTES
4 Eyes Skin Assessment Completed by RIYA Hummel and RIYA Lyon.    Head Redness scalp redness  Ears WDL  Nose WDL  Mouth Redness dry   Neck WDL  Breast/Chest Redness and Bruising radiation injury to left breast  Shoulder Blades WDL  Spine Incision-surgical site redness, blanching, bruishing  (R) Arm/Elbow/Hand Redness and Bruising  (L) Arm/Elbow/Hand Redness and Bruising  Abdomen Bruising  Groin Redness and Excoriation discoloration rash  Scrotum/Coccyx/Buttocks Redness, Blanching, and Discoloration bruising  (R) Leg Bruising  (L) Leg Bruising  (R) Heel/Foot/Toe Redness and Blanching dry  (L) Heel/Foot/Toe Redness and Blanching dry      Devices In Places Tele Box, Blood Pressure Cuff, Pulse Ox, Mojica, SCD's, and ET Tube      Interventions In Place Sacral Mepilex, TAP System, Pillows, Q2 Turns, Low Air Loss Mattress, Barrier Cream, ZFlo Pillow, Dri-Pavan Pads, and Heels Loaded W/Pillows    Possible Skin Injury Yes    Pictures Uploaded Into Epic   Wound Consult Placed N/A  RN Wound Prevention Protocol Ordered Yes

## 2025-01-04 NOTE — PROCEDURES
"Arterial Line Insertion    Date/Time: 1/4/2025 3:36 AM    Performed by: Aidee Hicks  Authorized by: Aidee Hicks  Consent: The procedure was performed in an emergent situation.  Risks and benefits: risks, benefits and alternatives were discussed  Consent given by: unable 2/2 pt condition.  Required items: required blood products, implants, devices, and special equipment available  Patient identity confirmed: arm band and hospital-assigned identification number  Time out: Immediately prior to procedure a \"time out\" was called to verify the correct patient, procedure, equipment, support staff and site/side marked as required.  Preparation: Patient was prepped and draped in the usual sterile fashion.  Indications: multiple ABGs, respiratory failure and hemodynamic monitoring  Location: right radial  Anesthesia: local infiltration    Anesthesia:  Local Anesthetic: lidocaine 1% without epinephrine  Anesthetic total: 0.5 mL    Sedation:  Patient sedated: yes  Sedatives: see MAR for details  Analgesia: see MAR for details    Dieter's test normal: yes  Needle gauge: 20  Seldinger technique: Seldinger technique used  Number of attempts: 1  Post-procedure: line sutured and dressing applied  Post-procedure CMS: normal and unchanged  Patient tolerance: patient tolerated the procedure well with no immediate complications  Comments: The wire is accounted for, there is an appropriate waveform noted on the monitor for titration of vasopressors.              "

## 2025-01-04 NOTE — PROCEDURES
Central Line Insertion    Date/Time: 1/4/2025 3:53 AM    Performed by: Aidee Hicks  Authorized by: Aidee Hicks    Consent:     Consent obtained:  Emergent situation    Consent given by: unable 2/2 pt condition.    Risks discussed:  Arterial puncture, incorrect placement, nerve damage, pneumothorax, infection and bleeding    Alternatives discussed:  No treatment and delayed treatment  Universal protocol:     Imaging studies available: yes      Required blood products, implants, devices, and special equipment available: yes      Site/side marked: yes      Immediately prior to procedure, a time out was called: yes      Patient identity confirmed:  Arm band and hospital-assigned identification number  Pre-procedure details:     Hand hygiene: Hand hygiene performed prior to insertion      Sterile barrier technique: All elements of maximal sterile technique followed      Skin preparation:  ChloraPrep    Skin preparation agent: Skin preparation agent completely dried prior to procedure    Sedation:     Sedation type: see mar.  Anesthesia:     Anesthesia method:  Local infiltration    Local anesthetic:  Lidocaine 1% w/o epi  Procedure details:     Location:  R internal jugular    Patient position:  Trendelenburg    Procedural supplies:  Triple lumen    Catheter size:  7 Fr    Landmarks identified: yes      Ultrasound guidance: yes      Sterile ultrasound techniques: Sterile gel and sterile probe covers were used      Number of attempts:  1    Successful placement: yes    Post-procedure details:     Post-procedure:  Dressing applied and line sutured    Guidewire: guidewire removal confirmed      Assessment:  Blood return through all ports, no pneumothorax on x-ray, free fluid flow and placement verified by x-ray  Comments:      The wire is accounted for, the line is ok to use.

## 2025-01-04 NOTE — ASSESSMENT & PLAN NOTE
Right pneumothorax s/p pigtail catheter placement on 1/3/2025 with significant initial improvement - subsequently developed worsening right pneumothorax and a right closed tube thoracostomy on 1/4/2025 with 20 Congolese chest tube with near complete resolution of right pneumothorax  pneumothorax resolved. Pigtail catheter removed 1/7

## 2025-01-04 NOTE — PROGRESS NOTES
Rapid Response Summary     Rapid response called at 1919 for: Shortness of breath  and Tachycardia     VS: Tachycardia , Tachypneic , Hypertensive , and Hypoxic  (See Vitals Flowsheet)  Additional info: CXR shows R pnuemothorax  MD Paged: Yareli, Block  Interventions:    Imaging/Tests: Chest X-ray and EKG    Labs: CBC, BMP, Lactic Acid, ABG , and Troponin   Medications: Narcan    Other:  R Chest tube placed  Disposition: Improved with rapid response team interventions. Primary RN updated on plan of care. Transfer not indicated at this time.  and Rapid team will continue to follow the patient.

## 2025-01-04 NOTE — PROGRESS NOTES
Dr. Shi at bedside for CT insertion.   Patient and family consent.     All emergency equipment present and available.   Images reviewed.   Allergy to demerol.   Time out 2037 all agree.     Pharmacist and Dr. Ignacio present.   Pt on bipap with RT present. 13/5 100%    2040 procedure start.   8.5 pigtail CT placed with serous fluid returned.   Xray ordered.   -20 mmHg suction.   Vital signs stable throughout.     End time: 2050

## 2025-01-04 NOTE — ASSESSMENT & PLAN NOTE
intubated 1/4 - 1/5  extubated 1/5 - 1/8    ARDS vs lymphangetic spread vs both  Pt understand re: progression of her cancer ad no options for tx  She would like to go home with hospice  Hospice team consulted  Fio2 back up

## 2025-01-04 NOTE — PROCEDURES
Date of Procedure:  1/4/2025    Title of Procedure:  Diagnostic and therapeutic flexible fiberoptic bronchoscopy with bronchoalveolar lavage    Indication for Procedure:   Atelectasis    Post-procedure Diagnoses:    1.  Normal endobronchial anatomy  2.  No endobronchial tumor identified  3.  Copious amounts of juicy white secretions seen in the distal trachea, left mainstem bronchus and right mainstem bronchus    Narrative:    A time out was performed identifying the correct patient, correct procedure and correct location prior to this procedure.    The patient was sedated, intubated and ventilated at the time of this procedure.  The flexible fiberoptic bronchoscope was inserted through the lumen of the endotracheal tube and advanced into the distal trachea without difficulty.  The airways were examined to the subsegmental bronchus level bilaterally.    The endobronchial anatomy was normal.  No tumor was identified.    There was copious amounts of juicy white secretions seen in the distal trachea, left mainstem bronchus and right mainstem bronchus.  I therapeutically suctioned these secretions.    Bronchoalveolar lavage was carried out in the basilar segments of the right lower lobe bronchi using the standard technique with good fluid return.    Bronchoalveolar lavage fluid from the right lower lobe is submitted to the laboratory for cytology, gram stain, culture and sensitivity, acid fast bacilli smear and culture and fungal culture.    The patient tolerated the procedure quite nicely.  No complications were apparent.  The heart rate and rhythm, blood pressure and oxygenation saturation were continuously monitored.    This was an EMERGENT procedure.  It was medically necessary to perform this procedure emergently to prevent life threatening deterioration.    Americo Avalos MD  Pulmonary and Critical Care Medicine

## 2025-01-04 NOTE — CONSULTS
Critical Care Consultation    Date of consult: 1/3/2025    Referring Physician  Eduardo Muniz M.D.    Reason for Consultation  Acute hypercapneic respiratory failure    History of Presenting Illness  64 y.o. female with history of breast cancer with metastasis to brain, lungs, pleura, liver, spine, admitted for hypoxemic respiratory failure on 12/25 related to bilateral pleural effusions.  She underwent right-sided thoracentesis on 12/27.  Right-sided pneumothorax was identified on 1/2 and was managed medically.  She underwent left-sided thoracentesis on 1/2.    This evening she developed encephalopathy and blood gas was obtained, 7.14/100.  Intensivist was consulted for further management.    She is unable to provide any meaningful history.    Code Status  Full Code    Review of Systems  ROS    Past Medical History   has a past medical history of Anxiety, Breast cancer metastasized to bone, unspecified laterality (HCC) (12/25/2024), Depression, Diabetes (HCC), HLD (hyperlipidemia), HTN (hypertension), and Hypothyroidism.    Surgical History   has a past surgical history that includes other; cholecystectomy; thoracic laminectomy (1/1/2025); and fusion, spine, thoracic approach (1/1/2025).    Family History  family history includes Cancer in her maternal grandfather; Lung Cancer in her mother; Stomach Cancer in her paternal grandmother; Uterine cancer in her sister.    Social History   reports that she has never smoked. She has never used smokeless tobacco. She reports current alcohol use. She reports current drug use. Drug: Marijuana.    Medications  Home Medications       Reviewed by Marleny Turcios R.N. (Registered Nurse) on 01/01/25 at 1515  Med List Status: Complete     Medication Last Dose Status   Abemaciclib 150 MG Tab 12/25/2024 Active   carvedilol (COREG) 3.125 MG Tab 12/25/2024 Active   HYDROcodone-acetaminophen (NORCO) 5-325 MG Tab per tablet 12/25/2024 Active   letrozole (FEMARA) 2.5 MG Tab New  Rx Active   losartan (COZAAR) 100 MG Tab 12/25/2024 Active                  Audit from Redirected Encounters    **Home medications have not yet been reviewed for this encounter**       Current Facility-Administered Medications   Medication Dose Route Frequency Provider Last Rate Last Admin    omeprazole (PriLOSEC) capsule 20 mg  20 mg Oral DAILY Griselda Shaver M.D.   20 mg at 01/03/25 1150    HYDROcodone/acetaminophen (Norco)  MG per tablet 1 Tablet  1 Tablet Oral Q6HRS PRN Neo Ceja, P.AMimi   1 Tablet at 01/03/25 1309    bisacodyl (Dulcolax) suppository 10 mg  10 mg Rectal DAILY Neo Ceja, P.AMimi   10 mg at 01/03/25 1151    melatonin tablet 5 mg  5 mg Oral Nightly Sandra Long D.O.        Respiratory Therapy Consult   Nebulization Continuous RT Eduardo Muniz M.D.        LABETALOL HCL 5 MG/ML IV SOLN             lidocaine-epinephrine 1 %-1:757501 1 %-1:545706 injection 20 mL  20 mL Injection Once Jordan Shi M.D.        melatonin tablet 5 mg  5 mg Oral HS PRN Parth Downs M.D.   5 mg at 01/02/25 2021    LORazepam (Ativan) tablet 0.25 mg  0.25 mg Oral Q4HRS PRN Griselda Shaver M.D.   0.25 mg at 01/03/25 1436    metoclopramide (Reglan) tablet 10 mg  10 mg Oral Q6HRS Eduardo Muniz M.D.   10 mg at 01/03/25 1736    benzocaine-menthol (Cepacol) lozenge 1 Lozenge  1 Lozenge Mouth/Throat Q2HRS PRN Omar Saleh M.D.        sore throat spray (Chloraseptic) 1 Spray  1 Spray Mouth/Throat Q2HRS PRN Omar Saleh M.D.   1 Spray at 01/02/25 0049    sodium chloride (Ocean) 0.65 % nasal spray 2 Spray  2 Spray Nasal Q2HRS PRN Omar Saleh M.D.   2 Spray at 01/01/25 0600    morphine ER (Ms Contin) tablet 15 mg  15 mg Oral Q12HRS Griselda Shaver M.D.   15 mg at 01/03/25 1723    MD ALERT...DO NOT ADMINISTER NSAIDS or ASPIRIN unless ORDERED By Neurosurgery 1 Each  1 Each Other PRN Karthik Lowery D.O.        docusate sodium (Colace) capsule 100 mg  100 mg Oral BID Karthik Lowery D.O.    100 mg at 01/03/25 1724    senna-docusate (Pericolace Or Senokot S) 8.6-50 MG per tablet 1 Tablet  1 Tablet Oral Nightly Karthik Lowery, D.O.   1 Tablet at 01/02/25 2022    senna-docusate (Pericolace Or Senokot S) 8.6-50 MG per tablet 1 Tablet  1 Tablet Oral Q24HRS PRN Karthik Lowery D.O.        polyethylene glycol/lytes (Miralax) Packet 1 Packet  1 Packet Oral BID PRN Karthik Lowery D.O.        magnesium hydroxide (Milk Of Magnesia) suspension 30 mL  30 mL Oral QDAY PRN Karthik Lowery D.O.        bisacodyl (Dulcolax) suppository 10 mg  10 mg Rectal Q24HRS PRN Karthik Lowery, D.O.        heparin injection 5,000 Units  5,000 Units Subcutaneous Q8HRS Karthik Lowery, D.O.   5,000 Units at 01/03/25 1724    methocarbamol (Robaxin) tablet 750 mg  750 mg Oral Q8HRS PRN Karthik Lowery D.O.   750 mg at 01/02/25 2027    magnesium oxide tablet 400 mg  400 mg Oral DAILY Nalyssa CLEMENCIA Sal D.O.   400 mg at 01/02/25 0543    ROPINIRole (Requip) tablet 1 mg  1 mg Oral BID PRN Familia Sal D.O.   1 mg at 01/01/25 0835    calcium carbonate (Tums) chewable tab 500 mg  500 mg Oral TID PRN Katherine Brown M.D.   500 mg at 01/03/25 0137    ondansetron (Zofran) syringe/vial injection 4 mg  4 mg Intravenous Q4HRS PRN Abdulaziz Nieto M.D.   4 mg at 01/03/25 0854    ondansetron (Zofran ODT) dispertab 4 mg  4 mg Oral Q4HRS PRN Abdulaziz Nieto M.D.   4 mg at 01/02/25 2027    [Held by provider] carvedilol (Coreg) tablet 3.125 mg  3.125 mg Oral BID Abdulaziz Nieto M.D.   3.125 mg at 01/02/25 0543    [Held by provider] losartan (Cozaar) tablet 100 mg  100 mg Oral Q DAY Abdulaziz Nieto M.D.   50 mg at 01/02/25 0542    lidocaine (Asperflex) 4 % patch 1 Patch  1 Patch Transdermal Q24HR Yosi Espino M.D.   1 Patch at 01/03/25 1143    ferrous sulfate tablet 325 mg  325 mg Oral QDAY with Breakfast Yosi Espino M.D.   325 mg at 01/03/25 0854    ROPINIRole (Requip) tablet 1 mg  1 mg Oral QHS Petra Agustin A.P.R.NMimi   1 mg at  "12/31/24 2232    fentaNYL (Sublimaze) injection 25 mcg  25 mcg Intravenous Q HOUR PRN Katherine Brown M.D.   25 mcg at 01/03/25 1152       Allergies  Allergies   Allergen Reactions    Demerol Rash     \"was told this during surgery\"  Other reaction(s): rash       Vital Signs last 24 hours  Temp:  [36.2 °C (97.2 °F)-38.3 °C (100.9 °F)] 37.4 °C (99.3 °F)  Pulse:  [] 109  Resp:  [16-30] 20  BP: (104-210)/() 138/96  SpO2:  [86 %-98 %] 97 %    Physical Exam  Physical Exam  Vitals and nursing note reviewed.   Constitutional:       General: She is not in acute distress.     Appearance: She is obese. She is ill-appearing. She is not toxic-appearing.   HENT:      Head: Normocephalic and atraumatic.   Eyes:      Comments: miosis   Cardiovascular:      Rate and Rhythm: Regular rhythm. Tachycardia present.   Pulmonary:      Effort: No respiratory distress.   Abdominal:      General: There is no distension.      Palpations: Abdomen is soft.   Musculoskeletal:         General: No swelling.   Skin:     General: Skin is warm.   Neurological:      Mental Status: She is alert.      Comments: Moans to voice  Gives thumbs up to command           Fluids    Intake/Output Summary (Last 24 hours) at 1/3/2025 2224  Last data filed at 1/3/2025 2100  Gross per 24 hour   Intake 400 ml   Output 745 ml   Net -345 ml       Laboratory  Recent Results (from the past 48 hours)   Basic Metabolic Panel (BMP)    Collection Time: 01/02/25  1:50 AM   Result Value Ref Range    Sodium 136 135 - 145 mmol/L    Potassium 4.7 3.6 - 5.5 mmol/L    Chloride 98 96 - 112 mmol/L    Co2 28 20 - 33 mmol/L    Glucose 237 (H) 65 - 99 mg/dL    Bun 14 8 - 22 mg/dL    Creatinine 0.77 0.50 - 1.40 mg/dL    Calcium 8.9 8.5 - 10.5 mg/dL    Anion Gap 10.0 7.0 - 16.0   CBC without Differential    Collection Time: 01/02/25  1:50 AM   Result Value Ref Range    WBC 6.7 4.8 - 10.8 K/uL    RBC 3.55 (L) 4.20 - 5.40 M/uL    Hemoglobin 10.2 (L) 12.0 - 16.0 g/dL    " Hematocrit 32.0 (L) 37.0 - 47.0 %    MCV 90.1 81.4 - 97.8 fL    MCH 28.7 27.0 - 33.0 pg    MCHC 31.9 (L) 32.2 - 35.5 g/dL    RDW 58.2 (H) 35.9 - 50.0 fL    Platelet Count 287 164 - 446 K/uL    MPV 9.3 9.0 - 12.9 fL   ESTIMATED GFR    Collection Time: 25  1:50 AM   Result Value Ref Range    GFR (CKD-EPI) 85 >60 mL/min/1.73 m 2   Renal Function Panel    Collection Time: 25  1:50 AM   Result Value Ref Range    Correct Calcium 9.5 8.5 - 10.5 mg/dL    Phosphorus 4.2 2.5 - 4.5 mg/dL    Albumin 3.2 3.2 - 4.9 g/dL   POCT glucose device results    Collection Time: 25  7:43 AM   Result Value Ref Range    POC Glucose, Blood 193 (H) 65 - 99 mg/dL   POCT glucose device results    Collection Time: 25 12:13 PM   Result Value Ref Range    POC Glucose, Blood 166 (H) 65 - 99 mg/dL   EKG    Collection Time: 25  1:35 PM   Result Value Ref Range    Report       [facility]    Test Date:  2025  Pt Name:    CHILANGO MUÑOZ         Department: [facility]  MRN:        [id number]                      Room:       [facility]  Gender:     Female                       Technician: [facility]  :        1960                   Requested By:AZUCENA WEEKS  Order #:    [id number]                    Reading MD: iRvera Mahmood MD    Measurements  Intervals                                Axis  Rate:       71                           P:          10  AL:         169                          QRS:        24  QRSD:       94                           T:          29  QT:         380  QTc:        413    Interpretive Statements  Sinus rhythm  Probable anteroseptal infarct, age indeterminate  Electronically Signed On 2025 13:35:01 PST by Rivera Mahmood MD     POCT glucose device results    Collection Time: 25  6:04 PM   Result Value Ref Range    POC Glucose, Blood 169 (H) 65 - 99 mg/dL   POCT glucose device results    Collection Time: 25 10:41 PM   Result Value Ref Range    POC Glucose, Blood 131 (H) 65 - 99 mg/dL    Basic Metabolic Panel (BMP)    Collection Time: 01/03/25 12:14 AM   Result Value Ref Range    Sodium 134 (L) 135 - 145 mmol/L    Potassium 4.5 3.6 - 5.5 mmol/L    Chloride 98 96 - 112 mmol/L    Co2 29 20 - 33 mmol/L    Glucose 138 (H) 65 - 99 mg/dL    Bun 20 8 - 22 mg/dL    Creatinine 0.83 0.50 - 1.40 mg/dL    Calcium 8.3 (L) 8.5 - 10.5 mg/dL    Anion Gap 7.0 7.0 - 16.0   CBC without Differential    Collection Time: 01/03/25 12:14 AM   Result Value Ref Range    WBC 6.7 4.8 - 10.8 K/uL    RBC 3.19 (L) 4.20 - 5.40 M/uL    Hemoglobin 9.2 (L) 12.0 - 16.0 g/dL    Hematocrit 28.8 (L) 37.0 - 47.0 %    MCV 90.3 81.4 - 97.8 fL    MCH 28.8 27.0 - 33.0 pg    MCHC 31.9 (L) 32.2 - 35.5 g/dL    RDW 58.7 (H) 35.9 - 50.0 fL    Platelet Count 302 164 - 446 K/uL    MPV 9.2 9.0 - 12.9 fL   ESTIMATED GFR    Collection Time: 01/03/25 12:14 AM   Result Value Ref Range    GFR (CKD-EPI) 78 >60 mL/min/1.73 m 2   POCT glucose device results    Collection Time: 01/03/25 12:50 PM   Result Value Ref Range    POC Glucose, Blood 192 (H) 65 - 99 mg/dL   POCT glucose device results    Collection Time: 01/03/25  5:43 PM   Result Value Ref Range    POC Glucose, Blood 159 (H) 65 - 99 mg/dL   CBC WITH DIFFERENTIAL    Collection Time: 01/03/25  7:46 PM   Result Value Ref Range    WBC 14.6 (H) 4.8 - 10.8 K/uL    RBC 3.72 (L) 4.20 - 5.40 M/uL    Hemoglobin 10.7 (L) 12.0 - 16.0 g/dL    Hematocrit 33.9 (L) 37.0 - 47.0 %    MCV 91.1 81.4 - 97.8 fL    MCH 28.8 27.0 - 33.0 pg    MCHC 31.6 (L) 32.2 - 35.5 g/dL    RDW 58.6 (H) 35.9 - 50.0 fL    Platelet Count 467 (H) 164 - 446 K/uL    MPV 9.1 9.0 - 12.9 fL    Neutrophils-Polys 71.00 44.00 - 72.00 %    Lymphocytes 12.80 (L) 22.00 - 41.00 %    Monocytes 14.40 (H) 0.00 - 13.40 %    Eosinophils 0.10 0.00 - 6.90 %    Basophils 0.50 0.00 - 1.80 %    Immature Granulocytes 1.20 (H) 0.00 - 0.90 %    Nucleated RBC 0.10 0.00 - 0.20 /100 WBC    Neutrophils (Absolute) 10.33 (H) 1.82 - 7.42 K/uL    Lymphs (Absolute) 1.87  1.00 - 4.80 K/uL    Monos (Absolute) 2.10 (H) 0.00 - 0.85 K/uL    Eos (Absolute) 0.01 0.00 - 0.51 K/uL    Baso (Absolute) 0.07 0.00 - 0.12 K/uL    Immature Granulocytes (abs) 0.18 (H) 0.00 - 0.11 K/uL    NRBC (Absolute) 0.02 K/uL   LACTIC ACID    Collection Time: 01/03/25  7:46 PM   Result Value Ref Range    Lactic Acid 1.2 0.5 - 2.0 mmol/L   PHOSPHORUS    Collection Time: 01/03/25  7:46 PM   Result Value Ref Range    Phosphorus 2.9 2.5 - 4.5 mg/dL   TROPONIN    Collection Time: 01/03/25  7:46 PM   Result Value Ref Range    Troponin T 44 (H) 6 - 19 ng/L   IMMATURE PLT FRACTION    Collection Time: 01/03/25  7:46 PM   Result Value Ref Range    Imm. Plt Fraction 2.5 0.6 - 13.1 %   POCT arterial blood gas device results    Collection Time: 01/03/25  7:47 PM   Result Value Ref Range    Ph 7.142 (LL) 7.350 - 7.450    Pco2 99.9 (HH) 32.0 - 48.0 mmHg    Po2 83 83 - 108 mmHg    Tco2 37 32 - 48 mmol/L    S02 91 (L) 93 - 99 %    Hco3 34.1 (H) 21.0 - 28.0 mmol/L    BE 2 -4 - 3 mmol/L    Body Temp 99.9 F degrees    Ph Temp Martina 7.132 (LL) 7.350 - 7.450    Pco2 Temp Co >100.0 (HH) 32.0 - 48.0 mmHg    Po2 Temp Cor 87 64 - 87 mmHg    Specimen Arterial     Dieter Test Pass     DelSys Other     LPM 15 lpm   POCT lactate device results    Collection Time: 01/03/25  7:47 PM   Result Value Ref Range    iStat Lactate 0.9 0.5 - 2.0 mmol/L   Arterial Blood Gas    Collection Time: 01/03/25  7:48 PM   Result Value Ref Range    Ph 7.13 (LL) 7.35 - 7.45    Pco2 96.7 (HH) 32.0 - 48.0 mmHg    Po2 92.1 83.0 - 108.0 mmHg    O2 Saturation 93.8 93.0 - 99.0 %    Hco3 32 (H) 21 - 28 mmol/L    Base Excess 0 -4 - 3 mmol/L    Body Temp 37.3 Centigrade    O2 Therapy 15L     Ph -TC 7.13 (LL) 7.35 - 7.45    Pco2 -TC 98.0 (HH) 32.0 - 48.0 mmHg    Po2 -TC 93.8 (H) 64.0 - 87.0 mmHg   EKG    Collection Time: 01/03/25  7:51 PM   Result Value Ref Range    Report       [facility]    Test Date:  2025-01-03  Pt Name:    CHILANGO MUÑOZ         Department:  [facility]  MRN:        [id number]                      Room:       [facility]  Gender:     Female                       Technician: CATE  :        1960                   Requested By:TYLER BARGER  Order #:    [id number]                    Reading MD:    Measurements  Intervals                                Axis  Rate:       124                          P:          48  OR:         158                          QRS:        108  QRSD:       95                           T:          -46  QT:         284  QTc:        408    Interpretive Statements  Sinus tachycardia  Right axis deviation  Abnormal T, consider ischemia, inferior leads  Minimal ST elevation, lateral leads  Compared to ECG 2025 13:07:11  Right-axis deviation now present  T-wave abnormality now present  Possible ischemia now present  ST (T wave) deviation now present  Sinus rhythm no longer present  Myocardial infarct finding no antoine sukhi present     AMMONIA    Collection Time: 25  8:18 PM   Result Value Ref Range    Ammonia 74 (H) 11 - 45 umol/L   POCT arterial blood gas device results    Collection Time: 25  9:15 PM   Result Value Ref Range    Ph 7.362 7.350 - 7.450    Pco2 55.2 (HH) 32.0 - 48.0 mmHg    Po2 233 (H) 83 - 108 mmHg    Tco2 33 32 - 48 mmol/L    S02 100 (H) 93 - 99 %    Hco3 31.3 (H) 21.0 - 28.0 mmol/L    BE 5 (H) -4 - 3 mmol/L    Body Temp see below degrees    Specimen Arterial     DelSys Other    POCT lactate device results    Collection Time: 25  9:15 PM   Result Value Ref Range    iStat Lactate 1.2 0.5 - 2.0 mmol/L     Assessment/Plan  Acute hypercapnic respiratory failure (HCC)  Assessment & Plan  Acute hypercapneic respiratory failure multifactorial - in part due to sedation/analgesia - she received hydrocodone, morphine, fentanyl, and lorazepam since noon today.  ABG 7.1/100.  She was given 0.4mg and then 0.2mg naloxone with improvement in mentation and respiratory drive.  XR notable for moderate R pneumothorax.  Chest  tube placed with near resolution of pneumothorax.  She was placed on NIV for ~30 minutes with repeat blood gas 7.35/55.  Consider simplifying analgesic regimen  Chest tube to -20 suction  Consider Pulmonology consultation for ongoing management of malignant effusions and pneumothorax        Critical care time: 35 minutes, excluding procedure.    Jordan Shi MD, [facility]  Critical Care Medicine  10:24 PM

## 2025-01-04 NOTE — ASSESSMENT & PLAN NOTE
Per oncology's last note they are recommending hospice and consider her case to be futile  She is too deconditioned to even undergo palliative chemotherapy.     Patent and family aware

## 2025-01-04 NOTE — PROGRESS NOTES
Rapid response and chest tube placement.    At 7:30 PM on 1/3/2025, this resident received a Voalte message from the RN supervisor regarding a rapid response being initiated for patient who was reported to be severely lethargic, tachycardic, and hypoxic, requiring 15 L/min oxygen via a non-rebreather mask. The resident promptly arrived at the patient's bedside, joining the rapid response team.    Upon assessment, the patient was found to be obtunded, unresponsive to sternal rubs, hypertensive with a systolic blood pressure (SBP) of 210 mmHg, and diaphoretic. Pupillary size was decreased bilaterally with no response to light. An EKG revealed sinus tachycardia, and an initial ABG showed a pH of 7.142, pCO2 of 99.9 mmHg. Laboratory results indicated normal phosphate and lactic acid levels, with elevated ammonia (74 µmol/L) and troponin (44 ng/L). A bedside CXR demonstrated worsening of a right-sided pneumothorax and bilateral pleural effusions, more pronounced on the right.    Dr. Shi from the ICU was consulted and recommended administration of Narcan. An initial dose of 0.4 mg was administered, resulting in a positive response, with the patient becoming slightly more awake and responsive to verbal commands. A subsequent dose of 0.2 mg of Narcan was given, leading to further improvement. A repeat ABG showed a slightly improved pCO2 of 96.7 mmHg.    Given the clinical findings, Dr. Shi proceeded with bedside placement of a right-sided chest tube after obtaining informed consent from the patient's daughter. The risks and benefits of the procedure, including potential complications such as bleeding, infection, and worsening pneumothorax, were thoroughly explained. Consent was obtained, and the procedure was performed with the patient placed on BiPAP for respiratory support.    The chest tube was successfully inserted, with an initial output of 570 mL. A post-procedure CXR revealed small right pneumothorax and a  small right pleural effusion. A follow-up ABG showed marked improvement, with a pH of 7.362 and pCO2 of 55.2 mmHg. The patient was gradually weaned from BiPAP to 4 L/min oxygen via an oxymask, maintaining oxygen saturation at 92%. Blood pressure improved without the administration of antihypertensive therapy. The patient's daughter was updated throughout the process, and the patient tolerated the procedure well.      At 1:19 AM on 1/4/2025, the patient’s condition prompted notification to the resident regarding another rapid response activation. This resident promptly arrived at the bedside along with the respiratory therapist (RT) and the rapid response team.    A repeat ABG revealed a further decline with a pH of 7.08 and a pCO2 exceeding 100 mmHg. The patient was placed on 15 L/min oxygen via a non-rebreather mask. Despite administration of 0.4 mg of Narcan, there was no improvement in the patient's responsiveness. The patient remained unresponsive to tactile stimuli, with pupils that were equal in size but small and nonreactive to light bilaterally.    A repeat CXR demonstrated an enlarging right pneumothorax with evidence of significant collapse of the right lung. Dr. Avalos from the ICU was consulted and recommended immediate transfer to the ICU for intubation and further evaluation of the chest tube.    All plans of care were fully discussed with the patient's daughter and shared-decision making was utilized to conclude best course of actions in patient's medical management.       Jose Luis Downs, PGY-3  [facility]

## 2025-01-05 ENCOUNTER — APPOINTMENT (OUTPATIENT)
Dept: RADIOLOGY | Facility: MEDICAL CENTER | Age: 65
End: 2025-01-05
Attending: INTERNAL MEDICINE
Payer: MEDICARE

## 2025-01-05 PROBLEM — Z71.89 GOALS OF CARE, COUNSELING/DISCUSSION: Status: ACTIVE | Noted: 2025-01-05

## 2025-01-05 LAB
ANION GAP SERPL CALC-SCNC: 8 MMOL/L (ref 7–16)
ARTERIAL PATENCY WRIST A: ABNORMAL
BASE EXCESS BLDA CALC-SCNC: 4 MMOL/L (ref -4–3)
BASOPHILS # BLD AUTO: 0.4 % (ref 0–1.8)
BASOPHILS # BLD: 0.04 K/UL (ref 0–0.12)
BODY TEMPERATURE: ABNORMAL DEGREES
BREATHS SETTING VENT: 20
BUN SERPL-MCNC: 11 MG/DL (ref 8–22)
CALCIUM SERPL-MCNC: 8.3 MG/DL (ref 8.5–10.5)
CHLORIDE SERPL-SCNC: 97 MMOL/L (ref 96–112)
CO2 BLDA-SCNC: 30 MMOL/L (ref 32–48)
CO2 SERPL-SCNC: 28 MMOL/L (ref 20–33)
CREAT SERPL-MCNC: 0.61 MG/DL (ref 0.5–1.4)
DELSYS IDSYS: ABNORMAL
END TIDAL CARBON DIOXIDE IECO2: 38 MMHG
EOSINOPHIL # BLD AUTO: 0.06 K/UL (ref 0–0.51)
EOSINOPHIL NFR BLD: 0.7 % (ref 0–6.9)
ERYTHROCYTE [DISTWIDTH] IN BLOOD BY AUTOMATED COUNT: 58.3 FL (ref 35.9–50)
GFR SERPLBLD CREATININE-BSD FMLA CKD-EPI: 99 ML/MIN/1.73 M 2
GLUCOSE BLD STRIP.AUTO-MCNC: 100 MG/DL (ref 65–99)
GLUCOSE BLD STRIP.AUTO-MCNC: 127 MG/DL (ref 65–99)
GLUCOSE BLD STRIP.AUTO-MCNC: 174 MG/DL (ref 65–99)
GLUCOSE SERPL-MCNC: 113 MG/DL (ref 65–99)
HCO3 BLDA-SCNC: 29.1 MMOL/L (ref 21–28)
HCT VFR BLD AUTO: 26.7 % (ref 37–47)
HGB BLD-MCNC: 8.6 G/DL (ref 12–16)
HOROWITZ INDEX BLDA+IHG-RTO: 248 MM[HG]
IMM GRANULOCYTES # BLD AUTO: 0.06 K/UL (ref 0–0.11)
IMM GRANULOCYTES NFR BLD AUTO: 0.7 % (ref 0–0.9)
LACTATE BLD-SCNC: 0.9 MMOL/L (ref 0.5–2)
LYMPHOCYTES # BLD AUTO: 1.02 K/UL (ref 1–4.8)
LYMPHOCYTES NFR BLD: 11.4 % (ref 22–41)
MAGNESIUM SERPL-MCNC: 2.2 MG/DL (ref 1.5–2.5)
MCH RBC QN AUTO: 29.1 PG (ref 27–33)
MCHC RBC AUTO-ENTMCNC: 32.2 G/DL (ref 32.2–35.5)
MCV RBC AUTO: 90.2 FL (ref 81.4–97.8)
MODE IMODE: ABNORMAL
MONOCYTES # BLD AUTO: 1.09 K/UL (ref 0–0.85)
MONOCYTES NFR BLD AUTO: 12.2 % (ref 0–13.4)
NEUTROPHILS # BLD AUTO: 6.7 K/UL (ref 1.82–7.42)
NEUTROPHILS NFR BLD: 74.6 % (ref 44–72)
NRBC # BLD AUTO: 0 K/UL
NRBC BLD-RTO: 0 /100 WBC (ref 0–0.2)
O2/TOTAL GAS SETTING VFR VENT: 40 %
PCO2 BLDA: 43.2 MMHG (ref 32–48)
PCO2 TEMP ADJ BLDA: 43.9 MMHG (ref 32–48)
PEEP END EXPIRATORY PRESSURE IPEEP: 8 CMH20
PH BLDA: 7.44 [PH] (ref 7.35–7.45)
PH TEMP ADJ BLDA: 7.43 [PH] (ref 7.35–7.45)
PHOSPHATE SERPL-MCNC: 1.8 MG/DL (ref 2.5–4.5)
PLATELET # BLD AUTO: 388 K/UL (ref 164–446)
PMV BLD AUTO: 9.2 FL (ref 9–12.9)
PO2 BLDA: 99 MMHG (ref 83–108)
PO2 TEMP ADJ BLDA: 101 MMHG (ref 64–87)
POTASSIUM SERPL-SCNC: 4.3 MMOL/L (ref 3.6–5.5)
RBC # BLD AUTO: 2.96 M/UL (ref 4.2–5.4)
SAO2 % BLDA: 98 % (ref 93–99)
SODIUM SERPL-SCNC: 133 MMOL/L (ref 135–145)
SPECIMEN DRAWN FROM PATIENT: ABNORMAL
TIDAL VOLUME IVT: 400 ML
WBC # BLD AUTO: 9 K/UL (ref 4.8–10.8)

## 2025-01-05 PROCEDURE — 770022 HCHG ROOM/CARE - ICU (200)

## 2025-01-05 PROCEDURE — 700101 HCHG RX REV CODE 250: Performed by: INTERNAL MEDICINE

## 2025-01-05 PROCEDURE — 94640 AIRWAY INHALATION TREATMENT: CPT

## 2025-01-05 PROCEDURE — 700102 HCHG RX REV CODE 250 W/ 637 OVERRIDE(OP): Performed by: INTERNAL MEDICINE

## 2025-01-05 PROCEDURE — 700102 HCHG RX REV CODE 250 W/ 637 OVERRIDE(OP): Performed by: PHYSICIAN ASSISTANT

## 2025-01-05 PROCEDURE — 700101 HCHG RX REV CODE 250

## 2025-01-05 PROCEDURE — 94150 VITAL CAPACITY TEST: CPT

## 2025-01-05 PROCEDURE — 92610 EVALUATE SWALLOWING FUNCTION: CPT

## 2025-01-05 PROCEDURE — 700105 HCHG RX REV CODE 258: Performed by: INTERNAL MEDICINE

## 2025-01-05 PROCEDURE — 71045 X-RAY EXAM CHEST 1 VIEW: CPT

## 2025-01-05 PROCEDURE — 37799 UNLISTED PX VASCULAR SURGERY: CPT

## 2025-01-05 PROCEDURE — 83605 ASSAY OF LACTIC ACID: CPT

## 2025-01-05 PROCEDURE — 700111 HCHG RX REV CODE 636 W/ 250 OVERRIDE (IP): Mod: JZ

## 2025-01-05 PROCEDURE — 84100 ASSAY OF PHOSPHORUS: CPT

## 2025-01-05 PROCEDURE — 700111 HCHG RX REV CODE 636 W/ 250 OVERRIDE (IP): Mod: JZ | Performed by: INTERNAL MEDICINE

## 2025-01-05 PROCEDURE — 85025 COMPLETE CBC W/AUTO DIFF WBC: CPT

## 2025-01-05 PROCEDURE — 94799 UNLISTED PULMONARY SVC/PX: CPT

## 2025-01-05 PROCEDURE — A9270 NON-COVERED ITEM OR SERVICE: HCPCS | Performed by: INTERNAL MEDICINE

## 2025-01-05 PROCEDURE — A9270 NON-COVERED ITEM OR SERVICE: HCPCS | Performed by: PHYSICIAN ASSISTANT

## 2025-01-05 PROCEDURE — 82803 BLOOD GASES ANY COMBINATION: CPT

## 2025-01-05 PROCEDURE — 82962 GLUCOSE BLOOD TEST: CPT | Mod: 91

## 2025-01-05 PROCEDURE — 94660 CPAP INITIATION&MGMT: CPT

## 2025-01-05 PROCEDURE — 94003 VENT MGMT INPAT SUBQ DAY: CPT

## 2025-01-05 PROCEDURE — 80048 BASIC METABOLIC PNL TOTAL CA: CPT

## 2025-01-05 PROCEDURE — 83735 ASSAY OF MAGNESIUM: CPT

## 2025-01-05 PROCEDURE — 99291 CRITICAL CARE FIRST HOUR: CPT | Performed by: INTERNAL MEDICINE

## 2025-01-05 PROCEDURE — 700111 HCHG RX REV CODE 636 W/ 250 OVERRIDE (IP): Performed by: INTERNAL MEDICINE

## 2025-01-05 RX ORDER — AMOXICILLIN 250 MG
1 CAPSULE ORAL
Status: DISCONTINUED | OUTPATIENT
Start: 2025-01-05 | End: 2025-01-08

## 2025-01-05 RX ORDER — DOCUSATE SODIUM 100 MG/1
100 CAPSULE, LIQUID FILLED ORAL 2 TIMES DAILY
Status: DISCONTINUED | OUTPATIENT
Start: 2025-01-05 | End: 2025-01-08

## 2025-01-05 RX ORDER — LANOLIN ALCOHOL/MO/W.PET/CERES
400 CREAM (GRAM) TOPICAL DAILY
Status: DISCONTINUED | OUTPATIENT
Start: 2025-01-06 | End: 2025-01-08

## 2025-01-05 RX ORDER — METOCLOPRAMIDE 10 MG/1
10 TABLET ORAL EVERY 6 HOURS
Status: DISCONTINUED | OUTPATIENT
Start: 2025-01-05 | End: 2025-01-08

## 2025-01-05 RX ORDER — MORPHINE SULFATE 4 MG/ML
2 INJECTION INTRAVENOUS
Status: DISCONTINUED | OUTPATIENT
Start: 2025-01-05 | End: 2025-01-07

## 2025-01-05 RX ORDER — AMOXICILLIN 250 MG
1 CAPSULE ORAL NIGHTLY
Status: DISCONTINUED | OUTPATIENT
Start: 2025-01-05 | End: 2025-01-08

## 2025-01-05 RX ORDER — FUROSEMIDE 10 MG/ML
40 INJECTION INTRAMUSCULAR; INTRAVENOUS ONCE
Status: DISCONTINUED | OUTPATIENT
Start: 2025-01-05 | End: 2025-01-05

## 2025-01-05 RX ORDER — FUROSEMIDE 10 MG/ML
40 INJECTION INTRAMUSCULAR; INTRAVENOUS ONCE
Status: COMPLETED | OUTPATIENT
Start: 2025-01-05 | End: 2025-01-05

## 2025-01-05 RX ORDER — ROPINIROLE 1 MG/1
1 TABLET, FILM COATED ORAL
Status: DISCONTINUED | OUTPATIENT
Start: 2025-01-05 | End: 2025-01-08

## 2025-01-05 RX ORDER — LABETALOL HYDROCHLORIDE 5 MG/ML
10 INJECTION, SOLUTION INTRAVENOUS EVERY 4 HOURS PRN
Status: DISCONTINUED | OUTPATIENT
Start: 2025-01-05 | End: 2025-01-26

## 2025-01-05 RX ORDER — DOCUSATE SODIUM 50 MG/5ML
100 LIQUID ORAL 2 TIMES DAILY
Status: DISCONTINUED | OUTPATIENT
Start: 2025-01-05 | End: 2025-01-05

## 2025-01-05 RX ORDER — HYDROCODONE BITARTRATE AND ACETAMINOPHEN 10; 325 MG/1; MG/1
1 TABLET ORAL EVERY 6 HOURS PRN
Status: DISCONTINUED | OUTPATIENT
Start: 2025-01-05 | End: 2025-01-07

## 2025-01-05 RX ORDER — CALCIUM CARBONATE 500 MG/1
500 TABLET, CHEWABLE ORAL 3 TIMES DAILY PRN
Status: DISCONTINUED | OUTPATIENT
Start: 2025-01-05 | End: 2025-01-08

## 2025-01-05 RX ORDER — METHOCARBAMOL 750 MG/1
750 TABLET, FILM COATED ORAL EVERY 8 HOURS PRN
Status: DISCONTINUED | OUTPATIENT
Start: 2025-01-05 | End: 2025-01-08

## 2025-01-05 RX ORDER — ONDANSETRON 4 MG/1
4 TABLET, ORALLY DISINTEGRATING ORAL EVERY 4 HOURS PRN
Status: DISCONTINUED | OUTPATIENT
Start: 2025-01-05 | End: 2025-01-08

## 2025-01-05 RX ORDER — FERROUS SULFATE 300 MG/5ML
300 LIQUID (ML) ORAL DAILY
Status: DISCONTINUED | OUTPATIENT
Start: 2025-01-06 | End: 2025-01-08

## 2025-01-05 RX ORDER — MORPHINE SULFATE 4 MG/ML
2 INJECTION INTRAVENOUS ONCE
Status: COMPLETED | OUTPATIENT
Start: 2025-01-05 | End: 2025-01-05

## 2025-01-05 RX ORDER — ROPINIROLE 1 MG/1
1 TABLET, FILM COATED ORAL 2 TIMES DAILY PRN
Status: DISCONTINUED | OUTPATIENT
Start: 2025-01-05 | End: 2025-01-08

## 2025-01-05 RX ORDER — POLYETHYLENE GLYCOL 3350 17 G/17G
1 POWDER, FOR SOLUTION ORAL 2 TIMES DAILY PRN
Status: DISCONTINUED | OUTPATIENT
Start: 2025-01-05 | End: 2025-01-08

## 2025-01-05 RX ADMIN — FAMOTIDINE 20 MG: 20 TABLET, FILM COATED ORAL at 05:16

## 2025-01-05 RX ADMIN — PROPOFOL 40 MCG/KG/MIN: 10 INJECTION, EMULSION INTRAVENOUS at 04:23

## 2025-01-05 RX ADMIN — INSULIN LISPRO 2 UNITS: 100 INJECTION, SOLUTION INTRAVENOUS; SUBCUTANEOUS at 17:34

## 2025-01-05 RX ADMIN — LABETALOL HYDROCHLORIDE 10 MG: 5 INJECTION, SOLUTION INTRAVENOUS at 13:30

## 2025-01-05 RX ADMIN — PIPERACILLIN AND TAZOBACTAM 4.5 G: 4; .5 INJECTION, POWDER, FOR SOLUTION INTRAVENOUS at 04:30

## 2025-01-05 RX ADMIN — Medication 400 MG: at 05:16

## 2025-01-05 RX ADMIN — ONDANSETRON 4 MG: 2 INJECTION INTRAMUSCULAR; INTRAVENOUS at 08:07

## 2025-01-05 RX ADMIN — METOCLOPRAMIDE 10 MG: 10 TABLET ORAL at 12:11

## 2025-01-05 RX ADMIN — PIPERACILLIN AND TAZOBACTAM 4.5 G: 4; .5 INJECTION, POWDER, FOR SOLUTION INTRAVENOUS at 12:14

## 2025-01-05 RX ADMIN — IPRATROPIUM BROMIDE AND ALBUTEROL SULFATE 3 ML: .5; 2.5 SOLUTION RESPIRATORY (INHALATION) at 14:22

## 2025-01-05 RX ADMIN — FUROSEMIDE 40 MG: 10 INJECTION INTRAMUSCULAR; INTRAVENOUS at 08:24

## 2025-01-05 RX ADMIN — IPRATROPIUM BROMIDE AND ALBUTEROL SULFATE 3 ML: .5; 2.5 SOLUTION RESPIRATORY (INHALATION) at 22:11

## 2025-01-05 RX ADMIN — ROPINIROLE HYDROCHLORIDE 1 MG: 1 TABLET, FILM COATED ORAL at 13:30

## 2025-01-05 RX ADMIN — VANCOMYCIN HYDROCHLORIDE 1000 MG: 5 INJECTION, POWDER, LYOPHILIZED, FOR SOLUTION INTRAVENOUS at 06:24

## 2025-01-05 RX ADMIN — HYDROCODONE BITARTRATE AND ACETAMINOPHEN 1 TABLET: 10; 325 TABLET ORAL at 07:58

## 2025-01-05 RX ADMIN — LABETALOL HYDROCHLORIDE 10 MG: 5 INJECTION, SOLUTION INTRAVENOUS at 08:02

## 2025-01-05 RX ADMIN — HYDROCODONE BITARTRATE AND ACETAMINOPHEN 1 TABLET: 10; 325 TABLET ORAL at 21:52

## 2025-01-05 RX ADMIN — LIDOCAINE 1 PATCH: 4 PATCH TOPICAL at 10:33

## 2025-01-05 RX ADMIN — MORPHINE SULFATE 2 MG: 4 INJECTION, SOLUTION INTRAMUSCULAR; INTRAVENOUS at 08:05

## 2025-01-05 RX ADMIN — METOCLOPRAMIDE 10 MG: 10 TABLET ORAL at 05:16

## 2025-01-05 RX ADMIN — METOCLOPRAMIDE 10 MG: 10 TABLET ORAL at 23:53

## 2025-01-05 RX ADMIN — PROPOFOL 35 MCG/KG/MIN: 10 INJECTION, EMULSION INTRAVENOUS at 00:26

## 2025-01-05 RX ADMIN — MORPHINE SULFATE 2 MG: 4 INJECTION, SOLUTION INTRAMUSCULAR; INTRAVENOUS at 19:32

## 2025-01-05 RX ADMIN — PIPERACILLIN AND TAZOBACTAM 4.5 G: 4; .5 INJECTION, POWDER, FOR SOLUTION INTRAVENOUS at 21:09

## 2025-01-05 RX ADMIN — ONDANSETRON 4 MG: 2 INJECTION INTRAMUSCULAR; INTRAVENOUS at 19:32

## 2025-01-05 RX ADMIN — SENNOSIDES AND DOCUSATE SODIUM 1 TABLET: 50; 8.6 TABLET ORAL at 20:57

## 2025-01-05 RX ADMIN — SODIUM PHOSPHATE, MONOBASIC, MONOHYDRATE AND SODIUM PHOSPHATE, DIBASIC, ANHYDROUS 30 MMOL: 276; 142 INJECTION, SOLUTION INTRAVENOUS at 12:47

## 2025-01-05 RX ADMIN — Medication 300 MG: at 05:31

## 2025-01-05 RX ADMIN — METOCLOPRAMIDE 10 MG: 10 TABLET ORAL at 17:28

## 2025-01-05 RX ADMIN — DOCUSATE SODIUM 100 MG: 50 LIQUID ORAL at 05:16

## 2025-01-05 RX ADMIN — HYDROCODONE BITARTRATE AND ACETAMINOPHEN 1 TABLET: 10; 325 TABLET ORAL at 13:30

## 2025-01-05 RX ADMIN — ROPINIROLE HYDROCHLORIDE 1 MG: 1 TABLET, FILM COATED ORAL at 20:57

## 2025-01-05 RX ADMIN — ENOXAPARIN SODIUM 40 MG: 100 INJECTION SUBCUTANEOUS at 17:28

## 2025-01-05 RX ADMIN — MORPHINE SULFATE 2 MG: 4 INJECTION, SOLUTION INTRAMUSCULAR; INTRAVENOUS at 16:50

## 2025-01-05 ASSESSMENT — PAIN DESCRIPTION - PAIN TYPE
TYPE: ACUTE PAIN
TYPE: ACUTE PAIN;CHRONIC PAIN
TYPE: ACUTE PAIN
TYPE: ACUTE PAIN;CHRONIC PAIN
TYPE: ACUTE PAIN;CHRONIC PAIN
TYPE: ACUTE PAIN

## 2025-01-05 ASSESSMENT — PULMONARY FUNCTION TESTS
FVC: 0.9
EPAP_CMH2O: 6

## 2025-01-05 ASSESSMENT — FIBROSIS 4 INDEX: FIB4 SCORE: 4.26

## 2025-01-05 NOTE — PROGRESS NOTES
Critical Care Progress Note    Date of admission  12/25/2024    Chief Complaint  64 y.o. female with history of breast cancer with metastasis to brain, lungs, pleura, liver, spine, admitted for hypoxemic respiratory failure on 12/25 related to bilateral pleural effusions.  She underwent right-sided thoracentesis on 12/27.  Right-sided pneumothorax was identified on 1/2 and was managed medically.  She underwent left-sided thoracentesis on 1/2. In the evening of 1/3 her R sided PTX worsened and was treated with a chest tube by Dr. Shi and improved clinically.  Then early the next morning she decompensated again.  Her PTX had recurred and she developed hypoxia and hypercarbia. She was intubated and large bore chest tube was placed by Dr. Avalos.  She was also harrington-cultured, had a bronch and started on antibiotics.  She was hypotensive requiring pressors.     Hospital Course  No notes on file    Interval Problem Update  Reviewed last 24 hour events:  Neuro: intact, writing notes  Mid thoracic pain  HR: SR  SBP:   Tmax: 99.4  GI: NGT clamped, BM 1/1 although patient thinks more recent  I/O: 800, chest tube dressing pretty saturated. Lower one put out 110, nothing from the upper.   Lines: central, art, PIV  Mobility: mobilize  Resp: extubated, flash edema, now on bipap. 40%. Can trial off   Vte: lovenox  PPI/H2:na  Antibx: vanco, zosyn    Good weaning paramenters this morning, trial of extubation--> back pain, got norco.  Escalating O2 requirements with HTN. Treating with labetalol, lasix, morphine, CXR pending. Sounds pretty similar on exam to pre-extubation. Work of breathing is acceptable--CXR pulm edema, started on bipap and improved.  WBC improving  Replete phos    Review of Systems  ROS     Vital Signs for last 24 hours   Pulse:  [62-88] 88  Resp:  [9-28] 28  BP: ()/(48-58) 111/57  SpO2:  [93 %-100 %] 98 %    Hemodynamic parameters for last 24 hours       Respiratory Information for the last 24  hours  Vent Mode: Spont  Rate (breaths/min): 20  Vt Target (mL): 400  PEEP/CPAP: 8  P Support: 5  MAP: 10  Control VTE (exp VT): 423    Physical Exam   Physical Exam  Constitutional:       Interventions: She is sedated and intubated.   HENT:      Head: Normocephalic and atraumatic.   Eyes:      Pupils: Pupils are equal, round, and reactive to light.   Cardiovascular:      Rate and Rhythm: Normal rate and regular rhythm.   Pulmonary:      Effort: She is intubated.      Comments: Diminished on the R  2x chest tubes on the R.  Neither is bubbling  Abdominal:      General: Bowel sounds are normal.      Tenderness: There is no abdominal tenderness.   Musculoskeletal:      Right lower leg: Edema (trace) present.      Left lower leg: Edema (trace) present.   Neurological:      Comments: Awake, nodding appropiately         Medications  Current Facility-Administered Medications   Medication Dose Route Frequency Provider Last Rate Last Admin    sodium phosphate 30 mmol in dextrose 5% 500 mL ivpb  30 mmol Intravenous Once Lidia Figueroa M.D.        labetalol (Normodyne/Trandate) injection 10 mg  10 mg Intravenous Q4HRS PRN Lidia Figueroa M.D.        furosemide (Lasix) injection 40 mg  40 mg Intravenous Once Lidia Figueroa M.D.        furosemide (Lasix) injection 40 mg  40 mg Intravenous Once Lidia Figueroa M.D.        Respiratory Therapy Consult   Nebulization Continuous RT Americo Avalos M.D.        famotidine (Pepcid) tablet 20 mg  20 mg Enteral Tube Q12HRS Americo Avalos M.D.   20 mg at 01/05/25 0516    Or    famotidine (Pepcid) injection 20 mg  20 mg Intravenous Q12HRS Americo Avalos M.D. MD Alert...ICU Electrolyte Replacement per Pharmacy   Other PHARMACY TO DOSE Americo Avalos M.D.        lidocaine (Xylocaine) 1 % injection 2 mL  2 mL Tracheal Tube Q30 MIN PRN Americo vAalos M.D.        propofol (DIPRIVAN) injection  0-80 mcg/kg/min (Ideal) Intravenous Continuous Americo PERALTA  Kenji Lynn M.D.   Stopped at 01/05/25 0515    ipratropium-albuterol (DUONEB) nebulizer solution  3 mL Nebulization Q2HRS PRN (RT) Americo Avalos M.D.        HYDROmorphone (DILAUDID) 0.2 mg/mL in 50mL NS (Continuous Infusion)   Intravenous Continuous Americo Avalos M.D.        HYDROmorphone (Dilaudid) injection 0.5-2 mg  0.5-2 mg Intravenous Q HOUR PRN Americo Avalos M.D.   1 mg at 01/04/25 1020    Pharmacy Consult: Enteral tube insertion - review meds/change route/product selection   Other PHARMACY TO DOSE Americo Avalos M.D.        ondansetron (Zofran ODT) dispertab 4 mg  4 mg Enteral Tube Q4HRS PRN Americo Avalos M.D.        magnesium hydroxide (Milk Of Magnesia) suspension 30 mL  30 mL Enteral Tube QDAY PRN Americo Avalos M.D.        methocarbamol (Robaxin) tablet 750 mg  750 mg Enteral Tube Q8HRS PRN Americo Avalos M.D.        metoclopramide (Reglan) tablet 10 mg  10 mg Enteral Tube Q6HRS Americo Avalos M.D.   10 mg at 01/05/25 0516    HYDROcodone/acetaminophen (Norco)  MG per tablet 1 Tablet  1 Tablet Enteral Tube Q6HRS PRN Americo Avalos M.D.   1 Tablet at 01/04/25 1937    ROPINIRole (Requip) tablet 1 mg  1 mg Enteral Tube QHS Americo Avalos M.D.   1 mg at 01/04/25 2049    calcium carbonate (Tums) chewable tab 500 mg  500 mg Enteral Tube TID PRN Americo Avalos M.D.        magnesium oxide tablet 400 mg  400 mg Enteral Tube DAILY Americo Avalos M.D.   400 mg at 01/05/25 0516    ROPINIRole (Requip) tablet 1 mg  1 mg Enteral Tube BID PRN Americo Avalos M.D.        senna-docusate (Pericolace Or Senokot S) 8.6-50 MG per tablet 1 Tablet  1 Tablet Enteral Tube Nightly Americo Avalos M.D.   1 Tablet at 01/04/25 2050    senna-docusate (Pericolace Or Senokot S) 8.6-50 MG per tablet 1 Tablet  1 Tablet Enteral Tube Q24HRS PRN Americo Avalos M.D.        polyethylene glycol/lytes (Miralax) Packet  1 Packet  1 Packet Enteral Tube BID PRN Americo Avalos M.D.   1 Packet at 01/04/25 1132    docusate sodium (Colace) oral solution 100 mg  100 mg Enteral Tube BID Americo Avalos M.D.   100 mg at 01/05/25 0516    enoxaparin (Lovenox) inj 40 mg  40 mg Subcutaneous DAILY AT 1800 Americo Avalos M.D.   40 mg at 01/04/25 1706    norepinephrine (Levophed) 8 mg in 250 mL NS infusion (premix)  0-1 mcg/kg/min (Ideal) Intravenous Continuous Americo Avalos M.D.   Stopped at 01/05/25 0526    And    vasopressin (Vasostrict) 20 Units in  mL Infusion  0.03 Units/min Intravenous Continuous Americo Avalos M.D.        piperacillin-tazobactam (Zosyn) 4.5 g in  mL IVPB  4.5 g Intravenous Q8HRS Americo Avalos M.D. 25 mL/hr at 01/05/25 0430 4.5 g at 01/05/25 0430    MD Alert...Vancomycin per Pharmacy   Other PHARMACY TO DOSE Americo Avalos M.D.        insulin lispro (HumaLOG,AdmeLOG) subcutaneous injection  2-9 Units Subcutaneous Q6HRS Americo Avalos M.D.   2 Units at 01/04/25 0526    And    dextrose 50% (D50W) injection 25 g  25 g Intravenous Q15 MIN PRN Americo Avalos M.D.        vancomycin (Vancocin) 1,000 mg in  mL IVPB  1,000 mg Intravenous Q12HR Americo Avalos M.D. 125 mL/hr at 01/05/25 0624 1,000 mg at 01/05/25 0624    ferrous sulfate 300 (60 Fe) MG/5ML solution 300 mg  300 mg Enteral Tube DAILY Lidia Figueroa M.D.   300 mg at 01/05/25 0531    bisacodyl (Dulcolax) suppository 10 mg  10 mg Rectal DAILY Neo Ceja, P.A.   10 mg at 01/03/25 1151    sodium chloride (Ocean) 0.65 % nasal spray 2 Spray  2 Spray Nasal Q2HRS PRN Omar Saleh M.D.   2 Spray at 01/01/25 0600    MD ALERT...DO NOT ADMINISTER NSAIDS or ASPIRIN unless ORDERED By Neurosurgery 1 Each  1 Each Other PRN Karthik Lowery D.O.        bisacodyl (Dulcolax) suppository 10 mg  10 mg Rectal Q24HRS PRN Karthik Lowery D.O.        ondansetron (Zofran) syringe/vial  injection 4 mg  4 mg Intravenous Q4HRS PRN Abdulaziz Nieto M.D.   4 mg at 01/03/25 0854    [Held by provider] carvedilol (Coreg) tablet 3.125 mg  3.125 mg Oral BID Abdulaziz Nieto M.D.   3.125 mg at 01/02/25 0543    [Held by provider] losartan (Cozaar) tablet 100 mg  100 mg Oral Q DAY Abdulaziz Nieto M.D.   50 mg at 01/02/25 0542    lidocaine (Asperflex) 4 % patch 1 Patch  1 Patch Transdermal Q24HR Yosi Espino M.D.   1 Patch at 01/04/25 1020       Fluids    Intake/Output Summary (Last 24 hours) at 1/5/2025 0800  Last data filed at 1/5/2025 0600  Gross per 24 hour   Intake 1829.96 ml   Output 1551 ml   Net 278.96 ml       Laboratory  Recent Labs     01/03/25  1948 01/03/25  2115 01/04/25  0107 01/04/25  0143 01/04/25  0423 01/05/25  0244   KDEFS15G 7.13*  --  7.24*  --   --   --    WWBUAC089Y 96.7*  --  72.3*  --   --   --    YNBXZ564J 92.1  --  65.4*  --   --   --    DVLX0ZDH 93.8  --  87.0*  --   --   --    ARTHCO3 32*  --  31*  --   --   --    [id number] 15L  --  6  --   --   --    ARTBE 0  --  2  --   --   --    ISTATAPH  --    < >  --  7.080* 7.389 7.436   ISTATAPCO2  --    < >  --  >100.0* 48.4* 43.2   ISTATAPO2  --    < >  --  79* 253* 99   ISTATATCO2  --    < >  --  38 31* 30*   [id number]  --    < >  --  87* 100* 98   ISTATARTHCO3  --    < >  --  34.7* 29.2* 29.1*   ISTATARTBE  --    < >  --  1 4* 4*   ISTATTEMP  --    < >  --  97.3 F 96.6 F 37.4 C   ISTATFIO2  --   --   --   --  100 40   ISTATSPEC  --    < >  --  Arterial Arterial Arterial   ISTATAPHTC  --   --   --  7.089* 7.405 7.430   UBXFNWQR3IS  --   --   --  75 248* 101*    < > = values in this interval not displayed.         Recent Labs     01/03/25  0014 01/03/25  1946 01/04/25  0107 01/05/25  0343   SODIUM 134*  --  136 133*   POTASSIUM 4.5  --  4.8 4.3   CHLORIDE 98  --  99 97   CO2 29  --  30 28   BUN 20  --  15 11   CREATININE 0.83  --  0.61 0.61   MAGNESIUM  --   --  1.9 2.2   PHOSPHORUS  --  2.9  --  1.8*   CALCIUM 8.3*  --  9.3 8.3*      Recent Labs     01/03/25  0014 01/04/25 0107 01/05/25  0343   ALTSGPT  --  72*  --    ASTSGOT  --  216*  --    ALKPHOSPHAT  --  121*  --    TBILIRUBIN  --  0.6  --    GLUCOSE 138* 174* 113*     Recent Labs     01/03/25 1946 01/04/25 0107 01/05/25  0343   WBC 14.6* 13.0* 9.0   NEUTSPOLYS 71.00 73.40* 74.60*   LYMPHOCYTES 12.80* 9.50* 11.40*   MONOCYTES 14.40* 15.70* 12.20   EOSINOPHILS 0.10 0.00 0.70   BASOPHILS 0.50 0.50 0.40   ASTSGOT  --  216*  --    ALTSGPT  --  72*  --    ALKPHOSPHAT  --  121*  --    TBILIRUBIN  --  0.6  --      Recent Labs     01/03/25 1946 01/04/25 0107 01/05/25  0343   RBC 3.72* 3.54* 2.96*   HEMOGLOBIN 10.7* 10.1* 8.6*   HEMATOCRIT 33.9* 32.7* 26.7*   PLATELETCT 467* 399 388       Imaging  CT:    Reviewed    Assessment/Plan  * Malignant neoplasm of breast (HCC)- (present on admission)  Assessment & Plan  Stage IV breast carcinoma with metastases to bones, brain, lungs, spine c/b malignant pleural effusions    Goals of care, counseling/discussion  Assessment & Plan  Spoke with patient and Jane on 1/5. Patient really hated being on the ventilator. I explained what DNR and DNI status mean.  She is thinking about it and sounds like is leaning towards DNR/DNI but wants to discuss it with Jane further.  She also looks forward to meeting with palliative care again    Septic shock (HCC)  Assessment & Plan  This is Septic shock Not present on admission  SIRS criteria identified on my evaluation include: Tachycardia, with heart rate greater than 90 BPM, Tachypnea, with respirations greater than 20 per minute, and Leukocytosis, with WBC greater than 12,000  Clinical indicators of end organ dysfunction include Hypotension with systolic blood pressure less than 90 or MAP less than 65 and Acute Respiratory Failure - (mechanical ventilation or BiPap or PaO2/FiO2 ratio < 300)  Indicators of septic shock include: Sepsis present and persistent hypotension despite fluid resuscitation   Source is:  unclear - possible pneumonia, urinary  Sepsis protocol initiated  Crystalloid Fluid Administration: Fluid resuscitation ordered per standard protocol - 30 mL/kg per current or ideal body weight  IV antibiotics as appropriate for source of sepsis  Reassessment: I have reassessed the patient's hemodynamic status    Culture blood, sputum and urine, all NGTD  D/c vanco with MRSA nares and improved clinically  Cont zosyn until cx negative x48hr, then can probably d/c    Acute on chronic respiratory failure with hypoxia and hypercapnia (HCC)  Assessment & Plan  Emergently intubated on 1/4  ABCDEF bundle  Trial of extubation today. Needed some rescute bipap but has since settled out on simple mask    Pneumothorax  Assessment & Plan  Right pneumothorax  S/P pigtail catheter placement on 1/3/2025 with significant initial improvement - subsequently developed worsening right pneumothorax  S/P right closed tube thoracostomy on 1/4/2025 with 20 [place] chest tube with near complete resolution of right pneumothorax  Clamping trial today, f/u CXR serially  Upper chest tube isn't working, but it'll be easier to just pull them at the same time so I haven't removed it    Restless leg syndrome- (present on admission)  Assessment & Plan       Pathological fracture of vertebra- (present on admission)  Assessment & Plan  Pathologic fracture of T5 with myelopathy on presentation  S/P T4-6 laminectomy with fusion and spinal decompression on 1/1/2025 by Dr. Lowery    Cancer related pain- (present on admission)  Assessment & Plan  Continue analgesia    Bilateral pleural effusion- (present on admission)  Assessment & Plan  Malignant pleural effusions  S/P left thoracentesis on 1/2/2025  S/P right closed tube thoracostomy on 1/4/2025  Briefly discussed the idea of tunneled pleural catheters with daughter on 1/4, but explained that these are typically a palliative approach and tend to get infected over time    Primary hypertension- (present on  admission)  Assessment & Plan  Just weaned of pressors  Hold carvedilol and losartan and resume when clinically appropriate    Diabetes mellitus, type 2 (HCC)- (present on admission)  Assessment & Plan  Glycohemoglobin 6.4  Sliding scale insulin         VTE:  Lovenox  Ulcer: H2 Antagonist  Lines: Central Line  Ongoing indication addressed, Arterial Line  Ongoing indication addressed, and Mojica Catheter  Ongoing indication addressed    I have performed a physical exam and reviewed and updated ROS and Plan today (1/5/2025). In review of yesterday's note (1/4/2025), there are no changes except as documented above.     Discussed patient condition and risk of morbidity and/or mortality with Family, RN, RT, Pharmacy, Charge nurse / hot rounds, and Patient  The patient remains critically ill on mechanical ventilation, then bipap both of which I managed.  Critical care time = 75 minutes in directly providing and coordinating critical care and extensive data review.  No time overlap and excludes procedures.

## 2025-01-05 NOTE — CARE PLAN
Problem: Ventilation  Goal: Ability to achieve and maintain unassisted ventilation or tolerate decreased levels of ventilator support  Description: Target End Date:  4 days     Document on Vent flowsheet    1.  Support and monitor invasive and noninvasive mechanical ventilation  2.  Monitor ventilator weaning response  3.  Perform ventilator associated pneumonia prevention interventions  4.  Manage ventilation therapy by monitoring diagnostic test results  Outcome: Progressing     Ventilator Daily Summary    Vent Day # 2  Airway: 7.5 @ 25    Ventilator settings: 20/400/8/30      Plan: Continue current ventilator settings and wean mechanical ventilation as tolerated per physician orders.

## 2025-01-05 NOTE — CARE PLAN
The patient is Watcher - Medium risk of patient condition declining or worsening    Shift Goals  Clinical Goals: MAP >65, monitor chest tube output, pain control  Patient Goals: Rest  Family Goals: Updates    Progress made toward(s) clinical / shift goals:      Problem: Skin Integrity  Goal: Skin integrity is maintained or improved  Outcome: Progressing  Note: Q2 turns in place. Assessed for signs and symptoms of skin breakdown.      Problem: Pain - Standard  Goal: Alleviation of pain or a reduction in pain to the patient’s comfort goal  Outcome: Progressing  Note: Administered PRN pain medications administered per MAR. Patient was able to rest comfortably.      Problem: Safety - Medical Restraint  Goal: Remains free of injury from restraints (Restraint for Interference with Medical Device)  Outcome: Progressing  Addressed this shift: Remains free of injury from restraints (restraint for interference with medical device):   Determine that other, less restrictive measures have been tried or would not be effective before applying the restraint   Evaluate the patient's condition at the time of restraint application   Inform patient/family regarding the reason for restraint   Every 2 hours: Monitor safety, psychosocial status, comfort, nutrition and hydration     Problem: Hemodynamics  Goal: Patient's hemodynamics, fluid balance and neurologic status will be stable or improve  Outcome: Progressing  Note: Maintained MAP >65 per orders.        Patient is not progressing towards the following goals: N/A

## 2025-01-05 NOTE — CARE PLAN
Problem: Ventilation  Goal: Ability to achieve and maintain unassisted ventilation or tolerate decreased levels of ventilator support  Description: Target End Date:  4 days     Document on Vent flowsheet    1.  Support and monitor invasive and noninvasive mechanical ventilation  2.  Monitor ventilator weaning response  3.  Perform ventilator associated pneumonia prevention interventions  4.  Manage ventilation therapy by monitoring diagnostic test results  Outcome: Progressing     Pt on APVCMV 20/400/8/50    No SBT at this time

## 2025-01-05 NOTE — CARE PLAN
Problem: Ventilation  Goal: Ability to achieve and maintain unassisted ventilation or tolerate decreased levels of ventilator support  Description: Target End Date:  4 days     Document on Vent flowsheet    1.  Support and monitor invasive and noninvasive mechanical ventilation  2.  Monitor ventilator weaning response  3.  Perform ventilator associated pneumonia prevention interventions  4.  Manage ventilation therapy by monitoring diagnostic test results  Outcome: Not Met   Bipap cont

## 2025-01-06 ENCOUNTER — APPOINTMENT (OUTPATIENT)
Dept: RADIOLOGY | Facility: MEDICAL CENTER | Age: 65
End: 2025-01-06
Attending: STUDENT IN AN ORGANIZED HEALTH CARE EDUCATION/TRAINING PROGRAM
Payer: MEDICARE

## 2025-01-06 LAB
ALBUMIN SERPL BCP-MCNC: 2.7 G/DL (ref 3.2–4.9)
ALBUMIN/GLOB SERPL: 0.9 G/DL
ALP SERPL-CCNC: 95 U/L (ref 30–99)
ALT SERPL-CCNC: 42 U/L (ref 2–50)
ANION GAP SERPL CALC-SCNC: 7 MMOL/L (ref 7–16)
ANION GAP SERPL CALC-SCNC: 8 MMOL/L (ref 7–16)
AST SERPL-CCNC: 103 U/L (ref 12–45)
BACTERIA BRONCH AEROBE CULT: NORMAL
BACTERIA UR CULT: NORMAL
BASOPHILS # BLD AUTO: 0.5 % (ref 0–1.8)
BASOPHILS # BLD: 0.04 K/UL (ref 0–0.12)
BILIRUB SERPL-MCNC: 0.4 MG/DL (ref 0.1–1.5)
BUN SERPL-MCNC: 8 MG/DL (ref 8–22)
BUN SERPL-MCNC: 8 MG/DL (ref 8–22)
CALCIUM ALBUM COR SERPL-MCNC: 9.4 MG/DL (ref 8.5–10.5)
CALCIUM SERPL-MCNC: 8.4 MG/DL (ref 8.5–10.5)
CALCIUM SERPL-MCNC: 8.4 MG/DL (ref 8.5–10.5)
CHLORIDE SERPL-SCNC: 94 MMOL/L (ref 96–112)
CHLORIDE SERPL-SCNC: 98 MMOL/L (ref 96–112)
CO2 SERPL-SCNC: 31 MMOL/L (ref 20–33)
CO2 SERPL-SCNC: 33 MMOL/L (ref 20–33)
CREAT SERPL-MCNC: 0.52 MG/DL (ref 0.5–1.4)
CREAT SERPL-MCNC: 0.55 MG/DL (ref 0.5–1.4)
EOSINOPHIL # BLD AUTO: 0.07 K/UL (ref 0–0.51)
EOSINOPHIL NFR BLD: 0.9 % (ref 0–6.9)
ERYTHROCYTE [DISTWIDTH] IN BLOOD BY AUTOMATED COUNT: 56.6 FL (ref 35.9–50)
GFR SERPLBLD CREATININE-BSD FMLA CKD-EPI: 101 ML/MIN/1.73 M 2
GFR SERPLBLD CREATININE-BSD FMLA CKD-EPI: 103 ML/MIN/1.73 M 2
GLOBULIN SER CALC-MCNC: 2.9 G/DL (ref 1.9–3.5)
GLUCOSE BLD STRIP.AUTO-MCNC: 114 MG/DL (ref 65–99)
GLUCOSE BLD STRIP.AUTO-MCNC: 119 MG/DL (ref 65–99)
GLUCOSE BLD STRIP.AUTO-MCNC: 150 MG/DL (ref 65–99)
GLUCOSE BLD STRIP.AUTO-MCNC: 231 MG/DL (ref 65–99)
GLUCOSE BLD STRIP.AUTO-MCNC: 255 MG/DL (ref 65–99)
GLUCOSE SERPL-MCNC: 136 MG/DL (ref 65–99)
GLUCOSE SERPL-MCNC: 183 MG/DL (ref 65–99)
GRAM STN SPEC: NORMAL
HCT VFR BLD AUTO: 28.6 % (ref 37–47)
HGB BLD-MCNC: 9 G/DL (ref 12–16)
IMM GRANULOCYTES # BLD AUTO: 0.08 K/UL (ref 0–0.11)
IMM GRANULOCYTES NFR BLD AUTO: 1 % (ref 0–0.9)
LYMPHOCYTES # BLD AUTO: 0.61 K/UL (ref 1–4.8)
LYMPHOCYTES NFR BLD: 7.8 % (ref 22–41)
MAGNESIUM SERPL-MCNC: 2 MG/DL (ref 1.5–2.5)
MCH RBC QN AUTO: 28.1 PG (ref 27–33)
MCHC RBC AUTO-ENTMCNC: 31.5 G/DL (ref 32.2–35.5)
MCV RBC AUTO: 89.4 FL (ref 81.4–97.8)
MONOCYTES # BLD AUTO: 1.17 K/UL (ref 0–0.85)
MONOCYTES NFR BLD AUTO: 14.9 % (ref 0–13.4)
NEUTROPHILS # BLD AUTO: 5.9 K/UL (ref 1.82–7.42)
NEUTROPHILS NFR BLD: 74.9 % (ref 44–72)
NRBC # BLD AUTO: 0 K/UL
NRBC BLD-RTO: 0 /100 WBC (ref 0–0.2)
PHOSPHATE SERPL-MCNC: 3 MG/DL (ref 2.5–4.5)
PLATELET # BLD AUTO: 421 K/UL (ref 164–446)
PMV BLD AUTO: 8.9 FL (ref 9–12.9)
POTASSIUM SERPL-SCNC: 4 MMOL/L (ref 3.6–5.5)
POTASSIUM SERPL-SCNC: 4.3 MMOL/L (ref 3.6–5.5)
PROT SERPL-MCNC: 5.6 G/DL (ref 6–8.2)
RBC # BLD AUTO: 3.2 M/UL (ref 4.2–5.4)
SIGNIFICANT IND 70042: NORMAL
SIGNIFICANT IND 70042: NORMAL
SITE SITE: NORMAL
SITE SITE: NORMAL
SODIUM SERPL-SCNC: 135 MMOL/L (ref 135–145)
SODIUM SERPL-SCNC: 136 MMOL/L (ref 135–145)
SOURCE SOURCE: NORMAL
SOURCE SOURCE: NORMAL
WBC # BLD AUTO: 7.9 K/UL (ref 4.8–10.8)

## 2025-01-06 PROCEDURE — 700102 HCHG RX REV CODE 250 W/ 637 OVERRIDE(OP): Performed by: INTERNAL MEDICINE

## 2025-01-06 PROCEDURE — 99223 1ST HOSP IP/OBS HIGH 75: CPT | Performed by: PHYSICAL MEDICINE & REHABILITATION

## 2025-01-06 PROCEDURE — 83735 ASSAY OF MAGNESIUM: CPT

## 2025-01-06 PROCEDURE — A9270 NON-COVERED ITEM OR SERVICE: HCPCS | Performed by: INTERNAL MEDICINE

## 2025-01-06 PROCEDURE — 94660 CPAP INITIATION&MGMT: CPT

## 2025-01-06 PROCEDURE — 700101 HCHG RX REV CODE 250

## 2025-01-06 PROCEDURE — A9270 NON-COVERED ITEM OR SERVICE: HCPCS | Mod: JZ | Performed by: STUDENT IN AN ORGANIZED HEALTH CARE EDUCATION/TRAINING PROGRAM

## 2025-01-06 PROCEDURE — 80048 BASIC METABOLIC PNL TOTAL CA: CPT

## 2025-01-06 PROCEDURE — 80053 COMPREHEN METABOLIC PANEL: CPT

## 2025-01-06 PROCEDURE — 71045 X-RAY EXAM CHEST 1 VIEW: CPT

## 2025-01-06 PROCEDURE — 700111 HCHG RX REV CODE 636 W/ 250 OVERRIDE (IP): Performed by: STUDENT IN AN ORGANIZED HEALTH CARE EDUCATION/TRAINING PROGRAM

## 2025-01-06 PROCEDURE — 99291 CRITICAL CARE FIRST HOUR: CPT | Performed by: STUDENT IN AN ORGANIZED HEALTH CARE EDUCATION/TRAINING PROGRAM

## 2025-01-06 PROCEDURE — 700105 HCHG RX REV CODE 258: Performed by: STUDENT IN AN ORGANIZED HEALTH CARE EDUCATION/TRAINING PROGRAM

## 2025-01-06 PROCEDURE — 82962 GLUCOSE BLOOD TEST: CPT

## 2025-01-06 PROCEDURE — 700111 HCHG RX REV CODE 636 W/ 250 OVERRIDE (IP): Mod: JZ | Performed by: INTERNAL MEDICINE

## 2025-01-06 PROCEDURE — 99292 CRITICAL CARE ADDL 30 MIN: CPT | Performed by: STUDENT IN AN ORGANIZED HEALTH CARE EDUCATION/TRAINING PROGRAM

## 2025-01-06 PROCEDURE — 99497 ADVNCD CARE PLAN 30 MIN: CPT | Performed by: NURSE PRACTITIONER

## 2025-01-06 PROCEDURE — 700105 HCHG RX REV CODE 258: Performed by: INTERNAL MEDICINE

## 2025-01-06 PROCEDURE — 700102 HCHG RX REV CODE 250 W/ 637 OVERRIDE(OP): Mod: JZ | Performed by: STUDENT IN AN ORGANIZED HEALTH CARE EDUCATION/TRAINING PROGRAM

## 2025-01-06 PROCEDURE — 99233 SBSQ HOSP IP/OBS HIGH 50: CPT | Mod: 25 | Performed by: NURSE PRACTITIONER

## 2025-01-06 PROCEDURE — 770022 HCHG ROOM/CARE - ICU (200)

## 2025-01-06 PROCEDURE — 700101 HCHG RX REV CODE 250: Performed by: STUDENT IN AN ORGANIZED HEALTH CARE EDUCATION/TRAINING PROGRAM

## 2025-01-06 PROCEDURE — 84100 ASSAY OF PHOSPHORUS: CPT

## 2025-01-06 PROCEDURE — 85025 COMPLETE CBC W/AUTO DIFF WBC: CPT

## 2025-01-06 PROCEDURE — 700111 HCHG RX REV CODE 636 W/ 250 OVERRIDE (IP): Mod: JZ

## 2025-01-06 RX ORDER — POTASSIUM CHLORIDE 1500 MG/1
40 TABLET, EXTENDED RELEASE ORAL ONCE
Status: COMPLETED | OUTPATIENT
Start: 2025-01-06 | End: 2025-01-06

## 2025-01-06 RX ORDER — FUROSEMIDE 10 MG/ML
40 INJECTION INTRAMUSCULAR; INTRAVENOUS ONCE
Status: COMPLETED | OUTPATIENT
Start: 2025-01-06 | End: 2025-01-06

## 2025-01-06 RX ORDER — MAGNESIUM SULFATE HEPTAHYDRATE 40 MG/ML
2 INJECTION, SOLUTION INTRAVENOUS ONCE
Status: COMPLETED | OUTPATIENT
Start: 2025-01-06 | End: 2025-01-06

## 2025-01-06 RX ORDER — FUROSEMIDE 10 MG/ML
40 INJECTION INTRAMUSCULAR; INTRAVENOUS
Status: DISCONTINUED | OUTPATIENT
Start: 2025-01-06 | End: 2025-01-06

## 2025-01-06 RX ORDER — LIDOCAINE HYDROCHLORIDE 20 MG/ML
20 INJECTION, SOLUTION INFILTRATION; PERINEURAL ONCE
Status: COMPLETED | OUTPATIENT
Start: 2025-01-06 | End: 2025-01-06

## 2025-01-06 RX ADMIN — POTASSIUM CHLORIDE 40 MEQ: 1500 TABLET, EXTENDED RELEASE ORAL at 16:15

## 2025-01-06 RX ADMIN — AMPICILLIN AND SULBACTAM 3 G: 1; 2 INJECTION, POWDER, FOR SOLUTION INTRAMUSCULAR; INTRAVENOUS at 17:59

## 2025-01-06 RX ADMIN — FUROSEMIDE 40 MG: 10 INJECTION, SOLUTION INTRAVENOUS at 10:06

## 2025-01-06 RX ADMIN — Medication 400 MG: at 07:19

## 2025-01-06 RX ADMIN — MORPHINE SULFATE 2 MG: 4 INJECTION, SOLUTION INTRAMUSCULAR; INTRAVENOUS at 01:01

## 2025-01-06 RX ADMIN — INSULIN LISPRO 3 UNITS: 100 INJECTION, SOLUTION INTRAVENOUS; SUBCUTANEOUS at 12:28

## 2025-01-06 RX ADMIN — ONDANSETRON 4 MG: 2 INJECTION INTRAMUSCULAR; INTRAVENOUS at 16:31

## 2025-01-06 RX ADMIN — POTASSIUM CHLORIDE 40 MEQ: 1500 TABLET, EXTENDED RELEASE ORAL at 10:05

## 2025-01-06 RX ADMIN — MORPHINE SULFATE 2 MG: 4 INJECTION, SOLUTION INTRAMUSCULAR; INTRAVENOUS at 03:03

## 2025-01-06 RX ADMIN — LIDOCAINE HYDROCHLORIDE 5 ML: 20 INJECTION, SOLUTION INFILTRATION; PERINEURAL at 14:40

## 2025-01-06 RX ADMIN — AMPICILLIN AND SULBACTAM 3 G: 1; 2 INJECTION, POWDER, FOR SOLUTION INTRAMUSCULAR; INTRAVENOUS at 12:41

## 2025-01-06 RX ADMIN — Medication 300 MG: at 07:20

## 2025-01-06 RX ADMIN — MAGNESIUM SULFATE HEPTAHYDRATE 2 G: 2 INJECTION, SOLUTION INTRAVENOUS at 16:40

## 2025-01-06 RX ADMIN — DOCUSATE SODIUM 100 MG: 100 CAPSULE, LIQUID FILLED ORAL at 17:45

## 2025-01-06 RX ADMIN — PIPERACILLIN AND TAZOBACTAM 4.5 G: 4; .5 INJECTION, POWDER, FOR SOLUTION INTRAVENOUS at 04:29

## 2025-01-06 RX ADMIN — LIDOCAINE 1 PATCH: 4 PATCH TOPICAL at 10:06

## 2025-01-06 RX ADMIN — ROPINIROLE HYDROCHLORIDE 1 MG: 1 TABLET, FILM COATED ORAL at 20:41

## 2025-01-06 RX ADMIN — HYDROCODONE BITARTRATE AND ACETAMINOPHEN 1 TABLET: 10; 325 TABLET ORAL at 03:57

## 2025-01-06 RX ADMIN — MORPHINE SULFATE 2 MG: 4 INJECTION, SOLUTION INTRAMUSCULAR; INTRAVENOUS at 08:40

## 2025-01-06 RX ADMIN — FUROSEMIDE 40 MG: 10 INJECTION INTRAMUSCULAR; INTRAVENOUS at 16:15

## 2025-01-06 RX ADMIN — DOCUSATE SODIUM 100 MG: 100 CAPSULE, LIQUID FILLED ORAL at 07:19

## 2025-01-06 RX ADMIN — ENOXAPARIN SODIUM 40 MG: 100 INJECTION SUBCUTANEOUS at 17:45

## 2025-01-06 RX ADMIN — HYDROCODONE BITARTRATE AND ACETAMINOPHEN 1 TABLET: 10; 325 TABLET ORAL at 16:14

## 2025-01-06 RX ADMIN — HYDROCODONE BITARTRATE AND ACETAMINOPHEN 1 TABLET: 10; 325 TABLET ORAL at 10:05

## 2025-01-06 RX ADMIN — LABETALOL HYDROCHLORIDE 10 MG: 5 INJECTION, SOLUTION INTRAVENOUS at 09:15

## 2025-01-06 ASSESSMENT — PAIN DESCRIPTION - PAIN TYPE
TYPE: CHRONIC PAIN
TYPE: CHRONIC PAIN
TYPE: ACUTE PAIN
TYPE: ACUTE PAIN
TYPE: CHRONIC PAIN
TYPE: CHRONIC PAIN
TYPE: ACUTE PAIN
TYPE: ACUTE PAIN;CHRONIC PAIN
TYPE: ACUTE PAIN
TYPE: CHRONIC PAIN
TYPE: CHRONIC PAIN
TYPE: ACUTE PAIN
TYPE: ACUTE PAIN;CHRONIC PAIN
TYPE: CHRONIC PAIN
TYPE: ACUTE PAIN;CHRONIC PAIN
TYPE: CHRONIC PAIN
TYPE: ACUTE PAIN;CHRONIC PAIN
TYPE: CHRONIC PAIN
TYPE: CHRONIC PAIN
TYPE: ACUTE PAIN

## 2025-01-06 NOTE — PROGRESS NOTES
Critical Care Progress Note    Date of admission  12/25/2024    Chief Complaint  64 y.o. female with history of breast cancer with metastasis to brain, lungs, pleura, liver, spine, admitted for hypoxemic respiratory failure on 12/25 related to bilateral pleural effusions.  She underwent right-sided thoracentesis on 12/27.  Right-sided pneumothorax was identified on 1/2 and was managed medically.  She underwent left-sided thoracentesis on 1/2. In the evening of 1/3 her R sided PTX worsened and was treated with a chest tube by Dr. Shi and improved clinically.  Then early the next morning she decompensated again.  Her PTX had recurred and she developed hypoxia and hypercarbia. She was intubated and large bore chest tube was placed by Dr. Avalos.  She was also harrington-cultured, had a bronch and started on antibiotics.  She was hypotensive requiring pressors.     Hospital Course  1/3: Admitted to ICU with respiratory failure following thoracentesis. Chest tube placed  1/4: Intubated and chest tube placed  1/5: No PTX on CXR. Chest tubes clamped    Interval Problem Update  Reviewed last 24 hour events:  On Bipap  CXR with pulm edema. No PTX. Some pleural effusion.  I/O: -3.6L 9 (-262 since admission)  Chest tube with 140cc out      Good weaning paramenters this morning, trial of extubation--> back pain, got norco.  Escalating O2 requirements with HTN. Treating with labetalol, lasix, morphine, CXR pending. Sounds pretty similar on exam to pre-extubation. Work of breathing is acceptable--CXR pulm edema, started on bipap and improved.  WBC improving  Replete phos    Review of Systems  Review of Systems   Cardiovascular:  Positive for chest pain.   All other systems reviewed and are negative.       Vital Signs for last 24 hours   Pulse:  [71-95] 79  Resp:  [8-44] 20  BP: ()/(52-74) 120/74  SpO2:  [92 %-98 %] 98 %    Hemodynamic parameters for last 24 hours       Respiratory Information for the last 24 hours        Physical Exam   Physical Exam  Vitals and nursing note reviewed.   Constitutional:       Appearance: Normal appearance. She is obese. She is ill-appearing.      Interventions: She is sedated. She is not intubated.  HENT:      Head: Normocephalic and atraumatic.   Eyes:      Pupils: Pupils are equal, round, and reactive to light.   Cardiovascular:      Rate and Rhythm: Normal rate and regular rhythm.   Pulmonary:      Effort: Pulmonary effort is normal. She is not intubated.      Breath sounds: Normal air entry. Rales present.      Comments: Diminished on the R  2x chest tubes on the R.  Neither is bubbling  Chest:      Comments: R mid-axillary chest tubes in place  Abdominal:      General: Bowel sounds are normal.      Palpations: Abdomen is soft.      Tenderness: There is no abdominal tenderness.   Genitourinary:     Comments: Mojica catheter in place  Musculoskeletal:      Right lower leg: Edema (trace) present.      Left lower leg: Edema (trace) present.   Neurological:      Mental Status: She is alert and oriented to person, place, and time.      Comments: Sitting in chair   Psychiatric:      Comments: Unable to assess         Medications  Current Facility-Administered Medications   Medication Dose Route Frequency Provider Last Rate Last Admin    furosemide (Lasix) injection 40 mg  40 mg Intravenous Q DAY Tree Cohoes, D.O.   40 mg at 01/06/25 1006    ampicillin/sulbactam (Unasyn) 3 g in  mL IVPB  3 g Intravenous Q6HRS Tree Kary, D.O.   Stopped at 01/06/25 1311    lidocaine (Xylocaine) 2 % injection 20 mL  20 mL Injection Once Tree Kary, D.O.        labetalol (Normodyne/Trandate) injection 10 mg  10 mg Intravenous Q4HRS PRN Lidia Figueroa M.D.   10 mg at 01/06/25 0915    morphine 4 MG/ML injection 2 mg  2 mg Intravenous Q2HRS PRN Lidia Figueroa M.D.   2 mg at 01/06/25 0840    ferrous sulfate 300 (60 Fe) MG/5ML solution 300 mg  300 mg Oral DAILY Lidia Figueroa M.D.   300 mg at 01/06/25 0720     magnesium oxide tablet 400 mg  400 mg Oral DAILY Lidia Figueroa M.D.   400 mg at 01/06/25 0719    metoclopramide (Reglan) tablet 10 mg  10 mg Oral Q6HRS Lidia Figueroa M.D.   10 mg at 01/05/25 2353    ROPINIRole (Requip) tablet 1 mg  1 mg Oral QHS Lidia Figueroa M.D.   1 mg at 01/05/25 2057    senna-docusate (Pericolace Or Senokot S) 8.6-50 MG per tablet 1 Tablet  1 Tablet Oral Nightly Lidia Figueroa M.D.   1 Tablet at 01/05/25 2057    calcium carbonate (Tums) chewable tab 500 mg  500 mg Oral TID PRN Lidia Figueroa M.D.        HYDROcodone/acetaminophen (Norco)  MG per tablet 1 Tablet  1 Tablet Oral Q6HRS PRN Lidia Figueroa M.D.   1 Tablet at 01/06/25 1005    methocarbamol (Robaxin) tablet 750 mg  750 mg Oral Q8HRS PRN Lidia Figueroa M.D.        ondansetron (Zofran ODT) dispertab 4 mg  4 mg Oral Q4HRS PRN Lidia Figueroa M.D.        polyethylene glycol/lytes (Miralax) Packet 1 Packet  1 Packet Oral BID PRN Lidia Figueroa M.D.        ROPINIRole (Requip) tablet 1 mg  1 mg Oral BID PRN Lidia Figueroa M.D.        senna-docusate (Pericolace Or Senokot S) 8.6-50 MG per tablet 1 Tablet  1 Tablet Oral Q24HRS PRN Lidia Figueroa M.D.        docusate sodium (Colace) capsule 100 mg  100 mg Oral BID Lidia Figueroa M.D.   100 mg at 01/06/25 0719    Respiratory Therapy Consult   Nebulization Continuous RT Americo Avalos M.D. MD Alert...ICU Electrolyte Replacement per Pharmacy   Other PHARMACY TO DOSE Americo Avalos M.D.        ipratropium-albuterol (DUONEB) nebulizer solution  3 mL Nebulization Q2HRS PRN (RT) Americo Avalos M.D.   3 mL at 01/05/25 2211    magnesium hydroxide (Milk Of Magnesia) suspension 30 mL  30 mL Enteral Tube QDAY PRN Americo Avalos M.D.        enoxaparin (Lovenox) inj 40 mg  40 mg Subcutaneous DAILY AT 1800 Americo Avalos M.D.   40 mg at 01/05/25 1728    insulin lispro (HumaLOG,AdmeLOG) subcutaneous injection  2-9 Units Subcutaneous Q6HRS Americo PERALTA  Kenji Lynn M.D.   3 Units at 01/06/25 1228    And    dextrose 50% (D50W) injection 25 g  25 g Intravenous Q15 MIN PRN Americo Avalos M.D.        bisacodyl (Dulcolax) suppository 10 mg  10 mg Rectal DAILY Neo Ceja, P.A.   10 mg at 01/03/25 1151    sodium chloride (Ocean) 0.65 % nasal spray 2 Spray  2 Spray Nasal Q2HRS PRN Omar Saleh M.D.   2 Spray at 01/01/25 0600    MD ALERT...DO NOT ADMINISTER NSAIDS or ASPIRIN unless ORDERED By Neurosurgery 1 Each  1 Each Other PRN MEENA Gutiérrez.OMimi        bisacodyl (Dulcolax) suppository 10 mg  10 mg Rectal Q24HRS PRN Karthik Lowery D.OMimi        ondansetron (Zofran) syringe/vial injection 4 mg  4 mg Intravenous Q4HRS PRN Abdulazzi Nieto M.D.   4 mg at 01/05/25 1932    [Held by provider] carvedilol (Coreg) tablet 3.125 mg  3.125 mg Oral BID Abdulaziz Nieto M.D.   3.125 mg at 01/02/25 0543    [Held by provider] losartan (Cozaar) tablet 100 mg  100 mg Oral Q DAY Abdulaziz Nieto M.D.   50 mg at 01/02/25 0542    lidocaine (Asperflex) 4 % patch 1 Patch  1 Patch Transdermal Q24HR Yosi Espino M.D.   1 Patch at 01/06/25 1006       Fluids    Intake/Output Summary (Last 24 hours) at 1/6/2025 1323  Last data filed at 1/6/2025 1000  Gross per 24 hour   Intake 1923.75 ml   Output 783 ml   Net 1140.75 ml       Laboratory  Recent Labs     01/03/25  1948 01/03/25  2115 01/04/25  0107 01/04/25  0143 01/04/25  0423 01/05/25  0244   ITNLA09L 7.13*  --  7.24*  --   --   --    ISWEMQ190S 96.7*  --  72.3*  --   --   --    OLFWG865W 92.1  --  65.4*  --   --   --    DHQG4TDE 93.8  --  87.0*  --   --   --    ARTHCO3 32*  --  31*  --   --   --    T2FTRHRXT 15L  --  6  --   --   --    ARTBE 0  --  2  --   --   --    ISTATAPH  --    < >  --  7.080* 7.389 7.436   ISTATAPCO2  --    < >  --  >100.0* 48.4* 43.2   ISTATAPO2  --    < >  --  79* 253* 99   ISTATATCO2  --    < >  --  38 31* 30*   PRIRKOV5CEQ  --    < >  --  87* 100* 98   ISTATARTHCO3  --    < >  --  34.7* 29.2*  "29.1*   ISTATARTBE  --    < >  --  1 4* 4*   ISTATTEMP  --    < >  --  97.3 F 96.6 F 37.4 C   ISTATFIO2  --   --   --   --  100 40   ISTATSPEC  --    < >  --  Arterial Arterial Arterial   ISTATAPHTC  --   --   --  7.089* 7.405 7.430   NLDCJAGK2ZS  --   --   --  75 248* 101*    < > = values in this interval not displayed.         Recent Labs     01/03/25  1946 01/04/25 0107 01/05/25 0343 01/06/25 0423   SODIUM  --  136 133* 136   POTASSIUM  --  4.8 4.3 4.0   CHLORIDE  --  99 97 98   CO2  --  30 28 31   BUN  --  15 11 8   CREATININE  --  0.61 0.61 0.55   MAGNESIUM  --  1.9 2.2 2.0   PHOSPHORUS 2.9  --  1.8* 3.0   CALCIUM  --  9.3 8.3* 8.4*     Recent Labs     01/04/25 0107 01/05/25 0343 01/06/25 0423   ALTSGPT 72*  --  42   ASTSGOT 216*  --  103*   ALKPHOSPHAT 121*  --  95   TBILIRUBIN 0.6  --  0.4   GLUCOSE 174* 113* 136*     Recent Labs     01/04/25 0107 01/05/25 0343 01/06/25 0423   WBC 13.0* 9.0 7.9   NEUTSPOLYS 73.40* 74.60* 74.90*   LYMPHOCYTES 9.50* 11.40* 7.80*   MONOCYTES 15.70* 12.20 14.90*   EOSINOPHILS 0.00 0.70 0.90   BASOPHILS 0.50 0.40 0.50   ASTSGOT 216*  --  103*   ALTSGPT 72*  --  42   ALKPHOSPHAT 121*  --  95   TBILIRUBIN 0.6  --  0.4     Recent Labs     01/04/25 0107 01/05/25 0343 01/06/25 0423   RBC 3.54* 2.96* 3.20*   HEMOGLOBIN 10.1* 8.6* 9.0*   HEMATOCRIT 32.7* 26.7* 28.6*   PLATELETCT 399 388 421       Imaging  X-Ray:  I have personally reviewed the images and compared with prior images.    Assessment/Plan  * Malignant neoplasm of breast (HCC)- (present on admission)  Assessment & Plan  \"Bilateral breast cancer with extensive destruction of the left breast and ulceration and skin lesions making her inoperable on the left.  Right breast shows a 3 x 3 cm mass.  PET scan March 2023 showed non-hypermetabolic pleural effusions which are cytology positive and she has demonstrable pleural masses. Started aromatase inhibition early September 2023.  Has not yet started CDK 4 6 inhibitor.  " "Lost to follow-up for over 6 months although apparently was on letrozole as single agent.  Now with clear progression of disease in the left breast.  August 2024: Progression of disease in liver with extensive disease.  Breast is worse.  Lung is better.  Lymph nodes are worse.  Status post radiation to symptomatic oozing and exophytic left breast mass.  Marked worsening of her cancer on letrozole with progression in the bone and liver.\"--Dr. Benitez  Plan:  --palliative Care Disucssion is merited in her case      Goals of care, counseling/discussion  Assessment & Plan  Spoke with patient and Jane on 1/5. Patient really hated being on the ventilator. I explained what DNR and DNI status mean.  She is thinking about it and sounds like is leaning towards DNR/DNI but wants to discuss it with Jane further.  She also looks forward to meeting with palliative care again    Acute on chronic respiratory failure with hypoxia and hypercapnia (HCC)  Assessment & Plan  Emergently intubated on 1/4. Extubated 1/5. Etiology is likely multifactorial: pleural effusion, PTX, pulm edema, and PNA.My fear is that much of her pleural effusion is drive by her ongoing and active malignancy. She will need active and aggressive diuresis for this to not recur and even in that setting I suspect that it will. I will reitroduce the idea of a tunneled pleual catheter.   Plan:  --Zosyn to unasyn  --Diuresis  --Remove surgical chest tube  --Keep pigtail tube for now    Pneumothorax  Assessment & Plan  Right pneumothorax s/p pigtail catheter placement on 1/3/2025 with significant initial improvement - subsequently developed worsening right pneumothorax and a right closed tube thoracostomy on 1/4/2025 with 20 [place] chest tube with near complete resolution of right pneumothorax  Clamping trial today on 1/5.  Plan:  --Pull closed tube thoracostomy on 1/6  --Can remove pigtail on 1/7 if CXR remains stable    Restless leg syndrome- (present on " admission)  Assessment & Plan       Pathological fracture of vertebra- (present on admission)  Assessment & Plan  Pathologic fracture of T5 with myelopathy on presentation  S/P T4-6 laminectomy with fusion and spinal decompression on 1/1/2025 by Dr. Lowery    Cancer related pain- (present on admission)  Assessment & Plan  Continue analgesia    Bilateral pleural effusion- (present on admission)  Assessment & Plan  Malignant pleural effusions. S/P left thoracentesis on 1/2/2025. S/P right closed tube thoracostomy on 1/4/2025.  Plan:  --Diuresis  --Removal of surgical chest tube today    Primary hypertension- (present on admission)  Assessment & Plan  Now off pressors.  Plan:  --Reintroduce losartan and carvedilol when clinically appropriate      Diabetes mellitus, type 2 (HCC)- (present on admission)  Assessment & Plan  Glycohemoglobin 6.4  Sliding scale insulin    Septic shock (HCC)  Assessment & Plan  This is Septic shock Not present on admission  SIRS criteria identified on my evaluation include: Tachycardia, with heart rate greater than 90 BPM, Tachypnea, with respirations greater than 20 per minute, and Leukocytosis, with WBC greater than 12,000  Clinical indicators of end organ dysfunction include Hypotension with systolic blood pressure less than 90 or MAP less than 65 and Acute Respiratory Failure - (mechanical ventilation or BiPap or PaO2/FiO2 ratio < 300)  Indicators of septic shock include: Sepsis present and persistent hypotension despite fluid resuscitation   Source is: unclear - possible pneumonia, urinary  Sepsis protocol initiated  Crystalloid Fluid Administration: Fluid resuscitation ordered per standard protocol - 30 mL/kg per current or ideal body weight  IV antibiotics as appropriate for source of sepsis  Reassessment: I have reassessed the patient's hemodynamic status    Culture blood, sputum and urine, all NGTD  D/c vanco with MRSA nares and improved clinically  DC Zosyn  Unasyn for possible  PNA         VTE:  Lovenox  Ulcer: H2 Antagonist  Lines: Central Line  Ongoing indication addressed, Arterial Line  Ongoing indication addressed, and Mojica Catheter  Ongoing indication addressed    I have performed a physical exam and reviewed and updated ROS and Plan today (1/6/2025). In review of yesterday's note (1/5/2025), there are no changes except as documented above.     Discussed patient condition and risk of morbidity and/or mortality with Family, RN, RT, Pharmacy, Charge nurse / hot rounds, and Patient  The patient remains critically ill on mechanical ventilation, then bipap both of which I managed.  Critical care time = 104 minutes in directly providing and coordinating critical care and extensive data review.  No time overlap and excludes procedures.    Tree Preston DO MPH  Critical Care Medicine

## 2025-01-06 NOTE — PROGRESS NOTES
Progress Note    Large bore chest tube removed and sutured with purse string. Occlusive dressing applied. CXR ordered.    Tree Preston DO MPH  Critical Care [facility]

## 2025-01-06 NOTE — OP REPORT
DATE OF SURGERY: 1/1/2025  SURGEON: Karthik Watt DO, FACOS  ASSISTANT: NONE  PREOPERATIVE DIAGNOSIS:   T5 pathologic compression fracture  Epidural metastasis T5  Cord compression T5  Hx of breast CA  POSTOPERATIVE DIAGNOSIS:   T5 pathologic Compression fracture  Epidural metastasis T5  Cord compression   Hx of breast CA    PROCEDURE PERFORMED:  T4-T6 thoracic laminectomy for decompression of spinal cord (51742)  T4-T6 pedicle screw kaiser fixation (78393)  T4-T5, T5- T6 posterior posterolateral arthrodesis (70371, 57002)  Microscope for microsurgical technique (95326)  Biplanar fluoroscopy  Electrophysiological monitoring of somatosensory evoked potentials and motor evoked potentials.   Allograft osteopromotive material (56953)     ESTIMATED BLOOD LOSS: 200 cc.  ANESTHESIA: GETA.  FINDINGS: Soft bony metastasis into the vertebral body of T5 as well as spinous process and all posterior elements of the T5 vertebral body. Great decompression obtained. Specimen sent off to pathology. No complication no issues. Hemovac drain placed. Good placement of screws and rods T4-T6  DRAINS: HemopVAC  COMPLICATIONS: None.  DISPOSITION: Stable to the PACU.     INDICATIONS FOR THE PROCEDURE  HISTORY: Ms. Pedro Hannon is a 64 yr old female with a history of Breast CA, who presents with signs, symptoms and radiographic evidence of Thoracic myelopathy, weakness, poor coordination, sensory changes of BL Lower extremities for a few weeks that has worsened in the past week. She also has intractable upper back pain. She has evidence of T5 pathologic fracture of T5 with assumed breast cancer mets. She has multiple bony lesions with mets to her bone through her body. She has weakness and evidence of cord compression with epidural extension of the T5 mets causing cord compression. For this reason she was offered and she accepted operative intervention in the form of decompressive laminectomy and fusion of T4 through T6. All risks and  benefits were discussed in detail with her and her daughter. No promises or guarantees were made as to outcome. The operation was also discussed in detail with her. She wishes to proceed.   DIAGNOSTIC STUDY: MRI T spine showed:  Multiple metastatic lesions of the thoracic spine as described above.     Pathologic compression fracture at T5 with 50% loss of height and posterior cortical retropulsion with circumferential epidural thickening representing epidural extension of metastatic disease. This causes severe canal stenosis with cord compression.     Metastatic disease also involves the posterior elements and spinous processes at T4, T5.     Pre and paravertebral metastatic soft tissue infiltration at T4-T5 noted..  MRI showed progression of her disease and symptoms continued to worsen. Given the progression of the symptoms, the patient decided to proceed with laminectomy and excisional biopsy of the epidural lesion at T5 with a T4-T6 lmainectomy.     SURGICAL RISKS: The patient and her daughter are well apprised of all objectives, benefits, risks and potential complications of the procedure, including but not limited to: worsening of current status, the possible need for further procedures, the risk of infection, headaches, CSF leak, possible spinal cord injury resulting in paralysis, infection, hematoma and hoarseness of voice, injury to major vessels causing hemorrhage, stroke, loss of language function, coma and even death. No assurance was given whether symptoms would improve following the procedure. Informed consent was obtained and secured in the chart after the patient and her daughter voiced under-standing of these risks and decided to proceed with the operation.     DESCRIPTION OF THE PROCEDURE   The patient was transferred to the operating room. She was given preoperative prophylactic IV antibiotics.     ANESTHESIA: The patient was sedated and intubated without difficulty by the anesthesia service. Eyes  were taped shut after ointment was applied to prevent corneal abrasion. A Miranda Hugger was placed over the lower/upper body to maintain control of core body temperature. A Mojica catheter was inserted.     POSITIONING:The patient was turned prone on a spinal Devan Spinal Table. All pressure points were carefully padded. The electro-physiology monitoring team inserted needles in their proper locations and baseline somatosensory and motor evoked potentials were obtained pre and post positioning.     OPERATIVE TECHNIQUE  The patient was prepped and draped in the standard sterile fashion. After performing a timeout, the fluoroscopy was brought in sterilely.  Under AP and lateral conditions, the pedicles of T4 and T6  were localized. The area of the pedicles was infiltrated with local and was numbed and a 10 blade was used to perform small incisions over the pedicles.  Jamshidis were used to cannulate the pedicles of T4 and T6 bilaterally without any complicating process under AP and lateral fluoroscopy. K-wires were then placed into the vertebral body and was secured to the drape. The AP C-arm was taken out of the field and the lateral unit was kept sterile and brought to the foot of the bed after T4-6 levels were confirmed for performance of T4-6 laminectomy. Attention was then turned to the laminectomy procedure. Local anesthetic was infiltrated along the midline in the planned the skin incision which was subsequently opened sharply with a #10 scalpel blade. Further dissection was carried down in the midline until the level of supraspinous ligament utilizing bipolar forceps and Bovie electrocautery for hemostasis. The muscle was elevated subperiosteally from T4-T6. Hemostasis was achieved. Self-retaining retractors were then inserted.  The space was again confirmed utilizing fluoroscopy x-ray. The operating microscope was draped with sterile drapes brought into the operative field. The posterior spinous processes were  cut off with heavy bone cutters. Utilizing a high-speed pneumatic drill, a full thickness groove was drilled through the bilateral T4-T6 lamina.  Sharp dissection was used to free ligamentum flavum from the superior border of T6 and the inferior border of T4 and the lamina uplifted carefully preserving the dura intact.   Again, hemostasis was meticulously achieved and the wound irrigated. Again, baseline SSEPs were obtained. The epidural aspect of the lesion could readily be seen. Using various curettes and rongeurs the epidural space posteriorly was decompressed and the specimen sent to pathology. The lateral recesses and anterior aspect of the cord were decompressed using an extra cavitary approach. Meticulous hemostasis was obtained and the area irrigated with antibiotic saline. The dura was pulsatile at this point and the decompression was complete. The exiting nerve roots were palpated and felt to be free and supple. A hemoVAC drain was placed and brought out through a separate stab incision and secured into place with a suture. There was no evidence of CSF leak. DBM putty was placed in the lateral gutters after the bone was decorticated with a drill. This was done for posterolateral arthrodesis purposes. The midline incision was then closed after 1 gram of vancomycin powder was placed in the wound using 0-0 vicryl suture for the facial layer. This was followed by 2-0 vicryl for the subcutaneous layer followed by surgical staples for the skin.   Attention was then turned to placement of the screws. The lateral fluoroscopy was then brought back into the field and after the microscope was taken out.  The K-wires placed earlier were then used to place screws at T4 and T6 bilaterally.  The extension towers were then lined up and the kaiser  was used to insert a kaiser through a counter incision into the extension towers.  These were confirmed to be in place.  Locking caps were then placed and final tightened to  the 's recommended torque.  The extension towers and kaiser  was then removed.  This was done bilaterally.The subcutaneous tissues were then closed with 2-0 Vicryl.  Skin was closed with surgical staples.  Local sterile dressing was placed.  The patient was subsequently log rolled back in the supine position onto her bed.     All sponge counts, needle counts and instrument counts were correct at the end of the case times two. All pressure points remained padded throughout the entire case. The patient tolerated the procedure well, without any complications and was transferred in stable condition to the recovery room. The electrophysiological monitoring remained stable from baseline through the end of the procedure.

## 2025-01-06 NOTE — CONSULTS
Physical Medicine and Rehabilitation Consultation          Date of initial consultation: 1/6/2025  Consulting provider: Karthik Lowery D.O.   Reason for consultation: assess for acute inpatient rehab appropriateness  LOS: 12 Day(s)    Chief complaint: T5 compression fracture, respiratory failure    HPI: The patient is a 64 y.o. right hand dominant female with a past medical history of breast cancer with metastasis to brain, lungs, pleural, liver, spine;  who presented on 12/25/2024  4:46 PM with hypoxemic respiratory failure related bilateral pleural effusions, now status post left thoracentesis on 12/27 and right thoracentesis on 1/2.  Patient required right chest tube on 1/3, with upsizing to large bore chest tube on 1/4 and intubation.  Patient was also found to have a T5 pathologic compression fracture and was taken to the OR Dr. Karthik Lowery DO on 1/1/2025 for T4-6 decompression and posterior spinal fusion.    Current labs reflect anemia with hemoglobin 9.0. She has an NG tube in right nare. She has right chest tube, left one has been removed. She reports using a 4ww at baseline. She endorses back pain. Denies numbness/tingling. Denies issues with bowel or bladder control.     ROS  Pertinent positives are mentioned in the HPI, all others reviewed and are negative.    Social Hx:  1 SH  1 LOBO  With: Adult daughter    THERAPY:  Restrictions: Fall risk   PT: Functional mobility   1/3: Walked 6 feet with four-wheel walker contact-guard assist, displaying ataxia    OT: ADLs  Pending     SLP:   1/5: Regular solids, thin liquids    IMAGING:  CT head 1/4/2025  1. No acute process in the brain evident.  2. Fluid in sphenoid sinus.  3. Right convexity midthoracic scoliosis and unchanged from 12/31/2024 MRI.    PROCEDURES:  Karthik Lowery DO 1/1/2025  T4-T6 thoracic laminectomy for decompression of spinal cord (34295)  T4-T6 pedicle screw kaiser fixation (47976)  T4-T5, T5- T6 posterior posterolateral arthrodesis (73451,  Clinic Note  9/17/2020      Subjective:       Patient ID:  Ruth is a 74 y.o. female being seen for a new visit.      Chief Complaint: Establish Care    HPI  Ruth is a 74 year old in clinic today to establish PCP. She is accompanied by her sister in law. States she has never been followed by a PCP.   She had a recent visit to [facility] ED for BLE edema and hypertension. She was started on Norvasc 5 mg daily per ED. She did not take medication today. She has bilateral overgrowth of toenails that she would like to have trimmed.  She was living alone, only eating 1 meal per day. She now lives with her brother and sister in law, states she has increased meals.    The following portions of the patient's history were reviewed and updated as appropriate: allergies, current medications, past family history, past medical history, past social history, past surgical history and problem list.      Family History   Problem Relation Age of Onset    No Known Problems Mother     No Known Problems Father      Social History     Socioeconomic History    Marital status: Single     Spouse name: Not on file    Number of children: Not on file    Years of education: Not on file    Highest education level: Not on file   Occupational History    Not on file   Social Needs    Financial resource strain: Not on file    Food insecurity     Worry: Not on file     Inability: Not on file    Transportation needs     Medical: Not on file     Non-medical: Not on file   Tobacco Use    Smoking status: Never Smoker    Smokeless tobacco: Never Used   Substance and Sexual Activity    Alcohol use: Never     Frequency: Never    Drug use: Never    Sexual activity: Not on file   Lifestyle    Physical activity     Days per week: Not on file     Minutes per session: Not on file    Stress: Not on file   Relationships    Social connections     Talks on phone: Not on file     Gets together: Not on file     Attends Yazidism service: Not on file      Active member of club or organization: Not on file     Attends meetings of clubs or organizations: Not on file     Relationship status: Not on file   Other Topics Concern    Not on file   Social History Narrative    Not on file     History reviewed. No pertinent surgical history.  Patient Active Problem List   Diagnosis    Dyslipidemia    Essential hypertension    Iron deficiency anemia       Review of Systems   Constitutional: Negative for chills, fever, malaise/fatigue and weight loss.   HENT: Negative for congestion, hearing loss, sinus pain and sore throat.    Eyes: Negative for blurred vision, double vision, pain and discharge.   Respiratory: Negative for cough, sputum production, shortness of breath and wheezing.    Cardiovascular: Negative for chest pain, palpitations, claudication and leg swelling.   Gastrointestinal: Negative for constipation, diarrhea, nausea and vomiting.   Genitourinary: Negative for dysuria, frequency, hematuria and urgency.   Musculoskeletal: Negative for back pain, joint pain, myalgias and neck pain.   Skin: Negative for itching and rash.   Neurological: Negative for dizziness, seizures, loss of consciousness and weakness.   Psychiatric/Behavioral: Negative for depression and suicidal ideas. The patient is not nervous/anxious and does not have insomnia.                Objective:      BP (!) 168/88 (BP Location: Left arm, Patient Position: Sitting, BP Method: Small (Automatic))   Pulse 63   Temp 97 °F (36.1 °C)   Ht 4' (1.219 m)   Wt 36 kg (79 lb 6 oz)   SpO2 96%   BMI 24.22 kg/m²   Estimated body mass index is 24.22 kg/m² as calculated from the following:    Height as of this encounter: 4' (1.219 m).    Weight as of this encounter: 36 kg (79 lb 6 oz).  Physical Exam   Constitutional: She is oriented to person, place, and time and well-developed, well-nourished, and in no distress. No distress.   HENT:   Head: Normocephalic and atraumatic.   Right Ear: External ear normal.  98149)  Microscope for microsurgical technique (19714)  Biplanar fluoroscopy  Electrophysiological monitoring of somatosensory evoked potentials and motor evoked potentials.   Allograft osteopromotive material (03294)    Left chest tube 12/27  Right chest tube 1/3  Intubation 1/4  Right chest tube 1/4    PMH:  Past Medical History:   Diagnosis Date    Anxiety     Breast cancer metastasized to bone, unspecified laterality (HCC) 12/25/2024    Depression     Diabetes (HCC)     HLD (hyperlipidemia)     HTN (hypertension)     Hypothyroidism        PSH:  Past Surgical History:   Procedure Laterality Date    THORACIC LAMINECTOMY  1/1/2025    Procedure: LAMINECTOMY, SPINE, THORACIC. T4-T6;  Surgeon: Karthik Lowery D.O.;  Location: SURGERY [facility];  Service: Neurosurgery    FUSION, SPINE, THORACIC APPROACH  1/1/2025    Procedure: FUSION, SPINE, THORACIC APPROACH;  Surgeon: Karthik Lowery D.O.;  Location: SURGERY [facility];  Service: Neurosurgery    CHOLECYSTECTOMY      OTHER      mastoid bone       FHX:    Family History   Problem Relation Age of Onset    Lung Cancer Mother     Uterine cancer Sister     Cancer Maternal Grandfather     Stomach Cancer Paternal Grandmother        Medications:  Current Facility-Administered Medications   Medication Dose    furosemide (Lasix) injection 40 mg  40 mg    ampicillin/sulbactam (Unasyn) 3 g in  mL IVPB  3 g    lidocaine (Xylocaine) 2 % injection 20 mL  20 mL    labetalol (Normodyne/Trandate) injection 10 mg  10 mg    morphine 4 MG/ML injection 2 mg  2 mg    ferrous sulfate 300 (60 Fe) MG/5ML solution 300 mg  300 mg    magnesium oxide tablet 400 mg  400 mg    metoclopramide (Reglan) tablet 10 mg  10 mg    ROPINIRole (Requip) tablet 1 mg  1 mg    senna-docusate (Pericolace Or Senokot S) 8.6-50 MG per tablet 1 Tablet  1 Tablet    calcium carbonate (Tums) chewable tab 500 mg  500 mg    HYDROcodone/acetaminophen (Norco)  MG per tablet 1 Tablet  1 Tablet    methocarbamol    Left Ear: External ear normal.   Nose: Nose normal.   Mouth/Throat: Oropharynx is clear and moist. No oropharyngeal exudate.   Cardiovascular: Normal rate, regular rhythm and normal heart sounds. Exam reveals no friction rub.   No murmur heard.  Pulmonary/Chest: Effort normal and breath sounds normal. No respiratory distress. She has no wheezes. She has no rales.   Musculoskeletal:         General: Edema present. No tenderness or deformity.      Right ankle: She exhibits swelling.      Left ankle: She exhibits swelling.   Neurological: She is alert and oriented to person, place, and time. Gait normal. GCS score is 15.   Skin: She is not diaphoretic.   Psychiatric: Mood, affect and judgment normal. Her affect is not blunt and not inappropriate. She is not agitated. She exhibits abnormal new learning ability and abnormal recent memory. She exhibits normal remote memory.   Nursing note and vitals reviewed.        Assessment and Plan:   Establish care  Routine labs, will call with results  Essential Hypertension  Obtain records from [facility] Ed visit  Take medication every morning   Monitor and record BP, Bring to next visit  Overgrowth of toenails, chronic  Referral to Podiatry  Follow up in 2 weeks and as needed        Problem List Items Addressed This Visit        Cardiac/Vascular    Dyslipidemia    Relevant Orders    Lipid Panel    Essential hypertension       Oncology    Iron deficiency anemia    Relevant Orders    CBC auto differential      Other Visit Diagnoses     Encounter to establish care    -  Primary    Laboratory exam ordered as part of routine general medical examination        Relevant Orders    Lipid Panel    TSH w/reflex to FT4    Comprehensive metabolic panel    CBC auto differential    Overgrown toenails        Relevant Orders    Ambulatory referral/consult to Podiatry          Risks, benefits, and alternatives discussed with patient, Patient verbalized understanding of discussed plan of care. Asked  "(Robaxin) tablet 750 mg  750 mg    ondansetron (Zofran ODT) dispertab 4 mg  4 mg    polyethylene glycol/lytes (Miralax) Packet 1 Packet  1 Packet    ROPINIRole (Requip) tablet 1 mg  1 mg    senna-docusate (Pericolace Or Senokot S) 8.6-50 MG per tablet 1 Tablet  1 Tablet    docusate sodium (Colace) capsule 100 mg  100 mg    Respiratory Therapy Consult      MD Alert...ICU Electrolyte Replacement per Pharmacy      ipratropium-albuterol (DUONEB) nebulizer solution  3 mL    magnesium hydroxide (Milk Of Magnesia) suspension 30 mL  30 mL    enoxaparin (Lovenox) inj 40 mg  40 mg    insulin lispro (HumaLOG,AdmeLOG) subcutaneous injection  2-9 Units    And    dextrose 50% (D50W) injection 25 g  25 g    bisacodyl (Dulcolax) suppository 10 mg  10 mg    sodium chloride (Ocean) 0.65 % nasal spray 2 [place]  2 [place]    MD ALERT...DO NOT ADMINISTER NSAIDS or ASPIRIN unless ORDERED By Neurosurgery 1 Each  1 Each    bisacodyl (Dulcolax) suppository 10 mg  10 mg    ondansetron (Zofran) syringe/vial injection 4 mg  4 mg    [Held by provider] carvedilol (Coreg) tablet 3.125 mg  3.125 mg    [Held by provider] losartan (Cozaar) tablet 100 mg  100 mg    lidocaine (Asperflex) 4 % patch 1 Patch  1 Patch       Allergies:  Allergies   Allergen Reactions    Demerol Rash     \"was told this during surgery\"  Other reaction(s): rash         Physical Exam:  Vitals: /53   Pulse 83   Temp 36 °C (96.8 °F) (Temporal)   Resp 20   Ht 1.727 m (5' 7.99\")   Wt 101 kg (223 lb 8.7 oz)   SpO2 99%   Gen: NAD  Head: NC/AT  Eyes/ Nose/ Mouth: PERRLA, moist mucous membranes  Cardio: RRR, good distal perfusion, warm extremities  Pulm: normal respiratory effort, no cyanosis   Abd: Soft NTND, negative borborygmi   Ext: No peripheral edema. No calf tenderness. No clubbing.    LINES: Right nare NG, right chest tube, right art line.     Mental status:  A&Ox4 (person, place, date, situation) answers questions appropriately follows commands  Speech: fluent, no " aphasia or dysarthria      Motor:      Upper Extremity  Myotome R L   Shoulder flexion C5 5 5   Elbow flexion C5 5 5   Wrist extension C6 5 5   Elbow extension C7 5 5   Finger flexion C8 5 5   Finger abduction T1 5 5     Lower Extremity Myotome R L   Hip flexion L2 4/5 4/5   Knee extension L3 5 5   Ankle dorsiflexion L4 5 5   Toe extension L5 5 5   Ankle plantarflexion S1 5 5     Skin:Right posterior hip 1/5/25       Labs: Reviewed and significant for   Recent Labs     01/04/25 0107 01/05/25 0343 01/06/25  0423   RBC 3.54* 2.96* 3.20*   HEMOGLOBIN 10.1* 8.6* 9.0*   HEMATOCRIT 32.7* 26.7* 28.6*   PLATELETCT 399 388 421     Recent Labs     01/04/25 0107 01/05/25 0343 01/06/25  0423   SODIUM 136 133* 136   POTASSIUM 4.8 4.3 4.0   CHLORIDE 99 97 98   CO2 30 28 31   GLUCOSE 174* 113* 136*   BUN 15 11 8   CREATININE 0.61 0.61 0.55   CALCIUM 9.3 8.3* 8.4*     Recent Results (from the past 24 hours)   POCT glucose device results    Collection Time: 01/05/25  5:31 PM   Result Value Ref Range    POC Glucose, Blood 174 (H) 65 - 99 mg/dL   POCT glucose device results    Collection Time: 01/05/25 11:52 PM   Result Value Ref Range    POC Glucose, Blood 119 (H) 65 - 99 mg/dL   CBC with Differential    Collection Time: 01/06/25  4:23 AM   Result Value Ref Range    WBC 7.9 4.8 - 10.8 K/uL    RBC 3.20 (L) 4.20 - 5.40 M/uL    Hemoglobin 9.0 (L) 12.0 - 16.0 g/dL    Hematocrit 28.6 (L) 37.0 - 47.0 %    MCV 89.4 81.4 - 97.8 fL    MCH 28.1 27.0 - 33.0 pg    MCHC 31.5 (L) 32.2 - 35.5 g/dL    RDW 56.6 (H) 35.9 - 50.0 fL    Platelet Count 421 164 - 446 K/uL    MPV 8.9 (L) 9.0 - 12.9 fL    Neutrophils-Polys 74.90 (H) 44.00 - 72.00 %    Lymphocytes 7.80 (L) 22.00 - 41.00 %    Monocytes 14.90 (H) 0.00 - 13.40 %    Eosinophils 0.90 0.00 - 6.90 %    Basophils 0.50 0.00 - 1.80 %    Immature Granulocytes 1.00 (H) 0.00 - 0.90 %    Nucleated RBC 0.00 0.00 - 0.20 /100 WBC    Neutrophils (Absolute) 5.90 1.82 - 7.42 K/uL    Lymphs (Absolute) 0.61  patient if any further questions, answered no.  Follow up:   Follow up in about 2 weeks (around 10/1/2020), or if symptoms worsen or fail to improve.     Other Orders Placed This Visit:  Orders Placed This Encounter   Procedures    Lipid Panel     Standing Status:   Future     Number of Occurrences:   1     Standing Expiration Date:   11/17/2021    TSH w/reflex to FT4     Standing Status:   Future     Number of Occurrences:   1     Standing Expiration Date:   11/16/2021    Comprehensive metabolic panel     Standing Status:   Future     Number of Occurrences:   1     Standing Expiration Date:   11/16/2021    CBC auto differential     Standing Status:   Future     Number of Occurrences:   1     Standing Expiration Date:   11/16/2021    Ambulatory referral/consult to Podiatry     Standing Status:   Future     Standing Expiration Date:   10/17/2021     Referral Priority:   Routine     Referral Type:   Consultation     Referral Reason:   Specialty Services Required     Referred to Provider:   Gregorio Modi IV, DPM     Requested Specialty:   Podiatry     Number of Visits Requested:   1           [place]    Problem List Items Addressed This Visit        Cardiac/Vascular    Dyslipidemia    Relevant Orders    Lipid Panel    Essential hypertension       Oncology    Iron deficiency anemia    Relevant Orders    CBC auto differential      Other Visit Diagnoses     Encounter to establish care    -  Primary    Laboratory exam ordered as part of routine general medical examination        Relevant Orders    Lipid Panel    TSH w/reflex to FT4    Comprehensive metabolic panel    CBC auto differential    Overgrown toenails        Relevant Orders    Ambulatory referral/consult to Podiatry            (L) 1.00 - 4.80 K/uL    Monos (Absolute) 1.17 (H) 0.00 - 0.85 K/uL    Eos (Absolute) 0.07 0.00 - 0.51 K/uL    Baso (Absolute) 0.04 0.00 - 0.12 K/uL    Immature Granulocytes (abs) 0.08 0.00 - 0.11 K/uL    NRBC (Absolute) 0.00 K/uL   Magnesium    Collection Time: 01/06/25  4:23 AM   Result Value Ref Range    Magnesium 2.0 1.5 - 2.5 mg/dL   Phosphorus    Collection Time: 01/06/25  4:23 AM   Result Value Ref Range    Phosphorus 3.0 2.5 - 4.5 mg/dL   Comp Metabolic Panel    Collection Time: 01/06/25  4:23 AM   Result Value Ref Range    Sodium 136 135 - 145 mmol/L    Potassium 4.0 3.6 - 5.5 mmol/L    Chloride 98 96 - 112 mmol/L    Co2 31 20 - 33 mmol/L    Anion Gap 7.0 7.0 - 16.0    Glucose 136 (H) 65 - 99 mg/dL    Bun 8 8 - 22 mg/dL    Creatinine 0.55 0.50 - 1.40 mg/dL    Calcium 8.4 (L) 8.5 - 10.5 mg/dL    Correct Calcium 9.4 8.5 - 10.5 mg/dL    AST(SGOT) 103 (H) 12 - 45 U/L    ALT(SGPT) 42 2 - 50 U/L    Alkaline Phosphatase 95 30 - 99 U/L    Total Bilirubin 0.4 0.1 - 1.5 mg/dL    Albumin 2.7 (L) 3.2 - 4.9 g/dL    Total Protein 5.6 (L) 6.0 - 8.2 g/dL    Globulin 2.9 1.9 - 3.5 g/dL    A-G Ratio 0.9 g/dL   ESTIMATED GFR    Collection Time: 01/06/25  4:23 AM   Result Value Ref Range    GFR (CKD-EPI) 101 >60 mL/min/1.73 m 2   POCT glucose device results    Collection Time: 01/06/25  5:37 AM   Result Value Ref Range    POC Glucose, Blood 114 (H) 65 - 99 mg/dL   POCT glucose device results    Collection Time: 01/06/25 12:26 PM   Result Value Ref Range    POC Glucose, Blood 231 (H) 65 - 99 mg/dL   POCT glucose device results    Collection Time: 01/06/25  1:16 PM   Result Value Ref Range    POC Glucose, Blood 255 (H) 65 - 99 mg/dL       ASSESSMENT:  Patient is a 64 y.o. female admitted with respiratory failure now s/p intubation, BL chest tubes; and T5 compression fracture requiring T4-6 PSF     Rehabilitation: Impaired ADLs and mobility  Barriers to transfer include: Insurance authorization, TCCs to verify disposition, medical  clearance and bed availability. All cases are subject to administrative review.     [facility] Code / Diagnosis to Support: 0008.9 - Orthopaedic Disorders: Other Orthopaedic, 0017.2 - Medically Complex: Neoplasms, and 0017.52 - Medically Complex: Respiratory Disorders - Non-Ventilator Dependent    Disposition recommendations:  - Good candidate for IPR.  Patient has deficits with mobility and ADLs secondary to multiple medical conditions including cancer, respiratory failure and T5 compression fracture.  Patient has good discharge support from daughter, and a stable discharge environment which is her H.   - TCC to verify DC support  -PMR to follow in the periphery for rehab appropriateness, please reach out with questions or request for medical management    Medical Complexity:    Debility due to multiple medical conditions   - Specific treatments below   - Continue PT/OT while in house   - Good candidate for IPR     Septic Shock   Acute respiratory failure   - Presumed Pneumonia   - Required intubation, then extubation to Bi-pap and BL chest tubes   - Left chest tube out   - Right chest tube to be removed soon   - On Room Air  -Unasyn through 1/9     Pathologic compression fracture   - T5 compression fracture   - S/P T4-6 PSF by Dr. Lowery, DO on 1/1/25    Widespread metastatic breast cancer   - Follows with Dr. Benitez  - No current plans for chemo, was last on Letrozole   - S/P radiation     Skin   - Update skin pictures     Dysphagia   - NG tube while intubated   - To be removed soon     Hypertension   - Coreg 3.125mg BID   - Losartan 100mg daily     Acute anemia  - Hgb 9.0  - monitoring with serial labs       DVT PPX: Lovenox       Thank you for allowing us to participate in the care of this patient.     Total time: >80 minutes. This includes time spent reviewing patient's history, notes, labs, imaging, vitals, medications, examining patient, discussing care with patient and family including prognosis, risk  reduction, benefits of treatment, and options for next stage of care, and communicating recommendations to primary team.     Indio Culver, DO   Physical Medicine and Rehabilitation     Please note that this dictation was created using voice recognition software. I have made every reasonable attempt to correct obvious errors, but there may be errors of grammar and possibly content that I did not discover before finalizing the note.

## 2025-01-06 NOTE — THERAPY
"Speech Language Pathology   Clinical Swallow Evaluation     Patient Name: Asha Hannon  AGE:  64 y.o., SEX:  female  Medical Record #: [id number]  Date of Service: 1/5/2025      History of Present Illness  64 y.o. female with history of breast cancer with metastasis to brain, lungs, pleura, liver, spine, admitted for hypoxemic respiratory failure on 12/25 related to bilateral pleural effusions.    S/p laminectomy and fusion of T4-6 1/1; chest tube 1/3; bronchoscopy 1/4; thoracostomy 1/4; intubated 1/4-1/5.     CMHx: Acute on chronic respiratory failure, septci shock, atelectasis, pneumothorax, pathological vertebral fx, HTN,   PMHx: Metastatic breast cancer (mets to lungs, liver, and bones), depression, obesity, cardiomegaly, NATACHA    No hx SLP in [facility].    CXR 1/5:  \"1. No change mild infiltrates.  2. Right chest tubes. No pneumothorax.  3. ETT out. NG tube and jugular line remain.\"    CT/CTA Chest/Pulmonary Artery 1/4:  \"1. No pulmonary embolus.  2. Medial right lung base consolidation felt to represent complete collapse of the right middle lobe. Mild to moderate mid and upper lung pneumonia infiltrates remain.  3. Right chest tubes. Small right pneumothorax and small right effusion. Improving small 2.5 cm left pleural effusion since 12/25/2024.  4. Extensive metastatic lesions including adenopathy, liver lesions, right breast nodule and bone lesions. Thoracic spine fusion hardware remains.\"      General Information:  Vitals  Respiration: (!) 11  Pulse Oximetry: 94 %  O2 (LPM): 6  O2 Delivery Device: Oxymask  Level of Consciousness: Alert, Awake  Patient Behaviors: Fatigue  Orientation: Oriented x 4  Follows Directives: Yes      Prior Living Situation & Level of Function:  Housing / Facility: 1 Story House  Lives with - Patient's Self Care Capacity: Adult Children  Communication: WFL  Swallowing: Reports poor apetite but otherwise WFL       Oral Mechanism Evaluation:  Dentition: Fair, Natural dentition, " Missing posterior dentition   Facial Symmetry: Equal  Facial Sensation: Equal     Labial Observations: WFL   Lingual Observations: Midline  Motor Speech: WFL - 100% intelligible at the conversational level without overt concern for apraxia or dysarthria           Laryngeal Function:  Secretion Management: Adequate  Voice Quality: Mild hoarseness; RN and dtr report improvements throughout the day  Cough: Perceptually WNL  Comments: Back pain with cough; no other pain reported with cough, speech, or swallow      Subjective  Pt agreeable and cooperative with SLP evaluation tasks. RN reports that patient took small sips of TN0 earlier without overt difficulty but coughed after larger sips.       Assessment  Current Method of Nutrition: NPO until cleared by speech pathology  Positioning: Shaikh's (60-90 degrees)  Bolus Administration: SLP  O2 (LPM): 6 O2 Delivery Device: Oxymask  Factor(s) Affecting Performance: None  Tracheostomy : No      Swallowing Trials:  Ice: WFL  Thin Liquid (TN0): WFL  Liquidised (LQ3): WFL  Easy to Chew (EC7): WFL      Comments:   Pt w/ adequate self-feeding. Appropriate oral bolus stripping and containment. Presumed complete AP transit with effective bolus formation evidenced by complete clearance of bolus from oral cavity. Functional and timely mastication of solid consistencies. No overt s/sx of aspiration noted w/ single and sequential sips of TN0 water. No increase in WOB, no cough/clear, no change in vocal quality. Passed 3 oz water test. One to two swallows appreciated per bolus.       Clinical Impressions  Pt presents without s/sx concerning for oropharyngeal dysphagia this date. Recommend initiation of RG/TN diet with PO medication administration. Given complex respiratory status and reduced mobility, patient does have elevated risk for dysphagia and related negative sequelae. SLP to follow; pt may benefit from further diagnostic evaluation should signs of aspiration occur or should  "patient's respiratory status decline. Discussed general signs and symptoms concerning for oropharyngeal dysphagia with patient and family, who verbalized agreement and understanding to inform RN/MD should these symptoms occur.       Recommendations  Regular solids / thin liquids  Instrumentation: Instrumental swallow study pending clinical progress  Medication: As tolerated  Supervision: Close supervision - patient may be left alone for less than 5 minutes at a time  Positioning: Fully upright and midline during oral intake, Meals sitting upright in a chair, as tolerated  Risk Management : Small bites/sips, Slow rate of intake, Physical mobility, as tolerated  Oral Care: BID       SLP Treatment Plan  Treatment Plan: Dysphagia Treatment, Patient/Family/Caregiver Training  SLP Frequency: 3x Per Week  Estimated Duration: Until Therapy Goals Met      Anticipated Discharge Needs  Discharge Recommendations: Anticipate that the patient will have no further speech therapy needs after discharge from the hospital   Therapy Recommendations Upon DC: Not Indicated        Patient / Family Goals  Patient / Family Goal #1: \"I drink [place]\"  Short Term Goals  Short Term Goal # 1: Pt will consume RG/TN diet with no overt signs concerning for airway invasion or worsening of respiratory status.      Pennie Rudolph, SLP   "

## 2025-01-06 NOTE — CARE PLAN
The patient is Watcher - Medium risk of patient condition declining or worsening    Shift Goals  Clinical Goals: MAP >65, monitor chest tube output, pain control  Patient Goals: Rest  Family Goals: Updates    Progress made toward(s) clinical / shift goals:    Problem: Knowledge Deficit - Standard  Goal: Patient and family/care givers will demonstrate understanding of plan of care, disease process/condition, diagnostic tests and medications  Outcome: Progressing     Problem: Skin Integrity  Goal: Skin integrity is maintained or improved  Outcome: Progressing     Problem: Fall Risk  Goal: Patient will remain free from falls  Outcome: Progressing     Problem: Pain - Standard  Goal: Alleviation of pain or a reduction in pain to the patient’s comfort goal  Outcome: Progressing     Problem: Safety - Medical Restraint  Goal: Free from restraint(s) (Restraint for Interference with Medical Device)  Outcome: Met     Problem: Hemodynamics  Goal: Patient's hemodynamics, fluid balance and neurologic status will be stable or improve  Outcome: Progressing     Problem: Respiratory  Goal: Patient will achieve/maintain optimum respiratory ventilation and gas exchange  Outcome: Progressing       Patient is not progressing towards the following goals:

## 2025-01-06 NOTE — HOSPITAL COURSE
"Asha Hannon is a 64-year-old female with PMH significant for breast cancer with mets to the lungs currently on oral chemo who presented 12/25/2024 with weakness, SOB and back & abdominal pain.  CT C/A/P with contrast showed new BUN infiltrate; moderate size pleural effusions; worsening of hepatic metastasis; new abdominal lymphadenopathy, thoracic adenopathy; diffuse osseous metastatic disease with T5 pathologic fracture.    Admitted to floor under the [facility] Team.    12/26 - Palliative Care consult - patient deferred discussion.   12/27 - right-sided thoracentesis.  12/29 - restless legs \"gave out\" resulting in fall, no trauma.   12/31 -right MRI showed multiple metastatic lesions; T-spine MRI showed multiple metastatic lesions and T5 pathologic fracture; L-spine multiple metastatic lesions; C-spine no metastatic lesions.  1/1 - to OR for T4-T6 laminectomy with Dr. Lowery.  1/2 - POD #1, right-sided pneumothorax, medically manage. Left-sided thoracentesis  1/3 - POD #2, AMS with respiratory failure, pH 7.14 CO2 100. Transfer to ICU. Chest tube placed.   1/4 - Intubation  1/5 - Extubation. Chest tubes clamped.  1/6 - Ongoing goals of care discussions. Large bore right chest tube removed. BIPAP and diuresis.   1/7 - DNR, I OK. BIPAP. Diuresing. Right pigtail catheter removed.  1/8 - acute decompensation with hypoxia & obtundation. Re-intubated.   1/9 - VD #2.   1/10 - VD #3. Diuresing. IR consult for PleurX placement.   1/11 - VD #4. Thoracentesis with 1150 removed. PleurX placed.   1/12 - VD #5. Awake, writing notes. Suboptimal SBT parameters for extubation. Continue to diurese.  1/13- VD#6. Awake, writing notes. SAT/SBT. Parameters suboptimal.   1/14- VD#7. Spont x4hrs.  Tired, anxious. Will rest on ASV overnight with Dex. PT. Mobilize.  1/15- Extubated.  GOC conversation.  Discussed the likelihood of trach if needing to be reintubated occurs.  Patient is okay with tracheostomy.  DNR/I okay.   "

## 2025-01-06 NOTE — PROGRESS NOTES
Palliative Care    Room: R113    HPI:   Asha Hannon is a 64 y.o. female with medical history significant for breast cancer with metastasis to brain, lungs, liver, and spine followed by Dr. Tabor admitted for hypoxemic respiratory failure 12/25 due to chronically recurrent pleural effusions. CT imaging obtained noting extensive metastatic disease including new T 5 vertebral compression fracture. Neurosurgery consulted; MRI of brain and C/T/L spine with anesthesia obtained 12/31 revealing multiple nodular enhancing metastatic lesions. Patient underwent T4-T6 laminectomy for decompression of spinal cord due to epidural metastases resulting in cord compression.  Hospital course further complicated by ongoing pleural effusions requiring thoracentesis with subsequent postprocedure pneumothorax requiring chest tubes and acute on chronic respiratory failure requiring intubation 1/4-1/5. Patient remains in ICU.  She has had some hypotension and thus opioid pain medications have been limited.    REVIEW OF SYSTEMS:  Generalized weakness. Positive for dyspnea. Positive for pain (acute on chronic). Positive for fatigue.     PHYSICAL EXAM:  Physical Exam  Constitutional:       General: She is not in acute distress.     Appearance: She is obese.      Interventions: Nasal cannula in place.      Comments: Sitting up in recliner chair  Drowsy towards the end of our conversation   HENT:      Nose:      Comments: NG tube in place  Cardiovascular:      Comments: Soft BPs on monitor  Pulmonary:      Effort: No respiratory distress.      Comments: CT x 2 right side  Skin:     Coloration: Skin is pale.   Neurological:      Mental Status: She is oriented to person, place, and time.      Comments: Oriented to event   Psychiatric:         Attention and Perception: Attention normal.         Speech: Speech normal.         Behavior: Behavior is cooperative.         Cognition and Memory: Cognition normal.        Discussion/Plan   #Metastatic breast cancer with extensive metastatic disease  #Acute on chronic respiratory failure  #Pneumothorax  #Pathologic fracture T5 s/p laminectomy  #Cancer associated pain - typically takes Norco 5-325 mg (1-2 tabs) Q 4-6 hours around the clock; morphine and oxycodone do not work well for patient; IV fentanyl with good response  #Hypotension - discussed limiting factor in pain management  #Restless leg syndrome  #Septic shock  #DM2  #Advance Care Planning  Met with patient bedside and [place] back regarding healthcare directives and goals of care.  Patient expressed she required short-term ventilator support and is unsure if she would want to do it again.  We discussed option of DNR/DNI.  Revisited advance directive and POLST form.  Provided education on documents.  At this time patient's goals of care remain to recover from back surgery and pneumothorax and undergo rehabilitative care to get stronger to do continue to pursue cancer treatment.  She confirms she would need to talk with her daughter regarding healthcare directives and CODE STATUS.  At this time her daughter entered the room.  Reintroduced myself to patient's daughter.  Discussed content of our visit.  She confirmed her mom has been expressing she may not want to undergo intubation again if needed.  Confirmed palliative care can continue to support goals of care discussions and assist with advance care planning documents for future care.  Provided education on forms to patient's daughter.  They would like to discuss and will reach out if they have questions or needs.  Confirmed I will check back in at a later date.  They denied having questions or needs at this time.    20 minutes spent discussing advance care planning, this time excludes any other billed services.    Interval diagnostic studies and medical documentation entries pertinent to this case were reviewed independently by me. This patient has at least one acute  or chronic illness or injury that poses a threat to life or bodily function. This patient suffers from a high risk of morbidity from additional invasive diagnostic testing or intensive treatment. Discussion of recommendations and coordination of care undertaken with primary provider/treatment team.      YESIKA Rae.  Palliative Care Nurse Practitioner  [phone]    Learner present: MSY4 Matt Hernandez

## 2025-01-06 NOTE — CARE PLAN
The patient is Watcher - Medium risk of patient condition declining or worsening    Shift Goals  Clinical Goals: MAP >65, monitor respiratory status, pain ,management  Patient Goals: Rest, pain control  Family Goals: Updates    Progress made toward(s) clinical / shift goals:      Problem: Skin Integrity  Goal: Skin integrity is maintained or improved  Outcome: Progressing  Note: Q2 turns in place. Assessed for signs and symptoms of skin breakdown.      Problem: Pain - Standard  Goal: Alleviation of pain or a reduction in pain to the patient’s comfort goal  Outcome: Progressing  Note: Administered PRN pain medication per MAR. Assessed pain using a 0-10 pain scale.      Problem: Hemodynamics  Goal: Patient's hemodynamics, fluid balance and neurologic status will be stable or improve  Outcome: Progressing  Note: Maintained MAP greater than 65 and SBP less than 160 per orders.      Problem: Respiratory  Goal: Patient will achieve/maintain optimum respiratory ventilation and gas exchange  Outcome: Progressing  Note: Patient is oxygenating well on bipap. Assisted paitent with bipap use.        Patient is not progressing towards the following goals: N/A

## 2025-01-06 NOTE — DISCHARGE PLANNING
Case Management Discharge Planning    Admission Date: 12/25/2024  GMLOS: 4.8  ALOS: 12    6-Clicks ADL Score: 19  6-Clicks Mobility Score: 18      Anticipated Discharge Dispo: Discharge Disposition: Discharged to home/self care (01)    Action(s) Taken: Chart reviewed, pt discussed in IDT rounds. One chest tube removed today. Charted on 5 liters NC. Palliative care following.    Escalations Completed: None    Medically Clear: No    Next Steps: Follow for discharge needs    Barriers to Discharge: None    Is the patient up for discharge tomorrow: No

## 2025-01-07 ENCOUNTER — APPOINTMENT (OUTPATIENT)
Dept: RADIOLOGY | Facility: MEDICAL CENTER | Age: 65
End: 2025-01-07
Attending: INTERNAL MEDICINE
Payer: MEDICARE

## 2025-01-07 LAB
ANION GAP SERPL CALC-SCNC: 5 MMOL/L (ref 7–16)
ANION GAP SERPL CALC-SCNC: 6 MMOL/L (ref 7–16)
ANION GAP SERPL CALC-SCNC: 6 MMOL/L (ref 7–16)
BASOPHILS # BLD AUTO: 0.6 % (ref 0–1.8)
BASOPHILS # BLD: 0.05 K/UL (ref 0–0.12)
BUN SERPL-MCNC: 7 MG/DL (ref 8–22)
CALCIUM SERPL-MCNC: 8.2 MG/DL (ref 8.5–10.5)
CALCIUM SERPL-MCNC: 8.6 MG/DL (ref 8.5–10.5)
CALCIUM SERPL-MCNC: 8.7 MG/DL (ref 8.5–10.5)
CHLORIDE SERPL-SCNC: 95 MMOL/L (ref 96–112)
CHLORIDE SERPL-SCNC: 97 MMOL/L (ref 96–112)
CHLORIDE SERPL-SCNC: 99 MMOL/L (ref 96–112)
CO2 SERPL-SCNC: 35 MMOL/L (ref 20–33)
CO2 SERPL-SCNC: 36 MMOL/L (ref 20–33)
CO2 SERPL-SCNC: 37 MMOL/L (ref 20–33)
CREAT SERPL-MCNC: 0.42 MG/DL (ref 0.5–1.4)
CREAT SERPL-MCNC: 0.42 MG/DL (ref 0.5–1.4)
CREAT SERPL-MCNC: 0.49 MG/DL (ref 0.5–1.4)
EOSINOPHIL # BLD AUTO: 0.11 K/UL (ref 0–0.51)
EOSINOPHIL NFR BLD: 1.4 % (ref 0–6.9)
ERYTHROCYTE [DISTWIDTH] IN BLOOD BY AUTOMATED COUNT: 58.3 FL (ref 35.9–50)
FUNGUS SPEC CULT: NORMAL
FUNGUS SPEC FUNGUS STN: NORMAL
GFR SERPLBLD CREATININE-BSD FMLA CKD-EPI: 104 ML/MIN/1.73 M 2
GFR SERPLBLD CREATININE-BSD FMLA CKD-EPI: 108 ML/MIN/1.73 M 2
GFR SERPLBLD CREATININE-BSD FMLA CKD-EPI: 108 ML/MIN/1.73 M 2
GLUCOSE BLD STRIP.AUTO-MCNC: 117 MG/DL (ref 65–99)
GLUCOSE BLD STRIP.AUTO-MCNC: 128 MG/DL (ref 65–99)
GLUCOSE BLD STRIP.AUTO-MCNC: 132 MG/DL (ref 65–99)
GLUCOSE BLD STRIP.AUTO-MCNC: 142 MG/DL (ref 65–99)
GLUCOSE BLD STRIP.AUTO-MCNC: 148 MG/DL (ref 65–99)
GLUCOSE SERPL-MCNC: 122 MG/DL (ref 65–99)
GLUCOSE SERPL-MCNC: 139 MG/DL (ref 65–99)
GLUCOSE SERPL-MCNC: 156 MG/DL (ref 65–99)
HCT VFR BLD AUTO: 28.2 % (ref 37–47)
HGB BLD-MCNC: 8.8 G/DL (ref 12–16)
IMM GRANULOCYTES # BLD AUTO: 0.1 K/UL (ref 0–0.11)
IMM GRANULOCYTES NFR BLD AUTO: 1.3 % (ref 0–0.9)
LYMPHOCYTES # BLD AUTO: 0.65 K/UL (ref 1–4.8)
LYMPHOCYTES NFR BLD: 8.4 % (ref 22–41)
MAGNESIUM SERPL-MCNC: 2 MG/DL (ref 1.5–2.5)
MCH RBC QN AUTO: 29 PG (ref 27–33)
MCHC RBC AUTO-ENTMCNC: 31.2 G/DL (ref 32.2–35.5)
MCV RBC AUTO: 93.1 FL (ref 81.4–97.8)
MONOCYTES # BLD AUTO: 1.3 K/UL (ref 0–0.85)
MONOCYTES NFR BLD AUTO: 16.8 % (ref 0–13.4)
NEUTROPHILS # BLD AUTO: 5.55 K/UL (ref 1.82–7.42)
NEUTROPHILS NFR BLD: 71.5 % (ref 44–72)
NRBC # BLD AUTO: 0 K/UL
NRBC BLD-RTO: 0 /100 WBC (ref 0–0.2)
PHOSPHATE SERPL-MCNC: 2.5 MG/DL (ref 2.5–4.5)
PLATELET # BLD AUTO: 432 K/UL (ref 164–446)
PMV BLD AUTO: 8.9 FL (ref 9–12.9)
POTASSIUM SERPL-SCNC: 4.2 MMOL/L (ref 3.6–5.5)
POTASSIUM SERPL-SCNC: 4.5 MMOL/L (ref 3.6–5.5)
POTASSIUM SERPL-SCNC: 4.6 MMOL/L (ref 3.6–5.5)
RBC # BLD AUTO: 3.03 M/UL (ref 4.2–5.4)
SIGNIFICANT IND 70042: NORMAL
SITE SITE: NORMAL
SODIUM SERPL-SCNC: 138 MMOL/L (ref 135–145)
SODIUM SERPL-SCNC: 139 MMOL/L (ref 135–145)
SODIUM SERPL-SCNC: 139 MMOL/L (ref 135–145)
SOURCE SOURCE: NORMAL
WBC # BLD AUTO: 7.8 K/UL (ref 4.8–10.8)

## 2025-01-07 PROCEDURE — 700111 HCHG RX REV CODE 636 W/ 250 OVERRIDE (IP): Mod: JZ | Performed by: STUDENT IN AN ORGANIZED HEALTH CARE EDUCATION/TRAINING PROGRAM

## 2025-01-07 PROCEDURE — A9270 NON-COVERED ITEM OR SERVICE: HCPCS | Performed by: NURSE PRACTITIONER

## 2025-01-07 PROCEDURE — 700101 HCHG RX REV CODE 250

## 2025-01-07 PROCEDURE — 71045 X-RAY EXAM CHEST 1 VIEW: CPT

## 2025-01-07 PROCEDURE — 700101 HCHG RX REV CODE 250: Performed by: INTERNAL MEDICINE

## 2025-01-07 PROCEDURE — 700102 HCHG RX REV CODE 250 W/ 637 OVERRIDE(OP): Performed by: NURSE PRACTITIONER

## 2025-01-07 PROCEDURE — 99291 CRITICAL CARE FIRST HOUR: CPT | Performed by: NURSE PRACTITIONER

## 2025-01-07 PROCEDURE — 99233 SBSQ HOSP IP/OBS HIGH 50: CPT | Performed by: NURSE PRACTITIONER

## 2025-01-07 PROCEDURE — 700111 HCHG RX REV CODE 636 W/ 250 OVERRIDE (IP): Performed by: INTERNAL MEDICINE

## 2025-01-07 PROCEDURE — A9270 NON-COVERED ITEM OR SERVICE: HCPCS | Performed by: INTERNAL MEDICINE

## 2025-01-07 PROCEDURE — 83735 ASSAY OF MAGNESIUM: CPT

## 2025-01-07 PROCEDURE — 84100 ASSAY OF PHOSPHORUS: CPT

## 2025-01-07 PROCEDURE — 700111 HCHG RX REV CODE 636 W/ 250 OVERRIDE (IP): Performed by: NURSE PRACTITIONER

## 2025-01-07 PROCEDURE — 700105 HCHG RX REV CODE 258: Performed by: STUDENT IN AN ORGANIZED HEALTH CARE EDUCATION/TRAINING PROGRAM

## 2025-01-07 PROCEDURE — 85025 COMPLETE CBC W/AUTO DIFF WBC: CPT

## 2025-01-07 PROCEDURE — 94640 AIRWAY INHALATION TREATMENT: CPT

## 2025-01-07 PROCEDURE — 80048 BASIC METABOLIC PNL TOTAL CA: CPT

## 2025-01-07 PROCEDURE — 94799 UNLISTED PULMONARY SVC/PX: CPT

## 2025-01-07 PROCEDURE — 94660 CPAP INITIATION&MGMT: CPT

## 2025-01-07 PROCEDURE — 770022 HCHG ROOM/CARE - ICU (200)

## 2025-01-07 PROCEDURE — 700111 HCHG RX REV CODE 636 W/ 250 OVERRIDE (IP): Mod: JZ

## 2025-01-07 PROCEDURE — 97167 OT EVAL HIGH COMPLEX 60 MIN: CPT

## 2025-01-07 PROCEDURE — 97530 THERAPEUTIC ACTIVITIES: CPT

## 2025-01-07 PROCEDURE — 82962 GLUCOSE BLOOD TEST: CPT | Mod: 91

## 2025-01-07 PROCEDURE — 700111 HCHG RX REV CODE 636 W/ 250 OVERRIDE (IP): Mod: JZ | Performed by: NURSE PRACTITIONER

## 2025-01-07 PROCEDURE — 700102 HCHG RX REV CODE 250 W/ 637 OVERRIDE(OP): Performed by: INTERNAL MEDICINE

## 2025-01-07 PROCEDURE — 700111 HCHG RX REV CODE 636 W/ 250 OVERRIDE (IP): Mod: JZ | Performed by: INTERNAL MEDICINE

## 2025-01-07 RX ORDER — POTASSIUM CHLORIDE 29.8 MG/ML
40 INJECTION INTRAVENOUS ONCE
Status: COMPLETED | OUTPATIENT
Start: 2025-01-07 | End: 2025-01-07

## 2025-01-07 RX ORDER — LOSARTAN POTASSIUM 50 MG/1
50 TABLET ORAL
Status: DISCONTINUED | OUTPATIENT
Start: 2025-01-07 | End: 2025-01-08

## 2025-01-07 RX ORDER — POTASSIUM CHLORIDE 1500 MG/1
40 TABLET, EXTENDED RELEASE ORAL ONCE
Status: DISCONTINUED | OUTPATIENT
Start: 2025-01-07 | End: 2025-01-07

## 2025-01-07 RX ORDER — FUROSEMIDE 10 MG/ML
40 INJECTION INTRAMUSCULAR; INTRAVENOUS ONCE
Status: COMPLETED | OUTPATIENT
Start: 2025-01-07 | End: 2025-01-07

## 2025-01-07 RX ORDER — INSULIN LISPRO 100 [IU]/ML
2-9 INJECTION, SOLUTION INTRAVENOUS; SUBCUTANEOUS
Status: DISCONTINUED | OUTPATIENT
Start: 2025-01-07 | End: 2025-01-08

## 2025-01-07 RX ORDER — HYDROCODONE BITARTRATE AND ACETAMINOPHEN 10; 325 MG/1; MG/1
1 TABLET ORAL EVERY 6 HOURS
Status: DISCONTINUED | OUTPATIENT
Start: 2025-01-07 | End: 2025-01-07

## 2025-01-07 RX ORDER — POTASSIUM CHLORIDE 7.45 MG/ML
10 INJECTION INTRAVENOUS
Status: COMPLETED | OUTPATIENT
Start: 2025-01-07 | End: 2025-01-07

## 2025-01-07 RX ORDER — DEXTROSE MONOHYDRATE 25 G/50ML
25 INJECTION, SOLUTION INTRAVENOUS
Status: DISCONTINUED | OUTPATIENT
Start: 2025-01-07 | End: 2025-01-08

## 2025-01-07 RX ORDER — ACETAZOLAMIDE 500 MG/5ML
500 INJECTION, POWDER, LYOPHILIZED, FOR SOLUTION INTRAVENOUS ONCE
Status: COMPLETED | OUTPATIENT
Start: 2025-01-07 | End: 2025-01-07

## 2025-01-07 RX ORDER — LIDOCAINE 4 G/G
2 PATCH TOPICAL EVERY 24 HOURS
Status: DISCONTINUED | OUTPATIENT
Start: 2025-01-08 | End: 2025-01-10

## 2025-01-07 RX ORDER — POTASSIUM CHLORIDE 14.9 MG/ML
20 INJECTION INTRAVENOUS ONCE
Status: DISCONTINUED | OUTPATIENT
Start: 2025-01-07 | End: 2025-01-07

## 2025-01-07 RX ORDER — HYDROCODONE BITARTRATE AND ACETAMINOPHEN 10; 325 MG/1; MG/1
1 TABLET ORAL EVERY 4 HOURS
Status: DISCONTINUED | OUTPATIENT
Start: 2025-01-07 | End: 2025-01-08

## 2025-01-07 RX ADMIN — ACETAZOLAMIDE 500 MG: 500 INJECTION, POWDER, LYOPHILIZED, FOR SOLUTION INTRAVENOUS at 16:40

## 2025-01-07 RX ADMIN — FENTANYL CITRATE 25 MCG: 50 INJECTION, SOLUTION INTRAMUSCULAR; INTRAVENOUS at 16:40

## 2025-01-07 RX ADMIN — ONDANSETRON 4 MG: 2 INJECTION INTRAMUSCULAR; INTRAVENOUS at 04:20

## 2025-01-07 RX ADMIN — AMPICILLIN AND SULBACTAM 3 G: 1; 2 INJECTION, POWDER, FOR SOLUTION INTRAMUSCULAR; INTRAVENOUS at 18:10

## 2025-01-07 RX ADMIN — HYDROCODONE BITARTRATE AND ACETAMINOPHEN 1 TABLET: 10; 325 TABLET ORAL at 21:22

## 2025-01-07 RX ADMIN — AMPICILLIN AND SULBACTAM 3 G: 1; 2 INJECTION, POWDER, FOR SOLUTION INTRAMUSCULAR; INTRAVENOUS at 12:07

## 2025-01-07 RX ADMIN — AMPICILLIN AND SULBACTAM 3 G: 1; 2 INJECTION, POWDER, FOR SOLUTION INTRAMUSCULAR; INTRAVENOUS at 05:54

## 2025-01-07 RX ADMIN — METOCLOPRAMIDE 10 MG: 10 TABLET ORAL at 06:07

## 2025-01-07 RX ADMIN — DOCUSATE SODIUM 100 MG: 100 CAPSULE, LIQUID FILLED ORAL at 18:00

## 2025-01-07 RX ADMIN — ONDANSETRON 4 MG: 2 INJECTION INTRAMUSCULAR; INTRAVENOUS at 18:42

## 2025-01-07 RX ADMIN — METHOCARBAMOL TABLETS 750 MG: 750 TABLET, COATED ORAL at 10:11

## 2025-01-07 RX ADMIN — LABETALOL HYDROCHLORIDE 10 MG: 5 INJECTION, SOLUTION INTRAVENOUS at 06:11

## 2025-01-07 RX ADMIN — HYDROCODONE BITARTRATE AND ACETAMINOPHEN 1 TABLET: 10; 325 TABLET ORAL at 12:00

## 2025-01-07 RX ADMIN — HYDROCODONE BITARTRATE AND ACETAMINOPHEN 1 TABLET: 10; 325 TABLET ORAL at 06:08

## 2025-01-07 RX ADMIN — LOSARTAN POTASSIUM 50 MG: 50 TABLET, FILM COATED ORAL at 07:45

## 2025-01-07 RX ADMIN — HYDROCODONE BITARTRATE AND ACETAMINOPHEN 1 TABLET: 10; 325 TABLET ORAL at 18:00

## 2025-01-07 RX ADMIN — FUROSEMIDE 40 MG: 10 INJECTION INTRAMUSCULAR; INTRAVENOUS at 08:33

## 2025-01-07 RX ADMIN — MORPHINE SULFATE 2 MG: 4 INJECTION, SOLUTION INTRAMUSCULAR; INTRAVENOUS at 15:49

## 2025-01-07 RX ADMIN — Medication 400 MG: at 06:07

## 2025-01-07 RX ADMIN — ROPINIROLE HYDROCHLORIDE 1 MG: 1 TABLET, FILM COATED ORAL at 21:22

## 2025-01-07 RX ADMIN — ENOXAPARIN SODIUM 40 MG: 100 INJECTION SUBCUTANEOUS at 18:00

## 2025-01-07 RX ADMIN — POLYETHYLENE GLYCOL 3350 1 PACKET: 17 POWDER, FOR SOLUTION ORAL at 17:59

## 2025-01-07 RX ADMIN — IPRATROPIUM BROMIDE AND ALBUTEROL SULFATE 3 ML: .5; 2.5 SOLUTION RESPIRATORY (INHALATION) at 13:48

## 2025-01-07 RX ADMIN — HYDROCODONE BITARTRATE AND ACETAMINOPHEN 1 TABLET: 10; 325 TABLET ORAL at 00:06

## 2025-01-07 RX ADMIN — POTASSIUM CHLORIDE 10 MEQ: 10 INJECTION, SOLUTION INTRAVENOUS at 17:01

## 2025-01-07 RX ADMIN — ANTACID TABLETS 500 MG: 500 TABLET, CHEWABLE ORAL at 04:24

## 2025-01-07 RX ADMIN — METOCLOPRAMIDE 10 MG: 10 TABLET ORAL at 12:01

## 2025-01-07 RX ADMIN — POTASSIUM CHLORIDE 40 MEQ: 29.8 INJECTION, SOLUTION INTRAVENOUS at 08:56

## 2025-01-07 RX ADMIN — Medication 300 MG: at 06:07

## 2025-01-07 RX ADMIN — POTASSIUM CHLORIDE 10 MEQ: 10 INJECTION, SOLUTION INTRAVENOUS at 15:52

## 2025-01-07 RX ADMIN — ONDANSETRON 4 MG: 2 INJECTION INTRAMUSCULAR; INTRAVENOUS at 08:33

## 2025-01-07 RX ADMIN — AMPICILLIN AND SULBACTAM 3 G: 1; 2 INJECTION, POWDER, FOR SOLUTION INTRAMUSCULAR; INTRAVENOUS at 00:07

## 2025-01-07 RX ADMIN — FUROSEMIDE 40 MG: 10 INJECTION INTRAMUSCULAR; INTRAVENOUS at 15:50

## 2025-01-07 RX ADMIN — METOCLOPRAMIDE 10 MG: 10 TABLET ORAL at 17:59

## 2025-01-07 RX ADMIN — LIDOCAINE 1 PATCH: 4 PATCH TOPICAL at 10:05

## 2025-01-07 ASSESSMENT — ENCOUNTER SYMPTOMS
NAUSEA: 0
FEVER: 0
VOMITING: 0
DIZZINESS: 0
HEADACHES: 0
COUGH: 1
WHEEZING: 0
BACK PAIN: 1
PALPITATIONS: 0
SHORTNESS OF BREATH: 0
CHILLS: 0

## 2025-01-07 ASSESSMENT — COGNITIVE AND FUNCTIONAL STATUS - GENERAL
DAILY ACTIVITIY SCORE: 11
MOVING FROM LYING ON BACK TO SITTING ON SIDE OF FLAT BED: A LOT
HELP NEEDED FOR BATHING: TOTAL
SUGGESTED CMS G CODE MODIFIER DAILY ACTIVITY: CL
PERSONAL GROOMING: A LITTLE
MOVING TO AND FROM BED TO CHAIR: TOTAL
EATING MEALS: A LITTLE
WALKING IN HOSPITAL ROOM: TOTAL
STANDING UP FROM CHAIR USING ARMS: A LOT
CLIMB 3 TO 5 STEPS WITH RAILING: TOTAL
DRESSING REGULAR LOWER BODY CLOTHING: TOTAL
SUGGESTED CMS G CODE MODIFIER MOBILITY: CM
TURNING FROM BACK TO SIDE WHILE IN FLAT BAD: A LOT
MOBILITY SCORE: 9
TOILETING: TOTAL
DRESSING REGULAR UPPER BODY CLOTHING: A LOT

## 2025-01-07 ASSESSMENT — PULMONARY FUNCTION TESTS
EPAP_CMH2O: 8
EPAP_CMH2O: 6
EPAP_CMH2O: 8

## 2025-01-07 ASSESSMENT — PAIN DESCRIPTION - PAIN TYPE
TYPE: CHRONIC PAIN
TYPE: ACUTE PAIN
TYPE: CHRONIC PAIN
TYPE: CHRONIC PAIN

## 2025-01-07 ASSESSMENT — FIBROSIS 4 INDEX: FIB4 SCORE: 2.39

## 2025-01-07 ASSESSMENT — ACTIVITIES OF DAILY LIVING (ADL): TOILETING: INDEPENDENT

## 2025-01-07 ASSESSMENT — GAIT ASSESSMENTS: GAIT LEVEL OF ASSIST: UNABLE TO PARTICIPATE

## 2025-01-07 NOTE — CARE PLAN
The patient is Stable - Low risk of patient condition declining or worsening    Shift Goals  Clinical Goals: Pulmonary hygeine, SBP < 160  Patient Goals: Pain control  Family Goals: Updates    Progress made toward(s) clinical / shift goals:        Problem: Pain - Standard  Goal: Alleviation of pain or a reduction in pain to the patient’s comfort goal  Outcome: Progressing     Problem: Hemodynamics  Goal: Patient's hemodynamics, fluid balance and neurologic status will be stable or improve  Outcome: Progressing     Problem: Knowledge Deficit - Standard  Goal: Patient and family/care givers will demonstrate understanding of plan of care, disease process/condition, diagnostic tests and medications  Outcome: Progressing     Problem: Respiratory  Goal: Patient will achieve/maintain optimum respiratory ventilation and gas exchange  Outcome: Progressing       Patient is not progressing towards the following goals:

## 2025-01-07 NOTE — THERAPY
Physical Therapy   Daily Treatment     Patient Name: Asha Hannon  Age:  64 y.o., Sex:  female  Medical Record #: [id number]  Today's Date: 1/7/2025     Precautions  Precautions: (P) Fall Risk;Spinal / Back Precautions   Comments: (P) R chest tube, T4-T6 lami and fusion    Assessment    PT was agreeable to PT session.  She presents with increased somnolence, but awakens easily to voice.  Pt was transferred down to the ICU for decreased alertness, 2 pneumothorax with chest tube placement.  One has since resolved with chest tube removal.  Pt required maxA x2 to get out of bed and coming to standing at EOB, unable to maintain her balance or support herself in standing d/t LE weakness with posterior lean and R knee buckling.  Pt tolerated standing 2x30 sec.  PT will continue to follow.  Recommend post acute PT services.  Plan    Treatment Plan Status: (P) Continue Current Treatment Plan  Type of Treatment: Bed Mobility, Equipment, Family / Caregiver Training, Gait Training, Manual Therapy, Neuro Re-Education / Balance, Self Care / Home Evaluation, Stair Training, Therapeutic Activities, Therapeutic Exercise  Treatment Frequency: 4 Times per Week  Treatment Duration: Until Therapy Goals Met    DC Equipment Recommendations: (P) Unable to determine at this time  Discharge Recommendations: (P) Recommend post-acute placement for additional physical therapy services prior to discharge home           Objective       01/07/25 1024   Precautions   Precautions Fall Risk;Spinal / Back Precautions    Comments R chest tube, T4-T6 lami and fusion   Vitals   O2 Delivery Device Oxymask   Pain 0 - 10 Group   Therapist Pain Assessment Post Activity Pain Same as Prior to Activity  (no complaints of pain)   Cognition    Level of Consciousness Responds to voice   Balance   Sitting Balance (Static) Fair -   Sitting Balance (Dynamic) Poor   Standing Balance (Static) Trace   Standing Balance (Dynamic) Trace   Weight Shift Sitting  Poor   Weight Shift Standing Absent   Skilled Intervention Postural Facilitation;Verbal Cuing;Tactile Cuing   Comments with FWW   Bed Mobility    Supine to Sit Maximal Assist   Sit to Supine Total Assist   Scooting Maximal Assist   Rolling Maximum Assist to Lt.   Skilled Intervention Facilitation;Sequencing;Verbal Cuing   Comments HOB elevated, no rail   Gait Analysis   Gait Level Of Assist Unable to Participate   Functional Mobility   Sit to Stand Maximal Assist  (x2)   Bed, Chair, Wheelchair Transfer Unable to Participate   Skilled Intervention Facilitation;Sequencing;Verbal Cuing;Tactile Cuing   Comments L nee hyperextending in standing, R knee buckling   ICU Target Mobility Level   ICU Mobility - Targeted Level Level 3A   Short Term Goals    Short Term Goal # 1 Pt will perform supine < > sit SPV with bed flat, no rail in 6 visits in order to set up for upright mobility   Goal Outcome # 1 goal not met   Short Term Goal # 2 Pt will perform sit < > stand SPV in 6 visits in order to prepare for ambulation   Goal Outcome # 2 Goal not met   Short Term Goal # 3 Pt will ambulate 150' SPV in 6 visits with FWW in order to return home safely   Goal Outcome # 3 Goal not met   Short Term Goal # 4 Pt will ascend/ descend 1 step SPV in 6 visits in order to access her home   Goal Outcome # 4 Goal not met   Education Group   Additional Comments bed mobility- action demonstration, reinforcement needed   Physical Therapy Treatment Plan   Physical Therapy Treatment Plan Continue Current Treatment Plan   Anticipated Discharge Equipment and Recommendations   DC Equipment Recommendations Unable to determine at this time   Discharge Recommendations Recommend post-acute placement for additional physical therapy services prior to discharge home   Interdisciplinary Plan of Care Collaboration   IDT Collaboration with  Nursing;Occupational Therapist   Patient Position at End of Therapy In Bed;Call Light within Reach;Tray Table within  Reach;Family / Friend in Room  (daughter in the room)   Collaboration Comments co-tx with OT, RN present for session   Session Information   Date / Session Number  1/7 -4 (1/4, 1/12)

## 2025-01-07 NOTE — PROGRESS NOTES
Advance Care Planning     I met with The patient Asha and her daughter. I spoke about her ongoing hyprecapneic and hypoxemic respirator failure in the setting of her breast malignancy. I stated that in my medical opinion I was concerned that she was going to die from her breast cancer within the next 3-6 months. I stated that her state has become deconditioned and that she is not to the point where she can safely leave the ICU due to requiring intermittent BiPAP/HFNC.    I discussed code status and the family and patient stated that she would like to be DNR/I okay for now.     Chart updated.    Tree Preston DO, [facility]  Critical Care Medicine

## 2025-01-07 NOTE — PROGRESS NOTES
"Critical Care Progress Note    Date of admission  12/25/2024    Chief Complaint  Shortness of breath.    Hospital Course  Asha Hannon is a 64-year-old female with PMH significant for breast cancer with mets to the lungs currently on oral chemo who presented 12/25/2024 with weakness, SOB and back & abdominal pain.  CT C/A/P with contrast showed new BUN infiltrate; moderate size pleural effusions; worsening of hepatic metastasis; new abdominal lymphadenopathy, thoracic adenopathy; diffuse osseous metastatic disease with T5 pathologic fracture.    Admitted to floor under the [facility] Team.    12/26 - Palliative Care consult - patient deferred discussion.   12/27 - right-sided thoracentesis.  12/29 - restless legs \"gave out\" resulting in fall, no trauma.   12/31 -right MRI showed multiple metastatic lesions; T-spine MRI showed multiple metastatic lesions and T5 pathologic fracture; L-spine multiple metastatic lesions; C-spine no metastatic lesions.  1/1 - to OR for T4-T6 laminectomy with Dr. Lowery.  1/2 - POD #1, right-sided pneumothorax, medically manage. Left-sided thoracentesis  1/3 - POD #2, AMS with respiratory failure, pH 7.14 CO2 100. Transfer to ICU. Chest tube placed.   1/4 - Intubation  1/5 - Extubation. Chest tubes clamped.  1/6 - Ongoing goals of care discussions. Large bore right chest tube removed. BIPAP and diuresis.     Interval Problem Update  Reviewed last 24 hour events:  No significant overnight events.   Tmax 100  SR 60-90's  -160's. Resume Losartan   BIPAP 60%, 10L oxymask for meals. Sats 90-94%  Neuro: sleeping, awakens to voice. Oriented x 4, moving all. Following.  RASS -1 to 0. No sedation  CHO vegetarian diet. BM 1/3  I/O: 1800/2700 - More Lasix today  Mojica, TLC  Lovenox, Unasyn 4 of 5.  Mobility 3B    1:32 PM - right pigtail catheter removed without difficulties. Patient tolerated well. Xeroform dressing with 4x4 and Tegaderm applied.     Review of Systems  Review of " Systems   Reason unable to perform ROS: Limited due to BIPAP. Shakes head yes/no.   Constitutional:  Positive for malaise/fatigue. Negative for chills and fever.   HENT: Negative.     Respiratory:  Positive for cough. Negative for shortness of breath and wheezing.    Cardiovascular:  Negative for chest pain and palpitations.   Gastrointestinal:  Negative for nausea and vomiting.   Musculoskeletal:  Positive for back pain.   Neurological:  Negative for dizziness and headaches.        Vital Signs for last 24 hours   Pulse:  [66-98] 82  Resp:  [12-44] 24  BP: ()/(50-88) 151/68  SpO2:  [91 %-100 %] 95 %    Hemodynamic parameters for last 24 hours       Respiratory Information for the last 24 hours       Physical Exam   Physical Exam  Vitals and nursing note reviewed.   Constitutional:       General: She is sleeping. She is not in acute distress.     Appearance: She is obese. She is ill-appearing. She is not toxic-appearing.      Interventions: Face mask in place.      Comments: BIPAP   HENT:      Head: Normocephalic and atraumatic.      Nose: Nose normal.      Mouth/Throat:      Lips: Pink.      Mouth: Mucous membranes are moist.   Eyes:      Pupils: Pupils are equal, round, and reactive to light.   Cardiovascular:      Rate and Rhythm: Normal rate and regular rhythm.      Pulses: Normal pulses.      Heart sounds: Normal heart sounds.   Pulmonary:      Effort: No respiratory distress.      Breath sounds: Examination of the right-middle field reveals rhonchi. Examination of the right-lower field reveals rhonchi. Rhonchi present.   Abdominal:      General: Bowel sounds are normal.      Palpations: Abdomen is soft.      Tenderness: There is no abdominal tenderness. There is no guarding or rebound.   Musculoskeletal:      Right lower leg: No edema.      Left lower leg: No edema.   Skin:     General: Skin is warm and dry.      Capillary Refill: Capillary refill takes less than 2 seconds.      Coloration: Skin is pale.    Neurological:      Mental Status: She is easily aroused.      GCS: GCS eye subscore is 3. GCS verbal subscore is 5. GCS motor subscore is 6.      Motor: Motor function is intact.   Psychiatric:         Mood and Affect: Mood normal.         Behavior: Behavior normal.         Cognition and Memory: Cognition normal.         Judgment: Judgment normal.         Medications  Current Facility-Administered Medications   Medication Dose Route Frequency Provider Last Rate Last Admin    losartan (Cozaar) tablet 50 mg  50 mg Oral Q DAY BHARGAVI Kwon.P.R.N.   50 mg at 01/07/25 0745    insulin lispro (HumaLOG,AdmeLOG) subcutaneous injection  2-9 Units Subcutaneous 4X/DAY ACHS Jessica Campos A.P.R.N.        And    dextrose 50% (D50W) injection 25 g  25 g Intravenous Q15 MIN PRN Jessica Campos A.P.R.N.        [START ON 1/8/2025] lidocaine (Asperflex) 4 % patch 2 Patch  2 Patch Transdermal Q24HR Petra Agustin A.P.R.N.        HYDROcodone/acetaminophen (Norco)  MG per tablet 1 Tablet  1 Tablet Oral Q6HRS Petra Agustin A.P.R.N.   1 Tablet at 01/07/25 1200    ampicillin/sulbactam (Unasyn) 3 g in  mL IVPB  3 g Intravenous Q6HRS Tree Preston D.O.   Stopped at 01/07/25 1237    labetalol (Normodyne/Trandate) injection 10 mg  10 mg Intravenous Q4HRS PRN Lidia Figueroa M.D.   10 mg at 01/07/25 0611    morphine 4 MG/ML injection 2 mg  2 mg Intravenous Q2HRS PRN Lidia Figueroa M.D.   2 mg at 01/06/25 0840    ferrous sulfate 300 (60 Fe) MG/5ML solution 300 mg  300 mg Oral DAILY Lidia Figueroa M.D.   300 mg at 01/07/25 0607    magnesium oxide tablet 400 mg  400 mg Oral DAILY Lidia Figueroa M.D.   400 mg at 01/07/25 0607    metoclopramide (Reglan) tablet 10 mg  10 mg Oral Q6HRS Lidia Figueroa M.D.   10 mg at 01/07/25 1201    ROPINIRole (Requip) tablet 1 mg  1 mg Oral QHS Lidia Figueroa M.D.   1 mg at 01/06/25 2041    senna-docusate (Pericolace Or Senokot S) 8.6-50 MG per tablet 1 Tablet  1 Tablet Oral Nightly  Lidia Figueroa M.D.   1 Tablet at 01/05/25 2057    calcium carbonate (Tums) chewable tab 500 mg  500 mg Oral TID PRN Lidia Figueroa M.D.   500 mg at 01/07/25 0424    methocarbamol (Robaxin) tablet 750 mg  750 mg Oral Q8HRS PRN Lidia Figueroa M.D.   750 mg at 01/07/25 1011    ondansetron (Zofran ODT) dispertab 4 mg  4 mg Oral Q4HRS PRN Lidia Figueroa M.D.        polyethylene glycol/lytes (Miralax) Packet 1 Packet  1 Packet Oral BID PRN Lidia Figueroa M.D.        ROPINIRole (Requip) tablet 1 mg  1 mg Oral BID PRN Lidia Figueroa M.D.        senna-docusate (Pericolace Or Senokot S) 8.6-50 MG per tablet 1 Tablet  1 Tablet Oral Q24HRS PRN Lidia Figueroa M.D.        docusate sodium (Colace) capsule 100 mg  100 mg Oral BID Lidia Figueroa M.D.   100 mg at 01/06/25 1745    Respiratory Therapy Consult   Nebulization Continuous RT Americo Avalos M.D.        ipratropium-albuterol (DUONEB) nebulizer solution  3 mL Nebulization Q2HRS PRN (RT) Americo Avalos M.D.   3 mL at 01/05/25 2211    magnesium hydroxide (Milk Of Magnesia) suspension 30 mL  30 mL Enteral Tube QDAY PRN Americo Avalos M.D.        enoxaparin (Lovenox) inj 40 mg  40 mg Subcutaneous DAILY AT 1800 Americo Avalos M.D.   40 mg at 01/06/25 1745    bisacodyl (Dulcolax) suppository 10 mg  10 mg Rectal DAILY Neo Ceja, P.A.   10 mg at 01/03/25 1151    sodium chloride (Ocean) 0.65 % nasal spray 2 Spray  2 Spray Nasal Q2HRS PRN Omar Saleh M.D.   2 Spray at 01/01/25 0600    MD ALERT...DO NOT ADMINISTER NSAIDS or ASPIRIN unless ORDERED By Neurosurgery 1 Each  1 Each Other PRN Karthik Lowery D.O.        bisacodyl (Dulcolax) suppository 10 mg  10 mg Rectal Q24HRS PRN Karthik Lowery D.O.        ondansetron (Zofran) syringe/vial injection 4 mg  4 mg Intravenous Q4HRS PRN Abdulaziz Nieto M.D.   4 mg at 01/07/25 0833       Fluids    Intake/Output Summary (Last 24 hours) at 1/7/2025 1344  Last data filed at 1/7/2025 0800  Gross  per 24 hour   Intake 1357.85 ml   Output 2360 ml   Net -1002.15 ml       Laboratory  Recent Labs     01/05/25  0244   ISTATAPH 7.436   ISTATAPCO2 43.2   ISTATAPO2 99   ISTATATCO2 30*   RQZVNAQ2ZOQ 98   ISTATARTHCO3 29.1*   ISTATARTBE 4*   ISTATTEMP 37.4 C   ISTATFIO2 40   ISTATSPEC Arterial   ISTATAPHTC 7.430   [id number] 101*         Recent Labs     01/05/25 0343 01/06/25 0423 01/06/25 2036 01/07/25 0319   SODIUM 133* 136 135 139   POTASSIUM 4.3 4.0 4.3 4.2   CHLORIDE 97 98 94* 99   CO2 28 31 33 35*   BUN 11 8 8 7*   CREATININE 0.61 0.55 0.52 0.42*   MAGNESIUM 2.2 2.0  --  2.0   PHOSPHORUS 1.8* 3.0  --  2.5   CALCIUM 8.3* 8.4* 8.4* 8.2*     Recent Labs     01/06/25 0423 01/06/25 2036 01/07/25 0319   ALTSGPT 42  --   --    ASTSGOT 103*  --   --    ALKPHOSPHAT 95  --   --    TBILIRUBIN 0.4  --   --    GLUCOSE 136* 183* 122*     Recent Labs     01/05/25 0343 01/06/25 0423 01/07/25 0319   WBC 9.0 7.9 7.8   NEUTSPOLYS 74.60* 74.90* 71.50   LYMPHOCYTES 11.40* 7.80* 8.40*   MONOCYTES 12.20 14.90* 16.80*   EOSINOPHILS 0.70 0.90 1.40   BASOPHILS 0.40 0.50 0.60   ASTSGOT  --  103*  --    ALTSGPT  --  42  --    ALKPHOSPHAT  --  95  --    TBILIRUBIN  --  0.4  --      Recent Labs     01/05/25 0343 01/06/25 0423 01/07/25 0319   RBC 2.96* 3.20* 3.03*   HEMOGLOBIN 8.6* 9.0* 8.8*   HEMATOCRIT 26.7* 28.6* 28.2*   PLATELETCT 388 421 432       Imaging  X-Ray:  I have personally reviewed the images and compared with prior images.  CT:    Reviewed  MRI:   Reviewed    Assessment/Plan  * Acute on chronic respiratory failure with hypoxia and hypercapnia (HCC)  Assessment & Plan  -intubated 1/4  -extubated 1/5  -BIPAP PRN  -s/p bilateral thoracenteses this hospitalization  -Left chest tube removed 1/6.  Right chest tube/pigtail removed today  -Encourage mobility and incentive spirometry  -RT protocols  -Titrate FiO2 to keep sats greater than 90%  -Diuresis as tolerated  -complete course of ABX  -DNR, I OK    Pathological  "fracture of vertebra- (present on admission)  Assessment & Plan  -Pathologic fracture of T5 with myelopathy on presentation  -S/P T4-6 laminectomy with fusion and spinal decompression on 1/1/2025 by Dr. Lowery  -Fall Precautions  -PT OT    Malignant neoplasm of breast (HCC)- (present on admission)  Assessment & Plan  \"Bilateral breast cancer with extensive destruction of the left breast and ulceration and skin lesions making her inoperable on the left.  Right breast shows a 3 x 3 cm mass.  PET scan March 2023 showed non-hypermetabolic pleural effusions which are cytology positive and she has demonstrable pleural masses. Started aromatase inhibition early September 2023.  Has not yet started CDK 4 6 inhibitor.  Lost to follow-up for over 6 months although apparently was on letrozole as single agent.  Now with clear progression of disease in the left breast.  August 2024: Progression of disease in liver with extensive disease.  Breast is worse.  Lung is better.  Lymph nodes are worse.  Status post radiation to symptomatic oozing and exophytic left breast mass.  Marked worsening of her cancer on letrozole with progression in the bone and liver.\"--Dr. Benitez  -ongoing goals of care discussions. Patient is fighting to recover and discharge home so she can start the Letrozole.  -DNR, I OK decided today after discussion with MD, APRN, patient, and daughter at bedside.      Pneumothorax  Assessment & Plan  -Right pneumothorax s/p pigtail catheter placement on 1/3/2025 with significant initial improvement - subsequently developed worsening right pneumothorax and a right closed tube thoracostomy on 1/4/2025 with 20 Bulgarian chest tube with near complete resolution of right pneumothorax  -pneumothorax resolved. Will remove pigtail catheter today    Goals of care, counseling/discussion  Assessment & Plan  -another discussion today with MD, CHERIE, daughter, and patient  -DNR, I OK    Septic shock (HCC)  Assessment & " Plan  This is Septic shock Not present on admission  SIRS criteria identified on my evaluation include: Tachycardia, with heart rate greater than 90 BPM, Tachypnea, with respirations greater than 20 per minute, and Leukocytosis, with WBC greater than 12,000  Clinical indicators of end organ dysfunction include Hypotension with systolic blood pressure less than 90 or MAP less than 65 and Acute Respiratory Failure - (mechanical ventilation or BiPap or PaO2/FiO2 ratio < 300)  Indicators of septic shock include: Sepsis present and persistent hypotension despite fluid resuscitation   Source is: unclear - possible pneumonia, urinary  Sepsis protocol initiated  Crystalloid Fluid Administration: Fluid resuscitation ordered per standard protocol - 30 mL/kg per current or ideal body weight  IV antibiotics as appropriate for source of sepsis  Reassessment: I have reassessed the patient's hemodynamic status    Culture blood, sputum and urine, all NGTD  Septic Shock has resolved    Restless leg syndrome- (present on admission)  Assessment & Plan  -Requip     Cancer related pain- (present on admission)  Assessment & Plan  -Palliative Care following for pain management  -Multimodal pain management    Bilateral pleural effusion- (present on admission)  Assessment & Plan  -Malignant pleural effusions from metastatic breast cancer s/p thoracenteses and chest tubes  -Diuresis as tolerated  -monitor chest xrays    Primary hypertension- (present on admission)  Assessment & Plan  -Now off pressors.  -resume home Losartan but at 1/2 dosing. Increase as tolerated  -PRN's available    Diabetes mellitus, type 2 (HCC)- (present on admission)  Assessment & Plan  -A1c 6.4  -accuchecks with SSI  -CHO diet  -glucose goal 140-180         VTE:  Lovenox  Ulcer: Not Indicated  Lines: Mojica Catheter  Ongoing indication addressed    I have performed a physical exam and reviewed and updated ROS and Plan today (1/7/2025). In review of yesterday's note  (1/6/2025), there are no changes except as documented above.     Discussed patient condition and risk of morbidity and/or mortality with Family, RN, RT, Pharmacy, Patient, and my attending Dr. Preston.  The patient remains critically ill.  Critical care time = 74 minutes in directly providing and coordinating critical care and extensive data review.  No time overlap and excludes procedures.    Please note that this dictation was created using voice recognition software. I have made every reasonable attempt to correct obvious errors, but there may be errors of grammar and possibly content that I did not discover before finalizing the note.    YESIKA Kwon.

## 2025-01-07 NOTE — PROGRESS NOTES
"Palliative Care    Room: R113    HPI:   Asha Hannon is a 64 y.o. female with medical history significant for breast cancer with metastasis to brain, lungs, liver, and spine followed by Dr. Tabor admitted for hypoxemic respiratory failure 12/25 due to chronically recurrent pleural effusions. CT imaging obtained noting extensive metastatic disease including new T 5 vertebral compression fracture. Neurosurgery consulted; MRI of brain and C/T/L spine with anesthesia obtained 12/31 revealing multiple nodular enhancing metastatic lesions. Patient underwent T4-T6 laminectomy for decompression of spinal cord due to epidural metastases resulting in cord compression.  Hospital course further complicated by ongoing pleural effusions requiring thoracentesis with subsequent postprocedure pneumothorax requiring chest tubes and acute on chronic respiratory failure requiring intubation 1/4-1/5. Patient remains in ICU.  She has had some hypotension and thus opioid pain medications have been limited.    REVIEW OF SYSTEMS:  Positive for generalized weakness. Positive for dyspnea. Positive for pain (upper back). Positive for fatigue (Per daughter worked with therapy this morning).     PHYSICAL EXAM:  Encounter Vitals  Temperature: 36 °C (96.8 °F)  Temp src: Bladder  Blood Pressure : (!) 143/65  BP Location: Left, Forearm  Patient BP Position: Lying Left Side  Pulse: 84  Respiration: (!) 34  Pulse Oximetry: 95 %  O2 (LPM): 10  O2 Delivery Device: Oxymask  Weight: 101 kg (223 lb 8.7 oz)  Weight Source: Bed Scale  Height: 172.7 cm (5' 7.99\")  BMI (Calculated): 35.58  Location: Back  Description: Stabbing  DME  O2 (LPM): 10  O2 Delivery Device: Oxymask    Physical Exam  Vitals and nursing note reviewed.   Constitutional:       Interventions: Face mask in place.      Comments: Laying in bed; drowsy   HENT:      Nose:      Comments: NG tube in place  Cardiovascular:      Comments: Soft BPs on monitor  Pulmonary:      " Effort: No respiratory distress.      Comments: CT x 2 right side  Skin:     Coloration: Skin is pale.   Neurological:      Mental Status: She is oriented to person, place, and time.      Comments: Oriented to event   Psychiatric:         Attention and Perception: Attention normal.         Speech: Speech normal.         Behavior: Behavior is cooperative.         Cognition and Memory: Cognition normal.       Discussion/Plan   #Metastatic breast cancer with extensive metastatic disease  #Acute on chronic respiratory failure  #Pneumothorax  #Pathologic fracture T5   S/p T4-5 laminectomy 1/1/25  #Cancer associated pain   - typically takes Norco 5-325 mg (1-2 tabs) Q 4-6 hours around the clock to manage metastatic bone pain  - morphine and oxycodone do not work well for patient; IV fentanyl with good response  - BP recovered today  - schedule NORCO  Q 1 tab Q 6 hours  - continue morphine 2 mg IV Q 2 hours PRN breakthrough pain  - continue methocarbamol 750 mg PO Q 8 hours PRN  - increase lidocaine patch from 1 patch to 2 patches across painful area to back    Continue bowel regimen to prevent OIC    Discussed changed with patient's daughter Jane bedside, bedside RN CHERIE Steward    #Hypotension  Resolved  #Restless leg syndrome  Continue Requip 1 mg PO nightly  #Septic shock  #DM2  #Advance Care Planning  1/6  Met with patient bedside and [place] back regarding healthcare directives and goals of care.  Patient expressed she required short-term ventilator support and is unsure if she would want to do it again.  We discussed option of DNR/DNI.  Revisited advance directive and POLST form.  Provided education on documents.  At this time patient's goals of care remain to recover from back surgery and pneumothorax and undergo rehabilitative care to get stronger to do continue to pursue cancer treatment.  She confirms she would need to talk with her daughter regarding healthcare directives and CODE STATUS.  At  this time her daughter entered the room.  Reintroduced myself to patient's daughter.  Discussed content of our visit.  She confirmed her mom has been expressing she may not want to undergo intubation again if needed.  Confirmed palliative care can continue to support goals of care discussions and assist with advance care planning documents for future care.  Provided education on forms to patient's daughter.  They would like to discuss and will reach out if they have questions or needs.  Confirmed I will check back in at a later date.  They denied having questions or needs at this time.    1/7  DNAR, I OK per ICU team this morning. ACP not discussed by palliative today; supportive visit. Pain medications adjusted.     Interval diagnostic studies and medical documentation entries pertinent to this case were reviewed independently by me. This patient has at least one acute or chronic illness or injury that poses a threat to life or bodily function. This patient suffers from a high risk of morbidity from additional invasive diagnostic testing or intensive treatment. Discussion of recommendations and coordination of care undertaken with primary provider/treatment team.      ARACELI [email or web address].  Palliative Care Nurse Practitioner  [phone]

## 2025-01-07 NOTE — CARE PLAN
The patient is Watcher - Medium risk of patient condition declining or worsening    Shift Goals  Clinical Goals: SBP < 160, MAP > 65, pain management, monitor respiratory status  Patient Goals: Rest, pain control, get to rehabilation center  Family Goals: Updates    Progress made toward(s) clinical / shift goals:        Problem: Knowledge Deficit - Standard  Goal: Patient and family/care givers will demonstrate understanding of plan of care, disease process/condition, diagnostic tests and medications  Outcome: Progressing     Problem: Fall Risk  Goal: Patient will remain free from falls  Outcome: Progressing     Problem: Hemodynamics  Goal: Patient's hemodynamics, fluid balance and neurologic status will be stable or improve  Outcome: Progressing       Patient is not progressing towards the following goals:      Problem: Pain - Standard  Goal: Alleviation of pain or a reduction in pain to the patient’s comfort goal  Outcome: Not Progressing

## 2025-01-07 NOTE — THERAPY
Occupational Therapy   Initial Evaluation     Patient Name: Asha Hannon  Age:  64 y.o., Sex:  female  Medical Record #: [id number]  Today's Date: 1/7/2025     Precautions  Precautions: Fall Risk, Spinal / Back Precautions   Comments: R chest tube, T4-T6 lami and fusion    Assessment  Patient is 64 y.o. female with a diagnosis of T5 pathologic FX s/p T4-6 lami and fusion.  Pt currently limited by decreased functional mobility, activity tolerance, cognition, strength, AROM, balance, and pain which are affecting pt's ability to complete ADLs/IADLs at baseline. Pt would benefit from OT services in the acute care setting to maximize functional recovery.       Plan    Occupational Therapy Initial Treatment Plan   Treatment Interventions: (P) Self Care / Activities of Daily Living, Neuro Re-Education / Balance, Therapeutic Activity  Treatment Frequency: (P) 3 Times per Week  Duration: (P) Until Therapy Goals Met       Discharge Recommendations: (P) Recommend post-acute placement for additional occupational therapy services prior to discharge home        01/07/25 1048   Prior Living Situation   Housing / Facility 1 Story House   Steps Into Home 1   Rail None   Bathroom Set up Bathtub / Shower Combination   Equipment Owned Front-Wheel Walker   Lives with - Patient's Self Care Capacity Adult Children   Prior Level of ADL Function   Self Feeding Independent   Grooming / Hygiene Independent   Bathing Independent   Dressing Independent   Toileting Independent   Strength Upper Body   Upper Body Strength  X   Comments 2+/5 B UEs   Balance Assessment   Sitting Balance (Static) Fair -   Sitting Balance (Dynamic) Poor   Standing Balance (Static) Trace   Standing Balance (Dynamic) Dependent   Weight Shift Sitting Poor   Weight Shift Standing Absent   ADL Assessment   Grooming Moderate Assist   Upper Body Dressing Maximal Assist   Lower Body Dressing Total Assist   Toileting Total Assist   Functional Mobility   Sit to Stand  Maximal Assist  (x2)   Bed, Chair, Wheelchair Transfer Unable to Participate   Short Term Goals   Short Term Goal # 1 SBA with UB dressing   Short Term Goal # 2 mod A with LB dressing   Short Term Goal # 3 mod A with ADL txfs   Occupational Therapy Initial Treatment Plan    Treatment Interventions Self Care / Activities of Daily Living;Neuro Re-Education / Balance;Therapeutic Activity   Treatment Frequency 3 Times per Week   Duration Until Therapy Goals Met   Anticipated Discharge Equipment and Recommendations   Discharge Recommendations Recommend post-acute placement for additional occupational therapy services prior to discharge home

## 2025-01-08 ENCOUNTER — APPOINTMENT (OUTPATIENT)
Dept: RADIOLOGY | Facility: MEDICAL CENTER | Age: 65
End: 2025-01-08
Attending: INTERNAL MEDICINE
Payer: MEDICARE

## 2025-01-08 LAB
ALBUMIN SERPL BCP-MCNC: 3 G/DL (ref 3.2–4.9)
ALP SERPL-CCNC: 105 U/L (ref 30–99)
ALT SERPL-CCNC: 34 U/L (ref 2–50)
ANION GAP SERPL CALC-SCNC: 1 MMOL/L (ref 7–16)
ANION GAP SERPL CALC-SCNC: 4 MMOL/L (ref 7–16)
ANISOCYTOSIS BLD QL SMEAR: ABNORMAL
ARTERIAL PATENCY WRIST A: ABNORMAL
AST SERPL-CCNC: 93 U/L (ref 12–45)
BASE EXCESS BLDA CALC-SCNC: 11 MMOL/L (ref -4–3)
BASE EXCESS BLDA CALC-SCNC: 8 MMOL/L (ref -4–3)
BASOPHILS # BLD AUTO: 0.4 % (ref 0–1.8)
BASOPHILS # BLD: 0.05 K/UL (ref 0–0.12)
BILIRUB CONJ SERPL-MCNC: <0.2 MG/DL (ref 0.1–0.5)
BILIRUB INDIRECT SERPL-MCNC: ABNORMAL MG/DL (ref 0–1)
BILIRUB SERPL-MCNC: 0.3 MG/DL (ref 0.1–1.5)
BODY TEMPERATURE: ABNORMAL DEGREES
BODY TEMPERATURE: ABNORMAL DEGREES
BREATHS SETTING VENT: 26
BUN SERPL-MCNC: 8 MG/DL (ref 8–22)
BUN SERPL-MCNC: 9 MG/DL (ref 8–22)
CA-I SERPL-SCNC: 1.1 MMOL/L (ref 1.1–1.3)
CALCIUM SERPL-MCNC: 8.5 MG/DL (ref 8.5–10.5)
CALCIUM SERPL-MCNC: 8.8 MG/DL (ref 8.5–10.5)
CHLORIDE SERPL-SCNC: 94 MMOL/L (ref 96–112)
CHLORIDE SERPL-SCNC: 96 MMOL/L (ref 96–112)
CO2 BLDA-SCNC: 43 MMOL/L (ref 32–48)
CO2 BLDA-SCNC: 44 MMOL/L (ref 32–48)
CO2 SERPL-SCNC: 38 MMOL/L (ref 20–33)
CO2 SERPL-SCNC: 40 MMOL/L (ref 20–33)
COMMENT 1642: NORMAL
CREAT SERPL-MCNC: 0.42 MG/DL (ref 0.5–1.4)
CREAT SERPL-MCNC: 0.52 MG/DL (ref 0.5–1.4)
DELSYS IDSYS: ABNORMAL
DELSYS IDSYS: ABNORMAL
END TIDAL CARBON DIOXIDE IECO2: 47 MMHG
EOSINOPHIL # BLD AUTO: 0.01 K/UL (ref 0–0.51)
EOSINOPHIL NFR BLD: 0.1 % (ref 0–6.9)
ERYTHROCYTE [DISTWIDTH] IN BLOOD BY AUTOMATED COUNT: 56.3 FL (ref 35.9–50)
GFR SERPLBLD CREATININE-BSD FMLA CKD-EPI: 103 ML/MIN/1.73 M 2
GFR SERPLBLD CREATININE-BSD FMLA CKD-EPI: 108 ML/MIN/1.73 M 2
GLUCOSE BLD STRIP.AUTO-MCNC: 131 MG/DL (ref 65–99)
GLUCOSE BLD STRIP.AUTO-MCNC: 133 MG/DL (ref 65–99)
GLUCOSE BLD STRIP.AUTO-MCNC: 151 MG/DL (ref 65–99)
GLUCOSE SERPL-MCNC: 129 MG/DL (ref 65–99)
GLUCOSE SERPL-MCNC: 136 MG/DL (ref 65–99)
HCO3 BLDA-SCNC: 40.1 MMOL/L (ref 21–28)
HCO3 BLDA-SCNC: 40.8 MMOL/L (ref 21–28)
HCT VFR BLD AUTO: 33.3 % (ref 37–47)
HGB BLD-MCNC: 9.9 G/DL (ref 12–16)
HOROWITZ INDEX BLDA+IHG-RTO: 22 MM[HG]
HOROWITZ INDEX BLDA+IHG-RTO: 94 MM[HG]
IMM GRANULOCYTES # BLD AUTO: 0.26 K/UL (ref 0–0.11)
IMM GRANULOCYTES NFR BLD AUTO: 1.8 % (ref 0–0.9)
LACTATE BLD-SCNC: 0.8 MMOL/L (ref 0.5–2)
LACTATE BLD-SCNC: 2.2 MMOL/L (ref 0.5–2)
LACTATE SERPL-SCNC: 2 MMOL/L (ref 0.5–2)
LYMPHOCYTES # BLD AUTO: 0.66 K/UL (ref 1–4.8)
LYMPHOCYTES NFR BLD: 4.7 % (ref 22–41)
MCH RBC QN AUTO: 27.7 PG (ref 27–33)
MCHC RBC AUTO-ENTMCNC: 29.7 G/DL (ref 32.2–35.5)
MCV RBC AUTO: 93.3 FL (ref 81.4–97.8)
MICROCYTES BLD QL SMEAR: ABNORMAL
MODE IMODE: ABNORMAL
MONOCYTES # BLD AUTO: 1.57 K/UL (ref 0–0.85)
MONOCYTES NFR BLD AUTO: 11.1 % (ref 0–13.4)
MORPHOLOGY BLD-IMP: NORMAL
NEUTROPHILS # BLD AUTO: 11.54 K/UL (ref 1.82–7.42)
NEUTROPHILS NFR BLD: 81.9 % (ref 44–72)
NRBC # BLD AUTO: 0 K/UL
NRBC BLD-RTO: 0 /100 WBC (ref 0–0.2)
O2/TOTAL GAS SETTING VFR VENT: 100 %
O2/TOTAL GAS SETTING VFR VENT: 80 %
PCO2 BLDA: 91.2 MMHG (ref 32–48)
PCO2 BLDA: >100 MMHG (ref 32–48)
PCO2 TEMP ADJ BLDA: 96.1 MMHG (ref 32–48)
PCO2 TEMP ADJ BLDA: >100 MMHG (ref 32–48)
PEEP END EXPIRATORY PRESSURE IPEEP: 8 CMH20
PH BLDA: 7.19 [PH] (ref 7.35–7.45)
PH BLDA: 7.26 [PH] (ref 7.35–7.45)
PH TEMP ADJ BLDA: 7.19 [PH] (ref 7.35–7.45)
PH TEMP ADJ BLDA: 7.24 [PH] (ref 7.35–7.45)
PLATELET # BLD AUTO: 679 K/UL (ref 164–446)
PLATELET BLD QL SMEAR: NORMAL
PMV BLD AUTO: 8.6 FL (ref 9–12.9)
PO2 BLDA: 22 MMHG (ref 83–108)
PO2 BLDA: 75 MMHG (ref 83–108)
PO2 TEMP ADJ BLDA: 24 MMHG (ref 64–87)
PO2 TEMP ADJ BLDA: 77 MMHG (ref 64–87)
POTASSIUM SERPL-SCNC: 3.9 MMOL/L (ref 3.6–5.5)
POTASSIUM SERPL-SCNC: 4.3 MMOL/L (ref 3.6–5.5)
PROT SERPL-MCNC: 6 G/DL (ref 6–8.2)
RBC # BLD AUTO: 3.57 M/UL (ref 4.2–5.4)
RBC BLD AUTO: PRESENT
SAO2 % BLDA: 27 % (ref 93–99)
SAO2 % BLDA: 89 % (ref 93–99)
SODIUM SERPL-SCNC: 135 MMOL/L (ref 135–145)
SODIUM SERPL-SCNC: 138 MMOL/L (ref 135–145)
SPECIMEN DRAWN FROM PATIENT: ABNORMAL
SPECIMEN DRAWN FROM PATIENT: ABNORMAL
TIDAL VOLUME IVT: 400 ML
WBC # BLD AUTO: 14.1 K/UL (ref 4.8–10.8)

## 2025-01-08 PROCEDURE — 94003 VENT MGMT INPAT SUBQ DAY: CPT

## 2025-01-08 PROCEDURE — 700102 HCHG RX REV CODE 250 W/ 637 OVERRIDE(OP): Performed by: NURSE PRACTITIONER

## 2025-01-08 PROCEDURE — A9270 NON-COVERED ITEM OR SERVICE: HCPCS | Performed by: NURSE PRACTITIONER

## 2025-01-08 PROCEDURE — 770022 HCHG ROOM/CARE - ICU (200)

## 2025-01-08 PROCEDURE — 94799 UNLISTED PULMONARY SVC/PX: CPT

## 2025-01-08 PROCEDURE — 700102 HCHG RX REV CODE 250 W/ 637 OVERRIDE(OP): Performed by: STUDENT IN AN ORGANIZED HEALTH CARE EDUCATION/TRAINING PROGRAM

## 2025-01-08 PROCEDURE — 31500 INSERT EMERGENCY AIRWAY: CPT | Performed by: EMERGENCY MEDICINE

## 2025-01-08 PROCEDURE — 700102 HCHG RX REV CODE 250 W/ 637 OVERRIDE(OP): Performed by: EMERGENCY MEDICINE

## 2025-01-08 PROCEDURE — 99152 MOD SED SAME PHYS/QHP 5/>YRS: CPT

## 2025-01-08 PROCEDURE — 5A1955Z RESPIRATORY VENTILATION, GREATER THAN 96 CONSECUTIVE HOURS: ICD-10-PCS | Performed by: EMERGENCY MEDICINE

## 2025-01-08 PROCEDURE — 700102 HCHG RX REV CODE 250 W/ 637 OVERRIDE(OP): Performed by: INTERNAL MEDICINE

## 2025-01-08 PROCEDURE — 700101 HCHG RX REV CODE 250: Performed by: NURSE PRACTITIONER

## 2025-01-08 PROCEDURE — 99291 CRITICAL CARE FIRST HOUR: CPT | Mod: 25 | Performed by: NURSE PRACTITIONER

## 2025-01-08 PROCEDURE — 82803 BLOOD GASES ANY COMBINATION: CPT | Mod: 91

## 2025-01-08 PROCEDURE — 0BH17EZ INSERTION OF ENDOTRACHEAL AIRWAY INTO TRACHEA, VIA NATURAL OR ARTIFICIAL OPENING: ICD-10-PCS | Performed by: EMERGENCY MEDICINE

## 2025-01-08 PROCEDURE — 302214 INTUBATION BOX: Performed by: EMERGENCY MEDICINE

## 2025-01-08 PROCEDURE — A9270 NON-COVERED ITEM OR SERVICE: HCPCS | Performed by: STUDENT IN AN ORGANIZED HEALTH CARE EDUCATION/TRAINING PROGRAM

## 2025-01-08 PROCEDURE — 700111 HCHG RX REV CODE 636 W/ 250 OVERRIDE (IP): Mod: JZ | Performed by: INTERNAL MEDICINE

## 2025-01-08 PROCEDURE — 700101 HCHG RX REV CODE 250: Performed by: EMERGENCY MEDICINE

## 2025-01-08 PROCEDURE — 83605 ASSAY OF LACTIC ACID: CPT | Mod: 91

## 2025-01-08 PROCEDURE — 82962 GLUCOSE BLOOD TEST: CPT | Mod: 91

## 2025-01-08 PROCEDURE — A9270 NON-COVERED ITEM OR SERVICE: HCPCS | Performed by: EMERGENCY MEDICINE

## 2025-01-08 PROCEDURE — 94660 CPAP INITIATION&MGMT: CPT

## 2025-01-08 PROCEDURE — 31500 INSERT EMERGENCY AIRWAY: CPT

## 2025-01-08 PROCEDURE — A9270 NON-COVERED ITEM OR SERVICE: HCPCS | Performed by: INTERNAL MEDICINE

## 2025-01-08 PROCEDURE — 94002 VENT MGMT INPAT INIT DAY: CPT

## 2025-01-08 PROCEDURE — 700105 HCHG RX REV CODE 258: Performed by: STUDENT IN AN ORGANIZED HEALTH CARE EDUCATION/TRAINING PROGRAM

## 2025-01-08 PROCEDURE — 700111 HCHG RX REV CODE 636 W/ 250 OVERRIDE (IP): Mod: JZ | Performed by: EMERGENCY MEDICINE

## 2025-01-08 PROCEDURE — 92950 HEART/LUNG RESUSCITATION CPR: CPT

## 2025-01-08 PROCEDURE — 700111 HCHG RX REV CODE 636 W/ 250 OVERRIDE (IP): Performed by: NURSE PRACTITIONER

## 2025-01-08 PROCEDURE — 36600 WITHDRAWAL OF ARTERIAL BLOOD: CPT

## 2025-01-08 PROCEDURE — 700111 HCHG RX REV CODE 636 W/ 250 OVERRIDE (IP): Performed by: STUDENT IN AN ORGANIZED HEALTH CARE EDUCATION/TRAINING PROGRAM

## 2025-01-08 PROCEDURE — 80076 HEPATIC FUNCTION PANEL: CPT

## 2025-01-08 PROCEDURE — 71045 X-RAY EXAM CHEST 1 VIEW: CPT

## 2025-01-08 PROCEDURE — 80048 BASIC METABOLIC PNL TOTAL CA: CPT | Mod: 91

## 2025-01-08 PROCEDURE — 93005 ELECTROCARDIOGRAM TRACING: CPT | Mod: TC | Performed by: EMERGENCY MEDICINE

## 2025-01-08 PROCEDURE — 82330 ASSAY OF CALCIUM: CPT

## 2025-01-08 PROCEDURE — 85025 COMPLETE CBC W/AUTO DIFF WBC: CPT

## 2025-01-08 PROCEDURE — 99292 CRITICAL CARE ADDL 30 MIN: CPT | Mod: 25 | Performed by: EMERGENCY MEDICINE

## 2025-01-08 RX ORDER — POTASSIUM CHLORIDE 14.9 MG/ML
20 INJECTION INTRAVENOUS ONCE
Status: DISCONTINUED | OUTPATIENT
Start: 2025-01-08 | End: 2025-01-08

## 2025-01-08 RX ORDER — INSULIN LISPRO 100 [IU]/ML
2-9 INJECTION, SOLUTION INTRAVENOUS; SUBCUTANEOUS EVERY 6 HOURS
Status: DISCONTINUED | OUTPATIENT
Start: 2025-01-09 | End: 2025-01-17

## 2025-01-08 RX ORDER — LANOLIN ALCOHOL/MO/W.PET/CERES
400 CREAM (GRAM) TOPICAL DAILY
Status: DISCONTINUED | OUTPATIENT
Start: 2025-01-09 | End: 2025-01-17

## 2025-01-08 RX ORDER — POLYETHYLENE GLYCOL 3350 17 G/17G
1 POWDER, FOR SOLUTION ORAL
Status: DISCONTINUED | OUTPATIENT
Start: 2025-01-08 | End: 2025-01-17

## 2025-01-08 RX ORDER — PHENYLEPHRINE HCL IN 0.9% NACL 1 MG/10 ML
200 SYRINGE (ML) INTRAVENOUS ONCE
Status: COMPLETED | OUTPATIENT
Start: 2025-01-08 | End: 2025-01-08

## 2025-01-08 RX ORDER — ONDANSETRON 4 MG/1
4 TABLET, ORALLY DISINTEGRATING ORAL EVERY 4 HOURS PRN
Status: DISCONTINUED | OUTPATIENT
Start: 2025-01-08 | End: 2025-01-17

## 2025-01-08 RX ORDER — AMOXICILLIN 250 MG
1 CAPSULE ORAL
Status: DISCONTINUED | OUTPATIENT
Start: 2025-01-08 | End: 2025-01-17

## 2025-01-08 RX ORDER — FUROSEMIDE 10 MG/ML
40 INJECTION INTRAMUSCULAR; INTRAVENOUS ONCE
Status: DISCONTINUED | OUTPATIENT
Start: 2025-01-08 | End: 2025-01-08

## 2025-01-08 RX ORDER — CALCIUM CARBONATE 500 MG/1
500 TABLET, CHEWABLE ORAL 3 TIMES DAILY PRN
Status: DISCONTINUED | OUTPATIENT
Start: 2025-01-08 | End: 2025-01-17

## 2025-01-08 RX ORDER — DEXTROSE MONOHYDRATE 25 G/50ML
25 INJECTION, SOLUTION INTRAVENOUS
Status: DISCONTINUED | OUTPATIENT
Start: 2025-01-08 | End: 2025-01-17

## 2025-01-08 RX ORDER — POTASSIUM CHLORIDE 14.9 MG/ML
20 INJECTION INTRAVENOUS ONCE
Status: COMPLETED | OUTPATIENT
Start: 2025-01-08 | End: 2025-01-08

## 2025-01-08 RX ORDER — EPINEPHRINE 0.1 MG/ML
30 SYRINGE (ML) INJECTION ONCE
Status: COMPLETED | OUTPATIENT
Start: 2025-01-08 | End: 2025-01-08

## 2025-01-08 RX ORDER — ACETAZOLAMIDE 500 MG/5ML
500 INJECTION, POWDER, LYOPHILIZED, FOR SOLUTION INTRAVENOUS ONCE
Status: DISCONTINUED | OUTPATIENT
Start: 2025-01-08 | End: 2025-01-08

## 2025-01-08 RX ORDER — METOCLOPRAMIDE 10 MG/1
10 TABLET ORAL EVERY 6 HOURS
Status: DISCONTINUED | OUTPATIENT
Start: 2025-01-09 | End: 2025-01-13

## 2025-01-08 RX ORDER — AMOXICILLIN 250 MG
2 CAPSULE ORAL 2 TIMES DAILY
Status: DISCONTINUED | OUTPATIENT
Start: 2025-01-09 | End: 2025-01-17

## 2025-01-08 RX ORDER — FERROUS SULFATE 325(65) MG
325 TABLET ORAL
Status: DISCONTINUED | OUTPATIENT
Start: 2025-01-09 | End: 2025-01-09

## 2025-01-08 RX ORDER — ETOMIDATE 2 MG/ML
20 INJECTION INTRAVENOUS ONCE
Status: COMPLETED | OUTPATIENT
Start: 2025-01-08 | End: 2025-01-08

## 2025-01-08 RX ORDER — POTASSIUM CHLORIDE 1500 MG/1
40 TABLET, EXTENDED RELEASE ORAL ONCE
Status: COMPLETED | OUTPATIENT
Start: 2025-01-08 | End: 2025-01-08

## 2025-01-08 RX ORDER — METHOCARBAMOL 750 MG/1
750 TABLET, FILM COATED ORAL EVERY 8 HOURS PRN
Status: DISCONTINUED | OUTPATIENT
Start: 2025-01-08 | End: 2025-01-17

## 2025-01-08 RX ORDER — ROCURONIUM BROMIDE 10 MG/ML
100 INJECTION, SOLUTION INTRAVENOUS ONCE
Status: COMPLETED | OUTPATIENT
Start: 2025-01-08 | End: 2025-01-08

## 2025-01-08 RX ORDER — FAMOTIDINE 20 MG/1
20 TABLET, FILM COATED ORAL EVERY 12 HOURS
Status: DISCONTINUED | OUTPATIENT
Start: 2025-01-09 | End: 2025-01-17

## 2025-01-08 RX ORDER — POTASSIUM CHLORIDE 1500 MG/1
40 TABLET, EXTENDED RELEASE ORAL ONCE
Status: DISCONTINUED | OUTPATIENT
Start: 2025-01-08 | End: 2025-01-08

## 2025-01-08 RX ORDER — HYDROCODONE BITARTRATE AND ACETAMINOPHEN 10; 325 MG/1; MG/1
1 TABLET ORAL EVERY 4 HOURS
Status: DISCONTINUED | OUTPATIENT
Start: 2025-01-08 | End: 2025-01-17

## 2025-01-08 RX ORDER — ROPINIROLE 0.5 MG/1
1 TABLET, FILM COATED ORAL
Status: DISCONTINUED | OUTPATIENT
Start: 2025-01-08 | End: 2025-01-17

## 2025-01-08 RX ORDER — LOSARTAN POTASSIUM 50 MG/1
50 TABLET ORAL
Status: DISCONTINUED | OUTPATIENT
Start: 2025-01-09 | End: 2025-01-11

## 2025-01-08 RX ORDER — BISACODYL 10 MG
10 SUPPOSITORY, RECTAL RECTAL
Status: DISCONTINUED | OUTPATIENT
Start: 2025-01-08 | End: 2025-01-17

## 2025-01-08 RX ORDER — POTASSIUM CHLORIDE 29.8 MG/ML
40 INJECTION INTRAVENOUS ONCE
Status: DISCONTINUED | OUTPATIENT
Start: 2025-01-08 | End: 2025-01-08

## 2025-01-08 RX ORDER — ACETAZOLAMIDE 500 MG/5ML
500 INJECTION, POWDER, LYOPHILIZED, FOR SOLUTION INTRAVENOUS ONCE
Status: COMPLETED | OUTPATIENT
Start: 2025-01-08 | End: 2025-01-08

## 2025-01-08 RX ORDER — ROPINIROLE 0.5 MG/1
1 TABLET, FILM COATED ORAL 2 TIMES DAILY PRN
Status: DISCONTINUED | OUTPATIENT
Start: 2025-01-08 | End: 2025-01-17

## 2025-01-08 RX ORDER — FUROSEMIDE 10 MG/ML
40 INJECTION INTRAMUSCULAR; INTRAVENOUS ONCE
Status: COMPLETED | OUTPATIENT
Start: 2025-01-08 | End: 2025-01-08

## 2025-01-08 RX ORDER — NOREPINEPHRINE BITARTRATE 0.03 MG/ML
0-1 INJECTION, SOLUTION INTRAVENOUS CONTINUOUS
Status: DISCONTINUED | OUTPATIENT
Start: 2025-01-08 | End: 2025-01-16

## 2025-01-08 RX ADMIN — Medication 300 MG: at 05:07

## 2025-01-08 RX ADMIN — FENTANYL CITRATE 100 MCG: 50 INJECTION, SOLUTION INTRAMUSCULAR; INTRAVENOUS at 22:13

## 2025-01-08 RX ADMIN — AMPICILLIN AND SULBACTAM 3 G: 1; 2 INJECTION, POWDER, FOR SOLUTION INTRAMUSCULAR; INTRAVENOUS at 00:04

## 2025-01-08 RX ADMIN — AMPICILLIN AND SULBACTAM 3 G: 1; 2 INJECTION, POWDER, FOR SOLUTION INTRAMUSCULAR; INTRAVENOUS at 18:47

## 2025-01-08 RX ADMIN — EPINEPHRINE 0.03 MG: 0.1 INJECTION INTRAVENOUS at 17:20

## 2025-01-08 RX ADMIN — Medication 200 MCG: at 18:00

## 2025-01-08 RX ADMIN — HYDROCODONE BITARTRATE AND ACETAMINOPHEN 1 TABLET: 10; 325 TABLET ORAL at 10:29

## 2025-01-08 RX ADMIN — INSULIN LISPRO 2 UNITS: 100 INJECTION, SOLUTION INTRAVENOUS; SUBCUTANEOUS at 12:10

## 2025-01-08 RX ADMIN — METOCLOPRAMIDE 10 MG: 10 TABLET ORAL at 12:15

## 2025-01-08 RX ADMIN — HYDROCODONE BITARTRATE AND ACETAMINOPHEN 1 TABLET: 10; 325 TABLET ORAL at 22:11

## 2025-01-08 RX ADMIN — ENOXAPARIN SODIUM 40 MG: 100 INJECTION SUBCUTANEOUS at 18:48

## 2025-01-08 RX ADMIN — ACETAZOLAMIDE 500 MG: 500 INJECTION, POWDER, LYOPHILIZED, FOR SOLUTION INTRAVENOUS at 18:53

## 2025-01-08 RX ADMIN — POTASSIUM CHLORIDE 20 MEQ: 14.9 INJECTION, SOLUTION INTRAVENOUS at 16:14

## 2025-01-08 RX ADMIN — AMPICILLIN AND SULBACTAM 3 G: 1; 2 INJECTION, POWDER, FOR SOLUTION INTRAMUSCULAR; INTRAVENOUS at 12:19

## 2025-01-08 RX ADMIN — FUROSEMIDE 40 MG: 10 INJECTION INTRAMUSCULAR; INTRAVENOUS at 16:12

## 2025-01-08 RX ADMIN — HYDROCODONE BITARTRATE AND ACETAMINOPHEN 1 TABLET: 10; 325 TABLET ORAL at 05:08

## 2025-01-08 RX ADMIN — ROCURONIUM BROMIDE 100 MG: 50 INJECTION INTRAVENOUS at 17:16

## 2025-01-08 RX ADMIN — ROPINIROLE HYDROCHLORIDE 1 MG: 1 TABLET, FILM COATED ORAL at 22:11

## 2025-01-08 RX ADMIN — NOREPINEPHRINE BITARTRATE 0.1 MCG/KG/MIN: 1 SOLUTION INTRAVENOUS at 17:45

## 2025-01-08 RX ADMIN — METOCLOPRAMIDE 10 MG: 10 TABLET ORAL at 05:07

## 2025-01-08 RX ADMIN — HYDROCODONE BITARTRATE AND ACETAMINOPHEN 1 TABLET: 10; 325 TABLET ORAL at 01:27

## 2025-01-08 RX ADMIN — FENTANYL CITRATE 100 MCG: 50 INJECTION, SOLUTION INTRAMUSCULAR; INTRAVENOUS at 20:11

## 2025-01-08 RX ADMIN — ROPINIROLE HYDROCHLORIDE 1 MG: 1 TABLET, FILM COATED ORAL at 05:08

## 2025-01-08 RX ADMIN — POTASSIUM CHLORIDE 40 MEQ: 1500 TABLET, EXTENDED RELEASE ORAL at 08:36

## 2025-01-08 RX ADMIN — Medication 400 MG: at 05:07

## 2025-01-08 RX ADMIN — LIDOCAINE 2 PATCH: 4 PATCH TOPICAL at 10:29

## 2025-01-08 RX ADMIN — AMPICILLIN AND SULBACTAM 3 G: 1; 2 INJECTION, POWDER, FOR SOLUTION INTRAMUSCULAR; INTRAVENOUS at 05:21

## 2025-01-08 RX ADMIN — LOSARTAN POTASSIUM 50 MG: 50 TABLET, FILM COATED ORAL at 05:07

## 2025-01-08 RX ADMIN — PROPOFOL 5 MCG/KG/MIN: 10 INJECTION, EMULSION INTRAVENOUS at 20:00

## 2025-01-08 RX ADMIN — ETOMIDATE 20 MG: 2 INJECTION, SOLUTION INTRAVENOUS at 17:14

## 2025-01-08 ASSESSMENT — ENCOUNTER SYMPTOMS
FEVER: 0
SHORTNESS OF BREATH: 0
DIZZINESS: 0
NAUSEA: 0
WHEEZING: 0
CHILLS: 0
HEADACHES: 0
VOMITING: 0
COUGH: 1
PALPITATIONS: 0
BACK PAIN: 1

## 2025-01-08 ASSESSMENT — PULMONARY FUNCTION TESTS
EPAP_CMH2O: 8

## 2025-01-08 ASSESSMENT — PAIN DESCRIPTION - PAIN TYPE
TYPE: ACUTE PAIN
TYPE: ACUTE PAIN
TYPE: CHRONIC PAIN
TYPE: ACUTE PAIN
TYPE: ACUTE PAIN
TYPE: CHRONIC PAIN
TYPE: ACUTE PAIN
TYPE: ACUTE PAIN

## 2025-01-08 NOTE — CARE PLAN
The patient is Unstable - High likelihood or risk of patient condition declining or worsening    Shift Goals  Clinical Goals: stable vitals & oxygenation, pain managment, goals of care  Patient Goals: pain control  Family Goals: maria ines    Progress made toward(s) clinical / shift goals:      Problem: Hemodynamics  Goal: Patient's hemodynamics, fluid balance and neurologic status will be stable or improve  Outcome: Progressing      Problem: Fluid Volume  Goal: Fluid volume balance will be maintained  Outcome: Progressing - Pt started on diuretics today, received lasix x2 and diamox x1.    Patient is not progressing towards the following goals:    Problem: Pain - Standard  Goal: Alleviation of pain or a reduction in pain to the patient’s comfort goal  Outcome: Not Progressing - Pt having pain in legs and back, Halley CRESPO for palliative came to talk to pt about pain regimen.   PRN norco changed to scheduled every 6 hours and will add 2nd lidocaine patch to back starting with tomorrow's dose.   This afternoon pt requesting that her pain meds be given every 4 hours instead of every 6 and that morphine does not work.  Discussed with Halley CRESPO, she is wanting to make sure that pt's BP tolerates the Q6 scheduled norco before increasing it to Q4 hours.  Morphine dc'd and fentanyl ordered.     Problem: Respiratory  Goal: Patient will achieve/maintain optimum respiratory ventilation and gas exchange  Outcome: Not Progressing - Pt was placed on bipap just prior to change of shift this morning due to decreased O2 sats in the mid 80s per report.   Pt has been on and off bipap all day, taking it off after 10-20 minutes of it being on despite frequent education on the reason why she needs to be on it (increased lethargy, decreased o2 sats when off).  When pt off of bipap, pt is on either 5L NC or 5L oxymask.  When off of oxygen pt's O2 sats decrease quickly to the low to mid 80s.

## 2025-01-08 NOTE — PROGRESS NOTES
"Critical Care Progress Note    Date of admission  12/25/2024    Chief Complaint  Respiratory Distress, AMS    Hospital Course  Asha Hannon is a 64-year-old female with PMH significant for breast cancer with mets to the lungs currently on oral chemo who presented 12/25/2024 with weakness, SOB and back & abdominal pain.  CT C/A/P with contrast showed new BUN infiltrate; moderate size pleural effusions; worsening of hepatic metastasis; new abdominal lymphadenopathy, thoracic adenopathy; diffuse osseous metastatic disease with T5 pathologic fracture.    Admitted to floor under the [facility] Family Medicine Team.    12/26 - Palliative Care consult - patient deferred discussion.   12/27 - right-sided thoracentesis.  12/29 - restless legs \"gave out\" resulting in fall, no trauma.   12/31 -right MRI showed multiple metastatic lesions; T-spine MRI showed multiple metastatic lesions and T5 pathologic fracture; L-spine multiple metastatic lesions; C-spine no metastatic lesions.  1/1 - to OR for T4-T6 laminectomy with Dr. Lowery.  1/2 - POD #1, right-sided pneumothorax, medically manage. Left-sided thoracentesis  1/3 - POD #2, AMS with respiratory failure, pH 7.14 CO2 100. Transfer to ICU. Chest tube placed.   1/4 - Intubation  1/5 - Extubation. Chest tubes clamped.  1/6 - Ongoing goals of care discussions. Large bore right chest tube removed. BIPAP and diuresis.   1/7 - DNR, I OK. BIPAP. Diuresing. Right pigtail catheter removed.    Interval Problem Update  Reviewed last 24 hour events:  No overnight events. On BIPAP all night.   Tmax 100.8  SR 60s to 70s  -170s.  Neuro: sleeps when undisturbed, awakens to voice, follows commands. Generalized weakness, moving all.   RASS: -1 to 0, no sedation  Oxymask with meals 88% ---> HFNC. Rescue BIPAP 50%.  CHO Vegetarian diet. Poor appetite. BM last night.  I/O: 1800/3900  TLC, Mojica  Lovenox, Unasyn day 5/5  Mobility 3B    Review of Systems  Review of Systems   Reason unable to " perform ROS: Limited due to BIPAP. Shakes head yes/no.   Constitutional:  Positive for malaise/fatigue. Negative for chills and fever.   HENT: Negative.     Respiratory:  Positive for cough. Negative for shortness of breath and wheezing.    Cardiovascular:  Negative for chest pain and palpitations.   Gastrointestinal:  Negative for nausea and vomiting.   Musculoskeletal:  Positive for back pain.   Neurological:  Negative for dizziness and headaches.        Vital Signs for last 24 hours   Temp:  [36.3 °C (97.4 °F)] 36.3 °C (97.4 °F)  Pulse:  [61-93] 92  Resp:  [11-55] 21  BP: ()/(49-77) 139/62  SpO2:  [89 %-100 %] 91 %    Hemodynamic parameters for last 24 hours       Respiratory Information for the last 24 hours       Physical Exam   Physical Exam  Vitals and nursing note reviewed.   Constitutional:       General: She is sleeping. She is not in acute distress.     Appearance: She is obese. She is ill-appearing. She is not toxic-appearing.      Interventions: Face mask in place.      Comments: BIPAP   HENT:      Head: Normocephalic and atraumatic.      Nose: Nose normal.      Mouth/Throat:      Lips: Pink.      Mouth: Mucous membranes are moist.   Eyes:      Pupils: Pupils are equal, round, and reactive to light.   Cardiovascular:      Rate and Rhythm: Normal rate and regular rhythm.      Pulses: Normal pulses.      Heart sounds: Normal heart sounds.   Pulmonary:      Effort: No respiratory distress.      Breath sounds: Examination of the right-middle field reveals rhonchi. Examination of the right-lower field reveals rhonchi. Rhonchi present.   Abdominal:      General: Bowel sounds are normal.      Palpations: Abdomen is soft.      Tenderness: There is no abdominal tenderness. There is no guarding or rebound.   Musculoskeletal:      Right lower leg: No edema.      Left lower leg: No edema.   Skin:     General: Skin is warm and dry.      Capillary Refill: Capillary refill takes less than 2 seconds.       Coloration: Skin is pale.   Neurological:      Mental Status: She is easily aroused.      GCS: GCS eye subscore is 3. GCS verbal subscore is 5. GCS motor subscore is 6.      Motor: Motor function is intact.   Psychiatric:         Mood and Affect: Mood normal.         Behavior: Behavior normal.         Cognition and Memory: Cognition normal.         Judgment: Judgment normal.         Medications  Current Facility-Administered Medications   Medication Dose Route Frequency Provider Last Rate Last Admin    [START ON 1/9/2025] ferrous sulfate tablet 325 mg  325 mg Oral QDAY with Breakfast Jessica Campos A.P.R.N.        losartan (Cozaar) tablet 50 mg  50 mg Oral Q DAY BHARGAVI Kwon.P.R.N.   50 mg at 01/08/25 0507    insulin lispro (HumaLOG,AdmeLOG) subcutaneous injection  2-9 Units Subcutaneous 4X/DAY ACHS Jessica Campos A.P.R.N.   2 Units at 01/08/25 1210    And    dextrose 50% (D50W) injection 25 g  25 g Intravenous Q15 MIN PRN Jessica Campos A.P.R.N.        lidocaine (Asperflex) 4 % patch 2 Patch  2 Patch Transdermal Q24HR Petra Agustin A.P.R.N.   2 Patch at 01/08/25 1029    fentaNYL (Sublimaze) injection 25 mcg  25 mcg Intravenous Q3HRS PRN Petra Agustin A.P.R.N.   25 mcg at 01/07/25 1640    HYDROcodone/acetaminophen (Norco)  MG per tablet 1 Tablet  1 Tablet Oral Q4HRS Petra Agustin A.P.R.N.   1 Tablet at 01/08/25 1029    ampicillin/sulbactam (Unasyn) 3 g in  mL IVPB  3 g Intravenous Q6HRS Tree Kary, D.O. 200 mL/hr at 01/08/25 1219 3 g at 01/08/25 1219    labetalol (Normodyne/Trandate) injection 10 mg  10 mg Intravenous Q4HRS PRN Lidia Figueroa M.D.   10 mg at 01/07/25 0611    magnesium oxide tablet 400 mg  400 mg Oral DAILY Lidia Figueroa M.D.   400 mg at 01/08/25 0507    metoclopramide (Reglan) tablet 10 mg  10 mg Oral Q6HRS Lidia Figueroa M.D.   10 mg at 01/08/25 1215    ROPINIRole (Requip) tablet 1 mg  1 mg Oral QHS Lidia Figueroa M.D.   1 mg at 01/07/25 9755     senna-docusate (Pericolace Or Senokot S) 8.6-50 MG per tablet 1 Tablet  1 Tablet Oral Nightly Lidia Figueroa M.D.   1 Tablet at 01/05/25 2057    calcium carbonate (Tums) chewable tab 500 mg  500 mg Oral TID PRN Lidia Figueroa M.D.   500 mg at 01/07/25 0424    methocarbamol (Robaxin) tablet 750 mg  750 mg Oral Q8HRS PRN Lidia Figueroa M.D.   750 mg at 01/07/25 1011    ondansetron (Zofran ODT) dispertab 4 mg  4 mg Oral Q4HRS PRN Lidia Figueroa M.D.        polyethylene glycol/lytes (Miralax) Packet 1 Packet  1 Packet Oral BID PRN Lidia Figueroa M.D.   1 Packet at 01/07/25 1759    ROPINIRole (Requip) tablet 1 mg  1 mg Oral BID PRN Lidia Figueroa M.D.   1 mg at 01/08/25 0508    senna-docusate (Pericolace Or Senokot S) 8.6-50 MG per tablet 1 Tablet  1 Tablet Oral Q24HRS PRN Lidia Figueroa M.D.        docusate sodium (Colace) capsule 100 mg  100 mg Oral BID Lidia Figueroa M.D.   100 mg at 01/07/25 1800    Respiratory Therapy Consult   Nebulization Continuous RT Americo Avalos M.D.        ipratropium-albuterol (DUONEB) nebulizer solution  3 mL Nebulization Q2HRS PRN (RT) Americo Avalos M.D.   3 mL at 01/07/25 1348    magnesium hydroxide (Milk Of Magnesia) suspension 30 mL  30 mL Enteral Tube QDAY PRN Americo Avalos M.D.        enoxaparin (Lovenox) inj 40 mg  40 mg Subcutaneous DAILY AT 1800 Americo Avalos M.D.   40 mg at 01/07/25 1800    bisacodyl (Dulcolax) suppository 10 mg  10 mg Rectal DAILY Neo Ceja, P.A.   10 mg at 01/03/25 1151    sodium chloride (Ocean) 0.65 % nasal spray 2 Spray  2 Spray Nasal Q2HRS PRN Omar Saleh M.D.   2 Spray at 01/01/25 0600    MD ALERT...DO NOT ADMINISTER NSAIDS or ASPIRIN unless ORDERED By Neurosurgery 1 Each  1 Each Other PRN Karthik Lowery D.O.        bisacodyl (Dulcolax) suppository 10 mg  10 mg Rectal Q24HRS PRN AQUILINO GutiérrezOMimi        ondansetron (Zofran) syringe/vial injection 4 mg  4 mg Intravenous Q4HRS PRN Abdulaziz Nieto M.D.    4 mg at 01/07/25 1842       Fluids    Intake/Output Summary (Last 24 hours) at 1/8/2025 1226  Last data filed at 1/8/2025 1000  Gross per 24 hour   Intake 1894.92 ml   Output 3195 ml   Net -1300.08 ml       Laboratory          Recent Labs     01/06/25 0423 01/06/25 2036 01/07/25 0319 01/07/25  1400 01/07/25 1945 01/08/25  0510   SODIUM 136   < > 139 139 138 138   POTASSIUM 4.0   < > 4.2 4.6 4.5 3.9   CHLORIDE 98   < > 99 97 95* 96   CO2 31   < > 35* 36* 37* 38*   BUN 8   < > 7* 7* 7* 8   CREATININE 0.55   < > 0.42* 0.42* 0.49* 0.42*   MAGNESIUM 2.0  --  2.0  --   --   --    PHOSPHORUS 3.0  --  2.5  --   --   --    CALCIUM 8.4*   < > 8.2* 8.6 8.7 8.5    < > = values in this interval not displayed.     Recent Labs     01/06/25 0423 01/06/25 2036 01/07/25 1400 01/07/25 1945 01/08/25  0510   ALTSGPT 42  --   --   --   --    ASTSGOT 103*  --   --   --   --    ALKPHOSPHAT 95  --   --   --   --    TBILIRUBIN 0.4  --   --   --   --    GLUCOSE 136*   < > 139* 156* 129*    < > = values in this interval not displayed.     Recent Labs     01/06/25 0423 01/07/25 0319   WBC 7.9 7.8   NEUTSPOLYS 74.90* 71.50   LYMPHOCYTES 7.80* 8.40*   MONOCYTES 14.90* 16.80*   EOSINOPHILS 0.90 1.40   BASOPHILS 0.50 0.60   ASTSGOT 103*  --    ALTSGPT 42  --    ALKPHOSPHAT 95  --    TBILIRUBIN 0.4  --      Recent Labs     01/06/25 0423 01/07/25 0319   RBC 3.20* 3.03*   HEMOGLOBIN 9.0* 8.8*   HEMATOCRIT 28.6* 28.2*   PLATELETCT 421 432       Imaging  X-Ray:  I have personally reviewed the images and compared with prior images.    Assessment/Plan  * Acute on chronic respiratory failure with hypoxia and hypercapnia (HCC)  Assessment & Plan  -intubated 1/4  -extubated 1/5  -Rescue BIPAP with HFNC during meals  -s/p bilateral thoracenteses this hospitalization  -Left chest tube removed 1/6.  Right pigtail removed 1/7  -Encourage mobility and incentive spirometry  -RT protocols  -Titrate FiO2 to keep sats greater than 90%  -Diuresis as  "tolerated  -completes course of ABX for suspected PNA today  -DNR, I OK    Pathological fracture of vertebra- (present on admission)  Assessment & Plan  -Pathologic fracture of T5 with myelopathy on presentation  -S/P T4-6 laminectomy with fusion and spinal decompression on 1/1/2025 by Dr. Lowery  -Fall Precautions  -PT OT    Malignant neoplasm of breast (HCC)- (present on admission)  Assessment & Plan  \"Bilateral breast cancer with extensive destruction of the left breast and ulceration and skin lesions making her inoperable on the left.  Right breast shows a 3 x 3 cm mass.  PET scan March 2023 showed non-hypermetabolic pleural effusions which are cytology positive and she has demonstrable pleural masses. Started aromatase inhibition early September 2023.  Has not yet started CDK 4 6 inhibitor.  Lost to follow-up for over 6 months although apparently was on letrozole as single agent.  Now with clear progression of disease in the left breast.  August 2024: Progression of disease in liver with extensive disease.  Breast is worse.  Lung is better.  Lymph nodes are worse.  Status post radiation to symptomatic oozing and exophytic left breast mass.  Marked worsening of her cancer on letrozole with progression in the bone and liver.\"--Dr. Benitez  -ongoing goals of care discussions. Patient is fighting to recover and discharge home so she can start the Letrozole.  -DNR, I OK   -Palliative following for pain management.      Pneumothorax  Assessment & Plan  -Right pneumothorax s/p pigtail catheter placement on 1/3/2025 with significant initial improvement - subsequently developed worsening right pneumothorax and a right closed tube thoracostomy on 1/4/2025 with 20 [place] chest tube with near complete resolution of right pneumothorax  -pneumothorax resolved. Pigtail catheter removed 1/7    Sepsis (HCC)  Assessment & Plan  -Culture blood, sputum and urine, all NGTD  -Sepsis resolved    Restless leg syndrome- (present " on admission)  Assessment & Plan  -Requip     Cancer related pain- (present on admission)  Assessment & Plan  -Palliative Care following for pain management  -Multimodal pain management    Bilateral pleural effusion- (present on admission)  Assessment & Plan  -Malignant pleural effusions from metastatic breast cancer s/p thoracenteses and chest tubes  -Diuresis as tolerated  -monitor chest xrays    Primary hypertension- (present on admission)  Assessment & Plan  -Now off pressors.  -resume home Losartan but at 1/2 dosing. Increase as tolerated  -PRN's available    Diabetes mellitus, type 2 (HCC)- (present on admission)  Assessment & Plan  -A1c 6.4  -accuchecks with SSI  -CHO diet  -glucose goal 140-180         VTE:  Lovenox  Ulcer: Not Indicated  Lines: Mojica Catheter  Ongoing indication addressed    I have performed a physical exam and reviewed and updated ROS and Plan today (1/8/2025). In review of yesterday's note (1/7/2025), there are no changes except as documented above.     Discussed patient condition and risk of morbidity and/or mortality with RN, RT, Pharmacy, , Patient, and my attending Dr. Preston.  The patient remains critically ill.  Critical care time = 50 minutes in directly providing and coordinating critical care and extensive data review.  No time overlap and excludes procedures.    Please note that this dictation was created using voice recognition software. I have made every reasonable attempt to correct obvious errors, but there may be errors of grammar and possibly content that I did not discover before finalizing the note.    YESIKA Kwon.

## 2025-01-08 NOTE — PROGRESS NOTES
In Patient Palliative Care  Chart check only for management of pain s/s. Pain appears adequately managed per current documentation. Clarified LBM with  nursing. PC to continue to follow.      Thank you,   Erlinda Anders, MSN, APRN, ACNPC-AG

## 2025-01-08 NOTE — CARE PLAN
Problem: Ventilation  Goal: Ability to achieve and maintain unassisted ventilation or tolerate decreased levels of ventilator support  Description: Target End Date:  4 days     Document on Vent flowsheet    1.  Support and monitor invasive and noninvasive mechanical ventilation  2.  Monitor ventilator weaning response  3.  Perform ventilator associated pneumonia prevention interventions  4.  Manage ventilation therapy by monitoring diagnostic test results  Outcome: Not Met     PT continues to be on/off Bipap, but stable

## 2025-01-08 NOTE — PROGRESS NOTES
Palliative Care   Appreciate VOLTE from patient's nurse regarding pain.  Vital signs stable.  Warfarin is ineffective for patient's pain.  Increase scheduled Norco to every 4 hours (hold for sedation or SBP lower than 90).  Discontinue IV morphine 2 mg every 2 hours as needed.  Reinitiate IV fentanyl 25 mcg and equal analgesic dose every 3 hours as needed severe pain unrelieved by Norco or NPO.    Petra Agustin A.P.R.N.  Palliative Care Nurse Practitioner  [phone]

## 2025-01-08 NOTE — CARE PLAN
The patient is Watcher - Medium risk of patient condition declining or worsening    Shift Goals  Clinical Goals: stable vitals & oxygenation, pain managment, goals of care  Patient Goals: pain control  Family Goals: maria ines    Progress made toward(s) clinical / shift goals:  remain on bipap    Patient is not progressing towards the following goals:      Problem: Pain - Standard  Goal: Alleviation of pain or a reduction in pain to the patient’s comfort goal  Outcome: Progressing     Problem: Hemodynamics  Goal: Patient's hemodynamics, fluid balance and neurologic status will be stable or improve  Outcome: Progressing

## 2025-01-08 NOTE — CARE PLAN
Problem: Ventilation  Goal: Ability to achieve and maintain unassisted ventilation or tolerate decreased levels of ventilator support  Description: Target End Date:  4 days     Document on Vent flowsheet    1.  Support and monitor invasive and noninvasive mechanical ventilation  2.  Monitor ventilator weaning response  3.  Perform ventilator associated pneumonia prevention interventions  4.  Manage ventilation therapy by monitoring diagnostic test results  Outcome: Progressing     Bipap 12/6, 50%  Back rate 16

## 2025-01-09 ENCOUNTER — APPOINTMENT (OUTPATIENT)
Dept: RADIOLOGY | Facility: MEDICAL CENTER | Age: 65
End: 2025-01-09
Attending: EMERGENCY MEDICINE
Payer: MEDICARE

## 2025-01-09 PROBLEM — J93.9 PNEUMOTHORAX: Status: RESOLVED | Noted: 2025-01-03 | Resolved: 2025-01-09

## 2025-01-09 PROBLEM — A41.9 SEPSIS (HCC): Status: RESOLVED | Noted: 2025-01-04 | Resolved: 2025-01-09

## 2025-01-09 LAB
ANION GAP SERPL CALC-SCNC: 10 MMOL/L (ref 7–16)
ANION GAP SERPL CALC-SCNC: 10 MMOL/L (ref 7–16)
APPEARANCE FLD: NORMAL
ARTERIAL PATENCY WRIST A: ABNORMAL
BACTERIA BLD CULT: NORMAL
BACTERIA BLD CULT: NORMAL
BASE EXCESS BLDA CALC-SCNC: 7 MMOL/L (ref -4–3)
BASOPHILS # BLD AUTO: 0.4 % (ref 0–1.8)
BASOPHILS # BLD: 0.05 K/UL (ref 0–0.12)
BODY FLD TYPE: NORMAL
BODY TEMPERATURE: ABNORMAL DEGREES
BREATHS SETTING VENT: 30
BUN SERPL-MCNC: 11 MG/DL (ref 8–22)
BUN SERPL-MCNC: 12 MG/DL (ref 8–22)
CALCIUM SERPL-MCNC: 8.5 MG/DL (ref 8.5–10.5)
CALCIUM SERPL-MCNC: 8.8 MG/DL (ref 8.5–10.5)
CHLORIDE SERPL-SCNC: 94 MMOL/L (ref 96–112)
CHLORIDE SERPL-SCNC: 95 MMOL/L (ref 96–112)
CO2 BLDA-SCNC: 32 MMOL/L (ref 32–48)
CO2 SERPL-SCNC: 32 MMOL/L (ref 20–33)
CO2 SERPL-SCNC: 33 MMOL/L (ref 20–33)
COLOR FLD: COLORLESS
CREAT SERPL-MCNC: 0.65 MG/DL (ref 0.5–1.4)
CREAT SERPL-MCNC: 0.74 MG/DL (ref 0.5–1.4)
DELSYS IDSYS: ABNORMAL
EKG IMPRESSION: NORMAL
END TIDAL CARBON DIOXIDE IECO2: 27 MMHG
EOSINOPHIL # BLD AUTO: 0.05 K/UL (ref 0–0.51)
EOSINOPHIL NFR BLD: 0.4 % (ref 0–6.9)
ERYTHROCYTE [DISTWIDTH] IN BLOOD BY AUTOMATED COUNT: 54.4 FL (ref 35.9–50)
GFR SERPLBLD CREATININE-BSD FMLA CKD-EPI: 89 ML/MIN/1.73 M 2
GFR SERPLBLD CREATININE-BSD FMLA CKD-EPI: 97 ML/MIN/1.73 M 2
GLUCOSE BLD STRIP.AUTO-MCNC: 100 MG/DL (ref 65–99)
GLUCOSE BLD STRIP.AUTO-MCNC: 120 MG/DL (ref 65–99)
GLUCOSE BLD STRIP.AUTO-MCNC: 125 MG/DL (ref 65–99)
GLUCOSE SERPL-MCNC: 118 MG/DL (ref 65–99)
GLUCOSE SERPL-MCNC: 147 MG/DL (ref 65–99)
GRAM STN SPEC: NORMAL
HCO3 BLDA-SCNC: 30.8 MMOL/L (ref 21–28)
HCT VFR BLD AUTO: 29.1 % (ref 37–47)
HGB BLD-MCNC: 9.1 G/DL (ref 12–16)
HOROWITZ INDEX BLDA+IHG-RTO: 183 MM[HG]
IMM GRANULOCYTES # BLD AUTO: 0.21 K/UL (ref 0–0.11)
IMM GRANULOCYTES NFR BLD AUTO: 1.8 % (ref 0–0.9)
LACTATE BLD-SCNC: 1.3 MMOL/L (ref 0.5–2)
LYMPHOCYTES # BLD AUTO: 1.06 K/UL (ref 1–4.8)
LYMPHOCYTES NFR BLD: 9.1 % (ref 22–41)
LYMPHOCYTES NFR FLD: 15 %
MAGNESIUM SERPL-MCNC: 2.1 MG/DL (ref 1.5–2.5)
MCH RBC QN AUTO: 28.3 PG (ref 27–33)
MCHC RBC AUTO-ENTMCNC: 31.3 G/DL (ref 32.2–35.5)
MCV RBC AUTO: 90.7 FL (ref 81.4–97.8)
MODE IMODE: ABNORMAL
MONOCYTES # BLD AUTO: 1.68 K/UL (ref 0–0.85)
MONOCYTES NFR BLD AUTO: 14.4 % (ref 0–13.4)
MONOS+MACROS NFR FLD MANUAL: 20 %
NEUTROPHILS # BLD AUTO: 8.64 K/UL (ref 1.82–7.42)
NEUTROPHILS NFR BLD: 73.9 % (ref 44–72)
NEUTROPHILS NFR FLD: 65 %
NRBC # BLD AUTO: 0 K/UL
NRBC BLD-RTO: 0 /100 WBC (ref 0–0.2)
O2/TOTAL GAS SETTING VFR VENT: 60 %
PCO2 BLDA: 38.2 MMHG (ref 32–48)
PCO2 TEMP ADJ BLDA: 39.8 MMHG (ref 32–48)
PEEP END EXPIRATORY PRESSURE IPEEP: 8 CMH20
PH BLDA: 7.51 [PH] (ref 7.35–7.45)
PH TEMP ADJ BLDA: 7.5 [PH] (ref 7.35–7.45)
PHOSPHATE SERPL-MCNC: 1.3 MG/DL (ref 2.5–4.5)
PLATELET # BLD AUTO: 599 K/UL (ref 164–446)
PMV BLD AUTO: 8.7 FL (ref 9–12.9)
PO2 BLDA: 110 MMHG (ref 83–108)
PO2 TEMP ADJ BLDA: 116 MMHG (ref 64–87)
POTASSIUM SERPL-SCNC: 3.9 MMOL/L (ref 3.6–5.5)
POTASSIUM SERPL-SCNC: 4.8 MMOL/L (ref 3.6–5.5)
RBC # BLD AUTO: 3.21 M/UL (ref 4.2–5.4)
SAO2 % BLDA: 99 % (ref 93–99)
SIGNIFICANT IND 70042: NORMAL
SITE SITE: NORMAL
SODIUM SERPL-SCNC: 137 MMOL/L (ref 135–145)
SODIUM SERPL-SCNC: 137 MMOL/L (ref 135–145)
SOURCE SOURCE: NORMAL
SPECIMEN DRAWN FROM PATIENT: ABNORMAL
TIDAL VOLUME IVT: 400 ML
WBC # BLD AUTO: 11.7 K/UL (ref 4.8–10.8)

## 2025-01-09 PROCEDURE — 700105 HCHG RX REV CODE 258: Performed by: EMERGENCY MEDICINE

## 2025-01-09 PROCEDURE — 700102 HCHG RX REV CODE 250 W/ 637 OVERRIDE(OP): Performed by: EMERGENCY MEDICINE

## 2025-01-09 PROCEDURE — 94003 VENT MGMT INPAT SUBQ DAY: CPT

## 2025-01-09 PROCEDURE — 94799 UNLISTED PULMONARY SVC/PX: CPT

## 2025-01-09 PROCEDURE — 700102 HCHG RX REV CODE 250 W/ 637 OVERRIDE(OP): Performed by: NURSE PRACTITIONER

## 2025-01-09 PROCEDURE — 700101 HCHG RX REV CODE 250: Performed by: INTERNAL MEDICINE

## 2025-01-09 PROCEDURE — 31624 DX BRONCHOSCOPE/LAVAGE: CPT | Performed by: STUDENT IN AN ORGANIZED HEALTH CARE EDUCATION/TRAINING PROGRAM

## 2025-01-09 PROCEDURE — 700101 HCHG RX REV CODE 250: Performed by: NURSE PRACTITIONER

## 2025-01-09 PROCEDURE — 700111 HCHG RX REV CODE 636 W/ 250 OVERRIDE (IP): Performed by: EMERGENCY MEDICINE

## 2025-01-09 PROCEDURE — 84100 ASSAY OF PHOSPHORUS: CPT

## 2025-01-09 PROCEDURE — 80048 BASIC METABOLIC PNL TOTAL CA: CPT

## 2025-01-09 PROCEDURE — 87205 SMEAR GRAM STAIN: CPT

## 2025-01-09 PROCEDURE — 700111 HCHG RX REV CODE 636 W/ 250 OVERRIDE (IP): Performed by: NURSE PRACTITIONER

## 2025-01-09 PROCEDURE — 36600 WITHDRAWAL OF ARTERIAL BLOOD: CPT

## 2025-01-09 PROCEDURE — A9270 NON-COVERED ITEM OR SERVICE: HCPCS | Performed by: NURSE PRACTITIONER

## 2025-01-09 PROCEDURE — 87070 CULTURE OTHR SPECIMN AEROBIC: CPT

## 2025-01-09 PROCEDURE — 700101 HCHG RX REV CODE 250: Performed by: EMERGENCY MEDICINE

## 2025-01-09 PROCEDURE — 700111 HCHG RX REV CODE 636 W/ 250 OVERRIDE (IP): Mod: JZ | Performed by: STUDENT IN AN ORGANIZED HEALTH CARE EDUCATION/TRAINING PROGRAM

## 2025-01-09 PROCEDURE — 82803 BLOOD GASES ANY COMBINATION: CPT

## 2025-01-09 PROCEDURE — 85025 COMPLETE CBC W/AUTO DIFF WBC: CPT

## 2025-01-09 PROCEDURE — 700105 HCHG RX REV CODE 258: Performed by: STUDENT IN AN ORGANIZED HEALTH CARE EDUCATION/TRAINING PROGRAM

## 2025-01-09 PROCEDURE — 93010 ELECTROCARDIOGRAM REPORT: CPT | Performed by: INTERNAL MEDICINE

## 2025-01-09 PROCEDURE — 99291 CRITICAL CARE FIRST HOUR: CPT | Mod: 25 | Performed by: NURSE PRACTITIONER

## 2025-01-09 PROCEDURE — 71045 X-RAY EXAM CHEST 1 VIEW: CPT

## 2025-01-09 PROCEDURE — 82962 GLUCOSE BLOOD TEST: CPT | Mod: 91

## 2025-01-09 PROCEDURE — 89240 UNLISTED MISC PATH TEST: CPT

## 2025-01-09 PROCEDURE — 302978 HCHG BRONCHOSCOPY-DIAGNOSTIC

## 2025-01-09 PROCEDURE — 83605 ASSAY OF LACTIC ACID: CPT

## 2025-01-09 PROCEDURE — 94640 AIRWAY INHALATION TREATMENT: CPT

## 2025-01-09 PROCEDURE — A9270 NON-COVERED ITEM OR SERVICE: HCPCS | Performed by: EMERGENCY MEDICINE

## 2025-01-09 PROCEDURE — 700105 HCHG RX REV CODE 258: Performed by: NURSE PRACTITIONER

## 2025-01-09 PROCEDURE — 770022 HCHG ROOM/CARE - ICU (200)

## 2025-01-09 PROCEDURE — 83735 ASSAY OF MAGNESIUM: CPT

## 2025-01-09 PROCEDURE — 302136 NUTRITION PUMP: Performed by: STUDENT IN AN ORGANIZED HEALTH CARE EDUCATION/TRAINING PROGRAM

## 2025-01-09 RX ORDER — ACETAZOLAMIDE 500 MG/5ML
500 INJECTION, POWDER, LYOPHILIZED, FOR SOLUTION INTRAVENOUS ONCE
Status: COMPLETED | OUTPATIENT
Start: 2025-01-09 | End: 2025-01-09

## 2025-01-09 RX ORDER — FUROSEMIDE 10 MG/ML
40 INJECTION INTRAMUSCULAR; INTRAVENOUS ONCE
Status: COMPLETED | OUTPATIENT
Start: 2025-01-09 | End: 2025-01-09

## 2025-01-09 RX ORDER — POTASSIUM CHLORIDE 14.9 MG/ML
20 INJECTION INTRAVENOUS ONCE
Status: COMPLETED | OUTPATIENT
Start: 2025-01-09 | End: 2025-01-09

## 2025-01-09 RX ADMIN — ROPINIROLE HYDROCHLORIDE 1 MG: 1 TABLET, FILM COATED ORAL at 20:33

## 2025-01-09 RX ADMIN — SENNOSIDES AND DOCUSATE SODIUM 2 TABLET: 50; 8.6 TABLET ORAL at 18:18

## 2025-01-09 RX ADMIN — ROPINIROLE HYDROCHLORIDE 1 MG: 1 TABLET, FILM COATED ORAL at 02:32

## 2025-01-09 RX ADMIN — FAMOTIDINE 20 MG: 20 TABLET, FILM COATED ORAL at 05:05

## 2025-01-09 RX ADMIN — METHOCARBAMOL TABLETS 750 MG: 750 TABLET, COATED ORAL at 08:08

## 2025-01-09 RX ADMIN — DIBASIC SODIUM PHOSPHATE, MONOBASIC POTASSIUM PHOSPHATE AND MONOBASIC SODIUM PHOSPHATE 500 MG: 852; 155; 130 TABLET ORAL at 08:08

## 2025-01-09 RX ADMIN — PROPOFOL 35 MCG/KG/MIN: 10 INJECTION, EMULSION INTRAVENOUS at 22:20

## 2025-01-09 RX ADMIN — FENTANYL CITRATE 100 MCG: 50 INJECTION, SOLUTION INTRAMUSCULAR; INTRAVENOUS at 01:47

## 2025-01-09 RX ADMIN — MAGNESIUM HYDROXIDE 30 ML: 1200 LIQUID ORAL at 18:19

## 2025-01-09 RX ADMIN — NOREPINEPHRINE BITARTRATE 0.09 MCG/KG/MIN: 1 SOLUTION INTRAVENOUS at 14:57

## 2025-01-09 RX ADMIN — AMPICILLIN AND SULBACTAM 3 G: 1; 2 INJECTION, POWDER, FOR SOLUTION INTRAMUSCULAR; INTRAVENOUS at 05:12

## 2025-01-09 RX ADMIN — HYDROCODONE BITARTRATE AND ACETAMINOPHEN 1 TABLET: 10; 325 TABLET ORAL at 18:18

## 2025-01-09 RX ADMIN — METOCLOPRAMIDE 10 MG: 10 TABLET ORAL at 12:24

## 2025-01-09 RX ADMIN — POTASSIUM CHLORIDE 20 MEQ: 14.9 INJECTION, SOLUTION INTRAVENOUS at 19:55

## 2025-01-09 RX ADMIN — AMPICILLIN AND SULBACTAM 3 G: 1; 2 INJECTION, POWDER, FOR SOLUTION INTRAMUSCULAR; INTRAVENOUS at 00:31

## 2025-01-09 RX ADMIN — ACETAZOLAMIDE 500 MG: 500 INJECTION, POWDER, LYOPHILIZED, FOR SOLUTION INTRAVENOUS at 16:40

## 2025-01-09 RX ADMIN — FENTANYL CITRATE 100 MCG: 50 INJECTION, SOLUTION INTRAMUSCULAR; INTRAVENOUS at 02:23

## 2025-01-09 RX ADMIN — FUROSEMIDE 40 MG: 10 INJECTION INTRAMUSCULAR; INTRAVENOUS at 14:39

## 2025-01-09 RX ADMIN — LIDOCAINE 2 PATCH: 4 PATCH TOPICAL at 10:03

## 2025-01-09 RX ADMIN — IPRATROPIUM BROMIDE AND ALBUTEROL SULFATE 3 ML: .5; 2.5 SOLUTION RESPIRATORY (INHALATION) at 00:32

## 2025-01-09 RX ADMIN — DIBASIC SODIUM PHOSPHATE, MONOBASIC POTASSIUM PHOSPHATE AND MONOBASIC SODIUM PHOSPHATE 500 MG: 852; 155; 130 TABLET ORAL at 12:24

## 2025-01-09 RX ADMIN — HYDROCODONE BITARTRATE AND ACETAMINOPHEN 1 TABLET: 10; 325 TABLET ORAL at 22:12

## 2025-01-09 RX ADMIN — METHOCARBAMOL TABLETS 750 MG: 750 TABLET, COATED ORAL at 15:00

## 2025-01-09 RX ADMIN — POLYETHYLENE GLYCOL 3350 1 PACKET: 17 POWDER, FOR SOLUTION ORAL at 05:05

## 2025-01-09 RX ADMIN — Medication 400 MG: at 05:05

## 2025-01-09 RX ADMIN — PROPOFOL 25 MCG/KG/MIN: 10 INJECTION, EMULSION INTRAVENOUS at 05:18

## 2025-01-09 RX ADMIN — HYDROCODONE BITARTRATE AND ACETAMINOPHEN 1 TABLET: 10; 325 TABLET ORAL at 01:48

## 2025-01-09 RX ADMIN — METOCLOPRAMIDE 10 MG: 10 TABLET ORAL at 00:30

## 2025-01-09 RX ADMIN — HYDROCODONE BITARTRATE AND ACETAMINOPHEN 1 TABLET: 10; 325 TABLET ORAL at 14:39

## 2025-01-09 RX ADMIN — FENTANYL CITRATE 150 MCG: 50 INJECTION, SOLUTION INTRAMUSCULAR; INTRAVENOUS at 12:35

## 2025-01-09 RX ADMIN — FENTANYL CITRATE 100 MCG: 50 INJECTION, SOLUTION INTRAMUSCULAR; INTRAVENOUS at 19:43

## 2025-01-09 RX ADMIN — METOCLOPRAMIDE 10 MG: 10 TABLET ORAL at 23:56

## 2025-01-09 RX ADMIN — FENTANYL CITRATE 100 MCG: 50 INJECTION, SOLUTION INTRAMUSCULAR; INTRAVENOUS at 10:06

## 2025-01-09 RX ADMIN — HYDROCODONE BITARTRATE AND ACETAMINOPHEN 1 TABLET: 10; 325 TABLET ORAL at 10:03

## 2025-01-09 RX ADMIN — METOCLOPRAMIDE 10 MG: 10 TABLET ORAL at 05:05

## 2025-01-09 RX ADMIN — METHOCARBAMOL TABLETS 750 MG: 750 TABLET, COATED ORAL at 00:56

## 2025-01-09 RX ADMIN — LIDOCAINE HYDROCHLORIDE 2 ML: 10 INJECTION, SOLUTION INFILTRATION; PERINEURAL at 00:23

## 2025-01-09 RX ADMIN — POTASSIUM PHOSPHATE, MONOBASIC AND POTASSIUM PHOSPHATE, DIBASIC 30 MMOL: 224; 236 INJECTION, SOLUTION, CONCENTRATE INTRAVENOUS at 08:21

## 2025-01-09 RX ADMIN — METOCLOPRAMIDE 10 MG: 10 TABLET ORAL at 18:19

## 2025-01-09 RX ADMIN — HYDROCODONE BITARTRATE AND ACETAMINOPHEN 1 TABLET: 10; 325 TABLET ORAL at 05:05

## 2025-01-09 RX ADMIN — PROPOFOL 25 MCG/KG/MIN: 10 INJECTION, EMULSION INTRAVENOUS at 14:54

## 2025-01-09 RX ADMIN — FENTANYL CITRATE 100 MCG: 50 INJECTION, SOLUTION INTRAMUSCULAR; INTRAVENOUS at 23:55

## 2025-01-09 RX ADMIN — SENNOSIDES AND DOCUSATE SODIUM 2 TABLET: 50; 8.6 TABLET ORAL at 05:05

## 2025-01-09 RX ADMIN — FENTANYL CITRATE 100 MCG: 50 INJECTION, SOLUTION INTRAMUSCULAR; INTRAVENOUS at 04:25

## 2025-01-09 RX ADMIN — FAMOTIDINE 20 MG: 20 TABLET, FILM COATED ORAL at 18:19

## 2025-01-09 RX ADMIN — POTASSIUM PHOSPHATE, MONOBASIC AND POTASSIUM PHOSPHATE, DIBASIC 15 MMOL: 224; 236 INJECTION, SOLUTION, CONCENTRATE INTRAVENOUS at 14:41

## 2025-01-09 RX ADMIN — FENTANYL CITRATE 100 MCG: 50 INJECTION, SOLUTION INTRAMUSCULAR; INTRAVENOUS at 15:02

## 2025-01-09 ASSESSMENT — PAIN DESCRIPTION - PAIN TYPE
TYPE: ACUTE PAIN
TYPE: ACUTE PAIN
TYPE: CHRONIC PAIN
TYPE: ACUTE PAIN
TYPE: ACUTE PAIN
TYPE: CHRONIC PAIN
TYPE: ACUTE PAIN
TYPE: ACUTE PAIN
TYPE: ACUTE PAIN;CHRONIC PAIN
TYPE: ACUTE PAIN
TYPE: ACUTE PAIN
TYPE: CHRONIC PAIN
TYPE: ACUTE PAIN
TYPE: CHRONIC PAIN
TYPE: ACUTE PAIN
TYPE: ACUTE PAIN;CHRONIC PAIN
TYPE: CHRONIC PAIN
TYPE: ACUTE PAIN
TYPE: CHRONIC PAIN

## 2025-01-09 ASSESSMENT — FIBROSIS 4 INDEX: FIB4 SCORE: 1.73

## 2025-01-09 NOTE — CARE PLAN
The patient is Watcher - Medium risk of patient condition declining or worsening    Shift Goals  Clinical Goals: Pain control, Maintain O2 sats >90  Patient Goals: MIMI  Family Goals: Updates, Patient comfort    Progress made toward(s) clinical / shift goals:    Problem: Fall Risk  Goal: Patient will remain free from falls  Outcome: Progressing    Problem: Safety - Medical Restraint  Goal: Remains free of injury from restraints (Restraint for Interference with Medical Device)  Outcome: Progressing      Patient not progressing toward:   Problem: Pain - Standard  Goal: Alleviation of pain or a reduction in pain to the patient’s comfort goal  Outcome: Progressing     -->PRN Fent pushes, Muscle relaxer, and additional medication given for restless leg. Patient still restless.

## 2025-01-09 NOTE — PROGRESS NOTES
Palliative Care    Room: R113    HPI:   Asha Hannon is a 64 y.o. female with medical history significant for breast cancer with metastasis to brain, lungs, liver, and spine followed by Dr. Tabor admitted for hypoxemic respiratory failure 12/25 due to chronically recurrent pleural effusions. CT imaging obtained noting extensive metastatic disease including new T 5 vertebral compression fracture. Neurosurgery consulted; MRI of brain and C/T/L spine with anesthesia obtained 12/31 revealing multiple nodular enhancing metastatic lesions. Patient underwent T4-T6 laminectomy for decompression of spinal cord due to epidural metastases resulting in cord compression.  Hospital course further complicated by ongoing pleural effusions requiring thoracentesis with subsequent postprocedure pneumothorax requiring chest tubes and acute on chronic respiratory failure requiring intubation 1/4-1/5 with subsequent hypotension. Re intubated 1/8; now on propofol and norepinephrine drips.     REVIEW OF SYSTEMS:  Unable to perform due to acuity of condition; sedated on ventilator.       Physical Exam  Vitals and nursing note reviewed.   Constitutional:       Interventions: She is sedated and intubated.   Cardiovascular:      Comments: Soft BPs on monitor  Pulmonary:      Effort: She is intubated.      Comments: CT x 2 right side  Skin:     Coloration: Skin is pale.      Findings: Bruising present.       Discussion/Plan   #Metastatic breast cancer with extensive metastatic disease  #Acute on chronic respiratory failure  #Pneumothorax  #Pathologic fracture T5   S/p T4-5 laminectomy 1/1/25  #Cancer associated pain  Scheduled NORCO (home med)  Fentanyl IV PRN available   #Hypotension  Resolved  #Restless leg syndrome  Continue Requip 1 mg PO nightly  #Septic shock  #DM2  #Advance Care Planning  1/6  Met with patient bedside and [place] back regarding healthcare directives and goals of care.  Patient expressed she required  short-term ventilator support and is unsure if she would want to do it again.  We discussed option of DNR/DNI.  Revisited advance directive and POLST form.  Provided education on documents.  At this time patient's goals of care remain to recover from back surgery and pneumothorax and undergo rehabilitative care to get stronger to do continue to pursue cancer treatment.  She confirms she would need to talk with her daughter regarding healthcare directives and CODE STATUS.  At this time her daughter entered the room.  Reintroduced myself to patient's daughter.  Discussed content of our visit.  She confirmed her mom has been expressing she may not want to undergo intubation again if needed.  Confirmed palliative care can continue to support goals of care discussions and assist with advance care planning documents for future care.  Provided education on forms to patient's daughter.  They would like to discuss and will reach out if they have questions or needs.  Confirmed I will check back in at a later date.  They denied having questions or needs at this time.      DNAR, I OK per ICU team this morning. ACP not discussed by palliative today; supportive visit. Pain medications adjusted.       Patient intubated yesterday and unable to participate in discussion.  No family at bedside.  Spoke with ICU team.  Phone call to patient's daughter.  Juana confirms she is not doing well as her father  2 days ago under the care of hospice at her brother's home in [place].  She stated she has not had time to grieve and also deal with the stress of her mother being sick in ICU.  Juana confirms she is trying to understand patient's wishes.  Confirmed unfortunately patient is no longer able to communicate wishes at this time due to acuity of her condition.  Juana confirmed patient's wishes for DNAR further discussion several days ago.  At this point she would like to take things day by day to see how patient progresses.   Discussed my concern that patient is at high risk for decompensation and would recommend considering against escalation of care.  Encouraged her to reach out with any needs.  Encouraged her to care for herself during this stressful time which would include the basics of eating, hydrating, and resting/sleeping.  She confirms she has been sleeping at the hospital to be close to her mom and likely will do the same tonight.  Encouraged her to reach out to care team with any questions or needs.  Updated bedside nurse Dr. Kary Colin, and Jessica Campos, CHERIE.     16 minutes spent discussing advanced care planning.    Interval diagnostic studies and medical documentation entries pertinent to this case were reviewed independently by me. This patient has at least one acute or chronic illness or injury that poses a threat to life or bodily function. This patient suffers from a high risk of morbidity from additional invasive diagnostic testing or intensive treatment. Discussion of recommendations and coordination of care undertaken with primary provider/treatment team.      YESIKA Rae.  Palliative Care Nurse Practitioner  [phone]

## 2025-01-09 NOTE — DISCHARGE PLANNING
Case Management Discharge Planning    Admission Date: 12/25/2024  GMLOS: 4.8  ALOS: 15    6-Clicks ADL Score: 11  6-Clicks Mobility Score: 9  PT and/or OT Eval ordered: Yes  Post-acute Referrals Ordered: Yes, PMR  Post-acute Choice Obtained: No  Has referral(s) been sent to post-acute provider:  No    Anticipated Discharge Dispo: Discharge Disposition: D/T to SNF with Medicare cert in anticipation of skilled care (03)    DME Needed: No    Action(s) Taken:   ALEC RN reviewed chart and pt was discussed during IDT rounds. Pt was re-intubated 1/8 and had a bronchoscopy today. Palliative following for pain management, per MD.     Escalations Completed: None    Medically Clear: No    Next Steps:  ALEC RN to follow up with medical team to discuss discharge barriers or needs.     Barriers to Discharge: Medical clearance

## 2025-01-09 NOTE — CARE PLAN
The patient is Watcher - Medium risk of patient condition declining or worsening    Shift Goals  Clinical Goals: Maintain 02, wean Bipap as rosa.  Patient Goals: Comfort  Family Goals: MIMI    Progress made toward(s) clinical / shift goals:  VS WNL  Problem: Hemodynamics  Goal: Patient's hemodynamics, fluid balance and neurologic status will be stable or improve  Outcome: Progressing       Patient is not progressing towards the following goals:  Remains on Bipap alt with HFNC.  Scheduled pain meds      Problem: Pain - Standard  Goal: Alleviation of pain or a reduction in pain to the patient’s comfort goal  Outcome: Not Progressing     Problem: Respiratory  Goal: Patient will achieve/maintain optimum respiratory ventilation and gas exchange  Outcome: Not Progressing

## 2025-01-09 NOTE — DIETARY
"Nutrition Services: Initial Assessment     Day 15 of admit. Asha Hannon is 64 y.o., female with admitting DX of Breast cancer metastasized to bone, unspecified laterality     Consult Received for: Enteral Nutrition.    Current Hospital Problems List:  Acute on chronic respiratory failure with hypoxia and hypercapnia   Diabetes mellitus, type 2   Primary hypertension   Malignant neoplasm of breast   Cancer related pain  Pathological fracture of vertebra   Restless leg syndrome      Nutrition Assessment:      Height: 172.7 cm (5' 7.99\")  Weight: 97.1 kg (214 lb 1.1 oz)  Weight taken via: Bed Scale  BMI Calculated: 32.56  BMI Classification: Obesity Class 1      Wt Readings from Last 5 Encounters:   25 97.1 kg (214 lb 1.1 oz)   24 106 kg (234 lb)   10/30/24 106 kg (232 lb 14.7 oz)   10/28/24 105 kg (232 lb)   10/16/24 105 kg (231 lb)     Calculation Equation: MSJ  x 1.0 = 2070 kcals/day (65-70% = 1346 - 1449 kcals/day); RMR per PSU  (Vent L/min: 11.9, Tmax/24 hrs: 38.8) = 2070 kcals/day (65-70% = 1346 - 1449 kcals/day  Total Calories / day: 1068 - 1359 Calories / k - 14   Total Grams Protein / day: 123+ Grams Protein / kg IBW: 2.0+    Objective:   Admitted  with shortness of breath, extremity weakness, and abdominal pain.  Pt initially admitted to the Cancer Nursing Unit, she transferred to the ICU .  Closed tube thoracostomy for right pneumothorax .  Bronchoscopy today.  Pertinent Medical Hx: Metastatic breast cancer with mets to lungs (currently receiving oral chemo), Anxiety, Depression, Diabetes, HLD (hyperlipidemia), HTN (hypertension), and Hypothyroidism.  Diet advanced to Consistent CHO (Diabetic) , modified to consistent CHO with vegetarian modification . PO per flow sheet was <% of meals.  Pt was intubated yesterday evening and subsequently made NPO.  Indication for Nutrition Support: Intubation  Enteral Access: placed and verified " (gastric)  Palliative following.   Pertinent Labs: Glucose 118, Phos 1.3  Pertinent Meds: Pepcid, SSI, Mg Ox, Reglan, KPhos, Pericolace; Levo @ 0.09 mcg/kg/min  Propofol currently running @ 25 mcg/kg/min (9.6 mL/hr) = 253 kcals/day; RD will monitor propofol rate and adjust TF as needed.  Skin/Wounds:  incison to back, wound to lower, right, lateral back; wound team last saw pt 12/26  Food Allergies: none noted  Last BM:  Not observed  01/06/25 (Per report.)   Hypo-caloric tube feeding indicated per SCCM guidelines for intubated pt with BMI >30. Will feed pt closer to the REE to meet estimated protein needs.   Formula based on RD Eval: Peptamen Intense VHP    Current Diet Order/Intake:   NPO      Subjective:   Patient reported UBW: 223 lbs  Dietary Recall/Energy Intake: 50%. This indicates Sufficient energy intake.     Nutrition Focused Physical Exam (NFPE) - information taken from RD visit at bedside 12/30:  Weight Loss: 9.6% BW loss in < 6 months. RD Suspects this change related to inadequate oral intake in the setting of s/s associated with cancer dx  Muscle Mass: Deferred due to pt request   Subcutaneous Fat: Deferred due to pt request  Fluid Accumulation: generalized 1+ edema to bilateral lower extremities  Reduced  Strength: N/A in acute care setting.    Nutrition Diagnosis:      Inadequate Oral Intake related to intubation status as evidenced by need for nutrition support.      Patient does not meet criteria in congruence with ASPEN/Academy guidelines for malnutrition    Nutrition Interventions:      Initiate Peptamen Intense VHP at 25 ml/hr continuous and advance per protocol to goal rate of 55 ml/hr (goal rate on and off propofol)  Goal tube feed volume provides 1320 kcals (+kcals from propofol), 123 g protein, and 1109 ml free water daily.   Patient aware of active plan of care as appropriate.     Nutrition Monitoring and Evaluation:      Monitor nutrition POC.   Additional fluids per MD/DO  Monitor  vital signs pertinent to nutrition.    RD following and will provide updated recommendations as indicated.    Edna Frank R.D.                                       ASPEN/AND CRITERIA FOR MALNUTRITION

## 2025-01-09 NOTE — PROGRESS NOTES
Pt increase work of breathing and oxygen demand. Pt placed on bipap without improvement. Md notified and patient prepared for emergent intubation.  Pt medicated in RSI fashion. Airway was secured. Pt vss unstable and medicated with phenylephrine and epinephrine and a norepinephrine drip was started and rapidly titrated to gain a map >65. MD updated daughter who was outside of patient's room during resuscitation.  At this time pt vss with support of vent and vaso pressor. Family at bedside.

## 2025-01-09 NOTE — PROCEDURES
Intubation    Date/Time: 1/8/2025 9:34 PM    Performed by: Juan Daniel Baca M.D.  Authorized by: Juan Daniel Baca M.D.    Consent:     Consent obtained:  Emergent situation    Consent given by: Daughter.    Risks discussed:  Aspiration, brain injury, hypoxia and death    Alternatives discussed:  No treatment  Pre-procedure details:     Patient status:  Altered mental status    Mallampati score:  I    Pretreatment medications:  None    Paralytics:  Rocuronium  Procedure details:     Preoxygenation: HFNO and BVM.    CPR in progress: no      Intubation method:  Oral    Oral intubation technique:  Video-assisted    Laryngoscope type:  GlideScope    Laryngoscope size: S3 HAVL.    Cormack-Lehane Classification:  Grade 1    Tube size (mm):  7.5    Tube type:  Cuffed    Number of attempts:  1    Ventilation between attempts: no      Cricoid pressure: no      Tube visualized through cords: yes    Placement assessment:     Tube secured with:  ETT lundberg    Placement verification: CXR verification, direct visualization and ETCO2 detector      CXR findings:  ETT in proper place  Post-procedure details:     Patient tolerance of procedure:  Tolerated well, no immediate complications  Comments:      Setting:Acute decompensation, critically ill in ICU with hypoxemia and hyerpcapnea   Device: GVL, S3 HAVL  View: G1V  Induction: 20 etomidate, 100 enmanuel  Hemodynamics: Markedly hypotensive following induction of positive pressure, requiring push dose pressors, initially 200 of phenylephrine, then continued to be hypotensive and got 30 mcg of epinephrine with improvement of her hemodynamics.  She was placed on norepinephrine infusion  Attempts: 1  Comments: Easy airway, hemodynamically tenuous following induction and initiation of PPV requiring ongoing resuscitation and ventilator management

## 2025-01-09 NOTE — PROGRESS NOTES
[facility] Progress Note:      I was called to bedside for emergent deterioration with hypoxemia and obtundation.  Her blood gas was very concerning for markedly elevated pCO2 and respiratory acidosis.  She was also having significant hypoxemia into the 70s and was minimally responsive.  She did end up receiving some BVM for a moment.  It was evident the patient needed emergent intubation.    She has a fairly complex history, with metastatic breast cancer on oral chemotherapy and has previously requested to remain DNR.  She has gone through a lot during this hospitalization including thoracostomy for malignant effusion, bilateral thoracenteses, hypoxemic and hypercapnic respiratory failure with need for fairly continuous NIV.    I discussed this with the daughter, her prognosis is fairly poor just given her underlying aggressive and progressive disease and having had this discussion before regarding goals of care and end-of-life planning, and some concerns about ongoing decisions regarding invasive care.    At this time I did discuss that she is can need to be mechanically ventilated in order to survive this as she has reached a level of respiratory failure that is not manageable by noninvasive strategies, the patient daughter requested that we proceed with intubation.  I also discussed that she likely is going to need another chest tube.      Following RSI the patient became extraordinarily unstable, with intermittent hypotension requiring push dose pressors, this happened a couple of times, her cardiac ultrasound was notable for May preserved biventricular function and reasonable right atrial pressure, she did receive a fluid bolus and the initiation of norepinephrine using.    Additionally she initially had very high peak pressures, with elevated plateau pressures and after period of time adjusting the ventilator and respiratory rate and giving her some time to recruit her mechanics improved  significantly.    Additionally her lung ultrasound shows basically reaccumulation of the right-sided pleural effusion with atelectasis and collapse of the right lower lobe, and also a small to moderate size left pleural effusion.    After period time the patient's hemodynamics improved significantly, and she was placed on Beverley sedation and some low-dose norepinephrine for continued management on mechanical ventilation.  We were able to improve her blood gas significantly.      Patient is critically ill.     There has been no overlap in critical care time.   Critical Care Time not including procedures: 60 minutes in addition to that billed for earlier today

## 2025-01-09 NOTE — CARE PLAN
Problem: Ventilation  Goal: Ability to achieve and maintain unassisted ventilation or tolerate decreased levels of ventilator support  Description: Target End Date:  4 days     Document on Vent flowsheet    1.  Support and monitor invasive and noninvasive mechanical ventilation  2.  Monitor ventilator weaning response  3.  Perform ventilator associated pneumonia prevention interventions  4.  Manage ventilation therapy by monitoring diagnostic test results  1/9/2025 0523 by Marcello Schwartz, RRT  Outcome: Not Met     Ventilator Daily Summary    Vent Day # 2    Airway: 7.5 @ 24    Ventilator settings: R 30 380 +8 40%    Weaning trials: No    Respiratory Procedures: None    Plan: Continue current ventilator settings and wean mechanical ventilation as tolerated per physician orders.   Normal for race Paramedian Forehead Flap Text: A decision was made to reconstruct the defect utilizing an interpolation axial flap and a staged reconstruction.  A telfa template was made of the defect.  This telfa template was then used to outline the paramedian forehead pedicle flap.  The donor area for the pedicle flap was then injected with anesthesia.  The flap was excised through the skin and subcutaneous tissue down to the layer of the underlying musculature.  The pedicle flap was carefully excised within this deep plane to maintain its blood supply.  The edges of the donor site were undermined.   The donor site was closed in a primary fashion.  The pedicle was then rotated into position and sutured.  Once the tube was sutured into place, adequate blood supply was confirmed with blanching and refill.  The pedicle was then wrapped with xeroform gauze and dressed appropriately with a telfa and gauze bandage to ensure continued blood supply and protect the attached pedicle.

## 2025-01-09 NOTE — THERAPY
Speech Language Therapy Contact Note    Patient Name: Asha Hannon  Age:  64 y.o., Sex:  female  Medical Record #: [id number]  Today's Date: 1/9/2025 01/09/25 0761   Treatment Variance   Reason For Missed Therapy Medical - Patient on Hold from Therapy;Medical - Patient Is Not Medically Stable;Medical - Patient not Able To Participate   Interdisciplinary Plan of Care Collaboration   IDT Collaboration with  Other (See Comments)  (EMR)   Collaboration Comments Per EMR, pt emergently intubated on 1/8 and remains on mechanical ventilation at this time. SLP to continue to hold and monitor for extubation.

## 2025-01-09 NOTE — PROCEDURES
Procedure Note    Date: 1/9/2025  Time: ~1230    Procedure: Bronchoscopy    Indication: AHHRF  Consent: Informed consent obtained from patient or designated decision maker after explaining the benefits/risks of the procedure including but not limited to bleeding, infection, airway trauma or loss therof, pneumothorax/hemothorax, arrythmia, or death. Patient or surrogate expressed understanding and agreement and signed consent which can be found in the patient's chart.    Procedure: After obtaining consent, a time-out was performed. Respiratory therapy and nursing at bedside throughout procedure. Patient provided sedation and analgesia throughout the procedure. Placed on full ventilator support with an FiO2 of 100% throughout the procedure. Using a fiberoptic bronchoscope, trachea entered via ETT.  5 mL of local anesthetic sprayed at the masoud (2% lidocaine) achieving appropriate comfort level for patient. Airways visualized directly and the following intervention was performed: BAL. Findings as below. Patient tolerated procedure well without any difficulties and left in care of bedside nurse/RT.     Medications: Propofol, Fentanyl  Findings: Upper airway - Not visualized as bronchoscope passed through ETT.        Trachea to masoud - Edematous appearing mucosa without lesions or mass, ETT tip measured 3 cm from the masoud.        R proximal and distal airways - Edematous appearing mucosa without mass/lesion/anatomic variance, secretions: copious, thin, clear        L proximal and distal airways - edematous appearing mucosa without mass/lesion/anatomic variance, secretions: mild thick white secretions        Samples - BAL from RML    Complications: None  CXR (if applicable): N/A    Tree Preston DO MPH  Critical Care Medicine

## 2025-01-09 NOTE — PROGRESS NOTES
"Critical Care Progress Note    Date of admission  2024    Chief Complaint  Respiratory Distress    Hospital Course  Asha Hannon is a 64-year-old female with PMH significant for breast cancer with mets to the lungs currently on oral chemo who presented 2024 with weakness, SOB and back & abdominal pain.  CT C/A/P with contrast showed new BUN infiltrate; moderate size pleural effusions; worsening of hepatic metastasis; new abdominal lymphadenopathy, thoracic adenopathy; diffuse osseous metastatic disease with T5 pathologic fracture.    Admitted to floor under the [facility] Medicine Team.     - Palliative Care consult - patient deferred discussion.    - right-sided thoracentesis.   - restless legs \"gave out\" resulting in fall, no trauma.    -right MRI showed multiple metastatic lesions; T-spine MRI showed multiple metastatic lesions and T5 pathologic fracture; L-spine multiple metastatic lesions; C-spine no metastatic lesions.   - to OR for T4-T6 laminectomy with Dr. Lowery.   - POD #1, right-sided pneumothorax, medically manage. Left-sided thoracentesis  1/3 - POD #2, AMS with respiratory failure, pH 7.14 CO2 100. Transfer to ICU. Chest tube placed.    - Intubation   - Extubation. Chest tubes clamped.   - Ongoing goals of care discussions. Large bore right chest tube removed. BIPAP and diuresis.    - DNR, I OK. BIPAP. Diuresing. Right pigtail catheter removed.   - acute decompensation with hypoxia & obtundation. Re-intubated.     Interval Problem Update  Reviewed last 24 hour events:  Emergently intubated overnight. CO > 100  Tmax 100.6  SR 80's  SBP's 's.  Actively titrating norepinephrine  Neuro: Opens eyes to voice, nods to questions. Follows commands. Moving all  RASS: -1 to + 1. Propofol & Fent  Vent Day #2: APVC 30/400/8/40%  AB.50/40/116 - decreased   SAT/SBT: yes/yes, stopped SBT due to agitation  BM 2 days ago - Miralax this AM  I/O: " 1130/1285  TLC, Mojica  Mobility 2  Plan for bronchoscopy today.    Review of Systems  Review of Systems   Unable to perform ROS: Intubated        Vital Signs for last 24 hours   Pulse:  [] 62  Resp:  [7-34] 7  BP: ()/() 95/54  SpO2:  [61 %-100 %] 98 %    Hemodynamic parameters for last 24 hours       Respiratory Information for the last 24 hours  Vent Mode: APVCMV  Rate (breaths/min): 30  Vt Target (mL): 380  PEEP/CPAP: 8  MAP: 14  Control VTE (exp VT): 406    Physical Exam   Physical Exam  Vitals and nursing note reviewed.   Constitutional:       General: She is sleeping. She is not in acute distress.     Appearance: Normal appearance. She is ill-appearing. She is not toxic-appearing.      Interventions: She is sedated and intubated. Nasal cannula in place.   HENT:      Head: Normocephalic.      Nose: Nose normal.      Mouth/Throat:      Lips: Pink.      Mouth: Mucous membranes are moist.   Eyes:      Pupils: Pupils are equal, round, and reactive to light.   Cardiovascular:      Rate and Rhythm: Normal rate and regular rhythm.      Pulses: Normal pulses.      Heart sounds: Normal heart sounds.   Pulmonary:      Effort: Pulmonary effort is normal. She is intubated.      Breath sounds: Examination of the right-middle field reveals rhonchi. Examination of the right-lower field reveals rhonchi. Rhonchi present. No wheezing.   Abdominal:      General: Bowel sounds are decreased.      Palpations: Abdomen is soft.   Musculoskeletal:      Right lower leg: No edema.      Left lower leg: No edema.   Skin:     General: Skin is warm and dry.      Capillary Refill: Capillary refill takes less than 2 seconds.   Neurological:      GCS: GCS eye subscore is 3. GCS verbal subscore is 1. GCS motor subscore is 6.      Comments: 10T   Psychiatric:      Comments: Intubated/Sedated         Medications  Current Facility-Administered Medications   Medication Dose Route Frequency Provider Last Rate Last Admin    phosphorus  (K-Phos-Neutral) per tablet 500 mg  500 mg Oral Q6HRS Jessica Fabianer, A.P.R.N.   500 mg at 01/09/25 1224    potassium phosphate IVPB 30 mmol in 500 mL D5W (premix)  30 mmol Intravenous Once Jessica Fabianer, A.P.R.N. 83.3 mL/hr at 01/09/25 0821 30 mmol at 01/09/25 0821    Followed by    potassium phosphate 15 mmol in dextrose 5% 250 mL ivpb  15 mmol Intravenous Once Jessica QUIÑONES Campos, A.P.R.N.        furosemide (Lasix) injection 40 mg  40 mg Intravenous Once Jessica  Campos, A.P.R.N.        acetaZOLAMIDE (Diamox) injection 500 mg  500 mg Intravenous Once Jessica QUIÑONES Campos, A.P.R.N.        potassium chloride in water (Kcl) ivpb **Administer in ICU only** 20 mEq  20 mEq Intravenous Once Jessica QUIÑONES Campos, A.P.R.N.        norepinephrine (Levophed) 8 mg in 250 mL NS infusion (premix)  0-1 mcg/kg/min (Ideal) Intravenous Continuous Juan Daniel Baca M.D. 10.8 mL/hr at 01/09/25 0730 0.09 mcg/kg/min at 01/09/25 0730    Respiratory Therapy Consult   Nebulization Continuous RT Juan Daniel Baca M.D.        famotidine (Pepcid) tablet 20 mg  20 mg Enteral Tube Q12HRS Juan Daniel Baca M.D.   20 mg at 01/09/25 0505    Or    famotidine (Pepcid) injection 20 mg  20 mg Intravenous Q12HRS Juan Daniel Baca M.D.        senna-docusate (Pericolace Or Senokot S) 8.6-50 MG per tablet 2 Tablet  2 Tablet Enteral Tube BID Juan Daniel Baca M.D.   2 Tablet at 01/09/25 0505    And    polyethylene glycol/lytes (Miralax) Packet 1 Packet  1 Packet Enteral Tube QDAY PRN Juan Daniel Baca M.D.   1 Packet at 01/09/25 0505    And    magnesium hydroxide (Milk Of Magnesia) suspension 30 mL  30 mL Enteral Tube QDAY PRN Juan Daniel P Josephy, M.D.        And    bisacodyl (Dulcolax) suppository 10 mg  10 mg Rectal QDAY PRN Juan Daniel Baca M.D.        MD Alert...ICU Electrolyte Replacement per Pharmacy   Other PHARMACY TO DOSE Juan Daniel Baca M.D.        lidocaine (Xylocaine) 1 % injection 2 mL  2 mL Tracheal Tube Q30 MIN PRN Juan Daniel Baca M.D.   2 mL at  01/09/25 0023    fentaNYL (Sublimaze) injection 100 mcg  100 mcg Intravenous Q15 MIN PRN Juan Daniel Baca M.D.   100 mcg at 01/09/25 1006    And    fentaNYL (Sublimaze) injection 200 mcg  200 mcg Intravenous Q15 MIN PRN Juan Daniel Baca M.D.   150 mcg at 01/09/25 1235    And    fentaNYL (SUBLIMAZE) 50 mcg/mL in 50mL (Continuous Infusion)   Intravenous Continuous Juan Daniel Baca M.D.   Dose not Required at 01/08/25 2000    And    propofol (DIPRIVAN) injection  0-80 mcg/kg/min (Ideal) Intravenous Continuous Juan Daniel Baca M.D. 9.6 mL/hr at 01/09/25 0704 25 mcg/kg/min at 01/09/25 0704    HYDROcodone/acetaminophen (Norco)  MG per tablet 1 Tablet  1 Tablet Enteral Tube Q4HRS Juan Daniel Baca M.D.   1 Tablet at 01/09/25 1003    [Held by provider] losartan (Cozaar) tablet 50 mg  50 mg Enteral Tube Q DAY Juan Daniel Baca M.D.        magnesium oxide tablet 400 mg  400 mg Enteral Tube DAILY Juan Daniel Baca M.D.   400 mg at 01/09/25 0505    metoclopramide (Reglan) tablet 10 mg  10 mg Enteral Tube Q6HRS Juan Daniel Baca M.D.   10 mg at 01/09/25 1224    ROPINIRole (Requip) tablet 1 mg  1 mg Enteral Tube QHS Juan Daniel Baca M.D.   1 mg at 01/08/25 2211    calcium carbonate (Tums) chewable tab 500 mg  500 mg Enteral Tube TID PRN Juan Daniel Baca M.D.        methocarbamol (Robaxin) tablet 750 mg  750 mg Enteral Tube Q8HRS PRN Juan Daniel Baca M.D.   750 mg at 01/09/25 0808    ondansetron (Zofran ODT) dispertab 4 mg  4 mg Enteral Tube Q4HRS PRN Juan Daniel Baca M.D.        ROPINIRole (Requip) tablet 1 mg  1 mg Enteral Tube BID PRN Juan Daniel Baca M.D.   1 mg at 01/09/25 0232    senna-docusate (Pericolace Or Senokot S) 8.6-50 MG per tablet 1 Tablet  1 Tablet Enteral Tube Q24HRS PRN Juan Daniel Baca M.D.        insulin lispro (HumaLOG,AdmeLOG) subcutaneous injection  2-9 Units Subcutaneous Q6HRS Aidee Hicks        And    dextrose 50% (D50W) injection 25 g  25 g Intravenous Q15 MIN PRN Aidee  DALLAS Hicks        lidocaine (Asperflex) 4 % patch 2 Patch  2 Patch Transdermal Q24HR LOPEZ Roman   2 Patch at 01/09/25 1003    labetalol (Normodyne/Trandate) injection 10 mg  10 mg Intravenous Q4HRS PRN Lidia Figueroa M.D.   10 mg at 01/07/25 0611    ipratropium-albuterol (DUONEB) nebulizer solution  3 mL Nebulization Q2HRS PRN (RT) Americo Avalos M.D.   3 mL at 01/09/25 0032    [Held by provider] enoxaparin (Lovenox) inj 40 mg  40 mg Subcutaneous DAILY AT 1800 Americo Avalos M.D.   40 mg at 01/08/25 1848    bisacodyl (Dulcolax) suppository 10 mg  10 mg Rectal DAILY Neo Ceja, P.AMimi   10 mg at 01/03/25 1151    sodium chloride (Ocean) 0.65 % nasal spray 2 Spray  2 Spray Nasal Q2HRS PRN Omar Saleh M.D.   2 Spray at 01/01/25 0600    MD ALERT...DO NOT ADMINISTER NSAIDS or ASPIRIN unless ORDERED By Neurosurgery 1 Each  1 Each Other PRN Karthik Lowery D.O.        ondansetron (Zofran) syringe/vial injection 4 mg  4 mg Intravenous Q4HRS PRN Abdulaziz Nieto M.D.   4 mg at 01/07/25 1842       Fluids    Intake/Output Summary (Last 24 hours) at 1/9/2025 1318  Last data filed at 1/9/2025 1200  Gross per 24 hour   Intake 996.49 ml   Output 1440 ml   Net -443.51 ml       Laboratory  Recent Labs     01/08/25  1645 01/08/25  1922 01/09/25  0505   ISTATAPH 7.194* 7.259* 7.514*   ISTATAPCO2 >100.0* 91.2* 38.2   ISTATAPO2 75* 22* 110*   ISTATATCO2 43* 44* 32   LVTTXFA2XSL 89* 27* 99   ISTATARTHCO3 40.1* 40.8* 30.8*   ISTATARTBE 8* 11* 7*   ISTATTEMP 99.1 F 38.2 C 37.9 C   ISTATFIO2 80 100 60   ISTATSPEC Arterial Arterial Arterial   ISTATAPHTC 7.190* 7.242* 7.500*   NKLMHAKT8TK 77 24* 116*         Recent Labs     01/07/25  0319 01/07/25  1400 01/08/25  1400 01/08/25 1957 01/09/25  0415   SODIUM 139   < > 135 137 137   POTASSIUM 4.2   < > 4.3 4.8 3.9   CHLORIDE 99   < > 94* 94* 95*   CO2 35*   < > 40* 33 32   BUN 7*   < > 9 11 12   CREATININE 0.42*   < > 0.52 0.65 0.74   MAGNESIUM 2.0  --    "--   --  2.1   PHOSPHORUS 2.5  --   --   --  1.3*   CALCIUM 8.2*   < > 8.8 8.8 8.5    < > = values in this interval not displayed.     Recent Labs     01/08/25  1400 01/08/25 1957 01/09/25 0415   ALTSGPT  --  34  --    ASTSGOT  --  93*  --    ALKPHOSPHAT  --  105*  --    TBILIRUBIN  --  0.3  --    DBILIRUBIN  --  <0.2  --    GLUCOSE 136* 147* 118*     Recent Labs     01/07/25  0319 01/08/25 1957 01/09/25 0415   WBC 7.8 14.1* 11.7*   NEUTSPOLYS 71.50 81.90* 73.90*   LYMPHOCYTES 8.40* 4.70* 9.10*   MONOCYTES 16.80* 11.10 14.40*   EOSINOPHILS 1.40 0.10 0.40   BASOPHILS 0.60 0.40 0.40   ASTSGOT  --  93*  --    ALTSGPT  --  34  --    ALKPHOSPHAT  --  105*  --    TBILIRUBIN  --  0.3  --      Recent Labs     01/07/25 0319 01/08/25 1957 01/09/25 0415   RBC 3.03* 3.57* 3.21*   HEMOGLOBIN 8.8* 9.9* 9.1*   HEMATOCRIT 28.2* 33.3* 29.1*   PLATELETCT 432 679* 599*       Imaging  X-Ray:  I have personally reviewed the images and compared with prior images.    Assessment/Plan  * Acute on chronic respiratory failure with hypoxia and hypercapnia (HCC)  Assessment & Plan  -intubated 1/4  -extubated 1/5  -re-intubated 1/8  -Ventilator dependent respiratory failure  -Modify ventilator to optimize oxygenation, acid-base balance and ventilation  -CXR as indicated: monitor lung volumes and tube/line placement  -HOB > 30  -Titrate FiO2 to keep sats greater than 92%  -Chlorhexidine  -goal CO2 35-40  -Daily awakening and SBT trials unless contraindicated  -ABCDEF bundle  -I am actively adjusting ventilator based on clinical indicators and ABG's    Pathological fracture of vertebra- (present on admission)  Assessment & Plan  -Pathologic fracture of T5 with myelopathy on presentation  -S/P T4-6 laminectomy with fusion and spinal decompression on 1/1/2025 by Dr. Lowery  -Fall Precautions  -PT OT    Malignant neoplasm of breast (HCC)- (present on admission)  Assessment & Plan  \"Bilateral breast cancer with extensive destruction of the " "left breast and ulceration and skin lesions making her inoperable on the left.  Right breast shows a 3 x 3 cm mass.  PET scan March 2023 showed non-hypermetabolic pleural effusions which are cytology positive and she has demonstrable pleural masses. Started aromatase inhibition early September 2023.  Has not yet started CDK 4 6 inhibitor.  Lost to follow-up for over 6 months although apparently was on letrozole as single agent.  Now with clear progression of disease in the left breast.  August 2024: Progression of disease in liver with extensive disease.  Breast is worse.  Lung is better.  Lymph nodes are worse.  Status post radiation to symptomatic oozing and exophytic left breast mass.  Marked worsening of her cancer on letrozole with progression in the bone and liver.\"--Dr. Benitez  -ongoing goals of care discussions. Patient is fighting to recover and discharge home so she can start the Letrozole.  -DNR, I OK   -Palliative following for pain management.  -Voalte to Dr. Tabor today to request assistance with advanced care planning.      Restless leg syndrome- (present on admission)  Assessment & Plan  -Requip     Cancer related pain- (present on admission)  Assessment & Plan  -Palliative Care following for pain management  -Multimodal pain management    Bilateral pleural effusion- (present on admission)  Assessment & Plan  -Malignant pleural effusions from metastatic breast cancer s/p thoracenteses and chest tubes  -Diuresis as tolerated  -monitor chest xrays    Primary hypertension- (present on admission)  Assessment & Plan  -Now off pressors.  -resume home Losartan but at 1/2 dosing. Increase as tolerated  -PRN's available    Diabetes mellitus, type 2 (HCC)- (present on admission)  Assessment & Plan  -A1c 6.4  -accuchecks with SSI  -CHO diet  -glucose goal 140-180         VTE:   Holding for possible chest tube placement today.  Ulcer: H2 Antagonist  Lines: Central Line  Ongoing indication addressed " and Mojica Catheter  Ongoing indication addressed    I have performed a physical exam and reviewed and updated ROS and Plan today (1/9/2025). In review of yesterday's note (1/8/2025), there are no changes except as documented above.     Discussed patient condition and risk of morbidity and/or mortality with RN, RT, Pharmacy, , Patient, and my attending Dr. Preston.  The patient remains critically ill.  Critical care time = 65 minutes in directly providing and coordinating critical care and extensive data review.  No time overlap and excludes procedures.   <-- Click to add NO pertinent Family History

## 2025-01-10 ENCOUNTER — APPOINTMENT (OUTPATIENT)
Dept: RADIOLOGY | Facility: MEDICAL CENTER | Age: 65
End: 2025-01-10
Attending: EMERGENCY MEDICINE
Payer: MEDICARE

## 2025-01-10 LAB
ANION GAP SERPL CALC-SCNC: 8 MMOL/L (ref 7–16)
ANION GAP SERPL CALC-SCNC: 9 MMOL/L (ref 7–16)
ARTERIAL PATENCY WRIST A: ABNORMAL
BASE EXCESS BLDA CALC-SCNC: 8 MMOL/L (ref -4–3)
BASOPHILS # BLD AUTO: 0.7 % (ref 0–1.8)
BASOPHILS # BLD: 0.06 K/UL (ref 0–0.12)
BODY TEMPERATURE: ABNORMAL DEGREES
BREATHS SETTING VENT: 30
BUN SERPL-MCNC: 13 MG/DL (ref 8–22)
BUN SERPL-MCNC: 14 MG/DL (ref 8–22)
CALCIUM SERPL-MCNC: 8.3 MG/DL (ref 8.5–10.5)
CALCIUM SERPL-MCNC: 8.6 MG/DL (ref 8.5–10.5)
CHLORIDE SERPL-SCNC: 96 MMOL/L (ref 96–112)
CHLORIDE SERPL-SCNC: 99 MMOL/L (ref 96–112)
CO2 BLDA-SCNC: 32 MMOL/L (ref 32–48)
CO2 SERPL-SCNC: 30 MMOL/L (ref 20–33)
CO2 SERPL-SCNC: 31 MMOL/L (ref 20–33)
CREAT SERPL-MCNC: 0.72 MG/DL (ref 0.5–1.4)
CREAT SERPL-MCNC: 0.74 MG/DL (ref 0.5–1.4)
CRP SERPL HS-MCNC: 6 MG/DL (ref 0–0.75)
DELSYS IDSYS: ABNORMAL
END TIDAL CARBON DIOXIDE IECO2: 27 MMHG
EOSINOPHIL # BLD AUTO: 0.13 K/UL (ref 0–0.51)
EOSINOPHIL NFR BLD: 1.5 % (ref 0–6.9)
ERYTHROCYTE [DISTWIDTH] IN BLOOD BY AUTOMATED COUNT: 55.1 FL (ref 35.9–50)
GFR SERPLBLD CREATININE-BSD FMLA CKD-EPI: 89 ML/MIN/1.73 M 2
GFR SERPLBLD CREATININE-BSD FMLA CKD-EPI: 92 ML/MIN/1.73 M 2
GLUCOSE BLD STRIP.AUTO-MCNC: 101 MG/DL (ref 65–99)
GLUCOSE BLD STRIP.AUTO-MCNC: 116 MG/DL (ref 65–99)
GLUCOSE BLD STRIP.AUTO-MCNC: 121 MG/DL (ref 65–99)
GLUCOSE BLD STRIP.AUTO-MCNC: 135 MG/DL (ref 65–99)
GLUCOSE BLD STRIP.AUTO-MCNC: 93 MG/DL (ref 65–99)
GLUCOSE SERPL-MCNC: 113 MG/DL (ref 65–99)
GLUCOSE SERPL-MCNC: 154 MG/DL (ref 65–99)
HCO3 BLDA-SCNC: 30.7 MMOL/L (ref 21–28)
HCT VFR BLD AUTO: 26.1 % (ref 37–47)
HGB BLD-MCNC: 8.4 G/DL (ref 12–16)
HOROWITZ INDEX BLDA+IHG-RTO: 158 MM[HG]
IMM GRANULOCYTES # BLD AUTO: 0.15 K/UL (ref 0–0.11)
IMM GRANULOCYTES NFR BLD AUTO: 1.7 % (ref 0–0.9)
LACTATE BLD-SCNC: 1.1 MMOL/L (ref 0.5–2)
LYMPHOCYTES # BLD AUTO: 0.96 K/UL (ref 1–4.8)
LYMPHOCYTES NFR BLD: 10.9 % (ref 22–41)
MAGNESIUM SERPL-MCNC: 2.1 MG/DL (ref 1.5–2.5)
MCH RBC QN AUTO: 28.8 PG (ref 27–33)
MCHC RBC AUTO-ENTMCNC: 32.2 G/DL (ref 32.2–35.5)
MCV RBC AUTO: 89.4 FL (ref 81.4–97.8)
MODE IMODE: ABNORMAL
MONOCYTES # BLD AUTO: 1.13 K/UL (ref 0–0.85)
MONOCYTES NFR BLD AUTO: 12.8 % (ref 0–13.4)
NEUTROPHILS # BLD AUTO: 6.4 K/UL (ref 1.82–7.42)
NEUTROPHILS NFR BLD: 72.4 % (ref 44–72)
NRBC # BLD AUTO: 0 K/UL
NRBC BLD-RTO: 0 /100 WBC (ref 0–0.2)
O2/TOTAL GAS SETTING VFR VENT: 40 %
PCO2 BLDA: 35.3 MMHG (ref 32–48)
PCO2 TEMP ADJ BLDA: 36.2 MMHG (ref 32–48)
PEEP END EXPIRATORY PRESSURE IPEEP: 8 CMH20
PH BLDA: 7.55 [PH] (ref 7.35–7.45)
PH TEMP ADJ BLDA: 7.54 [PH] (ref 7.35–7.45)
PHOSPHATE SERPL-MCNC: 2.9 MG/DL (ref 2.5–4.5)
PHOSPHATE SERPL-MCNC: 3.2 MG/DL (ref 2.5–4.5)
PLATELET # BLD AUTO: 511 K/UL (ref 164–446)
PMV BLD AUTO: 8.7 FL (ref 9–12.9)
PO2 BLDA: 63 MMHG (ref 83–108)
PO2 TEMP ADJ BLDA: 66 MMHG (ref 64–87)
POTASSIUM SERPL-SCNC: 2.8 MMOL/L (ref 3.6–5.5)
POTASSIUM SERPL-SCNC: 3.3 MMOL/L (ref 3.6–5.5)
PREALB SERPL-MCNC: 8.6 MG/DL (ref 18–38)
PROCALCITONIN SERPL-MCNC: 0.17 NG/ML
RBC # BLD AUTO: 2.92 M/UL (ref 4.2–5.4)
SAO2 % BLDA: 94 % (ref 93–99)
SODIUM SERPL-SCNC: 135 MMOL/L (ref 135–145)
SODIUM SERPL-SCNC: 138 MMOL/L (ref 135–145)
SPECIMEN DRAWN FROM PATIENT: ABNORMAL
TIDAL VOLUME IVT: 380 ML
WBC # BLD AUTO: 8.8 K/UL (ref 4.8–10.8)

## 2025-01-10 PROCEDURE — 82962 GLUCOSE BLOOD TEST: CPT

## 2025-01-10 PROCEDURE — A9270 NON-COVERED ITEM OR SERVICE: HCPCS | Performed by: NURSE PRACTITIONER

## 2025-01-10 PROCEDURE — 99291 CRITICAL CARE FIRST HOUR: CPT | Performed by: NURSE PRACTITIONER

## 2025-01-10 PROCEDURE — A9270 NON-COVERED ITEM OR SERVICE: HCPCS | Mod: JZ | Performed by: STUDENT IN AN ORGANIZED HEALTH CARE EDUCATION/TRAINING PROGRAM

## 2025-01-10 PROCEDURE — A9270 NON-COVERED ITEM OR SERVICE: HCPCS | Performed by: EMERGENCY MEDICINE

## 2025-01-10 PROCEDURE — 94003 VENT MGMT INPAT SUBQ DAY: CPT

## 2025-01-10 PROCEDURE — 82803 BLOOD GASES ANY COMBINATION: CPT

## 2025-01-10 PROCEDURE — 36600 WITHDRAWAL OF ARTERIAL BLOOD: CPT

## 2025-01-10 PROCEDURE — 84100 ASSAY OF PHOSPHORUS: CPT

## 2025-01-10 PROCEDURE — 94799 UNLISTED PULMONARY SVC/PX: CPT

## 2025-01-10 PROCEDURE — A9270 NON-COVERED ITEM OR SERVICE: HCPCS | Performed by: PHYSICIAN ASSISTANT

## 2025-01-10 PROCEDURE — 700102 HCHG RX REV CODE 250 W/ 637 OVERRIDE(OP): Performed by: PHYSICIAN ASSISTANT

## 2025-01-10 PROCEDURE — 71045 X-RAY EXAM CHEST 1 VIEW: CPT

## 2025-01-10 PROCEDURE — 700101 HCHG RX REV CODE 250: Performed by: NURSE PRACTITIONER

## 2025-01-10 PROCEDURE — 84145 PROCALCITONIN (PCT): CPT

## 2025-01-10 PROCEDURE — 700102 HCHG RX REV CODE 250 W/ 637 OVERRIDE(OP): Performed by: NURSE PRACTITIONER

## 2025-01-10 PROCEDURE — 700111 HCHG RX REV CODE 636 W/ 250 OVERRIDE (IP): Mod: JZ | Performed by: EMERGENCY MEDICINE

## 2025-01-10 PROCEDURE — 700105 HCHG RX REV CODE 258: Performed by: NURSE PRACTITIONER

## 2025-01-10 PROCEDURE — 83735 ASSAY OF MAGNESIUM: CPT

## 2025-01-10 PROCEDURE — 86140 C-REACTIVE PROTEIN: CPT

## 2025-01-10 PROCEDURE — 770022 HCHG ROOM/CARE - ICU (200)

## 2025-01-10 PROCEDURE — 84134 ASSAY OF PREALBUMIN: CPT

## 2025-01-10 PROCEDURE — 700102 HCHG RX REV CODE 250 W/ 637 OVERRIDE(OP): Performed by: EMERGENCY MEDICINE

## 2025-01-10 PROCEDURE — 80048 BASIC METABOLIC PNL TOTAL CA: CPT | Mod: 91

## 2025-01-10 PROCEDURE — 700102 HCHG RX REV CODE 250 W/ 637 OVERRIDE(OP): Mod: JZ | Performed by: STUDENT IN AN ORGANIZED HEALTH CARE EDUCATION/TRAINING PROGRAM

## 2025-01-10 PROCEDURE — 700111 HCHG RX REV CODE 636 W/ 250 OVERRIDE (IP): Mod: TB | Performed by: NURSE PRACTITIONER

## 2025-01-10 PROCEDURE — 83605 ASSAY OF LACTIC ACID: CPT

## 2025-01-10 PROCEDURE — 85025 COMPLETE CBC W/AUTO DIFF WBC: CPT

## 2025-01-10 RX ORDER — POTASSIUM CHLORIDE 29.8 MG/ML
40 INJECTION INTRAVENOUS ONCE
Status: COMPLETED | OUTPATIENT
Start: 2025-01-10 | End: 2025-01-10

## 2025-01-10 RX ORDER — FUROSEMIDE 10 MG/ML
40 INJECTION INTRAMUSCULAR; INTRAVENOUS ONCE
Status: COMPLETED | OUTPATIENT
Start: 2025-01-10 | End: 2025-01-10

## 2025-01-10 RX ORDER — POTASSIUM CHLORIDE 1500 MG/1
40 TABLET, EXTENDED RELEASE ORAL ONCE
Status: COMPLETED | OUTPATIENT
Start: 2025-01-10 | End: 2025-01-10

## 2025-01-10 RX ORDER — ACETAZOLAMIDE 500 MG/5ML
500 INJECTION, POWDER, LYOPHILIZED, FOR SOLUTION INTRAVENOUS ONCE
Status: COMPLETED | OUTPATIENT
Start: 2025-01-10 | End: 2025-01-10

## 2025-01-10 RX ORDER — 3% SODIUM CHLORIDE 3 G/100ML
250 INJECTION, SOLUTION INTRAVENOUS ONCE
Status: COMPLETED | OUTPATIENT
Start: 2025-01-10 | End: 2025-01-10

## 2025-01-10 RX ADMIN — FENTANYL CITRATE 100 MCG: 50 INJECTION, SOLUTION INTRAMUSCULAR; INTRAVENOUS at 09:05

## 2025-01-10 RX ADMIN — POTASSIUM CHLORIDE 40 MEQ: 29.8 INJECTION, SOLUTION INTRAVENOUS at 20:03

## 2025-01-10 RX ADMIN — FAMOTIDINE 20 MG: 20 TABLET, FILM COATED ORAL at 17:23

## 2025-01-10 RX ADMIN — METOCLOPRAMIDE 10 MG: 10 TABLET ORAL at 23:28

## 2025-01-10 RX ADMIN — FENTANYL CITRATE 100 MCG: 50 INJECTION, SOLUTION INTRAMUSCULAR; INTRAVENOUS at 05:54

## 2025-01-10 RX ADMIN — POTASSIUM BICARBONATE 50 MEQ: 978 TABLET, EFFERVESCENT ORAL at 08:36

## 2025-01-10 RX ADMIN — FAMOTIDINE 20 MG: 20 TABLET, FILM COATED ORAL at 05:10

## 2025-01-10 RX ADMIN — METOCLOPRAMIDE 10 MG: 10 TABLET ORAL at 11:45

## 2025-01-10 RX ADMIN — POTASSIUM BICARBONATE 50 MEQ: 978 TABLET, EFFERVESCENT ORAL at 05:14

## 2025-01-10 RX ADMIN — PROPOFOL 30 MCG/KG/MIN: 10 INJECTION, EMULSION INTRAVENOUS at 11:56

## 2025-01-10 RX ADMIN — SENNOSIDES AND DOCUSATE SODIUM 2 TABLET: 50; 8.6 TABLET ORAL at 05:10

## 2025-01-10 RX ADMIN — ROPINIROLE HYDROCHLORIDE 1 MG: 1 TABLET, FILM COATED ORAL at 01:43

## 2025-01-10 RX ADMIN — PROPOFOL 35 MCG/KG/MIN: 10 INJECTION, EMULSION INTRAVENOUS at 03:29

## 2025-01-10 RX ADMIN — ROPINIROLE HYDROCHLORIDE 1 MG: 1 TABLET, FILM COATED ORAL at 20:32

## 2025-01-10 RX ADMIN — HYDROCODONE BITARTRATE AND ACETAMINOPHEN 1 TABLET: 10; 325 TABLET ORAL at 01:42

## 2025-01-10 RX ADMIN — METOCLOPRAMIDE 10 MG: 10 TABLET ORAL at 05:11

## 2025-01-10 RX ADMIN — POTASSIUM CHLORIDE 40 MEQ: 29.8 INJECTION, SOLUTION INTRAVENOUS at 16:37

## 2025-01-10 RX ADMIN — HYDROMORPHONE HYDROCHLORIDE 1 MG/HR: 10 INJECTION, SOLUTION INTRAMUSCULAR; INTRAVENOUS; SUBCUTANEOUS at 20:13

## 2025-01-10 RX ADMIN — HYDROCODONE BITARTRATE AND ACETAMINOPHEN 1 TABLET: 10; 325 TABLET ORAL at 09:44

## 2025-01-10 RX ADMIN — Medication 400 MG: at 05:11

## 2025-01-10 RX ADMIN — METOCLOPRAMIDE 10 MG: 10 TABLET ORAL at 17:23

## 2025-01-10 RX ADMIN — POTASSIUM CHLORIDE 40 MEQ: 1500 TABLET, EXTENDED RELEASE ORAL at 11:46

## 2025-01-10 RX ADMIN — METHOCARBAMOL TABLETS 750 MG: 750 TABLET, COATED ORAL at 09:44

## 2025-01-10 RX ADMIN — BISACODYL 10 MG: 10 SUPPOSITORY RECTAL at 05:11

## 2025-01-10 RX ADMIN — HYDROMORPHONE HYDROCHLORIDE 0.5 MG/HR: 10 INJECTION, SOLUTION INTRAMUSCULAR; INTRAVENOUS; SUBCUTANEOUS at 10:12

## 2025-01-10 RX ADMIN — ACETAZOLAMIDE 500 MG: 500 INJECTION, POWDER, LYOPHILIZED, FOR SOLUTION INTRAVENOUS at 14:55

## 2025-01-10 RX ADMIN — FUROSEMIDE 40 MG: 10 INJECTION INTRAMUSCULAR; INTRAVENOUS at 13:42

## 2025-01-10 RX ADMIN — HYDROCODONE BITARTRATE AND ACETAMINOPHEN 1 TABLET: 10; 325 TABLET ORAL at 05:47

## 2025-01-10 RX ADMIN — HYDROCODONE BITARTRATE AND ACETAMINOPHEN 1 TABLET: 10; 325 TABLET ORAL at 13:42

## 2025-01-10 RX ADMIN — PROPOFOL 25 MCG/KG/MIN: 10 INJECTION, EMULSION INTRAVENOUS at 19:49

## 2025-01-10 RX ADMIN — SODIUM CHLORIDE 250 ML: 3 INJECTION, SOLUTION INTRAVENOUS at 10:51

## 2025-01-10 ASSESSMENT — PAIN DESCRIPTION - PAIN TYPE
TYPE: ACUTE PAIN;CHRONIC PAIN
TYPE: ACUTE PAIN
TYPE: ACUTE PAIN;CHRONIC PAIN
TYPE: ACUTE PAIN
TYPE: ACUTE PAIN;CHRONIC PAIN
TYPE: ACUTE PAIN;CHRONIC PAIN
TYPE: ACUTE PAIN

## 2025-01-10 ASSESSMENT — FIBROSIS 4 INDEX: FIB4 SCORE: 2.03

## 2025-01-10 NOTE — DISCHARGE PLANNING
Vented day 3. TCC will no longer follow.  Please reach out to myself once liberated from the vent or with any questions.

## 2025-01-10 NOTE — CARE PLAN
Problem: Ventilation  Goal: Ability to achieve and maintain unassisted ventilation or tolerate decreased levels of ventilator support  Description: Target End Date:  4 days     Document on Vent flowsheet    1.  Support and monitor invasive and noninvasive mechanical ventilation  2.  Monitor ventilator weaning response  3.  Perform ventilator associated pneumonia prevention interventions  4.  Manage ventilation therapy by monitoring diagnostic test results  Outcome: Not Met     Ventilator Daily Summary    Vent Day #3  Airway:   7.5 23  Ventilator settings: 28 380 8 30   Weaning trials: no  Respiratory Procedures:     Plan: Continue current ventilator settings and wean mechanical ventilation as tolerated per physician orders.

## 2025-01-10 NOTE — PROGRESS NOTES
"Critical Care Progress Note    Date of admission  2024    Chief Complaint  Shortness of breath    Hospital Course  Asha Hannon is a 64-year-old female with PMH significant for breast cancer with mets to the lungs currently on oral chemo who presented 2024 with weakness, SOB and back & abdominal pain.  CT C/A/P with contrast showed new BUN infiltrate; moderate size pleural effusions; worsening of hepatic metastasis; new abdominal lymphadenopathy, thoracic adenopathy; diffuse osseous metastatic disease with T5 pathologic fracture.    Admitted to floor under the [facility] Team.     - Palliative Care consult - patient deferred discussion.    - right-sided thoracentesis.   - restless legs \"gave out\" resulting in fall, no trauma.    -right MRI showed multiple metastatic lesions; T-spine MRI showed multiple metastatic lesions and T5 pathologic fracture; L-spine multiple metastatic lesions; C-spine no metastatic lesions.   - to OR for T4-T6 laminectomy with Dr. Lowery.   - POD #1, right-sided pneumothorax, medically manage. Left-sided thoracentesis  1/3 - POD #2, AMS with respiratory failure, pH 7.14 CO2 100. Transfer to ICU. Chest tube placed.    - Intubation   - Extubation. Chest tubes clamped.   - Ongoing goals of care discussions. Large bore right chest tube removed. BIPAP and diuresis.    - DNR, I OK. BIPAP. Diuresing. Right pigtail catheter removed.   - acute decompensation with hypoxia & obtundation. Re-intubated.    - VD #2.     Interval Problem Update  Reviewed last 24 hour events:  No significant overnight events.   SR 70's  SBP's 100-150's  Neuro: opens eyes spontaneously, follows commands. Moving all.  RASS: -1 to + 2. Restless legs. Adding IV Dilaudid for comfort.  Vent day #3: APVC 28/380/8/30%  AB.54/36/66  Tube feedings - will hold for IR procedure.  I/O: 2200/2725  Replacing electrolytes  Mojica, TLC  Mobility 2    Review of " Systems  Review of Systems   Unable to perform ROS: Intubated        Vital Signs for last 24 hours   Pulse:  [54-89] 66  Resp:  [3-30] 28  BP: ()/(45-95) 118/59  SpO2:  [93 %-100 %] 93 %    Hemodynamic parameters for last 24 hours       Respiratory Information for the last 24 hours  Vent Mode: APVCMV  Rate (breaths/min): 28  Vt Target (mL): 380  PEEP/CPAP: 8  MAP: 15  Control VTE (exp VT): 388    Physical Exam   Physical Exam  Vitals and nursing note reviewed.   Constitutional:       General: She is sleeping. She is not in acute distress.     Appearance: Normal appearance. She is ill-appearing. She is not toxic-appearing.      Interventions: She is sedated and intubated. Nasal cannula in place.   HENT:      Head: Normocephalic.      Nose: Nose normal.      Mouth/Throat:      Lips: Pink.      Mouth: Mucous membranes are moist.   Eyes:      Pupils: Pupils are equal, round, and reactive to light.   Cardiovascular:      Rate and Rhythm: Normal rate and regular rhythm.      Pulses: Normal pulses.      Heart sounds: Normal heart sounds.   Pulmonary:      Effort: Pulmonary effort is normal. She is intubated.      Breath sounds: Examination of the right-middle field reveals rhonchi. Examination of the right-lower field reveals rhonchi. Rhonchi present. No wheezing.   Abdominal:      General: Bowel sounds are decreased.      Palpations: Abdomen is soft.   Musculoskeletal:      Right lower leg: No edema.      Left lower leg: No edema.   Skin:     General: Skin is warm and dry.      Capillary Refill: Capillary refill takes less than 2 seconds.   Neurological:      GCS: GCS eye subscore is 4. GCS verbal subscore is 1. GCS motor subscore is 6.   Psychiatric:      Comments: Intubated/Sedated         Medications  Current Facility-Administered Medications   Medication Dose Route Frequency Provider Last Rate Last Admin    HYDROmorphone (DILAUDID) 0.2 mg/mL in 50mL NS (Continuous Infusion)   Intravenous Continuous Jessica QUIÑONES  RODY CamposP.R.N. 5 mL/hr at 01/10/25 1200 1 mg/hr at 01/10/25 1200    furosemide (Lasix) injection 40 mg  40 mg Intravenous Once RODY KwonP.R.MOISES.        acetaZOLAMIDE (Diamox) injection 500 mg  500 mg Intravenous Once RODY KwonP.R.GENOVEVA        Pharmacy Consult: Enteral tube insertion - review meds/change route/product selection  1 Each Other PHARMACY TO DOSE RODY KwonP.R.NMimi        norepinephrine (Levophed) 8 mg in 250 mL NS infusion (premix)  0-1 mcg/kg/min (Ideal) Intravenous Continuous Juan Daniel Baca M.D.   Stopped at 01/10/25 0600    Respiratory Therapy Consult   Nebulization Continuous RT Juan Daniel Baca M.D.        famotidine (Pepcid) tablet 20 mg  20 mg Enteral Tube Q12HRS Juan Daniel Baca M.D.   20 mg at 01/10/25 0510    Or    famotidine (Pepcid) injection 20 mg  20 mg Intravenous Q12HRS Juan Daniel Baca M.D.        senna-docusate (Pericolace Or Senokot S) 8.6-50 MG per tablet 2 Tablet  2 Tablet Enteral Tube BID Juan Daniel Baca M.D.   2 Tablet at 01/10/25 0510    And    polyethylene glycol/lytes (Miralax) Packet 1 Packet  1 Packet Enteral Tube QDAY PRN Juan Daniel Baca M.D.   1 Packet at 01/09/25 0505    And    magnesium hydroxide (Milk Of Magnesia) suspension 30 mL  30 mL Enteral Tube QDAY PRN Juan Daniel Baca M.D.   30 mL at 01/09/25 1819    And    bisacodyl (Dulcolax) suppository 10 mg  10 mg Rectal QDAY PRN Juan Daniel Baca M.D. MD Alert...ICU Electrolyte Replacement per Pharmacy   Other PHARMACY TO DOSE Juan Daniel Baca M.D.        lidocaine (Xylocaine) 1 % injection 2 mL  2 mL Tracheal Tube Q30 MIN PRN Juan Daniel Baca M.D.   2 mL at 01/09/25 0023    propofol (DIPRIVAN) injection  0-80 mcg/kg/min (Ideal) Intravenous Continuous Juan Daniel Baca M.D. 11.5 mL/hr at 01/10/25 1156 30 mcg/kg/min at 01/10/25 1156    HYDROcodone/acetaminophen (Norco)  MG per tablet 1 Tablet  1 Tablet Enteral Tube Q4HRS Juan Daniel Baca M.D.   1 Tablet at 01/10/25  0944    [Held by provider] losartan (Cozaar) tablet 50 mg  50 mg Enteral Tube Q DAY Juan Daniel Baca M.D.        magnesium oxide tablet 400 mg  400 mg Enteral Tube DAILY Juan Daniel Baca M.D.   400 mg at 01/10/25 0511    metoclopramide (Reglan) tablet 10 mg  10 mg Enteral Tube Q6HRS Juan Daniel Baca M.D.   10 mg at 01/10/25 1145    ROPINIRole (Requip) tablet 1 mg  1 mg Enteral Tube QHS Juan Daniel Baca M.D.   1 mg at 01/09/25 2033    calcium carbonate (Tums) chewable tab 500 mg  500 mg Enteral Tube TID PRN Juan Daniel Baca M.D.        methocarbamol (Robaxin) tablet 750 mg  750 mg Enteral Tube Q8HRS PRN Juan Daniel Baca M.D.   750 mg at 01/10/25 0944    ondansetron (Zofran ODT) dispertab 4 mg  4 mg Enteral Tube Q4HRS PRN Juan Daniel Baca M.D.        ROPINIRole (Requip) tablet 1 mg  1 mg Enteral Tube BID PRN Juan Daniel Baca M.D.   1 mg at 01/10/25 0143    senna-docusate (Pericolace Or Senokot S) 8.6-50 MG per tablet 1 Tablet  1 Tablet Enteral Tube Q24HRS PRN Juan Daniel Baca M.D.        insulin lispro (HumaLOG,AdmeLOG) subcutaneous injection  2-9 Units Subcutaneous Q6HRS Aidee Hicks        And    dextrose 50% (D50W) injection 25 g  25 g Intravenous Q15 MIN PRN Aidee HAYNES Lataudie        labetalol (Normodyne/Trandate) injection 10 mg  10 mg Intravenous Q4HRS PRN Lidia Figueroa M.D.   10 mg at 01/07/25 0611    ipratropium-albuterol (DUONEB) nebulizer solution  3 mL Nebulization Q2HRS PRN (RT) Americo Avalos M.D.   3 mL at 01/09/25 0032    [Held by provider] enoxaparin (Lovenox) inj 40 mg  40 mg Subcutaneous DAILY AT 1800 Americo Avalos M.D.   40 mg at 01/08/25 1848    bisacodyl (Dulcolax) suppository 10 mg  10 mg Rectal DAILY Neo Ceja, P.A.   10 mg at 01/10/25 0511    sodium chloride (Ocean) 0.65 % nasal spray 2 Spray  2 Spray Nasal Q2HRS PRN Omar Saleh M.D.   2 Spray at 01/01/25 0600    MD ALERT...DO NOT ADMINISTER NSAIDS or ASPIRIN unless ORDERED By Neurosurgery 1  Each  1 Each Other PRN Karthik Lowery D.O.        ondansetron (Zofran) syringe/vial injection 4 mg  4 mg Intravenous Q4HRS PRN Abdulaziz Nieto M.D.   4 mg at 01/07/25 1842       Fluids    Intake/Output Summary (Last 24 hours) at 1/10/2025 1335  Last data filed at 1/10/2025 1200  Gross per 24 hour   Intake 1911.32 ml   Output 2445 ml   Net -533.68 ml       Laboratory  Recent Labs     01/08/25 1922 01/09/25 0505 01/10/25  0336   ISTATAPH 7.259* 7.514* 7.547*   ISTATAPCO2 91.2* 38.2 35.3   ISTATAPO2 22* 110* 63*   ISTATATCO2 44* 32 32   [id number] 27* 99 94   ISTATARTHCO3 40.8* 30.8* 30.7*   ISTATARTBE 11* 7* 8*   ISTATTEMP 38.2 C 37.9 C 37.6 C   ISTATFIO2 100 60 40   ISTATSPEC Arterial Arterial Arterial   ISTATAPHTC 7.242* 7.500* 7.538*   [id number] 24* 116* 66         Recent Labs     01/08/25  1957 01/09/25  0415 01/10/25  0010 01/10/25  0334   SODIUM 137 137  --  135   POTASSIUM 4.8 3.9  --  2.8*   CHLORIDE 94* 95*  --  96   CO2 33 32  --  30   BUN 11 12  --  13   CREATININE 0.65 0.74  --  0.74   MAGNESIUM  --  2.1  --  2.1   PHOSPHORUS  --  1.3* 3.2 2.9   CALCIUM 8.8 8.5  --  8.6     Recent Labs     01/08/25  1957 01/09/25  0415 01/10/25  0334   ALTSGPT 34  --   --    ASTSGOT 93*  --   --    ALKPHOSPHAT 105*  --   --    TBILIRUBIN 0.3  --   --    DBILIRUBIN <0.2  --   --    PREALBUMIN  --   --  8.6*   GLUCOSE 147* 118* 154*     Recent Labs     01/08/25 1957 01/09/25 0415 01/10/25  0334   WBC 14.1* 11.7* 8.8   NEUTSPOLYS 81.90* 73.90* 72.40*   LYMPHOCYTES 4.70* 9.10* 10.90*   MONOCYTES 11.10 14.40* 12.80   EOSINOPHILS 0.10 0.40 1.50   BASOPHILS 0.40 0.40 0.70   ASTSGOT 93*  --   --    ALTSGPT 34  --   --    ALKPHOSPHAT 105*  --   --    TBILIRUBIN 0.3  --   --      Recent Labs     01/08/25  1957 01/09/25  0415 01/10/25  0334   RBC 3.57* 3.21* 2.92*   HEMOGLOBIN 9.9* 9.1* 8.4*   HEMATOCRIT 33.3* 29.1* 26.1*   PLATELETCT 679* 599* 511*       Imaging  X-Ray:  I have personally reviewed the images and compared with  "prior images.    Assessment/Plan  * Acute on chronic respiratory failure with hypoxia and hypercapnia (HCC)  Assessment & Plan  -intubated 1/4  -extubated 1/5  -re-intubated 1/8  -Ventilator dependent respiratory failure  -Modify ventilator to optimize oxygenation, acid-base balance and ventilation  -CXR as indicated: monitor lung volumes and tube/line placement  -HOB > 30  -Titrate FiO2 to keep sats greater than 92%  -Chlorhexidine  -goal CO2 35-40  -Daily awakening and SBT trials unless contraindicated  -ABCDEF bundle  -I am actively adjusting ventilator based on clinical indicators and ABG's    Pathological fracture of vertebra- (present on admission)  Assessment & Plan  -Pathologic fracture of T5 with myelopathy on presentation  -S/P T4-6 laminectomy with fusion and spinal decompression on 1/1/2025 by Dr. Lowery  -Fall Precautions  -PT OT    Malignant neoplasm of breast (HCC)- (present on admission)  Assessment & Plan  \"Bilateral breast cancer with extensive destruction of the left breast and ulceration and skin lesions making her inoperable on the left.  Right breast shows a 3 x 3 cm mass.  PET scan March 2023 showed non-hypermetabolic pleural effusions which are cytology positive and she has demonstrable pleural masses. Started aromatase inhibition early September 2023.  Has not yet started CDK 4 6 inhibitor.  Lost to follow-up for over 6 months although apparently was on letrozole as single agent.  Now with clear progression of disease in the left breast.  August 2024: Progression of disease in liver with extensive disease.  Breast is worse.  Lung is better.  Lymph nodes are worse.  Status post radiation to symptomatic oozing and exophytic left breast mass.  Marked worsening of her cancer on letrozole with progression in the bone and liver.\"--Dr. Benitez  -ongoing goals of care discussions. Patient is fighting to recover and discharge home so she can start the Letrozole.  -DNR, I OK   -Palliative " following for pain management.      Restless leg syndrome- (present on admission)  Assessment & Plan  -Requip     Cancer related pain- (present on admission)  Assessment & Plan  -Palliative Care following for pain management  -Multimodal pain management    Bilateral pleural effusion- (present on admission)  Assessment & Plan  -Malignant pleural effusions from metastatic breast cancer s/p thoracenteses and chest tubes  -Diuresis as tolerated  -monitor chest xrays  -IR consult for tunneled chest tube placement    Primary hypertension- (present on admission)  Assessment & Plan  -holding PO antihypertensives  -PRN's available    Diabetes mellitus, type 2 (HCC)- (present on admission)  Assessment & Plan  -A1c 6.4  -accuchecks with SSI  -CHO diet  -glucose goal 140-180         VTE:   Holding Lovenox for IR procedure  Ulcer: H2 Antagonist  Lines: Central Line  Ongoing indication addressed and Mojica Catheter  Ongoing indication addressed    I have performed a physical exam and reviewed and updated ROS and Plan today (1/10/2025). In review of yesterday's note (1/9/2025), there are no changes except as documented above.     Discussed patient condition and risk of morbidity and/or mortality with Family, RN, RT, Pharmacy, , Patient, and my attending Dr. Preston.  The patient remains critically ill.  Critical care time = 50 minutes in directly providing and coordinating critical care and extensive data review.  No time overlap and excludes procedures.    Please note that this dictation was created using voice recognition software. I have made every reasonable attempt to correct obvious errors, but there may be errors of grammar and possibly content that I did not discover before finalizing the note.    YESIKA Kwon.

## 2025-01-10 NOTE — CARE PLAN
Problem: Ventilation  Goal: Ability to achieve and maintain unassisted ventilation or tolerate decreased levels of ventilator support  Description: Target End Date:  4 days     Document on Vent flowsheet    1.  Support and monitor invasive and noninvasive mechanical ventilation  2.  Monitor ventilator weaning response  3.  Perform ventilator associated pneumonia prevention interventions  4.  Manage ventilation therapy by monitoring diagnostic test results  Outcome: Not Progressing     Ventilator Daily Summary    Vent Day #3  Airway: 7.5 ETT @23    Ventilator settings: APV/CMV 28 - 380 - 8 - 50%  Weaning trials: Not   Respiratory Procedures: No    Plan: Continue current ventilator settings and wean mechanical ventilation as tolerated per physician orders.

## 2025-01-10 NOTE — THERAPY
Physical Therapy Contact Note    Patient Name: Asha Hannon  Age:  64 y.o., Sex:  female  Medical Record #: [id number]  Today's Date: 1/10/2025       01/10/25 1354   Treatment Variance   Reason For Missed Therapy   (Nursing requesting PT hold d/t pt resting after being uncomfortable and agitated all morning.)   Interdisciplinary Plan of Care Collaboration   IDT Collaboration with  Nursing   Collaboration Comments hold per nursing   Session Information   Date / Session Number  1/7 -4 (1/4, 1/12) att 1/10

## 2025-01-10 NOTE — CARE PLAN
The patient is Watcher - Medium risk of patient condition declining or worsening    Shift Goals  Clinical Goals: MAP >65, comfort  Patient Goals: Comfort  Family Goals: Updates    Progress made toward(s) clinical / shift goals:  Free from injury from restrraints  Problem: Safety - Medical Restraint  Goal: Remains free of injury from restraints (Restraint for Interference with Medical Device)  Outcome: Progressing     Free  Patient is not progressing towards the following goals:  Continues to require pain med and Levophed infusion      Problem: Pain - Standard  Goal: Alleviation of pain or a reduction in pain to the patient’s comfort goal  Outcome: Not Progressing     Problem: Hemodynamics  Goal: Patient's hemodynamics, fluid balance and neurologic status will be stable or improve  Outcome: Not Progressing

## 2025-01-10 NOTE — CARE PLAN
The patient is Watcher - Medium risk of patient condition declining or worsening    Shift Goals  Clinical Goals: MAP >65, pain control, comfort  Patient Goals: Rest  Family Goals: Updates    Progress made toward(s) clinical / shift goals:      Problem: Skin Integrity  Goal: Skin integrity is maintained or improved  Outcome: Progressing  Note: Q2 turns in place. Assessed for signs and symptoms of skin breakdown. Barrier paste applied to groin excoriation.     Problem: Pain - Standard  Goal: Alleviation of pain or a reduction in pain to the patient’s comfort goal  Outcome: Progressing  Note: Administered PRN pain medication per MAR. Assessed pain using a CPOT.      Problem: Safety - Medical Restraint  Goal: Remains free of injury from restraints (Restraint for Interference with Medical Device)  Outcome: Progressing  Addressed this shift: Remains free of injury from restraints (restraint for interference with medical device):   Determine that other, less restrictive measures have been tried or would not be effective before applying the restraint   Evaluate the patient's condition at the time of restraint application   Inform patient/family regarding the reason for restraint   Every 2 hours: Monitor safety, psychosocial status, comfort, nutrition and hydration     Problem: Hemodynamics  Goal: Patient's hemodynamics, fluid balance and neurologic status will be stable or improve  Outcome: Progressing  Note: Maintained MAP greater than 65.      Problem: Respiratory  Goal: Patient will achieve/maintain optimum respiratory ventilation and gas exchange  Outcome: Progressing       Patient is not progressing towards the following goals: N/A

## 2025-01-10 NOTE — DIETARY
Nutrition Update: Follow-up for TF Care Plan  Day 16 of admit.  Asha Hannon is a 64 y.o. female with admitting DX of Breast cancer metastasized to bone, unspecified laterality (HCC) [C50.919, C79.51].    Current Diet: NPO w/ TF Peptamen Intense VHP, goal rate 55 mL/hr continuous feeds - same goal on and off propofol.  Propofol remains at 25 mcg/kg/min, providing 253 kcal/24 hrs. TF running per flowsheets is equivalent formula Vital HP, currently at 50 mL/hr.    Inadequate Oral Intake related to intubation status as evidenced by need for nutrition support.   Nutrition Dx Status: Ongoing - pt requiring enteral nutrition    Problem: Nutritional:  Goal: Achieve adequate nutritional intake  Description: Patient will meet >85% of estimated nutrition needs from EN  Outcome: Met    RD continues to follow.

## 2025-01-11 ENCOUNTER — APPOINTMENT (OUTPATIENT)
Dept: RADIOLOGY | Facility: MEDICAL CENTER | Age: 65
End: 2025-01-11
Attending: INTERNAL MEDICINE
Payer: MEDICARE

## 2025-01-11 LAB
ANION GAP SERPL CALC-SCNC: 12 MMOL/L (ref 7–16)
BACTERIA SPEC RESP CULT: NORMAL
BASOPHILS # BLD AUTO: 0.9 % (ref 0–1.8)
BASOPHILS # BLD: 0.09 K/UL (ref 0–0.12)
BUN SERPL-MCNC: 14 MG/DL (ref 8–22)
CALCIUM SERPL-MCNC: 8.1 MG/DL (ref 8.5–10.5)
CHLORIDE SERPL-SCNC: 101 MMOL/L (ref 96–112)
CO2 SERPL-SCNC: 26 MMOL/L (ref 20–33)
CREAT SERPL-MCNC: 0.68 MG/DL (ref 0.5–1.4)
EOSINOPHIL # BLD AUTO: 0.18 K/UL (ref 0–0.51)
EOSINOPHIL NFR BLD: 1.7 % (ref 0–6.9)
ERYTHROCYTE [DISTWIDTH] IN BLOOD BY AUTOMATED COUNT: 57.4 FL (ref 35.9–50)
GFR SERPLBLD CREATININE-BSD FMLA CKD-EPI: 96 ML/MIN/1.73 M 2
GLUCOSE BLD STRIP.AUTO-MCNC: 102 MG/DL (ref 65–99)
GLUCOSE BLD STRIP.AUTO-MCNC: 133 MG/DL (ref 65–99)
GLUCOSE BLD STRIP.AUTO-MCNC: 142 MG/DL (ref 65–99)
GLUCOSE BLD STRIP.AUTO-MCNC: 79 MG/DL (ref 65–99)
GLUCOSE SERPL-MCNC: 100 MG/DL (ref 65–99)
GRAM STN SPEC: NORMAL
GRAM STN SPEC: NORMAL
HCT VFR BLD AUTO: 27.2 % (ref 37–47)
HGB BLD-MCNC: 8.6 G/DL (ref 12–16)
IMM GRANULOCYTES # BLD AUTO: 0.45 K/UL (ref 0–0.11)
IMM GRANULOCYTES NFR BLD AUTO: 4.3 % (ref 0–0.9)
LYMPHOCYTES # BLD AUTO: 0.92 K/UL (ref 1–4.8)
LYMPHOCYTES NFR BLD: 8.8 % (ref 22–41)
MAGNESIUM SERPL-MCNC: 2.2 MG/DL (ref 1.5–2.5)
MCH RBC QN AUTO: 28.8 PG (ref 27–33)
MCHC RBC AUTO-ENTMCNC: 31.6 G/DL (ref 32.2–35.5)
MCV RBC AUTO: 91 FL (ref 81.4–97.8)
MONOCYTES # BLD AUTO: 1.06 K/UL (ref 0–0.85)
MONOCYTES NFR BLD AUTO: 10.1 % (ref 0–13.4)
NEUTROPHILS # BLD AUTO: 7.76 K/UL (ref 1.82–7.42)
NEUTROPHILS NFR BLD: 74.2 % (ref 44–72)
NRBC # BLD AUTO: 0.02 K/UL
NRBC BLD-RTO: 0.2 /100 WBC (ref 0–0.2)
PHOSPHATE SERPL-MCNC: 2.7 MG/DL (ref 2.5–4.5)
PLATELET # BLD AUTO: 513 K/UL (ref 164–446)
PMV BLD AUTO: 9 FL (ref 9–12.9)
POTASSIUM SERPL-SCNC: 3.8 MMOL/L (ref 3.6–5.5)
RBC # BLD AUTO: 2.99 M/UL (ref 4.2–5.4)
SIGNIFICANT IND 70042: NORMAL
SIGNIFICANT IND 70042: NORMAL
SITE SITE: NORMAL
SITE SITE: NORMAL
SODIUM SERPL-SCNC: 139 MMOL/L (ref 135–145)
SOURCE SOURCE: NORMAL
SOURCE SOURCE: NORMAL
TRIGL SERPL-MCNC: 127 MG/DL (ref 0–149)
WBC # BLD AUTO: 10.5 K/UL (ref 4.8–10.8)

## 2025-01-11 PROCEDURE — 94799 UNLISTED PULMONARY SVC/PX: CPT

## 2025-01-11 PROCEDURE — 700105 HCHG RX REV CODE 258: Performed by: EMERGENCY MEDICINE

## 2025-01-11 PROCEDURE — 700111 HCHG RX REV CODE 636 W/ 250 OVERRIDE (IP): Performed by: EMERGENCY MEDICINE

## 2025-01-11 PROCEDURE — 80048 BASIC METABOLIC PNL TOTAL CA: CPT

## 2025-01-11 PROCEDURE — 700111 HCHG RX REV CODE 636 W/ 250 OVERRIDE (IP): Mod: JZ

## 2025-01-11 PROCEDURE — 700101 HCHG RX REV CODE 250: Performed by: EMERGENCY MEDICINE

## 2025-01-11 PROCEDURE — 94003 VENT MGMT INPAT SUBQ DAY: CPT

## 2025-01-11 PROCEDURE — 770022 HCHG ROOM/CARE - ICU (200)

## 2025-01-11 PROCEDURE — A9270 NON-COVERED ITEM OR SERVICE: HCPCS | Mod: JZ | Performed by: STUDENT IN AN ORGANIZED HEALTH CARE EDUCATION/TRAINING PROGRAM

## 2025-01-11 PROCEDURE — 71045 X-RAY EXAM CHEST 1 VIEW: CPT

## 2025-01-11 PROCEDURE — 700102 HCHG RX REV CODE 250 W/ 637 OVERRIDE(OP): Performed by: EMERGENCY MEDICINE

## 2025-01-11 PROCEDURE — 700111 HCHG RX REV CODE 636 W/ 250 OVERRIDE (IP): Mod: JZ | Performed by: RADIOLOGY

## 2025-01-11 PROCEDURE — 84100 ASSAY OF PHOSPHORUS: CPT

## 2025-01-11 PROCEDURE — 85025 COMPLETE CBC W/AUTO DIFF WBC: CPT

## 2025-01-11 PROCEDURE — A9270 NON-COVERED ITEM OR SERVICE: HCPCS | Performed by: NURSE PRACTITIONER

## 2025-01-11 PROCEDURE — 700111 HCHG RX REV CODE 636 W/ 250 OVERRIDE (IP): Mod: TB | Performed by: NURSE PRACTITIONER

## 2025-01-11 PROCEDURE — 82962 GLUCOSE BLOOD TEST: CPT | Mod: 91

## 2025-01-11 PROCEDURE — 700101 HCHG RX REV CODE 250: Performed by: INTERNAL MEDICINE

## 2025-01-11 PROCEDURE — 700105 HCHG RX REV CODE 258: Performed by: NURSE PRACTITIONER

## 2025-01-11 PROCEDURE — 32557 INSERT CATH PLEURA W/ IMAGE: CPT

## 2025-01-11 PROCEDURE — 84478 ASSAY OF TRIGLYCERIDES: CPT

## 2025-01-11 PROCEDURE — 87070 CULTURE OTHR SPECIMN AEROBIC: CPT

## 2025-01-11 PROCEDURE — 87205 SMEAR GRAM STAIN: CPT

## 2025-01-11 PROCEDURE — 83735 ASSAY OF MAGNESIUM: CPT

## 2025-01-11 PROCEDURE — 99291 CRITICAL CARE FIRST HOUR: CPT | Performed by: NURSE PRACTITIONER

## 2025-01-11 PROCEDURE — 0W9930Z DRAINAGE OF RIGHT PLEURAL CAVITY WITH DRAINAGE DEVICE, PERCUTANEOUS APPROACH: ICD-10-PCS | Performed by: RADIOLOGY

## 2025-01-11 PROCEDURE — 700102 HCHG RX REV CODE 250 W/ 637 OVERRIDE(OP): Mod: JZ | Performed by: STUDENT IN AN ORGANIZED HEALTH CARE EDUCATION/TRAINING PROGRAM

## 2025-01-11 PROCEDURE — A9270 NON-COVERED ITEM OR SERVICE: HCPCS | Performed by: EMERGENCY MEDICINE

## 2025-01-11 PROCEDURE — 700111 HCHG RX REV CODE 636 W/ 250 OVERRIDE (IP): Mod: JZ | Performed by: NURSE PRACTITIONER

## 2025-01-11 PROCEDURE — 700102 HCHG RX REV CODE 250 W/ 637 OVERRIDE(OP): Performed by: NURSE PRACTITIONER

## 2025-01-11 RX ORDER — MIDAZOLAM HYDROCHLORIDE 1 MG/ML
INJECTION INTRAMUSCULAR; INTRAVENOUS
Status: COMPLETED
Start: 2025-01-11 | End: 2025-01-11

## 2025-01-11 RX ORDER — POTASSIUM CHLORIDE 1500 MG/1
40 TABLET, EXTENDED RELEASE ORAL ONCE
Status: COMPLETED | OUTPATIENT
Start: 2025-01-11 | End: 2025-01-11

## 2025-01-11 RX ORDER — ACETAMINOPHEN 500 MG
1000 TABLET ORAL EVERY 6 HOURS PRN
Status: DISCONTINUED | OUTPATIENT
Start: 2025-01-11 | End: 2025-01-13

## 2025-01-11 RX ORDER — MIDAZOLAM HYDROCHLORIDE 1 MG/ML
.5-2 INJECTION INTRAMUSCULAR; INTRAVENOUS PRN
Status: DISCONTINUED | OUTPATIENT
Start: 2025-01-11 | End: 2025-01-11 | Stop reason: HOSPADM

## 2025-01-11 RX ORDER — FUROSEMIDE 10 MG/ML
40 INJECTION INTRAMUSCULAR; INTRAVENOUS ONCE
Status: COMPLETED | OUTPATIENT
Start: 2025-01-11 | End: 2025-01-11

## 2025-01-11 RX ORDER — POTASSIUM CHLORIDE 14.9 MG/ML
20 INJECTION INTRAVENOUS EVERY 8 HOURS
Status: DISCONTINUED | OUTPATIENT
Start: 2025-01-11 | End: 2025-01-12

## 2025-01-11 RX ORDER — ONDANSETRON 2 MG/ML
4 INJECTION INTRAMUSCULAR; INTRAVENOUS PRN
Status: DISCONTINUED | OUTPATIENT
Start: 2025-01-11 | End: 2025-01-11 | Stop reason: HOSPADM

## 2025-01-11 RX ORDER — FUROSEMIDE 10 MG/ML
40 INJECTION INTRAMUSCULAR; INTRAVENOUS EVERY 8 HOURS
Status: DISCONTINUED | OUTPATIENT
Start: 2025-01-11 | End: 2025-01-11

## 2025-01-11 RX ORDER — SODIUM CHLORIDE 9 MG/ML
500 INJECTION, SOLUTION INTRAVENOUS
Status: DISCONTINUED | OUTPATIENT
Start: 2025-01-11 | End: 2025-01-11 | Stop reason: HOSPADM

## 2025-01-11 RX ORDER — METOLAZONE 5 MG/1
5 TABLET ORAL
Status: DISCONTINUED | OUTPATIENT
Start: 2025-01-11 | End: 2025-01-17

## 2025-01-11 RX ORDER — LIDOCAINE HYDROCHLORIDE AND EPINEPHRINE 10; 10 MG/ML; UG/ML
INJECTION, SOLUTION INFILTRATION; PERINEURAL
Status: DISPENSED
Start: 2025-01-11 | End: 2025-01-12

## 2025-01-11 RX ADMIN — ROPINIROLE HYDROCHLORIDE 1 MG: 1 TABLET, FILM COATED ORAL at 08:03

## 2025-01-11 RX ADMIN — FENTANYL CITRATE 50 MCG: 50 INJECTION, SOLUTION INTRAMUSCULAR; INTRAVENOUS at 12:15

## 2025-01-11 RX ADMIN — PROPOFOL 25 MCG/KG/MIN: 10 INJECTION, EMULSION INTRAVENOUS at 03:20

## 2025-01-11 RX ADMIN — MIDAZOLAM HYDROCHLORIDE 2 MG: 1 INJECTION, SOLUTION INTRAMUSCULAR; INTRAVENOUS at 12:16

## 2025-01-11 RX ADMIN — POTASSIUM CHLORIDE 20 MEQ: 14.9 INJECTION, SOLUTION INTRAVENOUS at 07:41

## 2025-01-11 RX ADMIN — HYDROCODONE BITARTRATE AND ACETAMINOPHEN 1 TABLET: 10; 325 TABLET ORAL at 10:14

## 2025-01-11 RX ADMIN — METOCLOPRAMIDE 10 MG: 10 TABLET ORAL at 23:29

## 2025-01-11 RX ADMIN — HYDROMORPHONE HYDROCHLORIDE 1 MG/HR: 10 INJECTION, SOLUTION INTRAMUSCULAR; INTRAVENOUS; SUBCUTANEOUS at 02:29

## 2025-01-11 RX ADMIN — PROPOFOL 30 MCG/KG/MIN: 10 INJECTION, EMULSION INTRAVENOUS at 20:01

## 2025-01-11 RX ADMIN — ROPINIROLE HYDROCHLORIDE 1 MG: 1 TABLET, FILM COATED ORAL at 20:58

## 2025-01-11 RX ADMIN — FENTANYL CITRATE 50 MCG: 50 INJECTION, SOLUTION INTRAMUSCULAR; INTRAVENOUS at 12:18

## 2025-01-11 RX ADMIN — HYDROCODONE BITARTRATE AND ACETAMINOPHEN 1 TABLET: 10; 325 TABLET ORAL at 14:27

## 2025-01-11 RX ADMIN — MIDAZOLAM HYDROCHLORIDE 2 MG: 1 INJECTION, SOLUTION INTRAMUSCULAR; INTRAVENOUS at 12:21

## 2025-01-11 RX ADMIN — ENOXAPARIN SODIUM 40 MG: 100 INJECTION SUBCUTANEOUS at 17:19

## 2025-01-11 RX ADMIN — NOREPINEPHRINE BITARTRATE 0.14 MCG/KG/MIN: 1 SOLUTION INTRAVENOUS at 17:23

## 2025-01-11 RX ADMIN — METOCLOPRAMIDE 10 MG: 10 TABLET ORAL at 11:02

## 2025-01-11 RX ADMIN — POTASSIUM CHLORIDE 20 MEQ: 14.9 INJECTION, SOLUTION INTRAVENOUS at 14:28

## 2025-01-11 RX ADMIN — METOCLOPRAMIDE 10 MG: 10 TABLET ORAL at 17:27

## 2025-01-11 RX ADMIN — IPRATROPIUM BROMIDE AND ALBUTEROL SULFATE 3 ML: .5; 2.5 SOLUTION RESPIRATORY (INHALATION) at 22:12

## 2025-01-11 RX ADMIN — POTASSIUM CHLORIDE 40 MEQ: 1500 TABLET, EXTENDED RELEASE ORAL at 16:39

## 2025-01-11 RX ADMIN — ACETAMINOPHEN 1000 MG: 500 TABLET ORAL at 20:58

## 2025-01-11 RX ADMIN — FUROSEMIDE 40 MG: 10 INJECTION INTRAMUSCULAR; INTRAVENOUS at 08:36

## 2025-01-11 RX ADMIN — POTASSIUM CHLORIDE 40 MEQ: 1500 TABLET, EXTENDED RELEASE ORAL at 07:44

## 2025-01-11 RX ADMIN — FUROSEMIDE 40 MG: 10 INJECTION INTRAMUSCULAR; INTRAVENOUS at 07:33

## 2025-01-11 RX ADMIN — HYDROMORPHONE HYDROCHLORIDE 0.5 MG/HR: 10 INJECTION, SOLUTION INTRAMUSCULAR; INTRAVENOUS; SUBCUTANEOUS at 20:13

## 2025-01-11 RX ADMIN — Medication 400 MG: at 05:19

## 2025-01-11 RX ADMIN — HYDROCODONE BITARTRATE AND ACETAMINOPHEN 1 TABLET: 10; 325 TABLET ORAL at 21:00

## 2025-01-11 RX ADMIN — FAMOTIDINE 20 MG: 20 TABLET, FILM COATED ORAL at 05:19

## 2025-01-11 RX ADMIN — PROPOFOL 30 MCG/KG/MIN: 10 INJECTION, EMULSION INTRAVENOUS at 13:07

## 2025-01-11 RX ADMIN — MIDAZOLAM HYDROCHLORIDE 2 MG: 1 INJECTION INTRAMUSCULAR; INTRAVENOUS at 12:21

## 2025-01-11 RX ADMIN — HYDROCODONE BITARTRATE AND ACETAMINOPHEN 1 TABLET: 10; 325 TABLET ORAL at 17:19

## 2025-01-11 RX ADMIN — MIDAZOLAM HYDROCHLORIDE 2 MG: 1 INJECTION INTRAMUSCULAR; INTRAVENOUS at 12:16

## 2025-01-11 RX ADMIN — FAMOTIDINE 20 MG: 10 INJECTION, SOLUTION INTRAVENOUS at 17:27

## 2025-01-11 RX ADMIN — POTASSIUM CHLORIDE 20 MEQ: 14.9 INJECTION, SOLUTION INTRAVENOUS at 23:29

## 2025-01-11 RX ADMIN — METOCLOPRAMIDE 10 MG: 10 TABLET ORAL at 05:19

## 2025-01-11 RX ADMIN — METOLAZONE 5 MG: 5 TABLET ORAL at 10:14

## 2025-01-11 ASSESSMENT — PAIN DESCRIPTION - PAIN TYPE
TYPE: ACUTE PAIN
TYPE: ACUTE PAIN;CHRONIC PAIN
TYPE: CHRONIC PAIN
TYPE: ACUTE PAIN
TYPE: CHRONIC PAIN
TYPE: CHRONIC PAIN
TYPE: ACUTE PAIN
TYPE: CHRONIC PAIN
TYPE: ACUTE PAIN
TYPE: CHRONIC PAIN
TYPE: CHRONIC PAIN

## 2025-01-11 ASSESSMENT — FIBROSIS 4 INDEX: FIB4 SCORE: 2.03

## 2025-01-11 NOTE — CARE PLAN
The patient is Watcher - Medium risk of patient condition declining or worsening    Shift Goals  Clinical Goals: Pain control, MAP >65  Patient Goals: Comfort  Family Goals: Updates    Progress made toward(s) clinical / shift goals:  MAP maintained >65.  Free from injury from restraints  Problem: Hemodynamics  Goal: Patient's hemodynamics, fluid balance and neurologic status will be stable or improve  Outcome: Progressing     Problem: Safety - Medical Restraint  Goal: Remains free of injury from restraints (Restraint for Interference with Medical Device)  Outcome: Progressing       Patient is not progressing towards the following goals:  Started on Hydromorphone infusion      Problem: Pain - Standard  Goal: Alleviation of pain or a reduction in pain to the patient’s comfort goal  Outcome: Not Progressing

## 2025-01-11 NOTE — OR SURGEON
Immediate Post- Operative Note        Findings: RIGHT ASPIRA DRAIN IN PLACE. MODERATE R PLEURAL EFFUSION      Procedure(s): R TUNNELED ASPIRA CHEST TUBE PLACEMENT WITH US AND FLUORO    EVACUATION: 1150 ML WATERY YELLOW FLUID    20 ML SENT FOR C+S. GRAM STAIN      Estimated Blood Loss: Less than 5 ml        Complications: None            1/11/2025     12:48 PM     Rubens Gaming M.D.

## 2025-01-11 NOTE — CARE PLAN
Problem: Ventilation  Goal: Ability to achieve and maintain unassisted ventilation or tolerate decreased levels of ventilator support  Description: Target End Date:  4 days     Document on Vent flowsheet    1.  Support and monitor invasive and noninvasive mechanical ventilation  2.  Monitor ventilator weaning response  3.  Perform ventilator associated pneumonia prevention interventions  4.  Manage ventilation therapy by monitoring diagnostic test results  Outcome: Not Met     Ventilator Daily Summary    Vent Day #3  Airway: 7.5#23 @ CMV [phone]    Ventilator settings:   Weaning trials:   Respiratory Procedures:     Plan: Continue current ventilator settings and wean mechanical ventilation as tolerated per physician orders.

## 2025-01-11 NOTE — PROGRESS NOTES
Pt presents to IR2. Patient was consented by MD at bedside, confirmed by this RN and consent at bedside. Pt transferred to IR table in supine position. Patient underwent a tunneled pleural catheter placement by Dr. Gaming. Procedure site was marked by MD and verified using imaging guidance. Pt placed on monitor, prepped and draped in a sterile fashion. Vitals were taken every 5 minutes and remained stable during procedure (see doc flow sheet for results). CO2 waveform capnography was monitored and remained WNL throughout procedure. Pt transported by bed with RN to R113.         Aspira pleural drainage catheter 15.5 F  REF: [id number]  LOT: [id number]  EXP: 10/15/2026

## 2025-01-11 NOTE — CARE PLAN
Problem: Ventilation  Goal: Ability to achieve and maintain unassisted ventilation or tolerate decreased levels of ventilator support  Description: Target End Date:  4 days     Document on Vent flowsheet    1.  Support and monitor invasive and noninvasive mechanical ventilation  2.  Monitor ventilator weaning response  3.  Perform ventilator associated pneumonia prevention interventions  4.  Manage ventilation therapy by monitoring diagnostic test results  Outcome: Not Progressing     Ventilator Daily Summary    Vent Day #4  Airway: 7.5 ETT @23    Ventilator settings: APV/CMV 28 - 380 - 8 - 50%  Weaning trials: No  Respiratory Procedures: No    Plan: Continue current ventilator settings and wean mechanical ventilation as tolerated per physician orders.

## 2025-01-11 NOTE — PROGRESS NOTES
"Critical Care Progress Note    Date of admission  12/25/2024    Chief Complaint  Shortness of breath     Hospital Course  Asha Hannon is a 64-year-old female with PMH significant for breast cancer with mets to the lungs currently on oral chemo who presented 12/25/2024 with weakness, SOB and back & abdominal pain.  CT C/A/P with contrast showed new BUN infiltrate; moderate size pleural effusions; worsening of hepatic metastasis; new abdominal lymphadenopathy, thoracic adenopathy; diffuse osseous metastatic disease with T5 pathologic fracture.    Admitted to floor under the [facility] Medicine Team.    12/26 - Palliative Care consult - patient deferred discussion.   12/27 - right-sided thoracentesis.  12/29 - restless legs \"gave out\" resulting in fall, no trauma.   12/31 -right MRI showed multiple metastatic lesions; T-spine MRI showed multiple metastatic lesions and T5 pathologic fracture; L-spine multiple metastatic lesions; C-spine no metastatic lesions.  1/1 - to OR for T4-T6 laminectomy with Dr. Lowery.  1/2 - POD #1, right-sided pneumothorax, medically manage. Left-sided thoracentesis  1/3 - POD #2, AMS with respiratory failure, pH 7.14 CO2 100. Transfer to ICU. Chest tube placed.   1/4 - Intubation  1/5 - Extubation. Chest tubes clamped.  1/6 - Ongoing goals of care discussions. Large bore right chest tube removed. BIPAP and diuresis.   1/7 - DNR, I OK. BIPAP. Diuresing. Right pigtail catheter removed.  1/8 - acute decompensation with hypoxia & obtundation. Re-intubated.   1/9 - VD #2.   1/10 - VD #3. Diuresing. IR consult for PleurX placement.     Interval Problem Update  Reviewed last 24 hour events:  Tmax 100  SR 60s to 80s  SBP 90s to 120s.  Actively titrating norepinephrine  RASS -2 to +2.  Propofol.  Dilaudid  Neuro: opens eyes, nods appropriately to questions. Moving all 5/5.   Vent day #4: APVC 28/380/8/50%  NPO pending IR procedure  I/O: 1600/1200.  Scheduled IV Lasix every 8 hours  Replacing " electrolytes  Lovenox on hold for IR procedure  TLC, Mojica  Mobility 2    Review of Systems  Review of Systems   Unable to perform ROS: Intubated        Vital Signs for last 24 hours   Temp:  [37.7 °C (99.9 °F)] 37.7 °C (99.9 °F)  Pulse:  [54-86] 65  Resp:  [9-29] 28  BP: ()/(44-77) 92/50  SpO2:  [91 %-100 %] 93 %    Hemodynamic parameters for last 24 hours       Respiratory Information for the last 24 hours  Vent Mode: APVCMV  Rate (breaths/min): 28  Vt Target (mL): 380  PEEP/CPAP: 8  MAP: 14  Control VTE (exp VT): 439    Physical Exam   Physical Exam  Vitals and nursing note reviewed.   Constitutional:       General: She is sleeping. She is not in acute distress.     Appearance: Normal appearance. She is ill-appearing. She is not toxic-appearing.      Interventions: She is sedated and intubated. Nasal cannula in place.   HENT:      Head: Normocephalic.      Nose: Nose normal.      Mouth/Throat:      Lips: Pink.      Mouth: Mucous membranes are moist.   Eyes:      Pupils: Pupils are equal, round, and reactive to light.   Cardiovascular:      Rate and Rhythm: Normal rate and regular rhythm.      Pulses: Normal pulses.      Heart sounds: Normal heart sounds.   Pulmonary:      Effort: Pulmonary effort is normal. She is intubated.      Breath sounds: Examination of the right-middle field reveals rhonchi. Examination of the right-lower field reveals rhonchi. Rhonchi present. No wheezing.   Abdominal:      General: Bowel sounds are decreased.      Palpations: Abdomen is soft.   Musculoskeletal:      Right lower leg: No edema.      Left lower leg: No edema.   Skin:     General: Skin is warm and dry.      Capillary Refill: Capillary refill takes less than 2 seconds.   Neurological:      GCS: GCS eye subscore is 4. GCS verbal subscore is 1. GCS motor subscore is 6.   Psychiatric:      Comments: Intubated/Sedated         Medications  Current Facility-Administered Medications   Medication Dose Route Frequency Provider  Last Rate Last Admin    potassium chloride in water (Kcl) ivpb **Administer in ICU only** 20 mEq  20 mEq Intravenous Q8HR RODY KwonP.R.N.   Stopped at 01/11/25 0941    metOLazone (Zaroxolyn) tablet 5 mg  5 mg Enteral Tube Q DAY RODY KwonP.R.N.   5 mg at 01/11/25 1014    HYDROmorphone (DILAUDID) 0.2 mg/mL in 50mL NS (Continuous Infusion)   Intravenous Continuous BHARGAVI Kwon.P.R.N. 2.5 mL/hr at 01/11/25 0708 0.5 mg/hr at 01/11/25 0708    Pharmacy Consult: Enteral tube insertion - review meds/change route/product selection  1 Each Other PHARMACY TO DOSE BHARGAVI Kwon.P.R.N.        norepinephrine (Levophed) 8 mg in 250 mL NS infusion (premix)  0-1 mcg/kg/min (Ideal) Intravenous Continuous Juan Daniel Baca M.D. 6 mL/hr at 01/11/25 1054 0.05 mcg/kg/min at 01/11/25 1054    Respiratory Therapy Consult   Nebulization Continuous RT Juan Daniel Baca M.D.        famotidine (Pepcid) tablet 20 mg  20 mg Enteral Tube Q12HRS Juan Daniel Baca M.D.   20 mg at 01/11/25 0519    Or    famotidine (Pepcid) injection 20 mg  20 mg Intravenous Q12HRS Juan Daniel Baca M.D.        senna-docusate (Pericolace Or Senokot S) 8.6-50 MG per tablet 2 Tablet  2 Tablet Enteral Tube BID Juan Daniel Baca M.D.   2 Tablet at 01/10/25 0510    And    polyethylene glycol/lytes (Miralax) Packet 1 Packet  1 Packet Enteral Tube QDAY PRN Juan Daniel Baca M.D.   1 Packet at 01/09/25 0505    And    magnesium hydroxide (Milk Of Magnesia) suspension 30 mL  30 mL Enteral Tube QDAY PRN Juan Daniel Baca M.D.   30 mL at 01/09/25 1819    And    bisacodyl (Dulcolax) suppository 10 mg  10 mg Rectal QDAY PRN Juan Daniel Baca M.D. MD Alert...ICU Electrolyte Replacement per Pharmacy   Other PHARMACY TO DOSE Juan Danile Baca M.D.        lidocaine (Xylocaine) 1 % injection 2 mL  2 mL Tracheal Tube Q30 MIN PRN Juan Daniel Baca M.D.   2 mL at 01/09/25 0023    propofol (DIPRIVAN) injection  0-80 mcg/kg/min (Ideal) Intravenous  Continuous Juan Daniel Baca M.D. 11.5 mL/hr at 01/11/25 0750 30 mcg/kg/min at 01/11/25 0750    HYDROcodone/acetaminophen (Norco)  MG per tablet 1 Tablet  1 Tablet Enteral Tube Q4HRS Juan Daniel Baca M.D.   1 Tablet at 01/11/25 1014    magnesium oxide tablet 400 mg  400 mg Enteral Tube DAILY Juan Daniel Baca M.D.   400 mg at 01/11/25 0519    metoclopramide (Reglan) tablet 10 mg  10 mg Enteral Tube Q6HRS Juan Daniel Baca M.D.   10 mg at 01/11/25 1102    ROPINIRole (Requip) tablet 1 mg  1 mg Enteral Tube QHS Juan Daniel Baca M.D.   1 mg at 01/10/25 2032    calcium carbonate (Tums) chewable tab 500 mg  500 mg Enteral Tube TID PRN Juan Daniel Baca M.D.        methocarbamol (Robaxin) tablet 750 mg  750 mg Enteral Tube Q8HRS PRN Juan Daniel Baca M.D.   750 mg at 01/10/25 0944    ondansetron (Zofran ODT) dispertab 4 mg  4 mg Enteral Tube Q4HRS PRN Juan Daniel Baca M.D.        ROPINIRole (Requip) tablet 1 mg  1 mg Enteral Tube BID PRN Juan Daniel Baca M.D.   1 mg at 01/11/25 0803    senna-docusate (Pericolace Or Senokot S) 8.6-50 MG per tablet 1 Tablet  1 Tablet Enteral Tube Q24HRS PRN Juan Daniel Baca M.D.        insulin lispro (HumaLOG,AdmeLOG) subcutaneous injection  2-9 Units Subcutaneous Q6HRS Aidee Hicks        And    dextrose 50% (D50W) injection 25 g  25 g Intravenous Q15 MIN PRN Aidee HAYNES Lataudie        labetalol (Normodyne/Trandate) injection 10 mg  10 mg Intravenous Q4HRS PRN Lidia Figueroa M.D.   10 mg at 01/07/25 0611    ipratropium-albuterol (DUONEB) nebulizer solution  3 mL Nebulization Q2HRS PRN (RT) Americo Avalos M.D.   3 mL at 01/09/25 0032    [Held by provider] enoxaparin (Lovenox) inj 40 mg  40 mg Subcutaneous DAILY AT 1800 Americo Avalos M.D.   40 mg at 01/08/25 1848    bisacodyl (Dulcolax) suppository 10 mg  10 mg Rectal DAILY Neo Ceja, P.A.   10 mg at 01/10/25 0511    sodium chloride (Ocean) 0.65 % nasal spray 2 Spray  2 Spray Nasal Q2HRS PRN Edward  ROCHELLE Saleh M.D.   2 Spray at 01/01/25 0600    MD ALERT...DO NOT ADMINISTER NSAIDS or ASPIRIN unless ORDERED By Neurosurgery 1 Each  1 Each Other PRN Karthik Lowery D.O.        ondansetron (Zofran) syringe/vial injection 4 mg  4 mg Intravenous Q4HRS PRN Abdulaziz Nieto M.D.   4 mg at 01/07/25 1842       Fluids    Intake/Output Summary (Last 24 hours) at 1/11/2025 1148  Last data filed at 1/11/2025 1126  Gross per 24 hour   Intake 1192.99 ml   Output 1796 ml   Net -603.01 ml       Laboratory  Recent Labs     01/08/25  1922 01/09/25  0505 01/10/25  0336   ISTATAPH 7.259* 7.514* 7.547*   ISTATAPCO2 91.2* 38.2 35.3   ISTATAPO2 22* 110* 63*   ISTATATCO2 44* 32 32   [id number] 27* 99 94   ISTATARTHCO3 40.8* 30.8* 30.7*   ISTATARTBE 11* 7* 8*   ISTATTEMP 38.2 C 37.9 C 37.6 C   ISTATFIO2 100 60 40   ISTATSPEC Arterial Arterial Arterial   ISTATAPHTC 7.242* 7.500* 7.538*   [id number] 24* 116* 66         Recent Labs     01/09/25  0415 01/10/25  0010 01/10/25  0334 01/10/25  1500 01/11/25  0429   SODIUM 137  --  135 138 139   POTASSIUM 3.9  --  2.8* 3.3* 3.8   CHLORIDE 95*  --  96 99 101   CO2 32  --  30 31 26   BUN 12  --  13 14 14   CREATININE 0.74  --  0.74 0.72 0.68   MAGNESIUM 2.1  --  2.1  --  2.2   PHOSPHORUS 1.3* 3.2 2.9  --  2.7   CALCIUM 8.5  --  8.6 8.3* 8.1*     Recent Labs     01/08/25 1957 01/09/25  0415 01/10/25  0334 01/10/25  1500 01/11/25  0429   ALTSGPT 34  --   --   --   --    ASTSGOT 93*  --   --   --   --    ALKPHOSPHAT 105*  --   --   --   --    TBILIRUBIN 0.3  --   --   --   --    DBILIRUBIN <0.2  --   --   --   --    PREALBUMIN  --   --  8.6*  --   --    GLUCOSE 147*   < > 154* 113* 100*    < > = values in this interval not displayed.     Recent Labs     01/08/25 1957 01/09/25  0415 01/10/25  0334 01/11/25  0429   WBC 14.1* 11.7* 8.8 10.5   NEUTSPOLYS 81.90* 73.90* 72.40* 74.20*   LYMPHOCYTES 4.70* 9.10* 10.90* 8.80*   MONOCYTES 11.10 14.40* 12.80 10.10   EOSINOPHILS 0.10 0.40 1.50 1.70   BASOPHILS  "0.40 0.40 0.70 0.90   ASTSGOT 93*  --   --   --    ALTSGPT 34  --   --   --    ALKPHOSPHAT 105*  --   --   --    TBILIRUBIN 0.3  --   --   --      Recent Labs     01/09/25  0415 01/10/25  0334 01/11/25  0429   RBC 3.21* 2.92* 2.99*   HEMOGLOBIN 9.1* 8.4* 8.6*   HEMATOCRIT 29.1* 26.1* 27.2*   PLATELETCT 599* 511* 513*       Imaging  X-Ray:  I have personally reviewed the images and compared with prior images.    Assessment/Plan  * Acute on chronic respiratory failure with hypoxia and hypercapnia (HCC)  Assessment & Plan  -intubated 1/4  -extubated 1/5  -re-intubated 1/8  -Ventilator dependent respiratory failure  -Modify ventilator to optimize oxygenation, acid-base balance and ventilation  -CXR as indicated: monitor lung volumes and tube/line placement  -HOB > 30  -Titrate FiO2 to keep sats greater than 92%  -Chlorhexidine  -goal CO2 35-40  -Daily awakening and SBT trials unless contraindicated  -ABCDEF bundle  -I am actively adjusting ventilator based on clinical indicators and ABG's    Pathological fracture of vertebra- (present on admission)  Assessment & Plan  -Pathologic fracture of T5 with myelopathy on presentation  -S/P T4-6 laminectomy with fusion and spinal decompression on 1/1/2025 by Dr. Lowery  -Fall Precautions  -PT OT    Malignant neoplasm of breast (HCC)- (present on admission)  Assessment & Plan  \"Bilateral breast cancer with extensive destruction of the left breast and ulceration and skin lesions making her inoperable on the left.  Right breast shows a 3 x 3 cm mass.  PET scan March 2023 showed non-hypermetabolic pleural effusions which are cytology positive and she has demonstrable pleural masses. Started aromatase inhibition early September 2023.  Has not yet started CDK 4 6 inhibitor.  Lost to follow-up for over 6 months although apparently was on letrozole as single agent.  Now with clear progression of disease in the left breast.  August 2024: Progression of disease in liver with extensive " "disease.  Breast is worse.  Lung is better.  Lymph nodes are worse.  Status post radiation to symptomatic oozing and exophytic left breast mass.  Marked worsening of her cancer on letrozole with progression in the bone and liver.\"--Dr. Benitez  -ongoing goals of care discussions. Patient is fighting to recover and discharge home so she can start the Letrozole.  -DNR, I OK   -Palliative following for pain management.      Restless leg syndrome- (present on admission)  Assessment & Plan  -Requip     Cancer related pain- (present on admission)  Assessment & Plan  -Palliative Care following for pain management  -Multimodal pain management    Bilateral pleural effusion- (present on admission)  Assessment & Plan  -Malignant pleural effusions from metastatic breast cancer s/p thoracenteses and chest tubes  -Diuresis as tolerated  -monitor chest xrays  -PleurX placement in IR today    Primary hypertension- (present on admission)  Assessment & Plan  -holding PO antihypertensives  -PRN's available    Diabetes mellitus, type 2 (HCC)- (present on admission)  Assessment & Plan  -A1c 6.4  -accuchecks with SSI  -CHO diet  -glucose goal 140-180         VTE:   On hold for IR procedure.  Ulcer: H2 Antagonist  Lines: Central Line  Ongoing indication addressed and Mojica Catheter  Ongoing indication addressed    I have performed a physical exam and reviewed and updated ROS and Plan today (1/11/2025). In review of yesterday's note (1/10/2025), there are no changes except as documented above.     Discussed patient condition and risk of morbidity and/or mortality with Family, RN, RT, Pharmacy, Patient, and my attending Dr. Preston.  The patient remains critically ill.  Critical care time = 58 minutes in directly providing and coordinating critical care and extensive data review.  No time overlap and excludes procedures.    Please note that this dictation was created using voice recognition software. I have made every reasonable attempt " to correct obvious errors, but there may be errors of grammar and possibly content that I did not discover before finalizing the note.    YESIKA Kwon.

## 2025-01-11 NOTE — CARE PLAN
Problem: Knowledge Deficit - Standard  Goal: Patient and family/care givers will demonstrate understanding of plan of care, disease process/condition, diagnostic tests and medications  Outcome: Progressing     Problem: Skin Integrity  Goal: Skin integrity is maintained or improved  Outcome: Progressing     Problem: Fall Risk  Goal: Patient will remain free from falls  Outcome: Progressing     Problem: Pain - Standard  Goal: Alleviation of pain or a reduction in pain to the patient’s comfort goal  Outcome: Progressing     Problem: Hemodynamics  Goal: Patient's hemodynamics, fluid balance and neurologic status will be stable or improve  Outcome: Progressing     Problem: Fluid Volume  Goal: Fluid volume balance will be maintained  Outcome: Progressing     Problem: Urinary - Renal Perfusion  Goal: Ability to achieve and maintain adequate renal perfusion and functioning will improve  Outcome: Progressing     Problem: Respiratory  Goal: Patient will achieve/maintain optimum respiratory ventilation and gas exchange  Outcome: Progressing     Problem: Physical Regulation  Goal: Diagnostic test results will improve  Outcome: Progressing   The patient is Watcher - Medium risk of patient condition declining or worsening    Shift Goals  Clinical Goals: Pain management, MAP >65  Patient Goals: Comfort  Family Goals: Updates    Progress made toward(s) clinical / shift goals:      Patient is not progressing towards the following goals:

## 2025-01-11 NOTE — CARE PLAN
The patient is Watcher - Medium risk of patient condition declining or worsening    Shift Goals  Clinical Goals: Pain management, MAP >65  Patient Goals: Comfort  Family Goals: Updates    Progress made toward(s) clinical / shift goals:      Problem: Skin Integrity  Goal: Skin integrity is maintained or improved  Outcome: Progressing  Note: Q2 turns in place. Assessed for signs and symptoms of skin breakdown.      Problem: Pain - Standard  Goal: Alleviation of pain or a reduction in pain to the patient’s comfort goal  Outcome: Progressing  Note: Assessed pain using a CPOT scale. Patient was able to rest comfortably on the dilaudid drip.      Problem: Safety - Medical Restraint  Goal: Remains free of injury from restraints (Restraint for Interference with Medical Device)  Outcome: Progressing  Addressed this shift: Remains free of injury from restraints (restraint for interference with medical device):   Determine that other, less restrictive measures have been tried or would not be effective before applying the restraint   Evaluate the patient's condition at the time of restraint application   Inform patient/family regarding the reason for restraint   Every 2 hours: Monitor safety, psychosocial status, comfort, nutrition and hydration     Problem: Hemodynamics  Goal: Patient's hemodynamics, fluid balance and neurologic status will be stable or improve  Outcome: Progressing  Note: Maintained MAP greater than 65 per MAR.      Problem: Respiratory  Goal: Patient will achieve/maintain optimum respiratory ventilation and gas exchange  Outcome: Progressing       Patient is not progressing towards the following goals: N/A

## 2025-01-12 ENCOUNTER — APPOINTMENT (OUTPATIENT)
Dept: RADIOLOGY | Facility: MEDICAL CENTER | Age: 65
End: 2025-01-12
Attending: INTERNAL MEDICINE
Payer: MEDICARE

## 2025-01-12 LAB
ANION GAP SERPL CALC-SCNC: 8 MMOL/L (ref 7–16)
ARTERIAL PATENCY WRIST A: ABNORMAL
BASE EXCESS BLDA CALC-SCNC: 4 MMOL/L (ref -4–3)
BASOPHILS # BLD AUTO: 0.7 % (ref 0–1.8)
BASOPHILS # BLD: 0.09 K/UL (ref 0–0.12)
BODY TEMPERATURE: ABNORMAL DEGREES
BREATHS SETTING VENT: 28
BUN SERPL-MCNC: 16 MG/DL (ref 8–22)
CALCIUM SERPL-MCNC: 7.9 MG/DL (ref 8.5–10.5)
CHLORIDE SERPL-SCNC: 101 MMOL/L (ref 96–112)
CO2 BLDA-SCNC: 29 MMOL/L (ref 32–48)
CO2 SERPL-SCNC: 27 MMOL/L (ref 20–33)
CREAT SERPL-MCNC: 0.69 MG/DL (ref 0.5–1.4)
DELSYS IDSYS: ABNORMAL
END TIDAL CARBON DIOXIDE IECO2: 32 MMHG
EOSINOPHIL # BLD AUTO: 0.19 K/UL (ref 0–0.51)
EOSINOPHIL NFR BLD: 1.6 % (ref 0–6.9)
ERYTHROCYTE [DISTWIDTH] IN BLOOD BY AUTOMATED COUNT: 57.6 FL (ref 35.9–50)
GFR SERPLBLD CREATININE-BSD FMLA CKD-EPI: 96 ML/MIN/1.73 M 2
GLUCOSE BLD STRIP.AUTO-MCNC: 124 MG/DL (ref 65–99)
GLUCOSE BLD STRIP.AUTO-MCNC: 159 MG/DL (ref 65–99)
GLUCOSE BLD STRIP.AUTO-MCNC: 159 MG/DL (ref 65–99)
GLUCOSE SERPL-MCNC: 174 MG/DL (ref 65–99)
HCO3 BLDA-SCNC: 27.8 MMOL/L (ref 21–28)
HCT VFR BLD AUTO: 27.4 % (ref 37–47)
HGB BLD-MCNC: 8.6 G/DL (ref 12–16)
HOROWITZ INDEX BLDA+IHG-RTO: 138 MM[HG]
IMM GRANULOCYTES # BLD AUTO: 0.36 K/UL (ref 0–0.11)
IMM GRANULOCYTES NFR BLD AUTO: 3 % (ref 0–0.9)
LACTATE BLD-SCNC: 1.2 MMOL/L (ref 0.5–2)
LYMPHOCYTES # BLD AUTO: 1.11 K/UL (ref 1–4.8)
LYMPHOCYTES NFR BLD: 9.1 % (ref 22–41)
MCH RBC QN AUTO: 28.6 PG (ref 27–33)
MCHC RBC AUTO-ENTMCNC: 31.4 G/DL (ref 32.2–35.5)
MCV RBC AUTO: 91 FL (ref 81.4–97.8)
MODE IMODE: ABNORMAL
MONOCYTES # BLD AUTO: 1.32 K/UL (ref 0–0.85)
MONOCYTES NFR BLD AUTO: 10.8 % (ref 0–13.4)
NEUTROPHILS # BLD AUTO: 9.12 K/UL (ref 1.82–7.42)
NEUTROPHILS NFR BLD: 74.8 % (ref 44–72)
NRBC # BLD AUTO: 0.02 K/UL
NRBC BLD-RTO: 0.2 /100 WBC (ref 0–0.2)
O2/TOTAL GAS SETTING VFR VENT: 40 %
PCO2 BLDA: 36 MMHG (ref 32–48)
PCO2 TEMP ADJ BLDA: 37.5 MMHG (ref 32–48)
PEEP END EXPIRATORY PRESSURE IPEEP: 8 CMH20
PH BLDA: 7.5 [PH] (ref 7.35–7.45)
PH TEMP ADJ BLDA: 7.48 [PH] (ref 7.35–7.45)
PLATELET # BLD AUTO: 546 K/UL (ref 164–446)
PMV BLD AUTO: 8.9 FL (ref 9–12.9)
PO2 BLDA: 55 MMHG (ref 83–108)
PO2 TEMP ADJ BLDA: 59 MMHG (ref 64–87)
POTASSIUM SERPL-SCNC: 3.8 MMOL/L (ref 3.6–5.5)
RBC # BLD AUTO: 3.01 M/UL (ref 4.2–5.4)
SAO2 % BLDA: 91 % (ref 93–99)
SODIUM SERPL-SCNC: 136 MMOL/L (ref 135–145)
SPECIMEN DRAWN FROM PATIENT: ABNORMAL
TIDAL VOLUME IVT: 380 ML
WBC # BLD AUTO: 12.2 K/UL (ref 4.8–10.8)

## 2025-01-12 PROCEDURE — 700105 HCHG RX REV CODE 258: Performed by: EMERGENCY MEDICINE

## 2025-01-12 PROCEDURE — 94003 VENT MGMT INPAT SUBQ DAY: CPT

## 2025-01-12 PROCEDURE — 700111 HCHG RX REV CODE 636 W/ 250 OVERRIDE (IP): Mod: JZ

## 2025-01-12 PROCEDURE — 700101 HCHG RX REV CODE 250: Performed by: EMERGENCY MEDICINE

## 2025-01-12 PROCEDURE — 700111 HCHG RX REV CODE 636 W/ 250 OVERRIDE (IP): Mod: TB | Performed by: NURSE PRACTITIONER

## 2025-01-12 PROCEDURE — 700111 HCHG RX REV CODE 636 W/ 250 OVERRIDE (IP): Mod: JZ | Performed by: NURSE PRACTITIONER

## 2025-01-12 PROCEDURE — 82962 GLUCOSE BLOOD TEST: CPT | Mod: 91

## 2025-01-12 PROCEDURE — 94150 VITAL CAPACITY TEST: CPT

## 2025-01-12 PROCEDURE — 71045 X-RAY EXAM CHEST 1 VIEW: CPT

## 2025-01-12 PROCEDURE — 85025 COMPLETE CBC W/AUTO DIFF WBC: CPT

## 2025-01-12 PROCEDURE — A9270 NON-COVERED ITEM OR SERVICE: HCPCS | Performed by: NURSE PRACTITIONER

## 2025-01-12 PROCEDURE — 700105 HCHG RX REV CODE 258: Performed by: NURSE PRACTITIONER

## 2025-01-12 PROCEDURE — 700102 HCHG RX REV CODE 250 W/ 637 OVERRIDE(OP): Performed by: EMERGENCY MEDICINE

## 2025-01-12 PROCEDURE — 94799 UNLISTED PULMONARY SVC/PX: CPT

## 2025-01-12 PROCEDURE — A9270 NON-COVERED ITEM OR SERVICE: HCPCS | Performed by: EMERGENCY MEDICINE

## 2025-01-12 PROCEDURE — 700102 HCHG RX REV CODE 250 W/ 637 OVERRIDE(OP): Performed by: NURSE PRACTITIONER

## 2025-01-12 PROCEDURE — 36600 WITHDRAWAL OF ARTERIAL BLOOD: CPT

## 2025-01-12 PROCEDURE — 770022 HCHG ROOM/CARE - ICU (200)

## 2025-01-12 PROCEDURE — 700111 HCHG RX REV CODE 636 W/ 250 OVERRIDE (IP): Performed by: EMERGENCY MEDICINE

## 2025-01-12 PROCEDURE — 83605 ASSAY OF LACTIC ACID: CPT

## 2025-01-12 PROCEDURE — 80048 BASIC METABOLIC PNL TOTAL CA: CPT

## 2025-01-12 PROCEDURE — 82803 BLOOD GASES ANY COMBINATION: CPT

## 2025-01-12 PROCEDURE — 99291 CRITICAL CARE FIRST HOUR: CPT | Performed by: NURSE PRACTITIONER

## 2025-01-12 RX ORDER — FUROSEMIDE 10 MG/ML
80 INJECTION INTRAMUSCULAR; INTRAVENOUS
Status: DISCONTINUED | OUTPATIENT
Start: 2025-01-12 | End: 2025-01-19

## 2025-01-12 RX ADMIN — METOLAZONE 5 MG: 5 TABLET ORAL at 05:21

## 2025-01-12 RX ADMIN — ROPINIROLE HYDROCHLORIDE 1 MG: 1 TABLET, FILM COATED ORAL at 20:27

## 2025-01-12 RX ADMIN — FAMOTIDINE 20 MG: 20 TABLET, FILM COATED ORAL at 05:21

## 2025-01-12 RX ADMIN — FUROSEMIDE 80 MG: 10 INJECTION, SOLUTION INTRAMUSCULAR; INTRAVENOUS at 07:23

## 2025-01-12 RX ADMIN — HYDROCODONE BITARTRATE AND ACETAMINOPHEN 1 TABLET: 10; 325 TABLET ORAL at 09:52

## 2025-01-12 RX ADMIN — LIDOCAINE HYDROCHLORIDE 2 ML: 10 INJECTION, SOLUTION INFILTRATION; PERINEURAL at 20:46

## 2025-01-12 RX ADMIN — PROPOFOL 30 MCG/KG/MIN: 10 INJECTION, EMULSION INTRAVENOUS at 14:02

## 2025-01-12 RX ADMIN — METOCLOPRAMIDE 10 MG: 10 TABLET ORAL at 17:03

## 2025-01-12 RX ADMIN — ACETAMINOPHEN 1000 MG: 500 TABLET ORAL at 22:05

## 2025-01-12 RX ADMIN — ONDANSETRON 4 MG: 2 INJECTION INTRAMUSCULAR; INTRAVENOUS at 09:08

## 2025-01-12 RX ADMIN — SENNOSIDES AND DOCUSATE SODIUM 2 TABLET: 50; 8.6 TABLET ORAL at 17:03

## 2025-01-12 RX ADMIN — ROPINIROLE HYDROCHLORIDE 1 MG: 1 TABLET, FILM COATED ORAL at 02:16

## 2025-01-12 RX ADMIN — HYDROCODONE BITARTRATE AND ACETAMINOPHEN 1 TABLET: 10; 325 TABLET ORAL at 17:03

## 2025-01-12 RX ADMIN — METOCLOPRAMIDE 10 MG: 10 TABLET ORAL at 05:21

## 2025-01-12 RX ADMIN — ENOXAPARIN SODIUM 40 MG: 100 INJECTION SUBCUTANEOUS at 17:05

## 2025-01-12 RX ADMIN — HYDROCODONE BITARTRATE AND ACETAMINOPHEN 1 TABLET: 10; 325 TABLET ORAL at 01:09

## 2025-01-12 RX ADMIN — FAMOTIDINE 20 MG: 20 TABLET, FILM COATED ORAL at 17:03

## 2025-01-12 RX ADMIN — METHOCARBAMOL TABLETS 750 MG: 750 TABLET, COATED ORAL at 02:16

## 2025-01-12 RX ADMIN — INSULIN LISPRO 2 UNITS: 100 INJECTION, SOLUTION INTRAVENOUS; SUBCUTANEOUS at 05:26

## 2025-01-12 RX ADMIN — PROPOFOL 30 MCG/KG/MIN: 10 INJECTION, EMULSION INTRAVENOUS at 02:57

## 2025-01-12 RX ADMIN — HYDROCODONE BITARTRATE AND ACETAMINOPHEN 1 TABLET: 10; 325 TABLET ORAL at 21:16

## 2025-01-12 RX ADMIN — POTASSIUM BICARBONATE 50 MEQ: 978 TABLET, EFFERVESCENT ORAL at 07:23

## 2025-01-12 RX ADMIN — NOREPINEPHRINE BITARTRATE 0.05 MCG/KG/MIN: 1 SOLUTION INTRAVENOUS at 13:58

## 2025-01-12 RX ADMIN — INSULIN LISPRO 2 UNITS: 100 INJECTION, SOLUTION INTRAVENOUS; SUBCUTANEOUS at 11:58

## 2025-01-12 RX ADMIN — HYDROMORPHONE HYDROCHLORIDE 0.6 MG/HR: 10 INJECTION, SOLUTION INTRAMUSCULAR; INTRAVENOUS; SUBCUTANEOUS at 17:11

## 2025-01-12 RX ADMIN — Medication 400 MG: at 05:21

## 2025-01-12 RX ADMIN — HYDROCODONE BITARTRATE AND ACETAMINOPHEN 1 TABLET: 10; 325 TABLET ORAL at 05:25

## 2025-01-12 RX ADMIN — METOCLOPRAMIDE 10 MG: 10 TABLET ORAL at 11:54

## 2025-01-12 RX ADMIN — HYDROCODONE BITARTRATE AND ACETAMINOPHEN 1 TABLET: 10; 325 TABLET ORAL at 14:01

## 2025-01-12 RX ADMIN — METOCLOPRAMIDE 10 MG: 10 TABLET ORAL at 23:57

## 2025-01-12 RX ADMIN — PROPOFOL 30 MCG/KG/MIN: 10 INJECTION, EMULSION INTRAVENOUS at 20:32

## 2025-01-12 ASSESSMENT — PAIN DESCRIPTION - PAIN TYPE
TYPE: CHRONIC PAIN
TYPE: ACUTE PAIN;SURGICAL PAIN
TYPE: CHRONIC PAIN
TYPE: CHRONIC PAIN
TYPE: ACUTE PAIN
TYPE: ACUTE PAIN;SURGICAL PAIN
TYPE: ACUTE PAIN;SURGICAL PAIN
TYPE: CHRONIC PAIN
TYPE: ACUTE PAIN

## 2025-01-12 ASSESSMENT — ENCOUNTER SYMPTOMS
BACK PAIN: 0
COUGH: 1
HEADACHES: 0
PSYCHIATRIC NEGATIVE: 1
SHORTNESS OF BREATH: 0
ABDOMINAL PAIN: 0
CHILLS: 0
FEVER: 1

## 2025-01-12 ASSESSMENT — PULMONARY FUNCTION TESTS: FVC: 0.58

## 2025-01-12 NOTE — CARE PLAN
Problem: Ventilation  Goal: Ability to achieve and maintain unassisted ventilation or tolerate decreased levels of ventilator support  Description: Target End Date:  4 days     Document on Vent flowsheet    1.  Support and monitor invasive and noninvasive mechanical ventilation  2.  Monitor ventilator weaning response  3.  Perform ventilator associated pneumonia prevention interventions  4.  Manage ventilation therapy by monitoring diagnostic test results  Outcome: Not Met     Ventilator Daily Summary    Vent Day #4  Airway: 7.5@23    Ventilator settings: CMV R [id number] [phone]   Weaning trials: none   Respiratory Procedures:     Plan: Continue current ventilator settings and wean mechanical ventilation as tolerated per physician orders.

## 2025-01-12 NOTE — PROGRESS NOTES
Right radiology Progress Note     Author: Corazon Veras D.N.P. Date & Time created: 1/12/2025  10:41 AM   Date of admission  12/25/2024  Note to reader: this note follows the APSO format rather than the historical SOAP format. Assessment and plan located at the top of the note for ease of use.    Chief Complaint  64 y.o. female admitted 12/25/2024 with   Chief Complaint   Patient presents with    Shortness of Breath     Pt c/o shortness of breath over the past couple of days. Denies new fever.  Pt wears 1.5L NC at baseline.  Pt has hx breast cancer with mets to the lungs. Currently taking PO chemotherapy pills.     Extremity Weakness     Pt states she has progressively been feeling weaker over the past couple of days to that point where she cannot ambulate.     Abdominal Pain     Pt also c/o epigastric pain and nausea.      HPI  Asha Hannon is a 65-year-old female with PMH significant for metastatic breast cancer with metastasis to the lungs with hepatic and osseous metastatic disease s/p oral chemotherapy, DM type II, HTN, and OSH with nocturnal hypoxia who presented with increasing weakness, shortness of breath, difficulty with ambulation, and abdominal pain times several weeks.  1/11/2025 CXR demonstrates extensive bilateral perihilar airspace opacities consistent with pneumonitis and/or pulmonary edema  1.  And mild to moderate bilateral pleural effusions, right greater than left.  1/11/2025 IR Dr. Gaming completed a right tunneled pleural chest tube placement (Aspira) with 1150 mL watery yellow fluid out during procedure.    Interval History IR  1/12/2025: Patient is WOOD to PPTE in ICU bed with support person at bedside.  Right sided tunneled pleural catheter with drainage adapted to Pleur-evac with serosanguineous fluid out.  Patient notes absence of pain from catheter, relating that her breathing has improved since procedure.  (Patient is writing rather than speaking-patient is intubated.) SpO2 98-99 %  with ventilator FiO2 at 50%.  Aspira supplies are in the patient's room.  Alerted RICU APRN to need for Aspira supply prescription for future supply needs.  I reviewed patient's most recent labs including WBC 12.2<10.5, Hgb 8.6, CR 0.69.  Patient is febrile.  Coordinated post IR procedure care with patient, support person, intensivists, and hospital nursing staff.    Assessment/Plan     Principal Problem:    Acute on chronic respiratory failure with hypoxia and hypercapnia (HCC)  Active Problems:    Diabetes mellitus, type 2 (HCC)    Primary hypertension    Malignant neoplasm of breast (HCC)    Bilateral pleural effusion    Cancer related pain    Pathological fracture of vertebra    Restless leg syndrome      Plan IR  -Aspira Drainage Catheter Home Care Instructions:  - Continue to monitor drains, VS, and labs.  - Keep Aspira insertion site clean and dry.  - Okay to shower with site covered.  - No submerging Aspira insertion site.  - Refer to Aspira Patient Guide for further instructions related to drain care.  Interventional radiology signing off.    -Thank you for allowing Interventional Radiology team to participate in the patients care, if any additional care or requests are needed in the future please do not hesitate call or place IR order             Review of Systems  Physical Exam   Review of Systems   Constitutional:  Positive for fever. Negative for chills.   Respiratory:  Positive for cough. Negative for shortness of breath.    Cardiovascular:  Negative for chest pain.   Gastrointestinal:  Negative for abdominal pain.   Musculoskeletal:  Negative for back pain.   Neurological:  Negative for headaches.   Psychiatric/Behavioral: Negative.        Vitals:    01/12/25 0949   BP:    Pulse:    Resp: 16   Temp:    SpO2: 93%        Physical Exam  Vitals and nursing note reviewed.   Constitutional:       General: She is not in acute distress.     Appearance: She is ill-appearing.   HENT:      Head: Atraumatic.    Cardiovascular:      Rate and Rhythm: Normal rate.   Pulmonary:      Effort: No respiratory distress.   Abdominal:      General: There is no distension.      Tenderness: There is no abdominal tenderness.   Skin:     General: Skin is warm and dry.      Capillary Refill: Capillary refill takes less than 2 seconds.      Coloration: Skin is not pale.   Neurological:      Mental Status: She is alert and oriented to person, place, and time.   Psychiatric:         Mood and Affect: Mood normal.         Behavior: Behavior normal.             Labs    Recent Labs     01/10/25  0334 01/11/25  0429 01/12/25  0316   WBC 8.8 10.5 12.2*   RBC 2.92* 2.99* 3.01*   HEMOGLOBIN 8.4* 8.6* 8.6*   HEMATOCRIT 26.1* 27.2* 27.4*   MCV 89.4 91.0 91.0   MCH 28.8 28.8 28.6   MCHC 32.2 31.6* 31.4*   RDW 55.1* 57.4* 57.6*   PLATELETCT 511* 513* 546*   MPV 8.7* 9.0 8.9*     Recent Labs     01/10/25  1500 01/11/25  0429 01/12/25  0316   SODIUM 138 139 136   POTASSIUM 3.3* 3.8 3.8   CHLORIDE 99 101 101   CO2 31 26 27   GLUCOSE 113* 100* 174*   BUN 14 14 16   CREATININE 0.72 0.68 0.69   CALCIUM 8.3* 8.1* 7.9*     Recent Labs     01/10/25  1500 01/11/25  0429 01/12/25  0316   CREATININE 0.72 0.68 0.69     DX-CHEST-PORTABLE (1 VIEW)   Final Result      1.  Support equipment is unchanged.      2.  Cardiomegaly with moderate perihilar pulmonary edema unchanged from previous exam.      3.  Small bilateral pleural effusions left greater than right.      DX-CHEST-PORTABLE (1 VIEW)   Final Result      1.  Tubes and lines unchanged in position.      2.  Marked bilateral perihilar pulmonary edema which has improved slightly since previous exam.      3.  Small bilateral pleural effusions left greater than right.      IR-INSERT PLEURAL CATH W/ CUFF   Final Result      1.  Ultrasound and fluoroscopic guided placement of a RIGHT HEMITHORAX PleurX (Aspira type) tunneled pleural drainage catheter.   2.  The catheter can be used immediately with drainage of pleural  effusion as needed for comfort, as per package insert instructions.   3.  If the catheter becomes dislodged, do not reinsert it.  Place sterile dressing over the entry wound site.  May consult primary care physician or oncologist as to whether there is clinical indication for PleurX replacement.      DX-CHEST-PORTABLE (1 VIEW)   Final Result      1.  Tubes and lines unchanged in position.      2.   Extensive bilateral perihilar airspace opacities consistent with pneumonitis and/or pulmonary edema.      3.  Mild to moderate bilateral pleural effusions right greater than left      DX-CHEST-PORTABLE (1 VIEW)   Final Result         1. Increasing moderate right and small left pleural effusions. No change edema or infiltrates.      2. ETT 5 cm above masoud. NG tube in stomach. Right central line.                     DX-CHEST-PORTABLE (1 VIEW)   Final Result      Stable diffuse bilateral pulmonary infiltrates.      DX-ABDOMEN FOR TUBE PLACEMENT   Final Result      NG tube tip overlies the gastric body.      DX-CHEST-PORTABLE (1 VIEW)   Final Result      1.  Supportive tubing as described above.   2.  Apparent increasing RIGHT pleural fluid, possibly due to change in position.   3.  Improvement of LEFT lung base consolidation.   4.  No other significant change from prior exam.         DX-CHEST-PORTABLE (1 VIEW)   Final Result         1. Stable right IJ central catheter. Right chest tube has been removed. No pneumothorax.   2. Unchanged bilateral pulmonary opacities and associated pleural effusions.         DX-CHEST-PORTABLE (1 VIEW)   Final Result      1.  Unchanged bilateral pulmonary opacities. Stable small bilateral pleural effusions.   2.  Right pigtail catheter remains. No pneumothorax.      DX-CHEST-PORTABLE (1 VIEW)   Final Result      Removal of large bore right chest tube with stable right pigtail chest tube. Questionable tiny right apical pneumothorax.      DX-CHEST-PORTABLE (1 VIEW)   Final Result      No  significant interval change.      DX-CHEST-PORTABLE (1 VIEW)   Final Result      1.  Persistent bilateral pulmonary opacities, stable to slightly worse, although changes may be related to differences in imaging technique. No large pleural effusions.   2.  Well-positioned right IJ central catheter. No pneumothorax.   3.  Stable other lines and tubes.      DX-CHEST-LIMITED (1 VIEW)   Final Result         1.  No change in 2 separate right-sided chest tubes.      2.  No pneumothorax identified.      3.  Bilateral pulmonary opacifications again identified which are slightly less prominent.      DX-CHEST-PORTABLE (1 VIEW)   Final Result         1. No change mild infiltrates.      2. Right chest tubes. No pneumothorax.      3. ETT out. NG tube and jugular line remain.                     DX-CHEST-PORTABLE (1 VIEW)   Final Result         1. No significant interval change.      DX-CHEST-LIMITED (1 VIEW)   Final Result         1.  Findings on chest radiograph appear stable since the prior radiograph.  No new abnormalities are identified.      2.  2 separate right-sided chest tubes are again identified with no change in position.      3.  No pneumothorax identified.      DX-CHEST-LIMITED (1 VIEW)   Final Result         1.  No pneumothorax identified.      2.  2 right-sided chest tubes again identified.      CT-CTA CHEST PULMONARY ARTERY W/ RECONS   Final Result         1. No pulmonary embolus.      2. Medial right lung base consolidation felt to represent complete collapse of the right middle lobe. Mild to moderate mid and upper lung pneumonia infiltrates remain.      3. Right chest tubes. Small right pneumothorax and small right effusion. Improving small 2.5 cm left pleural effusion since 12/25/2024.      4. Extensive metastatic lesions including adenopathy, liver lesions, right breast nodule and bone lesions. Thoracic spine fusion hardware remains.                  CT-HEAD W/O   Final Result         1. No acute process in  the brain evident.      2. Fluid in sphenoid sinus.      3. Right convexity midthoracic scoliosis and unchanged from 12/31/2024 MRI.               DX-ABDOMEN FOR TUBE PLACEMENT   Final Result         1. NG tube in stomach.                     DX-CHEST-FOR LINE PLACEMENT Perform procedure in: Patient's Room   Final Result         1. Small right apical pneumothorax remains. 2 right chest tubes.      2. ETT 3 cm above masoud. NG tube in stomach. Right jugular line in SVC. No complication evident.      3. Mild infiltrates and small right effusion.                  DX-CHEST-LIMITED (1 VIEW)   Final Result         1. 2nd right chest tube placed with near complete evacuation of right pneumothorax.      2. Mild infiltrates, basilar atelectasis and small right effusion remain.      3. Endotracheal tube tip at about T5 and about 2 cm above the masoud. The masoud and tip of the endotracheal tube are difficult to visualize in part because of overlying thoracic spine fusion hardware.                  DX-CHEST-PORTABLE (1 VIEW)   Final Result         Enlarging right pneumothorax with collapsing of the right lung.      Preliminary findings texted to Dr. STANLEY FISHER in the Emergency Department via Voalte on 1/4/2025 1:50 AM         DX-CHEST-LIMITED (1 VIEW)   Final Result         Interval placement of right pigtail chest tube. Small right residual pneumothorax.      DX-CHEST-PORTABLE (1 VIEW)   Final Result   Addendum (preliminary) 1 of 1      CRITICAL RESULT READ BACK: Preliminary findings discussed with and    critical read back performed by Dr. Downs via telephone on 1/3/2025 8:03    PM      Final      1.  Interval worsening in right pneumothorax, now moderate.   2.  Persistent interstitial and alveolar pulmonary opacities bilaterally. Possible bilateral pleural effusions, right greater than left.            DX-CHEST-PORTABLE (1 VIEW)   Final Result         1. Slightly decreased right pneumothorax.   2. Stable  bilateral interstitial and airspace opacification.   3. Stable right pleural effusion.      DX-CHEST-PORTABLE (1 VIEW)   Final Result         1. Left pleural effusion has decreased in size status post thoracentesis. There is no left-sided pneumothorax.   2. There is persistent right basilar opacity with a new right apical pneumothorax.      Findings were communicated to the ordering provider at the time of dictation via Voalte.      US-THORACENTESIS PUNCTURE LEFT   Final Result      1. Ultrasound guided left sided therapeutic thoracentesis.      2. 950 mL of fluid withdrawn.      DX-CHEST-PORTABLE (1 VIEW)   Final Result      1. New postsurgical changes in the upper thoracic spine.   2. Worsening bilateral pleural effusions, pulmonary vascular congestion and interstitial edema.      DX-PORTABLE FLUORO > 1 HOUR   Final Result      Portable fluoroscopy utilized for 1 minute 19 seconds.      INTERPRETING LOCATION: [address] , [place], [id number]      DX-THORACIC SPINE-2 VIEWS   Final Result      Digitized intraoperative radiograph is submitted for review. This examination is not for diagnostic purpose but for guidance during a surgical procedure. Please see the patient's chart for full procedural details.         INTERPRETING LOCATION: [address] , [place], [id number]      MR-CERVICAL SPINE-WITH & W/O   Final Result         1.  No evidence of metastatic disease to the cervical spine.      MR-LUMBAR SPINE-WITH & W/O   Final Result   Impression:      Multiple bony metastatic lesions as described above, most prominent along the inferior endplate of L1.      Degenerative moderate canal stenosis at L4-5 with cauda equina compression. Epidural thickening and enhancement is noted in this region with severe bilateral facet degeneration and enhancement along the bilateral facet joints. This is felt to be    degenerative in origin.      MR-THORACIC SPINE-WITH & W/O   Final Result         Multiple metastatic lesions of the thoracic  spine as described above.      Pathologic compression fracture at T5 with 50% loss of height and posterior cortical retropulsion with circumferential epidural thickening representing epidural extension of metastatic disease. This causes severe canal stenosis with cord compression.      Metastatic disease also involves the posterior elements and spinous processes at T4, T5.      Pre and paravertebral metastatic soft tissue infiltration at T4-T5 noted.         MR-BRAIN-WITH & W/O   Final Result         Multiple nodular enhancing metastatic lesions involving the cranium as described above.      The largest lesion is in the right posterior temporal/parietal occipital region and demonstrates inner table cortical breakthrough with epidural extension and adjacent mild dural thickening and enhancement.      US-THORACENTESIS PUNCTURE RIGHT   Final Result   Addendum (preliminary) 1 of 1 12/27/2024 10:59 AM      HISTORY/REASON FOR EXAM:  Shortness of breath         TECHNIQUE/EXAM DESCRIPTION:   Ultrasound-guided thoracentesis.      Indication:  RIGHT pleural fluid collection.      COMPARISON:  None-however patient states history of right-sided    thoracentesis September 2024.      PROCEDURE:     Informed consent was obtained. A timeout was taken. A right    pleural effusion was localized with real-time ultrasound guidance. The    right posterior chest wall was prepped and draped in a sterile manner.    Following local anesthesia with 1%    lidocaine, and under live ultrasound guidance a 5 Citizen of [place] Yueh pigtail    catheter was advanced into the pleural space with trocar technique and    pleural fluid was drained. The patient tolerated the procedure well    without evidence of complication. A post    thoracentesis chest radiograph is forthcoming.      FINDINGS:      Fluid was sent to the laboratory.      Fluid character: serosanguinous         1. Ultrasound guided right sided therapeutic and diagnostic thoracentesis.      2.  "1000 mL of fluid withdrawn.      Final      1. Ultrasound guided right sided therapeutic and diagnostic thoracentesis.      2. 1000 mL of fluid withdrawn.      DX-CHEST-PORTABLE (1 VIEW)   Final Result      1.  Small persistent right pleural effusion which is decreased since recent thoracentesis.      2.  Small to moderate left pleural effusion.      3.  Bilateral perihilar and left lower lobe atelectasis and/or pneumonitis.      4.  Increased interstitial markings throughout the lung fields consistent with parenchymal scarring, and/or pulmonary edema.      CT-LSPINE W/O PLUS RECONS   Final Result         1. Diffuse metastatic disease.   2. No definite pathologic fracture.      CT-TSPINE W/O PLUS RECONS   Final Result         1. Extensive metastatic disease.   2. There is new pathologic compression fracture deformity of T5.      CT-CHEST,ABDOMEN,PELVIS WITH   Final Result         1. New bilateral upper lobe nodular infiltrates.   2. Moderate-sized bilateral pleural effusions.   3. Interval worsening of hepatic metastatic disease.   4. New abdominal lymphadenopathy. Thoracic adenopathy is again noted.   5. Diffuse osseous metastatic disease. There is a new compression fracture of T5 as reported on the prior spine CT.      DX-CHEST-PORTABLE (1 VIEW)   Final Result      1.  Stable bibasilar atelectasis/consolidation and small bilateral pleural effusions.      2.  Stable cardiomegaly and interstitial edema.          INR   Date Value Ref Range Status   01/01/2025 0.96 0.87 - 1.13 Final     Comment:     INR - Non-therapeutic Reference Range: 0.87-1.13  INR - Therapeutic Reference Range: 2.0-4.0       No results found for: \"POCINR\"     Intake/Output Summary (Last 24 hours) at 1/12/2025 1041  Last data filed at 1/12/2025 0800  Gross per 24 hour   Intake 1454.48 ml   Output 2875 ml   Net -1420.52 ml      Labs not explicitly included in this progress note were reviewed by the author. Radiology/imaging not explicitly included " in this progress note was reviewed by the author.     I have performed a physical exam and reviewed and updated ROS and Plan today (1/12/2025).     55 minutes in directly providing and coordinating care and extensive data review.  No time overlap and excludes procedures.

## 2025-01-12 NOTE — PROGRESS NOTES
"Critical Care Progress Note    Date of admission  2024    Chief Complaint  Shortness of breath    Hospital Course  Asha Hannon is a 64-year-old female with PMH significant for breast cancer with mets to the lungs currently on oral chemo who presented 2024 with weakness, SOB and back & abdominal pain.  CT C/A/P with contrast showed new BUN infiltrate; moderate size pleural effusions; worsening of hepatic metastasis; new abdominal lymphadenopathy, thoracic adenopathy; diffuse osseous metastatic disease with T5 pathologic fracture.    Admitted to floor under the [facility] Medicine Team.     - Palliative Care consult - patient deferred discussion.    - right-sided thoracentesis.   - restless legs \"gave out\" resulting in fall, no trauma.    -right MRI showed multiple metastatic lesions; T-spine MRI showed multiple metastatic lesions and T5 pathologic fracture; L-spine multiple metastatic lesions; C-spine no metastatic lesions.   - to OR for T4-T6 laminectomy with Dr. Lowery.   - POD #1, right-sided pneumothorax, medically manage. Left-sided thoracentesis  1/3 - POD #2, AMS with respiratory failure, pH 7.14 CO2 100. Transfer to ICU. Chest tube placed.    - Intubation   - Extubation. Chest tubes clamped.   - Ongoing goals of care discussions. Large bore right chest tube removed. BIPAP and diuresis.    - DNR, I OK. BIPAP. Diuresing. Right pigtail catheter removed.   - acute decompensation with hypoxia & obtundation. Re-intubated.    - VD #2.   1/10 - VD #3. Diuresing. IR consult for PleurX placement.    - VD #4. Thoracentesis with 1150 removed. PleurX placed.     Interval Problem Update  Reviewed last 24 hour events:  No significant overnight events.  Tmax 100.2  SB/SR 50-70's  SBP 's. Actively titrating Levophed  Vent day #5: APVC 26/380/8/50%  AB.48/37.5/59 on rate 28  SAT/SBT: yes/yes. FVC 0.58, NIF -26, RSBI 40, .   Neuro: following " commands, moving all 4-5/5.  RASS: -1. Prop at 30  OG with TF at goal. BM 1/10.  Mojica, Chest tube 244 out.  I/O: 1600/3625  Mobility 2    I certify the patient requires continued medically necessary hospital services for the treatment of acute respiratory failure due to malignant pleural effusions and will remain in the hospital for 5-7 days.  Discharge plan is anticipated to be undetermined at this time. Patient & daughter hopeful to return home. Remains intubated/ventilated and will need therapy evaluations once extubated.    Review of Systems  Review of Systems   Unable to perform ROS: Intubated        Vital Signs for last 24 hours   Temp:  [37.2 °C (99 °F)-37.9 °C (100.2 °F)] 37.7 °C (99.9 °F)  Pulse:  [53-94] 85  Resp:  [0-42] 16  BP: ()/(42-93) 132/63  SpO2:  [92 %-99 %] 93 %    Hemodynamic parameters for last 24 hours       Respiratory Information for the last 24 hours  Vent Mode: APVCMV  Rate (breaths/min): 26  Vt Target (mL): 380  PEEP/CPAP: 8  P Support: 5  MAP: 11  Control VTE (exp VT): 396    Physical Exam   Physical Exam  Vitals and nursing note reviewed.   Constitutional:       General: She is sleeping. She is not in acute distress.     Appearance: Normal appearance. She is ill-appearing. She is not toxic-appearing.      Interventions: She is sedated and intubated. Nasal cannula in place.   HENT:      Head: Normocephalic.      Nose: Nose normal.      Mouth/Throat:      Lips: Pink.      Mouth: Mucous membranes are moist.   Eyes:      Pupils: Pupils are equal, round, and reactive to light.   Cardiovascular:      Rate and Rhythm: Normal rate and regular rhythm.      Pulses: Normal pulses.      Heart sounds: Normal heart sounds.   Pulmonary:      Effort: Pulmonary effort is normal. She is intubated.      Breath sounds: Examination of the right-middle field reveals rhonchi. Examination of the right-lower field reveals rhonchi. Rhonchi present. No wheezing.   Abdominal:      General: Bowel sounds are  decreased.      Palpations: Abdomen is soft.   Musculoskeletal:      Right lower leg: No edema.      Left lower leg: No edema.   Skin:     General: Skin is warm and dry.      Capillary Refill: Capillary refill takes less than 2 seconds.   Neurological:      GCS: GCS eye subscore is 4. GCS verbal subscore is 1. GCS motor subscore is 6.   Psychiatric:      Comments: Intubated         Medications  Current Facility-Administered Medications   Medication Dose Route Frequency Provider Last Rate Last Admin    furosemide (Lasix) injection 80 mg  80 mg Intravenous Q DAY Jessica Campos, A.P.R.N.   80 mg at 01/12/25 0723    metOLazone (Zaroxolyn) tablet 5 mg  5 mg Enteral Tube Q DAY Jessica Campos A.P.R.N.   5 mg at 01/12/25 0521    acetaminophen (Tylenol) tablet 1,000 mg  1,000 mg Enteral Tube Q6HRS PRN Juan Daniel Baca M.D.   1,000 mg at 01/11/25 2058    HYDROmorphone (DILAUDID) 0.2 mg/mL in 50mL NS (Continuous Infusion)   Intravenous Continuous Jessica Campos A.P.R.N. 3 mL/hr at 01/12/25 0949 0.6 mg/hr at 01/12/25 0949    Pharmacy Consult: Enteral tube insertion - review meds/change route/product selection  1 Each Other PHARMACY TO DOSE Jessica Campos, A.P.R.N.        norepinephrine (Levophed) 8 mg in 250 mL NS infusion (premix)  0-1 mcg/kg/min (Ideal) Intravenous Continuous Juan Daniel Baca M.D. 6 mL/hr at 01/12/25 1021 0.05 mcg/kg/min at 01/12/25 1021    Respiratory Therapy Consult   Nebulization Continuous RT Juan Daniel Baca M.D.        famotidine (Pepcid) tablet 20 mg  20 mg Enteral Tube Q12HRS Juan Daniel Baca M.D.   20 mg at 01/12/25 0521    Or    famotidine (Pepcid) injection 20 mg  20 mg Intravenous Q12HRS Juan Daniel Baca M.D.   20 mg at 01/11/25 1727    senna-docusate (Pericolace Or Senokot S) 8.6-50 MG per tablet 2 Tablet  2 Tablet Enteral Tube BID Juan Daniel Baca M.D.   2 Tablet at 01/10/25 0510    And    polyethylene glycol/lytes (Miralax) Packet 1 Packet  1 Packet Enteral Tube QDAY PRN Juan Daniel PERALTA  BAR Baca   1 Packet at 01/09/25 0505    And    magnesium hydroxide (Milk Of Magnesia) suspension 30 mL  30 mL Enteral Tube QDAY PRN Juan Daniel Baca M.D.   30 mL at 01/09/25 1819    And    bisacodyl (Dulcolax) suppository 10 mg  10 mg Rectal QDAY PRN Juan Daniel Baca M.D. MD Alert...ICU Electrolyte Replacement per Pharmacy   Other PHARMACY TO DOSE Juan Daniel Baca M.D.        lidocaine (Xylocaine) 1 % injection 2 mL  2 mL Tracheal Tube Q30 MIN PRN Juan Daniel Baca M.D.   2 mL at 01/09/25 0023    propofol (DIPRIVAN) injection  0-80 mcg/kg/min (Ideal) Intravenous Continuous Juan Daniel Baca M.D. 11.5 mL/hr at 01/12/25 0951 30 mcg/kg/min at 01/12/25 0951    HYDROcodone/acetaminophen (Norco)  MG per tablet 1 Tablet  1 Tablet Enteral Tube Q4HRS Juan Daniel Baca M.D.   1 Tablet at 01/12/25 0952    magnesium oxide tablet 400 mg  400 mg Enteral Tube DAILY Juan Daniel Baca M.D.   400 mg at 01/12/25 0521    metoclopramide (Reglan) tablet 10 mg  10 mg Enteral Tube Q6HRS Juan Daniel Baca M.D.   10 mg at 01/12/25 0521    ROPINIRole (Requip) tablet 1 mg  1 mg Enteral Tube QHS Juan Daniel Baca M.D.   1 mg at 01/11/25 2058    calcium carbonate (Tums) chewable tab 500 mg  500 mg Enteral Tube TID PRN Juan Daniel Baca M.D.        methocarbamol (Robaxin) tablet 750 mg  750 mg Enteral Tube Q8HRS PRN Juan Daniel Baca M.D.   750 mg at 01/12/25 0216    ondansetron (Zofran ODT) dispertab 4 mg  4 mg Enteral Tube Q4HRS PRN Juan Daniel Baca M.D.        ROPINIRole (Requip) tablet 1 mg  1 mg Enteral Tube BID PRN Juan Daniel Baca M.D.   1 mg at 01/12/25 0216    senna-docusate (Pericolace Or Senokot S) 8.6-50 MG per tablet 1 Tablet  1 Tablet Enteral Tube Q24HRS PRN Juan Daniel Baca M.D.        insulin lispro (HumaLOG,AdmeLOG) subcutaneous injection  2-9 Units Subcutaneous Q6HRS Aidee Hicks   2 Units at 01/12/25 0526    And    dextrose 50% (D50W) injection 25 g  25 g Intravenous Q15 MIN PRN Aidee HAYNES  [place]        labetalol (Normodyne/Trandate) injection 10 mg  10 mg Intravenous Q4HRS PRN Lidia Figueroa M.D.   10 mg at 01/07/25 0611    ipratropium-albuterol (DUONEB) nebulizer solution  3 mL Nebulization Q2HRS PRN (RT) Americo Avalos M.D.   3 mL at 01/11/25 2212    enoxaparin (Lovenox) inj 40 mg  40 mg Subcutaneous DAILY AT 1800 Jessica Campos A.P.R.N.   40 mg at 01/11/25 1719    bisacodyl (Dulcolax) suppository 10 mg  10 mg Rectal DAILY Neo Ceja, P.A.   10 mg at 01/10/25 0511    sodium chloride (Ocean) 0.65 % nasal spray 2 Spray  2 Spray Nasal Q2HRS PRN Omar Saleh M.D.   2 Spray at 01/01/25 0600    MD ALERT...DO NOT ADMINISTER NSAIDS or ASPIRIN unless ORDERED By Neurosurgery 1 Each  1 Each Other PRN Karthik Lowery D.O.        ondansetron (Zofran) syringe/vial injection 4 mg  4 mg Intravenous Q4HRS PRN Abdulaziz Nieto M.D.   4 mg at 01/12/25 0908       Fluids    Intake/Output Summary (Last 24 hours) at 1/12/2025 1057  Last data filed at 1/12/2025 0800  Gross per 24 hour   Intake 1454.48 ml   Output 2875 ml   Net -1420.52 ml       Laboratory  Recent Labs     01/10/25  0336 01/12/25  0341   ISTATAPH 7.547* 7.495*   ISTATAPCO2 35.3 36.0   ISTATAPO2 63* 55*   ISTATATCO2 32 29*   TSVZOQR2UVY 94 91*   ISTATARTHCO3 30.7* 27.8   ISTATARTBE 8* 4*   ISTATTEMP 37.6 C 37.9 C   ISTATFIO2 40 40   ISTATSPEC Arterial Arterial   ISTATAPHTC 7.538* 7.482*   [id number] 66 59*         Recent Labs     01/10/25  0010 01/10/25  0334 01/10/25  0334 01/10/25  1500 01/11/25  0429 01/12/25  0316   SODIUM  --  135   < > 138 139 136   POTASSIUM  --  2.8*   < > 3.3* 3.8 3.8   CHLORIDE  --  96   < > 99 101 101   CO2  --  30   < > 31 26 27   BUN  --  13   < > 14 14 16   CREATININE  --  0.74   < > 0.72 0.68 0.69   MAGNESIUM  --  2.1  --   --  2.2  --    PHOSPHORUS 3.2 2.9  --   --  2.7  --    CALCIUM  --  8.6   < > 8.3* 8.1* 7.9*    < > = values in this interval not displayed.     Recent Labs     01/10/25  0334  "01/10/25  1500 01/11/25 0429 01/12/25 0316   PREALBUMIN 8.6*  --   --   --    GLUCOSE 154* 113* 100* 174*     Recent Labs     01/10/25  0334 01/11/25 0429 01/12/25 0316   WBC 8.8 10.5 12.2*   NEUTSPOLYS 72.40* 74.20* 74.80*   LYMPHOCYTES 10.90* 8.80* 9.10*   MONOCYTES 12.80 10.10 10.80   EOSINOPHILS 1.50 1.70 1.60   BASOPHILS 0.70 0.90 0.70     Recent Labs     01/10/25  0334 01/11/25 0429 01/12/25 0316   RBC 2.92* 2.99* 3.01*   HEMOGLOBIN 8.4* 8.6* 8.6*   HEMATOCRIT 26.1* 27.2* 27.4*   PLATELETCT 511* 513* 546*       Imaging  X-Ray:  I have personally reviewed the images and compared with prior images.    Assessment/Plan  * Acute on chronic respiratory failure with hypoxia and hypercapnia (HCC)  Assessment & Plan  -intubated 1/4  -extubated 1/5  -re-intubated 1/8  -Ventilator dependent respiratory failure  -Modify ventilator to optimize oxygenation, acid-base balance and ventilation  -CXR as indicated: monitor lung volumes and tube/line placement  -HOB > 30  -Titrate FiO2 to keep sats greater than 92%  -Chlorhexidine  -goal CO2 35-40  -Daily awakening and SBT trials unless contraindicated  -ABCDEF bundle  -I am actively adjusting ventilator based on clinical indicators and ABG's    Pathological fracture of vertebra- (present on admission)  Assessment & Plan  -Pathologic fracture of T5 with myelopathy on presentation  -S/P T4-6 laminectomy with fusion and spinal decompression on 1/1/2025 by Dr. Lowery  -multi-modal pain management  -Fall Precautions  -PT OT    Malignant neoplasm of breast (HCC)- (present on admission)  Assessment & Plan  \"Bilateral breast cancer with extensive destruction of the left breast and ulceration and skin lesions making her inoperable on the left.  Right breast shows a 3 x 3 cm mass.  PET scan March 2023 showed non-hypermetabolic pleural effusions which are cytology positive and she has demonstrable pleural masses. Started aromatase inhibition early September 2023.  Has not yet " "started CDK 4 6 inhibitor.  Lost to follow-up for over 6 months although apparently was on letrozole as single agent.  Now with clear progression of disease in the left breast.  August 2024: Progression of disease in liver with extensive disease.  Breast is worse.  Lung is better.  Lymph nodes are worse.  Status post radiation to symptomatic oozing and exophytic left breast mass.  Marked worsening of her cancer on letrozole with progression in the bone and liver.\"--Dr. Benitez  -ongoing goals of care discussions. Patient is fighting to recover and discharge home so she can start the Letrozole.  -DNR, I OK   -Palliative following for pain management.      Restless leg syndrome- (present on admission)  Assessment & Plan  -Requip     Cancer related pain- (present on admission)  Assessment & Plan  -Palliative Care following for pain management  -Multimodal pain management    Bilateral pleural effusion- (present on admission)  Assessment & Plan  -Malignant pleural effusions from metastatic breast cancer s/p thoracenteses and chest tubes  -Diuresis as tolerated  -monitor chest xrays  -PleurX placement in IR 1/11    Primary hypertension- (present on admission)  Assessment & Plan  -holding PO antihypertensives while on pressors  -PRN's available    Diabetes mellitus, type 2 (HCC)- (present on admission)  Assessment & Plan  -A1c 6.4  -accuchecks with SSI  -CHO diet  -glucose goal 140-180         VTE:  Lovenox  Ulcer: H2 Antagonist  Lines: Central Line  Ongoing indication addressed and Mojica Catheter  Ongoing indication addressed    I have performed a physical exam and reviewed and updated ROS and Plan today (1/12/2025). In review of yesterday's note (1/11/2025), there are no changes except as documented above.     Discussed patient condition and risk of morbidity and/or mortality with RN, RT, Pharmacy, Patient, and my attending Dr. Preston.  The patient remains critically ill.  Critical care time = 50 minutes in directly " providing and coordinating critical care and extensive data review.  No time overlap and excludes procedures.    Please note that this dictation was created using voice recognition software. I have made every reasonable attempt to correct obvious errors, but there may be errors of grammar and possibly content that I did not discover before finalizing the note.    YESIKA Kwon.

## 2025-01-12 NOTE — FLOWSHEET NOTE
01/12/25 0828   Weaning Parameters   RR (bpm) 16   $ FVC / Vital Capacity (liters)  0.58   NIF (cm H2O)  -26   Rapid Shallow Breathing Index (RR/VT) 40   Spontaneous VE 7.2   Spontaneous      Min cuff leak

## 2025-01-12 NOTE — CARE PLAN
The patient is Stable - Low risk of patient condition declining or worsening    Shift Goals  Clinical Goals: Map >65, pain management  Patient Goals: comfort  Family Goals: updates    Progress made toward(s) clinical / shift goals:    Problem: Skin Integrity  Goal: Skin integrity is maintained or improved  Outcome: Progressing     Problem: Fall Risk  Goal: Patient will remain free from falls  Outcome: Progressing     Problem: Pain - Standard  Goal: Alleviation of pain or a reduction in pain to the patient’s comfort goal  Outcome: Progressing     Problem: Safety - Medical Restraint  Goal: Remains free of injury from restraints (Restraint for Interference with Medical Device)  Outcome: Progressing

## 2025-01-12 NOTE — CARE PLAN
The patient is Stable - Low risk of patient condition declining or worsening    Shift Goals  Clinical Goals: Pain management, MAP >65  Patient Goals: Comfort  Family Goals: Updates    Progress made toward(s) clinical / shift goals:        Problem: Pain - Standard  Goal: Alleviation of pain or a reduction in pain to the patient’s comfort goal  Description: Target End Date:  Prior to discharge or change in level of care    Document on Vitals flowsheet    1.  Document pain using the appropriate pain scale per order or unit policy  2.  Educate and implement non-pharmacologic comfort measures (i.e. relaxation, distraction, massage, cold/heat therapy, etc.)  3.  Pain management medications as ordered  4.  Reassess pain after pain med administration per policy  5.  If opiods administered assess patient's response to pain medication is appropriate per POSS sedation scale  6.  Follow pain management plan developed in collaboration with patient and interdisciplinary team (including palliative care or pain specialists if applicable)  Outcome: Progressing     Problem: Safety - Medical Restraint  Goal: Remains free of injury from restraints (Restraint for Interference with Medical Device)  Description: INTERVENTIONS:  1. Determine that other, less restrictive measures have been tried or would not be effective before applying the restraint  2. Evaluate the patient's condition at the time of restraint application  3. Educate patient/family regarding the reason for restraint  4. Q2H: Monitor safety, psychosocial status, comfort, circulation, respiratory status, LOC, nutrition and hydration  Outcome: Progressing       Patient is not progressing towards the following goals:

## 2025-01-13 ENCOUNTER — APPOINTMENT (OUTPATIENT)
Dept: RADIOLOGY | Facility: MEDICAL CENTER | Age: 65
End: 2025-01-13
Attending: INTERNAL MEDICINE
Payer: MEDICARE

## 2025-01-13 LAB
ALBUMIN SERPL BCP-MCNC: 2.6 G/DL (ref 3.2–4.9)
ALBUMIN/GLOB SERPL: 0.9 G/DL
ALP SERPL-CCNC: 84 U/L (ref 30–99)
ALT SERPL-CCNC: 26 U/L (ref 2–50)
ANION GAP SERPL CALC-SCNC: 9 MMOL/L (ref 7–16)
ARTERIAL PATENCY WRIST A: ABNORMAL
AST SERPL-CCNC: 133 U/L (ref 12–45)
BASE EXCESS BLDA CALC-SCNC: 14 MMOL/L (ref -4–3)
BASOPHILS # BLD AUTO: 0.5 % (ref 0–1.8)
BASOPHILS # BLD: 0.07 K/UL (ref 0–0.12)
BILIRUB SERPL-MCNC: 0.3 MG/DL (ref 0.1–1.5)
BODY TEMPERATURE: ABNORMAL DEGREES
BUN SERPL-MCNC: 14 MG/DL (ref 8–22)
CALCIUM ALBUM COR SERPL-MCNC: 9.6 MG/DL (ref 8.5–10.5)
CALCIUM SERPL-MCNC: 8.5 MG/DL (ref 8.5–10.5)
CHLORIDE SERPL-SCNC: 94 MMOL/L (ref 96–112)
CO2 BLDA-SCNC: 44 MMOL/L (ref 32–48)
CO2 SERPL-SCNC: 33 MMOL/L (ref 20–33)
CREAT SERPL-MCNC: 0.55 MG/DL (ref 0.5–1.4)
CRP SERPL HS-MCNC: 9.4 MG/DL (ref 0–0.75)
DELSYS IDSYS: ABNORMAL
END TIDAL CARBON DIOXIDE IECO2: 56 MMHG
EOSINOPHIL # BLD AUTO: 0.21 K/UL (ref 0–0.51)
EOSINOPHIL NFR BLD: 1.6 % (ref 0–6.9)
ERYTHROCYTE [DISTWIDTH] IN BLOOD BY AUTOMATED COUNT: 55.4 FL (ref 35.9–50)
GFR SERPLBLD CREATININE-BSD FMLA CKD-EPI: 101 ML/MIN/1.73 M 2
GLOBULIN SER CALC-MCNC: 2.9 G/DL (ref 1.9–3.5)
GLUCOSE BLD STRIP.AUTO-MCNC: 128 MG/DL (ref 65–99)
GLUCOSE BLD STRIP.AUTO-MCNC: 159 MG/DL (ref 65–99)
GLUCOSE BLD STRIP.AUTO-MCNC: 162 MG/DL (ref 65–99)
GLUCOSE BLD STRIP.AUTO-MCNC: 185 MG/DL (ref 65–99)
GLUCOSE SERPL-MCNC: 170 MG/DL (ref 65–99)
HCO3 BLDA-SCNC: 42.1 MMOL/L (ref 21–28)
HCT VFR BLD AUTO: 28 % (ref 37–47)
HGB BLD-MCNC: 8.8 G/DL (ref 12–16)
HOROWITZ INDEX BLDA+IHG-RTO: 170 MM[HG]
IMM GRANULOCYTES # BLD AUTO: 0.3 K/UL (ref 0–0.11)
IMM GRANULOCYTES NFR BLD AUTO: 2.4 % (ref 0–0.9)
LACTATE BLD-SCNC: 1.1 MMOL/L (ref 0.5–2)
LYMPHOCYTES # BLD AUTO: 1.09 K/UL (ref 1–4.8)
LYMPHOCYTES NFR BLD: 8.6 % (ref 22–41)
MCH RBC QN AUTO: 28.1 PG (ref 27–33)
MCHC RBC AUTO-ENTMCNC: 31.4 G/DL (ref 32.2–35.5)
MCV RBC AUTO: 89.5 FL (ref 81.4–97.8)
MODE IMODE: ABNORMAL
MONOCYTES # BLD AUTO: 1.18 K/UL (ref 0–0.85)
MONOCYTES NFR BLD AUTO: 9.3 % (ref 0–13.4)
NEUTROPHILS # BLD AUTO: 9.89 K/UL (ref 1.82–7.42)
NEUTROPHILS NFR BLD: 77.6 % (ref 44–72)
NRBC # BLD AUTO: 0 K/UL
NRBC BLD-RTO: 0 /100 WBC (ref 0–0.2)
O2/TOTAL GAS SETTING VFR VENT: 50 %
PCO2 BLDA: 72 MMHG (ref 32–48)
PCO2 TEMP ADJ BLDA: 74.6 MMHG (ref 32–48)
PEEP END EXPIRATORY PRESSURE IPEEP: 8 CMH20
PH BLDA: 7.38 [PH] (ref 7.35–7.45)
PH TEMP ADJ BLDA: 7.36 [PH] (ref 7.35–7.45)
PLATELET # BLD AUTO: 524 K/UL (ref 164–446)
PMV BLD AUTO: 9 FL (ref 9–12.9)
PO2 BLDA: 85 MMHG (ref 83–108)
PO2 TEMP ADJ BLDA: 90 MMHG (ref 64–87)
POTASSIUM SERPL-SCNC: 2.9 MMOL/L (ref 3.6–5.5)
POTASSIUM SERPL-SCNC: 3 MMOL/L (ref 3.6–5.5)
POTASSIUM SERPL-SCNC: 3.2 MMOL/L (ref 3.6–5.5)
PREALB SERPL-MCNC: 11.1 MG/DL (ref 18–38)
PRESSURE SUPPORT SETTING VENT: 5 CM[H2O]
PROT SERPL-MCNC: 5.5 G/DL (ref 6–8.2)
RBC # BLD AUTO: 3.13 M/UL (ref 4.2–5.4)
SAO2 % BLDA: 95 % (ref 93–99)
SODIUM SERPL-SCNC: 136 MMOL/L (ref 135–145)
SPECIMEN DRAWN FROM PATIENT: ABNORMAL
WBC # BLD AUTO: 12.7 K/UL (ref 4.8–10.8)

## 2025-01-13 PROCEDURE — 700102 HCHG RX REV CODE 250 W/ 637 OVERRIDE(OP): Performed by: NURSE PRACTITIONER

## 2025-01-13 PROCEDURE — 99291 CRITICAL CARE FIRST HOUR: CPT | Performed by: NURSE PRACTITIONER

## 2025-01-13 PROCEDURE — A9270 NON-COVERED ITEM OR SERVICE: HCPCS | Performed by: NURSE PRACTITIONER

## 2025-01-13 PROCEDURE — 700111 HCHG RX REV CODE 636 W/ 250 OVERRIDE (IP): Performed by: NURSE PRACTITIONER

## 2025-01-13 PROCEDURE — 94640 AIRWAY INHALATION TREATMENT: CPT

## 2025-01-13 PROCEDURE — 82962 GLUCOSE BLOOD TEST: CPT

## 2025-01-13 PROCEDURE — 700111 HCHG RX REV CODE 636 W/ 250 OVERRIDE (IP): Performed by: EMERGENCY MEDICINE

## 2025-01-13 PROCEDURE — 36600 WITHDRAWAL OF ARTERIAL BLOOD: CPT

## 2025-01-13 PROCEDURE — 84134 ASSAY OF PREALBUMIN: CPT

## 2025-01-13 PROCEDURE — 86140 C-REACTIVE PROTEIN: CPT

## 2025-01-13 PROCEDURE — 94799 UNLISTED PULMONARY SVC/PX: CPT

## 2025-01-13 PROCEDURE — 83605 ASSAY OF LACTIC ACID: CPT

## 2025-01-13 PROCEDURE — 71045 X-RAY EXAM CHEST 1 VIEW: CPT

## 2025-01-13 PROCEDURE — 82803 BLOOD GASES ANY COMBINATION: CPT

## 2025-01-13 PROCEDURE — A9270 NON-COVERED ITEM OR SERVICE: HCPCS | Performed by: PHYSICIAN ASSISTANT

## 2025-01-13 PROCEDURE — 85025 COMPLETE CBC W/AUTO DIFF WBC: CPT

## 2025-01-13 PROCEDURE — 770022 HCHG ROOM/CARE - ICU (200)

## 2025-01-13 PROCEDURE — 700105 HCHG RX REV CODE 258: Performed by: NURSE PRACTITIONER

## 2025-01-13 PROCEDURE — 700111 HCHG RX REV CODE 636 W/ 250 OVERRIDE (IP): Mod: JZ | Performed by: NURSE PRACTITIONER

## 2025-01-13 PROCEDURE — 84132 ASSAY OF SERUM POTASSIUM: CPT | Mod: 91

## 2025-01-13 PROCEDURE — 700102 HCHG RX REV CODE 250 W/ 637 OVERRIDE(OP): Performed by: PHYSICIAN ASSISTANT

## 2025-01-13 PROCEDURE — 80053 COMPREHEN METABOLIC PANEL: CPT

## 2025-01-13 PROCEDURE — 94150 VITAL CAPACITY TEST: CPT

## 2025-01-13 PROCEDURE — 94003 VENT MGMT INPAT SUBQ DAY: CPT

## 2025-01-13 PROCEDURE — 700102 HCHG RX REV CODE 250 W/ 637 OVERRIDE(OP): Performed by: EMERGENCY MEDICINE

## 2025-01-13 PROCEDURE — 700111 HCHG RX REV CODE 636 W/ 250 OVERRIDE (IP): Mod: JZ

## 2025-01-13 PROCEDURE — A9270 NON-COVERED ITEM OR SERVICE: HCPCS | Performed by: EMERGENCY MEDICINE

## 2025-01-13 PROCEDURE — 700101 HCHG RX REV CODE 250: Performed by: EMERGENCY MEDICINE

## 2025-01-13 RX ORDER — ACETAMINOPHEN 325 MG/1
650 TABLET ORAL EVERY 6 HOURS PRN
Status: DISCONTINUED | OUTPATIENT
Start: 2025-01-13 | End: 2025-01-17

## 2025-01-13 RX ORDER — POTASSIUM CHLORIDE 29.8 MG/ML
40 INJECTION INTRAVENOUS ONCE
Status: COMPLETED | OUTPATIENT
Start: 2025-01-13 | End: 2025-01-14

## 2025-01-13 RX ORDER — METOCLOPRAMIDE 10 MG/1
10 TABLET ORAL EVERY 6 HOURS PRN
Status: DISCONTINUED | OUTPATIENT
Start: 2025-01-13 | End: 2025-01-17

## 2025-01-13 RX ORDER — POTASSIUM CHLORIDE 1500 MG/1
20 TABLET, EXTENDED RELEASE ORAL DAILY
Status: CANCELLED | OUTPATIENT
Start: 2025-01-13

## 2025-01-13 RX ORDER — ACETAZOLAMIDE 500 MG/5ML
500 INJECTION, POWDER, LYOPHILIZED, FOR SOLUTION INTRAVENOUS ONCE
Status: COMPLETED | OUTPATIENT
Start: 2025-01-13 | End: 2025-01-13

## 2025-01-13 RX ORDER — POTASSIUM CHLORIDE 29.8 MG/ML
40 INJECTION INTRAVENOUS ONCE
Status: COMPLETED | OUTPATIENT
Start: 2025-01-13 | End: 2025-01-13

## 2025-01-13 RX ADMIN — Medication 400 MG: at 05:12

## 2025-01-13 RX ADMIN — POTASSIUM BICARBONATE 50 MEQ: 978 TABLET, EFFERVESCENT ORAL at 06:25

## 2025-01-13 RX ADMIN — FAMOTIDINE 20 MG: 20 TABLET, FILM COATED ORAL at 17:26

## 2025-01-13 RX ADMIN — METHOCARBAMOL TABLETS 750 MG: 750 TABLET, COATED ORAL at 02:24

## 2025-01-13 RX ADMIN — HYDROCODONE BITARTRATE AND ACETAMINOPHEN 1 TABLET: 10; 325 TABLET ORAL at 01:59

## 2025-01-13 RX ADMIN — ONDANSETRON 4 MG: 2 INJECTION INTRAMUSCULAR; INTRAVENOUS at 17:43

## 2025-01-13 RX ADMIN — BISACODYL 10 MG: 10 SUPPOSITORY RECTAL at 05:11

## 2025-01-13 RX ADMIN — FAMOTIDINE 20 MG: 20 TABLET, FILM COATED ORAL at 05:12

## 2025-01-13 RX ADMIN — LIDOCAINE HYDROCHLORIDE 2 ML: 10 INJECTION, SOLUTION INFILTRATION; PERINEURAL at 23:48

## 2025-01-13 RX ADMIN — PROPOFOL 30 MCG/KG/MIN: 10 INJECTION, EMULSION INTRAVENOUS at 03:10

## 2025-01-13 RX ADMIN — HYDROCODONE BITARTRATE AND ACETAMINOPHEN 1 TABLET: 10; 325 TABLET ORAL at 05:11

## 2025-01-13 RX ADMIN — INSULIN LISPRO 2 UNITS: 100 INJECTION, SOLUTION INTRAVENOUS; SUBCUTANEOUS at 12:16

## 2025-01-13 RX ADMIN — POTASSIUM CHLORIDE 40 MEQ: 29.8 INJECTION, SOLUTION INTRAVENOUS at 16:16

## 2025-01-13 RX ADMIN — HYDROMORPHONE HYDROCHLORIDE 0.8 MG/HR: 10 INJECTION, SOLUTION INTRAMUSCULAR; INTRAVENOUS; SUBCUTANEOUS at 07:17

## 2025-01-13 RX ADMIN — POTASSIUM CHLORIDE 40 MEQ: 29.8 INJECTION, SOLUTION INTRAVENOUS at 06:27

## 2025-01-13 RX ADMIN — ENOXAPARIN SODIUM 40 MG: 100 INJECTION SUBCUTANEOUS at 17:26

## 2025-01-13 RX ADMIN — LIDOCAINE HYDROCHLORIDE 2 ML: 10 INJECTION, SOLUTION INFILTRATION; PERINEURAL at 16:54

## 2025-01-13 RX ADMIN — METOLAZONE 5 MG: 5 TABLET ORAL at 05:13

## 2025-01-13 RX ADMIN — HYDROCODONE BITARTRATE AND ACETAMINOPHEN 1 TABLET: 10; 325 TABLET ORAL at 17:27

## 2025-01-13 RX ADMIN — ROPINIROLE HYDROCHLORIDE 1 MG: 1 TABLET, FILM COATED ORAL at 21:30

## 2025-01-13 RX ADMIN — SENNOSIDES AND DOCUSATE SODIUM 2 TABLET: 50; 8.6 TABLET ORAL at 05:11

## 2025-01-13 RX ADMIN — POTASSIUM BICARBONATE 50 MEQ: 978 TABLET, EFFERVESCENT ORAL at 17:26

## 2025-01-13 RX ADMIN — POTASSIUM CHLORIDE 40 MEQ: 29.8 INJECTION, SOLUTION INTRAVENOUS at 23:29

## 2025-01-13 RX ADMIN — HYDROCODONE BITARTRATE AND ACETAMINOPHEN 1 TABLET: 10; 325 TABLET ORAL at 21:30

## 2025-01-13 RX ADMIN — HYDROCODONE BITARTRATE AND ACETAMINOPHEN 1 TABLET: 10; 325 TABLET ORAL at 14:14

## 2025-01-13 RX ADMIN — INSULIN LISPRO 2 UNITS: 100 INJECTION, SOLUTION INTRAVENOUS; SUBCUTANEOUS at 05:13

## 2025-01-13 RX ADMIN — HYDROCODONE BITARTRATE AND ACETAMINOPHEN 1 TABLET: 10; 325 TABLET ORAL at 10:14

## 2025-01-13 RX ADMIN — FUROSEMIDE 80 MG: 10 INJECTION, SOLUTION INTRAMUSCULAR; INTRAVENOUS at 05:11

## 2025-01-13 RX ADMIN — METOCLOPRAMIDE 10 MG: 10 TABLET ORAL at 05:11

## 2025-01-13 RX ADMIN — INSULIN LISPRO 2 UNITS: 100 INJECTION, SOLUTION INTRAVENOUS; SUBCUTANEOUS at 17:53

## 2025-01-13 RX ADMIN — ACETAZOLAMIDE 500 MG: 500 INJECTION, POWDER, LYOPHILIZED, FOR SOLUTION INTRAVENOUS at 12:44

## 2025-01-13 ASSESSMENT — PAIN DESCRIPTION - PAIN TYPE
TYPE: ACUTE PAIN

## 2025-01-13 ASSESSMENT — PULMONARY FUNCTION TESTS: FVC: 1.03

## 2025-01-13 NOTE — CARE PLAN
The patient is Watcher - Medium risk of patient condition declining or worsening    Shift Goals  Clinical Goals: MAP > 65, Pain management  Patient Goals: Rest  Family Goals: Updates    Progress made toward(s) clinical / shift goals:    Problem: Knowledge Deficit - Standard  Goal: Patient and family/care givers will demonstrate understanding of plan of care, disease process/condition, diagnostic tests and medications  Outcome: Progressing     Problem: Skin Integrity  Goal: Skin integrity is maintained or improved  Outcome: Progressing     Problem: Fall Risk  Goal: Patient will remain free from falls  Outcome: Progressing     Problem: Pain - Standard  Goal: Alleviation of pain or a reduction in pain to the patient’s comfort goal  Outcome: Progressing     Problem: Hemodynamics  Goal: Patient's hemodynamics, fluid balance and neurologic status will be stable or improve  Outcome: Progressing

## 2025-01-13 NOTE — PROGRESS NOTES
"Patient tolerating SBT. Parameters: FVC 1.03, NIF -45, RSBI 34.  ETCO2 60s.  CO2 70s on ABGs.  Patient is awake/alert, writing notes, and appears to understand. Asking appropriate questions.   Patient expresses she would want to be intubated (for the third time) if she fails this extubation.    We briefly discussed possibility of requiring tracheostomy placement if patient is unable to be liberated from the ventilator in the upcoming days.  Patient's shrugs \"I do not know\".  Will give another dose of 500 mg IV Diamox today and hold off on resuming propofol/Dilaudid drips.  Patient is agreeable to this plan.    Jessica Campos A.P.R.N.      "

## 2025-01-13 NOTE — CARE PLAN
The patient is Watcher - Medium risk of patient condition declining or worsening    Shift Goals  Clinical Goals: MAP >65, Pain management, SAT/SBT, Mobilize, GOC  Patient Goals: Rest  Family Goals: Updates    Progress made toward(s) clinical / shift goals:    Problem: Knowledge Deficit - Standard  Goal: Patient and family/care givers will demonstrate understanding of plan of care, disease process/condition, diagnostic tests and medications  Outcome: Progressing     Problem: Skin Integrity  Goal: Skin integrity is maintained or improved  Outcome: Progressing     Problem: Pain - Standard  Goal: Alleviation of pain or a reduction in pain to the patient’s comfort goal  Outcome: Progressing     Problem: Hemodynamics  Goal: Patient's hemodynamics, fluid balance and neurologic status will be stable or improve  Outcome: Progressing       Patient is not progressing towards the following goals: N/A

## 2025-01-13 NOTE — THERAPY
Speech Language Therapy Contact Note    Patient Name: Asha Hannon  Age:  64 y.o., Sex:  female  Medical Record #: [id number]  Today's Date: 1/13/2025 01/13/25 0709   Treatment Variance   Reason For Missed Therapy Medical - Patient on Hold from Therapy;Medical - Patient not Able To Participate   Vitals   O2 Delivery Device Ventilator   Interdisciplinary Plan of Care Collaboration   IDT Collaboration with  Other (See Comments)  (EMR)   Collaboration Comments Per EMR - patient remains intubated. SLP will continue to hold and monitor for extubation.     - Pennie Rudolph, SLP

## 2025-01-13 NOTE — DISCHARGE PLANNING
Case Management Discharge Planning    Admission Date: 12/25/2024  GMLOS: 4.8  ALOS: 19    6-Clicks ADL Score: 11  6-Clicks Mobility Score: 9  PT and/or OT Eval ordered: Yes  Post-acute Referrals Ordered: Yes  Post-acute Choice Obtained: No  Has referral(s) been sent to post-acute provider:  Yes    Anticipated Discharge Dispo: Discharge Disposition: Disch to a long term care facility (63)    DME Needed: No    Action(s) Taken:   Pt was discussed during IDT rounds. Pt is on ventilator day 6 and had PleurX placed on 1/11.     Escalations Completed: None    Medically Clear: No    Next Steps:  CM RN to follow up with medical team to discuss discharge barriers or needs.     Barriers to Discharge: Medical clearance

## 2025-01-13 NOTE — FLOWSHEET NOTE
01/13/25 1100   Weaning Parameters   RR (bpm) 16   $ FVC / Vital Capacity (liters)  1.03   NIF (cm H2O)  -45   Rapid Shallow Breathing Index (RR/VT) 34   Spontaneous VE 7.2   Spontaneous      Cuff leak present

## 2025-01-13 NOTE — PROGRESS NOTES
"Critical Care Progress Note    Date of admission  12/25/2024    Chief Complaint  Respiratory distress    Hospital Course  Asha Hannon is a 64-year-old female with PMH significant for breast cancer with mets to the lungs currently on oral chemo who presented 12/25/2024 with weakness, SOB and back & abdominal pain.  CT C/A/P with contrast showed new BUN infiltrate; moderate size pleural effusions; worsening of hepatic metastasis; new abdominal lymphadenopathy, thoracic adenopathy; diffuse osseous metastatic disease with T5 pathologic fracture.    Admitted to floor under the [facility] Team.    12/26 - Palliative Care consult - patient deferred discussion.   12/27 - right-sided thoracentesis.  12/29 - restless legs \"gave out\" resulting in fall, no trauma.   12/31 -right MRI showed multiple metastatic lesions; T-spine MRI showed multiple metastatic lesions and T5 pathologic fracture; L-spine multiple metastatic lesions; C-spine no metastatic lesions.  1/1 - to OR for T4-T6 laminectomy with Dr. Lowery.  1/2 - POD #1, right-sided pneumothorax, medically manage. Left-sided thoracentesis  1/3 - POD #2, AMS with respiratory failure, pH 7.14 CO2 100. Transfer to ICU. Chest tube placed.   1/4 - Intubation  1/5 - Extubation. Chest tubes clamped.  1/6 - Ongoing goals of care discussions. Large bore right chest tube removed. BIPAP and diuresis.   1/7 - DNR, I OK. BIPAP. Diuresing. Right pigtail catheter removed.  1/8 - acute decompensation with hypoxia & obtundation. Re-intubated.   1/9 - VD #2.   1/10 - VD #3. Diuresing. IR consult for PleurX placement.   1/11 - VD #4. Thoracentesis with 1150 removed. PleurX placed.   1/12 - VD #5. Awake, writing notes. Suboptimal SBT parameters for extubation. Continue to diurese.    Interval Problem Update  Reviewed last 24 hour events:  No overnight events.   Temp 100  SB/SR 50 to 60s  SBP 's. Levophed drip actively titrating.  Vent day #6: 24/380/8/50%  SAT/SBT: " yes/yes.  Neuro:Following, moving all 4-5/5. Writing notes. KAYLI.  RASS: 0.  CAM-ICU (-).  Propofol at 30.  OG with TF at goal. BM 1/10  I/O: 1460/4000  Replacing electrolytes.  Mobility 2    Review of Systems  Review of Systems   Unable to perform ROS: Intubated        Vital Signs for last 24 hours   Temp:  [37.9 °C (100.2 °F)] 37.9 °C (100.2 °F)  Pulse:  [52-92] 92  Resp:  [6-53] 24  BP: ()/(51-75) 104/75  SpO2:  [90 %-100 %] 96 %    Hemodynamic parameters for last 24 hours       Respiratory Information for the last 24 hours  Vent Mode: Spont  Rate (breaths/min): 24  Vt Target (mL): 380  PEEP/CPAP: 8  P Support: 5  MAP: 8.7  Control VTE (exp VT): 380    Physical Exam   Physical Exam  Vitals and nursing note reviewed.   Constitutional:       General: She is sleeping. She is not in acute distress.     Appearance: Normal appearance. She is ill-appearing. She is not toxic-appearing.      Interventions: She is sedated and intubated. Nasal cannula in place.   HENT:      Head: Normocephalic.      Nose: Nose normal.      Mouth/Throat:      Lips: Pink.      Mouth: Mucous membranes are moist.   Eyes:      Pupils: Pupils are equal, round, and reactive to light.   Cardiovascular:      Rate and Rhythm: Normal rate and regular rhythm.      Pulses: Normal pulses.      Heart sounds: Normal heart sounds.   Pulmonary:      Effort: Pulmonary effort is normal. She is intubated.      Breath sounds: Examination of the right-middle field reveals rhonchi. Examination of the right-lower field reveals rhonchi. Rhonchi present. No wheezing.   Abdominal:      General: Bowel sounds are decreased.      Palpations: Abdomen is soft.   Musculoskeletal:      Right lower leg: No edema.      Left lower leg: No edema.   Skin:     General: Skin is warm and dry.      Capillary Refill: Capillary refill takes less than 2 seconds.   Neurological:      GCS: GCS eye subscore is 4. GCS verbal subscore is 1. GCS motor subscore is 6.   Psychiatric:       Comments: Intubated         Medications  Current Facility-Administered Medications   Medication Dose Route Frequency Provider Last Rate Last Admin    propofol (DIPRIVAN) injection  0-80 mcg/kg/min (Ideal) Intravenous Continuous Jessica Campos, A.P.R.N.   Dose not Required at 01/13/25 1000    metoclopramide (Reglan) tablet 10 mg  10 mg Enteral Tube Q6HRS PRN Jessica Campos, A.P.R.N.        acetaminophen (Tylenol) tablet 650 mg  650 mg Enteral Tube Q6HRS PRN Jessica Campos, A.P.R.N.        furosemide (Lasix) injection 80 mg  80 mg Intravenous Q DAY Jessica Campos, A.P.R.N.   80 mg at 01/13/25 0511    metOLazone (Zaroxolyn) tablet 5 mg  5 mg Enteral Tube Q DAY Jessica Campos, A.P.R.N.   5 mg at 01/13/25 0513    HYDROmorphone (DILAUDID) 0.2 mg/mL in 50mL NS (Continuous Infusion)   Intravenous Continuous Jessica Campos, A.P.R.N.   Stopped at 01/13/25 0811    Pharmacy Consult: Enteral tube insertion - review meds/change route/product selection  1 Each Other PHARMACY TO DOSE Jessica Campos, A.P.R.N.        norepinephrine (Levophed) 8 mg in 250 mL NS infusion (premix)  0-1 mcg/kg/min (Ideal) Intravenous Continuous Juan Daniel Baca M.D. 14.4 mL/hr at 01/13/25 0807 0.12 mcg/kg/min at 01/13/25 0807    Respiratory Therapy Consult   Nebulization Continuous RT Juan Daniel Baca M.D.        famotidine (Pepcid) tablet 20 mg  20 mg Enteral Tube Q12HRS Juan Daniel Baca M.D.   20 mg at 01/13/25 0512    Or    famotidine (Pepcid) injection 20 mg  20 mg Intravenous Q12HRS Juan Daniel Baca M.D.   20 mg at 01/11/25 1727    senna-docusate (Pericolace Or Senokot S) 8.6-50 MG per tablet 2 Tablet  2 Tablet Enteral Tube BID Juan Daniel Baca M.D.   2 Tablet at 01/13/25 0511    And    polyethylene glycol/lytes (Miralax) Packet 1 Packet  1 Packet Enteral Tube QDAY PRN Juan Daniel Baca M.D.   1 Packet at 01/09/25 0505    And    magnesium hydroxide (Milk Of Magnesia) suspension 30 mL  30 mL Enteral Tube QDAY PRN Juan Daniel Baca M.D.    30 mL at 01/09/25 1819    And    bisacodyl (Dulcolax) suppository 10 mg  10 mg Rectal QDAY PRN Juan Daniel Baca M.D. MD Alert...ICU Electrolyte Replacement per Pharmacy   Other PHARMACY TO DOSE Juan Daniel Baca M.D.        lidocaine (Xylocaine) 1 % injection 2 mL  2 mL Tracheal Tube Q30 MIN PRN Juan Daniel Baca M.D.   2 mL at 01/12/25 2046    HYDROcodone/acetaminophen (Norco)  MG per tablet 1 Tablet  1 Tablet Enteral Tube Q4HRS Juan Daniel Baca M.D.   1 Tablet at 01/13/25 1014    magnesium oxide tablet 400 mg  400 mg Enteral Tube DAILY Juan Daniel Baca M.D.   400 mg at 01/13/25 0512    ROPINIRole (Requip) tablet 1 mg  1 mg Enteral Tube QHS Juan Daniel Baca M.D.   1 mg at 01/12/25 2027    calcium carbonate (Tums) chewable tab 500 mg  500 mg Enteral Tube TID PRN Juan Daniel Baca M.D.        methocarbamol (Robaxin) tablet 750 mg  750 mg Enteral Tube Q8HRS PRN Juan Daniel Baca M.D.   750 mg at 01/13/25 0224    ondansetron (Zofran ODT) dispertab 4 mg  4 mg Enteral Tube Q4HRS PRN Juan Daniel Baca M.D.        ROPINIRole (Requip) tablet 1 mg  1 mg Enteral Tube BID PRN Juan Daniel Baca M.D.   1 mg at 01/12/25 0216    senna-docusate (Pericolace Or Senokot S) 8.6-50 MG per tablet 1 Tablet  1 Tablet Enteral Tube Q24HRS PRN Juan Daniel Baca M.D.        insulin lispro (HumaLOG,AdmeLOG) subcutaneous injection  2-9 Units Subcutaneous Q6HRS Aidee Hicks   2 Units at 01/13/25 0513    And    dextrose 50% (D50W) injection 25 g  25 g Intravenous Q15 MIN PRN Aidee Hicks        labetalol (Normodyne/Trandate) injection 10 mg  10 mg Intravenous Q4HRS PRN Lidia Figueroa M.D.   10 mg at 01/07/25 0611    ipratropium-albuterol (DUONEB) nebulizer solution  3 mL Nebulization Q2HRS PRN (RT) Americo Avalos M.D.   3 mL at 01/11/25 2212    enoxaparin (Lovenox) inj 40 mg  40 mg Subcutaneous DAILY AT 1800 LAYNE KwonRMimiNMimi   40 mg at 01/12/25 1705    bisacodyl (Dulcolax) suppository 10 mg  10 mg  Rectal DAILY Neo Ceja, P.A.   10 mg at 01/13/25 0511    sodium chloride (Ocean) 0.65 % nasal spray 2 Spray  2 Spray Nasal Q2HRS PRN Omar Saleh M.D.   2 Spray at 01/01/25 0600    MD ALERT...DO NOT ADMINISTER NSAIDS or ASPIRIN unless ORDERED By Neurosurgery 1 Each  1 Each Other PRN Karthik Lowery D.O.        ondansetron (Zofran) syringe/vial injection 4 mg  4 mg Intravenous Q4HRS PRN Abdulaziz Nieto M.D.   4 mg at 01/12/25 0908       Fluids    Intake/Output Summary (Last 24 hours) at 1/13/2025 1040  Last data filed at 1/13/2025 0944  Gross per 24 hour   Intake 1721.07 ml   Output 5898 ml   Net -4176.93 ml       Laboratory  Recent Labs     01/12/25  0341   ISTATAPH 7.495*   ISTATAPCO2 36.0   ISTATAPO2 55*   ISTATATCO2 29*   UFXMLCC8LTA 91*   ISTATARTHCO3 27.8   ISTATARTBE 4*   ISTATTEMP 37.9 C   ISTATFIO2 40   ISTATSPEC Arterial   ISTATAPHTC 7.482*   UYJHHSVA9ND 59*         Recent Labs     01/11/25 0429 01/12/25 0316 01/13/25  0352   SODIUM 139 136 136   POTASSIUM 3.8 3.8 2.9*   CHLORIDE 101 101 94*   CO2 26 27 33   BUN 14 16 14   CREATININE 0.68 0.69 0.55   MAGNESIUM 2.2  --   --    PHOSPHORUS 2.7  --   --    CALCIUM 8.1* 7.9* 8.5     Recent Labs     01/11/25 0429 01/12/25 0316 01/13/25  0352   ALTSGPT  --   --  26   ASTSGOT  --   --  133*   ALKPHOSPHAT  --   --  84   TBILIRUBIN  --   --  0.3   GLUCOSE 100* 174* 170*     Recent Labs     01/11/25 0429 01/12/25 0316 01/13/25  0352   WBC 10.5 12.2* 12.7*   NEUTSPOLYS 74.20* 74.80* 77.60*   LYMPHOCYTES 8.80* 9.10* 8.60*   MONOCYTES 10.10 10.80 9.30   EOSINOPHILS 1.70 1.60 1.60   BASOPHILS 0.90 0.70 0.50   ASTSGOT  --   --  133*   ALTSGPT  --   --  26   ALKPHOSPHAT  --   --  84   TBILIRUBIN  --   --  0.3     Recent Labs     01/11/25  0429 01/12/25  0316 01/13/25  0352   RBC 2.99* 3.01* 3.13*   HEMOGLOBIN 8.6* 8.6* 8.8*   HEMATOCRIT 27.2* 27.4* 28.0*   PLATELETCT 513* 546* 524*       Imaging  X-Ray:  I have personally reviewed the images and compared  "with prior images.    Assessment/Plan  * Acute on chronic respiratory failure with hypoxia and hypercapnia (HCC)  Assessment & Plan  -intubated 1/4  -extubated 1/5  -re-intubated 1/8  -Ventilator dependent respiratory failure  -Modify ventilator to optimize oxygenation, acid-base balance and ventilation  -CXR as indicated: monitor lung volumes and tube/line placement  -HOB > 30  -Titrate FiO2 to keep sats greater than 92%  -Chlorhexidine  -goal CO2 35-40  -Daily awakening and SBT trials unless contraindicated  -ABCDEF bundle  -I am actively adjusting ventilator based on clinical indicators and ABG's    Pathological fracture of vertebra- (present on admission)  Assessment & Plan  -Pathologic fracture of T5 with myelopathy on presentation  -S/P T4-6 laminectomy with fusion and spinal decompression on 1/1/2025 by Dr. Lowery  -multi-modal pain management  -Fall Precautions  -PT OT    Malignant neoplasm of breast (HCC)- (present on admission)  Assessment & Plan  \"Bilateral breast cancer with extensive destruction of the left breast and ulceration and skin lesions making her inoperable on the left.  Right breast shows a 3 x 3 cm mass.  PET scan March 2023 showed non-hypermetabolic pleural effusions which are cytology positive and she has demonstrable pleural masses. Started aromatase inhibition early September 2023.  Has not yet started CDK 4 6 inhibitor.  Lost to follow-up for over 6 months although apparently was on letrozole as single agent.  Now with clear progression of disease in the left breast.  August 2024: Progression of disease in liver with extensive disease.  Breast is worse.  Lung is better.  Lymph nodes are worse.  Status post radiation to symptomatic oozing and exophytic left breast mass.  Marked worsening of her cancer on letrozole with progression in the bone and liver.\"--Dr. Benitez  -ongoing goals of care discussions. Patient is fighting to recover and discharge home so she can start the " Letrozole.  -DNR, I OK   -Palliative following for pain management.      Restless leg syndrome- (present on admission)  Assessment & Plan  -Requip     Cancer related pain- (present on admission)  Assessment & Plan  -Palliative Care following for pain management  -Multimodal pain management    Bilateral pleural effusion- (present on admission)  Assessment & Plan  -Malignant pleural effusions from metastatic breast cancer s/p thoracenteses and chest tubes  -Diuresis as tolerated  -monitor chest xrays  -PleurX placement in IR 1/11    Primary hypertension- (present on admission)  Assessment & Plan  -holding PO antihypertensives while on pressors  -PRN's available    Diabetes mellitus, type 2 (HCC)- (present on admission)  Assessment & Plan  -A1c 6.4  -accuchecks with SSI  -CHO diet  -glucose goal 140-180         VTE:  Lovenox  Ulcer: H2 Antagonist  Lines: Central Line  Ongoing indication addressed and Mojica Catheter  Ongoing indication addressed    I have performed a physical exam and reviewed and updated ROS and Plan today (1/13/2025). In review of yesterday's note (1/12/2025), there are no changes except as documented above.     Discussed patient condition and risk of morbidity and/or mortality with Family, RN, RT, Pharmacy, , Patient, and my attending Dr. Cruz  The patient remains critically ill.  Critical care time = 61 minutes in directly providing and coordinating critical care and extensive data review.  No time overlap and excludes procedures.    Please note that this dictation was created using voice recognition software. I have made every reasonable attempt to correct obvious errors, but there may be errors of grammar and possibly content that I did not discover before finalizing the note.    YESIKA Kwon.

## 2025-01-13 NOTE — PROGRESS NOTES
Pulmonary/Critical Care Medicine   Attending Progress Note    Date of service: 1/13/2025  Time: 0738    Ms/ Pedro Hannon is a 64-year-old female with a past medical history significant for stage IV breast cancer with metastatic disease to the lungs currently on oral chemotherapy who was admitted on 12/25/2024 with weakness, shortness of breath, and back pain and had a CT chest, abdomen, and pelvis which showed moderate-sized pleural effusions with worsening hepatic metastatic disease with new abdominal lymphadenopathy, thoracic adenopathy, diffuse osseous metastatic disease with a T5 pathologic fracture.  Since admission the patient had a right sided thoracentesis on 12/27.  An MRI showed multiple thoracic and lumbar metastatic lesions.  The patient was taken to the operating room on 1/1 and had a T4-T6 laminectomy.  She subsequently developed a right-sided pneumothorax requiring chest tube placement.  The patient underwent a left-sided thoracentesis.  The patient continues to have recurrent hypoxic respiratory failure requiring BiPAP but subsequently has been intubated.  She had a Pleurx catheter placed and has had frequent thoracentesis since placement.    24 hour events reviewed:   - no overnight events   - prop at 30, dilaudid 0.8   - RASS 0   - following, writing notes   - Tmax 100.6   - SR 60s   - SBP 80-100s   - levo at 0.12   - TFs at goal   - BM on 1/10   - UOP of 1200cc Galina dolan   - pleurex cath: 167cc overnight   - edge of bed   - VD6   - CXR(reviewed): hilar fullness, left effusion   - poor parameters for SBT   - lovenox   - pepcid   - no abx   - WBCs 12.7   - Hb 8.8   - K 2.9   - creat 0.55    Imaging reviewed    A/P:  Acute on chronic hypoxic and hypercapnic respiratory failure   - due to recurrent malignant effusion and diminished strength/capacity with spinal disease   - Intubated 1/4, extubated 1/5, Reintubated 1/8   - cont full vent support   - RT/O2 protocols   - vent bundle protocols   -  cont mobility   - ongoing SBTs, but not safe to liberate    Bilateral malignant pleural effusions   - s/p thoracentesis/chest tubes   - ongoing diuresis as tolerated   - s/p right pleurX catheter on 1/11    Metastatic breast CA   - cont pain management   - goals to go home and resume cancer therapies    Fracture of T5   - pathologic   - s/p T4-6 laminectomy with fusion and spinal decompression on 1/1/2025   - pain management    I spent extensive time in reviewing the patient's condition, physical examination, laboratory and imaging data, prior documentation, in discussion with IDT, family, patient and CHERIE Rausch in formulating an assessment/plan.    Critical Care time: 17 min. No time overlap, procedures not included in time.    Jimena Cruz MD  Pulmonary and Critical Care Medicine

## 2025-01-13 NOTE — CARE PLAN
Problem: Ventilation  Goal: Ability to achieve and maintain unassisted ventilation or tolerate decreased levels of ventilator support  Description: Target End Date:  4 days     Document on Vent flowsheet    1.  Support and monitor invasive and noninvasive mechanical ventilation  2.  Monitor ventilator weaning response  3.  Perform ventilator associated pneumonia prevention interventions  4.  Manage ventilation therapy by monitoring diagnostic test results  Outcome: Not Met     Ventilator Daily Summary    Vent Day #5  Airway: 7.5@ 23   CMV R26 380 8 50   Ventilator settings: CMV R26 380 8 50  Weaning trials: None   Respiratory Procedures:     Plan: Continue current ventilator settings and wean mechanical ventilation as tolerated per physician orders.

## 2025-01-13 NOTE — CARE PLAN
Problem: Ventilation  Goal: Ability to achieve and maintain unassisted ventilation or tolerate decreased levels of ventilator support  Description: Target End Date:  4 days     Document on Vent flowsheet    1.  Support and monitor invasive and noninvasive mechanical ventilation  2.  Monitor ventilator weaning response  3.  Perform ventilator associated pneumonia prevention interventions  4.  Manage ventilation therapy by monitoring diagnostic test results  Outcome: Not Progressing     Ventilator Daily Summary    Vent Day #6  Airway: 7.5@2.3     Ventilator settings: CMV R24 380 8 50%  Weaning trials: none   Respiratory Procedures:   None   Plan: Continue current ventilator settings and wean mechanical ventilation as tolerated per physician orders.

## 2025-01-13 NOTE — CARE PLAN
The patient is Watcher - Medium risk of patient condition declining or worsening    Shift Goals  Clinical Goals: Map >65, pain management  Patient Goals: comfort  Family Goals: updates    Progress made toward(s) clinical / shift goals:    Problem: Knowledge Deficit - Standard  Goal: Patient and family/care givers will demonstrate understanding of plan of care, disease process/condition, diagnostic tests and medications  Outcome: Progressing     Problem: Skin Integrity  Goal: Skin integrity is maintained or improved  Outcome: Progressing     Problem: Fall Risk  Goal: Patient will remain free from falls  Outcome: Progressing     Problem: Pain - Standard  Goal: Alleviation of pain or a reduction in pain to the patient’s comfort goal  Outcome: Progressing     Problem: Safety - Medical Restraint  Goal: Remains free of injury from restraints (Restraint for Interference with Medical Device)  Outcome: Met  Goal: Free from restraint(s) (Restraint for Interference with Medical Device)  Outcome: Met       Patient is not progressing towards the following goals:

## 2025-01-14 ENCOUNTER — APPOINTMENT (OUTPATIENT)
Dept: RADIOLOGY | Facility: MEDICAL CENTER | Age: 65
End: 2025-01-14
Attending: INTERNAL MEDICINE
Payer: MEDICARE

## 2025-01-14 PROBLEM — E87.6 HYPOKALEMIA: Status: ACTIVE | Noted: 2025-01-14

## 2025-01-14 LAB
ANION GAP SERPL CALC-SCNC: 10 MMOL/L (ref 7–16)
BACTERIA FLD AEROBE CULT: NORMAL
BASE EXCESS BLDA CALC-SCNC: 15 MMOL/L (ref -4–3)
BASOPHILS # BLD AUTO: 0.4 % (ref 0–1.8)
BASOPHILS # BLD: 0.06 K/UL (ref 0–0.12)
BODY TEMPERATURE: ABNORMAL DEGREES
BUN SERPL-MCNC: 15 MG/DL (ref 8–22)
CALCIUM SERPL-MCNC: 8.8 MG/DL (ref 8.5–10.5)
CHLORIDE SERPL-SCNC: 95 MMOL/L (ref 96–112)
CO2 BLDA-SCNC: 44 MMOL/L (ref 32–48)
CO2 SERPL-SCNC: 33 MMOL/L (ref 20–33)
CREAT SERPL-MCNC: 0.44 MG/DL (ref 0.5–1.4)
DELSYS IDSYS: ABNORMAL
END TIDAL CARBON DIOXIDE IECO2: 45 MMHG
EOSINOPHIL # BLD AUTO: 0.09 K/UL (ref 0–0.51)
EOSINOPHIL NFR BLD: 0.5 % (ref 0–6.9)
ERYTHROCYTE [DISTWIDTH] IN BLOOD BY AUTOMATED COUNT: 56 FL (ref 35.9–50)
GFR SERPLBLD CREATININE-BSD FMLA CKD-EPI: 107 ML/MIN/1.73 M 2
GLUCOSE BLD STRIP.AUTO-MCNC: 146 MG/DL (ref 65–99)
GLUCOSE BLD STRIP.AUTO-MCNC: 148 MG/DL (ref 65–99)
GLUCOSE BLD STRIP.AUTO-MCNC: 159 MG/DL (ref 65–99)
GLUCOSE BLD STRIP.AUTO-MCNC: 172 MG/DL (ref 65–99)
GLUCOSE SERPL-MCNC: 147 MG/DL (ref 65–99)
GRAM STN SPEC: NORMAL
HCO3 BLDA-SCNC: 42.2 MMOL/L (ref 21–28)
HCT VFR BLD AUTO: 30 % (ref 37–47)
HGB BLD-MCNC: 9.3 G/DL (ref 12–16)
HOROWITZ INDEX BLDA+IHG-RTO: 183 MM[HG]
IMM GRANULOCYTES # BLD AUTO: 0.22 K/UL (ref 0–0.11)
IMM GRANULOCYTES NFR BLD AUTO: 1.3 % (ref 0–0.9)
LACTATE BLD-SCNC: 1 MMOL/L (ref 0.5–2)
LYMPHOCYTES # BLD AUTO: 0.84 K/UL (ref 1–4.8)
LYMPHOCYTES NFR BLD: 5.1 % (ref 22–41)
MCH RBC QN AUTO: 28.3 PG (ref 27–33)
MCHC RBC AUTO-ENTMCNC: 31 G/DL (ref 32.2–35.5)
MCV RBC AUTO: 91.2 FL (ref 81.4–97.8)
MODE IMODE: ABNORMAL
MONOCYTES # BLD AUTO: 1.49 K/UL (ref 0–0.85)
MONOCYTES NFR BLD AUTO: 9.1 % (ref 0–13.4)
NEUTROPHILS # BLD AUTO: 13.69 K/UL (ref 1.82–7.42)
NEUTROPHILS NFR BLD: 83.6 % (ref 44–72)
NRBC # BLD AUTO: 0 K/UL
NRBC BLD-RTO: 0 /100 WBC (ref 0–0.2)
O2/TOTAL GAS SETTING VFR VENT: 40 %
PCO2 BLDA: 65.9 MMHG (ref 32–48)
PCO2 TEMP ADJ BLDA: 68 MMHG (ref 32–48)
PEEP END EXPIRATORY PRESSURE IPEEP: 8 CMH20
PH BLDA: 7.41 [PH] (ref 7.35–7.45)
PH TEMP ADJ BLDA: 7.4 [PH] (ref 7.35–7.45)
PLATELET # BLD AUTO: 547 K/UL (ref 164–446)
PMV BLD AUTO: 9.2 FL (ref 9–12.9)
PO2 BLDA: 73 MMHG (ref 83–108)
PO2 TEMP ADJ BLDA: 76 MMHG (ref 64–87)
POTASSIUM SERPL-SCNC: 3 MMOL/L (ref 3.6–5.5)
POTASSIUM SERPL-SCNC: 3.5 MMOL/L (ref 3.6–5.5)
POTASSIUM SERPL-SCNC: 3.8 MMOL/L (ref 3.6–5.5)
POTASSIUM SERPL-SCNC: 3.9 MMOL/L (ref 3.6–5.5)
PRESSURE SUPPORT SETTING VENT: 10 CM[H2O]
RBC # BLD AUTO: 3.29 M/UL (ref 4.2–5.4)
SAO2 % BLDA: 94 % (ref 93–99)
SIGNIFICANT IND 70042: NORMAL
SITE SITE: NORMAL
SODIUM SERPL-SCNC: 138 MMOL/L (ref 135–145)
SOURCE SOURCE: NORMAL
SPECIMEN DRAWN FROM PATIENT: ABNORMAL
WBC # BLD AUTO: 16.4 K/UL (ref 4.8–10.8)

## 2025-01-14 PROCEDURE — 94150 VITAL CAPACITY TEST: CPT

## 2025-01-14 PROCEDURE — 83605 ASSAY OF LACTIC ACID: CPT

## 2025-01-14 PROCEDURE — 84132 ASSAY OF SERUM POTASSIUM: CPT

## 2025-01-14 PROCEDURE — 700111 HCHG RX REV CODE 636 W/ 250 OVERRIDE (IP): Performed by: INTERNAL MEDICINE

## 2025-01-14 PROCEDURE — 700102 HCHG RX REV CODE 250 W/ 637 OVERRIDE(OP): Performed by: EMERGENCY MEDICINE

## 2025-01-14 PROCEDURE — 700111 HCHG RX REV CODE 636 W/ 250 OVERRIDE (IP): Mod: JZ

## 2025-01-14 PROCEDURE — 700111 HCHG RX REV CODE 636 W/ 250 OVERRIDE (IP): Mod: JZ | Performed by: NURSE PRACTITIONER

## 2025-01-14 PROCEDURE — 71045 X-RAY EXAM CHEST 1 VIEW: CPT

## 2025-01-14 PROCEDURE — A9270 NON-COVERED ITEM OR SERVICE: HCPCS | Performed by: INTERNAL MEDICINE

## 2025-01-14 PROCEDURE — 700101 HCHG RX REV CODE 250

## 2025-01-14 PROCEDURE — 99291 CRITICAL CARE FIRST HOUR: CPT

## 2025-01-14 PROCEDURE — 82803 BLOOD GASES ANY COMBINATION: CPT

## 2025-01-14 PROCEDURE — 700102 HCHG RX REV CODE 250 W/ 637 OVERRIDE(OP): Performed by: INTERNAL MEDICINE

## 2025-01-14 PROCEDURE — 99233 SBSQ HOSP IP/OBS HIGH 50: CPT | Performed by: NURSE PRACTITIONER

## 2025-01-14 PROCEDURE — 85025 COMPLETE CBC W/AUTO DIFF WBC: CPT

## 2025-01-14 PROCEDURE — 94799 UNLISTED PULMONARY SVC/PX: CPT

## 2025-01-14 PROCEDURE — 700105 HCHG RX REV CODE 258

## 2025-01-14 PROCEDURE — 700111 HCHG RX REV CODE 636 W/ 250 OVERRIDE (IP): Performed by: EMERGENCY MEDICINE

## 2025-01-14 PROCEDURE — 36600 WITHDRAWAL OF ARTERIAL BLOOD: CPT

## 2025-01-14 PROCEDURE — 80048 BASIC METABOLIC PNL TOTAL CA: CPT

## 2025-01-14 PROCEDURE — 770022 HCHG ROOM/CARE - ICU (200)

## 2025-01-14 PROCEDURE — 700111 HCHG RX REV CODE 636 W/ 250 OVERRIDE (IP): Performed by: NURSE PRACTITIONER

## 2025-01-14 PROCEDURE — 94003 VENT MGMT INPAT SUBQ DAY: CPT

## 2025-01-14 PROCEDURE — A9270 NON-COVERED ITEM OR SERVICE: HCPCS | Performed by: EMERGENCY MEDICINE

## 2025-01-14 PROCEDURE — 700111 HCHG RX REV CODE 636 W/ 250 OVERRIDE (IP)

## 2025-01-14 PROCEDURE — 82962 GLUCOSE BLOOD TEST: CPT | Mod: 91

## 2025-01-14 PROCEDURE — 700105 HCHG RX REV CODE 258: Performed by: EMERGENCY MEDICINE

## 2025-01-14 PROCEDURE — 700101 HCHG RX REV CODE 250: Performed by: EMERGENCY MEDICINE

## 2025-01-14 PROCEDURE — 700102 HCHG RX REV CODE 250 W/ 637 OVERRIDE(OP): Performed by: NURSE PRACTITIONER

## 2025-01-14 PROCEDURE — A9270 NON-COVERED ITEM OR SERVICE: HCPCS | Performed by: NURSE PRACTITIONER

## 2025-01-14 RX ORDER — POTASSIUM CHLORIDE 14.9 MG/ML
20 INJECTION INTRAVENOUS ONCE
Status: COMPLETED | OUTPATIENT
Start: 2025-01-15 | End: 2025-01-15

## 2025-01-14 RX ORDER — POTASSIUM CHLORIDE 14.9 MG/ML
20 INJECTION INTRAVENOUS ONCE
Status: COMPLETED | OUTPATIENT
Start: 2025-01-14 | End: 2025-01-14

## 2025-01-14 RX ORDER — POTASSIUM CHLORIDE 7.45 MG/ML
10 INJECTION INTRAVENOUS ONCE
Status: COMPLETED | OUTPATIENT
Start: 2025-01-14 | End: 2025-01-14

## 2025-01-14 RX ORDER — ACETAZOLAMIDE 500 MG/5ML
500 INJECTION, POWDER, LYOPHILIZED, FOR SOLUTION INTRAVENOUS ONCE
Status: COMPLETED | OUTPATIENT
Start: 2025-01-14 | End: 2025-01-14

## 2025-01-14 RX ORDER — DEXMEDETOMIDINE HYDROCHLORIDE 4 UG/ML
.1-1 INJECTION, SOLUTION INTRAVENOUS CONTINUOUS
Status: DISCONTINUED | OUTPATIENT
Start: 2025-01-14 | End: 2025-01-16

## 2025-01-14 RX ORDER — POTASSIUM CHLORIDE 29.8 MG/ML
40 INJECTION INTRAVENOUS ONCE
Status: COMPLETED | OUTPATIENT
Start: 2025-01-14 | End: 2025-01-14

## 2025-01-14 RX ORDER — SCOPOLAMINE 1 MG/3D
1 PATCH, EXTENDED RELEASE TRANSDERMAL
Status: DISCONTINUED | OUTPATIENT
Start: 2025-01-14 | End: 2025-01-17

## 2025-01-14 RX ADMIN — ROPINIROLE HYDROCHLORIDE 1 MG: 1 TABLET, FILM COATED ORAL at 03:52

## 2025-01-14 RX ADMIN — ACETAMINOPHEN 650 MG: 325 TABLET ORAL at 01:28

## 2025-01-14 RX ADMIN — POTASSIUM CHLORIDE 20 MEQ: 14.9 INJECTION, SOLUTION INTRAVENOUS at 23:50

## 2025-01-14 RX ADMIN — Medication 5 MG: at 22:10

## 2025-01-14 RX ADMIN — POTASSIUM CHLORIDE 20 MEQ: 14.9 INJECTION, SOLUTION INTRAVENOUS at 05:17

## 2025-01-14 RX ADMIN — HYDROCODONE BITARTRATE AND ACETAMINOPHEN 1 TABLET: 10; 325 TABLET ORAL at 01:28

## 2025-01-14 RX ADMIN — ONDANSETRON 4 MG: 2 INJECTION INTRAMUSCULAR; INTRAVENOUS at 07:03

## 2025-01-14 RX ADMIN — DEXMEDETOMIDINE HYDROCHLORIDE 0.2 MCG/KG/HR: 100 INJECTION, SOLUTION INTRAVENOUS at 20:18

## 2025-01-14 RX ADMIN — POTASSIUM BICARBONATE 50 MEQ: 978 TABLET, EFFERVESCENT ORAL at 17:58

## 2025-01-14 RX ADMIN — HYDROCODONE BITARTRATE AND ACETAMINOPHEN 1 TABLET: 10; 325 TABLET ORAL at 10:23

## 2025-01-14 RX ADMIN — POTASSIUM CHLORIDE 40 MEQ: 29.8 INJECTION, SOLUTION INTRAVENOUS at 13:32

## 2025-01-14 RX ADMIN — NOREPINEPHRINE BITARTRATE 0.1 MCG/KG/MIN: 1 SOLUTION INTRAVENOUS at 22:08

## 2025-01-14 RX ADMIN — POTASSIUM BICARBONATE 50 MEQ: 978 TABLET, EFFERVESCENT ORAL at 05:13

## 2025-01-14 RX ADMIN — ONDANSETRON 4 MG: 2 INJECTION INTRAMUSCULAR; INTRAVENOUS at 12:39

## 2025-01-14 RX ADMIN — Medication 400 MG: at 05:13

## 2025-01-14 RX ADMIN — HYDROCODONE BITARTRATE AND ACETAMINOPHEN 1 TABLET: 10; 325 TABLET ORAL at 21:26

## 2025-01-14 RX ADMIN — METHOCARBAMOL TABLETS 750 MG: 750 TABLET, COATED ORAL at 21:26

## 2025-01-14 RX ADMIN — LIDOCAINE HYDROCHLORIDE 2 ML: 10 INJECTION, SOLUTION INFILTRATION; PERINEURAL at 16:10

## 2025-01-14 RX ADMIN — SCOPOLAMINE 1 PATCH: 1.5 PATCH, EXTENDED RELEASE TRANSDERMAL at 01:41

## 2025-01-14 RX ADMIN — POTASSIUM CHLORIDE 20 MEQ: 14.9 INJECTION, SOLUTION INTRAVENOUS at 19:29

## 2025-01-14 RX ADMIN — DEXMEDETOMIDINE HYDROCHLORIDE 0.2 MCG/KG/HR: 100 INJECTION, SOLUTION INTRAVENOUS at 15:11

## 2025-01-14 RX ADMIN — HYDROCODONE BITARTRATE AND ACETAMINOPHEN 1 TABLET: 10; 325 TABLET ORAL at 18:04

## 2025-01-14 RX ADMIN — FAMOTIDINE 20 MG: 20 TABLET, FILM COATED ORAL at 18:00

## 2025-01-14 RX ADMIN — FENTANYL CITRATE 50 MCG: 50 INJECTION, SOLUTION INTRAMUSCULAR; INTRAVENOUS at 13:45

## 2025-01-14 RX ADMIN — METOCLOPRAMIDE 10 MG: 10 TABLET ORAL at 17:41

## 2025-01-14 RX ADMIN — FAMOTIDINE 20 MG: 20 TABLET, FILM COATED ORAL at 05:14

## 2025-01-14 RX ADMIN — SENNOSIDES AND DOCUSATE SODIUM 2 TABLET: 50; 8.6 TABLET ORAL at 18:01

## 2025-01-14 RX ADMIN — FUROSEMIDE 80 MG: 10 INJECTION, SOLUTION INTRAMUSCULAR; INTRAVENOUS at 05:14

## 2025-01-14 RX ADMIN — HYDROCODONE BITARTRATE AND ACETAMINOPHEN 1 TABLET: 10; 325 TABLET ORAL at 05:14

## 2025-01-14 RX ADMIN — POTASSIUM CHLORIDE 10 MEQ: 7.46 INJECTION, SOLUTION INTRAVENOUS at 07:19

## 2025-01-14 RX ADMIN — HYDROCODONE BITARTRATE AND ACETAMINOPHEN 1 TABLET: 10; 325 TABLET ORAL at 14:41

## 2025-01-14 RX ADMIN — ENOXAPARIN SODIUM 40 MG: 100 INJECTION SUBCUTANEOUS at 18:24

## 2025-01-14 RX ADMIN — INSULIN LISPRO 2 UNITS: 100 INJECTION, SOLUTION INTRAVENOUS; SUBCUTANEOUS at 23:48

## 2025-01-14 RX ADMIN — ACETAZOLAMIDE 500 MG: 500 INJECTION, POWDER, LYOPHILIZED, FOR SOLUTION INTRAVENOUS at 11:03

## 2025-01-14 RX ADMIN — ROPINIROLE HYDROCHLORIDE 1 MG: 1 TABLET, FILM COATED ORAL at 21:26

## 2025-01-14 RX ADMIN — LIDOCAINE HYDROCHLORIDE 2 ML: 10 INJECTION, SOLUTION INFILTRATION; PERINEURAL at 08:45

## 2025-01-14 RX ADMIN — INSULIN LISPRO 2 UNITS: 100 INJECTION, SOLUTION INTRAVENOUS; SUBCUTANEOUS at 05:27

## 2025-01-14 RX ADMIN — INSULIN LISPRO 2 UNITS: 100 INJECTION, SOLUTION INTRAVENOUS; SUBCUTANEOUS at 12:12

## 2025-01-14 RX ADMIN — METOLAZONE 5 MG: 5 TABLET ORAL at 05:14

## 2025-01-14 ASSESSMENT — PULMONARY FUNCTION TESTS: FVC: 0.6

## 2025-01-14 NOTE — PROGRESS NOTES
"Critical Care Progress Note    Date of admission  12/25/2024    Chief Complaint  64 y.o. female admitted 12/25/2024 with   Chief Complaint   Patient presents with    Shortness of Breath     Pt c/o shortness of breath over the past couple of days. Denies new fever.  Pt wears 1.5L NC at baseline.  Pt has hx breast cancer with mets to the lungs. Currently taking PO chemotherapy pills.     Extremity Weakness     Pt states she has progressively been feeling weaker over the past couple of days to that point where she cannot ambulate.     Abdominal Pain     Pt also c/o epigastric pain and nausea.      Hospital Course  Asha Hannon is a 64-year-old female with PMH significant for breast cancer with mets to the lungs currently on oral chemo who presented 12/25/2024 with weakness, SOB and back & abdominal pain.  CT C/A/P with contrast showed new BUN infiltrate; moderate size pleural effusions; worsening of hepatic metastasis; new abdominal lymphadenopathy, thoracic adenopathy; diffuse osseous metastatic disease with T5 pathologic fracture.    Admitted to floor under the [facility] Team.    12/26 - Palliative Care consult - patient deferred discussion.   12/27 - right-sided thoracentesis.  12/29 - restless legs \"gave out\" resulting in fall, no trauma.   12/31 -right MRI showed multiple metastatic lesions; T-spine MRI showed multiple metastatic lesions and T5 pathologic fracture; L-spine multiple metastatic lesions; C-spine no metastatic lesions.  1/1 - to OR for T4-T6 laminectomy with Dr. Lowery.  1/2 - POD #1, right-sided pneumothorax, medically manage. Left-sided thoracentesis  1/3 - POD #2, AMS with respiratory failure, pH 7.14 CO2 100. Transfer to ICU. Chest tube placed.   1/4 - Intubation  1/5 - Extubation. Chest tubes clamped.  1/6 - Ongoing goals of care discussions. Large bore right chest tube removed. BIPAP and diuresis.   1/7 - DNR, I OK. BIPAP. Diuresing. Right pigtail catheter removed.  1/8 - acute " decompensation with hypoxia & obtundation. Re-intubated.   1/9 - VD #2.   1/10 - VD #3. Diuresing. IR consult for PleurX placement.   1/11 - VD #4. Thoracentesis with 1150 removed. PleurX placed.   1/12 - VD #5. Awake, writing notes. Suboptimal SBT parameters for extubation. Continue to diurese.  1/13- VD#6. Awake, writing notes. SAT/SBT suboptimal.     Interval Problem Update  Reviewed last 24 hour events:  - Overnight Events:  - some anxiety overnight   - Tm: 101.1  - Neuro: No acute neuro changes   - HR: 60-80   - SBP:    - VD: # 7    -ASV: 100 / 8 / 50%   - UOP: 4750 mL/24 hrs   - Spivey: y   - GI: tf  - Lines: cvc, spivey   - PPx: GI pecid , DVT lovenox   - Mobility level 1    Infusions:   -levo    ABX:   -none    Review of Systems  Review of Systems   Unable to perform ROS: Intubated        Vital Signs for last 24 hours   Pulse:  [] 84  Resp:  [10-81] 23  BP: ()/(52-71) 117/55  SpO2:  [95 %-100 %] 96 %    Hemodynamic parameters for last 24 hours       Respiratory Information for the last 24 hours  Vent Mode: Spont  Rate (breaths/min): 24  Vt Target (mL): 554  PEEP/CPAP: 8  P Support: 10  MAP: 12  Control VTE (exp VT): 628    Physical Exam   Physical Exam  Vitals and nursing note reviewed.   Constitutional:       General: She is sleeping. She is not in acute distress.     Appearance: Normal appearance. She is ill-appearing. She is not toxic-appearing.      Interventions: She is sedated and intubated. Nasal cannula in place.   HENT:      Head: Normocephalic.      Nose: Nose normal.      Mouth/Throat:      Lips: Pink.      Mouth: Mucous membranes are moist.   Eyes:      Pupils: Pupils are equal, round, and reactive to light.   Cardiovascular:      Rate and Rhythm: Normal rate and regular rhythm.      Pulses: Normal pulses.      Heart sounds: Normal heart sounds.   Pulmonary:      Effort: Pulmonary effort is normal. She is intubated.      Breath sounds: Examination of the right-middle field reveals  rhonchi. Examination of the right-lower field reveals rhonchi. Rhonchi present. No wheezing.   Abdominal:      General: Bowel sounds are decreased.      Palpations: Abdomen is soft.   Musculoskeletal:      Right lower leg: No edema.      Left lower leg: No edema.   Skin:     General: Skin is warm and dry.      Capillary Refill: Capillary refill takes less than 2 seconds.   Neurological:      GCS: GCS eye subscore is 4. GCS verbal subscore is 1. GCS motor subscore is 6.   Psychiatric:      Comments: Intubated         Medications  Current Facility-Administered Medications   Medication Dose Route Frequency Provider Last Rate Last Admin    scopolamine (Transderm-Scop) patch 1 Patch  1 Patch Transdermal Q72HRS Lidia Figueroa M.D.   1 Patch at 01/14/25 0141    metoclopramide (Reglan) tablet 10 mg  10 mg Enteral Tube Q6HRS PRN Jessica Campos, A.P.R.N.        acetaminophen (Tylenol) tablet 650 mg  650 mg Enteral Tube Q6HRS PRN Jessica Campos, A.P.R.N.   650 mg at 01/14/25 0128    K+ Scale: Goal of 5  1 Each Intravenous Q6HRS Jessica Campos, A.P.R.N.   1 Each at 01/14/25 0600    potassium bicarbonate (Klyte) effervescent tablet 50 mEq  50 mEq Enteral Tube BID Jessica Campos, A.P.R.N.   50 mEq at 01/14/25 0513    furosemide (Lasix) injection 80 mg  80 mg Intravenous Q DAY Jessica Campos, A.P.R.N.   80 mg at 01/14/25 0514    metOLazone (Zaroxolyn) tablet 5 mg  5 mg Enteral Tube Q DAY Jessica Campos, A.P.R.N.   5 mg at 01/14/25 0514    Pharmacy Consult: Enteral tube insertion - review meds/change route/product selection  1 Each Other PHARMACY TO DOSE Jessica Campos, A.P.R.N.        norepinephrine (Levophed) 8 mg in 250 mL NS infusion (premix)  0-1 mcg/kg/min (Ideal) Intravenous Continuous Juan Daniel Baca M.D.   Paused at 01/13/25 1200    Respiratory Therapy Consult   Nebulization Continuous RT Juan Daniel P Josephy, M.D.        famotidine (Pepcid) tablet 20 mg  20 mg Enteral Tube Q12HRS Juan Daniel Baca M.D.   20 mg at  01/14/25 0514    Or    famotidine (Pepcid) injection 20 mg  20 mg Intravenous Q12HRS Juan Daniel Baca M.D.   20 mg at 01/11/25 1727    senna-docusate (Pericolace Or Senokot S) 8.6-50 MG per tablet 2 Tablet  2 Tablet Enteral Tube BID Juan Daniel Baca M.D.   2 Tablet at 01/13/25 0511    And    polyethylene glycol/lytes (Miralax) Packet 1 Packet  1 Packet Enteral Tube QDAY PRN Juan Daniel Baca M.D.   1 Packet at 01/09/25 0505    And    magnesium hydroxide (Milk Of Magnesia) suspension 30 mL  30 mL Enteral Tube QDAY PRN Juan Daniel Baca M.D.   30 mL at 01/09/25 1819    And    bisacodyl (Dulcolax) suppository 10 mg  10 mg Rectal QDAY PRN Juan Daniel Baca M.D. MD Alert...ICU Electrolyte Replacement per Pharmacy   Other PHARMACY TO DOSE Juan Daniel Baca M.D.        lidocaine (Xylocaine) 1 % injection 2 mL  2 mL Tracheal Tube Q30 MIN PRN Juan Daniel Baca M.D.   2 mL at 01/14/25 0845    HYDROcodone/acetaminophen (Norco)  MG per tablet 1 Tablet  1 Tablet Enteral Tube Q4HRS Juan Daniel Baca M.D.   1 Tablet at 01/14/25 1023    magnesium oxide tablet 400 mg  400 mg Enteral Tube DAILY Juan Daniel Baca M.D.   400 mg at 01/14/25 0513    ROPINIRole (Requip) tablet 1 mg  1 mg Enteral Tube QHS Juan Daniel Baca M.D.   1 mg at 01/13/25 2130    calcium carbonate (Tums) chewable tab 500 mg  500 mg Enteral Tube TID PRN Juan Daniel Baca M.D.        methocarbamol (Robaxin) tablet 750 mg  750 mg Enteral Tube Q8HRS PRN Juan Daniel Baca M.D.   750 mg at 01/13/25 0224    ondansetron (Zofran ODT) dispertab 4 mg  4 mg Enteral Tube Q4HRS PRN Juan Daniel Baca M.D.        ROPINIRole (Requip) tablet 1 mg  1 mg Enteral Tube BID PRN Juan Daniel PERALTA Josephy, M.D.   1 mg at 01/14/25 0352    senna-docusate (Pericolace Or Senokot S) 8.6-50 MG per tablet 1 Tablet  1 Tablet Enteral Tube Q24HRS PRMOISES Baca M.D.        insulin lispro (HumaLOG,AdmeLOG) subcutaneous injection  2-9 Units Subcutaneous Q6HRS Aidee HAYNES  [place]   2 Units at 01/14/25 1212    And    dextrose 50% (D50W) injection 25 g  25 g Intravenous Q15 MIN PRN Aidee HAYNES [place]        labetalol (Normodyne/Trandate) injection 10 mg  10 mg Intravenous Q4HRS PRN Lidia Figueroa M.D.   10 mg at 01/07/25 0611    ipratropium-albuterol (DUONEB) nebulizer solution  3 mL Nebulization Q2HRS PRN (RT) Americo Avalos M.D.   3 mL at 01/11/25 2212    enoxaparin (Lovenox) inj 40 mg  40 mg Subcutaneous DAILY AT 1800 Jessica Campos, A.P.R.N.   40 mg at 01/13/25 1726    bisacodyl (Dulcolax) suppository 10 mg  10 mg Rectal DAILY Neo Ceja, P.A.   10 mg at 01/13/25 0511    sodium chloride (Ocean) 0.65 % nasal spray 2 Spray  2 Spray Nasal Q2HRS PRN Omar Saleh M.D.   2 Spray at 01/01/25 0600    MD ALERT...DO NOT ADMINISTER NSAIDS or ASPIRIN unless ORDERED By Neurosurgery 1 Each  1 Each Other PRN Karthik Lowery D.O.        ondansetron (Zofran) syringe/vial injection 4 mg  4 mg Intravenous Q4HRS PRN Abdulaziz Nieto M.D.   4 mg at 01/14/25 1239       Fluids    Intake/Output Summary (Last 24 hours) at 1/14/2025 1243  Last data filed at 1/14/2025 1000  Gross per 24 hour   Intake 1717.7 ml   Output 3785 ml   Net -2067.3 ml       Laboratory  Recent Labs     01/12/25  0341 01/13/25  1133 01/14/25  1148   ISTATAPH 7.495* 7.375 7.414   ISTATAPCO2 36.0 72.0* 65.9*   ISTATAPO2 55* 85 73*   ISTATATCO2 29* 44* 44*   WVIPUBH2BTG 91* 95 94   ISTATARTHCO3 27.8 42.1* 42.2*   ISTATARTBE 4* 14* 15*   ISTATTEMP 37.9 C 37.8 C 37.7 C   ISTATFIO2 40 50 40   ISTATSPEC Arterial Arterial Arterial   ISTATAPHTC 7.482* 7.363 7.404   UQTFDGNU9RK 59* 90* 76         Recent Labs     01/12/25  0316 01/13/25  0352 01/13/25  1420 01/13/25  2205 01/14/25  0359 01/14/25  1200   SODIUM 136 136  --   --  138  --    POTASSIUM 3.8 2.9*   < > 3.2* 3.5* 3.0*   CHLORIDE 101 94*  --   --  95*  --    CO2 27 33  --   --  33  --    BUN 16 14  --   --  15  --    CREATININE 0.69 0.55  --   --  0.44*  --    CALCIUM 7.9*  8.5  --   --  8.8  --     < > = values in this interval not displayed.     Recent Labs     01/12/25 0316 01/13/25  0352 01/13/25  1420 01/14/25 0359   ALTSGPT  --  26  --   --    ASTSGOT  --  133*  --   --    ALKPHOSPHAT  --  84  --   --    TBILIRUBIN  --  0.3  --   --    PREALBUMIN  --   --  11.1*  --    GLUCOSE 174* 170*  --  147*     Recent Labs     01/12/25 0316 01/13/25 0352 01/14/25 0359   WBC 12.2* 12.7* 16.4*   NEUTSPOLYS 74.80* 77.60* 83.60*   LYMPHOCYTES 9.10* 8.60* 5.10*   MONOCYTES 10.80 9.30 9.10   EOSINOPHILS 1.60 1.60 0.50   BASOPHILS 0.70 0.50 0.40   ASTSGOT  --  133*  --    ALTSGPT  --  26  --    ALKPHOSPHAT  --  84  --    TBILIRUBIN  --  0.3  --      Recent Labs     01/12/25 0316 01/13/25 0352 01/14/25 0359   RBC 3.01* 3.13* 3.29*   HEMOGLOBIN 8.6* 8.8* 9.3*   HEMATOCRIT 27.4* 28.0* 30.0*   PLATELETCT 546* 524* 547*       Imaging  X-Ray:  I have personally reviewed the images and compared with prior images.  CT:    Reviewed  MRI:   Reviewed    Assessment/Plan  * Acute on chronic respiratory failure with hypoxia and hypercapnia (HCC)  Assessment & Plan  intubated 1/4  extubated 1/5  re-intubated 1/8  Ventilator dependent respiratory failure  Modify ventilator to optimize oxygenation, acid-base balance and ventilation  CXR as indicated: monitor lung volumes and tube/line placement  HOB > 30  Titrate FiO2 to keep sats greater than 92%  Chlorhexidine  goal CO2 35-40  Daily awakening and SBT trials unless contraindicated  ABCDEF bundle  I am actively adjusting ventilator based on clinical indicators and ABG's  Optimize mobility, up in cardiac chair, edge of bed, PT etc.  Possible bronch    Hypokalemia  Assessment & Plan  K scale    Restless leg syndrome- (present on admission)  Assessment & Plan  Requip     Pathological fracture of vertebra- (present on admission)  Assessment & Plan  Pathologic fracture of T5 with myelopathy on presentation  S/P T4-6 laminectomy with fusion and spinal  "decompression on 1/1/2025 by Dr. Lowery  multi-modal pain management  Fall Precautions  PT OT    Cancer related pain- (present on admission)  Assessment & Plan  Palliative Care following for pain management  Multimodal pain management    Bilateral pleural effusion- (present on admission)  Assessment & Plan  Malignant pleural effusions from metastatic breast cancer s/p thoracenteses and chest tubes  Diuresis as tolerated  monitor chest xrays  PleurX placement in IR 1/11    Malignant neoplasm of breast (HCC)- (present on admission)  Assessment & Plan  \"Bilateral breast cancer with extensive destruction of the left breast and ulceration and skin lesions making her inoperable on the left.  Right breast shows a 3 x 3 cm mass.  PET scan March 2023 showed non-hypermetabolic pleural effusions which are cytology positive and she has demonstrable pleural masses. Started aromatase inhibition early September 2023.  Has not yet started CDK 4 6 inhibitor.  Lost to follow-up for over 6 months although apparently was on letrozole as single agent.  Now with clear progression of disease in the left breast.  August 2024: Progression of disease in liver with extensive disease.  Breast is worse.  Lung is better.  Lymph nodes are worse.  Status post radiation to symptomatic oozing and exophytic left breast mass.  Marked worsening of her cancer on letrozole with progression in the bone and liver.\"--Dr. Benitez    ongoing goals of care discussions. Patient is fighting to recover and discharge home so she can start the Letrozole.  DNR, I OK   Palliative following for pain management as well as further discussions.      Primary hypertension- (present on admission)  Assessment & Plan  holding PO antihypertensives while on pressors  PRN's available    Diabetes mellitus, type 2 (HCC)- (present on admission)  Assessment & Plan  A1c 6.4  accuchecks with SSI  CHO diet  glucose goal 140-180         VTE:  Lovenox  Ulcer: H2 Antagonist  Lines: " Central Line  Ongoing indication addressed and Mojica Catheter  Ongoing indication addressed    I have performed a physical exam and reviewed and updated ROS and Plan today (1/14/2025). In review of yesterday's note (1/13/2025), there are no changes except as documented above.     Discussed patient condition and risk of morbidity and/or mortality with Family, RN, RT, Therapies, Pharmacy, Dietary, , Charge nurse / hot rounds, Patient, and   my attending Dr Cruz.     The patient remains critically ill.  Critical care time = 70 minutes in directly providing and coordinating critical care and extensive data review.  No time overlap and excludes procedures.    Please note that this dictation was created using voice recognition software. The accuracy of the dictation is limited to the abilities of the software. I have made every reasonable attempt to correct obvious errors, but I expect that there are errors of grammar and possibly content that I did not discover before finalizing the note.     Andrew Werner, APRN

## 2025-01-14 NOTE — CONSULTS
In Patient Palliative Care  Consult and EMR reviewed.  Discussed with CHERIE Reed, patient currently being followed by palliative care and will prioritize follow-up this afternoon.    Thank you,   Erlinda Anders, MSN, APRN, ACN-AG

## 2025-01-14 NOTE — PROGRESS NOTES
Pulmonary/Critical Care Medicine   Attending Progress Note    Date of service: 1/14/2025  Time: 0655    Ms/ Pedro Hannon is a 64-year-old female with a past medical history significant for stage IV breast cancer with metastatic disease to the lungs currently on oral chemotherapy who was admitted on 12/25/2024 with weakness, shortness of breath, and back pain and had a CT chest, abdomen, and pelvis which showed moderate-sized pleural effusions with worsening hepatic metastatic disease with new abdominal lymphadenopathy, thoracic adenopathy, diffuse osseous metastatic disease with a T5 pathologic fracture.  Since admission the patient had a right sided thoracentesis on 12/27.  An MRI showed multiple thoracic and lumbar metastatic lesions.  The patient was taken to the operating room on 1/1 and had a T4-T6 laminectomy.  She subsequently developed a right-sided pneumothorax requiring chest tube placement.  The patient underwent a left-sided thoracentesis.  The patient continues to have recurrent hypoxic respiratory failure requiring BiPAP but subsequently has been intubated.  She had a Pleurx catheter placed and has had frequent thoracentesis since placement.    24 hour events reviewed:   - off sedation since yesterday   - RASS -1 to +2   - writing notes, answering all, following   - Tmax 100.9   - SR 70-90s   - -140s   - levo off   - no drips   - TFs at goal, OG   - BM yesterday   - UOP of 1200cc overnight, Mojica   - right IJ TLC   - pleurX: 140cc overnight   - level 1 mobility, refused PT   - VD8   - PEEP 8, 40%   - SBT, end tidal at 50, poor parameters   - CXR(reviewed): bilat hilar fullness, left atelectasis   - lovenox   - pepcid   - off abx   - WBCs 16.4   - Hb 9.3   - platelets 547   - K 3.5   - creat 0.44     Yesterday's Events:   - no overnight events   - prop at 30, dilaudid 0.8   - RASS 0   - following, writing notes   - Tmax 100.6   - SR 60s   - SBP 80-100s   - levo at 0.12   - TFs at goal   - BM on  1/10   - UOP of 1200cc Galina dolan   - pleurex cath: 167cc overnight   - edge of bed   - VD6   - CXR(reviewed): hilar fullness, left effusion   - poor parameters for SBT   - lovenox   - pepcid   - no abx   - WBCs 12.7   - Hb 8.8   - K 2.9   - creat 0.55    Imaging reviewed    A/P:  Acute on chronic hypoxic and hypercapnic respiratory failure   - due to recurrent malignant effusion and diminished strength/capacity with spinal disease   - Intubated 1/4, extubated 1/5, Reintubated 1/8   - cont full vent support   - RT/O2 protocols   - vent bundle protocols   - cont mobility   - ongoing SBTs, but not safe to liberate still   - ?need for bronch with low grade fever and leukocytosis    Bilateral malignant pleural effusions   - s/p thoracentesis/chest tubes   - ongoing diuresis as tolerated   - s/p right pleurX catheter on 1/11    Metastatic breast CA   - cont pain management   - goals to go home and resume cancer therapies    Fracture of T5   - pathologic   - s/p T4-6 laminectomy with fusion and spinal decompression on 1/1/2025   - pain management    GOC   - 1/14: spoke to Asha while on the ventilator today about her need to participate in activity:  edge of bed, up to chair, and even walking, in order to strengthen her as I am aware of her desire to go home and attempt palliative chemo.  I explained that next steps would be a tracheostomy/LTAC, but I didn't think she was be an LTAC candidate given her extensive cancer and that she would not receive chemo if she remained on the ventilator.  Unfortunately, with my blunt discussion, Asha closed down and told me she was feeling very ill and could no longer talk.    I spent extensive time in reviewing the patient's condition, physical examination, laboratory and imaging data, prior documentation, in discussion with IDT, family, patient and CHERIE Reed in formulating an assessment/plan.    Critical Care time: 25 min. No time overlap, procedures not included in  time.    Jimena Cruz MD  Pulmonary and Critical Care Medicine

## 2025-01-14 NOTE — PROGRESS NOTES
I was called for excess oral secretions requiring suctioning 5-6 times per hour. I ordered a scopolamine patch.  This increases her risk of delirium, but I suspect being suctioned 5-6 times hourly is more delirium inducing so benefits may outweigh risks.

## 2025-01-14 NOTE — CARE PLAN
The patient is Stable - Low risk of patient condition declining or worsening    Shift Goals  Clinical Goals: MAP >65, pain managment  Patient Goals: rest  Family Goals: updates    Progress made toward(s) clinical / shift goals:    Problem: Knowledge Deficit - Standard  Goal: Patient and family/care givers will demonstrate understanding of plan of care, disease process/condition, diagnostic tests and medications  Outcome: Progressing     Problem: Skin Integrity  Goal: Skin integrity is maintained or improved  Outcome: Progressing     Problem: Fall Risk  Goal: Patient will remain free from falls  Outcome: Progressing     Problem: Pain - Standard  Goal: Alleviation of pain or a reduction in pain to the patient’s comfort goal  Outcome: Progressing     Problem: Hemodynamics  Goal: Patient's hemodynamics, fluid balance and neurologic status will be stable or improve  Outcome: Progressing

## 2025-01-14 NOTE — THERAPY
Physical Therapy Contact Note    Patient Name: Asha Hannon  Age:  64 y.o., Sex:  female  Medical Record #: [id number]  Today's Date: 1/14/2025 01/14/25 0914   Treatment Variance   Reason For Missed Therapy Non-Medical - Other (Please Comment)   Interdisciplinary Plan of Care Collaboration   IDT Collaboration with  Nursing   Collaboration Comments PT session attmepted.  Pt could not stay awake.  Daughters report she didn't sleep well last night.  PT will attempt at the next available date.   Session Information   Date / Session Number  1/7 -4 (1/4, 1/12) att 1/10, 1/14

## 2025-01-14 NOTE — CARE PLAN
"The patient is Watcher - Medium risk of patient condition declining or worsening    Shift Goals  Clinical Goals: MAP >65, Mobilize, respiratory stability  Patient Goals: \"remove tube\"  Family Goals: Updates    Progress made toward(s) clinical / shift goals:    Problem: Skin Integrity  Goal: Skin integrity is maintained or improved  Outcome: Progressing     Problem: Fall Risk  Goal: Patient will remain free from falls  Outcome: Progressing     Problem: Pain - Standard  Goal: Alleviation of pain or a reduction in pain to the patient’s comfort goal  Outcome: Progressing     Problem: Hemodynamics  Goal: Patient's hemodynamics, fluid balance and neurologic status will be stable or improve  Outcome: Progressing       Patient is not progressing towards the following goals:      "

## 2025-01-14 NOTE — PROGRESS NOTES
Palliative Care    Room: R113    HPI:   Asha Hannon is a 64 y.o. female with medical history significant for breast cancer with metastasis to brain, lungs, liver, and spine followed by Dr. Tabor admitted for hypoxemic respiratory failure  due to chronically recurrent pleural effusions. CT imaging obtained noting extensive metastatic disease including new T 5 vertebral compression fracture. Neurosurgery consulted; MRI of brain and C/T/L spine with anesthesia obtained  revealing multiple nodular enhancing metastatic lesions. Patient underwent T4-T6 laminectomy for decompression of spinal cord due to epidural metastases resulting in cord compression.  Hospital course further complicated by ongoing pleural effusions requiring thoracentesis with subsequent postprocedure pneumothorax requiring chest tubes and acute on chronic respiratory failure requiring intubation - with subsequent hypotension. Re intubated ; now on propofol and norepinephrine drips.     Interval updates:  Remains intubated with variable weaning parameters noted.  Palliative care requested to see patient and visit discussion regarding tracheostomy placement should patient fail extubation once deemed appropriate.    REVIEW OF SYSTEMS:  Unable to assess..       Physical Exam  Vitals and nursing note reviewed.   Constitutional:       Appearance: She is obese. She is ill-appearing.      Interventions: She is sedated and intubated.   Cardiovascular:      Rate and Rhythm: Normal rate.      Pulses: Normal pulses.      Heart sounds: Murmur heard.      Comments: Soft BPs on monitor  Pulmonary:      Effort: She is intubated.      Breath sounds: Decreased breath sounds present.      Comments: CT x 2 right side  Abdominal:      General: Bowel sounds are decreased.      Palpations: Abdomen is soft.      Tenderness: There is no abdominal tenderness.   Musculoskeletal:      Right lower le+ Pitting Edema present.      Left  lower le+ Pitting Edema present.      Comments: Sarcopenic obesity.    Skin:     General: Skin is cool and dry.      Capillary Refill: Capillary refill takes 2 to 3 seconds.      Coloration: Skin is pale.      Findings: Bruising and rash present. Rash is scaling.   Neurological:      Mental Status: She is alert and oriented to person, place, and time.      Comments: Writing on a pad while on ventilator.    Psychiatric:         Attention and Perception: Attention normal.         Mood and Affect: Mood is anxious.         Speech: She is noncommunicative.         Behavior: Behavior is agitated and withdrawn.         Cognition and Memory: Cognition normal.         Judgment: Judgment is impulsive.      Comments: Motioning to have ETT removed.        Discussion/Plan   #Metastatic breast cancer with extensive metastatic disease  Including malignant pleural effusion with chest tubes in place  #Acute on chronic respiratory failure  2025:  Patient remains intubated with variable weaning parameters noted.  Possible bronchoscopy tomorrow and possible extubation.  In reviewing history, patient has been unable to sustain ventilation for more than a few days without interventions i.e. BiPAP and ultimate intubation.  #Pneumothorax  #Pathologic fracture T5   S/p T4-5 laminectomy 25  #Cancer associated pain  Scheduled [facility] (home med)  Fentanyl IV PRN available   #Hypotension  Resolved  #Restless leg syndrome  Continue Requip 1 mg PO nightly  #Septic shock  #DM2  #Advance Care Planning    Met with patient bedside and [place] back regarding healthcare directives and goals of care.  Patient expressed she required short-term ventilator support and is unsure if she would want to do it again.  We discussed option of DNR/DNI.  Revisited advance directive and POLST form.  Provided education on documents.  At this time patient's goals of care remain to recover from back surgery and pneumothorax and undergo rehabilitative care  to get stronger to do continue to pursue cancer treatment.  She confirms she would need to talk with her daughter regarding healthcare directives and CODE STATUS.  At this time her daughter entered the room.  Reintroduced myself to patient's daughter.  Discussed content of our visit.  She confirmed her mom has been expressing she may not want to undergo intubation again if needed.  Confirmed palliative care can continue to support goals of care discussions and assist with advance care planning documents for future care.  Provided education on forms to patient's daughter.  They would like to discuss and will reach out if they have questions or needs.  Confirmed I will check back in at a later date.  They denied having questions or needs at this time.      DNAR, I OK per ICU team this morning. ACP not discussed by palliative today; supportive visit. Pain medications adjusted.       Patient intubated yesterday and unable to participate in discussion.  No family at bedside.  Spoke with ICU team.  Phone call to patient's daughter.  Juana confirms she is not doing well as her father  2 days ago under the care of hospice at her brother's home in [place].  She stated she has not had time to grieve and also deal with the stress of her mother being sick in ICU.  Juana confirms she is trying to understand patient's wishes.  Confirmed unfortunately patient is no longer able to communicate wishes at this time due to acuity of her condition.  Juana confirmed patient's wishes for DNAR further discussion several days ago.  At this point she would like to take things day by day to see how patient progresses.  Discussed my concern that patient is at high risk for decompensation and would recommend considering against escalation of care.  Encouraged her to reach out with any needs.  Encouraged her to care for herself during this stressful time which would include the basics of eating, hydrating, and resting/sleeping.  She  confirms she has been sleeping at the hospital to be close to her mom and likely will do the same tonight.  Encouraged her to reach out to care team with any questions or needs.  Updated bedside nurse Dixie, Dr. Preston, and Jessica Campos, CHERIE.     1/14:  Patient remains intubated with discussions regarding ultimate tracheostomy given ICU course and history.  Met with patient and children Kyrie and Sia at bedside, Juana has gone home.  Unfortunately, patient's ex-spouse and children's dad has just passed away in son Kyrie's home while on hospice on 1/8.  Discussed patient's goals of care regarding tracheostomy placement.  Reviewed in-eligibility for further cancer treatment while on ventilator with chest tube and in the ICU.  Discussed trajectory of post tracheostomy placement and difficulties as therein of weaning from ventilator given malignant pleural effusions and metastatic breast cancer to lungs.  Asked patient where she would want to be when the end of her life comes, home or in a facility.  Shared my concerns that she will ultimately go to a facility if she is trached and placed on long-term ventilation.  Kyrie expresses remorse and grief stating that it is a matter of where she wants to be and prolonging her suffering.  Patient is mostly avoidant but will write on her pad.  She does share that this information is not what her cancer doctor had given her.  Reviewed status was very different to when her cancer doctor saw her earlier in the month.  Family will convene as a unit and discuss further.  Team updated, PC to continue to follow.    45 minutes spent discussing advanced care planning.    Interval diagnostic studies and medical documentation entries pertinent to this case were reviewed independently by me. This patient has at least one acute or chronic illness or injury that poses a threat to life or bodily function. This patient suffers from a high risk of morbidity from additional invasive  diagnostic testing or intensive treatment. Discussion of recommendations and coordination of care undertaken with primary provider/treatment team.      Erlinda Anders, MSN, APRN, ACNPC-AG.  Palliative Care Nurse Practitioner  [phone]

## 2025-01-14 NOTE — CARE PLAN
Problem: Ventilation  Goal: Ability to achieve and maintain unassisted ventilation or tolerate decreased levels of ventilator support  Description: Target End Date:  4 days     Document on Vent flowsheet    1.  Support and monitor invasive and noninvasive mechanical ventilation  2.  Monitor ventilator weaning response  3.  Perform ventilator associated pneumonia prevention interventions  4.  Manage ventilation therapy by monitoring diagnostic test results  Outcome: Progressing     Ventilator Daily Summary    Vent Day # 8  Airway: 7.5 @ 23    Ventilator settings:  +8 50%      Plan: Continue current ventilator settings and wean mechanical ventilation as tolerated per physician orders.

## 2025-01-14 NOTE — CARE PLAN
Problem: Ventilation  Goal: Ability to achieve and maintain unassisted ventilation or tolerate decreased levels of ventilator support  Description: Target End Date:  4 days      Document on Vent flowsheet     1.  Support and monitor invasive and noninvasive mechanical ventilation  2.  Monitor ventilator weaning response  3.  Perform ventilator associated pneumonia prevention interventions  4.  Manage ventilation therapy by monitoring diagnostic test results  Outcome: Not met                        Ventilator Daily Summary     Vent Day #7     Ventilator settings:  +8 50%     Weaning trials: N/A     Respiratory Procedures: N/A     Plan: Continue current ventilator settings and wean mechanical ventilation as tolerated per physician orders.

## 2025-01-14 NOTE — FLOWSHEET NOTE
01/14/25 1148   Weaning Parameters   RR (bpm) 21   $ FVC / Vital Capacity (liters)  0.6   NIF (cm H2O)  -35   Rapid Shallow Breathing Index (RR/VT) 35   Spontaneous VE 8   Spontaneous      Cuff leak present

## 2025-01-15 ENCOUNTER — APPOINTMENT (OUTPATIENT)
Dept: RADIOLOGY | Facility: MEDICAL CENTER | Age: 65
End: 2025-01-15
Attending: INTERNAL MEDICINE
Payer: MEDICARE

## 2025-01-15 LAB
ANION GAP SERPL CALC-SCNC: 9 MMOL/L (ref 7–16)
BASOPHILS # BLD AUTO: 0.5 % (ref 0–1.8)
BASOPHILS # BLD: 0.09 K/UL (ref 0–0.12)
BUN SERPL-MCNC: 26 MG/DL (ref 8–22)
CALCIUM SERPL-MCNC: 8.9 MG/DL (ref 8.5–10.5)
CHLORIDE SERPL-SCNC: 93 MMOL/L (ref 96–112)
CO2 SERPL-SCNC: 33 MMOL/L (ref 20–33)
CREAT SERPL-MCNC: 0.55 MG/DL (ref 0.5–1.4)
EOSINOPHIL # BLD AUTO: 0.14 K/UL (ref 0–0.51)
EOSINOPHIL NFR BLD: 0.8 % (ref 0–6.9)
ERYTHROCYTE [DISTWIDTH] IN BLOOD BY AUTOMATED COUNT: 53.6 FL (ref 35.9–50)
GFR SERPLBLD CREATININE-BSD FMLA CKD-EPI: 101 ML/MIN/1.73 M 2
GLUCOSE BLD STRIP.AUTO-MCNC: 147 MG/DL (ref 65–99)
GLUCOSE BLD STRIP.AUTO-MCNC: 153 MG/DL (ref 65–99)
GLUCOSE BLD STRIP.AUTO-MCNC: 174 MG/DL (ref 65–99)
GLUCOSE BLD STRIP.AUTO-MCNC: 184 MG/DL (ref 65–99)
GLUCOSE SERPL-MCNC: 222 MG/DL (ref 65–99)
HCT VFR BLD AUTO: 30.9 % (ref 37–47)
HGB BLD-MCNC: 9.9 G/DL (ref 12–16)
IMM GRANULOCYTES # BLD AUTO: 0.32 K/UL (ref 0–0.11)
IMM GRANULOCYTES NFR BLD AUTO: 1.9 % (ref 0–0.9)
LYMPHOCYTES # BLD AUTO: 1 K/UL (ref 1–4.8)
LYMPHOCYTES NFR BLD: 6 % (ref 22–41)
MCH RBC QN AUTO: 28.6 PG (ref 27–33)
MCHC RBC AUTO-ENTMCNC: 32 G/DL (ref 32.2–35.5)
MCV RBC AUTO: 89.3 FL (ref 81.4–97.8)
MONOCYTES # BLD AUTO: 1.5 K/UL (ref 0–0.85)
MONOCYTES NFR BLD AUTO: 8.9 % (ref 0–13.4)
NEUTROPHILS # BLD AUTO: 13.73 K/UL (ref 1.82–7.42)
NEUTROPHILS NFR BLD: 81.9 % (ref 44–72)
NRBC # BLD AUTO: 0 K/UL
NRBC BLD-RTO: 0 /100 WBC (ref 0–0.2)
PLATELET # BLD AUTO: 695 K/UL (ref 164–446)
PMV BLD AUTO: 9.1 FL (ref 9–12.9)
POTASSIUM SERPL-SCNC: 3.6 MMOL/L (ref 3.6–5.5)
POTASSIUM SERPL-SCNC: 4 MMOL/L (ref 3.6–5.5)
POTASSIUM SERPL-SCNC: 4.4 MMOL/L (ref 3.6–5.5)
RBC # BLD AUTO: 3.46 M/UL (ref 4.2–5.4)
SODIUM SERPL-SCNC: 135 MMOL/L (ref 135–145)
WBC # BLD AUTO: 16.8 K/UL (ref 4.8–10.8)

## 2025-01-15 PROCEDURE — 700111 HCHG RX REV CODE 636 W/ 250 OVERRIDE (IP): Mod: JZ | Performed by: EMERGENCY MEDICINE

## 2025-01-15 PROCEDURE — 700102 HCHG RX REV CODE 250 W/ 637 OVERRIDE(OP): Performed by: EMERGENCY MEDICINE

## 2025-01-15 PROCEDURE — 94150 VITAL CAPACITY TEST: CPT

## 2025-01-15 PROCEDURE — 85025 COMPLETE CBC W/AUTO DIFF WBC: CPT

## 2025-01-15 PROCEDURE — 94003 VENT MGMT INPAT SUBQ DAY: CPT

## 2025-01-15 PROCEDURE — A9270 NON-COVERED ITEM OR SERVICE: HCPCS | Performed by: NURSE PRACTITIONER

## 2025-01-15 PROCEDURE — 99233 SBSQ HOSP IP/OBS HIGH 50: CPT | Performed by: NURSE PRACTITIONER

## 2025-01-15 PROCEDURE — 92610 EVALUATE SWALLOWING FUNCTION: CPT

## 2025-01-15 PROCEDURE — 770022 HCHG ROOM/CARE - ICU (200)

## 2025-01-15 PROCEDURE — 700111 HCHG RX REV CODE 636 W/ 250 OVERRIDE (IP)

## 2025-01-15 PROCEDURE — 80048 BASIC METABOLIC PNL TOTAL CA: CPT

## 2025-01-15 PROCEDURE — 700111 HCHG RX REV CODE 636 W/ 250 OVERRIDE (IP): Mod: JZ | Performed by: NURSE PRACTITIONER

## 2025-01-15 PROCEDURE — 82962 GLUCOSE BLOOD TEST: CPT

## 2025-01-15 PROCEDURE — 97530 THERAPEUTIC ACTIVITIES: CPT

## 2025-01-15 PROCEDURE — 700111 HCHG RX REV CODE 636 W/ 250 OVERRIDE (IP): Performed by: EMERGENCY MEDICINE

## 2025-01-15 PROCEDURE — 700101 HCHG RX REV CODE 250

## 2025-01-15 PROCEDURE — 700102 HCHG RX REV CODE 250 W/ 637 OVERRIDE(OP): Performed by: NURSE PRACTITIONER

## 2025-01-15 PROCEDURE — 700105 HCHG RX REV CODE 258

## 2025-01-15 PROCEDURE — 94799 UNLISTED PULMONARY SVC/PX: CPT

## 2025-01-15 PROCEDURE — 84132 ASSAY OF SERUM POTASSIUM: CPT

## 2025-01-15 PROCEDURE — 700111 HCHG RX REV CODE 636 W/ 250 OVERRIDE (IP): Performed by: NURSE PRACTITIONER

## 2025-01-15 PROCEDURE — A9270 NON-COVERED ITEM OR SERVICE: HCPCS | Performed by: EMERGENCY MEDICINE

## 2025-01-15 PROCEDURE — 99291 CRITICAL CARE FIRST HOUR: CPT

## 2025-01-15 PROCEDURE — 700111 HCHG RX REV CODE 636 W/ 250 OVERRIDE (IP): Performed by: INTERNAL MEDICINE

## 2025-01-15 PROCEDURE — 71045 X-RAY EXAM CHEST 1 VIEW: CPT

## 2025-01-15 RX ORDER — POTASSIUM CHLORIDE 7.45 MG/ML
10 INJECTION INTRAVENOUS ONCE
Status: COMPLETED | OUTPATIENT
Start: 2025-01-15 | End: 2025-01-15

## 2025-01-15 RX ORDER — POTASSIUM CHLORIDE 14.9 MG/ML
20 INJECTION INTRAVENOUS ONCE
Status: COMPLETED | OUTPATIENT
Start: 2025-01-15 | End: 2025-01-15

## 2025-01-15 RX ORDER — POTASSIUM CHLORIDE 29.8 MG/ML
40 INJECTION INTRAVENOUS ONCE
Status: COMPLETED | OUTPATIENT
Start: 2025-01-15 | End: 2025-01-15

## 2025-01-15 RX ADMIN — POTASSIUM CHLORIDE 20 MEQ: 14.9 INJECTION, SOLUTION INTRAVENOUS at 14:10

## 2025-01-15 RX ADMIN — FUROSEMIDE 80 MG: 10 INJECTION, SOLUTION INTRAMUSCULAR; INTRAVENOUS at 05:09

## 2025-01-15 RX ADMIN — Medication 400 MG: at 05:09

## 2025-01-15 RX ADMIN — METOLAZONE 5 MG: 5 TABLET ORAL at 05:09

## 2025-01-15 RX ADMIN — DEXMEDETOMIDINE HYDROCHLORIDE 0.9 MCG/KG/HR: 100 INJECTION, SOLUTION INTRAVENOUS at 03:59

## 2025-01-15 RX ADMIN — INSULIN LISPRO 2 UNITS: 100 INJECTION, SOLUTION INTRAVENOUS; SUBCUTANEOUS at 12:29

## 2025-01-15 RX ADMIN — ROPINIROLE HYDROCHLORIDE 1 MG: 1 TABLET, FILM COATED ORAL at 20:27

## 2025-01-15 RX ADMIN — ENOXAPARIN SODIUM 40 MG: 100 INJECTION SUBCUTANEOUS at 17:41

## 2025-01-15 RX ADMIN — POTASSIUM BICARBONATE 50 MEQ: 978 TABLET, EFFERVESCENT ORAL at 05:09

## 2025-01-15 RX ADMIN — POTASSIUM CHLORIDE 20 MEQ: 14.9 INJECTION, SOLUTION INTRAVENOUS at 06:12

## 2025-01-15 RX ADMIN — ROPINIROLE HYDROCHLORIDE 1 MG: 1 TABLET, FILM COATED ORAL at 17:20

## 2025-01-15 RX ADMIN — HYDROCODONE BITARTRATE AND ACETAMINOPHEN 1 TABLET: 10; 325 TABLET ORAL at 10:07

## 2025-01-15 RX ADMIN — HYDROCODONE BITARTRATE AND ACETAMINOPHEN 1 TABLET: 10; 325 TABLET ORAL at 02:05

## 2025-01-15 RX ADMIN — HYDROCODONE BITARTRATE AND ACETAMINOPHEN 1 TABLET: 10; 325 TABLET ORAL at 22:36

## 2025-01-15 RX ADMIN — Medication 5 MG: at 20:27

## 2025-01-15 RX ADMIN — POTASSIUM CHLORIDE 10 MEQ: 7.46 INJECTION, SOLUTION INTRAVENOUS at 08:58

## 2025-01-15 RX ADMIN — HYDROCODONE BITARTRATE AND ACETAMINOPHEN 1 TABLET: 10; 325 TABLET ORAL at 05:09

## 2025-01-15 RX ADMIN — HYDROCODONE BITARTRATE AND ACETAMINOPHEN 1 TABLET: 10; 325 TABLET ORAL at 17:22

## 2025-01-15 RX ADMIN — ACETAMINOPHEN 650 MG: 325 TABLET ORAL at 07:30

## 2025-01-15 RX ADMIN — POTASSIUM CHLORIDE 40 MEQ: 29.8 INJECTION, SOLUTION INTRAVENOUS at 18:42

## 2025-01-15 RX ADMIN — POTASSIUM CHLORIDE 10 MEQ: 7.46 INJECTION, SOLUTION INTRAVENOUS at 20:59

## 2025-01-15 RX ADMIN — INSULIN LISPRO 2 UNITS: 100 INJECTION, SOLUTION INTRAVENOUS; SUBCUTANEOUS at 05:10

## 2025-01-15 RX ADMIN — FAMOTIDINE 20 MG: 20 TABLET, FILM COATED ORAL at 05:09

## 2025-01-15 RX ADMIN — FAMOTIDINE 20 MG: 10 INJECTION, SOLUTION INTRAVENOUS at 17:47

## 2025-01-15 ASSESSMENT — COGNITIVE AND FUNCTIONAL STATUS - GENERAL
SUGGESTED CMS G CODE MODIFIER DAILY ACTIVITY: CL
HELP NEEDED FOR BATHING: TOTAL
TOILETING: TOTAL
DAILY ACTIVITIY SCORE: 13
DRESSING REGULAR UPPER BODY CLOTHING: A LOT
DRESSING REGULAR LOWER BODY CLOTHING: TOTAL

## 2025-01-15 ASSESSMENT — PULMONARY FUNCTION TESTS: FVC: 1.24

## 2025-01-15 ASSESSMENT — PAIN DESCRIPTION - PAIN TYPE
TYPE: CHRONIC PAIN
TYPE: CHRONIC PAIN

## 2025-01-15 NOTE — THERAPY
Physical Therapy Contact Note    Patient Name: Asha Hannon  Age:  64 y.o., Sex:  female  Medical Record #: [id number]  Today's Date: 1/15/2025       01/15/25 1328   Treatment Variance   Reason For Missed Therapy Non-Medical - Other (Please Comment)   Interdisciplinary Plan of Care Collaboration   IDT Collaboration with  Nursing   Collaboration Comments PT attempted x2.  1st attempt, nurse reports she just worked with OT and is tired.  2nd attempt, nurse reports she and the family are getting ready to meet with palliative and pt was just extubated.  PT will follow up at the next available time.   Session Information   Date / Session Number  1/7 -4 (1/4, 1/12) att 1/10, 1/14, 1/15

## 2025-01-15 NOTE — CARE PLAN
Problem: Ventilation  Goal: Ability to achieve and maintain unassisted ventilation or tolerate decreased levels of ventilator support  Description: Target End Date:  4 days     Document on Vent flowsheet    1.  Support and monitor invasive and noninvasive mechanical ventilation  2.  Monitor ventilator weaning response  3.  Perform ventilator associated pneumonia prevention interventions  4.  Manage ventilation therapy by monitoring diagnostic test results  Outcome: Progressing     Ventilator Daily Summary    Vent Day #9  Airway: 7.5 @23    Ventilator settings: % / +8 / 40%      Plan: Continue current ventilator settings and wean mechanical ventilation as tolerated per physician orders.

## 2025-01-15 NOTE — PROGRESS NOTES
Palliative Care    Room: R113    HPI:   Asha Hannon is a 64 y.o. female with medical history significant for breast cancer with metastasis to brain, lungs, liver, and spine followed by Dr. Tabor admitted for hypoxemic respiratory failure 12/25 due to chronically recurrent pleural effusions. CT imaging obtained noting extensive metastatic disease including new T 5 vertebral compression fracture. Neurosurgery consulted; MRI of brain and C/T/L spine with anesthesia obtained 12/31 revealing multiple nodular enhancing metastatic lesions. Patient underwent T4-T6 laminectomy for decompression of spinal cord due to epidural metastases resulting in cord compression.  Hospital course further complicated by ongoing pleural effusions requiring thoracentesis with subsequent postprocedure pneumothorax requiring chest tubes and acute on chronic respiratory failure requiring intubation 1/4-1/5 with subsequent hypotension. Re intubated 1/8; now on propofol and norepinephrine drips.     Interval updates:  Remains intubated with variable weaning parameters noted.  Palliative care requested to see patient and visit discussion regarding tracheostomy placement should patient fail extubation once deemed appropriate.    1/15/2025: Patient is now extubated to nasal cannula.  Continues to require intermittent blood pressure support with Levophed.    REVIEW OF SYSTEMS:  ROS    Physical Exam  Vitals and nursing note reviewed.   Constitutional:       Appearance: She is obese. She is ill-appearing.      Interventions: She is sedated and intubated.   Cardiovascular:      Rate and Rhythm: Normal rate.      Pulses: Normal pulses.      Heart sounds: Murmur heard.      Comments: Soft BPs on monitor  Pulmonary:      Effort: She is intubated.      Breath sounds: Decreased breath sounds present.      Comments: CT x 2 right side  Abdominal:      General: Bowel sounds are decreased.      Palpations: Abdomen is soft.      Tenderness:  There is no abdominal tenderness.   Musculoskeletal:      Right lower le+ Pitting Edema present.      Left lower le+ Pitting Edema present.      Comments: Sarcopenic obesity.    Skin:     General: Skin is cool and dry.      Capillary Refill: Capillary refill takes 2 to 3 seconds.      Coloration: Skin is pale.      Findings: Bruising and rash present. Rash is scaling.   Neurological:      Mental Status: She is alert and oriented to person, place, and time.      Comments: Writing on a pad while on ventilator.    Psychiatric:         Attention and Perception: Attention normal.         Mood and Affect: Mood is anxious.         Speech: She is noncommunicative.         Behavior: Behavior is agitated and withdrawn.         Cognition and Memory: Cognition normal.         Judgment: Judgment is impulsive.      Comments: Motioning to have ETT removed.        Discussion/Plan   #Metastatic breast cancer with extensive metastatic disease  Including malignant pleural effusion with chest tubes in place  #Acute on chronic respiratory failure  2025:  Patient remains intubated with variable weaning parameters noted.  Possible bronchoscopy tomorrow and possible extubation.  In reviewing history, patient has been unable to sustain ventilation for more than a few days without interventions i.e. BiPAP and ultimate intubation.  1/15/2025:  Now extubated, high likelihood patient will require reintubation if this is her desire.  Chest tubes remain in place continue with moderate output.  #Pneumothorax  #Pathologic fracture T5   S/p T4-5 laminectomy 25  #Cancer associated pain  Scheduled [facility] (home med)  Fentanyl IV PRN available   #Hypotension  Resolved  #Restless leg syndrome  Continue Requip 1 mg PO nightly  #Septic shock  #DM2  #Advance Care Planning    Met with patient bedside and [place] back regarding healthcare directives and goals of care.  Patient expressed she required short-term ventilator support and is  unsure if she would want to do it again.  We discussed option of DNR/DNI.  Revisited advance directive and POLST form.  Provided education on documents.  At this time patient's goals of care remain to recover from back surgery and pneumothorax and undergo rehabilitative care to get stronger to do continue to pursue cancer treatment.  She confirms she would need to talk with her daughter regarding healthcare directives and CODE STATUS.  At this time her daughter entered the room.  Reintroduced myself to patient's daughter.  Discussed content of our visit.  She confirmed her mom has been expressing she may not want to undergo intubation again if needed.  Confirmed palliative care can continue to support goals of care discussions and assist with advance care planning documents for future care.  Provided education on forms to patient's daughter.  They would like to discuss and will reach out if they have questions or needs.  Confirmed I will check back in at a later date.  They denied having questions or needs at this time.      DNAR, I OK per ICU team this morning. ACP not discussed by palliative today; supportive visit. Pain medications adjusted.       Patient intubated yesterday and unable to participate in discussion.  No family at bedside.  Spoke with ICU team.  Phone call to patient's daughter.  Juana confirms she is not doing well as her father  2 days ago under the care of hospice at her brother's home in [place].  She stated she has not had time to grieve and also deal with the stress of her mother being sick in ICU.  Juana confirms she is trying to understand patient's wishes.  Confirmed unfortunately patient is no longer able to communicate wishes at this time due to acuity of her condition.  Juana confirmed patient's wishes for DNAR further discussion several days ago.  At this point she would like to take things day by day to see how patient progresses.  Discussed my concern that patient is at  high risk for decompensation and would recommend considering against escalation of care.  Encouraged her to reach out with any needs.  Encouraged her to care for herself during this stressful time which would include the basics of eating, hydrating, and resting/sleeping.  She confirms she has been sleeping at the hospital to be close to her mom and likely will do the same tonight.  Encouraged her to reach out to care team with any questions or needs.  Updated bedside nurse Dixie, Dr. Preston, and Jessica Campos, APRN.     1/14:  Patient remains intubated with discussions regarding ultimate tracheostomy given ICU course and history.  Met with patient and children Kyrie and Sia at bedside, Juana has gone home.  Unfortunately, patient's ex-spouse and children's dad has just passed away in son Kyrie's home while on hospice on 1/8.  Discussed patient's goals of care regarding tracheostomy placement.  Reviewed in-eligibility for further cancer treatment while on ventilator with chest tube and in the ICU.  Discussed trajectory of post tracheostomy placement and difficulties as therein of weaning from ventilator given malignant pleural effusions and metastatic breast cancer to lungs.  Asked patient where she would want to be when the end of her life comes, home or in a facility.  Shared my concerns that she will ultimately go to a facility if she is trached and placed on long-term ventilation.  Kyrie expresses remorse and grief stating that it is a matter of where she wants to be and prolonging her suffering.  Patient is mostly avoidant but will write on her pad.  She does share that this information is not what her cancer doctor had given her.  Reviewed status was very different to when her cancer doctor saw her earlier in the month.  Family will convene as a unit and discuss further.  Team updated, PC to continue to follow.    1/15:  Met with patient's daughter Juana and Sia as well as patient who is now  "extubated with Dr. Cruz and CHERIE Reed.  Purpose of meeting was to discuss patient's desire for reintubation with subsequent tracheostomy placement if she requires reintubation.  Patient continues to have difficulty understanding she is ineligible for further cancer treatment while inpatient.  Additionally, she has poor insight into history of respiratory failure requiring intubation.  Multiple questions fielded by Dr. Cruz and CHERIE Reed.  Discussed likelihood if patient was reintubated and received tracheostomy she would go to vent dependent facility out of local area.  Patient states that her decision to be reintubated will depend on where she might likely go.  She is hyper fixated on receiving her \"shot\".  At close of meeting, patient states that she does want to be reintubated.  Guidance provided to family regarding comfort measures that would be provided should patient decide not to be intubated as needed respiratory failure in the future.  Additional discussion regarding chest tubes and requirement for same including ongoing accumulation of malignant pleural effusion (S).    45 minutes spent discussing advanced care planning.    Interval diagnostic studies and medical documentation entries pertinent to this case were reviewed independently by me. This patient has at least one acute or chronic illness or injury that poses a threat to life or bodily function. This patient suffers from a high risk of morbidity from additional invasive diagnostic testing or intensive treatment. Discussion of recommendations and coordination of care undertaken with primary provider/treatment team.      Erlinda Anders, MSN, APRN, [facility].  Palliative Care Nurse Practitioner  [phone]                   "

## 2025-01-15 NOTE — CARE PLAN
The patient is Stable - Low risk of patient condition declining or worsening    Shift Goals  Clinical Goals: Map >65  Patient Goals: comfort  Family Goals: updates    Progress made toward(s) clinical / shift goals:    Problem: Knowledge Deficit - Standard  Goal: Patient and family/care givers will demonstrate understanding of plan of care, disease process/condition, diagnostic tests and medications  Outcome: Progressing     Problem: Skin Integrity  Goal: Skin integrity is maintained or improved  Outcome: Progressing     Problem: Fall Risk  Goal: Patient will remain free from falls  Outcome: Progressing     Problem: Pain - Standard  Goal: Alleviation of pain or a reduction in pain to the patient’s comfort goal  Outcome: Progressing     Problem: Respiratory  Goal: Patient will achieve/maintain optimum respiratory ventilation and gas exchange  Outcome: Progressing

## 2025-01-15 NOTE — CARE PLAN
Problem: Ventilation  Goal: Ability to achieve and maintain unassisted ventilation or tolerate decreased levels of ventilator support  Description: Target End Date:  4 days     Document on Vent flowsheet    1.  Support and monitor invasive and noninvasive mechanical ventilation  2.  Monitor ventilator weaning response  3.  Perform ventilator associated pneumonia prevention interventions  4.  Manage ventilation therapy by monitoring diagnostic test results  Outcome: Not Met     Ventilator Daily Summary    Vent Day #8  Airway:   7.5@23    Ventilator settings:   8 50   Weaning trials:   Respiratory Procedures:     Plan: Continue current ventilator settings and wean mechanical ventilation as tolerated per physician orders.

## 2025-01-15 NOTE — PROGRESS NOTES
"Critical Care Progress Note    Date of admission  12/25/2024    Chief Complaint  64 y.o. female admitted 12/25/2024 with   Chief Complaint   Patient presents with    Shortness of Breath     Pt c/o shortness of breath over the past couple of days. Denies new fever.  Pt wears 1.5L NC at baseline.  Pt has hx breast cancer with mets to the lungs. Currently taking PO chemotherapy pills.     Extremity Weakness     Pt states she has progressively been feeling weaker over the past couple of days to that point where she cannot ambulate.     Abdominal Pain     Pt also c/o epigastric pain and nausea.      Hospital Course  Asha Hannon is a 64-year-old female with PMH significant for breast cancer with mets to the lungs currently on oral chemo who presented 12/25/2024 with weakness, SOB and back & abdominal pain.  CT C/A/P with contrast showed new BUN infiltrate; moderate size pleural effusions; worsening of hepatic metastasis; new abdominal lymphadenopathy, thoracic adenopathy; diffuse osseous metastatic disease with T5 pathologic fracture.    Admitted to floor under the [facility] Family Medicine Team.    12/26 - Palliative Care consult - patient deferred discussion.   12/27 - right-sided thoracentesis.  12/29 - restless legs \"gave out\" resulting in fall, no trauma.   12/31 -right MRI showed multiple metastatic lesions; T-spine MRI showed multiple metastatic lesions and T5 pathologic fracture; L-spine multiple metastatic lesions; C-spine no metastatic lesions.  1/1 - to OR for T4-T6 laminectomy with Dr. Lowery.  1/2 - POD #1, right-sided pneumothorax, medically manage. Left-sided thoracentesis  1/3 - POD #2, AMS with respiratory failure, pH 7.14 CO2 100. Transfer to ICU. Chest tube placed.   1/4 - Intubation  1/5 - Extubation. Chest tubes clamped.  1/6 - Ongoing goals of care discussions. Large bore right chest tube removed. BIPAP and diuresis.   1/7 - DNR, I OK. BIPAP. Diuresing. Right pigtail catheter removed.  1/8 - acute " decompensation with hypoxia & obtundation. Re-intubated.   1/9 - VD #2.   1/10 - VD #3. Diuresing. IR consult for PleurX placement.   1/11 - VD #4. Thoracentesis with 1150 removed. PleurX placed.   1/12 - VD #5. Awake, writing notes. Suboptimal SBT parameters for extubation. Continue to diurese.  1/13- VD#6. Awake, writing notes. SAT/SBT. Parameters suboptimal.   1/14- VD#7. Spont x4hrs.  Tired, anxious. Will rest on ASV overnight with Dex. PT. Mobilize.    Interval Problem Update  Reviewed last 24 hour events:  - Overnight Events:  - NAONE   - Tm: AF  - Neuro: No acute neuro changes   - HR: 50-90   - SBP:    - VD: # 8    -ASV   - UOP: 3300 mL/24 hrs   - Spivey: y   - GI: tf  - Lines: cvc, spivey   - PPx: GI pepcid, DVT lovenox   - Mobility level EOB and cardiac chair    Infusions:   -dex  -levo    Review of Systems  ROS     Vital Signs for last 24 hours   Pulse:  [46-94] 59  Resp:  [14-45] 19  BP: ()/() 102/55  SpO2:  [90 %-100 %] 98 %    Hemodynamic parameters for last 24 hours       Respiratory Information for the last 24 hours  Vent Mode: Spont  PEEP/CPAP: 8  MAP: 10  Control VTE (exp VT): 417    Physical Exam   Physical Exam  Vitals and nursing note reviewed.   Constitutional:       General: She is sleeping. She is not in acute distress.     Appearance: Normal appearance. She is ill-appearing. She is not toxic-appearing.      Interventions: She is sedated and intubated. Nasal cannula in place.   HENT:      Head: Normocephalic.      Nose: Nose normal.      Mouth/Throat:      Lips: Pink.      Mouth: Mucous membranes are moist.   Eyes:      Pupils: Pupils are equal, round, and reactive to light.   Cardiovascular:      Rate and Rhythm: Normal rate and regular rhythm.      Pulses: Normal pulses.      Heart sounds: Normal heart sounds.   Pulmonary:      Effort: Pulmonary effort is normal. She is intubated.      Breath sounds: Examination of the right-middle field reveals rhonchi. Examination of the  right-lower field reveals rhonchi. Rhonchi present. No wheezing.   Abdominal:      General: Bowel sounds are decreased.      Palpations: Abdomen is soft.   Musculoskeletal:      Right lower leg: No edema.      Left lower leg: No edema.   Skin:     General: Skin is warm and dry.      Capillary Refill: Capillary refill takes less than 2 seconds.   Neurological:      GCS: GCS eye subscore is 4. GCS verbal subscore is 1. GCS motor subscore is 6.   Psychiatric:      Comments: Intubated         Medications  Current Facility-Administered Medications   Medication Dose Route Frequency Provider Last Rate Last Admin    scopolamine (Transderm-Scop) patch 1 Patch  1 Patch Transdermal Q72HRS Lidia Figueroa M.D.   1 Patch at 01/14/25 0141    dexmedetomidine (Precedex) 400 mcg/100mL infusion  0.1-1 mcg/kg/hr (Ideal) Intravenous Continuous Andrew Werner A.P.R.N.   Paused at 01/15/25 0535    melatonin tablet 5 mg  5 mg Enteral Tube Nightly Jordan Shi M.D.   5 mg at 01/14/25 2210    metoclopramide (Reglan) tablet 10 mg  10 mg Enteral Tube Q6HRS PRN Jessica Campos, A.P.R.N.   10 mg at 01/14/25 1741    acetaminophen (Tylenol) tablet 650 mg  650 mg Enteral Tube Q6HRS PRN Jessica Campos, A.P.R.N.   650 mg at 01/15/25 0730    K+ Scale: Goal of 5  1 Each Intravenous Q6HRS Jessica Campos A.P.R.N.   1 Each at 01/15/25 0600    potassium bicarbonate (Klyte) effervescent tablet 50 mEq  50 mEq Enteral Tube BID Jessica Campos, A.P.R.N.   50 mEq at 01/15/25 0509    furosemide (Lasix) injection 80 mg  80 mg Intravenous Q DAY Jessica Campos, A.P.R.N.   80 mg at 01/15/25 0509    metOLazone (Zaroxolyn) tablet 5 mg  5 mg Enteral Tube Q DAY Jessica Campos A.P.R.N.   5 mg at 01/15/25 0509    Pharmacy Consult: Enteral tube insertion - review meds/change route/product selection  1 Each Other PHARMACY TO DOSE RODY KwonPMimiRPREMA        norepinephrine (Levophed) 8 mg in 250 mL NS infusion (premix)  0-1 mcg/kg/min (Ideal) Intravenous Continuous  Juan Daniel Baca M.D. 6 mL/hr at 01/15/25 1100 0.05 mcg/kg/min at 01/15/25 1100    Respiratory Therapy Consult   Nebulization Continuous RT Juan Daniel Baca M.D.        famotidine (Pepcid) tablet 20 mg  20 mg Enteral Tube Q12HRS Juan Daniel Baca M.D.   20 mg at 01/15/25 0509    Or    famotidine (Pepcid) injection 20 mg  20 mg Intravenous Q12HRS Juan Daniel Baca M.D.   20 mg at 01/11/25 1727    senna-docusate (Pericolace Or Senokot S) 8.6-50 MG per tablet 2 Tablet  2 Tablet Enteral Tube BID Juan Daniel Baca M.D.   2 Tablet at 01/14/25 1801    And    polyethylene glycol/lytes (Miralax) Packet 1 Packet  1 Packet Enteral Tube QDAY PRN Juan Daniel Baca M.D.   1 Packet at 01/09/25 0505    And    magnesium hydroxide (Milk Of Magnesia) suspension 30 mL  30 mL Enteral Tube QDAY PRN Juan Daniel Baca M.D.   30 mL at 01/09/25 1819    And    bisacodyl (Dulcolax) suppository 10 mg  10 mg Rectal QDAY PRN Juan Daniel Baca M.D. MD Alert...ICU Electrolyte Replacement per Pharmacy   Other PHARMACY TO DOSE Juan Daniel Baca M.D.        lidocaine (Xylocaine) 1 % injection 2 mL  2 mL Tracheal Tube Q30 MIN PRN Juan Daniel Baca M.D.   2 mL at 01/14/25 1610    HYDROcodone/acetaminophen (Norco)  MG per tablet 1 Tablet  1 Tablet Enteral Tube Q4HRS Juan Daniel Baca M.D.   1 Tablet at 01/15/25 1007    magnesium oxide tablet 400 mg  400 mg Enteral Tube DAILY Juan Daniel Baca M.D.   400 mg at 01/15/25 0509    ROPINIRole (Requip) tablet 1 mg  1 mg Enteral Tube QHS Juan Daniel Baca M.D.   1 mg at 01/14/25 2126    calcium carbonate (Tums) chewable tab 500 mg  500 mg Enteral Tube TID PRN Juan Daniel Baca M.D.        methocarbamol (Robaxin) tablet 750 mg  750 mg Enteral Tube Q8HRS PRN Juan Daniel Baca M.D.   750 mg at 01/14/25 2126    ondansetron (Zofran ODT) dispertab 4 mg  4 mg Enteral Tube Q4HRS PRN Juan Daniel Baca M.D.        ROPINIRole (Requip) tablet 1 mg  1 mg Enteral Tube BID PRN Juan Daniel Baca,  M.D.   1 mg at 01/14/25 0352    senna-docusate (Pericolace Or Senokot S) 8.6-50 MG per tablet 1 Tablet  1 Tablet Enteral Tube Q24HRS PRN Juan Daniel Baca M.D.        insulin lispro (HumaLOG,AdmeLOG) subcutaneous injection  2-9 Units Subcutaneous Q6HRS Aidee HAYNES Latona   2 Units at 01/15/25 1229    And    dextrose 50% (D50W) injection 25 g  25 g Intravenous Q15 MIN PRN Aidee HAYNES Lataudie        labetalol (Normodyne/Trandate) injection 10 mg  10 mg Intravenous Q4HRS PRN Lidia Figueroa M.D.   10 mg at 01/07/25 0611    ipratropium-albuterol (DUONEB) nebulizer solution  3 mL Nebulization Q2HRS PRN (RT) Americo Avalos M.D.   3 mL at 01/11/25 2212    enoxaparin (Lovenox) inj 40 mg  40 mg Subcutaneous DAILY AT 1800 Jessica Campos AMimiP.R.N.   40 mg at 01/14/25 1824    bisacodyl (Dulcolax) suppository 10 mg  10 mg Rectal DAILY Neo Ceja, P.A.   10 mg at 01/13/25 0511    sodium chloride (Ocean) 0.65 % nasal spray 2 Spray  2 Spray Nasal Q2HRS PRN Omar Saleh M.D.   2 Spray at 01/01/25 0600    MD ALERT...DO NOT ADMINISTER NSAIDS or ASPIRIN unless ORDERED By Neurosurgery 1 Each  1 Each Other PRN Karthik Lowery D.O.        ondansetron (Zofran) syringe/vial injection 4 mg  4 mg Intravenous Q4HRS PRN Abdulaziz Nieto M.D.   4 mg at 01/14/25 1239       Fluids    Intake/Output Summary (Last 24 hours) at 1/15/2025 1239  Last data filed at 1/15/2025 1100  Gross per 24 hour   Intake 2018.54 ml   Output 2800 ml   Net -781.46 ml       Laboratory  Recent Labs     01/13/25  1133 01/14/25  1148   ISTATAPH 7.375 7.414   ISTATAPCO2 72.0* 65.9*   ISTATAPO2 85 73*   ISTATATCO2 44* 44*   SMDXBUK5GRM 95 94   ISTATARTHCO3 42.1* 42.2*   ISTATARTBE 14* 15*   ISTATTEMP 37.8 C 37.7 C   ISTATFIO2 50 40   ISTATSPEC Arterial Arterial   ISTATAPHTC 7.363 7.404   OEYOOLEW4ID 90* 76         Recent Labs     01/13/25  0352 01/13/25  1420 01/14/25  0359 01/14/25  1200 01/14/25  1815 01/14/25  2312 01/15/25  0455   SODIUM 136  --  138  --   --    --  135   POTASSIUM 2.9*   < > 3.5*   < > 3.8 3.9 3.6   CHLORIDE 94*  --  95*  --   --   --  93*   CO2 33  --  33  --   --   --  33   BUN 14  --  15  --   --   --  26*   CREATININE 0.55  --  0.44*  --   --   --  0.55   CALCIUM 8.5  --  8.8  --   --   --  8.9    < > = values in this interval not displayed.     Recent Labs     01/13/25 0352 01/13/25  1420 01/14/25  0359 01/15/25  0455   ALTSGPT 26  --   --   --    ASTSGOT 133*  --   --   --    ALKPHOSPHAT 84  --   --   --    TBILIRUBIN 0.3  --   --   --    PREALBUMIN  --  11.1*  --   --    GLUCOSE 170*  --  147* 222*     Recent Labs     01/13/25  0352 01/14/25  0359 01/15/25  0455   WBC 12.7* 16.4* 16.8*   NEUTSPOLYS 77.60* 83.60* 81.90*   LYMPHOCYTES 8.60* 5.10* 6.00*   MONOCYTES 9.30 9.10 8.90   EOSINOPHILS 1.60 0.50 0.80   BASOPHILS 0.50 0.40 0.50   ASTSGOT 133*  --   --    ALTSGPT 26  --   --    ALKPHOSPHAT 84  --   --    TBILIRUBIN 0.3  --   --      Recent Labs     01/13/25  0352 01/14/25  0359 01/15/25  0455   RBC 3.13* 3.29* 3.46*   HEMOGLOBIN 8.8* 9.3* 9.9*   HEMATOCRIT 28.0* 30.0* 30.9*   PLATELETCT 524* 547* 695*       Imaging  X-Ray:  I have personally reviewed the images and compared with prior images.  CT:    Reviewed  MRI:   Reviewed    Assessment/Plan  * Acute on chronic respiratory failure with hypoxia and hypercapnia (HCC)  Assessment & Plan  intubated 1/4  extubated 1/5  re-intubated 1/8  Ventilator dependent respiratory failure  Modify ventilator to optimize oxygenation, acid-base balance and ventilation  CXR as indicated: monitor lung volumes and tube/line placement  HOB > 30  Titrate FiO2 to keep sats greater than 92%  Chlorhexidine  goal CO2 35-40  Daily awakening and SBT trials unless contraindicated  ABCDEF bundle  I am actively adjusting ventilator based on clinical indicators and ABG's  Optimize mobility, up in cardiac chair, edge of bed, PT etc.    Hypokalemia  Assessment & Plan  K scale    Restless leg syndrome- (present on  "admission)  Assessment & Plan  Requip     Pathological fracture of vertebra- (present on admission)  Assessment & Plan  Pathologic fracture of T5 with myelopathy on presentation  S/P T4-6 laminectomy with fusion and spinal decompression on 1/1/2025 by Dr. Lowery  multi-modal pain management  Fall Precautions  PT OT    Cancer related pain- (present on admission)  Assessment & Plan  Palliative Care following for pain management  Multimodal pain management    Bilateral pleural effusion- (present on admission)  Assessment & Plan  Malignant pleural effusions from metastatic breast cancer s/p thoracenteses and chest tubes  Diuresis as tolerated  monitor chest xrays  PleurX placement in IR 1/11    Malignant neoplasm of breast (HCC)- (present on admission)  Assessment & Plan  \"Bilateral breast cancer with extensive destruction of the left breast and ulceration and skin lesions making her inoperable on the left.  Right breast shows a 3 x 3 cm mass.  PET scan March 2023 showed non-hypermetabolic pleural effusions which are cytology positive and she has demonstrable pleural masses. Started aromatase inhibition early September 2023.  Has not yet started CDK 4 6 inhibitor.  Lost to follow-up for over 6 months although apparently was on letrozole as single agent.  Now with clear progression of disease in the left breast.  August 2024: Progression of disease in liver with extensive disease.  Breast is worse.  Lung is better.  Lymph nodes are worse.  Status post radiation to symptomatic oozing and exophytic left breast mass.  Marked worsening of her cancer on letrozole with progression in the bone and liver.\"--Dr. Benitez    ongoing goals of care discussions. Patient is fighting to recover and discharge home so she can start the Letrozole.  DNR, I OK   Palliative following for pain management as well as further discussions.      Primary hypertension- (present on admission)  Assessment & Plan  holding PO antihypertensives " while on pressors  PRN's available    Diabetes mellitus, type 2 (HCC)- (present on admission)  Assessment & Plan  A1c 6.4  accuchecks with SSI  CHO diet  glucose goal 140-180         VTE:  Lovenox  Ulcer: H2 Antagonist  Lines: Central Line  Ongoing indication addressed and Mojica Catheter  Ongoing indication addressed    I have performed a physical exam and reviewed and updated ROS and Plan today (1/15/2025). In review of yesterday's note (1/14/2025), there are no changes except as documented above.     Discussed patient condition and risk of morbidity and/or mortality with Family, RN, RT, Therapies, Pharmacy, Dietary, , Charge nurse / hot rounds, Patient, and   my attending Dr Cruz.   The patient remains critically ill.  Critical care time = 70 minutes in directly providing and coordinating critical care and extensive data review.  No time overlap and excludes procedures.    Please note that this dictation was created using voice recognition software. The accuracy of the dictation is limited to the abilities of the software. I have made every reasonable attempt to correct obvious errors, but I expect that there are errors of grammar and possibly content that I did not discover before finalizing the note.     Andrew Werner, APRN

## 2025-01-15 NOTE — CARE PLAN
Problem: Ventilation  Goal: Ability to achieve and maintain unassisted ventilation or tolerate decreased levels of ventilator support  Description: Target End Date:  4 days     Document on Vent flowsheet    1.  Support and monitor invasive and noninvasive mechanical ventilation  2.  Monitor ventilator weaning response  3.  Perform ventilator associated pneumonia prevention interventions  4.  Manage ventilation therapy by monitoring diagnostic test results  Outcome: Met   Pt extubated

## 2025-01-15 NOTE — DISCHARGE PLANNING
Case Management Discharge Planning      Anticipated Discharge Dispo: Discharge Disposition: Disch to a long term care facility (63)    Action(s) Taken: Chart review completed. Patient discussed in IDT rounds. Per IDT, RT to pull parameters for potential extubation this pm.    1445: Provider in to CM office re: request to meet with patient and family at bedside to discuss potential DCP/post-acute placement options/services; informed that patient is now extubated on 2L via NC    1515: RNCM met with patient and family at bedside to discuss DCP and answer questions/concerns    Patient and daughter verbalized understanding that if patient requires intubation, patient will need a trach placed    Patient and daughter verbalized understanding that patient will not be able to continue tx for cancer while at post-acute facilities    RNCM discussed placement options for patient's with trachs/vents; RNCM [facility] patient and family [facility] patient, pending outcome of palliative discussion and patient's progression during hospitalization    RNCM provided information on average LOS at LTACH (20-25 days); RNCM explained that while there is potential for patient to remain locally there is also potential for post-acute placement to be acquired for patient non-locally ([place], [facility])    RNCM discussed continuity of care beyond LTACH placement; informed patient and family that there are SNFs locally ([facility]) that are able to accept trach/vents and may have long-term beds available    RNCM stated there are HH/hospice agencies that may be able to accommodate patient's needs; pending patient's long-term GOC and capacity to accept patient (staffing, care needs of patient)    RNCM explained that acceptance to facilities is determined by patient's needs and the capacity of the accepting facility to care for the patient; patient and family verbalized  understanding    [id number]  [id number]       RNCM to escalate patient's case to CCM    Escalations Completed: Long Length of Stay Committee     Medically Clear: No    Next Steps: CM to follow up with IDT regarding DCP needs/barriers    Barriers to Discharge: Medical clearance    Is the patient up for discharge tomorrow: No

## 2025-01-15 NOTE — PROGRESS NOTES
Pulmonary/Critical Care Medicine   Attending Progress Note    Date of service: 1/15/2025  Time: 0701    Ms/ Pedro Hannon is a 64-year-old female with a past medical history significant for stage IV breast cancer with metastatic disease to the lungs currently on oral chemotherapy who was admitted on 12/25/2024 with weakness, shortness of breath, and back pain and had a CT chest, abdomen, and pelvis which showed moderate-sized pleural effusions with worsening hepatic metastatic disease with new abdominal lymphadenopathy, thoracic adenopathy, diffuse osseous metastatic disease with a T5 pathologic fracture.  Since admission the patient had a right sided thoracentesis on 12/27.  An MRI showed multiple thoracic and lumbar metastatic lesions.  The patient was taken to the operating room on 1/1 and had a T4-T6 laminectomy.  She subsequently developed a right-sided pneumothorax requiring chest tube placement.  The patient underwent a left-sided thoracentesis.  The patient continues to have recurrent hypoxic respiratory failure requiring BiPAP but subsequently has been intubated.  She had a Pleurx catheter placed and has had frequent thoracentesis since placement.  1/13 - VD6, SAT/SBT-->poor parameters  1/14 - VD7, SAT/SBT-->poor parameters, had palliative revisit patient    24 hour events reviewed:   - no overnight events   - precedex overnight, off now   - writing and following   - SB/SR 40-80s   - SBP 70-100s   - levo 0.06   - TFs at goal   - last BM on 1/13   - UOP of 900cc overnight, spivey   - edge of bed, up to chair   - chest tube with 65cc overnight   - ASV overnight   - SBT this am, good parameters   - lovenox   - pepcid   - no abx   - WBCs 16   - K 3.6   - Hb 9.9   - platelets 695   - creat 0.55      Yesterday's Events:   - off sedation since yesterday   - RASS -1 to +2   - writing notes, answering all, following   - Tmax 100.9   - SR 70-90s   - -140s   - levo off   - no drips   - TFs at goal, OG   - BM  yesterday   - UOP of 1200cc overnight, Mojica   - right IJ TLC   - pleurX: 140cc overnight   - level 1 mobility, refused PT   - VD7   - PEEP 8, 40%   - SBT, end tidal at 50, poor parameters   - CXR(reviewed): bilat hilar fullness, left atelectasis   - lovenox   - pepcid   - off abx   - WBCs 16.4   - Hb 9.3   - platelets 547   - K 3.5   - creat 0.44       Imaging reviewed    A/P:  Acute on chronic hypoxic and hypercapnic respiratory failure   - due to recurrent malignant effusion and diminished strength/capacity with spinal disease   - Intubated 1/4, extubated 1/5, Reintubated 1/8   - cont full vent support   - RT/O2 protocols   - vent bundle protocols   - cont mobility   - ongoing SBTs-->extubate today, rediscuss GOC   - ?need for bronch with low grade fever and leukocytosis    Bilateral malignant pleural effusions   - s/p thoracentesis/chest tubes   - ongoing diuresis as tolerated   - s/p right pleurX catheter on 1/11    Metastatic breast CA   - cont pain management   - goal to go home and resume cancer therapies    Fracture of T5   - pathologic   - s/p T4-6 laminectomy with fusion and spinal decompression on 1/1/2025   - pain management    GOC   - 1/14: spoke to Asha while on the ventilator today about her need to participate in activity:  edge of bed, up to chair, and even walking, in order to strengthen her as I am aware of her desire to go home and attempt palliative chemo.  I explained that next steps would be a tracheostomy/LTAC, but I didn't think she was be an LTAC candidate given her extensive cancer and that she would not receive chemo if she remained on the ventilator.  Unfortunately, with my blunt discussion, Asha closed down and told me she was feeling very ill and could no longer talk.    I spent extensive time in reviewing the patient's condition, physical examination, laboratory and imaging data, prior documentation, in discussion with IDT, and CHERIE Reed in formulating an  assessment/plan.    Critical Care time: 15 min. No time overlap, procedures not included in time.    Jimena Cruz MD  Pulmonary and Critical Care Medicine

## 2025-01-16 ENCOUNTER — APPOINTMENT (OUTPATIENT)
Dept: RADIOLOGY | Facility: MEDICAL CENTER | Age: 65
End: 2025-01-16
Attending: INTERNAL MEDICINE
Payer: MEDICARE

## 2025-01-16 PROBLEM — D63.8 ANEMIA OF CHRONIC DISEASE: Status: ACTIVE | Noted: 2025-01-16

## 2025-01-16 LAB
ANION GAP SERPL CALC-SCNC: 7 MMOL/L (ref 7–16)
BASOPHILS # BLD AUTO: 0.7 % (ref 0–1.8)
BASOPHILS # BLD: 0.1 K/UL (ref 0–0.12)
BUN SERPL-MCNC: 27 MG/DL (ref 8–22)
CALCIUM SERPL-MCNC: 9.1 MG/DL (ref 8.5–10.5)
CHLORIDE SERPL-SCNC: 94 MMOL/L (ref 96–112)
CO2 SERPL-SCNC: 35 MMOL/L (ref 20–33)
CREAT SERPL-MCNC: 0.56 MG/DL (ref 0.5–1.4)
EOSINOPHIL # BLD AUTO: 0.12 K/UL (ref 0–0.51)
EOSINOPHIL NFR BLD: 0.9 % (ref 0–6.9)
ERYTHROCYTE [DISTWIDTH] IN BLOOD BY AUTOMATED COUNT: 54.9 FL (ref 35.9–50)
GFR SERPLBLD CREATININE-BSD FMLA CKD-EPI: 101 ML/MIN/1.73 M 2
GLUCOSE BLD STRIP.AUTO-MCNC: 107 MG/DL (ref 65–99)
GLUCOSE BLD STRIP.AUTO-MCNC: 113 MG/DL (ref 65–99)
GLUCOSE BLD STRIP.AUTO-MCNC: 131 MG/DL (ref 65–99)
GLUCOSE BLD STRIP.AUTO-MCNC: 155 MG/DL (ref 65–99)
GLUCOSE SERPL-MCNC: 155 MG/DL (ref 65–99)
HCT VFR BLD AUTO: 30.7 % (ref 37–47)
HGB BLD-MCNC: 9.3 G/DL (ref 12–16)
IMM GRANULOCYTES # BLD AUTO: 0.17 K/UL (ref 0–0.11)
IMM GRANULOCYTES NFR BLD AUTO: 1.2 % (ref 0–0.9)
LYMPHOCYTES # BLD AUTO: 1.23 K/UL (ref 1–4.8)
LYMPHOCYTES NFR BLD: 8.9 % (ref 22–41)
MCH RBC QN AUTO: 27.9 PG (ref 27–33)
MCHC RBC AUTO-ENTMCNC: 30.3 G/DL (ref 32.2–35.5)
MCV RBC AUTO: 92.2 FL (ref 81.4–97.8)
MONOCYTES # BLD AUTO: 1.47 K/UL (ref 0–0.85)
MONOCYTES NFR BLD AUTO: 10.6 % (ref 0–13.4)
NEUTROPHILS # BLD AUTO: 10.76 K/UL (ref 1.82–7.42)
NEUTROPHILS NFR BLD: 77.7 % (ref 44–72)
NRBC # BLD AUTO: 0 K/UL
NRBC BLD-RTO: 0 /100 WBC (ref 0–0.2)
PLATELET # BLD AUTO: 563 K/UL (ref 164–446)
PMV BLD AUTO: 8.8 FL (ref 9–12.9)
POTASSIUM SERPL-SCNC: 3.9 MMOL/L (ref 3.6–5.5)
POTASSIUM SERPL-SCNC: 4 MMOL/L (ref 3.6–5.5)
POTASSIUM SERPL-SCNC: 4.5 MMOL/L (ref 3.6–5.5)
POTASSIUM SERPL-SCNC: 4.7 MMOL/L (ref 3.6–5.5)
RBC # BLD AUTO: 3.33 M/UL (ref 4.2–5.4)
SODIUM SERPL-SCNC: 136 MMOL/L (ref 135–145)
WBC # BLD AUTO: 13.9 K/UL (ref 4.8–10.8)

## 2025-01-16 PROCEDURE — 99233 SBSQ HOSP IP/OBS HIGH 50: CPT | Performed by: NURSE PRACTITIONER

## 2025-01-16 PROCEDURE — 700111 HCHG RX REV CODE 636 W/ 250 OVERRIDE (IP): Mod: JZ | Performed by: NURSE PRACTITIONER

## 2025-01-16 PROCEDURE — 770000 HCHG ROOM/CARE - INTERMEDIATE ICU *

## 2025-01-16 PROCEDURE — 700111 HCHG RX REV CODE 636 W/ 250 OVERRIDE (IP): Mod: JZ,TB

## 2025-01-16 PROCEDURE — 700102 HCHG RX REV CODE 250 W/ 637 OVERRIDE(OP): Performed by: NURSE PRACTITIONER

## 2025-01-16 PROCEDURE — 92612 ENDOSCOPY SWALLOW (FEES) VID: CPT

## 2025-01-16 PROCEDURE — 99497 ADVNCD CARE PLAN 30 MIN: CPT | Performed by: NURSE PRACTITIONER

## 2025-01-16 PROCEDURE — 84132 ASSAY OF SERUM POTASSIUM: CPT

## 2025-01-16 PROCEDURE — 99233 SBSQ HOSP IP/OBS HIGH 50: CPT

## 2025-01-16 PROCEDURE — 700111 HCHG RX REV CODE 636 W/ 250 OVERRIDE (IP): Mod: JZ

## 2025-01-16 PROCEDURE — 80048 BASIC METABOLIC PNL TOTAL CA: CPT

## 2025-01-16 PROCEDURE — 700111 HCHG RX REV CODE 636 W/ 250 OVERRIDE (IP): Performed by: NURSE PRACTITIONER

## 2025-01-16 PROCEDURE — A9270 NON-COVERED ITEM OR SERVICE: HCPCS | Performed by: EMERGENCY MEDICINE

## 2025-01-16 PROCEDURE — 82962 GLUCOSE BLOOD TEST: CPT | Mod: 91

## 2025-01-16 PROCEDURE — 700102 HCHG RX REV CODE 250 W/ 637 OVERRIDE(OP): Performed by: EMERGENCY MEDICINE

## 2025-01-16 PROCEDURE — 85025 COMPLETE CBC W/AUTO DIFF WBC: CPT

## 2025-01-16 PROCEDURE — 700111 HCHG RX REV CODE 636 W/ 250 OVERRIDE (IP): Performed by: HOSPITALIST

## 2025-01-16 PROCEDURE — 700111 HCHG RX REV CODE 636 W/ 250 OVERRIDE (IP): Mod: JZ | Performed by: EMERGENCY MEDICINE

## 2025-01-16 PROCEDURE — 99222 1ST HOSP IP/OBS MODERATE 55: CPT | Performed by: HOSPITALIST

## 2025-01-16 PROCEDURE — A9270 NON-COVERED ITEM OR SERVICE: HCPCS | Performed by: NURSE PRACTITIONER

## 2025-01-16 PROCEDURE — 71045 X-RAY EXAM CHEST 1 VIEW: CPT

## 2025-01-16 PROCEDURE — 700111 HCHG RX REV CODE 636 W/ 250 OVERRIDE (IP): Performed by: INTERNAL MEDICINE

## 2025-01-16 RX ORDER — POTASSIUM CHLORIDE 14.9 MG/ML
20 INJECTION INTRAVENOUS ONCE
Status: COMPLETED | OUTPATIENT
Start: 2025-01-16 | End: 2025-01-16

## 2025-01-16 RX ORDER — POTASSIUM CHLORIDE 7.45 MG/ML
10 INJECTION INTRAVENOUS ONCE
Status: COMPLETED | OUTPATIENT
Start: 2025-01-16 | End: 2025-01-16

## 2025-01-16 RX ADMIN — HYDROCODONE BITARTRATE AND ACETAMINOPHEN 1 TABLET: 10; 325 TABLET ORAL at 18:05

## 2025-01-16 RX ADMIN — POTASSIUM CHLORIDE 10 MEQ: 7.46 INJECTION, SOLUTION INTRAVENOUS at 06:15

## 2025-01-16 RX ADMIN — HYDROCODONE BITARTRATE AND ACETAMINOPHEN 1 TABLET: 10; 325 TABLET ORAL at 01:44

## 2025-01-16 RX ADMIN — Medication 400 MG: at 10:08

## 2025-01-16 RX ADMIN — ANTACID TABLETS 500 MG: 500 TABLET, CHEWABLE ORAL at 00:17

## 2025-01-16 RX ADMIN — FAMOTIDINE 20 MG: 20 TABLET, FILM COATED ORAL at 18:05

## 2025-01-16 RX ADMIN — HYDROCODONE BITARTRATE AND ACETAMINOPHEN 1 TABLET: 10; 325 TABLET ORAL at 21:04

## 2025-01-16 RX ADMIN — HYDROCODONE BITARTRATE AND ACETAMINOPHEN 1 TABLET: 10; 325 TABLET ORAL at 10:07

## 2025-01-16 RX ADMIN — POTASSIUM BICARBONATE 50 MEQ: 978 TABLET, EFFERVESCENT ORAL at 10:08

## 2025-01-16 RX ADMIN — HYDROCODONE BITARTRATE AND ACETAMINOPHEN 1 TABLET: 10; 325 TABLET ORAL at 05:03

## 2025-01-16 RX ADMIN — ANTACID TABLETS 500 MG: 500 TABLET, CHEWABLE ORAL at 19:34

## 2025-01-16 RX ADMIN — ONDANSETRON 4 MG: 2 INJECTION INTRAMUSCULAR; INTRAVENOUS at 04:41

## 2025-01-16 RX ADMIN — FAMOTIDINE 20 MG: 10 INJECTION, SOLUTION INTRAVENOUS at 05:03

## 2025-01-16 RX ADMIN — SENNOSIDES AND DOCUSATE SODIUM 2 TABLET: 50; 8.6 TABLET ORAL at 18:05

## 2025-01-16 RX ADMIN — ROPINIROLE HYDROCHLORIDE 1 MG: 1 TABLET, FILM COATED ORAL at 21:04

## 2025-01-16 RX ADMIN — POTASSIUM CHLORIDE 10 MEQ: 7.46 INJECTION, SOLUTION INTRAVENOUS at 01:50

## 2025-01-16 RX ADMIN — METOLAZONE 5 MG: 5 TABLET ORAL at 10:07

## 2025-01-16 RX ADMIN — FUROSEMIDE 80 MG: 10 INJECTION, SOLUTION INTRAMUSCULAR; INTRAVENOUS at 05:02

## 2025-01-16 RX ADMIN — ONDANSETRON 4 MG: 2 INJECTION INTRAMUSCULAR; INTRAVENOUS at 10:19

## 2025-01-16 RX ADMIN — INSULIN LISPRO 2 UNITS: 100 INJECTION, SOLUTION INTRAVENOUS; SUBCUTANEOUS at 12:19

## 2025-01-16 RX ADMIN — ONDANSETRON 4 MG: 2 INJECTION INTRAMUSCULAR; INTRAVENOUS at 00:17

## 2025-01-16 RX ADMIN — HYDROCODONE BITARTRATE AND ACETAMINOPHEN 1 TABLET: 10; 325 TABLET ORAL at 14:04

## 2025-01-16 RX ADMIN — ANTACID TABLETS 500 MG: 500 TABLET, CHEWABLE ORAL at 08:56

## 2025-01-16 RX ADMIN — Medication 5 MG: at 21:04

## 2025-01-16 RX ADMIN — POTASSIUM CHLORIDE 20 MEQ: 14.9 INJECTION, SOLUTION INTRAVENOUS at 19:14

## 2025-01-16 RX ADMIN — ALTEPLASE 2 MG: 2.2 INJECTION, POWDER, LYOPHILIZED, FOR SOLUTION INTRAVENOUS at 05:49

## 2025-01-16 RX ADMIN — POTASSIUM CHLORIDE 20 MEQ: 14.9 INJECTION, SOLUTION INTRAVENOUS at 13:18

## 2025-01-16 ASSESSMENT — PAIN DESCRIPTION - PAIN TYPE
TYPE: CHRONIC PAIN
TYPE: ACUTE PAIN;CHRONIC PAIN
TYPE: CHRONIC PAIN
TYPE: ACUTE PAIN
TYPE: CHRONIC PAIN

## 2025-01-16 ASSESSMENT — ENCOUNTER SYMPTOMS
DEPRESSION: 0
BRUISES/BLEEDS EASILY: 0
DIZZINESS: 0
GASTROINTESTINAL NEGATIVE: 1
BACK PAIN: 1
VOMITING: 0
NERVOUS/ANXIOUS: 1
COUGH: 1
HEARTBURN: 0
POLYDIPSIA: 0
SHORTNESS OF BREATH: 1
PALPITATIONS: 0
FOCAL WEAKNESS: 0
NAUSEA: 0
HEADACHES: 0
NECK PAIN: 1
CHILLS: 0
EYES NEGATIVE: 1
WEAKNESS: 1
FEVER: 0

## 2025-01-16 ASSESSMENT — FIBROSIS 4 INDEX: FIB4 SCORE: 3.01

## 2025-01-16 NOTE — DISCHARGE PLANNING
Complex Case Management    Order received for Complex Case Management. Patient's chart has been reviewed.     Complex case management following? Yes    Yes: Case assigned to Alec Gaviria        NOTE: There may be cases that are NOT assigned to a CCM but will be followed for progression of care by leaders of the Complex Discharge Committee.

## 2025-01-16 NOTE — PROGRESS NOTES
"Pulmonary/Critical Care Medicine   Attending Progress Note    Date of service: 1/16/2025  Time: 0702    Ms/ Pedro Hannon is a 64-year-old female with a past medical history significant for stage IV breast cancer with metastatic disease to the lungs currently on oral chemotherapy who was admitted on 12/25/2024 with weakness, shortness of breath, and back pain and had a CT chest, abdomen, and pelvis which showed moderate-sized pleural effusions with worsening hepatic metastatic disease with new abdominal lymphadenopathy, thoracic adenopathy, diffuse osseous metastatic disease with a T5 pathologic fracture.  Since admission the patient had a right sided thoracentesis on 12/27.  An MRI showed multiple thoracic and lumbar metastatic lesions.  The patient was taken to the operating room on 1/1 and had a T4-T6 laminectomy.  She subsequently developed a right-sided pneumothorax requiring chest tube placement.  The patient underwent a left-sided thoracentesis.  The patient continues to have recurrent hypoxic respiratory failure requiring BiPAP but subsequently has been intubated.  She had a Pleurx catheter placed and has had frequent thoracentesis since placement.  1/13 - VD6, SAT/SBT-->poor parameters  1/14 - VD7, SAT/SBT-->poor parameters, had palliative revisit patient, sitting up in chair  1/15 - VD8, SAT/SBT-->great parameters and extubated, lengthy talk with the patient who wants to \"keep fighting\" and wants the \"injection\" that Dr. Thornton has recommended.  She has elected to be reintubated and undergo tracheostomy should it come to this.    24 hour events reviewed:   - extubated yesterday  - a/ox4   - SB/SR 50-80s   - SBP 90-110s   - NPO, FEES today   - UOP of 790cc overnight, [place]   - BM on 1/13   - chest tube of 110cc overnight   - no RT issues   - lovenox  - pepcid  - WBCs 13.9  - Hb 9.3  - platelets 563  - K 4.5  - creat 0.56    Yesterday's Events:   - no overnight events   - precedex overnight, off now   - " writing and following   - SB/SR 40-80s   - SBP 70-100s   - levo 0.06   - TFs at goal   - last BM on 1/13   - UOP of 900cc overnight, spivey   - edge of bed, up to chair   - chest tube with 65cc overnight   - ASV overnight   - SBT this am, good parameters   - lovenox   - pepcid   - no abx   - WBCs 16   - K 3.6   - Hb 9.9   - platelets 695   - creat 0.55       Imaging reviewed    A/P:  Acute on chronic hypoxic and hypercapnic respiratory failure   - due to recurrent malignant effusion and diminished strength/capacity with spinal disease   - Intubated 1/4, extubated 1/5, Reintubated 1/8   - Extubated on 1/15   - long discussion with patient and her 2 daughters about getting reintubated would lead to tracheostomy and LTAC.  The patient would like reintubation and LTAC should she need reintubation in the next 48-72 hours   - monitor left pleural effusion for need of therapeutic thoracentesis    Bilateral malignant pleural effusions   - s/p thoracentesis/chest tubes    - ongoing diuresis as tolerated   - s/p right pleurX catheter on 1/11    Metastatic breast CA   - cont pain management   - goal to go home and resume cancer therapies    Fracture of T5   - pathologic   - s/p T4-6 laminectomy with fusion and spinal decompression on 1/1/2025   - pain management    GO   - 1/14: spoke to Asha while on the ventilator today about her need to participate in activity:  edge of bed, up to chair, and even walking, in order to strengthen her as I am aware of her desire to go home and attempt palliative chemo.  I explained that next steps would be a tracheostomy/LTAC, but I didn't think she was be an LTAC candidate given her extensive cancer and that she would not receive chemo if she remained on the ventilator.  Unfortunately, with my blunt discussion, Asha closed down and told me she was feeling very ill and could no longer talk.   - 1/15 - After Asha was extubated and with her 2 daughters present discussed the real possibility  of requiring reintubation and this would lead to trach/LTAC. Asha is fairly hell bent on attempting the injections that Dr. Thornton had discussed with her. I did explain that undergoing trach/LTAC that she would not receive treatment.  She considered her options and has chosen full treatment and would like reintubation should it come to that.    I spent extensive time in reviewing the patient's condition, physical examination, laboratory and imaging data, prior documentation, in discussion with IDT, and CHERIE Reed in formulating an assessment/plan.    High risk of worsening hypercapnia and will transfer to the IMCU for ongoing monitoring.    Jimena Cruz MD  Pulmonary and Critical Care Medicine

## 2025-01-16 NOTE — CARE PLAN
The patient is Watcher - Medium risk of patient condition declining or worsening    Shift Goals  Clinical Goals: Pulmonary hygeine, MAP > 65  Patient Goals: Pain management  Family Goals: Updates    Progress made toward(s) clinical / shift goals:        Problem: Knowledge Deficit - Standard  Goal: Patient and family/care givers will demonstrate understanding of plan of care, disease process/condition, diagnostic tests and medications  Outcome: Progressing     Problem: Pain - Standard  Goal: Alleviation of pain or a reduction in pain to the patient’s comfort goal  Outcome: Progressing     Problem: Hemodynamics  Goal: Patient's hemodynamics, fluid balance and neurologic status will be stable or improve  Outcome: Progressing     Problem: Urinary - Renal Perfusion  Goal: Ability to achieve and maintain adequate renal perfusion and functioning will improve  Outcome: Progressing     Problem: Respiratory  Goal: Patient will achieve/maintain optimum respiratory ventilation and gas exchange  Outcome: Progressing       Patient is not progressing towards the following goals:

## 2025-01-16 NOTE — THERAPY
"Speech Language Pathology   Flexible Endoscopic Evaluation of Swallowing (FEES)        Patient Name: Asha Hannon  AGE:  64 y.o., SEX:  female  Medical Record #: [id number]  Date of Service: 1/16/2025    History of Present Illness  64 y.o. female with history of breast cancer with metastasis to brain, lungs, pleura, liver, spine, admitted for hypoxemic respiratory failure on 12/25 related to bilateral pleural effusions.     S/p laminectomy and fusion of T4-6 1/1; chest tube 1/3; bronchoscopy 1/4; thoracostomy 1/4; intubated 1/4-1/5 and 1/8-1/15.      CSE completed 1/5 recommended RG7/TN0.     CMHx: Acute on chronic respiratory failure, septci shock, atelectasis, pneumothorax, pathological vertebral fx, HTN,   PMHx: Metastatic breast cancer (mets to lungs, liver, and bones), depression, obesity, cardiomegaly, NATACHA     CXR 1/15:  \"1.  Pulmonary edema and/or infiltrates are identified, which are stable since the prior exam.  2.  Layering left pleural effusion, stable\"    Pertinent Information  Current Method of Nutrition: NPO until cleared by speech pathology      Dentition: Fair, Natural dentition   Feeding Tube: None   Tracheostomy: No         Factor(s) Affecting Performance: None     Discussed the risks, benefits, and alternatives of the FEES procedure. Patient/family acknowledged and agreed to proceed.    Assessment  Flexible Endoscopic Evaluation of Swallowing (FEES) completed at bedside today. The endoscope was passed transnasally via Right nare to evaluate the anatomy and physiology of swallowing. Pt tolerated the procedure with no apparent distress.    Anatomic Findings: arytenoid and post cricoid edema. Excrescences at the posterior 1/3 of the vocal folds c/w intubation trauma    Vocal Fold Motion: Bilateral movement  Secretion Management: Adequate     Consistency PAS Score Timing Residue Comments   Thin Liquid 8 During swallow, Post swallow Vallecular Residue: Trace (1%-5%)  Pyriform Sinus Residue: " Trace (1%-5%) Max PAS 8, Modal PAS 6 & 8   Mildly Thick 5 Post Swallow, During Swallow Vallecular Residue: Trace (1%-5%)  Pyriform Sinus Residue: Trace (1%-5%)    Liquidised 1 N/A Vallecular Residue: Mild (5%-25%)  Pyriform Sinus Residue: Trace (1%-5%)    Pudding 1 N/A Vallecular Residue: Mild (5%-25%)  Pyriform Sinus Residue: Trace (1%-5%)    Soft & Bite Sized 1 N/A Vallecular Residue: Mild (5%-25%)  Pyriform Sinus Residue: Trace (1%-5%)    Regular Solid 1 N/A Vallecular Residue: Moderate (25%-50%)  Pyriform Sinus Residue: Mild (5%-25%)    Mixed 3 Post Swallow Vallecular Residue: Moderate (25%-50%)  Pyriform Sinus Residue: Mild (5%-25%)      Penetration-Aspiration Scale (PAS)  1     No contrast enters airway  2     Contrast enters the airway, remains above the vocal folds, and is ejected from the airway (not seen in the airway at the end of the swallow).  3     Contrast enters the airway, remains above the vocal folds, and is not ejected from the airway (is seen in the airway after the swallow).  4     Contrast enters the airway, contacts the vocal folds, and is ejected from the airway.  5     Contrast enters the airway, contacts the vocal folds, and is not ejected from the airway  6     Contrast enters the airway, crosses the plane of the vocal folds, and is ejected from the airway.  7     Contrast enters the airway, crosses the plane of the vocal folds, and is not ejected from the airway despite effort.  8     Contrast enters the airway, crosses the plane of the vocal folds, is not ejected from the airway and there is no response to aspiration.    Oral Phase:  Posterior containment was intact. Mastication was complete for all textures completed though prolonged in the context of deconditioning. Oral clearance was complete.    Pharyngeal Phase:   Initiation of the swallow was mildly delayed w/ liquids pooling in the pyriforms and at times reaching close to the vocal folds before the swallow.  Base of tongue  retraction was reduced. Epiglottic movement was noted but reduced. Pharyngeal contraction was mildly impaired w/ increasing residuals with viscosity. Laryngeal vestibule closure was reduced w/ some penetration and aspiration events suspected to occur during the swallow. PES opening was intact.  Sensory integrity was impaired; there was both silent aspiration indicative of RLN (recurrent laryngeal nerve) impairment and silent penetration events indicative of iSLN (internal branch of the superior laryngeal nerve) impairment). Of note, the patient was sensate to ~50% of aspiration events- though her cough was not effective in clearing the aspirate from the trachea. All penetration events were silent.     Severity Rating:  Severity Rating: MARGA     MARGA: Mild-Moderate    Clinical Impressions    - The pt p/w mild-moderate oropharyngeal dysphagia, likely acute related to deconditioning and acuity of illness, as well as laryngeal insufficiency w/ dysphonia and dystussia after a prolonged intubation.   - Swallow safety is impaired ; swallow efficiency is impaired.   - Pt appears to be at moderate risk for aspiration PNA and low risk for malnutrition/dehydration. They are at increased risk for an adverse event from aspiration due to deconditioning, acuity of illness, weak cough (dystussia), and reduced physical mobility.   - Swallow prognosis is excellent given spontaneous improvement and active rehabilitation. The pt appears to be a excellent candidate for behavioral swallow rehabilitation. Consider exercises targeting cough strength and pharyngeal constriction.      Recommendations  Diet Consistency: Mildly Thick Liquids, Soft & Bite-Sized Solids  Medication: Whole with puree  Supervision: Assist with meal tray set up, Close supervision - patient may be left alone for less than 5 minutes at a time  Positioning: Fully upright and midline during oral intake, Meals sitting upright in a chair, as tolerated  Strategies: Small  "bites/sips, Slow rate of intake (Intermittent throat clear)  Oral Care: BID  Additional Instrumentation: Repeat diagnostic study when clinically appropriate  Consult Referral(s): Dietician    SLP Treatment Plan  Treatment Plan: Dysphagia Treatment  SLP Frequency: 4x Per Week  Estimated Duration: Until Therapy Goals Met    Anticipated Discharge Needs  Discharge Recommendations: Recommend post-acute placement for additional speech therapy services prior to discharge home   Therapy Recommendations Upon DC: Dysphagia Training     Patient / Family Goals  Patient / Family Goal #1: \"I drink [place]\"  Goal #1 Outcome: Progressing as expected  Short Term Goal # 1: Pt will consume RG/TN diet with no overt signs concerning for airway invasion or worsening of respiratory status.  Goal Outcome # 1: Goal met  Short Term Goal # 2: Pt will complete FEES w SLP to further evaluate swallow function and inform POC.  Goal Outcome # 2 : Goal met  Short Term Goal # 3: Pt will tolerate a diet of mildly thick liquids and SB6 w/o signs of aspiration related pulmonary complications  Short Term Goal # 4: Pt will complete exercises targeting pharyngeal constriction and cough strength w/ min cueing    JACKY Swift  "

## 2025-01-16 NOTE — CARE PLAN
The patient is Watcher - Medium risk of patient condition declining or worsening    Shift Goals  Clinical Goals: MAP >65, Mobilize, Pulmonary hygeine  Patient Goals: Comfort  Family Goals: Updates    Progress made toward(s) clinical / shift goals:    Problem: Skin Integrity  Goal: Skin integrity is maintained or improved  Outcome: Progressing     Problem: Fall Risk  Goal: Patient will remain free from falls  Outcome: Progressing     Problem: Pain - Standard  Goal: Alleviation of pain or a reduction in pain to the patient’s comfort goal  Outcome: Progressing     Scheduled pain meds     Problem: Hemodynamics  Goal: Patient's hemodynamics, fluid balance and neurologic status will be stable or improve  Outcome: Progressing       Patient is not progressing towards the following goals:  N/a

## 2025-01-16 NOTE — PROGRESS NOTES
"Critical Care Progress Note    Date of admission  12/25/2024    Chief Complaint  64 y.o. female admitted 12/25/2024 with   Chief Complaint   Patient presents with    Shortness of Breath     Pt c/o shortness of breath over the past couple of days. Denies new fever.  Pt wears 1.5L NC at baseline.  Pt has hx breast cancer with mets to the lungs. Currently taking PO chemotherapy pills.     Extremity Weakness     Pt states she has progressively been feeling weaker over the past couple of days to that point where she cannot ambulate.     Abdominal Pain     Pt also c/o epigastric pain and nausea.      Hospital Course  Asha Hannon is a 64-year-old female with PMH significant for breast cancer with mets to the lungs currently on oral chemo who presented 12/25/2024 with weakness, SOB and back & abdominal pain.  CT C/A/P with contrast showed new BUN infiltrate; moderate size pleural effusions; worsening of hepatic metastasis; new abdominal lymphadenopathy, thoracic adenopathy; diffuse osseous metastatic disease with T5 pathologic fracture.    Admitted to floor under the [facility] Family Medicine Team.    12/26 - Palliative Care consult - patient deferred discussion.   12/27 - right-sided thoracentesis.  12/29 - restless legs \"gave out\" resulting in fall, no trauma.   12/31 -right MRI showed multiple metastatic lesions; T-spine MRI showed multiple metastatic lesions and T5 pathologic fracture; L-spine multiple metastatic lesions; C-spine no metastatic lesions.  1/1 - to OR for T4-T6 laminectomy with Dr. Lowery.  1/2 - POD #1, right-sided pneumothorax, medically manage. Left-sided thoracentesis  1/3 - POD #2, AMS with respiratory failure, pH 7.14 CO2 100. Transfer to ICU. Chest tube placed.   1/4 - Intubation  1/5 - Extubation. Chest tubes clamped.  1/6 - Ongoing goals of care discussions. Large bore right chest tube removed. BIPAP and diuresis.   1/7 - DNR, I OK. BIPAP. Diuresing. Right pigtail catheter removed.  1/8 - acute " decompensation with hypoxia & obtundation. Re-intubated.   1/9 - VD #2.   1/10 - VD #3. Diuresing. IR consult for PleurX placement.   1/11 - VD #4. Thoracentesis with 1150 removed. PleurX placed.   1/12 - VD #5. Awake, writing notes. Suboptimal SBT parameters for extubation. Continue to diurese.  1/13- VD#6. Awake, writing notes. SAT/SBT. Parameters suboptimal.   1/14- VD#7. Spont x4hrs.  Tired, anxious. Will rest on ASV overnight with Dex. PT. Mobilize.  1/15- Extubated.  GOC conversation.  Discussed the likelihood of trach if needing to be reintubated occurs.  Patient is okay with tracheostomy.  DNR/I okay.     No longer requires ICU care.  Transfer to [facility].Discussed with Hospitalist team, who will assume care.  Critical care service is available for any questions or concerns.    Interval Problem Update  Reviewed last 24 hour events:  - Overnight Events:  -3L NC.   - Tm: AF  - Neuro: No acute neuro changes   - HR: 60-80   - SBP:    - RESP: 5L NC   - UOP: 2200 mL/24 hrs   - Mojica: y   - GI: NPO, Fees today  - Lines: CVC   - PPx: GI pepcid, DVT lovenox   - Mobility level 3    Infusions:   -dex: off  -levo: off    ABX:   -none           Review of Systems  ROS     Vital Signs for last 24 hours   Pulse:  [57-82] 65  Resp:  [12-53] 18  BP: ()/(45-69) 98/51  SpO2:  [95 %-100 %] 97 %    Hemodynamic parameters for last 24 hours       Respiratory Information for the last 24 hours  MAP: 10    Physical Exam   Physical Exam  Vitals and nursing note reviewed.   Constitutional:       General: She is sleeping. She is not in acute distress.     Appearance: Normal appearance. She is ill-appearing. She is not toxic-appearing.      Interventions: She is sedated and intubated. Nasal cannula in place.   HENT:      Head: Normocephalic.      Nose: Nose normal.      Mouth/Throat:      Lips: Pink.      Mouth: Mucous membranes are moist.   Eyes:      Pupils: Pupils are equal, round, and reactive to light.   Cardiovascular:       Rate and Rhythm: Normal rate and regular rhythm.      Pulses: Normal pulses.      Heart sounds: Normal heart sounds.   Pulmonary:      Effort: Pulmonary effort is normal. She is intubated.      Breath sounds: Examination of the right-middle field reveals rhonchi. Examination of the right-lower field reveals rhonchi. Rhonchi present. No wheezing.   Abdominal:      General: Bowel sounds are decreased.      Palpations: Abdomen is soft.   Musculoskeletal:      Right lower leg: No edema.      Left lower leg: No edema.   Skin:     General: Skin is warm and dry.      Capillary Refill: Capillary refill takes less than 2 seconds.   Neurological:      GCS: GCS eye subscore is 4. GCS verbal subscore is 5. GCS motor subscore is 6.      Cranial Nerves: No cranial nerve deficit.         Medications  Current Facility-Administered Medications   Medication Dose Route Frequency Provider Last Rate Last Admin    potassium chloride (KCL) ivpb 10 mEq  10 mEq Intravenous Once Jimena Cruz M.D. 100 mL/hr at 01/16/25 0615 10 mEq at 01/16/25 0615    scopolamine (Transderm-Scop) patch 1 Patch  1 Patch Transdermal Q72HRS Lidia Figueroa M.D.   1 Patch at 01/14/25 0141    dexmedetomidine (Precedex) 400 mcg/100mL infusion  0.1-1 mcg/kg/hr (Ideal) Intravenous Continuous Andrew Werner, A.P.R.N.   Paused at 01/15/25 0535    melatonin tablet 5 mg  5 mg Enteral Tube Nightly Jordan Shi M.D.   5 mg at 01/15/25 2027    metoclopramide (Reglan) tablet 10 mg  10 mg Enteral Tube Q6HRS PRN Jessica Campos, A.P.R.N.   10 mg at 01/14/25 1741    acetaminophen (Tylenol) tablet 650 mg  650 mg Enteral Tube Q6HRS PRN Jessica Campos, A.P.R.N.   650 mg at 01/15/25 0730    K+ Scale: Goal of 5  1 Each Intravenous Q6HRS Jessica Campos, A.P.R.N.   1 Each at 01/16/25 0600    potassium bicarbonate (Klyte) effervescent tablet 50 mEq  50 mEq Enteral Tube BID Jessica Campos, A.P.R.N.   50 mEq at 01/15/25 0509    furosemide (Lasix) injection 80 mg  80 mg Intravenous Q  DAY Jessica Campos A.P.R.N.   80 mg at 01/16/25 0502    metOLazone (Zaroxolyn) tablet 5 mg  5 mg Enteral Tube Q DAY Jessica Campos A.P.R.N.   5 mg at 01/15/25 0509    Pharmacy Consult: Enteral tube insertion - review meds/change route/product selection  1 Each Other PHARMACY TO DOSE BHARGAVI Kwon.P.R.NMimi        norepinephrine (Levophed) 8 mg in 250 mL NS infusion (premix)  0-1 mcg/kg/min (Ideal) Intravenous Continuous Juan Daniel Baca M.D.   Paused at 01/15/25 1145    Respiratory Therapy Consult   Nebulization Continuous RT Juan Daniel Baca M.D.        famotidine (Pepcid) tablet 20 mg  20 mg Enteral Tube Q12HRS Juan Daniel Baca M.D.   20 mg at 01/15/25 0509    Or    famotidine (Pepcid) injection 20 mg  20 mg Intravenous Q12HRS Juan Daniel Baca M.D.   20 mg at 01/16/25 0503    senna-docusate (Pericolace Or Senokot S) 8.6-50 MG per tablet 2 Tablet  2 Tablet Enteral Tube BID Juan Daniel Baca M.D.   2 Tablet at 01/14/25 1801    And    polyethylene glycol/lytes (Miralax) Packet 1 Packet  1 Packet Enteral Tube QDAY PRN Juan Daniel Baca M.D.   1 Packet at 01/09/25 0505    And    magnesium hydroxide (Milk Of Magnesia) suspension 30 mL  30 mL Enteral Tube QDAY PRN Juan Daniel Baca M.D.   30 mL at 01/09/25 1819    And    bisacodyl (Dulcolax) suppository 10 mg  10 mg Rectal QDAY PRN Juan Daniel Baca M.D. MD Alert...ICU Electrolyte Replacement per Pharmacy   Other PHARMACY TO DOSE Juan Daniel Baca M.D.        lidocaine (Xylocaine) 1 % injection 2 mL  2 mL Tracheal Tube Q30 MIN PRN Juan Daniel Baca M.D.   2 mL at 01/14/25 1610    HYDROcodone/acetaminophen (Norco)  MG per tablet 1 Tablet  1 Tablet Enteral Tube Q4HRS Juan Daniel Baca M.D.   1 Tablet at 01/16/25 0503    magnesium oxide tablet 400 mg  400 mg Enteral Tube DAILY Juan Daniel Baca M.D.   400 mg at 01/15/25 0509    ROPINIRole (Requip) tablet 1 mg  1 mg Enteral Tube QHS Juan Daniel Baca M.D.   1 mg at 01/15/25 2027    calcium  carbonate (Tums) chewable tab 500 mg  500 mg Enteral Tube TID PRN Juan Daniel Baca M.D.   500 mg at 01/16/25 0017    methocarbamol (Robaxin) tablet 750 mg  750 mg Enteral Tube Q8HRS PRN Juan Daniel Baca M.D.   750 mg at 01/14/25 2126    ondansetron (Zofran ODT) dispertab 4 mg  4 mg Enteral Tube Q4HRS PRN Juan Daniel Baca M.D.        ROPINIRole (Requip) tablet 1 mg  1 mg Enteral Tube BID PRN Juan Daniel Baca M.D.   1 mg at 01/15/25 1720    senna-docusate (Pericolace Or Senokot S) 8.6-50 MG per tablet 1 Tablet  1 Tablet Enteral Tube Q24HRS PRN Juan Daniel Baca M.D.        insulin lispro (HumaLOG,AdmeLOG) subcutaneous injection  2-9 Units Subcutaneous Q6HRS Aidee Hicks   2 Units at 01/15/25 1229    And    dextrose 50% (D50W) injection 25 g  25 g Intravenous Q15 MIN PRN Aidee Hicks        labetalol (Normodyne/Trandate) injection 10 mg  10 mg Intravenous Q4HRS PRN Lidia Figueroa M.D.   10 mg at 01/07/25 0611    ipratropium-albuterol (DUONEB) nebulizer solution  3 mL Nebulization Q2HRS PRN (RT) Americo Avalos M.D.   3 mL at 01/11/25 2212    enoxaparin (Lovenox) inj 40 mg  40 mg Subcutaneous DAILY AT 1800 Jessica Campos, A.P.R.N.   40 mg at 01/15/25 1741    bisacodyl (Dulcolax) suppository 10 mg  10 mg Rectal DAILY Neo Ceja, P.A.   10 mg at 01/13/25 0511    sodium chloride (Ocean) 0.65 % nasal spray 2 Spray  2 Spray Nasal Q2HRS PRN Omar Saleh M.D.   2 Spray at 01/01/25 0600    MD ALERT...DO NOT ADMINISTER NSAIDS or ASPIRIN unless ORDERED By Neurosurgery 1 Each  1 Each Other PRN Karthik Lowery D.O.        ondansetron (Zofran) syringe/vial injection 4 mg  4 mg Intravenous Q4HRS PRN Abdulaziz Nieto M.D.   4 mg at 01/16/25 0441       Fluids    Intake/Output Summary (Last 24 hours) at 1/16/2025 0648  Last data filed at 1/16/2025 0600  Gross per 24 hour   Intake 1685.83 ml   Output 2390 ml   Net -704.17 ml       Laboratory  Recent Labs     01/13/25  1133 01/14/25  1148   ISTATAPH 7.375  7.414   ISTATAPCO2 72.0* 65.9*   ISTATAPO2 85 73*   ISTATATCO2 44* 44*   IOSUHNF2ALZ 95 94   ISTATARTHCO3 42.1* 42.2*   ISTATARTBE 14* 15*   ISTATTEMP 37.8 C 37.7 C   ISTATFIO2 50 40   ISTATSPEC Arterial Arterial   ISTATAPHTC 7.363 7.404   UGSYVLXO8XF 90* 76         Recent Labs     01/14/25  0359 01/14/25  1200 01/15/25  0455 01/15/25  1220 01/15/25  1740 01/16/25  0031 01/16/25  0517   SODIUM 138  --  135  --   --   --  136   POTASSIUM 3.5*   < > 3.6   < > 4.4 4.7 4.5   CHLORIDE 95*  --  93*  --   --   --  94*   CO2 33  --  33  --   --   --  35*   BUN 15  --  26*  --   --   --  27*   CREATININE 0.44*  --  0.55  --   --   --  0.56   CALCIUM 8.8  --  8.9  --   --   --  9.1    < > = values in this interval not displayed.     Recent Labs     01/13/25  1420 01/14/25  0359 01/15/25  0455 01/16/25  0517   PREALBUMIN 11.1*  --   --   --    GLUCOSE  --  147* 222* 155*     Recent Labs     01/14/25  0359 01/15/25  0455 01/16/25  0517   WBC 16.4* 16.8* 13.9*   NEUTSPOLYS 83.60* 81.90* 77.70*   LYMPHOCYTES 5.10* 6.00* 8.90*   MONOCYTES 9.10 8.90 10.60   EOSINOPHILS 0.50 0.80 0.90   BASOPHILS 0.40 0.50 0.70     Recent Labs     01/14/25  0359 01/15/25  0455 01/16/25  0517   RBC 3.29* 3.46* 3.33*   HEMOGLOBIN 9.3* 9.9* 9.3*   HEMATOCRIT 30.0* 30.9* 30.7*   PLATELETCT 547* 695* 563*       Imaging  X-Ray:  I have personally reviewed the images and compared with prior images.  CT:    Reviewed  MRI:   Reviewed    Assessment/Plan  * Acute on chronic respiratory failure with hypoxia and hypercapnia (HCC)  Assessment & Plan  intubated 1/4  extubated 1/5  re-intubated 1/8  Ventilator dependent respiratory failure  Modify ventilator to optimize oxygenation, acid-base balance and ventilation  CXR as indicated: monitor lung volumes and tube/line placement  HOB > 30  Titrate FiO2 to keep sats greater than 92%  Chlorhexidine  goal CO2 35-40  Daily awakening and SBT trials unless contraindicated  ABCDEF bundle  I am actively adjusting  "ventilator based on clinical indicators and ABG's  Optimize mobility, up in cardiac chair, edge of bed, PT etc.    Hypokalemia  Assessment & Plan  K scale    Restless leg syndrome- (present on admission)  Assessment & Plan  Requip     Pathological fracture of vertebra- (present on admission)  Assessment & Plan  Pathologic fracture of T5 with myelopathy on presentation  S/P T4-6 laminectomy with fusion and spinal decompression on 1/1/2025 by Dr. Lowery  multi-modal pain management  Fall Precautions  PT OT    Cancer related pain- (present on admission)  Assessment & Plan  Palliative Care following for pain management  Multimodal pain management    Bilateral pleural effusion- (present on admission)  Assessment & Plan  Malignant pleural effusions from metastatic breast cancer s/p thoracenteses and chest tubes  Diuresis as tolerated  monitor chest xrays  PleurX placement in IR 1/11    Malignant neoplasm of breast (HCC)- (present on admission)  Assessment & Plan  \"Bilateral breast cancer with extensive destruction of the left breast and ulceration and skin lesions making her inoperable on the left.  Right breast shows a 3 x 3 cm mass.  PET scan March 2023 showed non-hypermetabolic pleural effusions which are cytology positive and she has demonstrable pleural masses. Started aromatase inhibition early September 2023.  Has not yet started CDK 4 6 inhibitor.  Lost to follow-up for over 6 months although apparently was on letrozole as single agent.  Now with clear progression of disease in the left breast.  August 2024: Progression of disease in liver with extensive disease.  Breast is worse.  Lung is better.  Lymph nodes are worse.  Status post radiation to symptomatic oozing and exophytic left breast mass.  Marked worsening of her cancer on letrozole with progression in the bone and liver.\"--Dr. Benitez    ongoing goals of care discussions. Patient is fighting to recover and discharge home so she can start the " Letrozole.  DNR, I OK   Palliative following for pain management as well as further discussions.      Primary hypertension- (present on admission)  Assessment & Plan  holding PO antihypertensives while on pressors  PRN's available    Diabetes mellitus, type 2 (HCC)- (present on admission)  Assessment & Plan  A1c 6.4  accuchecks with SSI  CHO diet  glucose goal 140-180         VTE:  Lovenox  Ulcer: H2 Antagonist  Lines: Central Line  Ongoing indication addressed and Mojica Catheter  Ongoing indication addressed    I have performed a physical exam and reviewed and updated ROS and Plan today (1/16/2025). In review of yesterday's note (1/15/2025), there are no changes except as documented above.     Discussed patient condition and risk of morbidity and/or mortality with Hospitalist, Family, RN, RT, Therapies, Pharmacy, , Charge nurse / hot rounds, Patient, and   my attending Dr Cruz.     Please note that this dictation was created using voice recognition software. The accuracy of the dictation is limited to the abilities of the software. I have made every reasonable attempt to correct obvious errors, but I expect that there are errors of grammar and possibly content that I did not discover before finalizing the note.     Andrew Werner, APRN

## 2025-01-17 ENCOUNTER — APPOINTMENT (OUTPATIENT)
Dept: RADIOLOGY | Facility: MEDICAL CENTER | Age: 65
End: 2025-01-17
Attending: INTERNAL MEDICINE
Payer: MEDICARE

## 2025-01-17 LAB
ANION GAP SERPL CALC-SCNC: 7 MMOL/L (ref 7–16)
BASOPHILS # BLD AUTO: 0.9 % (ref 0–1.8)
BASOPHILS # BLD: 0.09 K/UL (ref 0–0.12)
BUN SERPL-MCNC: 23 MG/DL (ref 8–22)
CALCIUM SERPL-MCNC: 9.3 MG/DL (ref 8.5–10.5)
CHLORIDE SERPL-SCNC: 90 MMOL/L (ref 96–112)
CO2 SERPL-SCNC: 36 MMOL/L (ref 20–33)
CREAT SERPL-MCNC: 0.6 MG/DL (ref 0.5–1.4)
EOSINOPHIL # BLD AUTO: 0.1 K/UL (ref 0–0.51)
EOSINOPHIL NFR BLD: 0.9 % (ref 0–6.9)
ERYTHROCYTE [DISTWIDTH] IN BLOOD BY AUTOMATED COUNT: 53.6 FL (ref 35.9–50)
GFR SERPLBLD CREATININE-BSD FMLA CKD-EPI: 99 ML/MIN/1.73 M 2
GLUCOSE BLD STRIP.AUTO-MCNC: 107 MG/DL (ref 65–99)
GLUCOSE BLD STRIP.AUTO-MCNC: 123 MG/DL (ref 65–99)
GLUCOSE BLD STRIP.AUTO-MCNC: 137 MG/DL (ref 65–99)
GLUCOSE SERPL-MCNC: 107 MG/DL (ref 65–99)
HCT VFR BLD AUTO: 30.2 % (ref 37–47)
HGB BLD-MCNC: 9.4 G/DL (ref 12–16)
IMM GRANULOCYTES # BLD AUTO: 0.13 K/UL (ref 0–0.11)
IMM GRANULOCYTES NFR BLD AUTO: 1.2 % (ref 0–0.9)
LYMPHOCYTES # BLD AUTO: 0.82 K/UL (ref 1–4.8)
LYMPHOCYTES NFR BLD: 7.8 % (ref 22–41)
MCH RBC QN AUTO: 28.6 PG (ref 27–33)
MCHC RBC AUTO-ENTMCNC: 31.1 G/DL (ref 32.2–35.5)
MCV RBC AUTO: 91.8 FL (ref 81.4–97.8)
MONOCYTES # BLD AUTO: 1.04 K/UL (ref 0–0.85)
MONOCYTES NFR BLD AUTO: 9.9 % (ref 0–13.4)
NEUTROPHILS # BLD AUTO: 8.35 K/UL (ref 1.82–7.42)
NEUTROPHILS NFR BLD: 79.3 % (ref 44–72)
NRBC # BLD AUTO: 0 K/UL
NRBC BLD-RTO: 0 /100 WBC (ref 0–0.2)
PLATELET # BLD AUTO: 537 K/UL (ref 164–446)
PMV BLD AUTO: 8.8 FL (ref 9–12.9)
POTASSIUM SERPL-SCNC: 4.2 MMOL/L (ref 3.6–5.5)
POTASSIUM SERPL-SCNC: 4.2 MMOL/L (ref 3.6–5.5)
RBC # BLD AUTO: 3.29 M/UL (ref 4.2–5.4)
SODIUM SERPL-SCNC: 133 MMOL/L (ref 135–145)
WBC # BLD AUTO: 10.5 K/UL (ref 4.8–10.8)

## 2025-01-17 PROCEDURE — 700102 HCHG RX REV CODE 250 W/ 637 OVERRIDE(OP): Performed by: EMERGENCY MEDICINE

## 2025-01-17 PROCEDURE — 700101 HCHG RX REV CODE 250: Performed by: INTERNAL MEDICINE

## 2025-01-17 PROCEDURE — A9270 NON-COVERED ITEM OR SERVICE: HCPCS | Performed by: INTERNAL MEDICINE

## 2025-01-17 PROCEDURE — A9270 NON-COVERED ITEM OR SERVICE: HCPCS | Performed by: NURSE PRACTITIONER

## 2025-01-17 PROCEDURE — 85025 COMPLETE CBC W/AUTO DIFF WBC: CPT

## 2025-01-17 PROCEDURE — 82962 GLUCOSE BLOOD TEST: CPT | Mod: 91

## 2025-01-17 PROCEDURE — A9270 NON-COVERED ITEM OR SERVICE: HCPCS | Performed by: EMERGENCY MEDICINE

## 2025-01-17 PROCEDURE — 700111 HCHG RX REV CODE 636 W/ 250 OVERRIDE (IP): Performed by: HOSPITALIST

## 2025-01-17 PROCEDURE — 700111 HCHG RX REV CODE 636 W/ 250 OVERRIDE (IP): Performed by: NURSE PRACTITIONER

## 2025-01-17 PROCEDURE — A9270 NON-COVERED ITEM OR SERVICE: HCPCS | Performed by: HOSPITALIST

## 2025-01-17 PROCEDURE — 71045 X-RAY EXAM CHEST 1 VIEW: CPT

## 2025-01-17 PROCEDURE — 99233 SBSQ HOSP IP/OBS HIGH 50: CPT | Performed by: HOSPITALIST

## 2025-01-17 PROCEDURE — 700102 HCHG RX REV CODE 250 W/ 637 OVERRIDE(OP): Performed by: NURSE PRACTITIONER

## 2025-01-17 PROCEDURE — 700102 HCHG RX REV CODE 250 W/ 637 OVERRIDE(OP): Performed by: HOSPITALIST

## 2025-01-17 PROCEDURE — 700102 HCHG RX REV CODE 250 W/ 637 OVERRIDE(OP): Performed by: INTERNAL MEDICINE

## 2025-01-17 PROCEDURE — 80048 BASIC METABOLIC PNL TOTAL CA: CPT

## 2025-01-17 PROCEDURE — 94640 AIRWAY INHALATION TREATMENT: CPT

## 2025-01-17 PROCEDURE — 700111 HCHG RX REV CODE 636 W/ 250 OVERRIDE (IP): Mod: JZ | Performed by: STUDENT IN AN ORGANIZED HEALTH CARE EDUCATION/TRAINING PROGRAM

## 2025-01-17 PROCEDURE — 99222 1ST HOSP IP/OBS MODERATE 55: CPT | Performed by: INTERNAL MEDICINE

## 2025-01-17 PROCEDURE — 770000 HCHG ROOM/CARE - INTERMEDIATE ICU *

## 2025-01-17 PROCEDURE — 92526 ORAL FUNCTION THERAPY: CPT

## 2025-01-17 RX ORDER — METHOCARBAMOL 750 MG/1
750 TABLET, FILM COATED ORAL EVERY 8 HOURS PRN
Status: DISCONTINUED | OUTPATIENT
Start: 2025-01-17 | End: 2025-01-26

## 2025-01-17 RX ORDER — POTASSIUM CHLORIDE 7.45 MG/ML
10 INJECTION INTRAVENOUS ONCE
Status: COMPLETED | OUTPATIENT
Start: 2025-01-17 | End: 2025-01-17

## 2025-01-17 RX ORDER — POLYETHYLENE GLYCOL 3350 17 G/17G
1 POWDER, FOR SOLUTION ORAL
Status: DISCONTINUED | OUTPATIENT
Start: 2025-01-17 | End: 2025-01-26

## 2025-01-17 RX ORDER — METOLAZONE 5 MG/1
5 TABLET ORAL
Status: DISCONTINUED | OUTPATIENT
Start: 2025-01-18 | End: 2025-01-19

## 2025-01-17 RX ORDER — PROCHLORPERAZINE EDISYLATE 5 MG/ML
10 INJECTION INTRAMUSCULAR; INTRAVENOUS EVERY 6 HOURS PRN
Status: DISCONTINUED | OUTPATIENT
Start: 2025-01-17 | End: 2025-01-26

## 2025-01-17 RX ORDER — HYDROCODONE BITARTRATE AND ACETAMINOPHEN 10; 325 MG/1; MG/1
1 TABLET ORAL EVERY 4 HOURS
Status: DISCONTINUED | OUTPATIENT
Start: 2025-01-17 | End: 2025-01-19

## 2025-01-17 RX ORDER — POTASSIUM CHLORIDE 14.9 MG/ML
20 INJECTION INTRAVENOUS ONCE
Status: COMPLETED | OUTPATIENT
Start: 2025-01-17 | End: 2025-01-17

## 2025-01-17 RX ORDER — INSULIN LISPRO 100 [IU]/ML
2-9 INJECTION, SOLUTION INTRAVENOUS; SUBCUTANEOUS
Status: DISCONTINUED | OUTPATIENT
Start: 2025-01-17 | End: 2025-01-24

## 2025-01-17 RX ORDER — ONDANSETRON 4 MG/1
4 TABLET, ORALLY DISINTEGRATING ORAL EVERY 4 HOURS PRN
Status: DISCONTINUED | OUTPATIENT
Start: 2025-01-17 | End: 2025-01-26

## 2025-01-17 RX ORDER — POTASSIUM CHLORIDE 7.45 MG/ML
10 INJECTION INTRAVENOUS ONCE
Status: DISCONTINUED | OUTPATIENT
Start: 2025-01-17 | End: 2025-01-17

## 2025-01-17 RX ORDER — AMOXICILLIN 250 MG
2 CAPSULE ORAL 2 TIMES DAILY
Status: DISCONTINUED | OUTPATIENT
Start: 2025-01-17 | End: 2025-01-26

## 2025-01-17 RX ORDER — ROPINIROLE 0.5 MG/1
1 TABLET, FILM COATED ORAL 2 TIMES DAILY PRN
Status: DISCONTINUED | OUTPATIENT
Start: 2025-01-17 | End: 2025-01-26

## 2025-01-17 RX ORDER — LORAZEPAM 2 MG/ML
0.25 INJECTION INTRAMUSCULAR EVERY 4 HOURS PRN
Status: DISCONTINUED | OUTPATIENT
Start: 2025-01-17 | End: 2025-01-26

## 2025-01-17 RX ORDER — DEXTROSE MONOHYDRATE 25 G/50ML
25 INJECTION, SOLUTION INTRAVENOUS
Status: DISCONTINUED | OUTPATIENT
Start: 2025-01-17 | End: 2025-01-24

## 2025-01-17 RX ORDER — HYDROCODONE BITARTRATE AND ACETAMINOPHEN 10; 325 MG/1; MG/1
1 TABLET ORAL EVERY 4 HOURS
Status: DISCONTINUED | OUTPATIENT
Start: 2025-01-17 | End: 2025-01-17

## 2025-01-17 RX ORDER — METOCLOPRAMIDE 10 MG/1
10 TABLET ORAL EVERY 6 HOURS PRN
Status: DISCONTINUED | OUTPATIENT
Start: 2025-01-17 | End: 2025-01-26

## 2025-01-17 RX ORDER — BISACODYL 10 MG
10 SUPPOSITORY, RECTAL RECTAL
Status: DISCONTINUED | OUTPATIENT
Start: 2025-01-17 | End: 2025-01-26

## 2025-01-17 RX ORDER — ACETAMINOPHEN 325 MG/1
650 TABLET ORAL EVERY 6 HOURS PRN
Status: DISCONTINUED | OUTPATIENT
Start: 2025-01-17 | End: 2025-01-26

## 2025-01-17 RX ORDER — METOCLOPRAMIDE HYDROCHLORIDE 5 MG/ML
10 INJECTION INTRAMUSCULAR; INTRAVENOUS EVERY 6 HOURS
Status: COMPLETED | OUTPATIENT
Start: 2025-01-17 | End: 2025-01-18

## 2025-01-17 RX ORDER — ROPINIROLE 0.5 MG/1
1 TABLET, FILM COATED ORAL
Status: DISCONTINUED | OUTPATIENT
Start: 2025-01-17 | End: 2025-01-26

## 2025-01-17 RX ORDER — POTASSIUM CHLORIDE 1500 MG/1
40 TABLET, EXTENDED RELEASE ORAL 2 TIMES DAILY
Status: DISCONTINUED | OUTPATIENT
Start: 2025-01-17 | End: 2025-01-20

## 2025-01-17 RX ORDER — CALCIUM CARBONATE 500 MG/1
500 TABLET, CHEWABLE ORAL 3 TIMES DAILY PRN
Status: DISCONTINUED | OUTPATIENT
Start: 2025-01-17 | End: 2025-01-26

## 2025-01-17 RX ADMIN — ONDANSETRON 4 MG: 4 TABLET, ORALLY DISINTEGRATING ORAL at 20:02

## 2025-01-17 RX ADMIN — HYDROCODONE BITARTRATE AND ACETAMINOPHEN 1 TABLET: 10; 325 TABLET ORAL at 05:07

## 2025-01-17 RX ADMIN — SCOPOLAMINE 1 PATCH: 1.5 PATCH, EXTENDED RELEASE TRANSDERMAL at 02:16

## 2025-01-17 RX ADMIN — METOLAZONE 5 MG: 5 TABLET ORAL at 05:07

## 2025-01-17 RX ADMIN — ROPINIROLE HYDROCHLORIDE 1 MG: 0.5 TABLET, FILM COATED ORAL at 21:35

## 2025-01-17 RX ADMIN — ROPINIROLE HYDROCHLORIDE 1 MG: 0.5 TABLET, FILM COATED ORAL at 17:58

## 2025-01-17 RX ADMIN — SENNOSIDES AND DOCUSATE SODIUM 2 TABLET: 50; 8.6 TABLET ORAL at 05:07

## 2025-01-17 RX ADMIN — HYDROCODONE BITARTRATE AND ACETAMINOPHEN 1 TABLET: 10; 325 TABLET ORAL at 01:30

## 2025-01-17 RX ADMIN — HYDROCODONE BITARTRATE AND ACETAMINOPHEN 1 TABLET: 10; 325 TABLET ORAL at 14:15

## 2025-01-17 RX ADMIN — IPRATROPIUM BROMIDE AND ALBUTEROL SULFATE 3 ML: .5; 2.5 SOLUTION RESPIRATORY (INHALATION) at 15:44

## 2025-01-17 RX ADMIN — FAMOTIDINE 20 MG: 20 TABLET, FILM COATED ORAL at 05:07

## 2025-01-17 RX ADMIN — POTASSIUM CHLORIDE 10 MEQ: 7.46 INJECTION, SOLUTION INTRAVENOUS at 00:55

## 2025-01-17 RX ADMIN — HYDROCODONE BITARTRATE AND ACETAMINOPHEN 1 TABLET: 10; 325 TABLET ORAL at 19:56

## 2025-01-17 RX ADMIN — METOCLOPRAMIDE HYDROCHLORIDE 10 MG: 5 INJECTION INTRAMUSCULAR; INTRAVENOUS at 21:51

## 2025-01-17 RX ADMIN — POTASSIUM CHLORIDE 40 MEQ: 1500 TABLET, EXTENDED RELEASE ORAL at 17:51

## 2025-01-17 RX ADMIN — FUROSEMIDE 80 MG: 10 INJECTION, SOLUTION INTRAMUSCULAR; INTRAVENOUS at 05:07

## 2025-01-17 RX ADMIN — ANTACID TABLETS 500 MG: 500 TABLET, CHEWABLE ORAL at 05:09

## 2025-01-17 RX ADMIN — RIVAROXABAN 10 MG: 10 TABLET, FILM COATED ORAL at 17:51

## 2025-01-17 RX ADMIN — HYDROCODONE BITARTRATE AND ACETAMINOPHEN 1 TABLET: 10; 325 TABLET ORAL at 09:54

## 2025-01-17 RX ADMIN — Medication 400 MG: at 05:07

## 2025-01-17 RX ADMIN — POTASSIUM BICARBONATE 50 MEQ: 978 TABLET, EFFERVESCENT ORAL at 05:07

## 2025-01-17 RX ADMIN — Medication 5 MG: at 21:35

## 2025-01-17 RX ADMIN — POTASSIUM CHLORIDE 20 MEQ: 14.9 INJECTION, SOLUTION INTRAVENOUS at 07:21

## 2025-01-17 ASSESSMENT — ENCOUNTER SYMPTOMS
NAUSEA: 0
CHILLS: 0
BACK PAIN: 1
WEAKNESS: 1
NERVOUS/ANXIOUS: 1
EYES NEGATIVE: 1
FEVER: 0
DEPRESSION: 0
BRUISES/BLEEDS EASILY: 0
VOMITING: 0
FOCAL WEAKNESS: 0
DIZZINESS: 0
CARDIOVASCULAR NEGATIVE: 1
SHORTNESS OF BREATH: 1
HEARTBURN: 0
NECK PAIN: 0
POLYDIPSIA: 0
MYALGIAS: 1
HEADACHES: 0
PALPITATIONS: 0
GASTROINTESTINAL NEGATIVE: 1
COUGH: 0

## 2025-01-17 ASSESSMENT — PAIN DESCRIPTION - PAIN TYPE
TYPE: ACUTE PAIN
TYPE: ACUTE PAIN
TYPE: ACUTE PAIN;CHRONIC PAIN
TYPE: ACUTE PAIN
TYPE: ACUTE PAIN;CHRONIC PAIN
TYPE: ACUTE PAIN

## 2025-01-17 ASSESSMENT — FIBROSIS 4 INDEX: FIB4 SCORE: 3.01

## 2025-01-17 ASSESSMENT — COPD QUESTIONNAIRES
HAVE YOU SMOKED AT LEAST 100 CIGARETTES IN YOUR ENTIRE LIFE: NO/DON'T KNOW
COPD SCREENING SCORE: 4
DO YOU EVER COUGH UP ANY MUCUS OR PHLEGM?: NO/ONLY WITH OCCASIONAL COLDS OR INFECTIONS
DURING THE PAST 4 WEEKS HOW MUCH DID YOU FEEL SHORT OF BREATH: SOME OF THE TIME

## 2025-01-17 NOTE — DISCHARGE PLANNING
Case Management Discharge Planning    Admission Date: 12/25/2024  GMLOS: 10.1  ALOS: 23    6-Clicks ADL Score: 13  6-Clicks Mobility Score: 9      Anticipated Discharge Dispo: Discharge Disposition: Disch to a long term care facility (63)    This RN CM consulted on case and will assist with discharge planning. Chart review completed. Patient is pending conversation with Oncologist about progression of disease and goals of care. Palliative team has been following and assisting with POLST code status. Family is wanting patient to progress with mobility before returning home.     Patient has been accepted to [facility] and is pending with [facility] until palliative consult is completed for goals of care and POLST.     Plan to follow up with family on discharge planning once goals of care discussion is completed.

## 2025-01-17 NOTE — THERAPY
"Speech Language Pathology   Daily Treatment     Patient Name: Asha Hannon  AGE:  64 y.o., SEX:  female  Medical Record #: [id number]  Date of Service: 1/17/2025    Precautions:  Precautions: Fall Risk, Spinal / Back Precautions , Swallow Precautions, Chest Tube     Subjective  Pt seen A&Ox3 saturating on 5L NC. She was restless and complained of inability to urinate and diffuse body pain. RN aware.     Assessment  Pt completed 5x effortful swallows w/ a cracker bolus w/ mod cueing; attempted to complete further repetitions but pt distracted by pain and reporting \"I don't want any more\". Also attempted to utilize a peak cough flow meter to measure cough strength via L/m. Across 4 trials, despite tactile and verbal cueing, pt unable to generate sufficient flow and labial seal to generate a cough flow meter reading. Meter left at bedside for future attempts.     Clinical Impressions  Continued signs of oropharyngeal dysphagia consistent w/ FEES completed 1/16 indicating,\"...mild-moderate oropharyngeal dysphagia, likely acute related to deconditioning and acuity of illness, as well as laryngeal insufficiency w/ dysphonia and dystussia after a prolonged intubation:. Diet modification and swallow strategies continue to be indicated. Pt's participation in tx was limited today d/t pain and urinary urgency but she appears engaged in ST POC - continue tx activities as able.     Recommendations  Treatment Completed: Dysphagia Treatment  Consult Referral(s): Dietician    Dysphagia Treatment  Diet Consistency: Mildly Thick Liquids, Soft & Bite-Sized Solids  Instrumentation: Instrumental swallow study pending clinical progress  Medication: Whole with puree  Supervision: Assist with meal tray set up, Close supervision - patient may be left alone for less than 5 minutes at a time  Positioning: Fully upright and midline during oral intake, Meals sitting upright in a chair, as tolerated  Risk Management : Small " "bites/sips, Slow rate of intake, Physical mobility, as tolerated  Oral Care: BID    SLP Treatment Plan  Treatment Plan: Dysphagia Treatment  SLP Frequency: 4x Per Week  Estimated Duration: Until Therapy Goals Met    Anticipated Discharge Needs  Discharge Recommendations: Recommend post-acute placement for additional speech therapy services prior to discharge home  Therapy Recommendations Upon DC: Dysphagia Training    Patient / Family Goals  Patient / Family Goal #1: \"I drink [place]\"  Goal #1 Outcome: Progressing as expected  Short Term Goals  Short Term Goal # 1: Pt will consume RG/TN diet with no overt signs concerning for airway invasion or worsening of respiratory status.  Goal Outcome # 1: Goal met  Short Term Goal # 2: Pt will complete FEES w SLP to further evaluate swallow function and inform POC.  Goal Outcome # 2 : Goal met  Short Term Goal # 3: Pt will tolerate a diet of mildly thick liquids and SB6 w/o signs of aspiration related pulmonary complications  Goal Outcome  # 3: Progressing as expected  Short Term Goal # 4: Pt will complete exercises targeting pharyngeal constriction and cough strength w/ min cueing  Goal Outcome  # 4: Progressing as expected      Padma Darby, SLP  "

## 2025-01-17 NOTE — ASSESSMENT & PLAN NOTE
"Diagnosed 2019, with significant metastatic disease   \"Bilateral breast cancer with extensive destruction of the left breast and ulceration and skin lesions making her inoperable on the left.  Right breast shows a 3 x 3 cm mass.  PET scan March 2023 showed non-hypermetabolic pleural effusions which are cytology positive and she has demonstrable pleural masses. Started aromatase inhibition early September 2023.  Has not yet started CDK 4 6 inhibitor.  Lost to follow-up for over 6 months although apparently was on letrozole as single agent.  Now with clear progression of disease in the left breast.  August 2024: Progression of disease in liver with extensive disease.  Breast is worse.  Lung is better.  Lymph nodes are worse.  Status post radiation to symptomatic oozing and exophytic left breast mass.  Marked worsening of her cancer on letrozole with progression in the bone and liver.\"--Dr. Benitez  Patient is now comfort care with highflow  Continue palliative care and hospice discussions  "

## 2025-01-17 NOTE — CONSULTS
Hospital Medicine Consultation    Date of Service  1/16/2025    Referring Physician  Jimena Cruz M.D.    Consulting Physician  Keny Cartagena M.D.    Reason for Consultation  Hospital medicine consultation requested the patient admitted with weakness, shortness of breath, back pain with a past med history of stage IV breast cancer with metastasis.    History of Presenting Illness  64 y.o. female who presented 12/25/2024 with history of stage IV breast cancer metastatic disease, mets to the lungs and now spine, on previous oral chemotherapy, admitted on 12/25/2024 with weakness, shortness of breath and back pain, subsequently a CT chest abdomen showed moderate-sized pleural effusions, worsening hepatic metastatic disease with new abdominal lymphadenopathy, thoracic adenopathy, diffuse osseous metastatic disease with a T5 pathologic fracture, the patient was admitted, undergo a right-sided thoracentesis on 12/27, malignant cells identified in the fluid, and MRI showed multiple thoracic and lumbar metastasized lesions, the patient was taken to the operating room on 1 1 where she had a T4 T6 laminectomy, following the patient developed a right-sided pneumothorax, requiring chest tube placement, left-sided thoracentesis was performed, the patient following his respiratory difficulty requiring BiPAP treatment and subsequently required to be intubated, the patient had a right-sided PleurX and small bore catheter placed and had frequent thoracentesis since placement, the patient remained intubated until 1/15, where the patient then was extubated and had a extensive talk with the ICU team in terms of her overall status, metastatic malignancy and respiratory failure, the patient stated that she wanted to keep fighting and is wishing to hopefully reach outpatient status where she can fail to be treated for her malignancy.  If she would have additional respiratory failure she states that she would like to be  reintubated and possibly even undergo a tracheostomy to stay alive.  The patient tolerated extubation, she is alert and x 4, she is in a sinus rhythm in the 50s to 80s, blood pressure in the 90s to 100s, the patient was n.p.o., underwent evaluation today by speech therapy and is cleared for mildly thick liquids, soft and bite-size solids, the right chest tube had put out 100 cc overnight, the patient remains on DVT prophylaxis with Lovenox, Pepcid for GI prophylaxis,  Laboratory data showed white to count 13.9 hemoglobin 9.3 platelet count 563 sodium 136 potassium 4.5 chloride 94 bicarb 35 glucose 155 BUN 27 creatinine 0.56  Chest x-ray with pulmonary edema and infiltrates, stable, layering left pleural effusion, stable, right-sided small bore chest tube  The patient has been followed by palliative care, she currently confirms a DNR I okay status, the patient lives with her daughter    Review of Systems  Review of Systems   Constitutional:  Positive for malaise/fatigue. Negative for chills and fever.   HENT: Negative.     Eyes: Negative.    Respiratory:  Positive for cough and shortness of breath.    Cardiovascular:  Positive for chest pain and leg swelling. Negative for palpitations.   Gastrointestinal: Negative.  Negative for heartburn, nausea and vomiting.   Genitourinary: Negative.  Negative for dysuria and frequency.   Musculoskeletal:  Positive for back pain and neck pain.   Skin: Negative.  Negative for itching and rash.   Neurological:  Positive for weakness. Negative for dizziness, focal weakness and headaches.   Endo/Heme/Allergies: Negative.  Negative for polydipsia. Does not bruise/bleed easily.   Psychiatric/Behavioral:  Negative for depression. The patient is nervous/anxious.        Past Medical History   has a past medical history of Anxiety, Breast cancer metastasized to bone, unspecified laterality (HCC) (12/25/2024), Depression, Diabetes (HCC), HLD (hyperlipidemia), HTN (hypertension), and  "Hypothyroidism.    Surgical History   has a past surgical history that includes other; cholecystectomy; thoracic laminectomy (1/1/2025); and fusion, spine, thoracic approach (1/1/2025).    Family History  family history includes Cancer in her maternal grandfather; Lung Cancer in her mother; Stomach Cancer in her paternal grandmother; Uterine cancer in her sister.    Social History   reports that she has never smoked. She has never used smokeless tobacco. She reports current alcohol use. She reports current drug use. Drug: Marijuana.    Medications  Prior to Admission Medications   Prescriptions Last Dose Informant Patient Reported? Taking?   Abemaciclib 150 MG Tab 12/25/2024 Morning Family Member No Yes   Sig: Take 150 mg by mouth 2 times a day.   HYDROcodone-acetaminophen (NORCO) 5-325 MG Tab per tablet 12/25/2024 Morning Family Member Yes Yes   Sig: Take 1 Tablet by mouth every four hours as needed.   carvedilol (COREG) 3.125 MG Tab 12/25/2024 Morning Family Member Yes Yes   Sig: Take 3.125 mg by mouth 2 times a day.   letrozole (FEMARA) 2.5 MG Tab New Rx Family Member No Yes   Sig: Take 1 Tablet by mouth every day.   losartan (COZAAR) 100 MG Tab 12/25/2024 Morning Family Member No Yes   Sig: Take 1 tablet by mouth daily      Facility-Administered Medications: None       Allergies  Allergies   Allergen Reactions    Demerol Rash     \"was told this during surgery\"  Other reaction(s): rash       Physical Exam  Pulse:  [60-80] 68  Resp:  [9-32] 19  BP: ()/(45-69) 94/49  SpO2:  [91 %-100 %] 98 %    Physical Exam  Vitals and nursing note reviewed.   Constitutional:       Appearance: She is well-developed. She is obese. She is ill-appearing. She is not diaphoretic.   HENT:      Head: Normocephalic and atraumatic.      Nose: Nose normal.      Mouth/Throat:      Mouth: Mucous membranes are dry.   Eyes:      Conjunctiva/sclera: Conjunctivae normal.      Pupils: Pupils are equal, round, and reactive to light.   Neck: "      Thyroid: No thyromegaly.      Vascular: No JVD.   Cardiovascular:      Rate and Rhythm: Normal rate and regular rhythm.      Heart sounds: Normal heart sounds.      No friction rub. No gallop.   Pulmonary:      Effort: Pulmonary effort is normal.      Breath sounds: Rhonchi and rales present. No wheezing.      Comments: Right-sided smallbore chest tube  Abdominal:      General: Bowel sounds are normal. There is no distension.      Palpations: Abdomen is soft. There is no mass.      Tenderness: There is no abdominal tenderness. There is no guarding or rebound.   Musculoskeletal:         General: No tenderness. Normal range of motion.      Cervical back: Normal range of motion and neck supple.      Right lower leg: Edema present.      Left lower leg: Edema present.   Lymphadenopathy:      Cervical: No cervical adenopathy.   Skin:     General: Skin is warm and dry.      Coloration: Skin is pale.   Neurological:      Mental Status: She is alert and oriented to person, place, and time.      Cranial Nerves: No cranial nerve deficit.   Psychiatric:         Behavior: Behavior normal.         Fluids  Date 01/16/25 0700 - 01/17/25 0659   Shift 3138-7179 9224-6259 5936-7918 24 Hour Total   INTAKE   P.O. 200   200   IV Piggyback 132.7   132.7   Shift Total 332.7   332.7   OUTPUT   Urine 1365   1365   Shift Total 1365   1365   Weight (kg) 95.7 95.7 95.7 95.7       Laboratory  Recent Labs     01/14/25  0359 01/15/25  0455 01/16/25  0517   WBC 16.4* 16.8* 13.9*   RBC 3.29* 3.46* 3.33*   HEMOGLOBIN 9.3* 9.9* 9.3*   HEMATOCRIT 30.0* 30.9* 30.7*   MCV 91.2 89.3 92.2   MCH 28.3 28.6 27.9   MCHC 31.0* 32.0* 30.3*   RDW 56.0* 53.6* 54.9*   PLATELETCT 547* 695* 563*   MPV 9.2 9.1 8.8*     Recent Labs     01/14/25  0359 01/14/25  1200 01/15/25  0455 01/15/25  1220 01/16/25  0031 01/16/25  0517 01/16/25  1215   SODIUM 138  --  135  --   --  136  --    POTASSIUM 3.5*   < > 3.6   < > 4.7 4.5 4.0   CHLORIDE 95*  --  93*  --   --  94*  --     CO2 33  --  33  --   --  35*  --    GLUCOSE 147*  --  222*  --   --  155*  --    BUN 15  --  26*  --   --  27*  --    CREATININE 0.44*  --  0.55  --   --  0.56  --    CALCIUM 8.8  --  8.9  --   --  9.1  --     < > = values in this interval not displayed.                     Imaging  DX-CHEST-PORTABLE (1 VIEW)   Final Result         1.  Pulmonary edema and/or infiltrates are identified, which are stable since the prior exam.   2.  Layering left pleural effusion, stable      DX-CHEST-PORTABLE (1 VIEW)   Final Result         1.  Pulmonary edema and/or infiltrates are identified, which are stable since the prior exam.   2.  Layering left pleural effusion, stable      DX-CHEST-PORTABLE (1 VIEW)   Final Result         1.  Pulmonary edema and/or infiltrates are identified, which are stable since the prior exam.   2.  Trace bilateral pleural effusions, stable      DX-CHEST-PORTABLE (1 VIEW)   Final Result         1.  Pulmonary edema and/or infiltrates are identified, which are stable since the prior exam.   2.  Small left pleural effusion, stable      DX-CHEST-PORTABLE (1 VIEW)   Final Result      1.  Support equipment is unchanged.      2.  Cardiomegaly with moderate perihilar pulmonary edema unchanged from previous exam.      3.  Small bilateral pleural effusions left greater than right.      DX-CHEST-PORTABLE (1 VIEW)   Final Result      1.  Tubes and lines unchanged in position.      2.  Marked bilateral perihilar pulmonary edema which has improved slightly since previous exam.      3.  Small bilateral pleural effusions left greater than right.      IR-INSERT PLEURAL CATH W/ CUFF   Final Result      1.  Ultrasound and fluoroscopic guided placement of a RIGHT HEMITHORAX PleurX (Aspira type) tunneled pleural drainage catheter.   2.  The catheter can be used immediately with drainage of pleural effusion as needed for comfort, as per package insert instructions.   3.  If the catheter becomes dislodged, do not reinsert  it.  Place sterile dressing over the entry wound site.  May consult primary care physician or oncologist as to whether there is clinical indication for PleurX replacement.      DX-CHEST-PORTABLE (1 VIEW)   Final Result      1.  Tubes and lines unchanged in position.      2.   Extensive bilateral perihilar airspace opacities consistent with pneumonitis and/or pulmonary edema.      3.  Mild to moderate bilateral pleural effusions right greater than left      DX-CHEST-PORTABLE (1 VIEW)   Final Result         1. Increasing moderate right and small left pleural effusions. No change edema or infiltrates.      2. ETT 5 cm above masoud. NG tube in stomach. Right central line.                     DX-CHEST-PORTABLE (1 VIEW)   Final Result      Stable diffuse bilateral pulmonary infiltrates.      DX-ABDOMEN FOR TUBE PLACEMENT   Final Result      NG tube tip overlies the gastric body.      DX-CHEST-PORTABLE (1 VIEW)   Final Result      1.  Supportive tubing as described above.   2.  Apparent increasing RIGHT pleural fluid, possibly due to change in position.   3.  Improvement of LEFT lung base consolidation.   4.  No other significant change from prior exam.         DX-CHEST-PORTABLE (1 VIEW)   Final Result         1. Stable right IJ central catheter. Right chest tube has been removed. No pneumothorax.   2. Unchanged bilateral pulmonary opacities and associated pleural effusions.         DX-CHEST-PORTABLE (1 VIEW)   Final Result      1.  Unchanged bilateral pulmonary opacities. Stable small bilateral pleural effusions.   2.  Right pigtail catheter remains. No pneumothorax.      DX-CHEST-PORTABLE (1 VIEW)   Final Result      Removal of large bore right chest tube with stable right pigtail chest tube. Questionable tiny right apical pneumothorax.      DX-CHEST-PORTABLE (1 VIEW)   Final Result      No significant interval change.      DX-CHEST-PORTABLE (1 VIEW)   Final Result      1.  Persistent bilateral pulmonary opacities,  stable to slightly worse, although changes may be related to differences in imaging technique. No large pleural effusions.   2.  Well-positioned right IJ central catheter. No pneumothorax.   3.  Stable other lines and tubes.      DX-CHEST-LIMITED (1 VIEW)   Final Result         1.  No change in 2 separate right-sided chest tubes.      2.  No pneumothorax identified.      3.  Bilateral pulmonary opacifications again identified which are slightly less prominent.      DX-CHEST-PORTABLE (1 VIEW)   Final Result         1. No change mild infiltrates.      2. Right chest tubes. No pneumothorax.      3. ETT out. NG tube and jugular line remain.                     DX-CHEST-PORTABLE (1 VIEW)   Final Result         1. No significant interval change.      DX-CHEST-LIMITED (1 VIEW)   Final Result         1.  Findings on chest radiograph appear stable since the prior radiograph.  No new abnormalities are identified.      2.  2 separate right-sided chest tubes are again identified with no change in position.      3.  No pneumothorax identified.      DX-CHEST-LIMITED (1 VIEW)   Final Result         1.  No pneumothorax identified.      2.  2 right-sided chest tubes again identified.      CT-CTA CHEST PULMONARY ARTERY W/ RECONS   Final Result         1. No pulmonary embolus.      2. Medial right lung base consolidation felt to represent complete collapse of the right middle lobe. Mild to moderate mid and upper lung pneumonia infiltrates remain.      3. Right chest tubes. Small right pneumothorax and small right effusion. Improving small 2.5 cm left pleural effusion since 12/25/2024.      4. Extensive metastatic lesions including adenopathy, liver lesions, right breast nodule and bone lesions. Thoracic spine fusion hardware remains.                  CT-HEAD W/O   Final Result         1. No acute process in the brain evident.      2. Fluid in sphenoid sinus.      3. Right convexity midthoracic scoliosis and unchanged from 12/31/2024  MRI.               DX-ABDOMEN FOR TUBE PLACEMENT   Final Result         1. NG tube in stomach.                     DX-CHEST-FOR LINE PLACEMENT Perform procedure in: Patient's Room   Final Result         1. Small right apical pneumothorax remains. 2 right chest tubes.      2. ETT 3 cm above masoud. NG tube in stomach. Right jugular line in SVC. No complication evident.      3. Mild infiltrates and small right effusion.                  DX-CHEST-LIMITED (1 VIEW)   Final Result         1. 2nd right chest tube placed with near complete evacuation of right pneumothorax.      2. Mild infiltrates, basilar atelectasis and small right effusion remain.      3. Endotracheal tube tip at about T5 and about 2 cm above the masoud. The masoud and tip of the endotracheal tube are difficult to visualize in part because of overlying thoracic spine fusion hardware.                  DX-CHEST-PORTABLE (1 VIEW)   Final Result         Enlarging right pneumothorax with collapsing of the right lung.      Preliminary findings texted to Dr. STANLEY FISHER in the Emergency Department via Voalte on 1/4/2025 1:50 AM         DX-CHEST-LIMITED (1 VIEW)   Final Result         Interval placement of right pigtail chest tube. Small right residual pneumothorax.      DX-CHEST-PORTABLE (1 VIEW)   Final Result   Addendum (preliminary) 1 of 1      CRITICAL RESULT READ BACK: Preliminary findings discussed with and    critical read back performed by Dr. Downs via telephone on 1/3/2025 8:03    PM      Final      1.  Interval worsening in right pneumothorax, now moderate.   2.  Persistent interstitial and alveolar pulmonary opacities bilaterally. Possible bilateral pleural effusions, right greater than left.            DX-CHEST-PORTABLE (1 VIEW)   Final Result         1. Slightly decreased right pneumothorax.   2. Stable bilateral interstitial and airspace opacification.   3. Stable right pleural effusion.      DX-CHEST-PORTABLE (1 VIEW)   Final Result          1. Left pleural effusion has decreased in size status post thoracentesis. There is no left-sided pneumothorax.   2. There is persistent right basilar opacity with a new right apical pneumothorax.      Findings were communicated to the ordering provider at the time of dictation via Voalte.      US-THORACENTESIS PUNCTURE LEFT   Final Result      1. Ultrasound guided left sided therapeutic thoracentesis.      2. 950 mL of fluid withdrawn.      DX-CHEST-PORTABLE (1 VIEW)   Final Result      1. New postsurgical changes in the upper thoracic spine.   2. Worsening bilateral pleural effusions, pulmonary vascular congestion and interstitial edema.      DX-PORTABLE FLUORO > 1 HOUR   Final Result      Portable fluoroscopy utilized for 1 minute 19 seconds.      INTERPRETING LOCATION: [address], [place], [address]      DX-THORACIC SPINE-2 VIEWS   Final Result      Digitized intraoperative radiograph is submitted for review. This examination is not for diagnostic purpose but for guidance during a surgical procedure. Please see the patient's chart for full procedural details.         INTERPRETING LOCATION: [address], [place], [address]      MR-CERVICAL SPINE-WITH & W/O   Final Result         1.  No evidence of metastatic disease to the cervical spine.      MR-LUMBAR SPINE-WITH & W/O   Final Result   Impression:      Multiple bony metastatic lesions as described above, most prominent along the inferior endplate of L1.      Degenerative moderate canal stenosis at L4-5 with cauda equina compression. Epidural thickening and enhancement is noted in this region with severe bilateral facet degeneration and enhancement along the bilateral facet joints. This is felt to be    degenerative in origin.      MR-THORACIC SPINE-WITH & W/O   Final Result         Multiple metastatic lesions of the thoracic spine as described above.      Pathologic compression fracture at T5 with 50% loss of height and posterior cortical retropulsion with  circumferential epidural thickening representing epidural extension of metastatic disease. This causes severe canal stenosis with cord compression.      Metastatic disease also involves the posterior elements and spinous processes at T4, T5.      Pre and paravertebral metastatic soft tissue infiltration at T4-T5 noted.         MR-BRAIN-WITH & W/O   Final Result         Multiple nodular enhancing metastatic lesions involving the cranium as described above.      The largest lesion is in the right posterior temporal/parietal occipital region and demonstrates inner table cortical breakthrough with epidural extension and adjacent mild dural thickening and enhancement.      US-THORACENTESIS PUNCTURE RIGHT   Final Result   Addendum (preliminary) 1 of 1 12/27/2024 10:59 AM      HISTORY/REASON FOR EXAM:  Shortness of breath         TECHNIQUE/EXAM DESCRIPTION:   Ultrasound-guided thoracentesis.      Indication:  RIGHT pleural fluid collection.      COMPARISON:  None-however patient states history of right-sided    thoracentesis September 2024.      PROCEDURE:     Informed consent was obtained. A timeout was taken. A right    pleural effusion was localized with real-time ultrasound guidance. The    right posterior chest wall was prepped and draped in a sterile manner.    Following local anesthesia with 1%    lidocaine, and under live ultrasound guidance a 5 Turk[employer] Yueh pigtail    catheter was advanced into the pleural space with trocar technique and    pleural fluid was drained. The patient tolerated the procedure well    without evidence of complication. A post    thoracentesis chest radiograph is forthcoming.      FINDINGS:      Fluid was sent to the laboratory.      Fluid character: serosanguinous         1. Ultrasound guided right sided therapeutic and diagnostic thoracentesis.      2. 1000 mL of fluid withdrawn.      Final      1. Ultrasound guided right sided therapeutic and diagnostic thoracentesis.      2. 1000 mL  of fluid withdrawn.      DX-CHEST-PORTABLE (1 VIEW)   Final Result      1.  Small persistent right pleural effusion which is decreased since recent thoracentesis.      2.  Small to moderate left pleural effusion.      3.  Bilateral perihilar and left lower lobe atelectasis and/or pneumonitis.      4.  Increased interstitial markings throughout the lung fields consistent with parenchymal scarring, and/or pulmonary edema.      CT-LSPINE W/O PLUS RECONS   Final Result         1. Diffuse metastatic disease.   2. No definite pathologic fracture.      CT-TSPINE W/O PLUS RECONS   Final Result         1. Extensive metastatic disease.   2. There is new pathologic compression fracture deformity of T5.      CT-CHEST,ABDOMEN,PELVIS WITH   Final Result         1. New bilateral upper lobe nodular infiltrates.   2. Moderate-sized bilateral pleural effusions.   3. Interval worsening of hepatic metastatic disease.   4. New abdominal lymphadenopathy. Thoracic adenopathy is again noted.   5. Diffuse osseous metastatic disease. There is a new compression fracture of T5 as reported on the prior spine CT.      DX-CHEST-PORTABLE (1 VIEW)   Final Result      1.  Stable bibasilar atelectasis/consolidation and small bilateral pleural effusions.      2.  Stable cardiomegaly and interstitial edema.          Assessment/Plan  * Acute on chronic respiratory failure with hypoxia and hypercapnia (HCC)- (present on admission)  Assessment & Plan  intubated 1/4  extubated 1/5  re-intubated 1/8  Extubated 1/15  Patient is doing well while extubated.  He has been on 3 to 5 L nasal cannula since extubation  Extensive conversation had with patient and her family regarding her wishes.  She remains DNAR/I okay  We also discussed the likelihood of her needing a tracheostomy if she got intubated again.  At this time, if she were to be intubated again she would like to go ahead with a tracheotomy if necessary to prolong her life.    Anemia of chronic  "disease  Assessment & Plan  Wanted to, transfuse as indicated    Hypokalemia  Assessment & Plan  Replaced, monitor    Restless leg syndrome- (present on admission)  Assessment & Plan  Requip     Pathological fracture of vertebra- (present on admission)  Assessment & Plan  Pathologic fracture of T5 with myelopathy on presentation  S/P T4-6 laminectomy with fusion and spinal decompression on 1/1/2025 by Dr. Lowery  multi-modal pain management  Fall Precautions  PT OT    Cancer related pain- (present on admission)  Assessment & Plan  Palliative Care following for pain management  Multimodal pain management    Bilateral pleural effusion- (present on admission)  Assessment & Plan  Malignant pleural effusions from metastatic breast cancer s/p thoracenteses and chest tubes  Diuresis as tolerated  monitor chest xrays  PleurX placement in IR 1/11 on the right, monitor and continue to drain    Malignant neoplasm of breast (HCC)- (present on admission)  Assessment & Plan  \"Bilateral breast cancer with extensive destruction of the left breast and ulceration and skin lesions making her inoperable on the left.  Right breast shows a 3 x 3 cm mass.  PET scan March 2023 showed non-hypermetabolic pleural effusions which are cytology positive and she has demonstrable pleural masses. Started aromatase inhibition early September 2023.  Has not yet started CDK 4 6 inhibitor.  Lost to follow-up for over 6 months although apparently was on letrozole as single agent.  Now with clear progression of disease in the left breast.  August 2024: Progression of disease in liver with extensive disease.  Breast is worse.  Lung is better.  Lymph nodes are worse.  Status post radiation to symptomatic oozing and exophytic left breast mass.  Marked worsening of her cancer on letrozole with progression in the bone and liver.\"--Dr. Benitez  ongoing goals of care discussions. Patient is fighting to recover and discharge home so she can continue " treatment for her cancer  DNR, I OK   Palliative following for pain management as well as further discussions.    Primary hypertension- (present on admission)  Assessment & Plan  Monitor, resume as indicated  PRN's available    Diabetes mellitus, type 2 (HCC)- (present on admission)  Assessment & Plan  A1c 6.4  accuchecks with SSI  CHO diet  glucose goal 140-180        Plan  1/16  Continue forced diuresis, monitor closely  Monitor chest tube output, in light of the patient's malignant effusion will likely not be able to be reduced and entirely  Potassium replacement,  Symptomatic treatment  Reach out to surgery to remove staples hopefully  PT OT, mobilization, strengthening  Laboratory follow-up  Ongoing goals of care discussion  See orders  Patient is has a high medical complexity, complex decision making and is at high risk for complication, morbidity, and mortality.  I spent 65 minutes, reviewing the chart, obtaining and/or reviewing separately obtained history. Performing a medically appropriate examination and evaluation.  Counseling and educating the patient. Ordering and reviewing medications, tests, or procedures.   Documenting clinical information in EPIC. Independently interpreting results and communicating results to patient. Discussing future disposition of care with patient, RN and case management.  Thank you for consulting with us, we will follow closely while the patient is hospitalized      Please note that this dictation was created using voice recognition software. I have made every reasonable attempt to correct obvious errors, but I expect that there are errors of grammar and possibly context that I did not discover before finalizing the note.

## 2025-01-17 NOTE — CONSULTS
CONSULT: HEMATOLOGY/ONCOLOGY        Primary Care:  Elvis Russell M.D.    Diagnosis: Progressing, metastatic breast cancer.      History of Presenting Illness:    Please see Dr. AUGUST note on October 30, 2024 for detailed note.   In the meantime, she admitted to the hospital again on December 25, 2024 with a new progressive metastasis in the lung and T5 pathological fracture.  Eventually, she had T4-T6 laminectomy and she had a right-sided Pleurx catheter placed.  Patient was seen by Dr. Bolton last time on January 2025.  She is definitely progressing while she was on letrozole and 1/2 dose of abemaciclib.  Best treatment at this time is palliative chemotherapy but her general condition is too poor for chemotherapy.  Dr. Bolton recommended Faslodex with ABEMACICLIB full dose as outpatient when she improves.     Interval History:    Past Medical History:   Diagnosis Date    Anxiety     Breast cancer metastasized to bone, unspecified laterality (HCC) 12/25/2024    Depression     Diabetes (HCC)     HLD (hyperlipidemia)     HTN (hypertension)     Hypothyroidism        Past Surgical History:   Procedure Laterality Date    THORACIC LAMINECTOMY  1/1/2025    Procedure: LAMINECTOMY, SPINE, THORACIC. T4-T6;  Surgeon: Karthik Lowery D.O.;  Location: SURGERY [facility];  Service: Neurosurgery    FUSION, SPINE, THORACIC APPROACH  1/1/2025    Procedure: FUSION, SPINE, THORACIC APPROACH;  Surgeon: Karthik Lowery D.O.;  Location: SURGERY [facility];  Service: Neurosurgery    CHOLECYSTECTOMY      OTHER      mastoid bone       Family History   Problem Relation Age of Onset    Lung Cancer Mother     Uterine cancer Sister     Cancer Maternal Grandfather     Stomach Cancer Paternal Grandmother        OB History   No obstetric history on file.       Allergies as of 12/25/2024 - Reviewed 12/25/2024   Allergen Reaction Noted    Demerol Rash 05/20/2011         Current Facility-Administered  Medications:     HYDROcodone/acetaminophen (Norco)  MG per tablet 1 Tablet, 1 Tablet, Enteral Tube, Q4HRS, Keny Cartagena M.D., 1 Tablet at 01/17/25 0954    rivaroxaban (Xarelto) tablet 10 mg, 10 mg, Enteral Tube, DAILY AT 1800, Keny Cartagena M.D.    melatonin tablet 5 mg, 5 mg, Enteral Tube, Nightly, Jordan Shi M.D., 5 mg at 01/16/25 2104    metoclopramide (Reglan) tablet 10 mg, 10 mg, Enteral Tube, Q6HRS PRN, Jessica Campos, A.P.R.N., 10 mg at 01/14/25 1741    acetaminophen (Tylenol) tablet 650 mg, 650 mg, Enteral Tube, Q6HRS PRN, Jessica Campos, A.P.R.N., 650 mg at 01/15/25 0730    potassium bicarbonate (Klyte) effervescent tablet 50 mEq, 50 mEq, Enteral Tube, BID, Jsesica Campos, A.P.R.N., 50 mEq at 01/17/25 0507    furosemide (Lasix) injection 80 mg, 80 mg, Intravenous, Q DAY, Jessica Campos, A.P.R.N., 80 mg at 01/17/25 0507    metOLazone (Zaroxolyn) tablet 5 mg, 5 mg, Enteral Tube, Q DAY, Jessica Campos, A.P.R.N., 5 mg at 01/17/25 0507    Pharmacy Consult: Enteral tube insertion - review meds/change route/product selection, 1 Each, Other, PHARMACY TO DOSE, Jessica Campos, A.P.R.N.    Respiratory Therapy Consult, , Nebulization, Continuous RT, Juan Daniel Baca M.D.    senna-docusate (Pericolace Or Senokot S) 8.6-50 MG per tablet 2 Tablet, 2 Tablet, Enteral Tube, BID, 2 Tablet at 01/17/25 0507 **AND** polyethylene glycol/lytes (Miralax) Packet 1 Packet, 1 Packet, Enteral Tube, QDAY PRN, 1 Packet at 01/09/25 0505 **AND** magnesium hydroxide (Milk Of Magnesia) suspension 30 mL, 30 mL, Enteral Tube, QDAY PRN, 30 mL at 01/09/25 1819 **AND** bisacodyl (Dulcolax) suppository 10 mg, 10 mg, Rectal, QDAY PRN, Juan Daniel Baca M.D.    MD Alert...ICU Electrolyte Replacement per Pharmacy, , Other, PHARMACY TO DOSE, Juan Daniel Baca M.D.    ROPINIRole (Requip) tablet 1 mg, 1 mg, Enteral Tube, QHS, Juan Daniel Baca M.D., 1 mg at 01/16/25 2104    calcium carbonate (Tums) chewable tab 500 mg, 500  "mg, Enteral Tube, TID PRN, Juan Daniel Baca M.D., 500 mg at 01/17/25 0509    methocarbamol (Robaxin) tablet 750 mg, 750 mg, Enteral Tube, Q8HRS PRN, Juan Daniel Baca M.D., 750 mg at 01/14/25 2126    ondansetron (Zofran ODT) dispertab 4 mg, 4 mg, Enteral Tube, Q4HRS PRN, Juan Daniel Baca M.D.    ROPINIRole (Requip) tablet 1 mg, 1 mg, Enteral Tube, BID PRN, Juan Daniel Baca M.D., 1 mg at 01/15/25 1720    insulin lispro (HumaLOG,AdmeLOG) subcutaneous injection, 2-9 Units, Subcutaneous, Q6HRS, 2 Units at 01/16/25 1219 **AND** POC blood glucose manual result, , , Q6H **AND** NOTIFY MD and PharmD, , , Once **AND** Administer 20 grams of glucose (approximately 8 ounces of fruit juice) every 15 minutes PRN FSBG less than 70 mg/dL, , , PRN **AND** dextrose 50% (D50W) injection 25 g, 25 g, Intravenous, Q15 MIN PRN, Aidee Hicks    labetalol (Normodyne/Trandate) injection 10 mg, 10 mg, Intravenous, Q4HRS PRN, Lidia Figueroa M.D., 10 mg at 01/07/25 0611    ipratropium-albuterol (DUONEB) nebulizer solution, 3 mL, Nebulization, Q2HRS PRN (RT), Americo Avalos M.D., 3 mL at 01/11/25 2212    sodium chloride (Ocean) 0.65 % nasal spray 2 Spray, 2 Spray, Nasal, Q2HRS PRN, Omar Saleh M.D., 2 [place] at 01/01/25 0600    MD ALERT...DO NOT ADMINISTER NSAIDS or ASPIRIN unless ORDERED By Neurosurgery 1 Each, 1 Each, Other, PRN, Karthik Lowery D.O.    ondansetron (Zofran) syringe/vial injection 4 mg, 4 mg, Intravenous, Q4HRS PRN, Abdulaziz Nieto M.D., 4 mg at 01/16/25 1019    Review of Systems:  Patient denies fever, chills, nausea, vomiting, chest pain, sob, blood in stools, black stool, blood in urine. All systems are reviewed See HPI.        Physical Exam:  /66   Pulse 75   Temp 36 °C (96.8 °F) (Temporal)   Resp (!) 44   Ht 1.727 m (5' 7.99\")   Wt 90 kg (198 lb 6.6 oz)   SpO2 91%  Body mass index is 30.18 kg/m².  Constitutional:  NAD, well appearing.  HEENT:   LOTTIE EOMI  Cardiovascular: RRR.   No " m/r/g. No carotid bruits.       Lungs:   Clear, no wheezing, no rales, no respiratory distress.  Abdomen: Soft, non-tender. + BS, no masses, no suprapubic tenderness, no hepatomegaly.  Extremities:  No pedal edema. Bilateral and radial pulses present.  Skin:  Warm and dry.    Neurologic: Alert & oriented x 3, CN II-XII grossly intact, strength and sensation grossly intact.  No focal deficits noted.  Psychiatric:  Affect normal, mood normal, judgment normal.        ASSESSMENT and PLAN    Progressing, metastatic breast cancer to bone, liver and lung.  As above, she is definitely progressing with letrozole and half dose of abemaciclib.  [place] treatment is a palliative chemotherapy but her general condition is too poor and she is not a candidate for palliative chemotherapy as her functional state is ECOG 4.  She still wants to be treated.  As Dr. Bolton discussed with her, we will recommend Faslodex with full dose of abemaciclib as a outpatient if she improve in the future. Since she progressed while she was on letrozole and a half dose of Abemaciclib, I expect her response rate is probably less than 10% and her prognosis is very poor.  Medically she is a futile case and HOSPICE candidate.  She declined a hospice care.  We will continue supportive care.  We will offer continuous hormonal treatment as outpatient if she improves in the future.  We will follow this patient peripherally.      Any questions and concerns raised by the patient were answered to the best of my ability.   Thank you for allowing me to participate in the care for this patient. Please feel free to contact me for any questions or concerns.     Total time spent on chart review, clinic encounter, and documentation: 25 minutes.       Please note that this dictation was created using voice recognition software. I have made every reasonable attempt to correct obvious errors, but I expect that there are errors of grammar and possibly content that  I did not discover before finalizing the note.     Addendum : I discussed with attending. We will call RT for palliative RT for pain control and disease control.

## 2025-01-17 NOTE — ASSESSMENT & PLAN NOTE
Malignant pleural effusions from metastatic breast cancer s/p thoracenteses and chest tubes  PleurX placement in IR 1/11 on the right and was removed  Continues to require high flow nasal cannula  Repeat chest x-ray    1/31: Patient is actively dying.  Daughter continues to request high flow nasal cannula.  Oxygen saturation 75 to 80% despite 60 L/min.  Continue to encourage gentle weaning of high flow nasal cannula for patient comfort.  See ACP for more information

## 2025-01-17 NOTE — ASSESSMENT & PLAN NOTE
Patient was intubated on 1/4 and then extubated 1/5   Was reintubated on 1/8 and extubated on 1/15   Patient continues to require high flow nasal cannula   Most recent chest x-ray did show extensive bilateral perihilar airspace opacities as well as mild to moderate bilateral pleural effusions   Patient is now comfort care but wants to continue high flow due to shortness of breath  Patient would like to be on less oxygen, ideally would be able to get her home or GIP if we can get her off the high flow, patient is requesting Pleurx drain  I did discuss the case with interventional radiology who was agreeable to attempt to drain if there is adequate fluid  Unfortunately, unable to ascertain if there is adequate fluid on x-ray due to significant right sided whiteout

## 2025-01-17 NOTE — CARE PLAN
The patient is Watcher - Medium risk of patient condition declining or worsening    Shift Goals  Clinical Goals: Pulmonary hygeine, MAP > 65  Patient Goals: pain management  Family Goals: updates    Progress made toward(s) clinical / shift goals:      Problem: Knowledge Deficit - Standard  Goal: Patient and family/care givers will demonstrate understanding of plan of care, disease process/condition, diagnostic tests and medications  Outcome: Progressing     Problem: Pain - Standard  Goal: Alleviation of pain or a reduction in pain to the patient’s comfort goal  Outcome: Progressing     Problem: Hemodynamics  Goal: Patient's hemodynamics, fluid balance and neurologic status will be stable or improve  Outcome: Progressing     Problem: Respiratory:  Goal: Ability to maintain a clear airway will improve  Outcome: Progressing  Goal: Ability to maintain normal pulse oximetry readings will improve  Outcome: Progressing  Goal: Ability to maintain arterial blood gas levels within normal range will improve  Outcome: Progressing

## 2025-01-17 NOTE — DISCHARGE PLANNING
[facility] Transitional [facility] Coordination    Aware of repeat PMR referral from Dr. Cartagena.  Forwarding for PMR follow up.  Would welcome PT update - last seen 1/7/25.  Voalte message to [facility] regarding therapy need.

## 2025-01-17 NOTE — PROGRESS NOTES
"Palliative Care   [place] back with patient regarding CODE STATUS and POLST form.  Patient expressed \"I will have that for you by this afternoon.\"  Patient's daughter Juana bedside however she is sleeping and pullout and did not awake during discussion.  Patient mildly confused.  She asked that I come back later \"I am going to wake my daughter up in a half an hour.\"    YESIKA Rae.  Palliative Care Nurse Practitioner  [phone]             "

## 2025-01-17 NOTE — DISCHARGE PLANNING
Case Management Discharge Planning    Admission Date: 12/25/2024  GMLOS: 10.1  ALOS: 23        Anticipated Discharge Dispo: Discharge Disposition: Disch to a long term care facility (63)    DME Needed: No    Action(s) Taken: OTHER    Patient discussed in IDT rounds. Patient on 4L NC.   Plan for an additional family meeting with oncology to discuss disease progression/ goals of care.     In previous discussions family was wanting patient to regain strength before returning home. RN CM will re-consult PMR as they had previously signed off due to vent.    Escalations Completed: None    Medically Clear: No    Next Steps: Continue to follow for CM needs.    Barriers to Discharge: Medical clearance    Is the patient up for discharge tomorrow: No

## 2025-01-17 NOTE — DIETARY
"Nutrition Services: Weekly Nutrition Support     Day 22 of admit. Asha Hannon is a 64 y.o. female with admitting DX of Breast cancer metastasized to bone, unspecified laterality (HCC) [C50.919, C79.51]    TF's stopped per RN (unknown time frame).  Pt’s diet advanced to soft and bite size with mildly thick liquids on 1/16/25 per SLP recommendation. PO intake <25% x one meal.          Nutrition Assessment:      Height: 172.7 cm (5' 7.99\")  Weight: 95.7 kg (210 lb 15.7 oz)  Ideal Body Weight: 63.5 kg (140 lb)  Percent Ideal Body Weight: 152.9  Weight taken via bed scale  Body mass index is 32.09 kg/m². BMI classification: Obesity Class I.  Wt Readings from Last 6 Encounters:   01/11/25 95.7 kg (210 lb 15.7 oz)   11/05/24 106 kg (234 lb)   10/30/24 106 kg (232 lb 14.7 oz)   10/28/24 105 kg (232 lb)   10/16/24 105 kg (231 lb)   09/21/24 107 kg (236 lb 5.3 oz)      Weight trend:    Calculation/Equation: based on IBW due to obesity  Total Calories / day: (30-35kcal/IBWkg)   Total Grams Protein / day: (1.2-1.5gm/IBWkg)         Objective:  Per last SLP assessment, diet advanced today  Pertinent labs: reviewed  Pertinent meds: Dulcolax, famotidine, furosemide, insulin, magnesium oxide, potassium bicarbonate, bowel regimen  Skin/wounds: no concerns noted   Last BM 1/13/25     Current diet order:   IDDSI level 2 - mildly thick liquids and IDDSI level 6 - soft/bite-sized     Nutrition Diagnosis:      Inadequate Oral Intake related to intubation status as evidenced by need for nutrition support.   Nutrition Dx Status: revised    Inadequate oral intake related to decreased appetite as evidence by PO intake <25% of meals      Nutrition Interventions:      Resume current diet, texture per SLP  If BG becomes elevated and PO intake adequate consider consistent carbohydrate diet  Trial Glucerna BID, Magic Cup BID  Patient aware of active plan of care as appropriate.     Nutrition Monitoring and Evaluation:     Monitor " nutrition POC  Additional fluids per MD/DO  Monitor vital signs pertinent to nutrition       RD following and will provide updated recommendations as indicated.

## 2025-01-17 NOTE — PROGRESS NOTES
[facility] Medicine Daily Progress Note    Date of Service  1/17/2025    Chief Complaint  Asha Hannon is a 64 y.o. female admitted 12/25/2024 with weakness, shortness of breath, back pain, has a history of metastatic breast cancer     Hospital Course  64 y.o. female who presented 12/25/2024 with history of stage IV breast cancer metastatic disease, mets to the lungs and now spine, on previous oral chemotherapy, admitted on 12/25/2024 with weakness, shortness of breath and back pain, subsequently a CT chest abdomen showed moderate-sized pleural effusions, worsening hepatic metastatic disease with new abdominal lymphadenopathy, thoracic adenopathy, diffuse osseous metastatic disease with a T5 pathologic fracture, the patient was admitted, undergo a right-sided thoracentesis on 12/27, malignant cells identified in the fluid, and MRI showed multiple thoracic and lumbar metastasized lesions, the patient was taken to the operating room on 1 1 where she had a T4 T6 laminectomy, following the patient developed a right-sided pneumothorax, requiring chest tube placement, left-sided thoracentesis was performed, the patient following his respiratory difficulty requiring BiPAP treatment and subsequently required to be intubated, the patient had a right-sided PleurX and small bore catheter placed and had frequent thoracentesis since placement, the patient remained intubated until 1/15, where the patient then was extubated and had a extensive talk with the ICU team in terms of her overall status, metastatic malignancy and respiratory failure, the patient stated that she wanted to keep fighting and is wishing to hopefully reach outpatient status where she can fail to be treated for her malignancy.  If she would have additional respiratory failure she states that she would like to be reintubated and possibly even undergo a tracheostomy to stay alive.  The patient tolerated extubation, she is alert and x 4, she is in a  sinus rhythm in the 50s to 80s, blood pressure in the 90s to 100s, the patient was n.p.o., underwent evaluation today by speech therapy and is cleared for mildly thick liquids, soft and bite-size solids, the right chest tube had put out 100 cc overnight, the patient remains on DVT prophylaxis with Lovenox, Pepcid for GI prophylaxis,  Laboratory data showed white to count 13.9 hemoglobin 9.3 platelet count 563 sodium 136 potassium 4.5 chloride 94 bicarb 35 glucose 155 BUN 27 creatinine 0.56  Chest x-ray with pulmonary edema and infiltrates, stable, layering left pleural effusion, stable, right-sided small bore chest tube  The patient has been followed by palliative care, she currently confirms a DNR I okay status, the patient lives with her daughter  The patient has been consulted on by palliative care  The patient had a consult from her primary oncology team on 1/17/2025    Interval Problem Update  Patient seen and examined today.  Data, Medication data reviewed.  Case discussed with nursing as available.  Plan of Care reviewed with patient and notified of changes.  1/17 the patient feels somewhat better, she is oxygenating okay, she has still significant pain, she feels weak, nursing reports the patient is a 2-3 person assist, unsafe to sit up in a chair, the patient continues to resist certain medical interventions, currently afebrile, heart rate in the 70s, respiration unlabored, blood pressure in the 130s over 60s, the patient is saturating in the mid 90s on 5 L, chest tube output 190, no airleak, in the afternoon the patient has some increase in oxygen demand, chest x-ray appears fairly unchanged, left-sided fluid, right-sided small bore chest tube in place, no pneumothorax, with a count 10.5 hemoglobin 9.4 hematocrit 30.2 platelet count 537 sodium 133 potassium 4.2 chloride 90 bicarb 36 glucose 107 BUN 23 creatinine 0.6    I have discussed this patient's plan of care and discharge plan at IDT rounds today with  Case Management, Nursing, Nursing leadership, and other members of the IDT team.    Consultants/Specialty  critical care, oncology, and palliative care, physiatry    Code Status  DNAR, I OK    Disposition  The patient is not medically cleared for discharge to home or a post-acute facility.  Anticipate discharge to: hospice    I have placed the appropriate orders for post-discharge needs.    Review of Systems  Review of Systems   Constitutional:  Positive for malaise/fatigue. Negative for chills and fever.   HENT: Negative.     Eyes: Negative.    Respiratory:  Positive for shortness of breath. Negative for cough.    Cardiovascular: Negative.  Negative for chest pain and palpitations.   Gastrointestinal: Negative.  Negative for heartburn, nausea and vomiting.   Genitourinary: Negative.  Negative for dysuria and frequency.   Musculoskeletal:  Positive for back pain and myalgias. Negative for neck pain.   Skin: Negative.  Negative for itching and rash.   Neurological:  Positive for weakness. Negative for dizziness, focal weakness and headaches.   Endo/Heme/Allergies: Negative.  Negative for polydipsia. Does not bruise/bleed easily.   Psychiatric/Behavioral:  Negative for depression. The patient is nervous/anxious.         Physical Exam  Temp:  [35.8 °C (96.5 °F)-37.3 °C (99.1 °F)] 35.8 °C (96.5 °F)  Pulse:  [60-76] 62  Resp:  [9-59] 16  BP: ()/(42-71) 106/52  SpO2:  [91 %-100 %] 95 %    Physical Exam  Vitals and nursing note reviewed.   Constitutional:       Appearance: She is well-developed. She is obese. She is ill-appearing. She is not diaphoretic.   HENT:      Head: Normocephalic and atraumatic.      Nose: Nose normal.   Eyes:      Conjunctiva/sclera: Conjunctivae normal.      Pupils: Pupils are equal, round, and reactive to light.   Neck:      Thyroid: No thyromegaly.      Vascular: No JVD.   Cardiovascular:      Rate and Rhythm: Normal rate and regular rhythm.      Heart sounds: Normal heart sounds.      No  friction rub. No gallop.   Pulmonary:      Effort: Pulmonary effort is normal.      Breath sounds: Rhonchi present. No wheezing or rales.      Comments: Right-sided small bore chest tube, no air leak, serous fluid  Abdominal:      General: Bowel sounds are normal. There is no distension.      Palpations: Abdomen is soft. There is no mass.      Tenderness: There is no abdominal tenderness. There is no guarding or rebound.   Musculoskeletal:         General: No tenderness. Normal range of motion.      Cervical back: Normal range of motion and neck supple.      Right lower leg: Edema present.      Left lower leg: Edema present.   Lymphadenopathy:      Cervical: No cervical adenopathy.   Skin:     General: Skin is warm and dry.      Coloration: Skin is pale.      Findings: Bruising present.   Neurological:      Mental Status: She is alert and oriented to person, place, and time.      Cranial Nerves: No cranial nerve deficit.      Motor: Weakness present.   Psychiatric:         Behavior: Behavior normal.         Fluids    Intake/Output Summary (Last 24 hours) at 1/17/2025 0735  Last data filed at 1/16/2025 2000  Gross per 24 hour   Intake 635.87 ml   Output 1790 ml   Net -1154.13 ml        Laboratory  Recent Labs     01/15/25  0455 01/16/25  0517 01/17/25  0525   WBC 16.8* 13.9* 10.5   RBC 3.46* 3.33* 3.29*   HEMOGLOBIN 9.9* 9.3* 9.4*   HEMATOCRIT 30.9* 30.7* 30.2*   MCV 89.3 92.2 91.8   MCH 28.6 27.9 28.6   MCHC 32.0* 30.3* 31.1*   RDW 53.6* 54.9* 53.6*   PLATELETCT 695* 563* 537*   MPV 9.1 8.8* 8.8*     Recent Labs     01/15/25  0455 01/15/25  1220 01/16/25  0517 01/16/25  1215 01/16/25  1813 01/16/25  2334 01/17/25  0525   SODIUM 135  --  136  --   --   --  133*   POTASSIUM 3.6   < > 4.5   < > 3.9 4.2 4.2   CHLORIDE 93*  --  94*  --   --   --  90*   CO2 33  --  35*  --   --   --  36*   GLUCOSE 222*  --  155*  --   --   --  107*   BUN 26*  --  27*  --   --   --  23*   CREATININE 0.55  --  0.56  --   --   --  0.60    CALCIUM 8.9  --  9.1  --   --   --  9.3    < > = values in this interval not displayed.                   Imaging  DX-CHEST-PORTABLE (1 VIEW)   Final Result         1.  Pulmonary edema and/or infiltrates are identified, which appear somewhat increased since the prior exam.   2.  Layering left pleural effusion, stable   3.  Cardiomegaly      DX-CHEST-PORTABLE (1 VIEW)   Final Result         1.  Pulmonary edema and/or infiltrates are identified, which are stable since the prior exam.   2.  Layering left pleural effusion, stable      DX-CHEST-PORTABLE (1 VIEW)   Final Result         1.  Pulmonary edema and/or infiltrates are identified, which are stable since the prior exam.   2.  Layering left pleural effusion, stable      DX-CHEST-PORTABLE (1 VIEW)   Final Result         1.  Pulmonary edema and/or infiltrates are identified, which are stable since the prior exam.   2.  Trace bilateral pleural effusions, stable      DX-CHEST-PORTABLE (1 VIEW)   Final Result         1.  Pulmonary edema and/or infiltrates are identified, which are stable since the prior exam.   2.  Small left pleural effusion, stable      DX-CHEST-PORTABLE (1 VIEW)   Final Result      1.  Support equipment is unchanged.      2.  Cardiomegaly with moderate perihilar pulmonary edema unchanged from previous exam.      3.  Small bilateral pleural effusions left greater than right.      DX-CHEST-PORTABLE (1 VIEW)   Final Result      1.  Tubes and lines unchanged in position.      2.  Marked bilateral perihilar pulmonary edema which has improved slightly since previous exam.      3.  Small bilateral pleural effusions left greater than right.      IR-INSERT PLEURAL CATH W/ CUFF   Final Result      1.  Ultrasound and fluoroscopic guided placement of a RIGHT HEMITHORAX PleurX (Aspira type) tunneled pleural drainage catheter.   2.  The catheter can be used immediately with drainage of pleural effusion as needed for comfort, as per package insert instructions.    3.  If the catheter becomes dislodged, do not reinsert it.  Place sterile dressing over the entry wound site.  May consult primary care physician or oncologist as to whether there is clinical indication for PleurX replacement.      DX-CHEST-PORTABLE (1 VIEW)   Final Result      1.  Tubes and lines unchanged in position.      2.   Extensive bilateral perihilar airspace opacities consistent with pneumonitis and/or pulmonary edema.      3.  Mild to moderate bilateral pleural effusions right greater than left      DX-CHEST-PORTABLE (1 VIEW)   Final Result         1. Increasing moderate right and small left pleural effusions. No change edema or infiltrates.      2. ETT 5 cm above masoud. NG tube in stomach. Right central line.                     DX-CHEST-PORTABLE (1 VIEW)   Final Result      Stable diffuse bilateral pulmonary infiltrates.      DX-ABDOMEN FOR TUBE PLACEMENT   Final Result      NG tube tip overlies the gastric body.      DX-CHEST-PORTABLE (1 VIEW)   Final Result      1.  Supportive tubing as described above.   2.  Apparent increasing RIGHT pleural fluid, possibly due to change in position.   3.  Improvement of LEFT lung base consolidation.   4.  No other significant change from prior exam.         DX-CHEST-PORTABLE (1 VIEW)   Final Result         1. Stable right IJ central catheter. Right chest tube has been removed. No pneumothorax.   2. Unchanged bilateral pulmonary opacities and associated pleural effusions.         DX-CHEST-PORTABLE (1 VIEW)   Final Result      1.  Unchanged bilateral pulmonary opacities. Stable small bilateral pleural effusions.   2.  Right pigtail catheter remains. No pneumothorax.      DX-CHEST-PORTABLE (1 VIEW)   Final Result      Removal of large bore right chest tube with stable right pigtail chest tube. Questionable tiny right apical pneumothorax.      DX-CHEST-PORTABLE (1 VIEW)   Final Result      No significant interval change.      DX-CHEST-PORTABLE (1 VIEW)   Final Result       1.  Persistent bilateral pulmonary opacities, stable to slightly worse, although changes may be related to differences in imaging technique. No large pleural effusions.   2.  Well-positioned right IJ central catheter. No pneumothorax.   3.  Stable other lines and tubes.      DX-CHEST-LIMITED (1 VIEW)   Final Result         1.  No change in 2 separate right-sided chest tubes.      2.  No pneumothorax identified.      3.  Bilateral pulmonary opacifications again identified which are slightly less prominent.      DX-CHEST-PORTABLE (1 VIEW)   Final Result         1. No change mild infiltrates.      2. Right chest tubes. No pneumothorax.      3. ETT out. NG tube and jugular line remain.                     DX-CHEST-PORTABLE (1 VIEW)   Final Result         1. No significant interval change.      DX-CHEST-LIMITED (1 VIEW)   Final Result         1.  Findings on chest radiograph appear stable since the prior radiograph.  No new abnormalities are identified.      2.  2 separate right-sided chest tubes are again identified with no change in position.      3.  No pneumothorax identified.      DX-CHEST-LIMITED (1 VIEW)   Final Result         1.  No pneumothorax identified.      2.  2 right-sided chest tubes again identified.      CT-CTA CHEST PULMONARY ARTERY W/ RECONS   Final Result         1. No pulmonary embolus.      2. Medial right lung base consolidation felt to represent complete collapse of the right middle lobe. Mild to moderate mid and upper lung pneumonia infiltrates remain.      3. Right chest tubes. Small right pneumothorax and small right effusion. Improving small 2.5 cm left pleural effusion since 12/25/2024.      4. Extensive metastatic lesions including adenopathy, liver lesions, right breast nodule and bone lesions. Thoracic spine fusion hardware remains.                  CT-HEAD W/O   Final Result         1. No acute process in the brain evident.      2. Fluid in sphenoid sinus.      3. Right convexity  midthoracic scoliosis and unchanged from 12/31/2024 MRI.               DX-ABDOMEN FOR TUBE PLACEMENT   Final Result         1. NG tube in stomach.                     DX-CHEST-FOR LINE PLACEMENT Perform procedure in: Patient's Room   Final Result         1. Small right apical pneumothorax remains. 2 right chest tubes.      2. ETT 3 cm above masoud. NG tube in stomach. Right jugular line in SVC. No complication evident.      3. Mild infiltrates and small right effusion.                  DX-CHEST-LIMITED (1 VIEW)   Final Result         1. 2nd right chest tube placed with near complete evacuation of right pneumothorax.      2. Mild infiltrates, basilar atelectasis and small right effusion remain.      3. Endotracheal tube tip at about T5 and about 2 cm above the masoud. The masoud and tip of the endotracheal tube are difficult to visualize in part because of overlying thoracic spine fusion hardware.                  DX-CHEST-PORTABLE (1 VIEW)   Final Result         Enlarging right pneumothorax with collapsing of the right lung.      Preliminary findings texted to Dr. STANLEY FISHER in the Emergency Department via Voalte on 1/4/2025 1:50 AM         DX-CHEST-LIMITED (1 VIEW)   Final Result         Interval placement of right pigtail chest tube. Small right residual pneumothorax.      DX-CHEST-PORTABLE (1 VIEW)   Final Result   Addendum (preliminary) 1 of 1      CRITICAL RESULT READ BACK: Preliminary findings discussed with and    critical read back performed by Dr. Downs via telephone on 1/3/2025 8:03    PM      Final      1.  Interval worsening in right pneumothorax, now moderate.   2.  Persistent interstitial and alveolar pulmonary opacities bilaterally. Possible bilateral pleural effusions, right greater than left.            DX-CHEST-PORTABLE (1 VIEW)   Final Result         1. Slightly decreased right pneumothorax.   2. Stable bilateral interstitial and airspace opacification.   3. Stable right pleural  effusion.      DX-CHEST-PORTABLE (1 VIEW)   Final Result         1. Left pleural effusion has decreased in size status post thoracentesis. There is no left-sided pneumothorax.   2. There is persistent right basilar opacity with a new right apical pneumothorax.      Findings were communicated to the ordering provider at the time of dictation via Voalte.      US-THORACENTESIS PUNCTURE LEFT   Final Result      1. Ultrasound guided left sided therapeutic thoracentesis.      2. 950 mL of fluid withdrawn.      DX-CHEST-PORTABLE (1 VIEW)   Final Result      1. New postsurgical changes in the upper thoracic spine.   2. Worsening bilateral pleural effusions, pulmonary vascular congestion and interstitial edema.      DX-PORTABLE FLUORO > 1 HOUR   Final Result      Portable fluoroscopy utilized for 1 minute 19 seconds.      INTERPRETING LOCATION: [address], [place], [address]      DX-THORACIC SPINE-2 VIEWS   Final Result      Digitized intraoperative radiograph is submitted for review. This examination is not for diagnostic purpose but for guidance during a surgical procedure. Please see the patient's chart for full procedural details.         INTERPRETING LOCATION: [address], [place], [address]      MR-CERVICAL SPINE-WITH & W/O   Final Result         1.  No evidence of metastatic disease to the cervical spine.      MR-LUMBAR SPINE-WITH & W/O   Final Result   Impression:      Multiple bony metastatic lesions as described above, most prominent along the inferior endplate of L1.      Degenerative moderate canal stenosis at L4-5 with cauda equina compression. Epidural thickening and enhancement is noted in this region with severe bilateral facet degeneration and enhancement along the bilateral facet joints. This is felt to be    degenerative in origin.      MR-THORACIC SPINE-WITH & W/O   Final Result         Multiple metastatic lesions of the thoracic spine as described above.      Pathologic compression fracture at T5 with 50%  loss of height and posterior cortical retropulsion with circumferential epidural thickening representing epidural extension of metastatic disease. This causes severe canal stenosis with cord compression.      Metastatic disease also involves the posterior elements and spinous processes at T4, T5.      Pre and paravertebral metastatic soft tissue infiltration at T4-T5 noted.         MR-BRAIN-WITH & W/O   Final Result         Multiple nodular enhancing metastatic lesions involving the cranium as described above.      The largest lesion is in the right posterior temporal/parietal occipital region and demonstrates inner table cortical breakthrough with epidural extension and adjacent mild dural thickening and enhancement.      US-THORACENTESIS PUNCTURE RIGHT   Final Result   Addendum (preliminary) 1 of 1 12/27/2024 10:59 AM      HISTORY/REASON FOR EXAM:  Shortness of breath         TECHNIQUE/EXAM DESCRIPTION:   Ultrasound-guided thoracentesis.      Indication:  RIGHT pleural fluid collection.      COMPARISON:  None-however patient states history of right-sided    thoracentesis September 2024.      PROCEDURE:     Informed consent was obtained. A timeout was taken. A right    pleural effusion was localized with real-time ultrasound guidance. The    right posterior chest wall was prepped and draped in a sterile manner.    Following local anesthesia with 1%    lidocaine, and under live ultrasound guidance a 5 Malay Yueh pigtail    catheter was advanced into the pleural space with trocar technique and    pleural fluid was drained. The patient tolerated the procedure well    without evidence of complication. A post    thoracentesis chest radiograph is forthcoming.      FINDINGS:      Fluid was sent to the laboratory.      Fluid character: serosanguinous         1. Ultrasound guided right sided therapeutic and diagnostic thoracentesis.      2. 1000 mL of fluid withdrawn.      Final      1. Ultrasound guided right sided  therapeutic and diagnostic thoracentesis.      2. 1000 mL of fluid withdrawn.      DX-CHEST-PORTABLE (1 VIEW)   Final Result      1.  Small persistent right pleural effusion which is decreased since recent thoracentesis.      2.  Small to moderate left pleural effusion.      3.  Bilateral perihilar and left lower lobe atelectasis and/or pneumonitis.      4.  Increased interstitial markings throughout the lung fields consistent with parenchymal scarring, and/or pulmonary edema.      CT-LSPINE W/O PLUS RECONS   Final Result         1. Diffuse metastatic disease.   2. No definite pathologic fracture.      CT-TSPINE W/O PLUS RECONS   Final Result         1. Extensive metastatic disease.   2. There is new pathologic compression fracture deformity of T5.      CT-CHEST,ABDOMEN,PELVIS WITH   Final Result         1. New bilateral upper lobe nodular infiltrates.   2. Moderate-sized bilateral pleural effusions.   3. Interval worsening of hepatic metastatic disease.   4. New abdominal lymphadenopathy. Thoracic adenopathy is again noted.   5. Diffuse osseous metastatic disease. There is a new compression fracture of T5 as reported on the prior spine CT.      DX-CHEST-PORTABLE (1 VIEW)   Final Result      1.  Stable bibasilar atelectasis/consolidation and small bilateral pleural effusions.      2.  Stable cardiomegaly and interstitial edema.      DX-CHEST-PORTABLE (1 VIEW)    (Results Pending)        Assessment/Plan  * Acute on chronic respiratory failure with hypoxia and hypercapnia (HCC)- (present on admission)  Assessment & Plan  intubated 1/4  extubated 1/5  re-intubated 1/8  Extubated 1/15  Patient is doing well while extubated.  He has been on 3 to 5 L nasal cannula since extubation  Extensive conversation had with patient and her family regarding her wishes.  She remains DNAR/I okay  We also discussed the likelihood of her needing a tracheostomy if she got intubated again.  At this time, if she were to be intubated again  "she would like to go ahead with a tracheotomy if necessary to prolong her life.    Anemia of chronic disease- (present on admission)  Assessment & Plan  Monitor, transfuse as indicated    Hypokalemia- (present on admission)  Assessment & Plan  Replaced, monitor    Restless leg syndrome- (present on admission)  Assessment & Plan  Requip     Pathological fracture of vertebra- (present on admission)  Assessment & Plan  Pathologic fracture of T5 with myelopathy on presentation  S/P T4-6 laminectomy with fusion and spinal decompression on 1/1/2025 by Dr. Lowery  multi-modal pain management  Fall Precautions  PT OT    Cancer related pain- (present on admission)  Assessment & Plan  Palliative Care following for pain management  Multimodal pain management    Bilateral pleural effusion- (present on admission)  Assessment & Plan  Malignant pleural effusions from metastatic breast cancer s/p thoracenteses and chest tubes  Diuresis as tolerated  monitor chest xrays  PleurX placement in IR 1/11 on the right, monitor and continue to drain    Malignant neoplasm of breast (HCC)- (present on admission)  Assessment & Plan  \"Bilateral breast cancer with extensive destruction of the left breast and ulceration and skin lesions making her inoperable on the left.  Right breast shows a 3 x 3 cm mass.  PET scan March 2023 showed non-hypermetabolic pleural effusions which are cytology positive and she has demonstrable pleural masses. Started aromatase inhibition early September 2023.  Has not yet started CDK 4 6 inhibitor.  Lost to follow-up for over 6 months although apparently was on letrozole as single agent.  Now with clear progression of disease in the left breast.  August 2024: Progression of disease in liver with extensive disease.  Breast is worse.  Lung is better.  Lymph nodes are worse.  Status post radiation to symptomatic oozing and exophytic left breast mass.  Marked worsening of her cancer on letrozole with progression in the " "bone and liver.\"--Dr. Benitez  ongoing goals of care discussions. Patient is fighting to recover and discharge home so she can continue treatment for her cancer  DNR, I OK   Palliative following for pain management as well as further discussions.  Consulted with oncology, her primary oncology team, recommended hospice    Primary hypertension- (present on admission)  Assessment & Plan  Monitor, resume as indicated  PRN's available    Diabetes mellitus, type 2 (HCC)- (present on admission)  Assessment & Plan  A1c 6.4  accuchecks with SSI  CHO diet  glucose goal 140-180    Plan  1/17  Oncology consult to further delineate the patient's overall disease state and prognosis  Recommending hospice at this time  Continue forced diuresis, monitor closely  Monitor chest tube output, in light of the patient's malignant effusion will likely not be able to be reduced and entirely  Potassium replacement,  Symptomatic treatment  Reach out to surgery to remove staples hopefully  PT OT, mobilization, strengthening  Laboratory follow-up  Ongoing goals of care discussion  See orders  Patient is has a high medical complexity, complex decision making and is at high risk for complication, morbidity, and mortality.  I spent 58 minutes, reviewing the chart, obtaining and/or reviewing separately obtained history. Performing a medically appropriate examination and evaluation.  Counseling and educating the patient. Ordering and reviewing medications, tests, or procedures.   Documenting clinical information in EPIC. Independently interpreting results and communicating results to patient. Discussing future disposition of care with patient, RN and case management.     VTE prophylaxis:    Xarelto 10mg daily as prophylaxis      I have performed a physical exam and reviewed and updated ROS and Plan today (1/17/2025). In review of yesterday's note (1/16/2025), there are no changes except as documented above.      Please note that this dictation " was created using voice recognition software. I have made every reasonable attempt to correct obvious errors, but I expect that there are errors of grammar and possibly context that I did not discover before finalizing the note.

## 2025-01-17 NOTE — PROGRESS NOTES
4 Eyes Skin Assessment Completed by RIYA Scott and RIYA Duvall.    Head WDL  Ears Redness, hardened  Nose WDL  Mouth WDL  Neck WDL  Breast/Chest Redness, Abrasion, and Scar    Shoulder Blades WDL  Spine Incision, staples clean, pink, intact  (R) Arm/Elbow/Hand Redness, Bruising           (L) Arm/Elbow/Hand WDL  Abdomen WDL  Groin Redness, Blanching, and Excoriation          Scrotum/Coccyx/Buttocks Redness, Excoriation, Moisture Fissure, and Discoloration, Slow to trey    (R) Leg Discoloration  (L) Leg Discoloration  (R) Heel/Foot/Toe Redness, Slow to trey  (L) Heel/Foot/Toe Redness, Slow to trey          Devices In Places ECG, Blood Pressure Cuff, Pulse Ox, [place], and Central Line      Interventions In Place Heel Mepilex, TAP System, Pillows, Q2 Turns, Low Air Loss Mattress, Barrier Cream, and Heels Loaded W/Pillows    Possible Skin Injury Yes    Pictures Uploaded Into Epic Yes  Wound Consult Placed Yes  RN Wound Prevention Protocol Ordered Yes

## 2025-01-17 NOTE — ASSESSMENT & PLAN NOTE
Pathologic fracture of T5 with myelopathy on presentation  S/P T4-6 laminectomy with fusion and spinal decompression on 1/1/2025 by Dr. Lowery  The patient with L4-5 lesion with cauda equina on imaging  Multimodal pain management

## 2025-01-18 ENCOUNTER — APPOINTMENT (OUTPATIENT)
Dept: RADIOLOGY | Facility: MEDICAL CENTER | Age: 65
End: 2025-01-18
Attending: INTERNAL MEDICINE
Payer: MEDICARE

## 2025-01-18 LAB
ALBUMIN SERPL BCP-MCNC: 3.2 G/DL (ref 3.2–4.9)
ALBUMIN/GLOB SERPL: 0.9 G/DL
ALP SERPL-CCNC: 115 U/L (ref 30–99)
ALT SERPL-CCNC: 48 U/L (ref 2–50)
ANION GAP SERPL CALC-SCNC: 13 MMOL/L (ref 7–16)
AST SERPL-CCNC: 201 U/L (ref 12–45)
BASOPHILS # BLD AUTO: 0.7 % (ref 0–1.8)
BASOPHILS # BLD: 0.12 K/UL (ref 0–0.12)
BILIRUB SERPL-MCNC: 0.5 MG/DL (ref 0.1–1.5)
BUN SERPL-MCNC: 24 MG/DL (ref 8–22)
CALCIUM ALBUM COR SERPL-MCNC: 10.3 MG/DL (ref 8.5–10.5)
CALCIUM SERPL-MCNC: 9.7 MG/DL (ref 8.5–10.5)
CHLORIDE SERPL-SCNC: 86 MMOL/L (ref 96–112)
CO2 SERPL-SCNC: 32 MMOL/L (ref 20–33)
CREAT SERPL-MCNC: 0.73 MG/DL (ref 0.5–1.4)
EOSINOPHIL # BLD AUTO: 0.03 K/UL (ref 0–0.51)
EOSINOPHIL NFR BLD: 0.2 % (ref 0–6.9)
ERYTHROCYTE [DISTWIDTH] IN BLOOD BY AUTOMATED COUNT: 50.4 FL (ref 35.9–50)
GFR SERPLBLD CREATININE-BSD FMLA CKD-EPI: 91 ML/MIN/1.73 M 2
GLOBULIN SER CALC-MCNC: 3.6 G/DL (ref 1.9–3.5)
GLUCOSE BLD STRIP.AUTO-MCNC: 106 MG/DL (ref 65–99)
GLUCOSE BLD STRIP.AUTO-MCNC: 140 MG/DL (ref 65–99)
GLUCOSE BLD STRIP.AUTO-MCNC: 140 MG/DL (ref 65–99)
GLUCOSE BLD STRIP.AUTO-MCNC: 159 MG/DL (ref 65–99)
GLUCOSE BLD STRIP.AUTO-MCNC: 192 MG/DL (ref 65–99)
GLUCOSE SERPL-MCNC: 140 MG/DL (ref 65–99)
HCT VFR BLD AUTO: 35.4 % (ref 37–47)
HGB BLD-MCNC: 11.5 G/DL (ref 12–16)
IMM GRANULOCYTES # BLD AUTO: 0.13 K/UL (ref 0–0.11)
IMM GRANULOCYTES NFR BLD AUTO: 0.8 % (ref 0–0.9)
LYMPHOCYTES # BLD AUTO: 1.04 K/UL (ref 1–4.8)
LYMPHOCYTES NFR BLD: 6.1 % (ref 22–41)
MAGNESIUM SERPL-MCNC: 2.1 MG/DL (ref 1.5–2.5)
MCH RBC QN AUTO: 28.2 PG (ref 27–33)
MCHC RBC AUTO-ENTMCNC: 32.5 G/DL (ref 32.2–35.5)
MCV RBC AUTO: 86.8 FL (ref 81.4–97.8)
MONOCYTES # BLD AUTO: 1.58 K/UL (ref 0–0.85)
MONOCYTES NFR BLD AUTO: 9.3 % (ref 0–13.4)
NEUTROPHILS # BLD AUTO: 14.09 K/UL (ref 1.82–7.42)
NEUTROPHILS NFR BLD: 82.9 % (ref 44–72)
NRBC # BLD AUTO: 0 K/UL
NRBC BLD-RTO: 0 /100 WBC (ref 0–0.2)
NT-PROBNP SERPL IA-MCNC: 373 PG/ML (ref 0–125)
PHOSPHATE SERPL-MCNC: 3 MG/DL (ref 2.5–4.5)
PLATELET # BLD AUTO: 643 K/UL (ref 164–446)
PMV BLD AUTO: 9.2 FL (ref 9–12.9)
POTASSIUM SERPL-SCNC: 4.2 MMOL/L (ref 3.6–5.5)
PROT SERPL-MCNC: 6.8 G/DL (ref 6–8.2)
RBC # BLD AUTO: 4.08 M/UL (ref 4.2–5.4)
SODIUM SERPL-SCNC: 131 MMOL/L (ref 135–145)
WBC # BLD AUTO: 17 K/UL (ref 4.8–10.8)

## 2025-01-18 PROCEDURE — 700111 HCHG RX REV CODE 636 W/ 250 OVERRIDE (IP): Performed by: NURSE PRACTITIONER

## 2025-01-18 PROCEDURE — 770000 HCHG ROOM/CARE - INTERMEDIATE ICU *

## 2025-01-18 PROCEDURE — 700102 HCHG RX REV CODE 250 W/ 637 OVERRIDE(OP): Performed by: HOSPITALIST

## 2025-01-18 PROCEDURE — 82962 GLUCOSE BLOOD TEST: CPT | Mod: 91

## 2025-01-18 PROCEDURE — A9270 NON-COVERED ITEM OR SERVICE: HCPCS | Performed by: HOSPITALIST

## 2025-01-18 PROCEDURE — 83880 ASSAY OF NATRIURETIC PEPTIDE: CPT

## 2025-01-18 PROCEDURE — 80053 COMPREHEN METABOLIC PANEL: CPT

## 2025-01-18 PROCEDURE — 94669 MECHANICAL CHEST WALL OSCILL: CPT

## 2025-01-18 PROCEDURE — 700111 HCHG RX REV CODE 636 W/ 250 OVERRIDE (IP): Mod: JZ | Performed by: STUDENT IN AN ORGANIZED HEALTH CARE EDUCATION/TRAINING PROGRAM

## 2025-01-18 PROCEDURE — 83735 ASSAY OF MAGNESIUM: CPT

## 2025-01-18 PROCEDURE — 94640 AIRWAY INHALATION TREATMENT: CPT

## 2025-01-18 PROCEDURE — 85025 COMPLETE CBC W/AUTO DIFF WBC: CPT

## 2025-01-18 PROCEDURE — 84100 ASSAY OF PHOSPHORUS: CPT

## 2025-01-18 PROCEDURE — 99233 SBSQ HOSP IP/OBS HIGH 50: CPT | Performed by: HOSPITALIST

## 2025-01-18 PROCEDURE — 99233 SBSQ HOSP IP/OBS HIGH 50: CPT | Performed by: PHYSICAL MEDICINE & REHABILITATION

## 2025-01-18 PROCEDURE — 71045 X-RAY EXAM CHEST 1 VIEW: CPT

## 2025-01-18 RX ADMIN — SENNOSIDES AND DOCUSATE SODIUM 2 TABLET: 50; 8.6 TABLET ORAL at 06:55

## 2025-01-18 RX ADMIN — INSULIN LISPRO 2 UNITS: 100 INJECTION, SOLUTION INTRAVENOUS; SUBCUTANEOUS at 08:02

## 2025-01-18 RX ADMIN — METOLAZONE 5 MG: 5 TABLET ORAL at 06:52

## 2025-01-18 RX ADMIN — HYDROCODONE BITARTRATE AND ACETAMINOPHEN 1 TABLET: 10; 325 TABLET ORAL at 06:54

## 2025-01-18 RX ADMIN — INSULIN LISPRO 2 UNITS: 100 INJECTION, SOLUTION INTRAVENOUS; SUBCUTANEOUS at 21:16

## 2025-01-18 RX ADMIN — HYDROCODONE BITARTRATE AND ACETAMINOPHEN 1 TABLET: 10; 325 TABLET ORAL at 15:21

## 2025-01-18 RX ADMIN — ROPINIROLE HYDROCHLORIDE 1 MG: 0.5 TABLET, FILM COATED ORAL at 21:16

## 2025-01-18 RX ADMIN — METOCLOPRAMIDE HYDROCHLORIDE 10 MG: 5 INJECTION INTRAMUSCULAR; INTRAVENOUS at 06:51

## 2025-01-18 RX ADMIN — ROPINIROLE HYDROCHLORIDE 1 MG: 0.5 TABLET, FILM COATED ORAL at 11:33

## 2025-01-18 RX ADMIN — RIVAROXABAN 10 MG: 10 TABLET, FILM COATED ORAL at 18:33

## 2025-01-18 RX ADMIN — Medication 5 MG: at 21:15

## 2025-01-18 RX ADMIN — HYDROCODONE BITARTRATE AND ACETAMINOPHEN 1 TABLET: 10; 325 TABLET ORAL at 21:16

## 2025-01-18 RX ADMIN — HYDROCODONE BITARTRATE AND ACETAMINOPHEN 1 TABLET: 10; 325 TABLET ORAL at 11:33

## 2025-01-18 RX ADMIN — FUROSEMIDE 80 MG: 10 INJECTION, SOLUTION INTRAMUSCULAR; INTRAVENOUS at 06:51

## 2025-01-18 RX ADMIN — POTASSIUM CHLORIDE 40 MEQ: 1500 TABLET, EXTENDED RELEASE ORAL at 06:51

## 2025-01-18 RX ADMIN — HYDROCODONE BITARTRATE AND ACETAMINOPHEN 1 TABLET: 10; 325 TABLET ORAL at 18:33

## 2025-01-18 ASSESSMENT — PAIN DESCRIPTION - PAIN TYPE
TYPE: ACUTE PAIN

## 2025-01-18 ASSESSMENT — ENCOUNTER SYMPTOMS
FEVER: 0
FOCAL WEAKNESS: 0
DIZZINESS: 0
COUGH: 0
NAUSEA: 0
DEPRESSION: 0
MYALGIAS: 1
VOMITING: 0
NECK PAIN: 0
EYES NEGATIVE: 1
CHILLS: 0
NERVOUS/ANXIOUS: 1
CARDIOVASCULAR NEGATIVE: 1
HEARTBURN: 0
HEADACHES: 0
BRUISES/BLEEDS EASILY: 0
GASTROINTESTINAL NEGATIVE: 1
PALPITATIONS: 0
WEAKNESS: 1
POLYDIPSIA: 0
SHORTNESS OF BREATH: 1
BACK PAIN: 1

## 2025-01-18 ASSESSMENT — FIBROSIS 4 INDEX: FIB4 SCORE: 3.16

## 2025-01-18 NOTE — PROGRESS NOTES
Physical Medicine and Rehabilitation           Date of initial consultation: 1/6/2025  LOS: 24 Day(s)    Chief complaint: T5 compression fracture, respiratory failure    HPI: The patient is a 64 y.o. right hand dominant female with a past medical history of breast cancer with metastasis to brain, lungs, pleural, liver, spine;  who presented on 12/25/2024  4:46 PM with hypoxemic respiratory failure related bilateral pleural effusions, now status post left thoracentesis on 12/27 and right thoracentesis on 1/2.  Patient required right chest tube on 1/3, with upsizing to large bore chest tube on 1/4 and intubation.  Patient was also found to have a T5 pathologic compression fracture and was taken to the OR Dr. Karthik Lowery DO on 1/1/2025 for T4-6 decompression and posterior spinal fusion.    Current labs reflect anemia with hemoglobin 9.0. She has an NG tube in right nare. She has right chest tube, left one has been removed. She reports using a 4ww at baseline. She endorses back pain. Denies numbness/tingling. Denies issues with bowel or bladder control.     1/18/2025  Follow-up after extended interval.  Long discussion today regarding patient's functional status and overall prognosis.  She is currently on 30 L high flow nasal cannula oxygen.  She is very weak, has restless legs.  Was seen by Dr. Brock with oncology yesterday who reported a less than 10% chance of response to treatment and recommended hospice.  Patient follows with Dr. Leander ANDERSON and she values his opinion more.  Patient is not ready for hospice yet.  Wants to attempt IPR and further treatment.    ROS  Pertinent positives are mentioned in the HPI, all others reviewed and are negative.    Social Hx:  1 SH  1 LOBO  With: Adult daughter    THERAPY:  Restrictions: Fall risk   PT: Functional mobility   1/3: Walked 6 feet with four-wheel walker contact-guard assist, displaying ataxia  1/7: Max assist x 2 for sit to stand    OT: ADLs  1/15: Total assist  lower body dressing and toileting, max assist upper body dressing, mod assist grooming    SLP:   1/5: Regular solids, thin liquids  1/16: Mild to moderate oropharyngeal dysphagia    IMAGING:  CT head 1/4/2025  1. No acute process in the brain evident.  2. Fluid in sphenoid sinus.  3. Right convexity midthoracic scoliosis and unchanged from 12/31/2024 MRI.    MR L-spine 12/31/2024    Multiple bony metastatic lesions as described above, most prominent along the inferior endplate of L1.  Degenerative moderate canal stenosis at L4-5 with cauda equina compression. Epidural thickening and enhancement is noted in this region with severe bilateral facet degeneration and enhancement along the bilateral facet joints. This is felt to be   degenerative in origin.    PROCEDURES:  Karthik Lowery DO 1/1/2025  T4-T6 thoracic laminectomy for decompression of spinal cord (23535)  T4-T6 pedicle screw kaiser fixation (82848)  T4-T5, T5- T6 posterior posterolateral arthrodesis (98780, 52088)  Microscope for microsurgical technique (32803)  Biplanar fluoroscopy  Electrophysiological monitoring of somatosensory evoked potentials and motor evoked potentials.   Allograft osteopromotive material (91931)    Left chest tube 12/27  Right chest tube 1/3  Intubation 1/4  Right chest tube 1/4    PMH:  Past Medical History:   Diagnosis Date    Anxiety     Breast cancer metastasized to bone, unspecified laterality (HCC) 12/25/2024    Depression     Diabetes (HCC)     HLD (hyperlipidemia)     HTN (hypertension)     Hypothyroidism        PSH:  Past Surgical History:   Procedure Laterality Date    THORACIC LAMINECTOMY  1/1/2025    Procedure: LAMINECTOMY, SPINE, THORACIC. T4-T6;  Surgeon: Karthik Lowery D.O.;  Location: SURGERY [facility];  Service: Neurosurgery    FUSION, SPINE, THORACIC APPROACH  1/1/2025    Procedure: FUSION, SPINE, THORACIC APPROACH;  Surgeon: Karthik Lowery D.O.;  Location: SURGERY [facility];  Service: Neurosurgery     "CHOLECYSTECTOMY      OTHER      mastoid bone       FHX:    Family History   Problem Relation Age of Onset    Lung Cancer Mother     Uterine cancer Sister     Cancer Maternal Grandfather     Stomach Cancer Paternal Grandmother        Medications:  Current Facility-Administered Medications   Medication Dose    HYDROcodone/acetaminophen (Norco)  MG per tablet 1 Tablet  1 Tablet    insulin lispro (HumaLOG,AdmeLOG) subcutaneous injection  2-9 Units    And    dextrose 50% (D50W) injection 25 g  25 g    melatonin tablet 5 mg  5 mg    metOLazone (Zaroxolyn) tablet 5 mg  5 mg    potassium chloride SA (Kdur) tablet 40 mEq  40 mEq    rivaroxaban (Xarelto) tablet 10 mg  10 mg    ROPINIRole (Requip) tablet 1 mg  1 mg    senna-docusate (Pericolace Or Senokot S) 8.6-50 MG per tablet 2 Tablet  2 Tablet    And    polyethylene glycol/lytes (Miralax) Packet 1 Packet  1 Packet    And    magnesium hydroxide (Milk Of Magnesia) suspension 30 mL  30 mL    And    bisacodyl (Dulcolax) suppository 10 mg  10 mg    acetaminophen (Tylenol) tablet 650 mg  650 mg    calcium carbonate (Tums) chewable tab 500 mg  500 mg    methocarbamol (Robaxin) tablet 750 mg  750 mg    metoclopramide (Reglan) tablet 10 mg  10 mg    ondansetron (Zofran ODT) dispertab 4 mg  4 mg    ROPINIRole (Requip) tablet 1 mg  1 mg    LORazepam (Ativan) injection 0.26 mg  0.26 mg    prochlorperazine (Compazine) injection 10 mg  10 mg    furosemide (Lasix) injection 80 mg  80 mg    Respiratory Therapy Consult      MD Alert...ICU Electrolyte Replacement per Pharmacy      labetalol (Normodyne/Trandate) injection 10 mg  10 mg    ipratropium-albuterol (DUONEB) nebulizer solution  3 mL    sodium chloride (Ocean) 0.65 % nasal spray 2 [place]  2 [place]    MD ALERT...DO NOT ADMINISTER NSAIDS or ASPIRIN unless ORDERED By Neurosurgery 1 Each  1 Each    ondansetron (Zofran) syringe/vial injection 4 mg  4 mg       Allergies:  Allergies   Allergen Reactions    Demerol Rash     \"was told " "this during surgery\"  Other reaction(s): rash         Physical Exam:  Vitals: /56   Pulse 72   Temp 36.6 °C (97.9 °F) (Temporal)   Resp (!) 31   Ht 1.727 m (5' 7.99\")   Wt 84 kg (185 lb 3 oz)   SpO2 98%   Gen: NAD  Head: NC/AT  Eyes/ Nose/ Mouth: PERRLA, moist mucous membranes  Cardio: RRR, good distal perfusion, warm extremities  Pulm: normal respiratory effort, no cyanosis   Abd: Soft NTND, negative borborygmi   Ext: No peripheral edema. No calf tenderness. No clubbing.    LINES: Right nare NG, right chest tube, right art line.     Mental status:  A&Ox4 (person, place, date, situation) answers questions appropriately follows commands  Speech: fluent, no aphasia or dysarthria      Motor:      Upper Extremity  Myotome R L   Shoulder flexion C5 4/5 4/5   Elbow flexion C5 4/5 4/5   Wrist extension C6 4/5 4/5   Elbow extension C7 4-/5 4-/5   Finger flexion C8 4/5 4/5   Finger abduction T1 4/5 4/5     Lower Extremity Myotome R L   Hip flexion L2 3/5 3/5   Knee extension L3 4/5 4/5   Ankle dorsiflexion L4 5 5   Toe extension L5 5 5   Ankle plantarflexion S1 5 5     Skin:Right posterior hip 1/5/25    Sacrum 1/16      Spinal incisions 1/17    Labs: Reviewed and significant for   Recent Labs     01/16/25  0517 01/17/25  0525 01/18/25  0423   RBC 3.33* 3.29* 4.08*   HEMOGLOBIN 9.3* 9.4* 11.5*   HEMATOCRIT 30.7* 30.2* 35.4*   PLATELETCT 563* 537* 643*     Recent Labs     01/16/25  0517 01/16/25  1215 01/16/25  2334 01/17/25  0525 01/18/25  0423   SODIUM 136  --   --  133* 131*   POTASSIUM 4.5   < > 4.2 4.2 4.2   CHLORIDE 94*  --   --  90* 86*   CO2 35*  --   --  36* 32   GLUCOSE 155*  --   --  107* 140*   BUN 27*  --   --  23* 24*   CREATININE 0.56  --   --  0.60 0.73   CALCIUM 9.1  --   --  9.3 9.7    < > = values in this interval not displayed.     Recent Results (from the past 24 hours)   POCT glucose device results    Collection Time: 01/17/25  5:12 PM   Result Value Ref Range    POC Glucose, Blood 137 (H) 65 " - 99 mg/dL   POCT glucose device results    Collection Time: 01/17/25  9:33 PM   Result Value Ref Range    POC Glucose, Blood 140 (H) 65 - 99 mg/dL   CBC WITH DIFFERENTIAL    Collection Time: 01/18/25  4:23 AM   Result Value Ref Range    WBC 17.0 (H) 4.8 - 10.8 K/uL    RBC 4.08 (L) 4.20 - 5.40 M/uL    Hemoglobin 11.5 (L) 12.0 - 16.0 g/dL    Hematocrit 35.4 (L) 37.0 - 47.0 %    MCV 86.8 81.4 - 97.8 fL    MCH 28.2 27.0 - 33.0 pg    MCHC 32.5 32.2 - 35.5 g/dL    RDW 50.4 (H) 35.9 - 50.0 fL    Platelet Count 643 (H) 164 - 446 K/uL    MPV 9.2 9.0 - 12.9 fL    Neutrophils-Polys 82.90 (H) 44.00 - 72.00 %    Lymphocytes 6.10 (L) 22.00 - 41.00 %    Monocytes 9.30 0.00 - 13.40 %    Eosinophils 0.20 0.00 - 6.90 %    Basophils 0.70 0.00 - 1.80 %    Immature Granulocytes 0.80 0.00 - 0.90 %    Nucleated RBC 0.00 0.00 - 0.20 /100 WBC    Neutrophils (Absolute) 14.09 (H) 1.82 - 7.42 K/uL    Lymphs (Absolute) 1.04 1.00 - 4.80 K/uL    Monos (Absolute) 1.58 (H) 0.00 - 0.85 K/uL    Eos (Absolute) 0.03 0.00 - 0.51 K/uL    Baso (Absolute) 0.12 0.00 - 0.12 K/uL    Immature Granulocytes (abs) 0.13 (H) 0.00 - 0.11 K/uL    NRBC (Absolute) 0.00 K/uL   Comp Metabolic Panel    Collection Time: 01/18/25  4:23 AM   Result Value Ref Range    Sodium 131 (L) 135 - 145 mmol/L    Potassium 4.2 3.6 - 5.5 mmol/L    Chloride 86 (L) 96 - 112 mmol/L    Co2 32 20 - 33 mmol/L    Anion Gap 13.0 7.0 - 16.0    Glucose 140 (H) 65 - 99 mg/dL    Bun 24 (H) 8 - 22 mg/dL    Creatinine 0.73 0.50 - 1.40 mg/dL    Calcium 9.7 8.5 - 10.5 mg/dL    Correct Calcium 10.3 8.5 - 10.5 mg/dL    AST(SGOT) 201 (H) 12 - 45 U/L    ALT(SGPT) 48 2 - 50 U/L    Alkaline Phosphatase 115 (H) 30 - 99 U/L    Total Bilirubin 0.5 0.1 - 1.5 mg/dL    Albumin 3.2 3.2 - 4.9 g/dL    Total Protein 6.8 6.0 - 8.2 g/dL    Globulin 3.6 (H) 1.9 - 3.5 g/dL    A-G Ratio 0.9 g/dL   MAGNESIUM    Collection Time: 01/18/25  4:23 AM   Result Value Ref Range    Magnesium 2.1 1.5 - 2.5 mg/dL   PHOSPHORUS     Collection Time: 01/18/25  4:23 AM   Result Value Ref Range    Phosphorus 3.0 2.5 - 4.5 mg/dL   proBrain Natriuretic Peptide, NT    Collection Time: 01/18/25  4:23 AM   Result Value Ref Range    NT-proBNP 373 (H) 0 - 125 pg/mL   ESTIMATED GFR    Collection Time: 01/18/25  4:23 AM   Result Value Ref Range    GFR (CKD-EPI) 91 >60 mL/min/1.73 m 2   POCT glucose device results    Collection Time: 01/18/25  6:50 AM   Result Value Ref Range    POC Glucose, Blood 192 (H) 65 - 99 mg/dL   POCT glucose device results    Collection Time: 01/18/25 11:31 AM   Result Value Ref Range    POC Glucose, Blood 106 (H) 65 - 99 mg/dL       ASSESSMENT:  Patient is a 64 y.o. female admitted with respiratory failure now s/p intubation, BL chest tubes; and T5 compression fracture requiring T4-6 PSF     Rehabilitation: Impaired ADLs and mobility  Barriers to transfer include: Insurance authorization, TCCs to verify disposition, medical clearance and bed availability. All cases are subject to administrative review.     [place] Code / Diagnosis to Support: 0008.9 - Orthopaedic Disorders: Other Orthopaedic, 0017.2 - Medically Complex: Neoplasms, and 0017.52 - Medically Complex: Respiratory Disorders - Non-Ventilator Dependent    Disposition recommendations:  -Patient has gotten much weaker.  On my initial exam she was in 4/5 strength at bilateral hip flexors, now much weaker from disuse.  She is particularly weak in the bilateral hips and triceps which are required for standing.  We discussed a low chance of functional recovery with SNF placement, and if it comes to this I would recommend hospice instead.  Patient wants to continue to try for IPR placement, which we can do but she needs to be able to tolerate an IPR program and demonstrate that she can stand and walk a few steps prior to transfer.  -High flow nasal cannula oxygen currently a barrier to IPR  - TCC to verify DC support  -PMR to follow in the periphery for rehab appropriateness, please  reach out with questions or request for medical management    Medical Complexity:    Debility due to multiple medical conditions   - Specific treatments below   - Continue PT/OT while in house   -Very weak  -Needs to be able to stand with max assist or better and take a few steps to qualify for IPR and maximize her chances of success with continued cancer treatment  -Would not recommend SNF placement  -If patient is ready for discharge but unable to come to IPR, recommend home with hospice    Septic Shock   Acute respiratory failure   - Presumed Pneumonia   - Required intubation, then extubation to Bi-pap and BL chest tubes   - Left chest tube out   - Right chest tube to be removed soon   -Completed Unasyn  -Now on high flow nasal cannula oxygen, 30 L  -WBC increased to 17 today, primary team managing    Restless leg syndrome  -Ropinirole 1 mg twice daily as needed and 1 mg nightly    Pathologic compression fracture   - T5 compression fracture   - S/P T4-6 PSF by Dr. Beverley DO on 1/1/25    Widespread metastatic breast cancer   - Follows with Dr. Benitez  - No current plans for chemo, was last on Letrozole   - S/P radiation     Dysphagia   -NG tube removed  -Mild to moderate oropharyngeal dysphagia    Hypertension   - Coreg 3.125mg BID   - Losartan 100mg daily     DVT PPX: Lovenox       Thank you for allowing us to participate in the care of this patient.     Total time: >50 minutes. This includes time spent reviewing patient's history, notes, labs, imaging, vitals, medications, examining patient, discussing care with patient and family including prognosis, risk reduction, benefits of treatment, and options for next stage of care, and communicating recommendations to primary team.     Indio Culver,    Physical Medicine and Rehabilitation     Please note that this dictation was created using voice recognition software. I have made every reasonable attempt to correct obvious errors, but there may be errors  of grammar and possibly content that I did not discover before finalizing the note.

## 2025-01-18 NOTE — PROGRESS NOTES
Dr Sergio Varela made aware pt is complaining of abd pain and nausea. Zofran has been given but it doesn't seem to help much. Also, las BM was 1/13/25.

## 2025-01-18 NOTE — CARE PLAN
The patient is Stable - Low risk of patient condition declining or worsening    Shift Goals  Clinical Goals: Manage pain effectively to improve comfort and facilitate recovery. Promote safe mobility and prevent complications related to immobility after laminectomy and metastatic disease. Maintain adequate respiratory function and oxygenation, especially given metastatic lung involvement. Promote wound healing and prevent infection at the surgical site (T4-T6 laminectomy).  Ensure adequate nutrition to support healing and overall health.  Provide emotional support and address the psychological impact of metastatic cancer diagnosis and surgery.  Educate the patient and family about the care plan, treatment options, and the disease process.  Patient Goals: To rest and sleep  Family Goals: Updates      Problem: Knowledge Deficit - Standard  Goal: Patient and family/care givers will demonstrate understanding of plan of care, disease process/condition, diagnostic tests and medications  Outcome: Progressing     Problem: Skin Integrity  Goal: Skin integrity is maintained or improved  Outcome: Progressing     Problem: Fall Risk  Goal: Patient will remain free from falls  Outcome: Progressing     Problem: Pain - Standard  Goal: Alleviation of pain or a reduction in pain to the patient’s comfort goal  Outcome: Progressing     Problem: Hemodynamics  Goal: Patient's hemodynamics, fluid balance and neurologic status will be stable or improve  Outcome: Progressing     Problem: Fluid Volume  Goal: Fluid volume balance will be maintained  Outcome: Progressing     Problem: Knowledge Deficit:  Goal: Knowledge of diagnostic tests will improve  Outcome: Progressing

## 2025-01-18 NOTE — CARE PLAN
The patient is Watcher - Medium risk of patient condition declining or worsening    Shift Goals  Clinical Goals: Pain management; maintain adequate respiratory function  Patient Goals: To rest and sleep  Family Goals: Updates    Progress made toward(s) clinical / shift goals:  pain management, maintain adequate respiratory function    Patient is not progressing towards the following goals:

## 2025-01-18 NOTE — PROGRESS NOTES
"Palliative Care   Appreciate update from bedside RN - pulling central line and urinary catheter.  She had short incidence of desaturation which recovered quickly on HFNC.  Patient's daughter at bedside and awake.  Patient stated \"I did not hold up my end of the bargain\" in regards to completing POLST form.  Confirmed it is not an issue.  She appreciated me following up and would prefer to talk Monday or Tuesday.  I will follow-up early next week.    ARACELI Rae.R.N.  Palliative Care Nurse Practitioner  [phone]             "

## 2025-01-18 NOTE — CARE PLAN
The patient is Watcher - Medium risk of patient condition declining or worsening    Shift Goals  Clinical Goals: O2 and hemodynamic stability  Patient Goals: rest, urinate  Family Goals: updates    Progress made toward(s) clinical / shift goals:  Pt went from NC to high flow. Hemodynamics stable.     Problem: Skin Integrity  Goal: Skin integrity is maintained or improved  Outcome: Progressing     Problem: Pain - Standard  Goal: Alleviation of pain or a reduction in pain to the patient’s comfort goal  Outcome: Progressing     Problem: Hemodynamics  Goal: Patient's hemodynamics, fluid balance and neurologic status will be stable or improve  Outcome: Progressing       Patient is not progressing towards the following goals:      Problem: Respiratory  Goal: Patient will achieve/maintain optimum respiratory ventilation and gas exchange  Outcome: Not Progressing

## 2025-01-18 NOTE — PROGRESS NOTES
Assumed care of patient; received bedside report from Jonathan RITTER; all questions answered. Patient voices no needs at this time.

## 2025-01-18 NOTE — PROGRESS NOTES
[facility] Medicine Daily Progress Note    Date of Service  1/18/2025    Chief Complaint  Asha Hannon is a 64 y.o. female admitted 12/25/2024 with weakness, shortness of breath, back pain, has a history of metastatic breast cancer     Hospital Course  64 y.o. female who presented 12/25/2024 with history of stage IV breast cancer metastatic disease, mets to the lungs and now spine, on previous oral chemotherapy, admitted on 12/25/2024 with weakness, shortness of breath and back pain, subsequently a CT chest abdomen showed moderate-sized pleural effusions, worsening hepatic metastatic disease with new abdominal lymphadenopathy, thoracic adenopathy, diffuse osseous metastatic disease with a T5 pathologic fracture, the patient was admitted, undergo a right-sided thoracentesis on 12/27, malignant cells identified in the fluid, and MRI showed multiple thoracic and lumbar metastasized lesions, the patient was taken to the operating room on 1 1 where she had a T4 T6 laminectomy, following the patient developed a right-sided pneumothorax, requiring chest tube placement, left-sided thoracentesis was performed, the patient following his respiratory difficulty requiring BiPAP treatment and subsequently required to be intubated, the patient had a right-sided PleurX and small bore catheter placed and had frequent thoracentesis since placement, the patient remained intubated until 1/15, where the patient then was extubated and had a extensive talk with the ICU team in terms of her overall status, metastatic malignancy and respiratory failure, the patient stated that she wanted to keep fighting and is wishing to hopefully reach outpatient status where she can fail to be treated for her malignancy.  If she would have additional respiratory failure she states that she would like to be reintubated and possibly even undergo a tracheostomy to stay alive.  The patient tolerated extubation, she is alert and x 4, she is in a  sinus rhythm in the 50s to 80s, blood pressure in the 90s to 100s, the patient was n.p.o., underwent evaluation today by speech therapy and is cleared for mildly thick liquids, soft and bite-size solids, the right chest tube had put out 100 cc overnight, the patient remains on DVT prophylaxis with Lovenox, Pepcid for GI prophylaxis,  Laboratory data showed white to count 13.9 hemoglobin 9.3 platelet count 563 sodium 136 potassium 4.5 chloride 94 bicarb 35 glucose 155 BUN 27 creatinine 0.56  Chest x-ray with pulmonary edema and infiltrates, stable, layering left pleural effusion, stable, right-sided small bore chest tube  The patient has been followed by palliative care, she currently confirms a DNR I okay status, the patient lives with her daughter  The patient has been consulted on by palliative care  The patient had a consult from her primary oncology team on 1/17/2025    Interval Problem Update  Patient seen and examined today.  Data, Medication data reviewed.  Case discussed with nursing as available.  Plan of Care reviewed with patient and notified of changes.  1/17 the patient feels somewhat better, she is oxygenating okay, she has still significant pain, she feels weak, nursing reports the patient is a 2-3 person assist, unsafe to sit up in a chair, the patient continues to resist certain medical interventions, currently afebrile, heart rate in the 70s, respiration unlabored, blood pressure in the 130s over 60s, the patient is saturating in the mid 90s on 5 L, chest tube output 190, no airleak, in the afternoon the patient has some increase in oxygen demand, chest x-ray appears fairly unchanged, left-sided fluid, right-sided small bore chest tube in place, no pneumothorax, with a count 10.5 hemoglobin 9.4 hematocrit 30.2 platelet count 537 sodium 133 potassium 4.2 chloride 90 bicarb 36 glucose 107 BUN 23 creatinine 0.6  1/18 the patient feels less anxious today, she has improved she feels, she would like to  titrate down oxygen, she would like to engage with more therapy, PMR has spent quite some time with the patient and made expectations clear, if she fails to improve where she could transition to acute rehab the patient should most likely consider home hospice  Patient is currently afebrile, heart rate in the 70s respiration unlabored the patient is down to 5 L nasal cannula now saturating 96%, blood pressure 120s over 50s  Pain appears to be controlled I discussed the possibility of palliative radiation to her spine, will engage on Monday  Wbc count 17, hemoglobin 11.5 platelet count 643 sodium 131 potassium 4.2 chloride 86 bicarb 32 glucose 140 BUN 24 creatinine 0.73  alk phos 115  glucose 106    I have discussed this patient's plan of care and discharge plan at IDT rounds today with Case Management, Nursing, Nursing leadership, and other members of the IDT team.    Consultants/Specialty  critical care, oncology, and palliative care, physiatry    Code Status  DNAR, I OK    Disposition  The patient is not medically cleared for discharge to home or a post-acute facility.  Anticipate discharge to: an inpatient rehabilitation hospital    I have placed the appropriate orders for post-discharge needs.    Review of Systems  Review of Systems   Constitutional:  Positive for malaise/fatigue. Negative for chills and fever.   HENT: Negative.     Eyes: Negative.    Respiratory:  Positive for shortness of breath. Negative for cough.    Cardiovascular: Negative.  Negative for chest pain and palpitations.   Gastrointestinal: Negative.  Negative for heartburn, nausea and vomiting.   Genitourinary: Negative.  Negative for dysuria and frequency.   Musculoskeletal:  Positive for back pain and myalgias. Negative for neck pain.   Skin: Negative.  Negative for itching and rash.   Neurological:  Positive for weakness. Negative for dizziness, focal weakness and headaches.   Endo/Heme/Allergies: Negative.  Negative for  polydipsia. Does not bruise/bleed easily.   Psychiatric/Behavioral:  Negative for depression. The patient is nervous/anxious.         Physical Exam  Temp:  [36 °C (96.8 °F)-36.7 °C (98.1 °F)] 36.6 °C (97.9 °F)  Pulse:  [64-89] 85  Resp:  [17-50] 42  BP: (111-182)/(53-94) 140/77  SpO2:  [55 %-97 %] 96 %    Physical Exam  Vitals and nursing note reviewed.   Constitutional:       Appearance: She is well-developed. She is obese. She is ill-appearing. She is not diaphoretic.   HENT:      Head: Normocephalic and atraumatic.      Nose: Nose normal.   Eyes:      Conjunctiva/sclera: Conjunctivae normal.      Pupils: Pupils are equal, round, and reactive to light.   Neck:      Thyroid: No thyromegaly.      Vascular: No JVD.   Cardiovascular:      Rate and Rhythm: Normal rate and regular rhythm.      Heart sounds: Normal heart sounds.      No friction rub. No gallop.   Pulmonary:      Effort: Pulmonary effort is normal.      Breath sounds: Rhonchi present. No wheezing or rales.      Comments: Right-sided small bore chest tube, no air leak, serous fluid  Abdominal:      General: Bowel sounds are normal. There is no distension.      Palpations: Abdomen is soft. There is no mass.      Tenderness: There is no abdominal tenderness. There is no guarding or rebound.   Musculoskeletal:         General: No tenderness. Normal range of motion.      Cervical back: Normal range of motion and neck supple.      Right lower leg: Edema present.      Left lower leg: Edema present.   Lymphadenopathy:      Cervical: No cervical adenopathy.   Skin:     General: Skin is warm and dry.      Coloration: Skin is pale.      Findings: Bruising present.   Neurological:      Mental Status: She is alert and oriented to person, place, and time.      Cranial Nerves: No cranial nerve deficit.      Motor: Weakness present.   Psychiatric:         Behavior: Behavior normal.         Fluids    Intake/Output Summary (Last 24 hours) at 1/18/2025 0808  Last data filed  at 1/18/2025 0700  Gross per 24 hour   Intake --   Output 1516 ml   Net -1516 ml        Laboratory  Recent Labs     01/16/25  0517 01/17/25  0525 01/18/25  0423   WBC 13.9* 10.5 17.0*   RBC 3.33* 3.29* 4.08*   HEMOGLOBIN 9.3* 9.4* 11.5*   HEMATOCRIT 30.7* 30.2* 35.4*   MCV 92.2 91.8 86.8   MCH 27.9 28.6 28.2   MCHC 30.3* 31.1* 32.5   RDW 54.9* 53.6* 50.4*   PLATELETCT 563* 537* 643*   MPV 8.8* 8.8* 9.2     Recent Labs     01/16/25  0517 01/16/25  1215 01/16/25  2334 01/17/25  0525 01/18/25  0423   SODIUM 136  --   --  133* 131*   POTASSIUM 4.5   < > 4.2 4.2 4.2   CHLORIDE 94*  --   --  90* 86*   CO2 35*  --   --  36* 32   GLUCOSE 155*  --   --  107* 140*   BUN 27*  --   --  23* 24*   CREATININE 0.56  --   --  0.60 0.73   CALCIUM 9.1  --   --  9.3 9.7    < > = values in this interval not displayed.                   Imaging  DX-CHEST-PORTABLE (1 VIEW)   Final Result         1.  Pulmonary edema and/or infiltrates are identified, which are stable since the prior exam.   2.  Cardiomegaly      DX-CHEST-PORTABLE (1 VIEW)   Final Result      1.  Interval improvement of pulmonary vascular congestion and pulmonary edema.   2.  No other significant change from prior exam.         DX-CHEST-PORTABLE (1 VIEW)   Final Result         1.  Pulmonary edema and/or infiltrates are identified, which appear somewhat increased since the prior exam.   2.  Layering left pleural effusion, stable   3.  Cardiomegaly      DX-CHEST-PORTABLE (1 VIEW)   Final Result         1.  Pulmonary edema and/or infiltrates are identified, which are stable since the prior exam.   2.  Layering left pleural effusion, stable      DX-CHEST-PORTABLE (1 VIEW)   Final Result         1.  Pulmonary edema and/or infiltrates are identified, which are stable since the prior exam.   2.  Layering left pleural effusion, stable      DX-CHEST-PORTABLE (1 VIEW)   Final Result         1.  Pulmonary edema and/or infiltrates are identified, which are stable since the prior exam.    2.  Trace bilateral pleural effusions, stable      DX-CHEST-PORTABLE (1 VIEW)   Final Result         1.  Pulmonary edema and/or infiltrates are identified, which are stable since the prior exam.   2.  Small left pleural effusion, stable      DX-CHEST-PORTABLE (1 VIEW)   Final Result      1.  Support equipment is unchanged.      2.  Cardiomegaly with moderate perihilar pulmonary edema unchanged from previous exam.      3.  Small bilateral pleural effusions left greater than right.      DX-CHEST-PORTABLE (1 VIEW)   Final Result      1.  Tubes and lines unchanged in position.      2.  Marked bilateral perihilar pulmonary edema which has improved slightly since previous exam.      3.  Small bilateral pleural effusions left greater than right.      IR-INSERT PLEURAL CATH W/ CUFF   Final Result      1.  Ultrasound and fluoroscopic guided placement of a RIGHT HEMITHORAX PleurX (Aspira type) tunneled pleural drainage catheter.   2.  The catheter can be used immediately with drainage of pleural effusion as needed for comfort, as per package insert instructions.   3.  If the catheter becomes dislodged, do not reinsert it.  Place sterile dressing over the entry wound site.  May consult primary care physician or oncologist as to whether there is clinical indication for PleurX replacement.      DX-CHEST-PORTABLE (1 VIEW)   Final Result      1.  Tubes and lines unchanged in position.      2.   Extensive bilateral perihilar airspace opacities consistent with pneumonitis and/or pulmonary edema.      3.  Mild to moderate bilateral pleural effusions right greater than left      DX-CHEST-PORTABLE (1 VIEW)   Final Result         1. Increasing moderate right and small left pleural effusions. No change edema or infiltrates.      2. ETT 5 cm above masoud. NG tube in stomach. Right central line.                     DX-CHEST-PORTABLE (1 VIEW)   Final Result      Stable diffuse bilateral pulmonary infiltrates.      DX-ABDOMEN FOR TUBE  PLACEMENT   Final Result      NG tube tip overlies the gastric body.      DX-CHEST-PORTABLE (1 VIEW)   Final Result      1.  Supportive tubing as described above.   2.  Apparent increasing RIGHT pleural fluid, possibly due to change in position.   3.  Improvement of LEFT lung base consolidation.   4.  No other significant change from prior exam.         DX-CHEST-PORTABLE (1 VIEW)   Final Result         1. Stable right IJ central catheter. Right chest tube has been removed. No pneumothorax.   2. Unchanged bilateral pulmonary opacities and associated pleural effusions.         DX-CHEST-PORTABLE (1 VIEW)   Final Result      1.  Unchanged bilateral pulmonary opacities. Stable small bilateral pleural effusions.   2.  Right pigtail catheter remains. No pneumothorax.      DX-CHEST-PORTABLE (1 VIEW)   Final Result      Removal of large bore right chest tube with stable right pigtail chest tube. Questionable tiny right apical pneumothorax.      DX-CHEST-PORTABLE (1 VIEW)   Final Result      No significant interval change.      DX-CHEST-PORTABLE (1 VIEW)   Final Result      1.  Persistent bilateral pulmonary opacities, stable to slightly worse, although changes may be related to differences in imaging technique. No large pleural effusions.   2.  Well-positioned right IJ central catheter. No pneumothorax.   3.  Stable other lines and tubes.      DX-CHEST-LIMITED (1 VIEW)   Final Result         1.  No change in 2 separate right-sided chest tubes.      2.  No pneumothorax identified.      3.  Bilateral pulmonary opacifications again identified which are slightly less prominent.      DX-CHEST-PORTABLE (1 VIEW)   Final Result         1. No change mild infiltrates.      2. Right chest tubes. No pneumothorax.      3. ETT out. NG tube and jugular line remain.                     DX-CHEST-PORTABLE (1 VIEW)   Final Result         1. No significant interval change.      DX-CHEST-LIMITED (1 VIEW)   Final Result         1.  Findings on  chest radiograph appear stable since the prior radiograph.  No new abnormalities are identified.      2.  2 separate right-sided chest tubes are again identified with no change in position.      3.  No pneumothorax identified.      DX-CHEST-LIMITED (1 VIEW)   Final Result         1.  No pneumothorax identified.      2.  2 right-sided chest tubes again identified.      CT-CTA CHEST PULMONARY ARTERY W/ RECONS   Final Result         1. No pulmonary embolus.      2. Medial right lung base consolidation felt to represent complete collapse of the right middle lobe. Mild to moderate mid and upper lung pneumonia infiltrates remain.      3. Right chest tubes. Small right pneumothorax and small right effusion. Improving small 2.5 cm left pleural effusion since 12/25/2024.      4. Extensive metastatic lesions including adenopathy, liver lesions, right breast nodule and bone lesions. Thoracic spine fusion hardware remains.                  CT-HEAD W/O   Final Result         1. No acute process in the brain evident.      2. Fluid in sphenoid sinus.      3. Right convexity midthoracic scoliosis and unchanged from 12/31/2024 MRI.               DX-ABDOMEN FOR TUBE PLACEMENT   Final Result         1. NG tube in stomach.                     DX-CHEST-FOR LINE PLACEMENT Perform procedure in: Patient's Room   Final Result         1. Small right apical pneumothorax remains. 2 right chest tubes.      2. ETT 3 cm above masoud. NG tube in stomach. Right jugular line in SVC. No complication evident.      3. Mild infiltrates and small right effusion.                  DX-CHEST-LIMITED (1 VIEW)   Final Result         1. 2nd right chest tube placed with near complete evacuation of right pneumothorax.      2. Mild infiltrates, basilar atelectasis and small right effusion remain.      3. Endotracheal tube tip at about T5 and about 2 cm above the masoud. The masoud and tip of the endotracheal tube are difficult to visualize in part because of  overlying thoracic spine fusion hardware.                  DX-CHEST-PORTABLE (1 VIEW)   Final Result         Enlarging right pneumothorax with collapsing of the right lung.      Preliminary findings texted to Dr. STANLEY FISHER in the Emergency Department via Voalte on 1/4/2025 1:50 AM         DX-CHEST-LIMITED (1 VIEW)   Final Result         Interval placement of right pigtail chest tube. Small right residual pneumothorax.      DX-CHEST-PORTABLE (1 VIEW)   Final Result   Addendum (preliminary) 1 of 1      CRITICAL RESULT READ BACK: Preliminary findings discussed with and    critical read back performed by Dr. Downs via telephone on 1/3/2025 8:03    PM      Final      1.  Interval worsening in right pneumothorax, now moderate.   2.  Persistent interstitial and alveolar pulmonary opacities bilaterally. Possible bilateral pleural effusions, right greater than left.            DX-CHEST-PORTABLE (1 VIEW)   Final Result         1. Slightly decreased right pneumothorax.   2. Stable bilateral interstitial and airspace opacification.   3. Stable right pleural effusion.      DX-CHEST-PORTABLE (1 VIEW)   Final Result         1. Left pleural effusion has decreased in size status post thoracentesis. There is no left-sided pneumothorax.   2. There is persistent right basilar opacity with a new right apical pneumothorax.      Findings were communicated to the ordering provider at the time of dictation via Voalte.      US-THORACENTESIS PUNCTURE LEFT   Final Result      1. Ultrasound guided left sided therapeutic thoracentesis.      2. 950 mL of fluid withdrawn.      DX-CHEST-PORTABLE (1 VIEW)   Final Result      1. New postsurgical changes in the upper thoracic spine.   2. Worsening bilateral pleural effusions, pulmonary vascular congestion and interstitial edema.      DX-PORTABLE FLUORO > 1 HOUR   Final Result      Portable fluoroscopy utilized for 1 minute 19 seconds.      INTERPRETING LOCATION: [address] [place] [address],  [id number]      DX-THORACIC SPINE-2 VIEWS   Final Result      Digitized intraoperative radiograph is submitted for review. This examination is not for diagnostic purpose but for guidance during a surgical procedure. Please see the patient's chart for full procedural details.         [facility] [id number]      MR-CERVICAL SPINE-WITH & W/O   Final Result         1.  No evidence of metastatic disease to the cervical spine.      MR-LUMBAR SPINE-WITH & W/O   Final Result   Impression:      Multiple bony metastatic lesions as described above, most prominent along the inferior endplate of L1.      Degenerative moderate canal stenosis at L4-5 with cauda equina compression. Epidural thickening and enhancement is noted in this region with severe bilateral facet degeneration and enhancement along the bilateral facet joints. This is felt to be    degenerative in origin.      MR-THORACIC SPINE-WITH & W/O   Final Result         Multiple metastatic lesions of the thoracic spine as described above.      Pathologic compression fracture at T5 with 50% loss of height and posterior cortical retropulsion with circumferential epidural thickening representing epidural extension of metastatic disease. This causes severe canal stenosis with cord compression.      Metastatic disease also involves the posterior elements and spinous processes at T4, T5.      Pre and paravertebral metastatic soft tissue infiltration at T4-T5 noted.         MR-BRAIN-WITH & W/O   Final Result         Multiple nodular enhancing metastatic lesions involving the cranium as described above.      The largest lesion is in the right posterior temporal/parietal occipital region and demonstrates inner table cortical breakthrough with epidural extension and adjacent mild dural thickening and enhancement.      US-THORACENTESIS PUNCTURE RIGHT   Final Result   Addendum (preliminary) 1 of 1 12/27/2024 10:59 AM      HISTORY/REASON FOR EXAM:   Shortness of breath         TECHNIQUE/EXAM DESCRIPTION:   Ultrasound-guided thoracentesis.      Indication:  RIGHT pleural fluid collection.      COMPARISON:  None-however patient states history of right-sided    thoracentesis September 2024.      PROCEDURE:     Informed consent was obtained. A timeout was taken. A right    pleural effusion was localized with real-time ultrasound guidance. The    right posterior chest wall was prepped and draped in a sterile manner.    Following local anesthesia with 1%    lidocaine, and under live ultrasound guidance a 5 [place] Yueh pigtail    catheter was advanced into the pleural space with trocar technique and    pleural fluid was drained. The patient tolerated the procedure well    without evidence of complication. A post    thoracentesis chest radiograph is forthcoming.      FINDINGS:      Fluid was sent to the laboratory.      Fluid character: serosanguinous         1. Ultrasound guided right sided therapeutic and diagnostic thoracentesis.      2. 1000 mL of fluid withdrawn.      Final      1. Ultrasound guided right sided therapeutic and diagnostic thoracentesis.      2. 1000 mL of fluid withdrawn.      DX-CHEST-PORTABLE (1 VIEW)   Final Result      1.  Small persistent right pleural effusion which is decreased since recent thoracentesis.      2.  Small to moderate left pleural effusion.      3.  Bilateral perihilar and left lower lobe atelectasis and/or pneumonitis.      4.  Increased interstitial markings throughout the lung fields consistent with parenchymal scarring, and/or pulmonary edema.      CT-LSPINE W/O PLUS RECONS   Final Result         1. Diffuse metastatic disease.   2. No definite pathologic fracture.      CT-TSPINE W/O PLUS RECONS   Final Result         1. Extensive metastatic disease.   2. There is new pathologic compression fracture deformity of T5.      CT-CHEST,ABDOMEN,PELVIS WITH   Final Result         1. New bilateral upper lobe nodular infiltrates.   2.  Moderate-sized bilateral pleural effusions.   3. Interval worsening of hepatic metastatic disease.   4. New abdominal lymphadenopathy. Thoracic adenopathy is again noted.   5. Diffuse osseous metastatic disease. There is a new compression fracture of T5 as reported on the prior spine CT.      DX-CHEST-PORTABLE (1 VIEW)   Final Result      1.  Stable bibasilar atelectasis/consolidation and small bilateral pleural effusions.      2.  Stable cardiomegaly and interstitial edema.           Assessment/Plan  * Acute on chronic respiratory failure with hypoxia and hypercapnia (HCC)- (present on admission)  Assessment & Plan  intubated 1/4  extubated 1/5  re-intubated 1/8  Extubated 1/15  Patient is doing well while extubated.  He has been on 3 to 5 L nasal cannula since extubation  Extensive conversation had with patient and her family regarding her wishes.  She remains DNAR/I okay  We also discussed the likelihood of her needing a tracheostomy if she got intubated again.  At this time, if she were to be intubated again she would like to go ahead with a tracheotomy if necessary to prolong her life.    Anemia of chronic disease- (present on admission)  Assessment & Plan  Monitor, transfuse as indicated    Hypokalemia- (present on admission)  Assessment & Plan  Replaced, monitor    Restless leg syndrome- (present on admission)  Assessment & Plan  Requip     Pathological fracture of vertebra- (present on admission)  Assessment & Plan  Pathologic fracture of T5 with myelopathy on presentation  S/P T4-6 laminectomy with fusion and spinal decompression on 1/1/2025 by Dr. Lowery  multi-modal pain management  The patient with additional L4-5 lesion with cauda equina  Getting a radiation oncology opinion if the patient possibly could be undergoing palliative radiation hopefully to somewhat stabilize further  Fall Precautions  PT OT    Cancer related pain- (present on admission)  Assessment & Plan  Palliative Care following for  "pain management  Multimodal pain management  Consider palliative radiation    Bilateral pleural effusion- (present on admission)  Assessment & Plan  Malignant pleural effusions from metastatic breast cancer s/p thoracenteses and chest tubes  Diuresis as tolerated  monitor chest xrays  PleurX placement in IR 1/11 on the right, monitor and continue to drain    Malignant neoplasm of breast (HCC)- (present on admission)  Assessment & Plan  \"Bilateral breast cancer with extensive destruction of the left breast and ulceration and skin lesions making her inoperable on the left.  Right breast shows a 3 x 3 cm mass.  PET scan March 2023 showed non-hypermetabolic pleural effusions which are cytology positive and she has demonstrable pleural masses. Started aromatase inhibition early September 2023.  Has not yet started CDK 4 6 inhibitor.  Lost to follow-up for over 6 months although apparently was on letrozole as single agent.  Now with clear progression of disease in the left breast.  August 2024: Progression of disease in liver with extensive disease.  Breast is worse.  Lung is better.  Lymph nodes are worse.  Status post radiation to symptomatic oozing and exophytic left breast mass.  Marked worsening of her cancer on letrozole with progression in the bone and liver.\"--Dr. Benitez  ongoing goals of care discussions. Patient is fighting to recover and discharge home so she can continue treatment for her cancer  DNR, I OK   Palliative following for pain management as well as further discussions.  Consulted with oncology, her primary oncology team, recommended hospice  The patient is not willing to engage in hospice at this time, she would like to get stronger and have more outpatient therapy  Consulted radiation oncology for possibility of spine radiation    Primary hypertension- (present on admission)  Assessment & Plan  Monitor, resume as indicated  PRN's available    Diabetes mellitus, type 2 (HCC)- (present on " admission)  Assessment & Plan  A1c 6.4  accuchecks with SSI  CHO diet  glucose goal 140-180    Plan  1/18  Improved respiratory status now, back down to 5 L, continue to work on respiratory dynamics, forced diuresis  Radiation oncology for possible palliative radiation to spine lesion  Oncology consult to further delineate the patient's overall disease state and prognosis, poorly received the patient  Recommending hospice at this time  Continue forced diuresis, monitor closely  Monitor chest tube output, in light of the patient's malignant effusion will likely not be able to be reduced and entirely  Potassium replacement,  Symptomatic treatment  PT OT, mobilization, strengthening  Laboratory follow-up  Ongoing goals of care discussion, rehab assisting for possible rehabilitation in a last effort to get the patient to an outpatient status to receive additional cancer treatment   see orders  Patient is has a high medical complexity, complex decision making and is at high risk for complication, morbidity, and mortality.  I spent 59 minutes, reviewing the chart, obtaining and/or reviewing separately obtained history. Performing a medically appropriate examination and evaluation.  Counseling and educating the patient. Ordering and reviewing medications, tests, or procedures.   Documenting clinical information in EPIC. Independently interpreting results and communicating results to patient. Discussing future disposition of care with patient, RN and case management.     VTE prophylaxis:    Xarelto 10mg daily as prophylaxis      I have performed a physical exam and reviewed and updated ROS and Plan today (1/18/2025). In review of yesterday's note (1/17/2025), there are no changes except as documented above.      Please note that this dictation was created using voice recognition software. I have made every reasonable attempt to correct obvious errors, but I expect that there are errors of grammar and possibly context that I  did not discover before finalizing the note.

## 2025-01-19 ENCOUNTER — APPOINTMENT (OUTPATIENT)
Dept: RADIOLOGY | Facility: MEDICAL CENTER | Age: 65
End: 2025-01-19
Attending: INTERNAL MEDICINE
Payer: MEDICARE

## 2025-01-19 LAB
ANION GAP SERPL CALC-SCNC: 11 MMOL/L (ref 7–16)
BUN SERPL-MCNC: 22 MG/DL (ref 8–22)
CALCIUM SERPL-MCNC: 9 MG/DL (ref 8.5–10.5)
CHLORIDE SERPL-SCNC: 85 MMOL/L (ref 96–112)
CO2 SERPL-SCNC: 37 MMOL/L (ref 20–33)
CREAT SERPL-MCNC: 0.6 MG/DL (ref 0.5–1.4)
ERYTHROCYTE [DISTWIDTH] IN BLOOD BY AUTOMATED COUNT: 51.3 FL (ref 35.9–50)
GFR SERPLBLD CREATININE-BSD FMLA CKD-EPI: 99 ML/MIN/1.73 M 2
GLUCOSE BLD STRIP.AUTO-MCNC: 118 MG/DL (ref 65–99)
GLUCOSE BLD STRIP.AUTO-MCNC: 134 MG/DL (ref 65–99)
GLUCOSE BLD STRIP.AUTO-MCNC: 153 MG/DL (ref 65–99)
GLUCOSE BLD STRIP.AUTO-MCNC: 153 MG/DL (ref 65–99)
GLUCOSE SERPL-MCNC: 130 MG/DL (ref 65–99)
HCT VFR BLD AUTO: 33.3 % (ref 37–47)
HGB BLD-MCNC: 10.5 G/DL (ref 12–16)
MAGNESIUM SERPL-MCNC: 2 MG/DL (ref 1.5–2.5)
MCH RBC QN AUTO: 27.9 PG (ref 27–33)
MCHC RBC AUTO-ENTMCNC: 31.5 G/DL (ref 32.2–35.5)
MCV RBC AUTO: 88.3 FL (ref 81.4–97.8)
NT-PROBNP SERPL IA-MCNC: 361 PG/ML (ref 0–125)
PHOSPHATE SERPL-MCNC: 2.9 MG/DL (ref 2.5–4.5)
PLATELET # BLD AUTO: 600 K/UL (ref 164–446)
PMV BLD AUTO: 9.1 FL (ref 9–12.9)
POTASSIUM SERPL-SCNC: 3.1 MMOL/L (ref 3.6–5.5)
PROCALCITONIN SERPL-MCNC: 0.14 NG/ML
RBC # BLD AUTO: 3.77 M/UL (ref 4.2–5.4)
SODIUM SERPL-SCNC: 133 MMOL/L (ref 135–145)
WBC # BLD AUTO: 15.4 K/UL (ref 4.8–10.8)

## 2025-01-19 PROCEDURE — 770020 HCHG ROOM/CARE - TELE (206)

## 2025-01-19 PROCEDURE — 80048 BASIC METABOLIC PNL TOTAL CA: CPT

## 2025-01-19 PROCEDURE — 700111 HCHG RX REV CODE 636 W/ 250 OVERRIDE (IP): Performed by: NURSE PRACTITIONER

## 2025-01-19 PROCEDURE — 83880 ASSAY OF NATRIURETIC PEPTIDE: CPT

## 2025-01-19 PROCEDURE — 700111 HCHG RX REV CODE 636 W/ 250 OVERRIDE (IP): Performed by: HOSPITALIST

## 2025-01-19 PROCEDURE — 84100 ASSAY OF PHOSPHORUS: CPT

## 2025-01-19 PROCEDURE — 82962 GLUCOSE BLOOD TEST: CPT | Mod: 91

## 2025-01-19 PROCEDURE — 94669 MECHANICAL CHEST WALL OSCILL: CPT

## 2025-01-19 PROCEDURE — 302220 CHEST TUBE TRAY: Performed by: HOSPITALIST

## 2025-01-19 PROCEDURE — 700101 HCHG RX REV CODE 250: Performed by: HOSPITALIST

## 2025-01-19 PROCEDURE — 99233 SBSQ HOSP IP/OBS HIGH 50: CPT | Performed by: HOSPITALIST

## 2025-01-19 PROCEDURE — 84145 PROCALCITONIN (PCT): CPT

## 2025-01-19 PROCEDURE — 85027 COMPLETE CBC AUTOMATED: CPT

## 2025-01-19 PROCEDURE — 700111 HCHG RX REV CODE 636 W/ 250 OVERRIDE (IP): Mod: JZ

## 2025-01-19 PROCEDURE — A9270 NON-COVERED ITEM OR SERVICE: HCPCS | Performed by: HOSPITALIST

## 2025-01-19 PROCEDURE — 83735 ASSAY OF MAGNESIUM: CPT

## 2025-01-19 PROCEDURE — 700102 HCHG RX REV CODE 250 W/ 637 OVERRIDE(OP): Performed by: HOSPITALIST

## 2025-01-19 PROCEDURE — 71045 X-RAY EXAM CHEST 1 VIEW: CPT

## 2025-01-19 RX ORDER — SODIUM PHOSPHATE,MONO-DIBASIC 19G-7G/118
1 ENEMA (ML) RECTAL
Status: DISCONTINUED | OUTPATIENT
Start: 2025-01-19 | End: 2025-01-26

## 2025-01-19 RX ORDER — HYDROCODONE BITARTRATE AND ACETAMINOPHEN 10; 325 MG/1; MG/1
1 TABLET ORAL EVERY 4 HOURS PRN
Status: DISCONTINUED | OUTPATIENT
Start: 2025-01-19 | End: 2025-01-26

## 2025-01-19 RX ORDER — HYDROCODONE BITARTRATE AND ACETAMINOPHEN 10; 325 MG/1; MG/1
1 TABLET ORAL EVERY 6 HOURS PRN
Status: DISCONTINUED | OUTPATIENT
Start: 2025-01-19 | End: 2025-01-19

## 2025-01-19 RX ORDER — POTASSIUM CHLORIDE 1500 MG/1
40 TABLET, EXTENDED RELEASE ORAL ONCE
Status: COMPLETED | OUTPATIENT
Start: 2025-01-19 | End: 2025-01-19

## 2025-01-19 RX ORDER — FUROSEMIDE 10 MG/ML
40 INJECTION INTRAMUSCULAR; INTRAVENOUS
Status: DISCONTINUED | OUTPATIENT
Start: 2025-01-20 | End: 2025-01-21

## 2025-01-19 RX ADMIN — POTASSIUM CHLORIDE 40 MEQ: 1500 TABLET, EXTENDED RELEASE ORAL at 11:43

## 2025-01-19 RX ADMIN — HYDROCODONE BITARTRATE AND ACETAMINOPHEN 1 TABLET: 10; 325 TABLET ORAL at 18:47

## 2025-01-19 RX ADMIN — FUROSEMIDE 80 MG: 10 INJECTION, SOLUTION INTRAMUSCULAR; INTRAVENOUS at 06:23

## 2025-01-19 RX ADMIN — ONDANSETRON 4 MG: 2 INJECTION INTRAMUSCULAR; INTRAVENOUS at 09:14

## 2025-01-19 RX ADMIN — POTASSIUM CHLORIDE 40 MEQ: 1500 TABLET, EXTENDED RELEASE ORAL at 06:21

## 2025-01-19 RX ADMIN — SENNOSIDES AND DOCUSATE SODIUM 2 TABLET: 50; 8.6 TABLET ORAL at 06:23

## 2025-01-19 RX ADMIN — HYDROCODONE BITARTRATE AND ACETAMINOPHEN 1 TABLET: 10; 325 TABLET ORAL at 06:21

## 2025-01-19 RX ADMIN — SODIUM PHOSPHATE, DIBASIC AND SODIUM PHOSPHATE, MONOBASIC 1 ENEMA: 7; 19 ENEMA RECTAL at 11:43

## 2025-01-19 RX ADMIN — ROPINIROLE HYDROCHLORIDE 1 MG: 0.5 TABLET, FILM COATED ORAL at 20:30

## 2025-01-19 RX ADMIN — METOLAZONE 5 MG: 5 TABLET ORAL at 06:22

## 2025-01-19 RX ADMIN — ROPINIROLE HYDROCHLORIDE 1 MG: 0.5 TABLET, FILM COATED ORAL at 06:21

## 2025-01-19 RX ADMIN — Medication 5 MG: at 20:30

## 2025-01-19 RX ADMIN — LORAZEPAM 0.26 MG: 2 INJECTION INTRAMUSCULAR; INTRAVENOUS at 22:31

## 2025-01-19 ASSESSMENT — PAIN DESCRIPTION - PAIN TYPE
TYPE: ACUTE PAIN

## 2025-01-19 ASSESSMENT — COGNITIVE AND FUNCTIONAL STATUS - GENERAL
SUGGESTED CMS G CODE MODIFIER DAILY ACTIVITY: CK
MOBILITY SCORE: 11
MOVING FROM LYING ON BACK TO SITTING ON SIDE OF FLAT BED: A LITTLE
TOILETING: A LOT
HELP NEEDED FOR BATHING: A LOT
SUGGESTED CMS G CODE MODIFIER MOBILITY: CL
MOVING TO AND FROM BED TO CHAIR: TOTAL
MOVING TO AND FROM BED TO CHAIR: TOTAL
DRESSING REGULAR LOWER BODY CLOTHING: A LITTLE
DRESSING REGULAR UPPER BODY CLOTHING: A LITTLE
CLIMB 3 TO 5 STEPS WITH RAILING: TOTAL
HELP NEEDED FOR BATHING: A LOT
SUGGESTED CMS G CODE MODIFIER MOBILITY: CL
MOBILITY SCORE: 11
TOILETING: A LOT
STANDING UP FROM CHAIR USING ARMS: TOTAL
DAILY ACTIVITIY SCORE: 15
PERSONAL GROOMING: A LITTLE
EATING MEALS: A LITTLE
DRESSING REGULAR UPPER BODY CLOTHING: A LITTLE
STANDING UP FROM CHAIR USING ARMS: TOTAL
WALKING IN HOSPITAL ROOM: TOTAL
SUGGESTED CMS G CODE MODIFIER DAILY ACTIVITY: CK
DRESSING REGULAR LOWER BODY CLOTHING: A LOT
EATING MEALS: A LITTLE
MOVING FROM LYING ON BACK TO SITTING ON SIDE OF FLAT BED: A LITTLE
DAILY ACTIVITIY SCORE: 16
PERSONAL GROOMING: A LITTLE
WALKING IN HOSPITAL ROOM: TOTAL
CLIMB 3 TO 5 STEPS WITH RAILING: TOTAL

## 2025-01-19 ASSESSMENT — ENCOUNTER SYMPTOMS
COUGH: 0
EYES NEGATIVE: 1
PALPITATIONS: 0
CARDIOVASCULAR NEGATIVE: 1
BACK PAIN: 1
MYALGIAS: 1
NECK PAIN: 0
DEPRESSION: 0
GASTROINTESTINAL NEGATIVE: 1
CHILLS: 0
NAUSEA: 0
WEAKNESS: 1
HEARTBURN: 0
SHORTNESS OF BREATH: 1
POLYDIPSIA: 0
FOCAL WEAKNESS: 0
NERVOUS/ANXIOUS: 1
BRUISES/BLEEDS EASILY: 0
VOMITING: 0
HEADACHES: 0
FEVER: 0
DIZZINESS: 0

## 2025-01-19 ASSESSMENT — FIBROSIS 4 INDEX: FIB4 SCORE: 3.14

## 2025-01-19 NOTE — PROGRESS NOTES
4 Eyes Skin Assessment Completed by RIYA Tellez and RIYA Barber.    Head WDL  Ears Redness and Blanching  Nose WDL  Mouth WDL  Neck WDL  Breast/Chest radiation scar/scab, redness  Shoulder Blades WDL  Spine healing incision sites  (R) Arm/Elbow/Hand Redness, Blanching, and Bruising  (L) Arm/Elbow/Hand Redness, Blanching, and Bruising  Abdomen WDL  Groin WDL  Scrotum/Coccyx/Buttocks Redness and Blanching  (R) Leg WDL  (L) Leg WDL  (R) Heel/Foot/Toe Redness, Blanching, Boggy, and Scab  (L) Heel/Foot/Toe Redness, Blanching, Boggy, and Scab          Devices In Places Tele Box, Blood Pressure Cuff, Pulse Ox, and Nasal Cannula      Interventions In Place Gray Ear Foams, Heel Mepilex, TAP System, Pillows, and Low Air Loss Mattress    Possible Skin Injury No    Pictures Uploaded Into Epic Yes  Wound Consult Placed N/A  RN Wound Prevention Protocol Ordered No

## 2025-01-19 NOTE — CARE PLAN
The patient is Stable - Low risk of patient condition declining or worsening    Shift Goals  Clinical Goals: Manage pain effectively to improve comfort and facilitate recovery. Promote safe mobility and prevent complications related to immobility after laminectomy and metastatic disease. Maintain adequate respiratory function and oxygenation, especially given metastatic lung involvement. Promote wound healing and prevent infection at the surgical site (T4-T6 laminectomy). Ensure adequate nutrition to support healing and overall health. Provide emotional support and address the psychological impact of metastatic cancer diagnosis and surgery. Educate the patient and family about the care plan, treatment options, and the disease process.  Patient Goals: to rest and sleep  Family Goals: updates      Problem: Knowledge Deficit - Standard  Goal: Patient and family/care givers will demonstrate understanding of plan of care, disease process/condition, diagnostic tests and medications  Outcome: Progressing     Problem: Skin Integrity  Goal: Skin integrity is maintained or improved  Outcome: Progressing     Problem: Fall Risk  Goal: Patient will remain free from falls  Outcome: Progressing     Problem: Pain - Standard  Goal: Alleviation of pain or a reduction in pain to the patient’s comfort goal  Outcome: Progressing     Problem: Hemodynamics  Goal: Patient's hemodynamics, fluid balance and neurologic status will be stable or improve  Outcome: Progressing     Problem: Fluid Volume  Goal: Fluid volume balance will be maintained  Outcome: Progressing     Problem: Respiratory  Goal: Patient will achieve/maintain optimum respiratory ventilation and gas exchange  Outcome: Progressing

## 2025-01-19 NOTE — PROGRESS NOTES
This RN changed chest tube cannister and tubing. No more air leaks at this time. Patient in no apparent distress, patient verbalizes understanding of POC. Educated patient to be mindful of chest tubing as it can get dislodged. RN to continue purposeful rounding, ensure patient safety, and notify MD of any changes PRN

## 2025-01-19 NOTE — PROGRESS NOTES
This RN transferred patient to T837. Chest tube has no air leak, assessed with oncoming nurse Elisabeth. No other questions or concerns. Patient AO4 and in no apparent distress upon handing care.

## 2025-01-19 NOTE — CARE PLAN
The patient is Watcher - Medium risk of patient condition declining or worsening    Shift Goals  Clinical Goals: pain management, monitor o2, skin integrity  Patient Goals: pain control, sleep  Family Goals: updates    Progress made toward(s) clinical / shift goals:  Patient continues to verbalize needs and understanding of POC. RN to continue purposeful rounding and notify MD of any changes PRN    Patient is not progressing towards the following goals:

## 2025-01-19 NOTE — PROGRESS NOTES
Upon assessment, patient R chest tube indicates continuous air leak. Changed dressing, searched for leak with no success. Charge RN notified, assessed patient with confirmed air leak. Notified MD with request for c-xray. Patient has no changed in O2 demands at this time. This RN to continue purposeful rounding, ensure patient safety, and notify MD of any changes PRN.

## 2025-01-19 NOTE — PROGRESS NOTES
[facility] Medicine Daily Progress Note    Date of Service  1/19/2025    Chief Complaint  Asha Hannon is a 64 y.o. female admitted 12/25/2024 with weakness, shortness of breath, back pain, has a history of metastatic breast cancer     Hospital Course  64 y.o. female who presented 12/25/2024 with history of stage IV breast cancer metastatic disease, mets to the lungs and now spine, on previous oral chemotherapy, admitted on 12/25/2024 with weakness, shortness of breath and back pain, subsequently a CT chest abdomen showed moderate-sized pleural effusions, worsening hepatic metastatic disease with new abdominal lymphadenopathy, thoracic adenopathy, diffuse osseous metastatic disease with a T5 pathologic fracture, the patient was admitted, undergo a right-sided thoracentesis on 12/27, malignant cells identified in the fluid, and MRI showed multiple thoracic and lumbar metastasized lesions, the patient was taken to the operating room on 1 1 where she had a T4 T6 laminectomy, following the patient developed a right-sided pneumothorax, requiring chest tube placement, left-sided thoracentesis was performed, the patient following his respiratory difficulty requiring BiPAP treatment and subsequently required to be intubated, the patient had a right-sided PleurX and small bore catheter placed and had frequent thoracentesis since placement, the patient remained intubated until 1/15, where the patient then was extubated and had a extensive talk with the ICU team in terms of her overall status, metastatic malignancy and respiratory failure, the patient stated that she wanted to keep fighting and is wishing to hopefully reach outpatient status where she can fail to be treated for her malignancy.  If she would have additional respiratory failure she states that she would like to be reintubated and possibly even undergo a tracheostomy to stay alive.  The patient tolerated extubation, she is alert and x 4, she is in a  sinus rhythm in the 50s to 80s, blood pressure in the 90s to 100s, the patient was n.p.o., underwent evaluation today by speech therapy and is cleared for mildly thick liquids, soft and bite-size solids, the right chest tube had put out 100 cc overnight, the patient remains on DVT prophylaxis with Lovenox, Pepcid for GI prophylaxis,  Laboratory data showed white to count 13.9 hemoglobin 9.3 platelet count 563 sodium 136 potassium 4.5 chloride 94 bicarb 35 glucose 155 BUN 27 creatinine 0.56  Chest x-ray with pulmonary edema and infiltrates, stable, layering left pleural effusion, stable, right-sided small bore chest tube  The patient has been followed by palliative care, she currently confirms a DNR I okay status, the patient lives with her daughter  The patient has been consulted on by palliative care  The patient had a consult from her primary oncology team on 1/17/2025  Radiation oncology was consulted as well    Interval Problem Update  Patient seen and examined today.  Data, Medication data reviewed.  Case discussed with nursing as available.  Plan of Care reviewed with patient and notified of changes.  1/17 the patient feels somewhat better, she is oxygenating okay, she has still significant pain, she feels weak, nursing reports the patient is a 2-3 person assist, unsafe to sit up in a chair, the patient continues to resist certain medical interventions, currently afebrile, heart rate in the 70s, respiration unlabored, blood pressure in the 130s over 60s, the patient is saturating in the mid 90s on 5 L, chest tube output 190, no airleak, in the afternoon the patient has some increase in oxygen demand, chest x-ray appears fairly unchanged, left-sided fluid, right-sided small bore chest tube in place, no pneumothorax, with a count 10.5 hemoglobin 9.4 hematocrit 30.2 platelet count 537 sodium 133 potassium 4.2 chloride 90 bicarb 36 glucose 107 BUN 23 creatinine 0.6  1/18 the patient feels less anxious today, she  has improved she feels, she would like to titrate down oxygen, she would like to engage with more therapy, PMR has spent quite some time with the patient and made expectations clear, if she fails to improve where she could transition to acute rehab the patient should most likely consider home hospice  Patient is currently afebrile, heart rate in the 70s respiration unlabored the patient is down to 5 L nasal cannula now saturating 96%, blood pressure 120s over 50s  Pain appears to be controlled I discussed the possibility of palliative radiation to her spine, will engage on Monday  Wbc count 17, hemoglobin 11.5 platelet count 643 sodium 131 potassium 4.2 chloride 86 bicarb 32 glucose 140 BUN 24 creatinine 0.73  alk phos 115  glucose 106  1/19 the patient is not feeling well today, she wants to have a bowel movement she continues to be significantly weak, no nausea vomiting, there was an air leak in the chest tube suction system, it was replaced and resolved, chest x-ray showed a possible small apical pneumothorax, chest tube output 60 cc, afebrile, heart rate in the 70s respiration unlabored, the patient is saturating the mid 90s on 5 L, blood pressure in the 120s over 70s,  WBC count 15.4 hemoglobin 10.5 platelet count 600 sodium 133 potassium 3.1 chloride 85 bicarb 37 glucose 130  Procalcitonin 0.14  I have discussed this patient's plan of care and discharge plan at IDT rounds today with Case Management, Nursing, Nursing leadership, and other members of the IDT team.    Consultants/Specialty  critical care, oncology, and palliative care, physiatry    Code Status  DNAR, I OK    Disposition  The patient is not medically cleared for discharge to home or a post-acute facility.  Anticipate discharge to: an inpatient rehabilitation hospital    I have placed the appropriate orders for post-discharge needs.    Review of Systems  Review of Systems   Constitutional:  Positive for malaise/fatigue. Negative for  chills and fever.   HENT: Negative.     Eyes: Negative.    Respiratory:  Positive for shortness of breath. Negative for cough.    Cardiovascular: Negative.  Negative for chest pain and palpitations.   Gastrointestinal: Negative.  Negative for heartburn, nausea and vomiting.   Genitourinary: Negative.  Negative for dysuria and frequency.   Musculoskeletal:  Positive for back pain and myalgias. Negative for neck pain.   Skin: Negative.  Negative for itching and rash.   Neurological:  Positive for weakness. Negative for dizziness, focal weakness and headaches.   Endo/Heme/Allergies: Negative.  Negative for polydipsia. Does not bruise/bleed easily.   Psychiatric/Behavioral:  Negative for depression. The patient is nervous/anxious.         Physical Exam  Temp:  [36.4 °C (97.5 °F)-36.5 °C (97.7 °F)] 36.4 °C (97.5 °F)  Pulse:  [66-87] 78  Resp:  [16-40] 40  BP: (108-141)/(56-95) 127/95  SpO2:  [92 %-98 %] 94 %    Physical Exam  Vitals and nursing note reviewed.   Constitutional:       Appearance: She is well-developed. She is obese. She is ill-appearing. She is not diaphoretic.   HENT:      Head: Normocephalic and atraumatic.      Nose: Nose normal.   Eyes:      Conjunctiva/sclera: Conjunctivae normal.      Pupils: Pupils are equal, round, and reactive to light.   Neck:      Thyroid: No thyromegaly.      Vascular: No JVD.   Cardiovascular:      Rate and Rhythm: Normal rate and regular rhythm.      Heart sounds: Normal heart sounds.      No friction rub. No gallop.   Pulmonary:      Effort: Pulmonary effort is normal.      Breath sounds: Rhonchi present. No wheezing or rales.      Comments: Right-sided small bore chest tube, no air leak, serous fluid  Abdominal:      General: Bowel sounds are normal. There is no distension.      Palpations: Abdomen is soft. There is no mass.      Tenderness: There is no abdominal tenderness. There is no guarding or rebound.   Musculoskeletal:         General: No tenderness. Normal range of  motion.      Cervical back: Normal range of motion and neck supple.      Right lower leg: Edema present.      Left lower leg: Edema present.   Lymphadenopathy:      Cervical: No cervical adenopathy.   Skin:     General: Skin is warm and dry.      Coloration: Skin is pale.      Findings: Bruising present.   Neurological:      Mental Status: She is alert and oriented to person, place, and time.      Cranial Nerves: No cranial nerve deficit.      Motor: Weakness present.   Psychiatric:         Behavior: Behavior normal.         Fluids    Intake/Output Summary (Last 24 hours) at 1/19/2025 0755  Last data filed at 1/18/2025 2000  Gross per 24 hour   Intake 550 ml   Output 40 ml   Net 510 ml        Laboratory  Recent Labs     01/17/25  0525 01/18/25  0423 01/19/25  0401   WBC 10.5 17.0* 15.4*   RBC 3.29* 4.08* 3.77*   HEMOGLOBIN 9.4* 11.5* 10.5*   HEMATOCRIT 30.2* 35.4* 33.3*   MCV 91.8 86.8 88.3   MCH 28.6 28.2 27.9   MCHC 31.1* 32.5 31.5*   RDW 53.6* 50.4* 51.3*   PLATELETCT 537* 643* 600*   MPV 8.8* 9.2 9.1     Recent Labs     01/17/25  0525 01/18/25  0423 01/19/25  0401   SODIUM 133* 131* 133*   POTASSIUM 4.2 4.2 3.1*   CHLORIDE 90* 86* 85*   CO2 36* 32 37*   GLUCOSE 107* 140* 130*   BUN 23* 24* 22   CREATININE 0.60 0.73 0.60   CALCIUM 9.3 9.7 9.0                   Imaging  DX-CHEST-PORTABLE (1 VIEW)   Final Result      No significant change from prior exam.      DX-CHEST-PORTABLE (1 VIEW)   Final Result         1.  Pulmonary edema and/or infiltrates are identified, which are stable since the prior exam.   2.  Cardiomegaly      DX-CHEST-PORTABLE (1 VIEW)   Final Result      1.  Interval improvement of pulmonary vascular congestion and pulmonary edema.   2.  No other significant change from prior exam.         DX-CHEST-PORTABLE (1 VIEW)   Final Result         1.  Pulmonary edema and/or infiltrates are identified, which appear somewhat increased since the prior exam.   2.  Layering left pleural effusion, stable   3.   Cardiomegaly      DX-CHEST-PORTABLE (1 VIEW)   Final Result         1.  Pulmonary edema and/or infiltrates are identified, which are stable since the prior exam.   2.  Layering left pleural effusion, stable      DX-CHEST-PORTABLE (1 VIEW)   Final Result         1.  Pulmonary edema and/or infiltrates are identified, which are stable since the prior exam.   2.  Layering left pleural effusion, stable      DX-CHEST-PORTABLE (1 VIEW)   Final Result         1.  Pulmonary edema and/or infiltrates are identified, which are stable since the prior exam.   2.  Trace bilateral pleural effusions, stable      DX-CHEST-PORTABLE (1 VIEW)   Final Result         1.  Pulmonary edema and/or infiltrates are identified, which are stable since the prior exam.   2.  Small left pleural effusion, stable      DX-CHEST-PORTABLE (1 VIEW)   Final Result      1.  Support equipment is unchanged.      2.  Cardiomegaly with moderate perihilar pulmonary edema unchanged from previous exam.      3.  Small bilateral pleural effusions left greater than right.      DX-CHEST-PORTABLE (1 VIEW)   Final Result      1.  Tubes and lines unchanged in position.      2.  Marked bilateral perihilar pulmonary edema which has improved slightly since previous exam.      3.  Small bilateral pleural effusions left greater than right.      IR-INSERT PLEURAL CATH W/ CUFF   Final Result      1.  Ultrasound and fluoroscopic guided placement of a RIGHT HEMITHORAX PleurX (Aspira type) tunneled pleural drainage catheter.   2.  The catheter can be used immediately with drainage of pleural effusion as needed for comfort, as per package insert instructions.   3.  If the catheter becomes dislodged, do not reinsert it.  Place sterile dressing over the entry wound site.  May consult primary care physician or oncologist as to whether there is clinical indication for PleurX replacement.      DX-CHEST-PORTABLE (1 VIEW)   Final Result      1.  Tubes and lines unchanged in position.       2.   Extensive bilateral perihilar airspace opacities consistent with pneumonitis and/or pulmonary edema.      3.  Mild to moderate bilateral pleural effusions right greater than left      DX-CHEST-PORTABLE (1 VIEW)   Final Result         1. Increasing moderate right and small left pleural effusions. No change edema or infiltrates.      2. ETT 5 cm above masoud. NG tube in stomach. Right central line.                     DX-CHEST-PORTABLE (1 VIEW)   Final Result      Stable diffuse bilateral pulmonary infiltrates.      DX-ABDOMEN FOR TUBE PLACEMENT   Final Result      NG tube tip overlies the gastric body.      DX-CHEST-PORTABLE (1 VIEW)   Final Result      1.  Supportive tubing as described above.   2.  Apparent increasing RIGHT pleural fluid, possibly due to change in position.   3.  Improvement of LEFT lung base consolidation.   4.  No other significant change from prior exam.         DX-CHEST-PORTABLE (1 VIEW)   Final Result         1. Stable right IJ central catheter. Right chest tube has been removed. No pneumothorax.   2. Unchanged bilateral pulmonary opacities and associated pleural effusions.         DX-CHEST-PORTABLE (1 VIEW)   Final Result      1.  Unchanged bilateral pulmonary opacities. Stable small bilateral pleural effusions.   2.  Right pigtail catheter remains. No pneumothorax.      DX-CHEST-PORTABLE (1 VIEW)   Final Result      Removal of large bore right chest tube with stable right pigtail chest tube. Questionable tiny right apical pneumothorax.      DX-CHEST-PORTABLE (1 VIEW)   Final Result      No significant interval change.      DX-CHEST-PORTABLE (1 VIEW)   Final Result      1.  Persistent bilateral pulmonary opacities, stable to slightly worse, although changes may be related to differences in imaging technique. No large pleural effusions.   2.  Well-positioned right IJ central catheter. No pneumothorax.   3.  Stable other lines and tubes.      DX-CHEST-LIMITED (1 VIEW)   Final Result          1.  No change in 2 separate right-sided chest tubes.      2.  No pneumothorax identified.      3.  Bilateral pulmonary opacifications again identified which are slightly less prominent.      DX-CHEST-PORTABLE (1 VIEW)   Final Result         1. No change mild infiltrates.      2. Right chest tubes. No pneumothorax.      3. ETT out. NG tube and jugular line remain.                     DX-CHEST-PORTABLE (1 VIEW)   Final Result         1. No significant interval change.      DX-CHEST-LIMITED (1 VIEW)   Final Result         1.  Findings on chest radiograph appear stable since the prior radiograph.  No new abnormalities are identified.      2.  2 separate right-sided chest tubes are again identified with no change in position.      3.  No pneumothorax identified.      DX-CHEST-LIMITED (1 VIEW)   Final Result         1.  No pneumothorax identified.      2.  2 right-sided chest tubes again identified.      CT-CTA CHEST PULMONARY ARTERY W/ RECONS   Final Result         1. No pulmonary embolus.      2. Medial right lung base consolidation felt to represent complete collapse of the right middle lobe. Mild to moderate mid and upper lung pneumonia infiltrates remain.      3. Right chest tubes. Small right pneumothorax and small right effusion. Improving small 2.5 cm left pleural effusion since 12/25/2024.      4. Extensive metastatic lesions including adenopathy, liver lesions, right breast nodule and bone lesions. Thoracic spine fusion hardware remains.                  CT-HEAD W/O   Final Result         1. No acute process in the brain evident.      2. Fluid in sphenoid sinus.      3. Right convexity midthoracic scoliosis and unchanged from 12/31/2024 MRI.               DX-ABDOMEN FOR TUBE PLACEMENT   Final Result         1. NG tube in stomach.                     DX-CHEST-FOR LINE PLACEMENT Perform procedure in: Patient's Room   Final Result         1. Small right apical pneumothorax remains. 2 right chest tubes.      2.  ETT 3 cm above masoud. NG tube in stomach. Right jugular line in SVC. No complication evident.      3. Mild infiltrates and small right effusion.                  DX-CHEST-LIMITED (1 VIEW)   Final Result         1. 2nd right chest tube placed with near complete evacuation of right pneumothorax.      2. Mild infiltrates, basilar atelectasis and small right effusion remain.      3. Endotracheal tube tip at about T5 and about 2 cm above the masoud. The masoud and tip of the endotracheal tube are difficult to visualize in part because of overlying thoracic spine fusion hardware.                  DX-CHEST-PORTABLE (1 VIEW)   Final Result         Enlarging right pneumothorax with collapsing of the right lung.      Preliminary findings texted to Dr. STANLEY FISHER in the Emergency Department via Voalte on 1/4/2025 1:50 AM         DX-CHEST-LIMITED (1 VIEW)   Final Result         Interval placement of right pigtail chest tube. Small right residual pneumothorax.      DX-CHEST-PORTABLE (1 VIEW)   Final Result   Addendum (preliminary) 1 of 1      CRITICAL RESULT READ BACK: Preliminary findings discussed with and    critical read back performed by Dr. Downs via telephone on 1/3/2025 8:03    PM      Final      1.  Interval worsening in right pneumothorax, now moderate.   2.  Persistent interstitial and alveolar pulmonary opacities bilaterally. Possible bilateral pleural effusions, right greater than left.            DX-CHEST-PORTABLE (1 VIEW)   Final Result         1. Slightly decreased right pneumothorax.   2. Stable bilateral interstitial and airspace opacification.   3. Stable right pleural effusion.      DX-CHEST-PORTABLE (1 VIEW)   Final Result         1. Left pleural effusion has decreased in size status post thoracentesis. There is no left-sided pneumothorax.   2. There is persistent right basilar opacity with a new right apical pneumothorax.      Findings were communicated to the ordering provider at the time of  dictation via Voalte.      US-THORACENTESIS PUNCTURE LEFT   Final Result      1. Ultrasound guided left sided therapeutic thoracentesis.      2. 950 mL of fluid withdrawn.      DX-CHEST-PORTABLE (1 VIEW)   Final Result      1. New postsurgical changes in the upper thoracic spine.   2. Worsening bilateral pleural effusions, pulmonary vascular congestion and interstitial edema.      DX-PORTABLE FLUORO > 1 HOUR   Final Result      Portable fluoroscopy utilized for 1 minute 19 seconds.      INTERPRETING LOCATION: [address], [place], [address]      DX-THORACIC SPINE-2 VIEWS   Final Result      Digitized intraoperative radiograph is submitted for review. This examination is not for diagnostic purpose but for guidance during a surgical procedure. Please see the patient's chart for full procedural details.         INTERPRETING LOCATION: [address], [place], [address]      MR-CERVICAL SPINE-WITH & W/O   Final Result         1.  No evidence of metastatic disease to the cervical spine.      MR-LUMBAR SPINE-WITH & W/O   Final Result   Impression:      Multiple bony metastatic lesions as described above, most prominent along the inferior endplate of L1.      Degenerative moderate canal stenosis at L4-5 with cauda equina compression. Epidural thickening and enhancement is noted in this region with severe bilateral facet degeneration and enhancement along the bilateral facet joints. This is felt to be    degenerative in origin.      MR-THORACIC SPINE-WITH & W/O   Final Result         Multiple metastatic lesions of the thoracic spine as described above.      Pathologic compression fracture at T5 with 50% loss of height and posterior cortical retropulsion with circumferential epidural thickening representing epidural extension of metastatic disease. This causes severe canal stenosis with cord compression.      Metastatic disease also involves the posterior elements and spinous processes at T4, T5.      Pre and paravertebral metastatic  soft tissue infiltration at T4-T5 noted.         MR-BRAIN-WITH & W/O   Final Result         Multiple nodular enhancing metastatic lesions involving the cranium as described above.      The largest lesion is in the right posterior temporal/parietal occipital region and demonstrates inner table cortical breakthrough with epidural extension and adjacent mild dural thickening and enhancement.      US-THORACENTESIS PUNCTURE RIGHT   Final Result   Addendum (preliminary) 1 of 1 12/27/2024 10:59 AM      HISTORY/REASON FOR EXAM:  Shortness of breath         TECHNIQUE/EXAM DESCRIPTION:   Ultrasound-guided thoracentesis.      Indication:  RIGHT pleural fluid collection.      COMPARISON:  None-however patient states history of right-sided    thoracentesis September 2024.      PROCEDURE:     Informed consent was obtained. A timeout was taken. A right    pleural effusion was localized with real-time ultrasound guidance. The    right posterior chest wall was prepped and draped in a sterile manner.    Following local anesthesia with 1%    lidocaine, and under live ultrasound guidance a 5 Hebrew Yueh pigtail    catheter was advanced into the pleural space with trocar technique and    pleural fluid was drained. The patient tolerated the procedure well    without evidence of complication. A post    thoracentesis chest radiograph is forthcoming.      FINDINGS:      Fluid was sent to the laboratory.      Fluid character: serosanguinous         1. Ultrasound guided right sided therapeutic and diagnostic thoracentesis.      2. 1000 mL of fluid withdrawn.      Final      1. Ultrasound guided right sided therapeutic and diagnostic thoracentesis.      2. 1000 mL of fluid withdrawn.      DX-CHEST-PORTABLE (1 VIEW)   Final Result      1.  Small persistent right pleural effusion which is decreased since recent thoracentesis.      2.  Small to moderate left pleural effusion.      3.  Bilateral perihilar and left lower lobe atelectasis  and/or pneumonitis.      4.  Increased interstitial markings throughout the lung fields consistent with parenchymal scarring, and/or pulmonary edema.      CT-LSPINE W/O PLUS RECONS   Final Result         1. Diffuse metastatic disease.   2. No definite pathologic fracture.      CT-TSPINE W/O PLUS RECONS   Final Result         1. Extensive metastatic disease.   2. There is new pathologic compression fracture deformity of T5.      CT-CHEST,ABDOMEN,PELVIS WITH   Final Result         1. New bilateral upper lobe nodular infiltrates.   2. Moderate-sized bilateral pleural effusions.   3. Interval worsening of hepatic metastatic disease.   4. New abdominal lymphadenopathy. Thoracic adenopathy is again noted.   5. Diffuse osseous metastatic disease. There is a new compression fracture of T5 as reported on the prior spine CT.      DX-CHEST-PORTABLE (1 VIEW)   Final Result      1.  Stable bibasilar atelectasis/consolidation and small bilateral pleural effusions.      2.  Stable cardiomegaly and interstitial edema.           Assessment/Plan  * Acute on chronic respiratory failure with hypoxia and hypercapnia (HCC)- (present on admission)  Assessment & Plan  intubated 1/4  extubated 1/5  re-intubated 1/8  Extubated 1/15  Patient is doing well while extubated.  He has been on 3 to 5 L nasal cannula since extubation  Extensive conversation had with patient and her family regarding her wishes.  She remains DNAR/I okay  We also discussed the likelihood of her needing a tracheostomy if she got intubated again.  At this time, if she were to be intubated again she would like to go ahead with a tracheotomy if necessary to prolong her life.    Anemia of chronic disease- (present on admission)  Assessment & Plan  Monitor, transfuse as indicated    Hypokalemia- (present on admission)  Assessment & Plan  Replaced, monitor    Restless leg syndrome- (present on admission)  Assessment & Plan  Requip     Pathological fracture of vertebra-  "(present on admission)  Assessment & Plan  Pathologic fracture of T5 with myelopathy on presentation  S/P T4-6 laminectomy with fusion and spinal decompression on 1/1/2025 by Dr. Lowery  multi-modal pain management  The patient with additional L4-5 lesion with cauda equina  Getting a radiation oncology opinion if the patient possibly could be undergoing palliative radiation hopefully to somewhat stabilize further  Fall Precautions  PT OT    Cancer related pain- (present on admission)  Assessment & Plan  Palliative Care following for pain management  Multimodal pain management  Consider palliative radiation    Bilateral pleural effusion- (present on admission)  Assessment & Plan  Malignant pleural effusions from metastatic breast cancer s/p thoracenteses and chest tubes  Diuresis as tolerated  monitor chest xrays  PleurX placement in IR 1/11 on the right, monitor and continue to drain    Malignant neoplasm of breast (HCC)- (present on admission)  Assessment & Plan  \"Bilateral breast cancer with extensive destruction of the left breast and ulceration and skin lesions making her inoperable on the left.  Right breast shows a 3 x 3 cm mass.  PET scan March 2023 showed non-hypermetabolic pleural effusions which are cytology positive and she has demonstrable pleural masses. Started aromatase inhibition early September 2023.  Has not yet started CDK 4 6 inhibitor.  Lost to follow-up for over 6 months although apparently was on letrozole as single agent.  Now with clear progression of disease in the left breast.  August 2024: Progression of disease in liver with extensive disease.  Breast is worse.  Lung is better.  Lymph nodes are worse.  Status post radiation to symptomatic oozing and exophytic left breast mass.  Marked worsening of her cancer on letrozole with progression in the bone and liver.\"--Dr. Benitez  ongoing goals of care discussions. Patient is fighting to recover and discharge home so she can continue " treatment for her cancer  DNR, I OK   Palliative following for pain management as well as further discussions.  Consulted with oncology, her primary oncology team, recommended hospice  The patient is not willing to engage in hospice at this time, she would like to get stronger and have more outpatient therapy  Consulted radiation oncology for possibility of spine radiation    Primary hypertension- (present on admission)  Assessment & Plan  Monitor, resume as indicated  PRN's available    Diabetes mellitus, type 2 (HCC)- (present on admission)  Assessment & Plan  A1c 6.4  accuchecks with SSI  CHO diet  glucose goal 140-180    Plan  1/19  Evaluated chest tubing, replaced collection container and DM airleak has resolved now, follow-up chest x-ray in a.m.  Improved respiratory status now, back down to 5 L, continue to work on respiratory dynamics, forced diuresis, will decrease diuresis  Radiation oncology for possible palliative radiation to spine lesion  Oncology consult to further delineate the patient's overall disease state and prognosis, poorly received the patient  Recommending hospice at this time  Monitor chest tube output, in light of the patient's malignant effusion will likely not be able to be resolved entirely  Potassium replacement,  Symptomatic treatment  PT OT, mobilization, strengthening  Laboratory follow-up  Ongoing goals of care discussion, rehab assisting for possible rehabilitation in a last effort to get the patient to an outpatient status to receive additional cancer treatment   see orders  Patient is has a high medical complexity, complex decision making and is at high risk for complication, morbidity, and mortality.  I spent 56 minutes, reviewing the chart, obtaining and/or reviewing separately obtained history. Performing a medically appropriate examination and evaluation.  Counseling and educating the patient. Ordering and reviewing medications, tests, or procedures.   Documenting clinical  information in EPIC. Independently interpreting results and communicating results to patient. Discussing future disposition of care with patient, RN and case management.     VTE prophylaxis:    Xarelto 10mg daily as prophylaxis      I have performed a physical exam and reviewed and updated ROS and Plan today (1/19/2025). In review of yesterday's note (1/18/2025), there are no changes except as documented above.      Please note that this dictation was created using voice recognition software. I have made every reasonable attempt to correct obvious errors, but I expect that there are errors of grammar and possibly context that I did not discover before finalizing the note.

## 2025-01-19 NOTE — PROGRESS NOTES
Gave report to Elisabeth FARIAS RN. RN verbalizes understanding of POC, no other questions or concerns at this time. This RN to transfer patient to [id number].

## 2025-01-20 ENCOUNTER — APPOINTMENT (OUTPATIENT)
Dept: RADIOLOGY | Facility: MEDICAL CENTER | Age: 65
End: 2025-01-20
Attending: INTERNAL MEDICINE
Payer: MEDICARE

## 2025-01-20 PROBLEM — R53.81 DEBILITY: Status: ACTIVE | Noted: 2024-12-26

## 2025-01-20 LAB
ALBUMIN SERPL BCP-MCNC: 3.4 G/DL (ref 3.2–4.9)
ALBUMIN/GLOB SERPL: 1 G/DL
ALP SERPL-CCNC: 120 U/L (ref 30–99)
ALT SERPL-CCNC: 45 U/L (ref 2–50)
ANION GAP SERPL CALC-SCNC: 15 MMOL/L (ref 7–16)
AST SERPL-CCNC: 185 U/L (ref 12–45)
BILIRUB SERPL-MCNC: 0.7 MG/DL (ref 0.1–1.5)
BUN SERPL-MCNC: 24 MG/DL (ref 8–22)
CALCIUM ALBUM COR SERPL-MCNC: 10 MG/DL (ref 8.5–10.5)
CALCIUM SERPL-MCNC: 9.5 MG/DL (ref 8.5–10.5)
CHLORIDE SERPL-SCNC: 80 MMOL/L (ref 96–112)
CO2 SERPL-SCNC: 38 MMOL/L (ref 20–33)
CREAT SERPL-MCNC: 0.67 MG/DL (ref 0.5–1.4)
ERYTHROCYTE [DISTWIDTH] IN BLOOD BY AUTOMATED COUNT: 49.4 FL (ref 35.9–50)
GFR SERPLBLD CREATININE-BSD FMLA CKD-EPI: 97 ML/MIN/1.73 M 2
GLOBULIN SER CALC-MCNC: 3.3 G/DL (ref 1.9–3.5)
GLUCOSE BLD STRIP.AUTO-MCNC: 121 MG/DL (ref 65–99)
GLUCOSE BLD STRIP.AUTO-MCNC: 121 MG/DL (ref 65–99)
GLUCOSE BLD STRIP.AUTO-MCNC: 123 MG/DL (ref 65–99)
GLUCOSE BLD STRIP.AUTO-MCNC: 174 MG/DL (ref 65–99)
GLUCOSE SERPL-MCNC: 124 MG/DL (ref 65–99)
HCT VFR BLD AUTO: 36.6 % (ref 37–47)
HGB BLD-MCNC: 11.7 G/DL (ref 12–16)
MAGNESIUM SERPL-MCNC: 2.1 MG/DL (ref 1.5–2.5)
MCH RBC QN AUTO: 27.9 PG (ref 27–33)
MCHC RBC AUTO-ENTMCNC: 32 G/DL (ref 32.2–35.5)
MCV RBC AUTO: 87.4 FL (ref 81.4–97.8)
PHOSPHATE SERPL-MCNC: 3 MG/DL (ref 2.5–4.5)
PLATELET # BLD AUTO: 691 K/UL (ref 164–446)
PMV BLD AUTO: 9.1 FL (ref 9–12.9)
POTASSIUM SERPL-SCNC: 3.1 MMOL/L (ref 3.6–5.5)
PROT SERPL-MCNC: 6.7 G/DL (ref 6–8.2)
RBC # BLD AUTO: 4.19 M/UL (ref 4.2–5.4)
SODIUM SERPL-SCNC: 133 MMOL/L (ref 135–145)
WBC # BLD AUTO: 19.3 K/UL (ref 4.8–10.8)

## 2025-01-20 PROCEDURE — 71045 X-RAY EXAM CHEST 1 VIEW: CPT

## 2025-01-20 PROCEDURE — 83735 ASSAY OF MAGNESIUM: CPT

## 2025-01-20 PROCEDURE — A9270 NON-COVERED ITEM OR SERVICE: HCPCS | Performed by: HOSPITALIST

## 2025-01-20 PROCEDURE — 84100 ASSAY OF PHOSPHORUS: CPT

## 2025-01-20 PROCEDURE — 36415 COLL VENOUS BLD VENIPUNCTURE: CPT

## 2025-01-20 PROCEDURE — 770001 HCHG ROOM/CARE - MED/SURG/GYN PRIV*

## 2025-01-20 PROCEDURE — 97530 THERAPEUTIC ACTIVITIES: CPT

## 2025-01-20 PROCEDURE — 85027 COMPLETE CBC AUTOMATED: CPT

## 2025-01-20 PROCEDURE — 99233 SBSQ HOSP IP/OBS HIGH 50: CPT | Performed by: HOSPITALIST

## 2025-01-20 PROCEDURE — 82962 GLUCOSE BLOOD TEST: CPT | Mod: 91

## 2025-01-20 PROCEDURE — 94669 MECHANICAL CHEST WALL OSCILL: CPT

## 2025-01-20 PROCEDURE — 700102 HCHG RX REV CODE 250 W/ 637 OVERRIDE(OP): Performed by: HOSPITALIST

## 2025-01-20 PROCEDURE — 700111 HCHG RX REV CODE 636 W/ 250 OVERRIDE (IP)

## 2025-01-20 PROCEDURE — 700111 HCHG RX REV CODE 636 W/ 250 OVERRIDE (IP): Mod: JZ

## 2025-01-20 PROCEDURE — 700111 HCHG RX REV CODE 636 W/ 250 OVERRIDE (IP): Performed by: HOSPITALIST

## 2025-01-20 PROCEDURE — 92526 ORAL FUNCTION THERAPY: CPT

## 2025-01-20 PROCEDURE — 80053 COMPREHEN METABOLIC PANEL: CPT

## 2025-01-20 RX ORDER — POTASSIUM CHLORIDE 7.45 MG/ML
10 INJECTION INTRAVENOUS
Status: COMPLETED | OUTPATIENT
Start: 2025-01-20 | End: 2025-01-20

## 2025-01-20 RX ADMIN — HYDROCODONE BITARTRATE AND ACETAMINOPHEN 1 TABLET: 10; 325 TABLET ORAL at 03:13

## 2025-01-20 RX ADMIN — POTASSIUM CHLORIDE 10 MEQ: 7.46 INJECTION, SOLUTION INTRAVENOUS at 08:27

## 2025-01-20 RX ADMIN — INSULIN LISPRO 2 UNITS: 100 INJECTION, SOLUTION INTRAVENOUS; SUBCUTANEOUS at 07:51

## 2025-01-20 RX ADMIN — FUROSEMIDE 40 MG: 10 INJECTION INTRAMUSCULAR; INTRAVENOUS at 05:49

## 2025-01-20 RX ADMIN — POTASSIUM CHLORIDE 10 MEQ: 7.46 INJECTION, SOLUTION INTRAVENOUS at 07:24

## 2025-01-20 RX ADMIN — ONDANSETRON 4 MG: 2 INJECTION INTRAMUSCULAR; INTRAVENOUS at 23:18

## 2025-01-20 RX ADMIN — ROPINIROLE HYDROCHLORIDE 1 MG: 0.5 TABLET, FILM COATED ORAL at 10:18

## 2025-01-20 RX ADMIN — ROPINIROLE HYDROCHLORIDE 1 MG: 0.5 TABLET, FILM COATED ORAL at 20:41

## 2025-01-20 RX ADMIN — RIVAROXABAN 10 MG: 10 TABLET, FILM COATED ORAL at 17:02

## 2025-01-20 RX ADMIN — HYDROCODONE BITARTRATE AND ACETAMINOPHEN 1 TABLET: 10; 325 TABLET ORAL at 10:21

## 2025-01-20 RX ADMIN — POTASSIUM CHLORIDE 10 MEQ: 7.46 INJECTION, SOLUTION INTRAVENOUS at 09:24

## 2025-01-20 RX ADMIN — POTASSIUM CHLORIDE 10 MEQ: 7.46 INJECTION, SOLUTION INTRAVENOUS at 05:51

## 2025-01-20 RX ADMIN — Medication 5 MG: at 20:41

## 2025-01-20 ASSESSMENT — COGNITIVE AND FUNCTIONAL STATUS - GENERAL
DAILY ACTIVITIY SCORE: 16
HELP NEEDED FOR BATHING: A LOT
CLIMB 3 TO 5 STEPS WITH RAILING: TOTAL
MOBILITY SCORE: 10
SUGGESTED CMS G CODE MODIFIER MOBILITY: CL
PERSONAL GROOMING: A LITTLE
STANDING UP FROM CHAIR USING ARMS: A LOT
MOVING TO AND FROM BED TO CHAIR: TOTAL
WALKING IN HOSPITAL ROOM: TOTAL
MOVING FROM LYING ON BACK TO SITTING ON SIDE OF FLAT BED: A LOT
DRESSING REGULAR UPPER BODY CLOTHING: A LITTLE
SUGGESTED CMS G CODE MODIFIER DAILY ACTIVITY: CK
TOILETING: A LOT
DRESSING REGULAR LOWER BODY CLOTHING: A LOT
TURNING FROM BACK TO SIDE WHILE IN FLAT BAD: A LITTLE

## 2025-01-20 ASSESSMENT — PAIN DESCRIPTION - PAIN TYPE
TYPE: ACUTE PAIN
TYPE: CHRONIC PAIN
TYPE: CHRONIC PAIN
TYPE: ACUTE PAIN

## 2025-01-20 ASSESSMENT — ENCOUNTER SYMPTOMS
FEVER: 0
NERVOUS/ANXIOUS: 1
WEAKNESS: 1
GASTROINTESTINAL NEGATIVE: 1
BRUISES/BLEEDS EASILY: 0
BACK PAIN: 1
FOCAL WEAKNESS: 0
EYES NEGATIVE: 1
NECK PAIN: 0
SHORTNESS OF BREATH: 1
PALPITATIONS: 0
NAUSEA: 0
VOMITING: 0
COUGH: 0
CHILLS: 0
DIZZINESS: 0
HEADACHES: 0
MYALGIAS: 1
DEPRESSION: 0
POLYDIPSIA: 0
CARDIOVASCULAR NEGATIVE: 1
HEARTBURN: 0

## 2025-01-20 ASSESSMENT — GAIT ASSESSMENTS: GAIT LEVEL OF ASSIST: UNABLE TO PARTICIPATE

## 2025-01-20 ASSESSMENT — FIBROSIS 4 INDEX: FIB4 SCORE: 2.59

## 2025-01-20 NOTE — PROGRESS NOTES
Bedside report received from off going RN/tech: Lainey, assumed care of patient.     Fall Risk Score: HIGH RISK  Fall risk interventions in place: Place yellow fall risk ID band on patient, Provide patient/family education based on risk assessment, Educate patient/family to call staff for assistance when getting out of bed, Place fall precaution signage outside patient door, Place patient in room close to nursing station, Utilize bed/chair fall alarm, Notify charge of high risk for huddle, and Bed alarm connected correctly  Bed type: Low air loss (Raj Score less than 17 interventions in place)  Patient on cardiac monitor: Yes  IVF/IV medications: Infusion per MAR (List Med(s)) potassium  Oxygen: How many liters 5L oxymask  Bedside sitter: Not Applicable   Isolation: Not applicable

## 2025-01-20 NOTE — PROGRESS NOTES
Bedside report received from off going RN/tech: mile Barber care of patient.     Fall Risk Score: HIGH RISK  Fall risk interventions in place: Provide patient/family education based on risk assessment, Educate patient/family to call staff for assistance when getting out of bed, Place patient in room close to nursing station, Utilize bed/chair fall alarm, and Bed alarm connected correctly  Bed type: Regular (Raj Score less than 17 interventions in place)  Patient on cardiac monitor: Yes  IVF/IV medications: Not Applicable   Oxygen: How many liters 5L  Bedside sitter: Not Applicable   Isolation: Not applicable

## 2025-01-20 NOTE — CARE PLAN
The patient is Stable - Low risk of patient condition declining or worsening    Shift Goals  Clinical Goals: monitor o2, monitor chest tube output, pain management, mobility  Patient Goals: mobility, rest, pain control  Family Goals: POC updates    Progress made toward(s) clinical / shift goals:    Problem: Knowledge Deficit - Standard  Goal: Patient and family/care givers will demonstrate understanding of plan of care, disease process/condition, diagnostic tests and medications  Description: Target End Date:  1-3 days or as soon as patient condition allows    Document in Patient Education    1.  Patient and family/caregiver oriented to unit, equipment, visitation policy and means for communicating concern  2.  Complete/review Learning Assessment  3.  Assess knowledge level of disease process/condition, treatment plan, diagnostic tests and medications  4.  Explain disease process/condition, treatment plan, diagnostic tests and medications  Outcome: Progressing  Note: Pt and family updated on POC     Problem: Pain - Standard  Goal: Alleviation of pain or a reduction in pain to the patient’s comfort goal  Description: Target End Date:  Prior to discharge or change in level of care    Document on Vitals flowsheet    1.  Document pain using the appropriate pain scale per order or unit policy  2.  Educate and implement non-pharmacologic comfort measures (i.e. relaxation, distraction, massage, cold/heat therapy, etc.)  3.  Pain management medications as ordered  4.  Reassess pain after pain med administration per policy  5.  If opiods administered assess patient's response to pain medication is appropriate per POSS sedation scale  6.  Follow pain management plan developed in collaboration with patient and interdisciplinary team (including palliative care or pain specialists if applicable)  Outcome: Progressing  Note: Pt reports pain well controlled with current therapy. Additional non-pharmacologic interventions  implemented. Education on pain reduction goals, pain rating scale, and potential side effects of pharmacologic interventions. Demonstrates use of appropriate diversional activities and/or relaxation techniques.        Patient is not progressing towards the following goals:

## 2025-01-20 NOTE — DISCHARGE PLANNING
[facility] Rehabilitation Transitional Care Coordination    Physiatry follow up complete.  Dr. Culver recommending -    Disposition recommendations:  -Patient has gotten much weaker.  On my initial exam she was in 4/5 strength at bilateral hip flexors, now much weaker from disuse.  She is particularly weak in the bilateral hips and triceps which are required for standing.  We discussed a low chance of functional recovery with SNF placement, and if it comes to this I would recommend hospice instead.  Patient wants to continue to try for IPR placement, which we can do but she needs to be able to tolerate an IPR program and demonstrate that she can stand and walk a few steps prior to transfer.  -High flow nasal cannula oxygen currently a barrier to IPR  - TCC to verify DC support  -PMR to follow in the periphery for rehab appropriateness, please reach out with questions or request for medical management.      If there is interval improvement to demonstrate ability to stand and walk a few steps, please reach out to Rehab TCC [phone].      Volate update to T8 CM Ilemona.   TCC signing off.

## 2025-01-20 NOTE — PROGRESS NOTES
Pt refusing PO potassium, requesting for it to be given IV. Updated NOC provider sarwat, new orders received

## 2025-01-20 NOTE — THERAPY
"Occupational Therapy  Daily Treatment     Patient Name: Asha Hannon  Age:  64 y.o., Sex:  female  Medical Record #: [id number]  Today's Date: 1/20/2025     Precautions  Precautions: Fall Risk, Spinal / Back Precautions , Swallow Precautions, Chest Tube  Comments: R chest tube to suction, T4-6 lami/fusion, no brace per order    Assessment    Pt seen for OT treatment. Pt required mod A for bed mobility, min A for seated gown change, SBA-CGA to wipe hands/face, and max A to don/doff socks. Pt provided continued education regarding the role of OT, pathology of bedrest, and precautions in relation to ADLs. Pt current functional performance limited by impaired activity tolerance, impaired balance, and generalized weakness. Pt will continue to benefit from skilled OT while admitted to acute care.     Plan    Treatment Plan Status: Continue Current Treatment Plan  Type of Treatment: Self Care / Activities of Daily Living, Neuro Re-Education / Balance, Therapeutic Activity  Treatment Frequency: 3 Times per Week  Treatment Duration: Until Therapy Goals Met    DC Equipment Recommendations: Unable to determine at this time  Discharge Recommendations: Recommend post-acute placement for additional occupational therapy services prior to discharge home    Subjective    \"I just have this enema problem and I need to lay down\"     Objective     01/20/25 1204   Precautions   Precautions Fall Risk;Spinal / Back Precautions ;Swallow Precautions;Chest Tube   Comments R chest tube to suction, T4-6 lami/fusion, no brace per order   Pain   Pain Scales 0 to 10 Scale    Pain 0 - 10 Group   Therapist Pain Assessment Post Activity Pain Same as Prior to Activity;Nurse Notified  (Not rated, agreeable to session)   Non Verbal Descriptors   Non Verbal Scale  Calm   Cognition    Cognition / Consciousness WDL   Level of Consciousness Alert   Comments Pleasant and cooperative, motivated to work with therapy, perseverating on \"enema " "problem\", RN aware   Other Treatments   Other Treatments Provided Continued education regarding the role of OT, pathology of bedrest, and precautions in relation to ADLs.   Balance   Sitting Balance (Static) Fair -   Sitting Balance (Dynamic) Poor +   Weight Shift Sitting Poor   Skilled Intervention Verbal Cuing;Tactile Cuing;Sequencing;Facilitation   Comments no stand, session focused on seated ADLs   Bed Mobility    Supine to Sit Moderate Assist   Sit to Supine Moderate Assist   Scooting Maximal Assist   Rolling Moderate Assist to Rt.   Skilled Intervention Verbal Cuing;Tactile Cuing;Sequencing;Postural Facilitation;Facilitation   Comments HOB slightly elevated, cues for log roll   Activities of Daily Living   Grooming Standby Assist;Seated  (washed face)   Upper Body Dressing Minimal Assist  (don/doff gown)   Lower Body Dressing Maximal Assist  (don/doff socks)   Skilled Intervention Verbal Cuing;Sequencing;Tactile Cuing;Facilitation   Comments close SBA-min A required for balance throughout   Functional Mobility   Comments EOB for ADL tasks only   Visual Perception   Visual Perception  WDL   Activity Tolerance   Sitting Edge of Bed >5-10 min   Standing NT   Comments limited by weakness, fatigue, \"enema problem\" (declined offer for transfer to [facility] at time of session)   Patient / Family Goals   Patient / Family Goal #1 to return home   Goal #1 Outcome Progressing as expected   Short Term Goals   Short Term Goal # 1 SBA with UB dressing   Goal Outcome # 1 Progressing as expected   Short Term Goal # 2 mod A with LB dressing   Goal Outcome # 2 Progressing slower than expected   Short Term Goal # 3 mod A with ADL txfs   Goal Outcome # 3 Goal not met   Education Group   Education Provided Role of Occupational Therapist;Activities of Daily Living;Spinal Precautions;Pathology of bedrest   Role of Occupational Therapist Patient Response Patient;Acceptance;Explanation;Verbal Demonstration   Spinal Precautions Patient " Response Patient;Acceptance;Explanation;Demonstration;Verbal Demonstration;Action Demonstration;Reinforcement Needed   ADL Patient Response Patient;Acceptance;Explanation;Verbal Demonstration;Demonstration;Action Demonstration   Pathology of Bedrest Patient Response Patient;Acceptance;Explanation;Verbal Demonstration

## 2025-01-20 NOTE — CARE PLAN
Problem: Knowledge Deficit - Standard  Goal: Patient and family/care givers will demonstrate understanding of plan of care, disease process/condition, diagnostic tests and medications  Outcome: Progressing  Note: Pt educated regarding plan of care for the day, activity, diet, and medications. Verbalized understanding.       Problem: Skin Integrity  Goal: Skin integrity is maintained or improved  Outcome: Progressing  Note: Encouraged patient to repositioned herself more often as tolerated, frequent cleansing of skin, skin protectant used. Frequent monitoring of skin integrity.       Problem: Fall Risk  Goal: Patient will remain free from falls  Outcome: Progressing  Note: Fall risk assessed and fall precautions in place, pt 2 person assist. Bed alarm on, appropriate signs in place, call light and personal belongings within reach. Pt educated to call for help and not get up without assistance. Verbalized understanding.      Problem: Pain - Standard  Goal: Alleviation of pain or a reduction in pain to the patient’s comfort goal  Outcome: Progressing  Flowsheets (Taken 1/20/2025 1538)  OB Pain Intervention:   Rest   Repositioned   Relaxation technique  Pain Rating Scale (NPRS): 2  Note: Pt assessed for pain Q4h and medicated PRN. Pt instructed to notify RN of any new or increasing pain to prevent it from becoming intolerable. Verbalized understanding.       Problem: Hemodynamics  Goal: Patient's hemodynamics, fluid balance and neurologic status will be stable or improve  Outcome: Progressing     Problem: Fluid Volume  Goal: Fluid volume balance will be maintained  Outcome: Progressing     Problem: Respiratory  Goal: Patient will achieve/maintain optimum respiratory ventilation and gas exchange  Outcome: Progressing  Flowsheets (Taken 1/20/2025 1538)  O2 Delivery Device: Nasal Cannula  Note: Weaned to 3 LPM NC      Problem: Knowledge Deficit:  Goal: Knowledge of diagnostic tests will improve  Outcome:  "Progressing  Note: Pt educated regarding plan of care for the day, activity, diet, and medications. Verbalized understanding.     The patient is Stable - Low risk of patient condition declining or worsening    Shift Goals  Clinical Goals: Monitor VS and labs ; wean O2 safety from fall  Patient Goals: get better;  pain managemenet  Family Goals: POC updates    Progress made toward(s) clinical / shift goals:   Performed multiple pericare, patient is incontinent, call light within reach and bed alarm kept on,/72   Pulse 79   Temp 36.6 °C (97.9 °F) (Temporal)   Resp 18   Ht 1.727 m (5' 7.99\")   Wt 83.7 kg (184 lb 8.4 oz)   SpO2 92%           "

## 2025-01-20 NOTE — THERAPY
Physical Therapy   Daily Treatment     Patient Name: Asha Hannon  Age:  64 y.o., Sex:  female  Medical Record #: [id number]  Today's Date: 1/20/2025     Precautions  Precautions: Fall Risk;Spinal / Back Precautions ;Swallow Precautions;Chest Tube  Comments: R chest tube to suction, T4-6 lami/fusion, no brace per order    Assessment    Rec'd pt alert, in bed, pleasant and agreeable to work w/ PT.  She presents w/ significant LE weakness, especially proximally.  Unable to sit and march, and unable to maintain legs in neutral while hook lying.  She needs max assist to sit eob, and uses her UEs to support her, otherwise she is able to maintain midline for only seconds.  Max assist to attempt standing several trials, w/ pt able to elevate buttocks off the bed by approx 6 inches.  Unable to achieve full standing.  Worked on sit scooting along eob, as a precursor to slide board transfers.  Pt needs max assist to move along the eob.  Assisted nsg regarding rolling in order to clean/change bedding.      Plan    Treatment Plan Status: Continue Current Treatment Plan  Type of Treatment: Bed Mobility, Equipment, Family / Caregiver Training, Gait Training, Manual Therapy, Neuro Re-Education / Balance, Self Care / Home Evaluation, Stair Training, Therapeutic Activities, Therapeutic Exercise  Treatment Frequency: 4 Times per Week  Treatment Duration: Until Therapy Goals Met    DC Equipment Recommendations: Unable to determine at this time  Discharge Recommendations: Recommend post-acute placement for additional physical therapy services prior to discharge home       Objective       01/20/25 1133   Strength Lower Body   Comments   (proximal hip strength grossly 2/5, knee flex/ext grossly 4/5)   Balance   Sitting Balance (Static) Fair -   Sitting Balance (Dynamic) Fair -   Standing Balance (Static)   (unable to come to full standing posture)   Weight Shift Sitting Poor   Weight Shift Standing Absent   Skilled  Intervention Verbal Cuing;Tactile Cuing;Sequencing;Facilitation   Comments w/ fww   Bed Mobility    Supine to Sit Maximal Assist   Sit to Supine Maximal Assist   Scooting Total Assist   Skilled Intervention Verbal Cuing;Tactile Cuing;Sequencing;Facilitation   Gait Analysis   Gait Level Of Assist Unable to Participate   Functional Mobility   Sit to Stand Maximal Assist   Bed, Chair, Wheelchair Transfer Unable to Participate   Skilled Intervention Sequencing;Tactile Cuing;Verbal Cuing;Facilitation   Comments able to elevate buttocks off bed by approx 6 inches   Short Term Goals    Short Term Goal # 1 Pt will perform supine < > sit SPV with bed flat, no rail in 6 visits in order to set up for upright mobility   Goal Outcome # 1 goal not met   Short Term Goal # 2 Pt will perform sit < > stand SPV in 6 visits in order to prepare for ambulation   Goal Outcome # 2 Goal not met   Short Term Goal # 3 Pt will ambulate 150' SPV in 6 visits with FWW in order to return home safely   Goal Outcome # 3 Goal not met   Short Term Goal # 4 Pt will ascend/ descend 1 step SPV in 6 visits in order to access her home   Goal Outcome # 4 Goal not met   Physical Therapy Treatment Plan   Physical Therapy Treatment Plan Continue Current Treatment Plan   Anticipated Discharge Equipment and Recommendations   DC Equipment Recommendations Unable to determine at this time   Discharge Recommendations Recommend post-acute placement for additional physical therapy services prior to discharge home

## 2025-01-20 NOTE — THERAPY
"Speech Language Pathology   Daily Treatment     Patient Name: Asha Hannon  AGE:  64 y.o., SEX:  female  Medical Record #: [id number]  Date of Service: 1/20/2025      Precautions:  Precautions: Fall Risk, Spinal / Back Precautions , Swallow Precautions, Chest Tube         Subjective  Patient cleared by RN for session. Per RN, patient with poor PO intake, patient reports it's d/t poor appetite. With with \"good\" recall of FEES study, though demonstrated poor understanding of results/recommendation. Patient stating, \"re-evaluate me, I don't want thicken liquids.\"       Assessment  Patient needing max encouragement, but agreeable to participate in swallowing exercises.   Pt completed ~10 reps of effortful swallows with \"good\" accuracy and min verbal cues for form.   Pt completed ~10  reps of tongue push back exercise with \"good\" accuracy and min verbal cues for form.  Patient wit limited participation this date.        Clinical Impressions  Patient presents with oropharyngeal dysphagia consistent w/ FEES completed 1/16 indicating,\"...mild-moderate oropharyngeal dysphagia. Recommend continuing SB6/MT2 diet. Service will continue to follow to maximize dysphagia outcomes.         Recommendations  Treatment Completed: Dysphagia Treatment       Dysphagia Treatment  Diet Consistency: Mildly Thick Liquids, Soft & Bite-Sized Solids  Instrumentation: Instrumental swallow study pending clinical progress  Medication: Whole with puree  Supervision: Assist with meal tray set up, Distant supervision - check on patient 2-3 times per meal  Positioning: Fully upright and midline during oral intake, Meals sitting upright in a chair, as tolerated  Risk Management : Small bites/sips, Slow rate of intake, Physical mobility, as tolerated  Oral Care: BID                     SLP Treatment Plan  Treatment Plan: Dysphagia Treatment, Patient/Family/Caregiver Training  SLP Frequency: 3x Per Week  Estimated Duration: Until Therapy Goals " "Met      Anticipated Discharge Needs  Discharge Recommendations: Recommend post-acute placement for additional speech therapy services prior to discharge home  Therapy Recommendations Upon DC: Dysphagia Training, Patient / Family / Caregiver Education      Patient / Family Goals  Patient / Family Goal #1: \"I drink [place]\"  Goal #1 Outcome: Progressing as expected  Short Term Goals  Short Term Goal # 3: Pt will tolerate a diet of mildly thick liquids and SB6 w/o signs of aspiration related pulmonary complications  Goal Outcome  # 3: Progressing as expected  Short Term Goal # 4: Pt will complete exercises targeting pharyngeal constriction and cough strength w/ min cueing  Goal Outcome  # 4: Progressing slower than expected      JACKY Tesfaye  "

## 2025-01-21 ENCOUNTER — APPOINTMENT (OUTPATIENT)
Dept: RADIOLOGY | Facility: MEDICAL CENTER | Age: 65
End: 2025-01-21
Attending: INTERNAL MEDICINE
Payer: MEDICARE

## 2025-01-21 PROBLEM — E87.1 HYPONATREMIA: Status: ACTIVE | Noted: 2025-01-21

## 2025-01-21 LAB
ALBUMIN SERPL BCP-MCNC: 3.2 G/DL (ref 3.2–4.9)
BUN SERPL-MCNC: 30 MG/DL (ref 8–22)
CALCIUM ALBUM COR SERPL-MCNC: 9.9 MG/DL (ref 8.5–10.5)
CALCIUM SERPL-MCNC: 9.3 MG/DL (ref 8.5–10.5)
CHLORIDE SERPL-SCNC: 82 MMOL/L (ref 96–112)
CO2 SERPL-SCNC: 35 MMOL/L (ref 20–33)
CREAT SERPL-MCNC: 0.67 MG/DL (ref 0.5–1.4)
ERYTHROCYTE [DISTWIDTH] IN BLOOD BY AUTOMATED COUNT: 49.2 FL (ref 35.9–50)
FUNGUS SPEC CULT: NORMAL
FUNGUS SPEC FUNGUS STN: NORMAL
GFR SERPLBLD CREATININE-BSD FMLA CKD-EPI: 97 ML/MIN/1.73 M 2
GLUCOSE BLD STRIP.AUTO-MCNC: 113 MG/DL (ref 65–99)
GLUCOSE BLD STRIP.AUTO-MCNC: 127 MG/DL (ref 65–99)
GLUCOSE BLD STRIP.AUTO-MCNC: 134 MG/DL (ref 65–99)
GLUCOSE BLD STRIP.AUTO-MCNC: 159 MG/DL (ref 65–99)
GLUCOSE SERPL-MCNC: 129 MG/DL (ref 65–99)
HCT VFR BLD AUTO: 36.4 % (ref 37–47)
HGB BLD-MCNC: 11.9 G/DL (ref 12–16)
MCH RBC QN AUTO: 28.5 PG (ref 27–33)
MCHC RBC AUTO-ENTMCNC: 32.7 G/DL (ref 32.2–35.5)
MCV RBC AUTO: 87.1 FL (ref 81.4–97.8)
OSMOLALITY SERPL: 287 MOSM/KG H2O (ref 278–298)
PHOSPHATE SERPL-MCNC: 3.1 MG/DL (ref 2.5–4.5)
PLATELET # BLD AUTO: 602 K/UL (ref 164–446)
PMV BLD AUTO: 8.9 FL (ref 9–12.9)
POTASSIUM SERPL-SCNC: 3.7 MMOL/L (ref 3.6–5.5)
RBC # BLD AUTO: 4.18 M/UL (ref 4.2–5.4)
SIGNIFICANT IND 70042: NORMAL
SITE SITE: NORMAL
SODIUM SERPL-SCNC: 129 MMOL/L (ref 135–145)
SOURCE SOURCE: NORMAL
WBC # BLD AUTO: 18.3 K/UL (ref 4.8–10.8)

## 2025-01-21 PROCEDURE — C1729 CATH, DRAINAGE: HCPCS | Performed by: HOSPITALIST

## 2025-01-21 PROCEDURE — 51798 US URINE CAPACITY MEASURE: CPT

## 2025-01-21 PROCEDURE — 82962 GLUCOSE BLOOD TEST: CPT

## 2025-01-21 PROCEDURE — A9270 NON-COVERED ITEM OR SERVICE: HCPCS | Performed by: STUDENT IN AN ORGANIZED HEALTH CARE EDUCATION/TRAINING PROGRAM

## 2025-01-21 PROCEDURE — 99498 ADVNCD CARE PLAN ADDL 30 MIN: CPT | Performed by: NURSE PRACTITIONER

## 2025-01-21 PROCEDURE — 700111 HCHG RX REV CODE 636 W/ 250 OVERRIDE (IP): Performed by: HOSPITALIST

## 2025-01-21 PROCEDURE — 71045 X-RAY EXAM CHEST 1 VIEW: CPT

## 2025-01-21 PROCEDURE — 99497 ADVNCD CARE PLAN 30 MIN: CPT | Performed by: NURSE PRACTITIONER

## 2025-01-21 PROCEDURE — 36415 COLL VENOUS BLD VENIPUNCTURE: CPT

## 2025-01-21 PROCEDURE — A9270 NON-COVERED ITEM OR SERVICE: HCPCS | Performed by: HOSPITALIST

## 2025-01-21 PROCEDURE — 99233 SBSQ HOSP IP/OBS HIGH 50: CPT | Performed by: STUDENT IN AN ORGANIZED HEALTH CARE EDUCATION/TRAINING PROGRAM

## 2025-01-21 PROCEDURE — 80069 RENAL FUNCTION PANEL: CPT

## 2025-01-21 PROCEDURE — 94669 MECHANICAL CHEST WALL OSCILL: CPT

## 2025-01-21 PROCEDURE — 700102 HCHG RX REV CODE 250 W/ 637 OVERRIDE(OP): Performed by: HOSPITALIST

## 2025-01-21 PROCEDURE — 700102 HCHG RX REV CODE 250 W/ 637 OVERRIDE(OP): Performed by: STUDENT IN AN ORGANIZED HEALTH CARE EDUCATION/TRAINING PROGRAM

## 2025-01-21 PROCEDURE — 83930 ASSAY OF BLOOD OSMOLALITY: CPT

## 2025-01-21 PROCEDURE — 85027 COMPLETE CBC AUTOMATED: CPT

## 2025-01-21 PROCEDURE — 770006 HCHG ROOM/CARE - MED/SURG/GYN SEMI*

## 2025-01-21 RX ORDER — HYDROMORPHONE HYDROCHLORIDE 1 MG/ML
0.5 INJECTION, SOLUTION INTRAMUSCULAR; INTRAVENOUS; SUBCUTANEOUS ONCE
Status: ACTIVE | OUTPATIENT
Start: 2025-01-21 | End: 2025-01-22

## 2025-01-21 RX ORDER — FUROSEMIDE 10 MG/ML
20 INJECTION INTRAMUSCULAR; INTRAVENOUS
Status: DISCONTINUED | OUTPATIENT
Start: 2025-01-22 | End: 2025-01-26

## 2025-01-21 RX ORDER — GAUZE BANDAGE 2" X 2"
100 BANDAGE TOPICAL DAILY
Status: DISCONTINUED | OUTPATIENT
Start: 2025-01-21 | End: 2025-01-26

## 2025-01-21 RX ORDER — FOLIC ACID 1 MG/1
1 TABLET ORAL DAILY
Status: DISCONTINUED | OUTPATIENT
Start: 2025-01-21 | End: 2025-01-26

## 2025-01-21 RX ADMIN — INSULIN LISPRO 2 UNITS: 100 INJECTION, SOLUTION INTRAVENOUS; SUBCUTANEOUS at 17:05

## 2025-01-21 RX ADMIN — FOLIC ACID 1 MG: 1 TABLET ORAL at 15:33

## 2025-01-21 RX ADMIN — Medication 100 MG: at 15:33

## 2025-01-21 RX ADMIN — Medication 5 MG: at 21:09

## 2025-01-21 RX ADMIN — THERA TABS 1 TABLET: TAB at 15:33

## 2025-01-21 RX ADMIN — FUROSEMIDE 40 MG: 10 INJECTION INTRAMUSCULAR; INTRAVENOUS at 04:54

## 2025-01-21 RX ADMIN — ROPINIROLE HYDROCHLORIDE 1 MG: 0.5 TABLET, FILM COATED ORAL at 09:54

## 2025-01-21 RX ADMIN — ROPINIROLE HYDROCHLORIDE 1 MG: 0.5 TABLET, FILM COATED ORAL at 21:08

## 2025-01-21 RX ADMIN — HYDROCODONE BITARTRATE AND ACETAMINOPHEN 1 TABLET: 10; 325 TABLET ORAL at 04:54

## 2025-01-21 RX ADMIN — RIVAROXABAN 10 MG: 10 TABLET, FILM COATED ORAL at 17:04

## 2025-01-21 ASSESSMENT — FIBROSIS 4 INDEX
FIB4 SCORE: 2.98
FIB4 SCORE: 2.98

## 2025-01-21 ASSESSMENT — ENCOUNTER SYMPTOMS
SHORTNESS OF BREATH: 1
FEVER: 0
CHILLS: 0
HEADACHES: 0
DIZZINESS: 0
BACK PAIN: 1
CARDIOVASCULAR NEGATIVE: 1
NECK PAIN: 0
FOCAL WEAKNESS: 0
WEAKNESS: 1
SPEECH CHANGE: 0
VOMITING: 0
COUGH: 0
MYALGIAS: 1
DEPRESSION: 0
PALPITATIONS: 0
BRUISES/BLEEDS EASILY: 0
HEARTBURN: 0
NERVOUS/ANXIOUS: 1
GASTROINTESTINAL NEGATIVE: 1
POLYDIPSIA: 0
NAUSEA: 0
SENSORY CHANGE: 0
TINGLING: 0
EYES NEGATIVE: 1

## 2025-01-21 ASSESSMENT — PAIN DESCRIPTION - PAIN TYPE
TYPE: ACUTE PAIN
TYPE: ACUTE PAIN;CHRONIC PAIN

## 2025-01-21 NOTE — PROGRESS NOTES
2330: Notable air leak to chambers 5-6 in chest tube. Patient resting in no distress at this time. Notified Charge RN and RRT RN at bedside to assess. Re-taped junction of chest tube and observed no air leak at this time.    0215: Air leak noted again to chambers 5-6. Charge and RRT RN notified to assist. Re-taped junction and replaced Pleura-Vac system to assess for air leak. Air leak now noted in air chamber 1. CHERIE Malcolm notified of patient update.

## 2025-01-21 NOTE — CARE PLAN
The patient is Watcher - Medium risk of patient condition declining or worsening    Shift Goals  Clinical Goals: Pain mgmt  Patient Goals: Pain mgmt  Family Goals: POC updates    Progress made toward(s) clinical / shift goals:    Problem: Knowledge Deficit - Standard  Goal: Patient and family/care givers will demonstrate understanding of plan of care, disease process/condition, diagnostic tests and medications  Outcome: Progressing     Problem: Skin Integrity  Goal: Skin integrity is maintained or improved  Outcome: Progressing     Problem: Pain - Standard  Goal: Alleviation of pain or a reduction in pain to the patient’s comfort goal  Outcome: Progressing     Problem: Fluid Volume  Goal: Fluid volume balance will be maintained  Outcome: Progressing       Patient is not progressing towards the following goals:

## 2025-01-21 NOTE — PROGRESS NOTES
[facility] Medicine Daily Progress Note    Date of Service  1/21/2025    Chief Complaint  Asha Hannon is a 64 y.o. female admitted 12/25/2024 with weakness, shortness of breath, back pain, has a history of metastatic breast cancer     Hospital Course  64 y.o. female who presented 12/25/2024 with history of stage IV breast cancer metastatic disease, mets to the lungs and now spine, on previous oral chemotherapy, admitted on 12/25/2024 with weakness, shortness of breath and back pain, subsequently a CT chest abdomen showed moderate-sized pleural effusions, worsening hepatic metastatic disease with new abdominal lymphadenopathy, thoracic adenopathy, diffuse osseous metastatic disease with a T5 pathologic fracture, the patient was admitted, undergo a right-sided thoracentesis on 12/27, malignant cells identified in the fluid, and MRI showed multiple thoracic and lumbar metastasized lesions, the patient was taken to the operating room on 1 1 where she had a T4 T6 laminectomy, following the patient developed a right-sided pneumothorax, requiring chest tube placement, left-sided thoracentesis was performed, the patient following his respiratory difficulty requiring BiPAP treatment and subsequently required to be intubated, the patient had a right-sided PleurX and small bore catheter placed and had frequent thoracentesis since placement, the patient remained intubated until 1/15, where the patient then was extubated and had a extensive talk with the ICU team in terms of her overall status, metastatic malignancy and respiratory failure, the patient stated that she wanted to keep fighting and is wishing to hopefully reach outpatient status where she can fail to be treated for her malignancy.  If she would have additional respiratory failure she states that she would like to be reintubated and possibly even undergo a tracheostomy to stay alive.  The patient tolerated extubation, she is alert and x 4, she is in a  sinus rhythm in the 50s to 80s, blood pressure in the 90s to 100s, the patient was n.p.o., underwent evaluation today by speech therapy and is cleared for mildly thick liquids, soft and bite-size solids, the right chest tube had put out 100 cc overnight, the patient remains on DVT prophylaxis with Lovenox, Pepcid for GI prophylaxis,  Laboratory data showed white to count 13.9 hemoglobin 9.3 platelet count 563 sodium 136 potassium 4.5 chloride 94 bicarb 35 glucose 155 BUN 27 creatinine 0.56  Chest x-ray with pulmonary edema and infiltrates, stable, layering left pleural effusion, stable, right-sided small bore chest tube  The patient has been followed by palliative care, she currently confirms a DNR I okay status, the patient lives with her daughter  The patient has been consulted on by palliative care  The patient had a consult from her primary oncology team on 1/17/2025  Radiation oncology was consulted as well      Interval Problem Update  1/17 the patient feels somewhat better, she is oxygenating okay, she has still significant pain, she feels weak, nursing reports the patient is a 2-3 person assist, unsafe to sit up in a chair, the patient continues to resist certain medical interventions, currently afebrile, heart rate in the 70s, respiration unlabored, blood pressure in the 130s over 60s, the patient is saturating in the mid 90s on 5 L, chest tube output 190, no airleak, in the afternoon the patient has some increase in oxygen demand, chest x-ray appears fairly unchanged, left-sided fluid, right-sided small bore chest tube in place, no pneumothorax, with a count 10.5 hemoglobin 9.4 hematocrit 30.2 platelet count 537 sodium 133 potassium 4.2 chloride 90 bicarb 36 glucose 107 BUN 23 creatinine 0.6  1/18 the patient feels less anxious today, she has improved she feels, she would like to titrate down oxygen, she would like to engage with more therapy, PMR has spent quite some time with the patient and made  expectations clear, if she fails to improve where she could transition to acute rehab the patient should most likely consider home hospice  Patient is currently afebrile, heart rate in the 70s respiration unlabored the patient is down to 5 L nasal cannula now saturating 96%, blood pressure 120s over 50s  Pain appears to be controlled I discussed the possibility of palliative radiation to her spine, will engage on Monday  Wbc count 17, hemoglobin 11.5 platelet count 643 sodium 131 potassium 4.2 chloride 86 bicarb 32 glucose 140 BUN 24 creatinine 0.73  alk phos 115  glucose 106  1/19 the patient is not feeling well today, she wants to have a bowel movement she continues to be significantly weak, no nausea vomiting, there was an air leak in the chest tube suction system, it was replaced and resolved, chest x-ray showed a possible small apical pneumothorax, chest tube output 60 cc, afebrile, heart rate in the 70s respiration unlabored, the patient is saturating the mid 90s on 5 L, blood pressure in the 120s over 70s, WBC count 15.4 hemoglobin 10.5 platelet count 600 sodium 133 potassium 3.1 chloride 85 bicarb 37 glucose 130  Procalcitonin 0.14  1/20 9 still weak with insomnia and anxiety.  Rehab will only consider her if she can stand and walk a few steps.  Radiation oncology will only take her if she can get through rehab. Transferred to [facility].  Total time 51 minutes.    1/21:  Evaluated patient at the bedside.  Patient is on 2 L of oxygen.  Hemodynamically stable.  Afebrile.  White counts 18.3.  She is complaining of burning of urination and think that she might have a UTI, urinalysis has been ordered.  Sodium is 129, chloride 85, likely due to dehydration and poor p.o. intake.  Will decrease the Lasix dose to 20 mg daily.  Encourages adequate hydration and good p.o. intake.  Check serum, urine, osmolarity and urine sodium.  Patient has a Aspira drain in place.  Since patient is hemodynamically stable and  on 2 L of oxygen, and chest x-ray with mild pleural effusion, patient is requesting if the drain can be discontinued.  Discussed with IR and planning on discontinuing Aspira drain.  IR reviewed patient images and no indication for thoracentesis at the moment.  Patient is currently pending placement at LTAC  Patient states she doesn't want hospice, family is at the bedside. Per previous provider notes, oncology was consulted and they are recommending hospice.      I have discussed this patient's plan of care and discharge plan at IDT rounds today with Case Management, Nursing, Nursing leadership, and other members of the IDT team.    Consultants/Specialty  critical care, oncology, and palliative care, physiatry    Code Status  DNAR, I OK    Disposition  Not Medically Cleared  I have placed the appropriate orders for post-discharge needs.    Review of Systems  Review of Systems   Constitutional:  Positive for malaise/fatigue. Negative for chills and fever.   HENT: Negative.     Eyes: Negative.    Respiratory:  Positive for shortness of breath. Negative for cough.    Cardiovascular: Negative.  Negative for chest pain and palpitations.   Gastrointestinal: Negative.  Negative for heartburn, nausea and vomiting.   Genitourinary: Negative.  Negative for dysuria and frequency.   Musculoskeletal:  Positive for back pain and myalgias. Negative for neck pain.   Skin: Negative.  Negative for itching and rash.   Neurological:  Positive for weakness. Negative for dizziness, focal weakness and headaches.   Endo/Heme/Allergies: Negative.  Negative for polydipsia. Does not bruise/bleed easily.   Psychiatric/Behavioral:  Negative for depression. The patient is nervous/anxious.         Physical Exam  Temp:  [36.5 °C (97.7 °F)-36.6 °C (97.9 °F)] 36.5 °C (97.7 °F)  Pulse:  [49-81] 69  Resp:  [18-19] 19  BP: (114-127)/(57-72) 127/68  SpO2:  [92 %-96 %] 94 %    Physical Exam  Vitals and nursing note reviewed.   Constitutional:        Appearance: She is well-developed. She is ill-appearing. She is not diaphoretic.   HENT:      Head: Normocephalic and atraumatic.      Nose: Nose normal.   Eyes:      Conjunctiva/sclera: Conjunctivae normal.      Pupils: Pupils are equal, round, and reactive to light.   Neck:      Thyroid: No thyromegaly.      Vascular: No JVD.   Cardiovascular:      Rate and Rhythm: Normal rate and regular rhythm.      Heart sounds: Normal heart sounds.      No friction rub. No gallop.   Pulmonary:      Effort: Pulmonary effort is normal.      Breath sounds: Rhonchi present. No wheezing or rales.      Comments: Aspira drain in place  Abdominal:      General: Bowel sounds are normal. There is no distension.      Palpations: Abdomen is soft. There is no mass.      Tenderness: There is no abdominal tenderness. There is no guarding or rebound.   Musculoskeletal:         General: No tenderness. Normal range of motion.      Cervical back: Normal range of motion and neck supple.      Right lower leg: Edema present.      Left lower leg: Edema present.   Lymphadenopathy:      Cervical: No cervical adenopathy.   Skin:     General: Skin is warm and dry.      Coloration: Skin is pale.      Findings: Bruising present.   Neurological:      Mental Status: She is alert and oriented to person, place, and time.      Cranial Nerves: No cranial nerve deficit.      Motor: Weakness present.   Psychiatric:         Behavior: Behavior normal.         Fluids    Intake/Output Summary (Last 24 hours) at 1/21/2025 1438  Last data filed at 1/21/2025 0900  Gross per 24 hour   Intake 50 ml   Output 0 ml   Net 50 ml        Laboratory  Recent Labs     01/19/25  0401 01/20/25  0023 01/21/25  0248   WBC 15.4* 19.3* 18.3*   RBC 3.77* 4.19* 4.18*   HEMOGLOBIN 10.5* 11.7* 11.9*   HEMATOCRIT 33.3* 36.6* 36.4*   MCV 88.3 87.4 87.1   MCH 27.9 27.9 28.5   MCHC 31.5* 32.0* 32.7   RDW 51.3* 49.4 49.2   PLATELETCT 600* 691* 602*   MPV 9.1 9.1 8.9*     Recent Labs      01/19/25  0401 01/20/25  0023 01/21/25  0248   SODIUM 133* 133* 129*   POTASSIUM 3.1* 3.1* 3.7   CHLORIDE 85* 80* 82*   CO2 37* 38* 35*   GLUCOSE 130* 124* 129*   BUN 22 24* 30*   CREATININE 0.60 0.67 0.67   CALCIUM 9.0 9.5 9.3                   Imaging  DX-CHEST-PORTABLE (1 VIEW)   Final Result         1.  Pulmonary edema and/or infiltrates are identified, which are stable since the prior exam.   2.  Small bilateral pleural effusions, increased on the right since prior study   3.  Cardiomegaly      DX-CHEST-PORTABLE (1 VIEW)   Final Result         1.  Pulmonary edema and/or infiltrates are identified, which are stable since the prior exam.   2.  Small left pleural effusion   3.  Cardiomegaly      DX-CHEST-PORTABLE (1 VIEW)   Final Result         1. Small right apical pneumothorax now visible. Right chest tube unchanged.      2. Persistent infiltrates and small effusions.                     DX-CHEST-PORTABLE (1 VIEW)   Final Result      No significant change from prior exam.      DX-CHEST-PORTABLE (1 VIEW)   Final Result         1.  Pulmonary edema and/or infiltrates are identified, which are stable since the prior exam.   2.  Cardiomegaly      DX-CHEST-PORTABLE (1 VIEW)   Final Result      1.  Interval improvement of pulmonary vascular congestion and pulmonary edema.   2.  No other significant change from prior exam.         DX-CHEST-PORTABLE (1 VIEW)   Final Result         1.  Pulmonary edema and/or infiltrates are identified, which appear somewhat increased since the prior exam.   2.  Layering left pleural effusion, stable   3.  Cardiomegaly      DX-CHEST-PORTABLE (1 VIEW)   Final Result         1.  Pulmonary edema and/or infiltrates are identified, which are stable since the prior exam.   2.  Layering left pleural effusion, stable      DX-CHEST-PORTABLE (1 VIEW)   Final Result         1.  Pulmonary edema and/or infiltrates are identified, which are stable since the prior exam.   2.  Layering left pleural  effusion, stable      DX-CHEST-PORTABLE (1 VIEW)   Final Result         1.  Pulmonary edema and/or infiltrates are identified, which are stable since the prior exam.   2.  Trace bilateral pleural effusions, stable      DX-CHEST-PORTABLE (1 VIEW)   Final Result         1.  Pulmonary edema and/or infiltrates are identified, which are stable since the prior exam.   2.  Small left pleural effusion, stable      DX-CHEST-PORTABLE (1 VIEW)   Final Result      1.  Support equipment is unchanged.      2.  Cardiomegaly with moderate perihilar pulmonary edema unchanged from previous exam.      3.  Small bilateral pleural effusions left greater than right.      DX-CHEST-PORTABLE (1 VIEW)   Final Result      1.  Tubes and lines unchanged in position.      2.  Marked bilateral perihilar pulmonary edema which has improved slightly since previous exam.      3.  Small bilateral pleural effusions left greater than right.      IR-INSERT PLEURAL CATH W/ CUFF   Final Result      1.  Ultrasound and fluoroscopic guided placement of a RIGHT HEMITHORAX PleurX (Aspira type) tunneled pleural drainage catheter.   2.  The catheter can be used immediately with drainage of pleural effusion as needed for comfort, as per package insert instructions.   3.  If the catheter becomes dislodged, do not reinsert it.  Place sterile dressing over the entry wound site.  May consult primary care physician or oncologist as to whether there is clinical indication for PleurX replacement.      DX-CHEST-PORTABLE (1 VIEW)   Final Result      1.  Tubes and lines unchanged in position.      2.   Extensive bilateral perihilar airspace opacities consistent with pneumonitis and/or pulmonary edema.      3.  Mild to moderate bilateral pleural effusions right greater than left      DX-CHEST-PORTABLE (1 VIEW)   Final Result         1. Increasing moderate right and small left pleural effusions. No change edema or infiltrates.      2. ETT 5 cm above masoud. NG tube in  stomach. Right central line.                     DX-CHEST-PORTABLE (1 VIEW)   Final Result      Stable diffuse bilateral pulmonary infiltrates.      DX-ABDOMEN FOR TUBE PLACEMENT   Final Result      NG tube tip overlies the gastric body.      DX-CHEST-PORTABLE (1 VIEW)   Final Result      1.  Supportive tubing as described above.   2.  Apparent increasing RIGHT pleural fluid, possibly due to change in position.   3.  Improvement of LEFT lung base consolidation.   4.  No other significant change from prior exam.         DX-CHEST-PORTABLE (1 VIEW)   Final Result         1. Stable right IJ central catheter. Right chest tube has been removed. No pneumothorax.   2. Unchanged bilateral pulmonary opacities and associated pleural effusions.         DX-CHEST-PORTABLE (1 VIEW)   Final Result      1.  Unchanged bilateral pulmonary opacities. Stable small bilateral pleural effusions.   2.  Right pigtail catheter remains. No pneumothorax.      DX-CHEST-PORTABLE (1 VIEW)   Final Result      Removal of large bore right chest tube with stable right pigtail chest tube. Questionable tiny right apical pneumothorax.      DX-CHEST-PORTABLE (1 VIEW)   Final Result      No significant interval change.      DX-CHEST-PORTABLE (1 VIEW)   Final Result      1.  Persistent bilateral pulmonary opacities, stable to slightly worse, although changes may be related to differences in imaging technique. No large pleural effusions.   2.  Well-positioned right IJ central catheter. No pneumothorax.   3.  Stable other lines and tubes.      DX-CHEST-LIMITED (1 VIEW)   Final Result         1.  No change in 2 separate right-sided chest tubes.      2.  No pneumothorax identified.      3.  Bilateral pulmonary opacifications again identified which are slightly less prominent.      DX-CHEST-PORTABLE (1 VIEW)   Final Result         1. No change mild infiltrates.      2. Right chest tubes. No pneumothorax.      3. ETT out. NG tube and jugular line remain.                      DX-CHEST-PORTABLE (1 VIEW)   Final Result         1. No significant interval change.      DX-CHEST-LIMITED (1 VIEW)   Final Result         1.  Findings on chest radiograph appear stable since the prior radiograph.  No new abnormalities are identified.      2.  2 separate right-sided chest tubes are again identified with no change in position.      3.  No pneumothorax identified.      DX-CHEST-LIMITED (1 VIEW)   Final Result         1.  No pneumothorax identified.      2.  2 right-sided chest tubes again identified.      CT-CTA CHEST PULMONARY ARTERY W/ RECONS   Final Result         1. No pulmonary embolus.      2. Medial right lung base consolidation felt to represent complete collapse of the right middle lobe. Mild to moderate mid and upper lung pneumonia infiltrates remain.      3. Right chest tubes. Small right pneumothorax and small right effusion. Improving small 2.5 cm left pleural effusion since 12/25/2024.      4. Extensive metastatic lesions including adenopathy, liver lesions, right breast nodule and bone lesions. Thoracic spine fusion hardware remains.                  CT-HEAD W/O   Final Result         1. No acute process in the brain evident.      2. Fluid in sphenoid sinus.      3. Right convexity midthoracic scoliosis and unchanged from 12/31/2024 MRI.               DX-ABDOMEN FOR TUBE PLACEMENT   Final Result         1. NG tube in stomach.                     DX-CHEST-FOR LINE PLACEMENT Perform procedure in: Patient's Room   Final Result         1. Small right apical pneumothorax remains. 2 right chest tubes.      2. ETT 3 cm above masoud. NG tube in stomach. Right jugular line in SVC. No complication evident.      3. Mild infiltrates and small right effusion.                  DX-CHEST-LIMITED (1 VIEW)   Final Result         1. 2nd right chest tube placed with near complete evacuation of right pneumothorax.      2. Mild infiltrates, basilar atelectasis and small right effusion remain.       3. Endotracheal tube tip at about T5 and about 2 cm above the masoud. The masoud and tip of the endotracheal tube are difficult to visualize in part because of overlying thoracic spine fusion hardware.                  DX-CHEST-PORTABLE (1 VIEW)   Final Result         Enlarging right pneumothorax with collapsing of the right lung.      Preliminary findings texted to Dr. STANLEY FISHER in the Emergency Department via Voalte on 1/4/2025 1:50 AM         DX-CHEST-LIMITED (1 VIEW)   Final Result         Interval placement of right pigtail chest tube. Small right residual pneumothorax.      DX-CHEST-PORTABLE (1 VIEW)   Final Result   Addendum (preliminary) 1 of 1      CRITICAL RESULT READ BACK: Preliminary findings discussed with and    critical read back performed by Dr. Downs via telephone on 1/3/2025 8:03    PM      Final      1.  Interval worsening in right pneumothorax, now moderate.   2.  Persistent interstitial and alveolar pulmonary opacities bilaterally. Possible bilateral pleural effusions, right greater than left.            DX-CHEST-PORTABLE (1 VIEW)   Final Result         1. Slightly decreased right pneumothorax.   2. Stable bilateral interstitial and airspace opacification.   3. Stable right pleural effusion.      DX-CHEST-PORTABLE (1 VIEW)   Final Result         1. Left pleural effusion has decreased in size status post thoracentesis. There is no left-sided pneumothorax.   2. There is persistent right basilar opacity with a new right apical pneumothorax.      Findings were communicated to the ordering provider at the time of dictation via Voalte.      US-THORACENTESIS PUNCTURE LEFT   Final Result      1. Ultrasound guided left sided therapeutic thoracentesis.      2. 950 mL of fluid withdrawn.      DX-CHEST-PORTABLE (1 VIEW)   Final Result      1. New postsurgical changes in the upper thoracic spine.   2. Worsening bilateral pleural effusions, pulmonary vascular congestion and interstitial edema.       DX-PORTABLE FLUORO > 1 HOUR   Final Result      Portable fluoroscopy utilized for 1 minute 19 seconds.      INTERPRETING LOCATION: [address], [place], [address]      DX-THORACIC SPINE-2 VIEWS   Final Result      Digitized intraoperative radiograph is submitted for review. This examination is not for diagnostic purpose but for guidance during a surgical procedure. Please see the patient's chart for full procedural details.         INTERPRETING LOCATION: [address], [place], [address]      MR-CERVICAL SPINE-WITH & W/O   Final Result         1.  No evidence of metastatic disease to the cervical spine.      MR-LUMBAR SPINE-WITH & W/O   Final Result   Impression:      Multiple bony metastatic lesions as described above, most prominent along the inferior endplate of L1.      Degenerative moderate canal stenosis at L4-5 with cauda equina compression. Epidural thickening and enhancement is noted in this region with severe bilateral facet degeneration and enhancement along the bilateral facet joints. This is felt to be    degenerative in origin.      MR-THORACIC SPINE-WITH & W/O   Final Result         Multiple metastatic lesions of the thoracic spine as described above.      Pathologic compression fracture at T5 with 50% loss of height and posterior cortical retropulsion with circumferential epidural thickening representing epidural extension of metastatic disease. This causes severe canal stenosis with cord compression.      Metastatic disease also involves the posterior elements and spinous processes at T4, T5.      Pre and paravertebral metastatic soft tissue infiltration at T4-T5 noted.         MR-BRAIN-WITH & W/O   Final Result         Multiple nodular enhancing metastatic lesions involving the cranium as described above.      The largest lesion is in the right posterior temporal/parietal occipital region and demonstrates inner table cortical breakthrough with epidural extension and adjacent mild dural thickening and  enhancement.      US-THORACENTESIS PUNCTURE RIGHT   Final Result   Addendum (preliminary) 1 of 1 12/27/2024 10:59 AM      HISTORY/REASON FOR EXAM:  Shortness of breath         TECHNIQUE/EXAM DESCRIPTION:   Ultrasound-guided thoracentesis.      Indication:  RIGHT pleural fluid collection.      COMPARISON:  None-however patient states history of right-sided    thoracentesis September 2024.      PROCEDURE:     Informed consent was obtained. A timeout was taken. A right    pleural effusion was localized with real-time ultrasound guidance. The    right posterior chest wall was prepped and draped in a sterile manner.    Following local anesthesia with 1%    lidocaine, and under live ultrasound guidance a 5 [employer] pigtail    catheter was advanced into the pleural space with trocar technique and    pleural fluid was drained. The patient tolerated the procedure well    without evidence of complication. A post    thoracentesis chest radiograph is forthcoming.      FINDINGS:      Fluid was sent to the laboratory.      Fluid character: serosanguinous         1. Ultrasound guided right sided therapeutic and diagnostic thoracentesis.      2. 1000 mL of fluid withdrawn.      Final      1. Ultrasound guided right sided therapeutic and diagnostic thoracentesis.      2. 1000 mL of fluid withdrawn.      DX-CHEST-PORTABLE (1 VIEW)   Final Result      1.  Small persistent right pleural effusion which is decreased since recent thoracentesis.      2.  Small to moderate left pleural effusion.      3.  Bilateral perihilar and left lower lobe atelectasis and/or pneumonitis.      4.  Increased interstitial markings throughout the lung fields consistent with parenchymal scarring, and/or pulmonary edema.      CT-LSPINE W/O PLUS RECONS   Final Result         1. Diffuse metastatic disease.   2. No definite pathologic fracture.      CT-TSPINE W/O PLUS RECONS   Final Result         1. Extensive metastatic disease.   2. There is new  pathologic compression fracture deformity of T5.      CT-CHEST,ABDOMEN,PELVIS WITH   Final Result         1. New bilateral upper lobe nodular infiltrates.   2. Moderate-sized bilateral pleural effusions.   3. Interval worsening of hepatic metastatic disease.   4. New abdominal lymphadenopathy. Thoracic adenopathy is again noted.   5. Diffuse osseous metastatic disease. There is a new compression fracture of T5 as reported on the prior spine CT.      DX-CHEST-PORTABLE (1 VIEW)   Final Result      1.  Stable bibasilar atelectasis/consolidation and small bilateral pleural effusions.      2.  Stable cardiomegaly and interstitial edema.      IR-CONSULT AND TREAT    (Results Pending)        Assessment/Plan  * Acute on chronic respiratory failure with hypoxia and hypercapnia (HCC)- (present on admission)  Assessment & Plan  intubated 1/4  extubated 1/5  re-intubated 1/8  Extubated 1/15  Patient is doing well while extubated.  He has been on 3 to 5 L nasal cannula since extubation  Extensive conversation had with patient and her family regarding her wishes.  She remains DNAR/I okay  We also discussed the likelihood of her needing a tracheostomy if she got intubated again.  At this time, if she were to be intubated again she would like to go ahead with a tracheotomy if necessary to prolong her life.    Hyponatremia  Assessment & Plan  Na 129, cl 82  Likely hypotonia hyponatremia from dehydration and poor PO intake   Decreased lasix to 20 QD  Check serum osmolarity, urine osmolarity, and urine sodium  Goal correction 6 to 8 mEq      Anemia of chronic disease- (present on admission)  Assessment & Plan  Monitor, transfuse as indicated    Hypokalemia- (present on admission)  Assessment & Plan  Replaced, monitor    Restless leg syndrome- (present on admission)  Assessment & Plan  Requip     Debility- (present on admission)  Assessment & Plan  Very weak and currently cannot walk  SNF rejected her due to her acuity  Rehab would  "need her to be able to walk a few steps before accepting her    Pathological fracture of vertebra- (present on admission)  Assessment & Plan  Pathologic fracture of T5 with myelopathy on presentation  S/P T4-6 laminectomy with fusion and spinal decompression on 1/1/2025 by Dr. Lowery  multi-modal pain management  The patient with additional L4-5 lesion with cauda equina  Getting a radiation oncology opinion if the patient possibly could be undergoing palliative radiation hopefully to somewhat stabilize further  Fall Precautions  PT OT    Cancer related pain- (present on admission)  Assessment & Plan  Palliative Care following for pain management  Multimodal pain management  Consider palliative radiation    Bilateral pleural effusion- (present on admission)  Assessment & Plan  Malignant pleural effusions from metastatic breast cancer s/p thoracenteses and chest tubes  Diuresis as tolerated  monitor chest xrays  PleurX placement in IR 1/11 on the right, monitor and continue to drain  Discussed with IR and since pleural effusion is minimal and patient is hemodynamcally stable, plans is discontinue aspira drain.     Malignant neoplasm of breast (HCC)- (present on admission)  Assessment & Plan  \"Bilateral breast cancer with extensive destruction of the left breast and ulceration and skin lesions making her inoperable on the left.  Right breast shows a 3 x 3 cm mass.  PET scan March 2023 showed non-hypermetabolic pleural effusions which are cytology positive and she has demonstrable pleural masses. Started aromatase inhibition early September 2023.  Has not yet started CDK 4 6 inhibitor.  Lost to follow-up for over 6 months although apparently was on letrozole as single agent.  Now with clear progression of disease in the left breast.  August 2024: Progression of disease in liver with extensive disease.  Breast is worse.  Lung is better.  Lymph nodes are worse.  Status post radiation to symptomatic oozing and exophytic " "left breast mass.  Marked worsening of her cancer on letrozole with progression in the bone and liver.\"--Dr. Benitez  ongoing goals of care discussions. Patient is fighting to recover and discharge home so she can continue treatment for her cancer  DNR, I OK   Palliative following for pain management as well as further discussions.  Consulted with oncology, her primary oncology team, recommended hospice  The patient is not willing to engage in hospice at this time, she would like to get stronger and have more outpatient therapy  radiation oncology for possibility of spine radiation -would only take her if she can complete rehab successfully    Primary hypertension- (present on admission)  Assessment & Plan  Monitor, resume as indicated  PRN's available    Diabetes mellitus, type 2 (HCC)- (present on admission)  Assessment & Plan  A1c 6.4  accuchecks with SSI  CHO diet  glucose goal 140-180      VTE prophylaxis: xarelto    I have performed a physical exam and reviewed and updated ROS and Plan today (1/21/2025). In review of yesterday's note (1/20/2025), there are no changes except as documented above.    Patient has a high medical complexity, complex decision making and is at high risk of complication, morbidity, and mortality      "

## 2025-01-21 NOTE — PROGRESS NOTES
[facility] Medicine Daily Progress Note    Date of Service  1/20/2025    Chief Complaint  Asha Hannon is a 64 y.o. female admitted 12/25/2024 with weakness, shortness of breath, back pain, has a history of metastatic breast cancer     Hospital Course  64 y.o. female who presented 12/25/2024 with history of stage IV breast cancer metastatic disease, mets to the lungs and now spine, on previous oral chemotherapy, admitted on 12/25/2024 with weakness, shortness of breath and back pain, subsequently a CT chest abdomen showed moderate-sized pleural effusions, worsening hepatic metastatic disease with new abdominal lymphadenopathy, thoracic adenopathy, diffuse osseous metastatic disease with a T5 pathologic fracture, the patient was admitted, undergo a right-sided thoracentesis on 12/27, malignant cells identified in the fluid, and MRI showed multiple thoracic and lumbar metastasized lesions, the patient was taken to the operating room on 1 1 where she had a T4 T6 laminectomy, following the patient developed a right-sided pneumothorax, requiring chest tube placement, left-sided thoracentesis was performed, the patient following his respiratory difficulty requiring BiPAP treatment and subsequently required to be intubated, the patient had a right-sided PleurX and small bore catheter placed and had frequent thoracentesis since placement, the patient remained intubated until 1/15, where the patient then was extubated and had a extensive talk with the ICU team in terms of her overall status, metastatic malignancy and respiratory failure, the patient stated that she wanted to keep fighting and is wishing to hopefully reach outpatient status where she can fail to be treated for her malignancy.  If she would have additional respiratory failure she states that she would like to be reintubated and possibly even undergo a tracheostomy to stay alive.  The patient tolerated extubation, she is alert and x 4, she is in a  sinus rhythm in the 50s to 80s, blood pressure in the 90s to 100s, the patient was n.p.o., underwent evaluation today by speech therapy and is cleared for mildly thick liquids, soft and bite-size solids, the right chest tube had put out 100 cc overnight, the patient remains on DVT prophylaxis with Lovenox, Pepcid for GI prophylaxis,  Laboratory data showed white to count 13.9 hemoglobin 9.3 platelet count 563 sodium 136 potassium 4.5 chloride 94 bicarb 35 glucose 155 BUN 27 creatinine 0.56  Chest x-ray with pulmonary edema and infiltrates, stable, layering left pleural effusion, stable, right-sided small bore chest tube  The patient has been followed by palliative care, she currently confirms a DNR I okay status, the patient lives with her daughter  The patient has been consulted on by palliative care  The patient had a consult from her primary oncology team on 1/17/2025  Radiation oncology was consulted as well      Interval Problem Update  1/17 the patient feels somewhat better, she is oxygenating okay, she has still significant pain, she feels weak, nursing reports the patient is a 2-3 person assist, unsafe to sit up in a chair, the patient continues to resist certain medical interventions, currently afebrile, heart rate in the 70s, respiration unlabored, blood pressure in the 130s over 60s, the patient is saturating in the mid 90s on 5 L, chest tube output 190, no airleak, in the afternoon the patient has some increase in oxygen demand, chest x-ray appears fairly unchanged, left-sided fluid, right-sided small bore chest tube in place, no pneumothorax, with a count 10.5 hemoglobin 9.4 hematocrit 30.2 platelet count 537 sodium 133 potassium 4.2 chloride 90 bicarb 36 glucose 107 BUN 23 creatinine 0.6  1/18 the patient feels less anxious today, she has improved she feels, she would like to titrate down oxygen, she would like to engage with more therapy, PMR has spent quite some time with the patient and made  expectations clear, if she fails to improve where she could transition to acute rehab the patient should most likely consider home hospice  Patient is currently afebrile, heart rate in the 70s respiration unlabored the patient is down to 5 L nasal cannula now saturating 96%, blood pressure 120s over 50s  Pain appears to be controlled I discussed the possibility of palliative radiation to her spine, will engage on Monday  Wbc count 17, hemoglobin 11.5 platelet count 643 sodium 131 potassium 4.2 chloride 86 bicarb 32 glucose 140 BUN 24 creatinine 0.73  alk phos 115  glucose 106  1/19 the patient is not feeling well today, she wants to have a bowel movement she continues to be significantly weak, no nausea vomiting, there was an air leak in the chest tube suction system, it was replaced and resolved, chest x-ray showed a possible small apical pneumothorax, chest tube output 60 cc, afebrile, heart rate in the 70s respiration unlabored, the patient is saturating the mid 90s on 5 L, blood pressure in the 120s over 70s, WBC count 15.4 hemoglobin 10.5 platelet count 600 sodium 133 potassium 3.1 chloride 85 bicarb 37 glucose 130  Procalcitonin 0.14  1/20 9 still weak with insomnia and anxiety.  Rehab will only consider her if she can stand and walk a few steps.  Radiation oncology will only take her if she can get through rehab. Transferred to [facility].  Total time 51 minutes.    I have discussed this patient's plan of care and discharge plan at IDT rounds today with Case Management, Nursing, Nursing leadership, and other members of the IDT team.    Consultants/Specialty  critical care, oncology, and palliative care, physiatry    Code Status  DNAR, I OK    Disposition  Medically Cleared  I have placed the appropriate orders for post-discharge needs.    Review of Systems  Review of Systems   Constitutional:  Positive for malaise/fatigue. Negative for chills and fever.   HENT: Negative.     Eyes: Negative.     Respiratory:  Positive for shortness of breath. Negative for cough.    Cardiovascular: Negative.  Negative for chest pain and palpitations.   Gastrointestinal: Negative.  Negative for heartburn, nausea and vomiting.   Genitourinary: Negative.  Negative for dysuria and frequency.   Musculoskeletal:  Positive for back pain and myalgias. Negative for neck pain.   Skin: Negative.  Negative for itching and rash.   Neurological:  Positive for weakness. Negative for dizziness, focal weakness and headaches.   Endo/Heme/Allergies: Negative.  Negative for polydipsia. Does not bruise/bleed easily.   Psychiatric/Behavioral:  Negative for depression. The patient is nervous/anxious.         Physical Exam  Temp:  [36.5 °C (97.7 °F)-36.6 °C (97.9 °F)] 36.6 °C (97.9 °F)  Pulse:  [65-81] 79  Resp:  [18-20] 18  BP: (109-129)/(58-72) 114/72  SpO2:  [91 %-97 %] 92 %    Physical Exam  Vitals and nursing note reviewed.   Constitutional:       Appearance: She is well-developed. She is obese. She is ill-appearing. She is not diaphoretic.   HENT:      Head: Normocephalic and atraumatic.      Nose: Nose normal.   Eyes:      Conjunctiva/sclera: Conjunctivae normal.      Pupils: Pupils are equal, round, and reactive to light.   Neck:      Thyroid: No thyromegaly.      Vascular: No JVD.   Cardiovascular:      Rate and Rhythm: Normal rate and regular rhythm.      Heart sounds: Normal heart sounds.      No friction rub. No gallop.   Pulmonary:      Effort: Pulmonary effort is normal.      Breath sounds: Rhonchi present. No wheezing or rales.      Comments: Right-sided small bore chest tube, no air leak, serous fluid  Abdominal:      General: Bowel sounds are normal. There is no distension.      Palpations: Abdomen is soft. There is no mass.      Tenderness: There is no abdominal tenderness. There is no guarding or rebound.   Musculoskeletal:         General: No tenderness. Normal range of motion.      Cervical back: Normal range of motion and  neck supple.      Right lower leg: Edema present.      Left lower leg: Edema present.   Lymphadenopathy:      Cervical: No cervical adenopathy.   Skin:     General: Skin is warm and dry.      Coloration: Skin is pale.      Findings: Bruising present.   Neurological:      Mental Status: She is alert and oriented to person, place, and time.      Cranial Nerves: No cranial nerve deficit.      Motor: Weakness present.   Psychiatric:         Behavior: Behavior normal.         Fluids    Intake/Output Summary (Last 24 hours) at 1/20/2025 1850  Last data filed at 1/20/2025 1737  Gross per 24 hour   Intake 300 ml   Output 5 ml   Net 295 ml        Laboratory  Recent Labs     01/18/25  0423 01/19/25  0401 01/20/25  0023   WBC 17.0* 15.4* 19.3*   RBC 4.08* 3.77* 4.19*   HEMOGLOBIN 11.5* 10.5* 11.7*   HEMATOCRIT 35.4* 33.3* 36.6*   MCV 86.8 88.3 87.4   MCH 28.2 27.9 27.9   MCHC 32.5 31.5* 32.0*   RDW 50.4* 51.3* 49.4   PLATELETCT 643* 600* 691*   MPV 9.2 9.1 9.1     Recent Labs     01/18/25  0423 01/19/25  0401 01/20/25  0023   SODIUM 131* 133* 133*   POTASSIUM 4.2 3.1* 3.1*   CHLORIDE 86* 85* 80*   CO2 32 37* 38*   GLUCOSE 140* 130* 124*   BUN 24* 22 24*   CREATININE 0.73 0.60 0.67   CALCIUM 9.7 9.0 9.5                   Imaging  DX-CHEST-PORTABLE (1 VIEW)   Final Result         1.  Pulmonary edema and/or infiltrates are identified, which are stable since the prior exam.   2.  Small left pleural effusion   3.  Cardiomegaly      DX-CHEST-PORTABLE (1 VIEW)   Final Result         1. Small right apical pneumothorax now visible. Right chest tube unchanged.      2. Persistent infiltrates and small effusions.                     DX-CHEST-PORTABLE (1 VIEW)   Final Result      No significant change from prior exam.      DX-CHEST-PORTABLE (1 VIEW)   Final Result         1.  Pulmonary edema and/or infiltrates are identified, which are stable since the prior exam.   2.  Cardiomegaly      DX-CHEST-PORTABLE (1 VIEW)   Final Result      1.   Interval improvement of pulmonary vascular congestion and pulmonary edema.   2.  No other significant change from prior exam.         DX-CHEST-PORTABLE (1 VIEW)   Final Result         1.  Pulmonary edema and/or infiltrates are identified, which appear somewhat increased since the prior exam.   2.  Layering left pleural effusion, stable   3.  Cardiomegaly      DX-CHEST-PORTABLE (1 VIEW)   Final Result         1.  Pulmonary edema and/or infiltrates are identified, which are stable since the prior exam.   2.  Layering left pleural effusion, stable      DX-CHEST-PORTABLE (1 VIEW)   Final Result         1.  Pulmonary edema and/or infiltrates are identified, which are stable since the prior exam.   2.  Layering left pleural effusion, stable      DX-CHEST-PORTABLE (1 VIEW)   Final Result         1.  Pulmonary edema and/or infiltrates are identified, which are stable since the prior exam.   2.  Trace bilateral pleural effusions, stable      DX-CHEST-PORTABLE (1 VIEW)   Final Result         1.  Pulmonary edema and/or infiltrates are identified, which are stable since the prior exam.   2.  Small left pleural effusion, stable      DX-CHEST-PORTABLE (1 VIEW)   Final Result      1.  Support equipment is unchanged.      2.  Cardiomegaly with moderate perihilar pulmonary edema unchanged from previous exam.      3.  Small bilateral pleural effusions left greater than right.      DX-CHEST-PORTABLE (1 VIEW)   Final Result      1.  Tubes and lines unchanged in position.      2.  Marked bilateral perihilar pulmonary edema which has improved slightly since previous exam.      3.  Small bilateral pleural effusions left greater than right.      IR-INSERT PLEURAL CATH W/ CUFF   Final Result      1.  Ultrasound and fluoroscopic guided placement of a RIGHT HEMITHORAX PleurX (Aspira type) tunneled pleural drainage catheter.   2.  The catheter can be used immediately with drainage of pleural effusion as needed for comfort, as per package  insert instructions.   3.  If the catheter becomes dislodged, do not reinsert it.  Place sterile dressing over the entry wound site.  May consult primary care physician or oncologist as to whether there is clinical indication for PleurX replacement.      DX-CHEST-PORTABLE (1 VIEW)   Final Result      1.  Tubes and lines unchanged in position.      2.   Extensive bilateral perihilar airspace opacities consistent with pneumonitis and/or pulmonary edema.      3.  Mild to moderate bilateral pleural effusions right greater than left      DX-CHEST-PORTABLE (1 VIEW)   Final Result         1. Increasing moderate right and small left pleural effusions. No change edema or infiltrates.      2. ETT 5 cm above masoud. NG tube in stomach. Right central line.                     DX-CHEST-PORTABLE (1 VIEW)   Final Result      Stable diffuse bilateral pulmonary infiltrates.      DX-ABDOMEN FOR TUBE PLACEMENT   Final Result      NG tube tip overlies the gastric body.      DX-CHEST-PORTABLE (1 VIEW)   Final Result      1.  Supportive tubing as described above.   2.  Apparent increasing RIGHT pleural fluid, possibly due to change in position.   3.  Improvement of LEFT lung base consolidation.   4.  No other significant change from prior exam.         DX-CHEST-PORTABLE (1 VIEW)   Final Result         1. Stable right IJ central catheter. Right chest tube has been removed. No pneumothorax.   2. Unchanged bilateral pulmonary opacities and associated pleural effusions.         DX-CHEST-PORTABLE (1 VIEW)   Final Result      1.  Unchanged bilateral pulmonary opacities. Stable small bilateral pleural effusions.   2.  Right pigtail catheter remains. No pneumothorax.      DX-CHEST-PORTABLE (1 VIEW)   Final Result      Removal of large bore right chest tube with stable right pigtail chest tube. Questionable tiny right apical pneumothorax.      DX-CHEST-PORTABLE (1 VIEW)   Final Result      No significant interval change.      DX-CHEST-PORTABLE (1  VIEW)   Final Result      1.  Persistent bilateral pulmonary opacities, stable to slightly worse, although changes may be related to differences in imaging technique. No large pleural effusions.   2.  Well-positioned right IJ central catheter. No pneumothorax.   3.  Stable other lines and tubes.      DX-CHEST-LIMITED (1 VIEW)   Final Result         1.  No change in 2 separate right-sided chest tubes.      2.  No pneumothorax identified.      3.  Bilateral pulmonary opacifications again identified which are slightly less prominent.      DX-CHEST-PORTABLE (1 VIEW)   Final Result         1. No change mild infiltrates.      2. Right chest tubes. No pneumothorax.      3. ETT out. NG tube and jugular line remain.                     DX-CHEST-PORTABLE (1 VIEW)   Final Result         1. No significant interval change.      DX-CHEST-LIMITED (1 VIEW)   Final Result         1.  Findings on chest radiograph appear stable since the prior radiograph.  No new abnormalities are identified.      2.  2 separate right-sided chest tubes are again identified with no change in position.      3.  No pneumothorax identified.      DX-CHEST-LIMITED (1 VIEW)   Final Result         1.  No pneumothorax identified.      2.  2 right-sided chest tubes again identified.      CT-CTA CHEST PULMONARY ARTERY W/ RECONS   Final Result         1. No pulmonary embolus.      2. Medial right lung base consolidation felt to represent complete collapse of the right middle lobe. Mild to moderate mid and upper lung pneumonia infiltrates remain.      3. Right chest tubes. Small right pneumothorax and small right effusion. Improving small 2.5 cm left pleural effusion since 12/25/2024.      4. Extensive metastatic lesions including adenopathy, liver lesions, right breast nodule and bone lesions. Thoracic spine fusion hardware remains.                  CT-HEAD W/O   Final Result         1. No acute process in the brain evident.      2. Fluid in sphenoid sinus.       3. Right convexity midthoracic scoliosis and unchanged from 12/31/2024 MRI.               DX-ABDOMEN FOR TUBE PLACEMENT   Final Result         1. NG tube in stomach.                     DX-CHEST-FOR LINE PLACEMENT Perform procedure in: Patient's Room   Final Result         1. Small right apical pneumothorax remains. 2 right chest tubes.      2. ETT 3 cm above masoud. NG tube in stomach. Right jugular line in SVC. No complication evident.      3. Mild infiltrates and small right effusion.                  DX-CHEST-LIMITED (1 VIEW)   Final Result         1. 2nd right chest tube placed with near complete evacuation of right pneumothorax.      2. Mild infiltrates, basilar atelectasis and small right effusion remain.      3. Endotracheal tube tip at about T5 and about 2 cm above the masoud. The masoud and tip of the endotracheal tube are difficult to visualize in part because of overlying thoracic spine fusion hardware.                  DX-CHEST-PORTABLE (1 VIEW)   Final Result         Enlarging right pneumothorax with collapsing of the right lung.      Preliminary findings texted to Dr. STANLEY FISHER in the Emergency Department via Voalte on 1/4/2025 1:50 AM         DX-CHEST-LIMITED (1 VIEW)   Final Result         Interval placement of right pigtail chest tube. Small right residual pneumothorax.      DX-CHEST-PORTABLE (1 VIEW)   Final Result   Addendum (preliminary) 1 of 1      CRITICAL RESULT READ BACK: Preliminary findings discussed with and    critical read back performed by Dr. Downs via telephone on 1/3/2025 8:03    PM      Final      1.  Interval worsening in right pneumothorax, now moderate.   2.  Persistent interstitial and alveolar pulmonary opacities bilaterally. Possible bilateral pleural effusions, right greater than left.            DX-CHEST-PORTABLE (1 VIEW)   Final Result         1. Slightly decreased right pneumothorax.   2. Stable bilateral interstitial and airspace opacification.   3. Stable  right pleural effusion.      DX-CHEST-PORTABLE (1 VIEW)   Final Result         1. Left pleural effusion has decreased in size status post thoracentesis. There is no left-sided pneumothorax.   2. There is persistent right basilar opacity with a new right apical pneumothorax.      Findings were communicated to the ordering provider at the time of dictation via Voalte.      US-THORACENTESIS PUNCTURE LEFT   Final Result      1. Ultrasound guided left sided therapeutic thoracentesis.      2. 950 mL of fluid withdrawn.      DX-CHEST-PORTABLE (1 VIEW)   Final Result      1. New postsurgical changes in the upper thoracic spine.   2. Worsening bilateral pleural effusions, pulmonary vascular congestion and interstitial edema.      DX-PORTABLE FLUORO > 1 HOUR   Final Result      Portable fluoroscopy utilized for 1 minute 19 seconds.      INTERPRETING LOCATION: [address], [place], [address]      DX-THORACIC SPINE-2 VIEWS   Final Result      Digitized intraoperative radiograph is submitted for review. This examination is not for diagnostic purpose but for guidance during a surgical procedure. Please see the patient's chart for full procedural details.         INTERPRETING LOCATION: [address], [place], [address]      MR-CERVICAL SPINE-WITH & W/O   Final Result         1.  No evidence of metastatic disease to the cervical spine.      MR-LUMBAR SPINE-WITH & W/O   Final Result   Impression:      Multiple bony metastatic lesions as described above, most prominent along the inferior endplate of L1.      Degenerative moderate canal stenosis at L4-5 with cauda equina compression. Epidural thickening and enhancement is noted in this region with severe bilateral facet degeneration and enhancement along the bilateral facet joints. This is felt to be    degenerative in origin.      MR-THORACIC SPINE-WITH & W/O   Final Result         Multiple metastatic lesions of the thoracic spine as described above.      Pathologic compression fracture at  T5 with 50% loss of height and posterior cortical retropulsion with circumferential epidural thickening representing epidural extension of metastatic disease. This causes severe canal stenosis with cord compression.      Metastatic disease also involves the posterior elements and spinous processes at T4, T5.      Pre and paravertebral metastatic soft tissue infiltration at T4-T5 noted.         MR-BRAIN-WITH & W/O   Final Result         Multiple nodular enhancing metastatic lesions involving the cranium as described above.      The largest lesion is in the right posterior temporal/parietal occipital region and demonstrates inner table cortical breakthrough with epidural extension and adjacent mild dural thickening and enhancement.      US-THORACENTESIS PUNCTURE RIGHT   Final Result   Addendum (preliminary) 1 of 1 12/27/2024 10:59 AM      HISTORY/REASON FOR EXAM:  Shortness of breath         TECHNIQUE/EXAM DESCRIPTION:   Ultrasound-guided thoracentesis.      Indication:  RIGHT pleural fluid collection.      COMPARISON:  None-however patient states history of right-sided    thoracentesis September 2024.      PROCEDURE:     Informed consent was obtained. A timeout was taken. A right    pleural effusion was localized with real-time ultrasound guidance. The    right posterior chest wall was prepped and draped in a sterile manner.    Following local anesthesia with 1%    lidocaine, and under live ultrasound guidance a 5 [employer] pigtail    catheter was advanced into the pleural space with trocar technique and    pleural fluid was drained. The patient tolerated the procedure well    without evidence of complication. A post    thoracentesis chest radiograph is forthcoming.      FINDINGS:      Fluid was sent to the laboratory.      Fluid character: serosanguinous         1. Ultrasound guided right sided therapeutic and diagnostic thoracentesis.      2. 1000 mL of fluid withdrawn.      Final      1. Ultrasound guided  right sided therapeutic and diagnostic thoracentesis.      2. 1000 mL of fluid withdrawn.      DX-CHEST-PORTABLE (1 VIEW)   Final Result      1.  Small persistent right pleural effusion which is decreased since recent thoracentesis.      2.  Small to moderate left pleural effusion.      3.  Bilateral perihilar and left lower lobe atelectasis and/or pneumonitis.      4.  Increased interstitial markings throughout the lung fields consistent with parenchymal scarring, and/or pulmonary edema.      CT-LSPINE W/O PLUS RECONS   Final Result         1. Diffuse metastatic disease.   2. No definite pathologic fracture.      CT-TSPINE W/O PLUS RECONS   Final Result         1. Extensive metastatic disease.   2. There is new pathologic compression fracture deformity of T5.      CT-CHEST,ABDOMEN,PELVIS WITH   Final Result         1. New bilateral upper lobe nodular infiltrates.   2. Moderate-sized bilateral pleural effusions.   3. Interval worsening of hepatic metastatic disease.   4. New abdominal lymphadenopathy. Thoracic adenopathy is again noted.   5. Diffuse osseous metastatic disease. There is a new compression fracture of T5 as reported on the prior spine CT.      DX-CHEST-PORTABLE (1 VIEW)   Final Result      1.  Stable bibasilar atelectasis/consolidation and small bilateral pleural effusions.      2.  Stable cardiomegaly and interstitial edema.           Assessment/Plan  * Acute on chronic respiratory failure with hypoxia and hypercapnia (HCC)- (present on admission)  Assessment & Plan  intubated 1/4  extubated 1/5  re-intubated 1/8  Extubated 1/15  Patient is doing well while extubated.  He has been on 3 to 5 L nasal cannula since extubation  Extensive conversation had with patient and her family regarding her wishes.  She remains DNAR/I okay  We also discussed the likelihood of her needing a tracheostomy if she got intubated again.  At this time, if she were to be intubated again she would like to go ahead with a  "tracheotomy if necessary to prolong her life.    Anemia of chronic disease- (present on admission)  Assessment & Plan  Monitor, transfuse as indicated    Hypokalemia- (present on admission)  Assessment & Plan  Replaced, monitor    Restless leg syndrome- (present on admission)  Assessment & Plan  Requip     Debility- (present on admission)  Assessment & Plan  Very weak and currently cannot walk  SNF rejected her due to her acuity  Rehab would need her to be able to walk a few steps before accepting her    Pathological fracture of vertebra- (present on admission)  Assessment & Plan  Pathologic fracture of T5 with myelopathy on presentation  S/P T4-6 laminectomy with fusion and spinal decompression on 1/1/2025 by Dr. Lowery  multi-modal pain management  The patient with additional L4-5 lesion with cauda equina  Getting a radiation oncology opinion if the patient possibly could be undergoing palliative radiation hopefully to somewhat stabilize further  Fall Precautions  PT OT    Cancer related pain- (present on admission)  Assessment & Plan  Palliative Care following for pain management  Multimodal pain management  Consider palliative radiation    Bilateral pleural effusion- (present on admission)  Assessment & Plan  Malignant pleural effusions from metastatic breast cancer s/p thoracenteses and chest tubes  Diuresis as tolerated  monitor chest xrays  PleurX placement in IR 1/11 on the right, monitor and continue to drain    Malignant neoplasm of breast (HCC)- (present on admission)  Assessment & Plan  \"Bilateral breast cancer with extensive destruction of the left breast and ulceration and skin lesions making her inoperable on the left.  Right breast shows a 3 x 3 cm mass.  PET scan March 2023 showed non-hypermetabolic pleural effusions which are cytology positive and she has demonstrable pleural masses. Started aromatase inhibition early September 2023.  Has not yet started CDK 4 6 inhibitor.  Lost to follow-up for " "over 6 months although apparently was on letrozole as single agent.  Now with clear progression of disease in the left breast.  August 2024: Progression of disease in liver with extensive disease.  Breast is worse.  Lung is better.  Lymph nodes are worse.  Status post radiation to symptomatic oozing and exophytic left breast mass.  Marked worsening of her cancer on letrozole with progression in the bone and liver.\"--Dr. Benitez  ongoing goals of care discussions. Patient is fighting to recover and discharge home so she can continue treatment for her cancer  DNR, I OK   Palliative following for pain management as well as further discussions.  Consulted with oncology, her primary oncology team, recommended hospice  The patient is not willing to engage in hospice at this time, she would like to get stronger and have more outpatient therapy  radiation oncology for possibility of spine radiation -would only take her if she can complete rehab successfully    Primary hypertension- (present on admission)  Assessment & Plan  Monitor, resume as indicated  PRN's available    Diabetes mellitus, type 2 (HCC)- (present on admission)  Assessment & Plan  A1c 6.4  accuchecks with SSI  CHO diet  glucose goal 140-180      VTE prophylaxis:    Xarelto 10mg daily as prophylaxis      I have performed a physical exam and reviewed and updated ROS and Plan today (1/20/2025). In review of yesterday's note (1/19/2025), there are no changes except as documented above.        "

## 2025-01-21 NOTE — ASSESSMENT & PLAN NOTE
Na 129, cl 82  Likely hypotonia hyponatremia from dehydration and poor PO intake   Decreased lasix to 20 QD  Check serum osmolarity, urine osmolarity, and urine sodium  Goal correction 6 to 8 mEq

## 2025-01-21 NOTE — DISCHARGE PLANNING
Care Transition Team Discharge Planning    Anticipated Discharge Information  Discharge Disposition: D/T to SNF with Medicare cert in anticipation of skilled care (03)    Discharge Plan:  Patient discussed in PM rounds. Per MD, plan for chest tube to be removed today. Complex CM notified via Voalte.

## 2025-01-21 NOTE — PROGRESS NOTES
Bedside report received from off going RN/tech: Gladis assumed care of patient.     Fall Risk Score: HIGH RISK  Fall risk interventions in place: Place yellow fall risk ID band on patient, Provide patient/family education based on risk assessment, Educate patient/family to call staff for assistance when getting out of bed, Place fall precaution signage outside patient door, Place patient in room close to nursing station, Utilize bed/chair fall alarm, Notify charge of high risk for huddle, and Bed alarm connected correctly  Bed type: Low air loss (Raj Score less than 17 interventions in place)  Patient on cardiac monitor: No   IVF/IV medications: Not Applicable   Oxygen: How many liters 3L  Bedside sitter: Not Applicable   Isolation: Not applicable

## 2025-01-21 NOTE — CARE PLAN
Problem: Knowledge Deficit - Standard  Goal: Patient and family/care givers will demonstrate understanding of plan of care, disease process/condition, diagnostic tests and medications  Outcome: Progressing  Note: Pt educated regarding plan of care for the day, activity, diet, and medications. Verbalized understanding.       Problem: Skin Integrity  Goal: Skin integrity is maintained or improved  Outcome: Progressing  Note: Encouraged frequent repositioning while ine bed , frequent cleansing of skin, skin protectant used. Frequent monitoring of skin integrity.       Problem: Fall Risk  Goal: Patient will remain free from falls  Outcome: Progressing  Note: Fall risk assessed and fall precautions in place, pt max assist. Bed alarm on, appropriate signs in place, call light and personal belongings within reach. Pt educated to call for help and not get up without assistance. Verbalized understanding.      Problem: Pain - Standard  Goal: Alleviation of pain or a reduction in pain to the patient’s comfort goal  Outcome: Progressing  Flowsheets (Taken 1/21/2025 1441)  OB Pain Intervention:   Rest   Repositioned   Relaxation technique  Pain Rating Scale (NPRS): 2  Note: Pt assessed for pain Q4h and medicated PRN. Pt instructed to notify RN of any new or increasing pain to prevent it from becoming intolerable. Verbalized understanding.       Problem: Respiratory  Goal: Patient will achieve/maintain optimum respiratory ventilation and gas exchange  Outcome: Progressing  Flowsheets (Taken 1/21/2025 1441)  O2 Delivery Device: Nasal Cannula  Deep Breathe and Cough: Performs Correctly  Note: Saturation above 90% on 2 LPMNC   The patient is Stable - Low risk of patient condition declining or worsening    Shift Goals  Clinical Goals: Monitor VS and labd ; pain management  Patient Goals: pain control  Family Goals: daughter at bedside    Progress made toward(s) clinical / shift goals:   Chest tube line replaced by IR, performed  "pericare, call light within reach and bed alarm kept on,/68   Pulse 69   Temp 36.5 °C (97.7 °F) (Temporal)   Resp 19   Ht 1.727 m (5' 7.99\")   Wt 84 kg (185 lb 3 oz)   SpO2 94%           "

## 2025-01-21 NOTE — ASSESSMENT & PLAN NOTE
Very weak and currently cannot walk  SNF rejected her due to her acuity  Rehab would need her to be able to walk a few steps before accepting her

## 2025-01-21 NOTE — HOSPITAL COURSE
Asha Hannon is a 64 y.o. female with PMHx HTN, DMT2, restless leg syndrome, chronic anemia, stage IV breast cancer with metastatic disease to lungs and spine..  Admitted 12/25 for weakness and shortness of breath.    Patient was previously on oral chemotherapy.  She presented with worsening shortness of breath and back pain. CT chest abdomen showed moderate-sized pleural effusions, worsening hepatic metastatic disease with new abdominal lymphadenopathy, thoracic adenopathy, diffuse osseous metastatic disease with a T5 pathologic fracture    Patient underwent a right sided thoracentesis 12/27.  Confirmed diagnosis of malignant pleural effusion.  MRI showed multiple thoracic and lumbar metastasized lesions.  Patient underwent T4-T6 laminectomy for spinal cord compression on 1/1.  She subsequently developed a right-sided pneumothorax and a chest tube was placed.  This was shortly followed by BiPAP and then intubation on 1/4.  Patient remained intubated until 1/15.  During this time she required frequent thoracentesis.    Many goals of care conversations occurred between patient and her medical staff here at the hospital.  Ultimately patient was unable to come off high flow nasal cannula.  Palliative care met with patient's family and patient at bedside on multiple occasions.  Patient elected to transition to DNR/comfort care on 1/28. Patient passed peacefully away with family at bedside.

## 2025-01-21 NOTE — DIETARY
Nutrition Services: Follow-up for PO > = 50%    Day 27 of admit. Asha Hannon is a 64 y.o. female with admitting DX of Breast cancer metastasized to bone, unspecified laterality (HCC) [C50.919, C79.51]    Patient has had poor oral intake for the past 5 days since the discontinuation of TF and the resume  PO. Average intake has been 30%, last supplements recorded 0 % . A significant weight loss of 12 lbs (6%) has been noted over the past 5 days. Weight loss can possibly attributed to fluid loss 2/2 furosemide. The possibility of resuming TF was discussed with the MD, who mentioned that he had already spoken with the patient during today’s visit. The patient expressed continued eagerness to maintain oral intake at this time. The MD stated that he will reassess the patient tomorrow morning.     Current diet order:   IDDSI level 6 - soft/bite-sized  Glucerna (provides 220 calories, 10 g protein per 8 fl oz) and Magic Cup (provides 290 calories, 9 g protein per 4 fl oz) BID      Inadequate oral intake related to decreased appetite as evidence by PO intake <25% of meals     Nutrition Dx Status: Ongoing     Severe Malnutrition in context of Acute Illness or Injury related to inadequate PO intake  as evidenced by < 50% of estimated energy requirement x 5 days. Severe weight loss of 6% in 5 days.     Nutrition Dx Status: Ongoing     Problem: Nutritional:  Goal: Achieve adequate nutritional intake  Description: Patient will consume > 50%  of meals  Outcome: NOT MET       Plan/ Recommendations:      Encourage intake of meals, goal for < 50 % consumption from meals and/or supplements  Document intake of all meals as % taken in ADL's to provide interdisciplinary communication across all shifts.   Monitor weight. ( Significant weight loss of 6% in 5 days, considering the possibility to resume EN if PO not progress )   Nutrition rep will continue to see patient for ongoing meal and snack preferences.       AUDREY  following

## 2025-01-22 ENCOUNTER — APPOINTMENT (OUTPATIENT)
Dept: RADIOLOGY | Facility: MEDICAL CENTER | Age: 65
End: 2025-01-22
Attending: NURSE PRACTITIONER
Payer: MEDICARE

## 2025-01-22 LAB
ANION GAP SERPL CALC-SCNC: 11 MMOL/L (ref 7–16)
BUN SERPL-MCNC: 34 MG/DL (ref 8–22)
CALCIUM SERPL-MCNC: 9.3 MG/DL (ref 8.5–10.5)
CHLORIDE SERPL-SCNC: 82 MMOL/L (ref 96–112)
CO2 SERPL-SCNC: 39 MMOL/L (ref 20–33)
CREAT SERPL-MCNC: 0.68 MG/DL (ref 0.5–1.4)
ERYTHROCYTE [DISTWIDTH] IN BLOOD BY AUTOMATED COUNT: 50.6 FL (ref 35.9–50)
GFR SERPLBLD CREATININE-BSD FMLA CKD-EPI: 96 ML/MIN/1.73 M 2
GLUCOSE BLD STRIP.AUTO-MCNC: 109 MG/DL (ref 65–99)
GLUCOSE BLD STRIP.AUTO-MCNC: 120 MG/DL (ref 65–99)
GLUCOSE BLD STRIP.AUTO-MCNC: 122 MG/DL (ref 65–99)
GLUCOSE BLD STRIP.AUTO-MCNC: 124 MG/DL (ref 65–99)
GLUCOSE SERPL-MCNC: 117 MG/DL (ref 65–99)
HCT VFR BLD AUTO: 35.9 % (ref 37–47)
HGB BLD-MCNC: 11.4 G/DL (ref 12–16)
MCH RBC QN AUTO: 28.4 PG (ref 27–33)
MCHC RBC AUTO-ENTMCNC: 31.8 G/DL (ref 32.2–35.5)
MCV RBC AUTO: 89.5 FL (ref 81.4–97.8)
PLATELET # BLD AUTO: 582 K/UL (ref 164–446)
PMV BLD AUTO: 9.3 FL (ref 9–12.9)
POTASSIUM SERPL-SCNC: 2.8 MMOL/L (ref 3.6–5.5)
RBC # BLD AUTO: 4.01 M/UL (ref 4.2–5.4)
SODIUM SERPL-SCNC: 132 MMOL/L (ref 135–145)
WBC # BLD AUTO: 16.6 K/UL (ref 4.8–10.8)

## 2025-01-22 PROCEDURE — A9270 NON-COVERED ITEM OR SERVICE: HCPCS | Performed by: HOSPITALIST

## 2025-01-22 PROCEDURE — 306637 IR-LUNG & PLEURAL CATH REMOVAL

## 2025-01-22 PROCEDURE — A9270 NON-COVERED ITEM OR SERVICE: HCPCS | Performed by: NURSE PRACTITIONER

## 2025-01-22 PROCEDURE — 85027 COMPLETE CBC AUTOMATED: CPT

## 2025-01-22 PROCEDURE — 32552 REMOVE LUNG CATHETER: CPT

## 2025-01-22 PROCEDURE — 80048 BASIC METABOLIC PNL TOTAL CA: CPT

## 2025-01-22 PROCEDURE — 94669 MECHANICAL CHEST WALL OSCILL: CPT

## 2025-01-22 PROCEDURE — 97602 WOUND(S) CARE NON-SELECTIVE: CPT

## 2025-01-22 PROCEDURE — 700102 HCHG RX REV CODE 250 W/ 637 OVERRIDE(OP): Performed by: HOSPITALIST

## 2025-01-22 PROCEDURE — 36415 COLL VENOUS BLD VENIPUNCTURE: CPT

## 2025-01-22 PROCEDURE — 700111 HCHG RX REV CODE 636 W/ 250 OVERRIDE (IP): Performed by: STUDENT IN AN ORGANIZED HEALTH CARE EDUCATION/TRAINING PROGRAM

## 2025-01-22 PROCEDURE — 700101 HCHG RX REV CODE 250: Performed by: INTERNAL MEDICINE

## 2025-01-22 PROCEDURE — 0B9D8ZX DRAINAGE OF RIGHT MIDDLE LUNG LOBE, VIA NATURAL OR ARTIFICIAL OPENING ENDOSCOPIC, DIAGNOSTIC: ICD-10-PCS | Performed by: NURSE PRACTITIONER

## 2025-01-22 PROCEDURE — 700111 HCHG RX REV CODE 636 W/ 250 OVERRIDE (IP): Mod: JZ | Performed by: INTERNAL MEDICINE

## 2025-01-22 PROCEDURE — 99233 SBSQ HOSP IP/OBS HIGH 50: CPT | Performed by: INTERNAL MEDICINE

## 2025-01-22 PROCEDURE — 770006 HCHG ROOM/CARE - MED/SURG/GYN SEMI*

## 2025-01-22 PROCEDURE — 700102 HCHG RX REV CODE 250 W/ 637 OVERRIDE(OP): Performed by: NURSE PRACTITIONER

## 2025-01-22 PROCEDURE — 0WP9X0Z REMOVAL OF DRAINAGE DEVICE FROM RIGHT PLEURAL CAVITY, EXTERNAL APPROACH: ICD-10-PCS | Performed by: NURSE PRACTITIONER

## 2025-01-22 PROCEDURE — 82962 GLUCOSE BLOOD TEST: CPT | Mod: 91

## 2025-01-22 RX ORDER — MEGESTROL ACETATE 20 MG/1
20 TABLET ORAL 4 TIMES DAILY
Status: DISCONTINUED | OUTPATIENT
Start: 2025-01-22 | End: 2025-01-26

## 2025-01-22 RX ORDER — POTASSIUM CHLORIDE 1500 MG/1
40 TABLET, EXTENDED RELEASE ORAL
Status: DISCONTINUED | OUTPATIENT
Start: 2025-01-22 | End: 2025-01-22

## 2025-01-22 RX ORDER — HYDROMORPHONE HYDROCHLORIDE 1 MG/ML
1 INJECTION, SOLUTION INTRAMUSCULAR; INTRAVENOUS; SUBCUTANEOUS ONCE
Status: DISCONTINUED | OUTPATIENT
Start: 2025-01-22 | End: 2025-01-22

## 2025-01-22 RX ORDER — MORPHINE SULFATE 4 MG/ML
2 INJECTION INTRAVENOUS ONCE
Status: COMPLETED | OUTPATIENT
Start: 2025-01-22 | End: 2025-01-22

## 2025-01-22 RX ORDER — POTASSIUM CHLORIDE 7.45 MG/ML
10 INJECTION INTRAVENOUS
Status: COMPLETED | OUTPATIENT
Start: 2025-01-22 | End: 2025-01-22

## 2025-01-22 RX ORDER — POTASSIUM CHLORIDE 7.45 MG/ML
10 INJECTION INTRAVENOUS
Status: DISPENSED | OUTPATIENT
Start: 2025-01-22 | End: 2025-01-22

## 2025-01-22 RX ADMIN — POTASSIUM CHLORIDE 10 MEQ: 7.46 INJECTION, SOLUTION INTRAVENOUS at 13:15

## 2025-01-22 RX ADMIN — RIVAROXABAN 10 MG: 10 TABLET, FILM COATED ORAL at 17:22

## 2025-01-22 RX ADMIN — MORPHINE SULFATE 2 MG: 4 INJECTION, SOLUTION INTRAMUSCULAR; INTRAVENOUS at 09:55

## 2025-01-22 RX ADMIN — FUROSEMIDE 20 MG: 10 INJECTION, SOLUTION INTRAVENOUS at 08:38

## 2025-01-22 RX ADMIN — POTASSIUM CHLORIDE 10 MEQ: 7.46 INJECTION, SOLUTION INTRAVENOUS at 10:45

## 2025-01-22 RX ADMIN — HYDROCODONE BITARTRATE AND ACETAMINOPHEN 1 TABLET: 10; 325 TABLET ORAL at 05:19

## 2025-01-22 RX ADMIN — ROPINIROLE HYDROCHLORIDE 1 MG: 0.5 TABLET, FILM COATED ORAL at 21:55

## 2025-01-22 RX ADMIN — POTASSIUM CHLORIDE 10 MEQ: 7.46 INJECTION, SOLUTION INTRAVENOUS at 15:29

## 2025-01-22 RX ADMIN — MEGESTROL ACETATE 20 MG: 20 TABLET ORAL at 13:12

## 2025-01-22 RX ADMIN — POTASSIUM CHLORIDE 10 MEQ: 7.46 INJECTION, SOLUTION INTRAVENOUS at 08:44

## 2025-01-22 RX ADMIN — Medication 5 MG: at 21:56

## 2025-01-22 RX ADMIN — MEGESTROL ACETATE 20 MG: 20 TABLET ORAL at 17:22

## 2025-01-22 ASSESSMENT — ENCOUNTER SYMPTOMS
VOMITING: 0
SPEECH CHANGE: 0
FEVER: 0
NECK PAIN: 0
GASTROINTESTINAL NEGATIVE: 1
HEARTBURN: 0
NERVOUS/ANXIOUS: 0
DIAPHORESIS: 0
POLYDIPSIA: 0
NAUSEA: 0
SHORTNESS OF BREATH: 1
WEAKNESS: 1
FOCAL WEAKNESS: 0
TINGLING: 0
CHILLS: 0
EYES NEGATIVE: 1
BACK PAIN: 1
CARDIOVASCULAR NEGATIVE: 1
SENSORY CHANGE: 0
DIZZINESS: 0
MYALGIAS: 1
DEPRESSION: 0
HEADACHES: 0
COUGH: 0
PALPITATIONS: 0

## 2025-01-22 ASSESSMENT — PAIN DESCRIPTION - PAIN TYPE
TYPE: ACUTE PAIN

## 2025-01-22 NOTE — PROGRESS NOTES
Pt was given Kdur dissolved in water. The pt insisted to drink it slowly. During bedside report, the medication had not yet been fully consumed. The receiving RN is aware and changed the status to refused in the MAR.

## 2025-01-22 NOTE — DISCHARGE PLANNING
1101  DPA sent updated referrals to the following facilities:     [facility]  [facility]  [facility]

## 2025-01-22 NOTE — PROGRESS NOTES
4 Eyes Skin Assessment Completed by RIYA Tatum and RIYA Del Toro.    Head WDL  Ears Redness and Blanching  Nose WDL  Mouth WDL  Neck WDL  Breast/Chest Redness, Scab, and Scar (left breast)     Shoulder Blades WDL  Spine incision sites Redness, scar    (R) Arm/Elbow/Hand Redness, Blanching, and Bruising  (L) Arm/Elbow/Hand Redness, Blanching, and Bruising  Abdomen Bruising, discoloration (ASPIRA drain) CDI  Groin Redness and Blanching  Scrotum/Coccyx/Buttocks Redness, Excoriation, Moisture Fissure, and Discoloration    (R) Leg Discoloration (lower leg)  (L) Leg Discoloration (lower leg)  (R) Heel/Foot/Toe Redness, Blanching, Boggy, and Scab  (L) Heel/Foot/Toe Redness, Blanching, Boggy, and Scab    Devices In Places Nasal Cannula, PIV      Interventions In Place NC W/Ear Foams, Heel Mepilex, Pillows, Q2 Turns, Low Air Loss Mattress, and Barrier Cream    Possible Skin Injury Yes    Pictures Uploaded Into Epic Yes  Wound Consult Placed N/A  RN Wound Prevention Protocol Ordered No

## 2025-01-22 NOTE — DISCHARGE PLANNING
Case Management Discharge Planning    Admission Date: 12/25/2024  GMLOS: 10.1  ALOS: 28    6-Clicks ADL Score: 16  6-Clicks Mobility Score: 10      Anticipated Discharge Dispo: Discharge Disposition: D/T to SNF with Medicare cert in anticipation of skilled care (03)    This RN CM completed chart review, patient drain will be removed today. Plan to resend referrals locally and rural SNF, [facility] for consideration. Patient stated that she does not want to SNF as far as [place]. This RN CM discussed that if SNF are resent locally and decline after drain is removed patient would go home with .     Patient asked for this RN CM to return once her daughter is at bedside.     This RN CM updated that [facility] is declining as patient is no longer needing higher level of care and cannot accept with chemo meds.

## 2025-01-22 NOTE — CARE PLAN
The patient is Stable - Low risk of patient condition declining or worsening    Shift Goals  Clinical Goals: Monitor vitals  Patient Goals: Rest/sleep  Family Goals: daughter at bedside    Progress made toward(s) clinical / shift goals: Patient is alert and oriented x4. Updated on POC and verbalized understanding. Medicated per MAR and tolerated well. Q2 turns. Skin check completed. Hourly rounding and fall precautions in place. Needs attended.    Patient is not progressing towards the following goals: PRN for pain given.

## 2025-01-22 NOTE — PROGRESS NOTES
Radiology Progress Note   Author: LOPEZ England Date & Time created: 1/21/2025  4:42 PM   Date of admission  12/25/2024  Note to reader: this note follows the APSO format rather than the historical SOAP format. Assessment and plan located at the top of the note for ease of use.    Chief Complaint  64 y.o. female admitted 12/25/2024 with   Chief Complaint   Patient presents with    Shortness of Breath     Pt c/o shortness of breath over the past couple of days. Denies new fever.  Pt wears 1.5L NC at baseline.  Pt has hx breast cancer with mets to the lungs. Currently taking PO chemotherapy pills.     Extremity Weakness     Pt states she has progressively been feeling weaker over the past couple of days to that point where she cannot ambulate.     Abdominal Pain     Pt also c/o epigastric pain and nausea.          HPI  65-year-old female with PMH significant for metastatic breast cancer with metastasis to the lungs with hepatic and osseous metastatic disease s/p oral chemotherapy, DM type II, HTN, and OSH with nocturnal hypoxia admitted 12/25/2024 for CT finding of bilateral pleural effusions, worsening hepatic metastatic disease, new abdominal lymphedema, thoracic adenopathy, diffuse osseous metastasis with T5 pathologic fracture after presenting with   increasing weakness, shortness of breath, difficulty with ambulation, and abdominal pain times several weeks.  Patient underwent right sided thoracentesis 12/27, T4-T6 laminectomy 01/01/2025 complicated by right-sided pneumothorax requiring chest tube placement 01/03/ and subsequent removal 01/06, left sided thoracentesis 01/02.  Due to malignant pleural effusion, IR was consulted for right tunneled pleural chest tube placement (Aspira) which Dr. Gaming placed on 01/11/2025; 1150 mL watery yellow fluid out during procedure.  Patient was educated regarding drain care and had supplies at bedside for self removal of fluid, and IR signed off.    Interval  History:   01/21/25 - IR contacted to evaluate Aspira drain for leaking at tubing connection site.  Upon assessment, Aspira drain was found to be connected to Pleur-evac on -20 cm of suction.  Drain taken off of suction, connection site cleaned and capped, and education provided to both patient and nursing staff that an Aspira drain is not meant to be placed to suction.  During conversation, patient expressed desire for drain removal.  She states she thought it was a temporary drain that should have been removed by now.  Benefits of Aspira drain reviewed with patient in detail including ability to drain pleural fluid without thoracentesis and relieving shortness of breath related to recurrent pleural effusions.  Risks also reviewed including increased risk of infection, bleeding, collapsed lung, and leaking from the drain.  Also discussed was the very high likelihood that patient's pleural effusion would return at some point.  After the conversation, patient stated she would still like drain removed.  I left the room to gather supplies for removal, and when I returned to the room, palliative care was in helping patient with her advanced directive.  Aspira drain deferred until tomorrow.    Assessment/Plan     Principal Problem:    Acute on chronic respiratory failure with hypoxia and hypercapnia (HCC)  Active Problems:    Diabetes mellitus, type 2 (HCC)    Primary hypertension    Malignant neoplasm of breast (HCC)    Bilateral pleural effusion    Cancer related pain    Pathological fracture of vertebra    Debility    Restless leg syndrome    Hypokalemia    Anemia of chronic disease    Hyponatremia      Plan IR  - Plan to remove Aspira drain tomorrow.  -  -Thank you for allowing Interventional Radiology team to participate in the patients care, if any additional care or requests are needed in the future please do not hesitate to call or place IR order           Review of Systems  Physical Exam   Review of Systems    Constitutional:  Positive for malaise/fatigue. Negative for chills and fever.   Cardiovascular:  Negative for chest pain and palpitations.   Gastrointestinal:  Negative for nausea and vomiting.   Musculoskeletal:  Positive for back pain and myalgias.   Neurological:  Positive for weakness. Negative for tingling, sensory change and speech change.      Vitals:    01/21/25 0807   BP: 127/68   Pulse: 69   Resp: 19   Temp: 36.5 °C (97.7 °F)   SpO2: 94%        Physical Exam  Constitutional:       General: She is not in acute distress.  Cardiovascular:      Rate and Rhythm: Normal rate.   Pulmonary:      Effort: Pulmonary effort is normal. No respiratory distress.      Comments: Right tunneled Pleurx catheter Aspira drain in place  Abdominal:      General: There is no distension.   Musculoskeletal:      Right lower leg: Edema present.      Left lower leg: Edema present.   Skin:     General: Skin is warm and dry.   Neurological:      General: No focal deficit present.      Mental Status: She is alert and oriented to person, place, and time.   Psychiatric:         Mood and Affect: Mood normal.         Behavior: Behavior normal.             Labs    Recent Labs     01/19/25  0401 01/20/25 0023 01/21/25  0248   WBC 15.4* 19.3* 18.3*   RBC 3.77* 4.19* 4.18*   HEMOGLOBIN 10.5* 11.7* 11.9*   HEMATOCRIT 33.3* 36.6* 36.4*   MCV 88.3 87.4 87.1   MCH 27.9 27.9 28.5   MCHC 31.5* 32.0* 32.7   RDW 51.3* 49.4 49.2   PLATELETCT 600* 691* 602*   MPV 9.1 9.1 8.9*     Recent Labs     01/19/25  0401 01/20/25 0023 01/21/25  0248   SODIUM 133* 133* 129*   POTASSIUM 3.1* 3.1* 3.7   CHLORIDE 85* 80* 82*   CO2 37* 38* 35*   GLUCOSE 130* 124* 129*   BUN 22 24* 30*   CREATININE 0.60 0.67 0.67   CALCIUM 9.0 9.5 9.3     Recent Labs     01/19/25  0401 01/20/25  0023 01/21/25  0248   ALBUMIN  --  3.4 3.2   TBILIRUBIN  --  0.7  --    ALKPHOSPHAT  --  120*  --    TOTPROTEIN  --  6.7  --    ALTSGPT  --  45  --    ASTSGOT  --  185*  --    CREATININE 0.60  0.67 0.67     DX-CHEST-PORTABLE (1 VIEW)   Final Result         1.  Pulmonary edema and/or infiltrates are identified, which are stable since the prior exam.   2.  Small bilateral pleural effusions, increased on the right since prior study   3.  Cardiomegaly      DX-CHEST-PORTABLE (1 VIEW)   Final Result         1.  Pulmonary edema and/or infiltrates are identified, which are stable since the prior exam.   2.  Small left pleural effusion   3.  Cardiomegaly      DX-CHEST-PORTABLE (1 VIEW)   Final Result         1. Small right apical pneumothorax now visible. Right chest tube unchanged.      2. Persistent infiltrates and small effusions.                     DX-CHEST-PORTABLE (1 VIEW)   Final Result      No significant change from prior exam.      DX-CHEST-PORTABLE (1 VIEW)   Final Result         1.  Pulmonary edema and/or infiltrates are identified, which are stable since the prior exam.   2.  Cardiomegaly      DX-CHEST-PORTABLE (1 VIEW)   Final Result      1.  Interval improvement of pulmonary vascular congestion and pulmonary edema.   2.  No other significant change from prior exam.         DX-CHEST-PORTABLE (1 VIEW)   Final Result         1.  Pulmonary edema and/or infiltrates are identified, which appear somewhat increased since the prior exam.   2.  Layering left pleural effusion, stable   3.  Cardiomegaly      DX-CHEST-PORTABLE (1 VIEW)   Final Result         1.  Pulmonary edema and/or infiltrates are identified, which are stable since the prior exam.   2.  Layering left pleural effusion, stable      DX-CHEST-PORTABLE (1 VIEW)   Final Result         1.  Pulmonary edema and/or infiltrates are identified, which are stable since the prior exam.   2.  Layering left pleural effusion, stable      DX-CHEST-PORTABLE (1 VIEW)   Final Result         1.  Pulmonary edema and/or infiltrates are identified, which are stable since the prior exam.   2.  Trace bilateral pleural effusions, stable      DX-CHEST-PORTABLE (1 VIEW)    Final Result         1.  Pulmonary edema and/or infiltrates are identified, which are stable since the prior exam.   2.  Small left pleural effusion, stable      DX-CHEST-PORTABLE (1 VIEW)   Final Result      1.  Support equipment is unchanged.      2.  Cardiomegaly with moderate perihilar pulmonary edema unchanged from previous exam.      3.  Small bilateral pleural effusions left greater than right.      DX-CHEST-PORTABLE (1 VIEW)   Final Result      1.  Tubes and lines unchanged in position.      2.  Marked bilateral perihilar pulmonary edema which has improved slightly since previous exam.      3.  Small bilateral pleural effusions left greater than right.      IR-INSERT PLEURAL CATH W/ CUFF   Final Result      1.  Ultrasound and fluoroscopic guided placement of a RIGHT HEMITHORAX PleurX (Aspira type) tunneled pleural drainage catheter.   2.  The catheter can be used immediately with drainage of pleural effusion as needed for comfort, as per package insert instructions.   3.  If the catheter becomes dislodged, do not reinsert it.  Place sterile dressing over the entry wound site.  May consult primary care physician or oncologist as to whether there is clinical indication for PleurX replacement.      DX-CHEST-PORTABLE (1 VIEW)   Final Result      1.  Tubes and lines unchanged in position.      2.   Extensive bilateral perihilar airspace opacities consistent with pneumonitis and/or pulmonary edema.      3.  Mild to moderate bilateral pleural effusions right greater than left      DX-CHEST-PORTABLE (1 VIEW)   Final Result         1. Increasing moderate right and small left pleural effusions. No change edema or infiltrates.      2. ETT 5 cm above masoud. NG tube in stomach. Right central line.                     DX-CHEST-PORTABLE (1 VIEW)   Final Result      Stable diffuse bilateral pulmonary infiltrates.      DX-ABDOMEN FOR TUBE PLACEMENT   Final Result      NG tube tip overlies the gastric body.       DX-CHEST-PORTABLE (1 VIEW)   Final Result      1.  Supportive tubing as described above.   2.  Apparent increasing RIGHT pleural fluid, possibly due to change in position.   3.  Improvement of LEFT lung base consolidation.   4.  No other significant change from prior exam.         DX-CHEST-PORTABLE (1 VIEW)   Final Result         1. Stable right IJ central catheter. Right chest tube has been removed. No pneumothorax.   2. Unchanged bilateral pulmonary opacities and associated pleural effusions.         DX-CHEST-PORTABLE (1 VIEW)   Final Result      1.  Unchanged bilateral pulmonary opacities. Stable small bilateral pleural effusions.   2.  Right pigtail catheter remains. No pneumothorax.      DX-CHEST-PORTABLE (1 VIEW)   Final Result      Removal of large bore right chest tube with stable right pigtail chest tube. Questionable tiny right apical pneumothorax.      DX-CHEST-PORTABLE (1 VIEW)   Final Result      No significant interval change.      DX-CHEST-PORTABLE (1 VIEW)   Final Result      1.  Persistent bilateral pulmonary opacities, stable to slightly worse, although changes may be related to differences in imaging technique. No large pleural effusions.   2.  Well-positioned right IJ central catheter. No pneumothorax.   3.  Stable other lines and tubes.      DX-CHEST-LIMITED (1 VIEW)   Final Result         1.  No change in 2 separate right-sided chest tubes.      2.  No pneumothorax identified.      3.  Bilateral pulmonary opacifications again identified which are slightly less prominent.      DX-CHEST-PORTABLE (1 VIEW)   Final Result         1. No change mild infiltrates.      2. Right chest tubes. No pneumothorax.      3. ETT out. NG tube and jugular line remain.                     DX-CHEST-PORTABLE (1 VIEW)   Final Result         1. No significant interval change.      DX-CHEST-LIMITED (1 VIEW)   Final Result         1.  Findings on chest radiograph appear stable since the prior radiograph.  No new  abnormalities are identified.      2.  2 separate right-sided chest tubes are again identified with no change in position.      3.  No pneumothorax identified.      DX-CHEST-LIMITED (1 VIEW)   Final Result         1.  No pneumothorax identified.      2.  2 right-sided chest tubes again identified.      CT-CTA CHEST PULMONARY ARTERY W/ RECONS   Final Result         1. No pulmonary embolus.      2. Medial right lung base consolidation felt to represent complete collapse of the right middle lobe. Mild to moderate mid and upper lung pneumonia infiltrates remain.      3. Right chest tubes. Small right pneumothorax and small right effusion. Improving small 2.5 cm left pleural effusion since 12/25/2024.      4. Extensive metastatic lesions including adenopathy, liver lesions, right breast nodule and bone lesions. Thoracic spine fusion hardware remains.                  CT-HEAD W/O   Final Result         1. No acute process in the brain evident.      2. Fluid in sphenoid sinus.      3. Right convexity midthoracic scoliosis and unchanged from 12/31/2024 MRI.               DX-ABDOMEN FOR TUBE PLACEMENT   Final Result         1. NG tube in stomach.                     DX-CHEST-FOR LINE PLACEMENT Perform procedure in: Patient's Room   Final Result         1. Small right apical pneumothorax remains. 2 right chest tubes.      2. ETT 3 cm above masoud. NG tube in stomach. Right jugular line in SVC. No complication evident.      3. Mild infiltrates and small right effusion.                  DX-CHEST-LIMITED (1 VIEW)   Final Result         1. 2nd right chest tube placed with near complete evacuation of right pneumothorax.      2. Mild infiltrates, basilar atelectasis and small right effusion remain.      3. Endotracheal tube tip at about T5 and about 2 cm above the masoud. The masoud and tip of the endotracheal tube are difficult to visualize in part because of overlying thoracic spine fusion hardware.                   DX-CHEST-PORTABLE (1 VIEW)   Final Result         Enlarging right pneumothorax with collapsing of the right lung.      Preliminary findings texted to Dr. STANLEY FISHER in the Emergency Department via Voalte on 1/4/2025 1:50 AM         DX-CHEST-LIMITED (1 VIEW)   Final Result         Interval placement of right pigtail chest tube. Small right residual pneumothorax.      DX-CHEST-PORTABLE (1 VIEW)   Final Result   Addendum (preliminary) 1 of 1      CRITICAL RESULT READ BACK: Preliminary findings discussed with and    critical read back performed by Dr. Downs via telephone on 1/3/2025 8:03    PM      Final      1.  Interval worsening in right pneumothorax, now moderate.   2.  Persistent interstitial and alveolar pulmonary opacities bilaterally. Possible bilateral pleural effusions, right greater than left.            DX-CHEST-PORTABLE (1 VIEW)   Final Result         1. Slightly decreased right pneumothorax.   2. Stable bilateral interstitial and airspace opacification.   3. Stable right pleural effusion.      DX-CHEST-PORTABLE (1 VIEW)   Final Result         1. Left pleural effusion has decreased in size status post thoracentesis. There is no left-sided pneumothorax.   2. There is persistent right basilar opacity with a new right apical pneumothorax.      Findings were communicated to the ordering provider at the time of dictation via Voalte.      US-THORACENTESIS PUNCTURE LEFT   Final Result      1. Ultrasound guided left sided therapeutic thoracentesis.      2. 950 mL of fluid withdrawn.      DX-CHEST-PORTABLE (1 VIEW)   Final Result      1. New postsurgical changes in the upper thoracic spine.   2. Worsening bilateral pleural effusions, pulmonary vascular congestion and interstitial edema.      DX-PORTABLE FLUORO > 1 HOUR   Final Result      Portable fluoroscopy utilized for 1 minute 19 seconds.      INTERPRETING LOCATION: [address] [place] [address], [facility]      DX-THORACIC SPINE-2 VIEWS   Final Result       Digitized intraoperative radiograph is submitted for review. This examination is not for diagnostic purpose but for guidance during a surgical procedure. Please see the patient's chart for full procedural details.         INTERPRETING LOCATION: [address] [facility], [place], [address]      MR-CERVICAL SPINE-WITH & W/O   Final Result         1.  No evidence of metastatic disease to the cervical spine.      MR-LUMBAR SPINE-WITH & W/O   Final Result   Impression:      Multiple bony metastatic lesions as described above, most prominent along the inferior endplate of L1.      Degenerative moderate canal stenosis at L4-5 with cauda equina compression. Epidural thickening and enhancement is noted in this region with severe bilateral facet degeneration and enhancement along the bilateral facet joints. This is felt to be    degenerative in origin.      MR-THORACIC SPINE-WITH & W/O   Final Result         Multiple metastatic lesions of the thoracic spine as described above.      Pathologic compression fracture at T5 with 50% loss of height and posterior cortical retropulsion with circumferential epidural thickening representing epidural extension of metastatic disease. This causes severe canal stenosis with cord compression.      Metastatic disease also involves the posterior elements and spinous processes at T4, T5.      Pre and paravertebral metastatic soft tissue infiltration at T4-T5 noted.         MR-BRAIN-WITH & W/O   Final Result         Multiple nodular enhancing metastatic lesions involving the cranium as described above.      The largest lesion is in the right posterior temporal/parietal occipital region and demonstrates inner table cortical breakthrough with epidural extension and adjacent mild dural thickening and enhancement.      US-THORACENTESIS PUNCTURE RIGHT   Final Result   Addendum (preliminary) 1 of 1 12/27/2024 10:59 AM      HISTORY/REASON FOR EXAM:  Shortness of breath         TECHNIQUE/EXAM DESCRIPTION:    Ultrasound-guided thoracentesis.      Indication:  RIGHT pleural fluid collection.      COMPARISON:  None-however patient states history of right-sided    thoracentesis September 2024.      PROCEDURE:     Informed consent was obtained. A timeout was taken. A right    pleural effusion was localized with real-time ultrasound guidance. The    right posterior chest wall was prepped and draped in a sterile manner.    Following local anesthesia with 1%    lidocaine, and under live ultrasound guidance a 5 [place] Yueh pigtail    catheter was advanced into the pleural space with trocar technique and    pleural fluid was drained. The patient tolerated the procedure well    without evidence of complication. A post    thoracentesis chest radiograph is forthcoming.      FINDINGS:      Fluid was sent to the laboratory.      Fluid character: serosanguinous         1. Ultrasound guided right sided therapeutic and diagnostic thoracentesis.      2. 1000 mL of fluid withdrawn.      Final      1. Ultrasound guided right sided therapeutic and diagnostic thoracentesis.      2. 1000 mL of fluid withdrawn.      DX-CHEST-PORTABLE (1 VIEW)   Final Result      1.  Small persistent right pleural effusion which is decreased since recent thoracentesis.      2.  Small to moderate left pleural effusion.      3.  Bilateral perihilar and left lower lobe atelectasis and/or pneumonitis.      4.  Increased interstitial markings throughout the lung fields consistent with parenchymal scarring, and/or pulmonary edema.      CT-LSPINE W/O PLUS RECONS   Final Result         1. Diffuse metastatic disease.   2. No definite pathologic fracture.      CT-TSPINE W/O PLUS RECONS   Final Result         1. Extensive metastatic disease.   2. There is new pathologic compression fracture deformity of T5.      CT-CHEST,ABDOMEN,PELVIS WITH   Final Result         1. New bilateral upper lobe nodular infiltrates.   2. Moderate-sized bilateral pleural effusions.   3.  "Interval worsening of hepatic metastatic disease.   4. New abdominal lymphadenopathy. Thoracic adenopathy is again noted.   5. Diffuse osseous metastatic disease. There is a new compression fracture of T5 as reported on the prior spine CT.      DX-CHEST-PORTABLE (1 VIEW)   Final Result      1.  Stable bibasilar atelectasis/consolidation and small bilateral pleural effusions.      2.  Stable cardiomegaly and interstitial edema.      IR-LUNG & PLEURAL CATH REMOVAL    (Results Pending)     INR   Date Value Ref Range Status   01/01/2025 0.96 0.87 - 1.13 Final     Comment:     INR - Non-therapeutic Reference Range: 0.87-1.13  INR - Therapeutic Reference Range: 2.0-4.0       No results found for: \"POCINR\"     Intake/Output Summary (Last 24 hours) at 1/21/2025 1642  Last data filed at 1/21/2025 0900  Gross per 24 hour   Intake 50 ml   Output 0 ml   Net 50 ml      I have personally reviewed the above labs and imaging      I have performed a physical exam and reviewed and updated ROS and Plan today (1/21/2025).     68 minutes in directly providing and coordinating care and extensive data review.  No time overlap and excludes procedures.   "

## 2025-01-22 NOTE — PROGRESS NOTES
Radiology Progress Note   Author: LOPEZ England Date & Time created: 1/22/2025  10:32 AM   Date of admission  12/25/2024  Note to reader: this note follows the APSO format rather than the historical SOAP format. Assessment and plan located at the top of the note for ease of use.    Chief Complaint  64 y.o. female admitted 12/25/2024 with   Chief Complaint   Patient presents with    Shortness of Breath     Pt c/o shortness of breath over the past couple of days. Denies new fever.  Pt wears 1.5L NC at baseline.  Pt has hx breast cancer with mets to the lungs. Currently taking PO chemotherapy pills.     Extremity Weakness     Pt states she has progressively been feeling weaker over the past couple of days to that point where she cannot ambulate.     Abdominal Pain     Pt also c/o epigastric pain and nausea.          HPI  65-year-old female with PMH significant for metastatic breast cancer with metastasis to the lungs with hepatic and osseous metastatic disease s/p oral chemotherapy, DM type II, HTN, and OSH with nocturnal hypoxia admitted 12/25/2024 for CT finding of bilateral pleural effusions, worsening hepatic metastatic disease, new abdominal lymphedema, thoracic adenopathy, diffuse osseous metastasis with T5 pathologic fracture after presenting with   increasing weakness, shortness of breath, difficulty with ambulation, and abdominal pain times several weeks.  Patient underwent right sided thoracentesis 12/27, T4-T6 laminectomy 01/01/2025 complicated by right-sided pneumothorax requiring chest tube placement 01/03/ and subsequent removal 01/06, left sided thoracentesis 01/02.  Due to malignant pleural effusion, IR was consulted for right tunneled pleural chest tube placement (Aspira) which Dr. Gaming placed on 01/11/2025; 1150 mL watery yellow fluid out during procedure.  Patient was educated regarding drain care and had supplies at bedside for self removal of fluid, and IR signed off.    Interval  History:   01/21/25 - IR contacted to evaluate Aspira drain for leaking at tubing connection site.  Upon assessment, Aspira drain was found to be connected to Pleur-evac on -20 cm of suction.  Drain taken off of suction, connection site cleaned and capped, and education provided to both patient and nursing staff that an Aspira drain is not meant to be placed to suction.  During conversation, patient expressed desire for drain removal.  She states she thought it was a temporary drain that should have been removed by now.  Benefits of Aspira drain reviewed with patient in detail including ability to drain pleural fluid without thoracentesis and relieving shortness of breath related to recurrent pleural effusions.  Risks also reviewed including increased risk of infection, bleeding, collapsed lung, and leaking from the drain.  Also discussed was the very high likelihood that patient's pleural effusion would return at some point.  After the conversation, patient stated she would still like drain removed.  I left the room to gather supplies for removal, and when I returned to the room, palliative care was in helping patient with her advanced directive.  Aspira drain deferred until tomorrow.    01/22/25 - Risks/benefits of continuing Aspira drain again reviewed at bedside. Reiterated the likelihood that her pleural effusion would return and if she no longer had the Aspira drain, she would need a thoracentesis to remove the fluid. Pt again stated she wanted the Aspira drain removed. Aspira drain removed at bedside using sterile technique. See dictation under imaging.     Assessment/Plan     Principal Problem:    Acute on chronic respiratory failure with hypoxia and hypercapnia (HCC)  Active Problems:    Diabetes mellitus, type 2 (HCC)    Primary hypertension    Malignant neoplasm of breast (HCC)    Bilateral pleural effusion    Cancer related pain    Pathological fracture of vertebra    Debility    Restless leg syndrome     Hypokalemia    Anemia of chronic disease    Hyponatremia      Plan IR  - Aspira drain removed.   - IR signing off.   -  -Thank you for allowing Interventional Radiology team to participate in the patients care, if any additional care or requests are needed in the future please do not hesitate to call or place IR order           Review of Systems  Physical Exam   Review of Systems   Constitutional:  Positive for malaise/fatigue. Negative for chills and fever.   Cardiovascular:  Negative for chest pain and palpitations.   Gastrointestinal:  Negative for nausea and vomiting.   Musculoskeletal:  Positive for back pain and myalgias.   Neurological:  Positive for weakness. Negative for tingling, sensory change and speech change.      Vitals:    01/22/25 0810   BP: 120/64   Pulse: 67   Resp: 19   Temp: 35.9 °C (96.6 °F)   SpO2: 94%        Physical Exam  Constitutional:       General: She is not in acute distress.  Cardiovascular:      Rate and Rhythm: Normal rate.   Pulmonary:      Effort: Pulmonary effort is normal. No respiratory distress.   Abdominal:      General: There is no distension.   Musculoskeletal:      Right lower leg: Edema present.      Left lower leg: Edema present.   Skin:     General: Skin is warm and dry.      Comments: Bandage to removal site of aspira drain Right anterior flank; CDI   Neurological:      General: No focal deficit present.      Mental Status: She is alert and oriented to person, place, and time.   Psychiatric:         Mood and Affect: Mood normal.         Behavior: Behavior normal.             Labs    Recent Labs     01/20/25  0023 01/21/25 0248 01/22/25  0345   WBC 19.3* 18.3* 16.6*   RBC 4.19* 4.18* 4.01*   HEMOGLOBIN 11.7* 11.9* 11.4*   HEMATOCRIT 36.6* 36.4* 35.9*   MCV 87.4 87.1 89.5   MCH 27.9 28.5 28.4   MCHC 32.0* 32.7 31.8*   RDW 49.4 49.2 50.6*   PLATELETCT 691* 602* 582*   MPV 9.1 8.9* 9.3     Recent Labs     01/20/25  0023 01/21/25  0248 01/22/25  0345   SODIUM 133* 129*  132*   POTASSIUM 3.1* 3.7 2.8*   CHLORIDE 80* 82* 82*   CO2 38* 35* 39*   GLUCOSE 124* 129* 117*   BUN 24* 30* 34*   CREATININE 0.67 0.67 0.68   CALCIUM 9.5 9.3 9.3     Recent Labs     01/20/25  0023 01/21/25  0248 01/22/25  0345   ALBUMIN 3.4 3.2  --    TBILIRUBIN 0.7  --   --    ALKPHOSPHAT 120*  --   --    TOTPROTEIN 6.7  --   --    ALTSGPT 45  --   --    ASTSGOT 185*  --   --    CREATININE 0.67 0.67 0.68     DX-CHEST-PORTABLE (1 VIEW)   Final Result         1.  Pulmonary edema and/or infiltrates are identified, which are stable since the prior exam.   2.  Small bilateral pleural effusions, increased on the right since prior study   3.  Cardiomegaly      DX-CHEST-PORTABLE (1 VIEW)   Final Result         1.  Pulmonary edema and/or infiltrates are identified, which are stable since the prior exam.   2.  Small left pleural effusion   3.  Cardiomegaly      DX-CHEST-PORTABLE (1 VIEW)   Final Result         1. Small right apical pneumothorax now visible. Right chest tube unchanged.      2. Persistent infiltrates and small effusions.                     DX-CHEST-PORTABLE (1 VIEW)   Final Result      No significant change from prior exam.      DX-CHEST-PORTABLE (1 VIEW)   Final Result         1.  Pulmonary edema and/or infiltrates are identified, which are stable since the prior exam.   2.  Cardiomegaly      DX-CHEST-PORTABLE (1 VIEW)   Final Result      1.  Interval improvement of pulmonary vascular congestion and pulmonary edema.   2.  No other significant change from prior exam.         DX-CHEST-PORTABLE (1 VIEW)   Final Result         1.  Pulmonary edema and/or infiltrates are identified, which appear somewhat increased since the prior exam.   2.  Layering left pleural effusion, stable   3.  Cardiomegaly      DX-CHEST-PORTABLE (1 VIEW)   Final Result         1.  Pulmonary edema and/or infiltrates are identified, which are stable since the prior exam.   2.  Layering left pleural effusion, stable      DX-CHEST-PORTABLE  (1 VIEW)   Final Result         1.  Pulmonary edema and/or infiltrates are identified, which are stable since the prior exam.   2.  Layering left pleural effusion, stable      DX-CHEST-PORTABLE (1 VIEW)   Final Result         1.  Pulmonary edema and/or infiltrates are identified, which are stable since the prior exam.   2.  Trace bilateral pleural effusions, stable      DX-CHEST-PORTABLE (1 VIEW)   Final Result         1.  Pulmonary edema and/or infiltrates are identified, which are stable since the prior exam.   2.  Small left pleural effusion, stable      DX-CHEST-PORTABLE (1 VIEW)   Final Result      1.  Support equipment is unchanged.      2.  Cardiomegaly with moderate perihilar pulmonary edema unchanged from previous exam.      3.  Small bilateral pleural effusions left greater than right.      DX-CHEST-PORTABLE (1 VIEW)   Final Result      1.  Tubes and lines unchanged in position.      2.  Marked bilateral perihilar pulmonary edema which has improved slightly since previous exam.      3.  Small bilateral pleural effusions left greater than right.      IR-INSERT PLEURAL CATH W/ CUFF   Final Result      1.  Ultrasound and fluoroscopic guided placement of a RIGHT HEMITHORAX PleurX (Aspira type) tunneled pleural drainage catheter.   2.  The catheter can be used immediately with drainage of pleural effusion as needed for comfort, as per package insert instructions.   3.  If the catheter becomes dislodged, do not reinsert it.  Place sterile dressing over the entry wound site.  May consult primary care physician or oncologist as to whether there is clinical indication for PleurX replacement.      DX-CHEST-PORTABLE (1 VIEW)   Final Result      1.  Tubes and lines unchanged in position.      2.   Extensive bilateral perihilar airspace opacities consistent with pneumonitis and/or pulmonary edema.      3.  Mild to moderate bilateral pleural effusions right greater than left      DX-CHEST-PORTABLE (1 VIEW)   Final  Result         1. Increasing moderate right and small left pleural effusions. No change edema or infiltrates.      2. ETT 5 cm above masoud. NG tube in stomach. Right central line.                     DX-CHEST-PORTABLE (1 VIEW)   Final Result      Stable diffuse bilateral pulmonary infiltrates.      DX-ABDOMEN FOR TUBE PLACEMENT   Final Result      NG tube tip overlies the gastric body.      DX-CHEST-PORTABLE (1 VIEW)   Final Result      1.  Supportive tubing as described above.   2.  Apparent increasing RIGHT pleural fluid, possibly due to change in position.   3.  Improvement of LEFT lung base consolidation.   4.  No other significant change from prior exam.         DX-CHEST-PORTABLE (1 VIEW)   Final Result         1. Stable right IJ central catheter. Right chest tube has been removed. No pneumothorax.   2. Unchanged bilateral pulmonary opacities and associated pleural effusions.         DX-CHEST-PORTABLE (1 VIEW)   Final Result      1.  Unchanged bilateral pulmonary opacities. Stable small bilateral pleural effusions.   2.  Right pigtail catheter remains. No pneumothorax.      DX-CHEST-PORTABLE (1 VIEW)   Final Result      Removal of large bore right chest tube with stable right pigtail chest tube. Questionable tiny right apical pneumothorax.      DX-CHEST-PORTABLE (1 VIEW)   Final Result      No significant interval change.      DX-CHEST-PORTABLE (1 VIEW)   Final Result      1.  Persistent bilateral pulmonary opacities, stable to slightly worse, although changes may be related to differences in imaging technique. No large pleural effusions.   2.  Well-positioned right IJ central catheter. No pneumothorax.   3.  Stable other lines and tubes.      DX-CHEST-LIMITED (1 VIEW)   Final Result         1.  No change in 2 separate right-sided chest tubes.      2.  No pneumothorax identified.      3.  Bilateral pulmonary opacifications again identified which are slightly less prominent.      DX-CHEST-PORTABLE (1 VIEW)    Final Result         1. No change mild infiltrates.      2. Right chest tubes. No pneumothorax.      3. ETT out. NG tube and jugular line remain.                     DX-CHEST-PORTABLE (1 VIEW)   Final Result         1. No significant interval change.      DX-CHEST-LIMITED (1 VIEW)   Final Result         1.  Findings on chest radiograph appear stable since the prior radiograph.  No new abnormalities are identified.      2.  2 separate right-sided chest tubes are again identified with no change in position.      3.  No pneumothorax identified.      DX-CHEST-LIMITED (1 VIEW)   Final Result         1.  No pneumothorax identified.      2.  2 right-sided chest tubes again identified.      CT-CTA CHEST PULMONARY ARTERY W/ RECONS   Final Result         1. No pulmonary embolus.      2. Medial right lung base consolidation felt to represent complete collapse of the right middle lobe. Mild to moderate mid and upper lung pneumonia infiltrates remain.      3. Right chest tubes. Small right pneumothorax and small right effusion. Improving small 2.5 cm left pleural effusion since 12/25/2024.      4. Extensive metastatic lesions including adenopathy, liver lesions, right breast nodule and bone lesions. Thoracic spine fusion hardware remains.                  CT-HEAD W/O   Final Result         1. No acute process in the brain evident.      2. Fluid in sphenoid sinus.      3. Right convexity midthoracic scoliosis and unchanged from 12/31/2024 MRI.               DX-ABDOMEN FOR TUBE PLACEMENT   Final Result         1. NG tube in stomach.                     DX-CHEST-FOR LINE PLACEMENT Perform procedure in: Patient's Room   Final Result         1. Small right apical pneumothorax remains. 2 right chest tubes.      2. ETT 3 cm above masoud. NG tube in stomach. Right jugular line in SVC. No complication evident.      3. Mild infiltrates and small right effusion.                  DX-CHEST-LIMITED (1 VIEW)   Final Result         1. 2nd  right chest tube placed with near complete evacuation of right pneumothorax.      2. Mild infiltrates, basilar atelectasis and small right effusion remain.      3. Endotracheal tube tip at about T5 and about 2 cm above the masoud. The masoud and tip of the endotracheal tube are difficult to visualize in part because of overlying thoracic spine fusion hardware.                  DX-CHEST-PORTABLE (1 VIEW)   Final Result         Enlarging right pneumothorax with collapsing of the right lung.      Preliminary findings texted to Dr. STANLEY FISHER in the Emergency Department via Voalte on 1/4/2025 1:50 AM         DX-CHEST-LIMITED (1 VIEW)   Final Result         Interval placement of right pigtail chest tube. Small right residual pneumothorax.      DX-CHEST-PORTABLE (1 VIEW)   Final Result   Addendum (preliminary) 1 of 1      CRITICAL RESULT READ BACK: Preliminary findings discussed with and    critical read back performed by Dr. Downs via telephone on 1/3/2025 8:03    PM      Final      1.  Interval worsening in right pneumothorax, now moderate.   2.  Persistent interstitial and alveolar pulmonary opacities bilaterally. Possible bilateral pleural effusions, right greater than left.            DX-CHEST-PORTABLE (1 VIEW)   Final Result         1. Slightly decreased right pneumothorax.   2. Stable bilateral interstitial and airspace opacification.   3. Stable right pleural effusion.      DX-CHEST-PORTABLE (1 VIEW)   Final Result         1. Left pleural effusion has decreased in size status post thoracentesis. There is no left-sided pneumothorax.   2. There is persistent right basilar opacity with a new right apical pneumothorax.      Findings were communicated to the ordering provider at the time of dictation via Voalte.      US-THORACENTESIS PUNCTURE LEFT   Final Result      1. Ultrasound guided left sided therapeutic thoracentesis.      2. 950 mL of fluid withdrawn.      DX-CHEST-PORTABLE (1 VIEW)   Final Result       1. New postsurgical changes in the upper thoracic spine.   2. Worsening bilateral pleural effusions, pulmonary vascular congestion and interstitial edema.      DX-PORTABLE FLUORO > 1 HOUR   Final Result      Portable fluoroscopy utilized for 1 minute 19 seconds.      INTERPRETING LOCATION: [address], [place], [address]      DX-THORACIC SPINE-2 VIEWS   Final Result      Digitized intraoperative radiograph is submitted for review. This examination is not for diagnostic purpose but for guidance during a surgical procedure. Please see the patient's chart for full procedural details.         INTERPRETING LOCATION: [address], [place], [address]      MR-CERVICAL SPINE-WITH & W/O   Final Result         1.  No evidence of metastatic disease to the cervical spine.      MR-LUMBAR SPINE-WITH & W/O   Final Result   Impression:      Multiple bony metastatic lesions as described above, most prominent along the inferior endplate of L1.      Degenerative moderate canal stenosis at L4-5 with cauda equina compression. Epidural thickening and enhancement is noted in this region with severe bilateral facet degeneration and enhancement along the bilateral facet joints. This is felt to be    degenerative in origin.      MR-THORACIC SPINE-WITH & W/O   Final Result         Multiple metastatic lesions of the thoracic spine as described above.      Pathologic compression fracture at T5 with 50% loss of height and posterior cortical retropulsion with circumferential epidural thickening representing epidural extension of metastatic disease. This causes severe canal stenosis with cord compression.      Metastatic disease also involves the posterior elements and spinous processes at T4, T5.      Pre and paravertebral metastatic soft tissue infiltration at T4-T5 noted.         MR-BRAIN-WITH & W/O   Final Result         Multiple nodular enhancing metastatic lesions involving the cranium as described above.      The largest lesion is in the right  posterior temporal/parietal occipital region and demonstrates inner table cortical breakthrough with epidural extension and adjacent mild dural thickening and enhancement.      US-THORACENTESIS PUNCTURE RIGHT   Final Result   Addendum (preliminary) 1 of 1 12/27/2024 10:59 AM      HISTORY/REASON FOR EXAM:  Shortness of breath         TECHNIQUE/EXAM DESCRIPTION:   Ultrasound-guided thoracentesis.      Indication:  RIGHT pleural fluid collection.      COMPARISON:  None-however patient states history of right-sided    thoracentesis September 2024.      PROCEDURE:     Informed consent was obtained. A timeout was taken. A right    pleural effusion was localized with real-time ultrasound guidance. The    right posterior chest wall was prepped and draped in a sterile manner.    Following local anesthesia with 1%    lidocaine, and under live ultrasound guidance a 5 [place] Yueh pigtail    catheter was advanced into the pleural space with trocar technique and    pleural fluid was drained. The patient tolerated the procedure well    without evidence of complication. A post    thoracentesis chest radiograph is forthcoming.      FINDINGS:      Fluid was sent to the laboratory.      Fluid character: serosanguinous         1. Ultrasound guided right sided therapeutic and diagnostic thoracentesis.      2. 1000 mL of fluid withdrawn.      Final      1. Ultrasound guided right sided therapeutic and diagnostic thoracentesis.      2. 1000 mL of fluid withdrawn.      DX-CHEST-PORTABLE (1 VIEW)   Final Result      1.  Small persistent right pleural effusion which is decreased since recent thoracentesis.      2.  Small to moderate left pleural effusion.      3.  Bilateral perihilar and left lower lobe atelectasis and/or pneumonitis.      4.  Increased interstitial markings throughout the lung fields consistent with parenchymal scarring, and/or pulmonary edema.      CT-LSPINE W/O PLUS RECONS   Final Result         1. Diffuse metastatic  "disease.   2. No definite pathologic fracture.      CT-TSPINE W/O PLUS RECONS   Final Result         1. Extensive metastatic disease.   2. There is new pathologic compression fracture deformity of T5.      CT-CHEST,ABDOMEN,PELVIS WITH   Final Result         1. New bilateral upper lobe nodular infiltrates.   2. Moderate-sized bilateral pleural effusions.   3. Interval worsening of hepatic metastatic disease.   4. New abdominal lymphadenopathy. Thoracic adenopathy is again noted.   5. Diffuse osseous metastatic disease. There is a new compression fracture of T5 as reported on the prior spine CT.      DX-CHEST-PORTABLE (1 VIEW)   Final Result      1.  Stable bibasilar atelectasis/consolidation and small bilateral pleural effusions.      2.  Stable cardiomegaly and interstitial edema.      IR-LUNG & PLEURAL CATH REMOVAL    (Results Pending)     INR   Date Value Ref Range Status   01/01/2025 0.96 0.87 - 1.13 Final     Comment:     INR - Non-therapeutic Reference Range: 0.87-1.13  INR - Therapeutic Reference Range: 2.0-4.0       No results found for: \"POCINR\"     Intake/Output Summary (Last 24 hours) at 1/21/2025 1642  Last data filed at 1/21/2025 0900  Gross per 24 hour   Intake 50 ml   Output 0 ml   Net 50 ml      I have personally reviewed the above labs and imaging      I have performed a physical exam and reviewed and updated ROS and Plan today (1/22/2025).     61 minutes in directly providing and coordinating care and extensive data review.  No time overlap and excludes procedures.   "

## 2025-01-22 NOTE — CARE PLAN
Problem: Hyperinflation  Goal: Prevent or improve atelectasis  Description: 1. Instruct incentive spirometry usage  2.  Perform hyperinflation therapy as indicated  1/22/2025 1435 by Grecia Dacosta, SRIKANTH  Outcome: Not Progressing  1/22/2025 1435 by Grecia Dacosta RRT  Outcome: Not Progressing     QID PEP

## 2025-01-22 NOTE — PROGRESS NOTES
[facility] Medicine Daily Progress Note    Date of Service  1/22/2025    Chief Complaint  Asha Hannon is a 64 y.o. female admitted 12/25/2024 with weakness, shortness of breath, back pain, has a history of metastatic breast cancer     Hospital Course  64 y.o. female who presented 12/25/2024 with history of stage IV breast cancer metastatic disease, mets to the lungs and now spine, on previous oral chemotherapy, admitted on 12/25/2024 with weakness, shortness of breath and back pain, subsequently a CT chest abdomen showed moderate-sized pleural effusions, worsening hepatic metastatic disease with new abdominal lymphadenopathy, thoracic adenopathy, diffuse osseous metastatic disease with a T5 pathologic fracture, the patient was admitted, undergo a right-sided thoracentesis on 12/27, malignant cells identified in the fluid, and MRI showed multiple thoracic and lumbar metastasized lesions, the patient was taken to the operating room on 1 1 where she had a T4 T6 laminectomy, following the patient developed a right-sided pneumothorax, requiring chest tube placement, left-sided thoracentesis was performed, the patient following his respiratory difficulty requiring BiPAP treatment and subsequently required to be intubated, the patient had a right-sided PleurX and small bore catheter placed and had frequent thoracentesis since placement, the patient remained intubated until 1/15, where the patient then was extubated and had a extensive talk with the ICU team in terms of her overall status, metastatic malignancy and respiratory failure, the patient stated that she wanted to keep fighting and is wishing to hopefully reach outpatient status where she can fail to be treated for her malignancy.  If she would have additional respiratory failure she states that she would like to be reintubated and possibly even undergo a tracheostomy to stay alive.  The patient tolerated extubation, she is alert and x 4, she is in a  sinus rhythm in the 50s to 80s, blood pressure in the 90s to 100s, the patient was n.p.o., underwent evaluation today by speech therapy and is cleared for mildly thick liquids, soft and bite-size solids, the right chest tube had put out 100 cc overnight, the patient remains on DVT prophylaxis with Lovenox, Pepcid for GI prophylaxis,  Laboratory data showed white to count 13.9 hemoglobin 9.3 platelet count 563 sodium 136 potassium 4.5 chloride 94 bicarb 35 glucose 155 BUN 27 creatinine 0.56  Chest x-ray with pulmonary edema and infiltrates, stable, layering left pleural effusion, stable, right-sided small bore chest tube  The patient has been followed by palliative care, she currently confirms a DNR I okay status, the patient lives with her daughter  The patient has been consulted on by palliative care  The patient had a consult from her primary oncology team on 1/17/2025  Radiation oncology was consulted as well      Interval Problem Update  1/17 the patient feels somewhat better, she is oxygenating okay, she has still significant pain, she feels weak, nursing reports the patient is a 2-3 person assist, unsafe to sit up in a chair, the patient continues to resist certain medical interventions, currently afebrile, heart rate in the 70s, respiration unlabored, blood pressure in the 130s over 60s, the patient is saturating in the mid 90s on 5 L, chest tube output 190, no airleak, in the afternoon the patient has some increase in oxygen demand, chest x-ray appears fairly unchanged, left-sided fluid, right-sided small bore chest tube in place, no pneumothorax, with a count 10.5 hemoglobin 9.4 hematocrit 30.2 platelet count 537 sodium 133 potassium 4.2 chloride 90 bicarb 36 glucose 107 BUN 23 creatinine 0.6  1/18 the patient feels less anxious today, she has improved she feels, she would like to titrate down oxygen, she would like to engage with more therapy, PMR has spent quite some time with the patient and made  expectations clear, if she fails to improve where she could transition to acute rehab the patient should most likely consider home hospice  Patient is currently afebrile, heart rate in the 70s respiration unlabored the patient is down to 5 L nasal cannula now saturating 96%, blood pressure 120s over 50s  Pain appears to be controlled I discussed the possibility of palliative radiation to her spine, will engage on Monday  Wbc count 17, hemoglobin 11.5 platelet count 643 sodium 131 potassium 4.2 chloride 86 bicarb 32 glucose 140 BUN 24 creatinine 0.73  alk phos 115  glucose 106  1/19 the patient is not feeling well today, she wants to have a bowel movement she continues to be significantly weak, no nausea vomiting, there was an air leak in the chest tube suction system, it was replaced and resolved, chest x-ray showed a possible small apical pneumothorax, chest tube output 60 cc, afebrile, heart rate in the 70s respiration unlabored, the patient is saturating the mid 90s on 5 L, blood pressure in the 120s over 70s, WBC count 15.4 hemoglobin 10.5 platelet count 600 sodium 133 potassium 3.1 chloride 85 bicarb 37 glucose 130  Procalcitonin 0.14  1/20 9 still weak with insomnia and anxiety.  Rehab will only consider her if she can stand and walk a few steps.  Radiation oncology will only take her if she can get through rehab. Transferred to [facility].  Total time 51 minutes.    1/21:  Evaluated patient at the bedside.  Patient is on 2 L of oxygen.  Hemodynamically stable.  Afebrile.  White counts 18.3.  She is complaining of burning of urination and think that she might have a UTI, urinalysis has been ordered.  Sodium is 129, chloride 85, likely due to dehydration and poor p.o. intake.  Will decrease the Lasix dose to 20 mg daily.  Encourages adequate hydration and good p.o. intake.  Check serum, urine, osmolarity and urine sodium.  Patient has a Aspira drain in place.  Since patient is hemodynamically stable and  on 2 L of oxygen, and chest x-ray with mild pleural effusion, patient is requesting if the drain can be discontinued.  Discussed with IR and planning on discontinuing Aspira drain.  IR reviewed patient images and no indication for thoracentesis at the moment.  Patient is currently pending placement at LTAC  Patient states she doesn't want hospice, family is at the bedside. Per previous provider notes, oncology was consulted and they are recommending hospice.    1/22 poor oral intake with lethargy, pt states she can not tolerate ensure d/t n/v  Pt encouraged oral intake, would monitor intake, considering peg tube placement      I have discussed this patient's plan of care and discharge plan at IDT rounds today with Case Management, Nursing, Nursing leadership, and other members of the IDT team.    Consultants/Specialty  critical care, oncology, and palliative care, physiatry    Code Status  DNAR, I OK    Disposition  Not The patient is not medically cleared for discharge to home or a post-acute facility.      I have placed the appropriate orders for post-discharge needs.    Review of Systems  Review of Systems   Constitutional:  Positive for malaise/fatigue. Negative for diaphoresis and fever.   HENT: Negative.     Eyes: Negative.    Respiratory:  Positive for shortness of breath. Negative for cough.    Cardiovascular: Negative.  Negative for chest pain and palpitations.   Gastrointestinal: Negative.  Negative for heartburn and vomiting.   Genitourinary: Negative.  Negative for dysuria and frequency.   Musculoskeletal:  Positive for back pain and myalgias. Negative for neck pain.   Skin: Negative.    Neurological:  Positive for weakness. Negative for dizziness, focal weakness and headaches.   Endo/Heme/Allergies: Negative.  Negative for polydipsia.   Psychiatric/Behavioral:  Negative for depression. The patient is not nervous/anxious.         Physical Exam  Temp:  [35.9 °C (96.6 °F)-36.5 °C (97.7 °F)] 35.9 °C (96.6  °F)  Pulse:  [64-83] 64  Resp:  [16-19] 16  BP: (107-150)/(61-76) 120/64  SpO2:  [93 %-98 %] 98 %    Physical Exam  Vitals and nursing note reviewed.   Constitutional:       General: She is not in acute distress.     Appearance: She is well-developed. She is ill-appearing. She is not toxic-appearing or diaphoretic.   HENT:      Head: Normocephalic and atraumatic.      Nose: Nose normal.      Mouth/Throat:      Mouth: Mucous membranes are dry.   Eyes:      Extraocular Movements: Extraocular movements intact.      Conjunctiva/sclera: Conjunctivae normal.      Pupils: Pupils are equal, round, and reactive to light.   Neck:      Thyroid: No thyromegaly.      Vascular: No JVD.   Cardiovascular:      Rate and Rhythm: Normal rate and regular rhythm.      Heart sounds: Normal heart sounds.      No friction rub. No gallop.   Pulmonary:      Effort: Pulmonary effort is normal.      Breath sounds: Rhonchi present. No wheezing or rales.      Comments: Aspira drain in place  Abdominal:      General: Bowel sounds are normal. There is no distension.      Palpations: Abdomen is soft.      Tenderness: There is no abdominal tenderness. There is no guarding.   Musculoskeletal:         General: No tenderness. Normal range of motion.      Cervical back: Normal range of motion and neck supple.      Right lower leg: Edema present.      Left lower leg: Edema present.   Lymphadenopathy:      Cervical: No cervical adenopathy.   Skin:     General: Skin is warm and dry.      Coloration: Skin is pale.      Findings: Bruising present.   Neurological:      Mental Status: She is alert and oriented to person, place, and time.      Cranial Nerves: No cranial nerve deficit.      Sensory: No sensory deficit.      Motor: Weakness present.      Coordination: Coordination normal.   Psychiatric:         Behavior: Behavior normal.         Thought Content: Thought content normal.         Fluids    Intake/Output Summary (Last 24 hours) at 1/22/2025  1523  Last data filed at 1/22/2025 0418  Gross per 24 hour   Intake 400 ml   Output 0 ml   Net 400 ml        Laboratory  Recent Labs     01/20/25  0023 01/21/25 0248 01/22/25  0345   WBC 19.3* 18.3* 16.6*   RBC 4.19* 4.18* 4.01*   HEMOGLOBIN 11.7* 11.9* 11.4*   HEMATOCRIT 36.6* 36.4* 35.9*   MCV 87.4 87.1 89.5   MCH 27.9 28.5 28.4   MCHC 32.0* 32.7 31.8*   RDW 49.4 49.2 50.6*   PLATELETCT 691* 602* 582*   MPV 9.1 8.9* 9.3     Recent Labs     01/20/25 0023 01/21/25 0248 01/22/25  0345   SODIUM 133* 129* 132*   POTASSIUM 3.1* 3.7 2.8*   CHLORIDE 80* 82* 82*   CO2 38* 35* 39*   GLUCOSE 124* 129* 117*   BUN 24* 30* 34*   CREATININE 0.67 0.67 0.68   CALCIUM 9.5 9.3 9.3                   Imaging  DX-CHEST-PORTABLE (1 VIEW)   Final Result         1.  Pulmonary edema and/or infiltrates are identified, which are stable since the prior exam.   2.  Small bilateral pleural effusions, increased on the right since prior study   3.  Cardiomegaly      DX-CHEST-PORTABLE (1 VIEW)   Final Result         1.  Pulmonary edema and/or infiltrates are identified, which are stable since the prior exam.   2.  Small left pleural effusion   3.  Cardiomegaly      DX-CHEST-PORTABLE (1 VIEW)   Final Result         1. Small right apical pneumothorax now visible. Right chest tube unchanged.      2. Persistent infiltrates and small effusions.                     DX-CHEST-PORTABLE (1 VIEW)   Final Result      No significant change from prior exam.      DX-CHEST-PORTABLE (1 VIEW)   Final Result         1.  Pulmonary edema and/or infiltrates are identified, which are stable since the prior exam.   2.  Cardiomegaly      DX-CHEST-PORTABLE (1 VIEW)   Final Result      1.  Interval improvement of pulmonary vascular congestion and pulmonary edema.   2.  No other significant change from prior exam.         DX-CHEST-PORTABLE (1 VIEW)   Final Result         1.  Pulmonary edema and/or infiltrates are identified, which appear somewhat increased since the prior  exam.   2.  Layering left pleural effusion, stable   3.  Cardiomegaly      DX-CHEST-PORTABLE (1 VIEW)   Final Result         1.  Pulmonary edema and/or infiltrates are identified, which are stable since the prior exam.   2.  Layering left pleural effusion, stable      DX-CHEST-PORTABLE (1 VIEW)   Final Result         1.  Pulmonary edema and/or infiltrates are identified, which are stable since the prior exam.   2.  Layering left pleural effusion, stable      DX-CHEST-PORTABLE (1 VIEW)   Final Result         1.  Pulmonary edema and/or infiltrates are identified, which are stable since the prior exam.   2.  Trace bilateral pleural effusions, stable      DX-CHEST-PORTABLE (1 VIEW)   Final Result         1.  Pulmonary edema and/or infiltrates are identified, which are stable since the prior exam.   2.  Small left pleural effusion, stable      DX-CHEST-PORTABLE (1 VIEW)   Final Result      1.  Support equipment is unchanged.      2.  Cardiomegaly with moderate perihilar pulmonary edema unchanged from previous exam.      3.  Small bilateral pleural effusions left greater than right.      DX-CHEST-PORTABLE (1 VIEW)   Final Result      1.  Tubes and lines unchanged in position.      2.  Marked bilateral perihilar pulmonary edema which has improved slightly since previous exam.      3.  Small bilateral pleural effusions left greater than right.      IR-INSERT PLEURAL CATH W/ CUFF   Final Result      1.  Ultrasound and fluoroscopic guided placement of a RIGHT HEMITHORAX PleurX (Aspira type) tunneled pleural drainage catheter.   2.  The catheter can be used immediately with drainage of pleural effusion as needed for comfort, as per package insert instructions.   3.  If the catheter becomes dislodged, do not reinsert it.  Place sterile dressing over the entry wound site.  May consult primary care physician or oncologist as to whether there is clinical indication for PleurX replacement.      DX-CHEST-PORTABLE (1 VIEW)   Final  Result      1.  Tubes and lines unchanged in position.      2.   Extensive bilateral perihilar airspace opacities consistent with pneumonitis and/or pulmonary edema.      3.  Mild to moderate bilateral pleural effusions right greater than left      DX-CHEST-PORTABLE (1 VIEW)   Final Result         1. Increasing moderate right and small left pleural effusions. No change edema or infiltrates.      2. ETT 5 cm above masoud. NG tube in stomach. Right central line.                     DX-CHEST-PORTABLE (1 VIEW)   Final Result      Stable diffuse bilateral pulmonary infiltrates.      DX-ABDOMEN FOR TUBE PLACEMENT   Final Result      NG tube tip overlies the gastric body.      DX-CHEST-PORTABLE (1 VIEW)   Final Result      1.  Supportive tubing as described above.   2.  Apparent increasing RIGHT pleural fluid, possibly due to change in position.   3.  Improvement of LEFT lung base consolidation.   4.  No other significant change from prior exam.         DX-CHEST-PORTABLE (1 VIEW)   Final Result         1. Stable right IJ central catheter. Right chest tube has been removed. No pneumothorax.   2. Unchanged bilateral pulmonary opacities and associated pleural effusions.         DX-CHEST-PORTABLE (1 VIEW)   Final Result      1.  Unchanged bilateral pulmonary opacities. Stable small bilateral pleural effusions.   2.  Right pigtail catheter remains. No pneumothorax.      DX-CHEST-PORTABLE (1 VIEW)   Final Result      Removal of large bore right chest tube with stable right pigtail chest tube. Questionable tiny right apical pneumothorax.      DX-CHEST-PORTABLE (1 VIEW)   Final Result      No significant interval change.      DX-CHEST-PORTABLE (1 VIEW)   Final Result      1.  Persistent bilateral pulmonary opacities, stable to slightly worse, although changes may be related to differences in imaging technique. No large pleural effusions.   2.  Well-positioned right IJ central catheter. No pneumothorax.   3.  Stable other lines and  tubes.      DX-CHEST-LIMITED (1 VIEW)   Final Result         1.  No change in 2 separate right-sided chest tubes.      2.  No pneumothorax identified.      3.  Bilateral pulmonary opacifications again identified which are slightly less prominent.      DX-CHEST-PORTABLE (1 VIEW)   Final Result         1. No change mild infiltrates.      2. Right chest tubes. No pneumothorax.      3. ETT out. NG tube and jugular line remain.                     DX-CHEST-PORTABLE (1 VIEW)   Final Result         1. No significant interval change.      DX-CHEST-LIMITED (1 VIEW)   Final Result         1.  Findings on chest radiograph appear stable since the prior radiograph.  No new abnormalities are identified.      2.  2 separate right-sided chest tubes are again identified with no change in position.      3.  No pneumothorax identified.      DX-CHEST-LIMITED (1 VIEW)   Final Result         1.  No pneumothorax identified.      2.  2 right-sided chest tubes again identified.      CT-CTA CHEST PULMONARY ARTERY W/ RECONS   Final Result         1. No pulmonary embolus.      2. Medial right lung base consolidation felt to represent complete collapse of the right middle lobe. Mild to moderate mid and upper lung pneumonia infiltrates remain.      3. Right chest tubes. Small right pneumothorax and small right effusion. Improving small 2.5 cm left pleural effusion since 12/25/2024.      4. Extensive metastatic lesions including adenopathy, liver lesions, right breast nodule and bone lesions. Thoracic spine fusion hardware remains.                  CT-HEAD W/O   Final Result         1. No acute process in the brain evident.      2. Fluid in sphenoid sinus.      3. Right convexity midthoracic scoliosis and unchanged from 12/31/2024 MRI.               DX-ABDOMEN FOR TUBE PLACEMENT   Final Result         1. NG tube in stomach.                     DX-CHEST-FOR LINE PLACEMENT Perform procedure in: Patient's Room   Final Result         1. Small right  apical pneumothorax remains. 2 right chest tubes.      2. ETT 3 cm above masoud. NG tube in stomach. Right jugular line in SVC. No complication evident.      3. Mild infiltrates and small right effusion.                  DX-CHEST-LIMITED (1 VIEW)   Final Result         1. 2nd right chest tube placed with near complete evacuation of right pneumothorax.      2. Mild infiltrates, basilar atelectasis and small right effusion remain.      3. Endotracheal tube tip at about T5 and about 2 cm above the masoud. The masoud and tip of the endotracheal tube are difficult to visualize in part because of overlying thoracic spine fusion hardware.                  DX-CHEST-PORTABLE (1 VIEW)   Final Result         Enlarging right pneumothorax with collapsing of the right lung.      Preliminary findings texted to Dr. STANLEY FISHER in the Emergency Department via Voalte on 1/4/2025 1:50 AM         DX-CHEST-LIMITED (1 VIEW)   Final Result         Interval placement of right pigtail chest tube. Small right residual pneumothorax.      DX-CHEST-PORTABLE (1 VIEW)   Final Result   Addendum (preliminary) 1 of 1      CRITICAL RESULT READ BACK: Preliminary findings discussed with and    critical read back performed by Dr. Downs via telephone on 1/3/2025 8:03    PM      Final      1.  Interval worsening in right pneumothorax, now moderate.   2.  Persistent interstitial and alveolar pulmonary opacities bilaterally. Possible bilateral pleural effusions, right greater than left.            DX-CHEST-PORTABLE (1 VIEW)   Final Result         1. Slightly decreased right pneumothorax.   2. Stable bilateral interstitial and airspace opacification.   3. Stable right pleural effusion.      DX-CHEST-PORTABLE (1 VIEW)   Final Result         1. Left pleural effusion has decreased in size status post thoracentesis. There is no left-sided pneumothorax.   2. There is persistent right basilar opacity with a new right apical pneumothorax.      Findings  were communicated to the ordering provider at the time of dictation via Voalte.      US-THORACENTESIS PUNCTURE LEFT   Final Result      1. Ultrasound guided left sided therapeutic thoracentesis.      2. 950 mL of fluid withdrawn.      DX-CHEST-PORTABLE (1 VIEW)   Final Result      1. New postsurgical changes in the upper thoracic spine.   2. Worsening bilateral pleural effusions, pulmonary vascular congestion and interstitial edema.      DX-PORTABLE FLUORO > 1 HOUR   Final Result      Portable fluoroscopy utilized for 1 minute 19 seconds.      INTERPRETING LOCATION: [address] , [place], [id number]      DX-THORACIC SPINE-2 VIEWS   Final Result      Digitized intraoperative radiograph is submitted for review. This examination is not for diagnostic purpose but for guidance during a surgical procedure. Please see the patient's chart for full procedural details.         INTERPRETING LOCATION: [address] , [place], [id number]      MR-CERVICAL SPINE-WITH & W/O   Final Result         1.  No evidence of metastatic disease to the cervical spine.      MR-LUMBAR SPINE-WITH & W/O   Final Result   Impression:      Multiple bony metastatic lesions as described above, most prominent along the inferior endplate of L1.      Degenerative moderate canal stenosis at L4-5 with cauda equina compression. Epidural thickening and enhancement is noted in this region with severe bilateral facet degeneration and enhancement along the bilateral facet joints. This is felt to be    degenerative in origin.      MR-THORACIC SPINE-WITH & W/O   Final Result         Multiple metastatic lesions of the thoracic spine as described above.      Pathologic compression fracture at T5 with 50% loss of height and posterior cortical retropulsion with circumferential epidural thickening representing epidural extension of metastatic disease. This causes severe canal stenosis with cord compression.      Metastatic disease also involves the posterior elements and spinous  processes at T4, T5.      Pre and paravertebral metastatic soft tissue infiltration at T4-T5 noted.         MR-BRAIN-WITH & W/O   Final Result         Multiple nodular enhancing metastatic lesions involving the cranium as described above.      The largest lesion is in the right posterior temporal/parietal occipital region and demonstrates inner table cortical breakthrough with epidural extension and adjacent mild dural thickening and enhancement.      US-THORACENTESIS PUNCTURE RIGHT   Final Result   Addendum (preliminary) 1 of 1 12/27/2024 10:59 AM      HISTORY/REASON FOR EXAM:  Shortness of breath         TECHNIQUE/EXAM DESCRIPTION:   Ultrasound-guided thoracentesis.      Indication:  RIGHT pleural fluid collection.      COMPARISON:  None-however patient states history of right-sided    thoracentesis September 2024.      PROCEDURE:     Informed consent was obtained. A timeout was taken. A right    pleural effusion was localized with real-time ultrasound guidance. The    right posterior chest wall was prepped and draped in a sterile manner.    Following local anesthesia with 1%    lidocaine, and under live ultrasound guidance a 5 Swedish Yueh pigtail    catheter was advanced into the pleural space with trocar technique and    pleural fluid was drained. The patient tolerated the procedure well    without evidence of complication. A post    thoracentesis chest radiograph is forthcoming.      FINDINGS:      Fluid was sent to the laboratory.      Fluid character: serosanguinous         1. Ultrasound guided right sided therapeutic and diagnostic thoracentesis.      2. 1000 mL of fluid withdrawn.      Final      1. Ultrasound guided right sided therapeutic and diagnostic thoracentesis.      2. 1000 mL of fluid withdrawn.      DX-CHEST-PORTABLE (1 VIEW)   Final Result      1.  Small persistent right pleural effusion which is decreased since recent thoracentesis.      2.  Small to moderate left pleural effusion.      3.   Bilateral perihilar and left lower lobe atelectasis and/or pneumonitis.      4.  Increased interstitial markings throughout the lung fields consistent with parenchymal scarring, and/or pulmonary edema.      CT-LSPINE W/O PLUS RECONS   Final Result         1. Diffuse metastatic disease.   2. No definite pathologic fracture.      CT-TSPINE W/O PLUS RECONS   Final Result         1. Extensive metastatic disease.   2. There is new pathologic compression fracture deformity of T5.      CT-CHEST,ABDOMEN,PELVIS WITH   Final Result         1. New bilateral upper lobe nodular infiltrates.   2. Moderate-sized bilateral pleural effusions.   3. Interval worsening of hepatic metastatic disease.   4. New abdominal lymphadenopathy. Thoracic adenopathy is again noted.   5. Diffuse osseous metastatic disease. There is a new compression fracture of T5 as reported on the prior spine CT.      DX-CHEST-PORTABLE (1 VIEW)   Final Result      1.  Stable bibasilar atelectasis/consolidation and small bilateral pleural effusions.      2.  Stable cardiomegaly and interstitial edema.      IR-LUNG & PLEURAL CATH REMOVAL    (Results Pending)        Assessment/Plan  * Acute on chronic respiratory failure with hypoxia and hypercapnia (HCC)- (present on admission)  Assessment & Plan  intubated 1/4  extubated 1/5  re-intubated 1/8  Extubated 1/15  Patient is doing well while extubated.  He has been on 3 to 5 L nasal cannula since extubation  Extensive conversation had with patient and her family regarding her wishes.  She remains DNAR/I okay  We also discussed the likelihood of her needing a tracheostomy if she got intubated again.  At this time, if she were to be intubated again she would like to go ahead with a tracheotomy if necessary to prolong her life.    Hyponatremia  Assessment & Plan  Na 129, cl 82  Likely hypotonia hyponatremia from dehydration and poor PO intake   Decreased lasix to 20 QD  Check serum osmolarity, urine osmolarity, and urine  "sodium  Goal correction 6 to 8 mEq      Anemia of chronic disease- (present on admission)  Assessment & Plan  Monitor, transfuse as indicated    Hypokalemia- (present on admission)  Assessment & Plan  Replaced, monitor    Restless leg syndrome- (present on admission)  Assessment & Plan  Requip     Debility- (present on admission)  Assessment & Plan  Very weak and currently cannot walk  SNF rejected her due to her acuity  Rehab would need her to be able to walk a few steps before accepting her    Pathological fracture of vertebra- (present on admission)  Assessment & Plan  Pathologic fracture of T5 with myelopathy on presentation  S/P T4-6 laminectomy with fusion and spinal decompression on 1/1/2025 by Dr. Lowery  multi-modal pain management  The patient with additional L4-5 lesion with cauda equina  Getting a radiation oncology opinion if the patient possibly could be undergoing palliative radiation hopefully to somewhat stabilize further  Fall Precautions  PT OT    Cancer related pain- (present on admission)  Assessment & Plan  Palliative Care following for pain management  Multimodal pain management  Consider palliative radiation    Bilateral pleural effusion- (present on admission)  Assessment & Plan  Malignant pleural effusions from metastatic breast cancer s/p thoracenteses and chest tubes  Diuresis as tolerated  monitor chest xrays  PleurX placement in IR 1/11 on the right, monitor and continue to drain  Discussed with IR and since pleural effusion is minimal and patient is hemodynamcally stable, plans is discontinue aspira drain.     1/22 aspira catheter removed per IR, monitor    Malignant neoplasm of breast (HCC)- (present on admission)  Assessment & Plan  \"Bilateral breast cancer with extensive destruction of the left breast and ulceration and skin lesions making her inoperable on the left.  Right breast shows a 3 x 3 cm mass.  PET scan March 2023 showed non-hypermetabolic pleural effusions which are " "cytology positive and she has demonstrable pleural masses. Started aromatase inhibition early September 2023.  Has not yet started CDK 4 6 inhibitor.  Lost to follow-up for over 6 months although apparently was on letrozole as single agent.  Now with clear progression of disease in the left breast.  August 2024: Progression of disease in liver with extensive disease.  Breast is worse.  Lung is better.  Lymph nodes are worse.  Status post radiation to symptomatic oozing and exophytic left breast mass.  Marked worsening of her cancer on letrozole with progression in the bone and liver.\"--Dr. Benitez  ongoing goals of care discussions. Patient is fighting to recover and discharge home so she can continue treatment for her cancer  DNR, I OK   Palliative following for pain management as well as further discussions.  Consulted with oncology, her primary oncology team, recommended hospice  The patient is not willing to engage in hospice at this time, she would like to get stronger and have more outpatient therapy  radiation oncology for possibility of spine radiation -would only take her if she can complete rehab successfully    1/22 Palliative care following, patient would like continued therapy, for skilled versus rehab  Patient with poor oral intake, dietary to consult for calorie count and recommendation for tube feeding  At this point patient is considering PEG tube feeding, if patient is not meeting caloric needs, does not want NG tube feed at this time    Primary hypertension- (present on admission)  Assessment & Plan  Monitor, resume as indicated  PRN's available    Diabetes mellitus, type 2 (HCC)- (present on admission)  Assessment & Plan  A1c 6.4  accuchecks with SSI  CHO diet  glucose goal 140-180      VTE prophylaxis: xarelto    I have performed a physical exam and reviewed and updated ROS and Plan today (1/22/2025). In review of yesterday's note (1/21/2025), there are no changes except as documented " above.    Patient has a high medical complexity, complex decision making and is at high risk of complication, morbidity, and mortality

## 2025-01-22 NOTE — PROGRESS NOTES
Palliative Care    Room: T837    HPI:   Asha Greenhebert Hannon is a 64 y.o. female with medical history significant for metastatic breast cancer to brain, lungs, liver, and spine admitted for respiratory failure due to chronically recurrent pleural effusion 12/25/2024. She has had a tenuous hospital course including multiple intubations and chest tubes status post T4-6 laminectomy related to epidural metastases resulting in spinal cord compression.     Discussion/Plan   Followed up with patient regarding Advance Directive, POLST form, and code status. After she discussed her frustration with lack of frequent therapies and not being able to start/continue cancer treatment she was agreeable to discuss goals of care. Juana was present but then left to go to [facility]. Asked if she would like to stay for discussion; Juana consulted with Asha who confirmed she would be back shortly and it was okay for her to go.     Reviewed Advance Directive and POLST form. She remains undecided about POLSt/code status. She initially was agreeable to complete Advance Directive as she wants her daughter Juana to be her healthcare power of  which was filled out. She again is considering DNR/DNI but wants to discuss further with Juana. POLST deferred. At this time IR CHERIE entered to discuss Aspira pleural catheter. Discussed risk of infection vs benefit of not needing frequent thoracentesis. Patient stated she would prefer coming for thoracentesis and is hopeful that once she starts cancer treatment her pleural effusion will resolve. Discussed my concern about potential reacumulation of fluid prior to patient being able to resume treatment (if she is able to at all). She understands and will think about her options.     She stated she needs to talk more with Juana and deferred completion of Advance Directive. She agreed to having me [place] back Thursday.     50 minutes spent discussing advance care planning.    Petra  "\"Halley\" CHERIE Jameson, [facility]  Inpatient Palliative Care (service hours Mon-Fri 8AM - 5PM)  [phone]      "

## 2025-01-23 ENCOUNTER — APPOINTMENT (OUTPATIENT)
Dept: RADIOLOGY | Facility: MEDICAL CENTER | Age: 65
End: 2025-01-23
Attending: INTERNAL MEDICINE
Payer: MEDICARE

## 2025-01-23 LAB
ARTERIAL PATENCY WRIST A: ABNORMAL
BASE EXCESS BLDA CALC-SCNC: 18 MMOL/L (ref -4–3)
BODY TEMPERATURE: ABNORMAL DEGREES
CO2 BLDA-SCNC: 47 MMOL/L (ref 32–48)
DELSYS IDSYS: ABNORMAL
GLUCOSE BLD STRIP.AUTO-MCNC: 122 MG/DL (ref 65–99)
GLUCOSE BLD STRIP.AUTO-MCNC: 124 MG/DL (ref 65–99)
GLUCOSE BLD STRIP.AUTO-MCNC: 131 MG/DL (ref 65–99)
GLUCOSE BLD STRIP.AUTO-MCNC: 136 MG/DL (ref 65–99)
HCO3 BLDA-SCNC: 45 MMOL/L (ref 21–28)
LACTATE BLD-SCNC: 1.6 MMOL/L (ref 0.5–2)
LPM ILPM: 15 LPM
PCO2 BLDA: 59.1 MMHG (ref 32–48)
PCO2 TEMP ADJ BLDA: 56.4 MMHG (ref 32–48)
PH BLDA: 7.49 [PH] (ref 7.35–7.45)
PH TEMP ADJ BLDA: 7.51 [PH] (ref 7.35–7.45)
PO2 BLDA: 52 MMHG (ref 83–108)
PO2 TEMP ADJ BLDA: 48 MMHG (ref 83–108)
SAO2 % BLDA: 87 % (ref 93–99)
SPECIMEN DRAWN FROM PATIENT: ABNORMAL

## 2025-01-23 PROCEDURE — A9270 NON-COVERED ITEM OR SERVICE: HCPCS | Performed by: STUDENT IN AN ORGANIZED HEALTH CARE EDUCATION/TRAINING PROGRAM

## 2025-01-23 PROCEDURE — 700101 HCHG RX REV CODE 250: Performed by: INTERNAL MEDICINE

## 2025-01-23 PROCEDURE — 700102 HCHG RX REV CODE 250 W/ 637 OVERRIDE(OP): Performed by: STUDENT IN AN ORGANIZED HEALTH CARE EDUCATION/TRAINING PROGRAM

## 2025-01-23 PROCEDURE — 94640 AIRWAY INHALATION TREATMENT: CPT

## 2025-01-23 PROCEDURE — 92526 ORAL FUNCTION THERAPY: CPT

## 2025-01-23 PROCEDURE — 99233 SBSQ HOSP IP/OBS HIGH 50: CPT | Performed by: INTERNAL MEDICINE

## 2025-01-23 PROCEDURE — 99291 CRITICAL CARE FIRST HOUR: CPT | Performed by: STUDENT IN AN ORGANIZED HEALTH CARE EDUCATION/TRAINING PROGRAM

## 2025-01-23 PROCEDURE — 700111 HCHG RX REV CODE 636 W/ 250 OVERRIDE (IP): Performed by: STUDENT IN AN ORGANIZED HEALTH CARE EDUCATION/TRAINING PROGRAM

## 2025-01-23 PROCEDURE — 770022 HCHG ROOM/CARE - ICU (200)

## 2025-01-23 PROCEDURE — 71045 X-RAY EXAM CHEST 1 VIEW: CPT

## 2025-01-23 PROCEDURE — 700111 HCHG RX REV CODE 636 W/ 250 OVERRIDE (IP): Mod: JZ | Performed by: INTERNAL MEDICINE

## 2025-01-23 PROCEDURE — 82962 GLUCOSE BLOOD TEST: CPT

## 2025-01-23 PROCEDURE — 94669 MECHANICAL CHEST WALL OSCILL: CPT

## 2025-01-23 PROCEDURE — 97530 THERAPEUTIC ACTIVITIES: CPT | Mod: CQ

## 2025-01-23 PROCEDURE — 83605 ASSAY OF LACTIC ACID: CPT

## 2025-01-23 PROCEDURE — 700102 HCHG RX REV CODE 250 W/ 637 OVERRIDE(OP): Performed by: HOSPITALIST

## 2025-01-23 PROCEDURE — 36600 WITHDRAWAL OF ARTERIAL BLOOD: CPT

## 2025-01-23 PROCEDURE — 700111 HCHG RX REV CODE 636 W/ 250 OVERRIDE (IP): Mod: JZ

## 2025-01-23 PROCEDURE — 82803 BLOOD GASES ANY COMBINATION: CPT

## 2025-01-23 PROCEDURE — 97530 THERAPEUTIC ACTIVITIES: CPT

## 2025-01-23 PROCEDURE — A9270 NON-COVERED ITEM OR SERVICE: HCPCS | Performed by: HOSPITALIST

## 2025-01-23 RX ORDER — ONDANSETRON 2 MG/ML
4 INJECTION INTRAMUSCULAR; INTRAVENOUS EVERY 6 HOURS
Status: DISCONTINUED | OUTPATIENT
Start: 2025-01-23 | End: 2025-01-26

## 2025-01-23 RX ORDER — FUROSEMIDE 10 MG/ML
20 INJECTION INTRAMUSCULAR; INTRAVENOUS ONCE
Status: COMPLETED | OUTPATIENT
Start: 2025-01-23 | End: 2025-01-23

## 2025-01-23 RX ADMIN — Medication 100 MG: at 06:06

## 2025-01-23 RX ADMIN — FUROSEMIDE 20 MG: 10 INJECTION INTRAMUSCULAR; INTRAVENOUS at 16:05

## 2025-01-23 RX ADMIN — THERA TABS 1 TABLET: TAB at 06:06

## 2025-01-23 RX ADMIN — MEGESTROL ACETATE 20 MG: 20 TABLET ORAL at 17:30

## 2025-01-23 RX ADMIN — IPRATROPIUM BROMIDE AND ALBUTEROL SULFATE 3 ML: .5; 2.5 SOLUTION RESPIRATORY (INHALATION) at 15:46

## 2025-01-23 RX ADMIN — FOLIC ACID 1 MG: 1 TABLET ORAL at 06:06

## 2025-01-23 RX ADMIN — FUROSEMIDE 20 MG: 10 INJECTION, SOLUTION INTRAVENOUS at 06:06

## 2025-01-23 RX ADMIN — ONDANSETRON 4 MG: 2 INJECTION INTRAMUSCULAR; INTRAVENOUS at 17:29

## 2025-01-23 RX ADMIN — ONDANSETRON 4 MG: 2 INJECTION INTRAMUSCULAR; INTRAVENOUS at 11:52

## 2025-01-23 RX ADMIN — MEGESTROL ACETATE 20 MG: 20 TABLET ORAL at 09:34

## 2025-01-23 RX ADMIN — RIVAROXABAN 10 MG: 10 TABLET, FILM COATED ORAL at 17:31

## 2025-01-23 ASSESSMENT — ENCOUNTER SYMPTOMS
WEAKNESS: 1
CONSTIPATION: 0
DIARRHEA: 0
NECK PAIN: 0
FEVER: 0
MYALGIAS: 1
DIAPHORESIS: 0
CARDIOVASCULAR NEGATIVE: 1
VOMITING: 0
BACK PAIN: 1
EYES NEGATIVE: 1
COUGH: 0
ABDOMINAL PAIN: 0
NAUSEA: 1
DIZZINESS: 0
PALPITATIONS: 0
NERVOUS/ANXIOUS: 0
SHORTNESS OF BREATH: 1
FOCAL WEAKNESS: 0
CHILLS: 0
HEADACHES: 0
POLYDIPSIA: 0

## 2025-01-23 ASSESSMENT — COGNITIVE AND FUNCTIONAL STATUS - GENERAL
SUGGESTED CMS G CODE MODIFIER DAILY ACTIVITY: CK
STANDING UP FROM CHAIR USING ARMS: A LOT
TOILETING: A LOT
HELP NEEDED FOR BATHING: A LOT
WALKING IN HOSPITAL ROOM: TOTAL
MOVING TO AND FROM BED TO CHAIR: A LOT
DRESSING REGULAR UPPER BODY CLOTHING: A LITTLE
CLIMB 3 TO 5 STEPS WITH RAILING: TOTAL
MOVING FROM LYING ON BACK TO SITTING ON SIDE OF FLAT BED: A LOT
SUGGESTED CMS G CODE MODIFIER MOBILITY: CL
DAILY ACTIVITIY SCORE: 16
MOBILITY SCORE: 11
PERSONAL GROOMING: A LITTLE
TURNING FROM BACK TO SIDE WHILE IN FLAT BAD: A LITTLE
DRESSING REGULAR LOWER BODY CLOTHING: A LOT

## 2025-01-23 ASSESSMENT — PAIN DESCRIPTION - PAIN TYPE
TYPE: ACUTE PAIN

## 2025-01-23 ASSESSMENT — GAIT ASSESSMENTS: GAIT LEVEL OF ASSIST: UNABLE TO PARTICIPATE

## 2025-01-23 ASSESSMENT — FIBROSIS 4 INDEX: FIB4 SCORE: 3.08

## 2025-01-23 NOTE — PROGRESS NOTES
Pt with assisted fall while transferring from chair to bed. 2 RNs were assisting pt when her legs gave out. Assisted to the floor on her bottom with legs underneath her. Required 6 staff members to assist back to bed. Bed alarm on, resting comfortably. No c/o injury. Charge RIYA Mantilla and Dr. Nj notified

## 2025-01-23 NOTE — CARE PLAN
The patient is Stable - Low risk of patient condition declining or worsening    Shift Goals  Clinical Goals: Maintain skin integrity, safety  Patient Goals: Comfort  Family Goals: maria ines    Progress made toward(s) clinical / shift goals:  Patient alert and oriented and remains on O2 @4L/min satting >94%. Patient denies any pain at this time. Patient repositioned as tolerated. Remains free from falls throughout the shift. Safety precaution in place, bed to lowest position and call light in reach. All needs attended.     Patient is not progressing towards the following goals: N/A

## 2025-01-23 NOTE — PROGRESS NOTES
O2 increased from 3L to 5L after desaturation to 87%, pt with mild SOB. SpO2 increased to 90% on 5 L. Lungs diminished but she has a nonproductive, congested cough. Dr. Nj notified, plan to continue to monitor unless she develops distress. SpO2 goal 88-90% per Dr. Nj

## 2025-01-23 NOTE — DISCHARGE PLANNING
Case Management Discharge Planning    Admission Date: 12/25/2024  GMLOS: 10.1  ALOS: 29    6-Clicks ADL Score: 16  6-Clicks Mobility Score: 10      Anticipated Discharge Dispo: Discharge Disposition: D/T to SNF with Medicare cert in anticipation of skilled care (03)    This RN CM completed chart review, patient is not medically cleared for discharge today.     This RN CM called daughter Juana and discussed local SNF previously declined, we are resending local SNF now that her drain is removed but are also following up with referrals sent to [facility] and [facility].     Daughter Juana updated that she agrees with her mom that they would not want her to go to a SNF outside of the area. This RN CM updated daughter that if patient is declined by local SNF then the plan would be home with family to provide care and HH.     Daughter Juana updated that her brother Kyrie would be better set up to take care of her mom at this time as he has the space. Kyrie was taking care of spouse of patient whom passed away in [facility] on 1/7/25. Daughter Juana is worried that he will not be ready to take care of another parent but will call him today.     This RN CM followed up with daughter about what are the barriers for taking her mom home to provide her care. Daughter stated that her house is cluttered and would need to clear out pathways for her mom to have space to move. Per daughter it will take her 3 weeks to clear enough space for her mom to come home with her.     This RN CM updated that follow up with Juana tomorrow to see if son would be able to care for patient if she is declined by all local SNF.

## 2025-01-23 NOTE — THERAPY
"Physical Therapy   Daily Treatment     Patient Name: Asha Hannon  Age:  64 y.o., Sex:  female  Medical Record #: [id number]  Today's Date: 1/23/2025     Precautions  Precautions: Fall Risk  Comments: T4-6 lami/fusion, no brace per order    Assessment    Pt greeted and seen for PT treatment. Pt eager to work with PT. She was able to roll in bed with very Lani at the hips. ModA with the trunk to sit EOB, pt was able to maintain her sitting balance. ModA x2 person to stand and pivot to the chair. Pt currently limited by impaired balance, decreased strength and decreased activity tolerance which negatively impacts functional mobility. Pt will continue to benefit from skilled PT to address deficits.       Plan    Treatment Plan Status: Continue Current Treatment Plan  Type of Treatment: Bed Mobility, Equipment, Family / Caregiver Training, Gait Training, Manual Therapy, Neuro Re-Education / Balance, Self Care / Home Evaluation, Stair Training, Therapeutic Activities, Therapeutic Exercise  Treatment Frequency: 4 Times per Week  Treatment Duration: Until Therapy Goals Met    DC Equipment Recommendations: Unable to determine at this time  Discharge Recommendations: Recommend post-acute placement for additional physical therapy services prior to discharge home      Subjective  \"I really wanted you to come\" Pt declined AM attempt due to nausea, requested therapist return later in the day.        01/23/25 1332   Vitals   O2 (LPM) 3   O2 Delivery Device Silicone Nasal Cannula   Pain 0 - 10 Group   Therapist Pain Assessment Post Activity Pain Same as Prior to Activity;Nurse Notified  (no c/o pain)   Cognition    Level of Consciousness Alert   Comments pleasant and cooperative   Balance   Sitting Balance (Static) Fair   Sitting Balance (Dynamic) Fair   Standing Balance (Static) Poor +   Standing Balance (Dynamic) Poor -   Weight Shift Sitting Fair   Weight Shift Standing Poor   Skilled Intervention Verbal " Cuing;Tactile Cuing;Sequencing;Facilitation   Comments physical assist x2 person   Bed Mobility    Supine to Sit Moderate Assist   Scooting Minimal Assist   Rolling Minimal Assist to Rt.;Minimum Assist to Lt.   Skilled Intervention Verbal Cuing;Sequencing;Facilitation   Comments Pt able to initiate roll and maintain sidelying, she was able to lift legs   Gait Analysis   Gait Level Of Assist Unable to Participate   Comments transfer only   Functional Mobility   Sit to Stand Moderate Assist  (x2P)   Bed, Chair, Wheelchair Transfer Moderate Assist  (x2P)   Transfer Method Stand Pivot   Mobility bed>chair   Skilled Intervention Verbal Cuing;Tactile Cuing;Sequencing;Facilitation;Compensatory Strategies   Comments Mild LE buckling, pt relied on L Le more than R.   Short Term Goals    Short Term Goal # 1 Pt will perform supine < > sit SPV with bed flat, no rail in 6 visits in order to set up for upright mobility   Goal Outcome # 1 goal not met   Short Term Goal # 2 Pt will perform sit < > stand SPV in 6 visits in order to prepare for ambulation   Goal Outcome # 2 Goal not met   Short Term Goal # 3 Pt will ambulate 150' SPV in 6 visits with FWW in order to return home safely   Goal Outcome # 3 Goal not met   Short Term Goal # 4 Pt will ascend/ descend 1 step SPV in 6 visits in order to access her home   Goal Outcome # 4 Goal not met   Supervising Physical Therapist (PTA Treatments Only)   Supervising Physical Therapist Glory Mccall

## 2025-01-23 NOTE — PROGRESS NOTES
4 Eyes Skin Assessment Completed by RIYA Valverde and RIYA Joseph.    Head WDL  Ears Redness and Blanching  Nose WDL  Mouth WDL  Neck WDL  Breast/Chest Redness and Scar tissue  Shoulder Blades WDL  Spine Scar, Incision sites redness  (R) Arm/Elbow/Hand Redness, Blanching, and Bruising  (L) Arm/Elbow/Hand Redness, Blanching, and Bruising  Abdomen Bruising, discoloration, aspira drain removed, dressing CDI  Groin Redness and Blanching  Scrotum/Coccyx/Buttocks Redness, Excoriation, and Moisture Fissure  (R) Leg Discoloration on lower leg  (L) Leg Discoloration on lower leg  (R) Heel/Foot/Toe Redness, Blanching, Boggy, and Scar  (L) Heel/Foot/Toe Redness, Blanching, Boggy, and Scab        Devices In Places Nasal Cannula, PIV        Interventions In Place NC W/Ear Foams, Heel Mepilex, Pillows, Q2 Turns, Low Air Loss Mattress, and Barrier Cream     Possible Skin Injury Yes     Pictures Uploaded Into Epic Yes  Wound Consult Placed N/A  RN Wound Prevention Protocol Ordered Yes

## 2025-01-23 NOTE — DIETARY
Nutrition Services: Follow-up for PO Intake   Day 29 of admit. Asha Hannon is a 64 y.o. female with admitting DX of Breast cancer metastasized to bone, unspecified laterality (HCC) [C50.919, C79.51]    Pt remains ordered a soft and bite size diet. PO intake minimal, averages <25% of meals. Pt ordered Glucerna/Ensure BID and Magic Cup BID. Provider note reports pt unable to tolerate Ensure due to nausea and vomiting. Provider note reports PEG discussion. Provider note reports pt declined enteral nutrition via NGT although considering PEG tube feeding. Palliative care consulted.   Pertinent Medications: folic acid, furosemide, insulin, megesterol, multivitamin, ondansetron, bowel regimen, thiamine    Edema present in RLE, LLE       Current diet order:   IDDSI level 6 - soft/bite-sized  Glucerna (provides 220 calories, 10 g protein per 8 fl oz) and Magic Cup (provides 290 calories, 9 g protein per 4 fl oz) BID     Inadequate oral intake related to decreased appetite as evidence by PO intake <25% of meals      Nutrition Dx Status: Ongoing      Severe Malnutrition in context of Acute Illness or Injury related to inadequate PO intake  as evidenced by < 50% of estimated energy requirement x 5 days. Severe weight loss of 6% in 5 days.      Nutrition Dx Status: Ongoing      Problem: Nutritional:  Goal: Achieve adequate nutritional intake  Description: Patient will consume > 50%  of meals  Outcome: NOT MET       Plan/ Recommendations:      Encourage intake of meals, goal for >50% consumption from meals and/or supplements  Transition from Ensure Plus BID to Ensure Clear BID   Document intake of all meals as % taken in ADL's to provide interdisciplinary communication across all shifts.   Monitor weight.  Nutrition rep will continue to see patient for ongoing meal and snack preferences.       Pt with inadequate PO intake >7 days, enteral nutrition is appropriate. Please consult if enteral nutrition recommendations  desired.      RD following

## 2025-01-23 NOTE — THERAPY
"Speech Language Pathology   Daily Treatment     Patient Name: Asha Hannon  AGE:  64 y.o., SEX:  female  Medical Record #: [id number]  Date of Service: 1/23/2025      Precautions:  Precautions: Fall Risk, Spinal / Back Precautions , Swallow Precautions    Subjective  RN cleared patient for dysphagia tx session. Patient awake, alert, UIC, and with flat affect and fatigue noted. Lunch tray of SB6/MT2 diet on tray table and recently delivered, but patient declined most items and only agreeable to limited trials of MTL via straw, and thins via small cup sip.     Assessment  Patient demonstrated intermittent coughing prior to any PO trials. Patient consumed trials independently with rapid rate of intake with MTL via straw, requiring verbal cues to reduce sip size and rate. Patient consumed PO trials of thins via small cup sip with intermittent coughing, but difficult to determine if coughing was related to PO intake. Patient has hx of silent aspiration on thins on FEES on 1/16 and would benefit from repeat instrumental swallow study to further evaluate oropharyngeal swallow function and r/o continued aspiration. Patient reporting preference for MBSS over FEES, as it's \"less invasive\", but RN later reported to this SLP that patient had a fall between SLP session and documentation filing time. Patient is also frequently fatigued and nauseous with poor PO intake and participation d/t same. Primary SLP is following and will see patient as able/appropriate tomorrow to determine readiness and appropriateness for repeat diagnostic swallow study. RN aware.     Clinical Impressions  Recommend patient continue SB6/MT2 diet with assistance as needed. Patient would benefit from repeat swallow study to r/o continued silent aspiration of thin liquids. Primary SLP to follow and determine appropriateness and readiness for same, as patient has been nauseous with poor participation, fatigue, and just had a fall today. " "    Recommendations  Treatment Completed: Dysphagia Treatment    Dysphagia Treatment  Diet Consistency: SB6/MT2 (soft/bite-sized solids w/ mildly thick liquids)  Instrumentation: Instrumental swallow study pending clinical progress  Medication: Whole with puree  Supervision: Assist with meal tray set up, Distant supervision - check on patient 2-3 times per meal  Positioning: Fully upright and midline during oral intake, Meals sitting upright in a chair, as tolerated  Risk Management : Small bites/sips, Slow rate of intake, Reduce environmental distractions, Physical mobility, as tolerated  Oral Care: BID    SLP Treatment Plan  Treatment Plan: Dysphagia Treatment, Patient/Family/Caregiver Training  SLP Frequency: 3x Per Week  Estimated Duration: Until Therapy Goals Met    Anticipated Discharge Needs  Discharge Recommendations: Recommend post-acute placement for additional speech therapy services prior to discharge home  Therapy Recommendations Upon DC: Dysphagia Training, Community Re-Integration, Patient / Family / Caregiver Education    Patient / Family Goals  Patient / Family Goal #1: \"I drink [place]\"  Goal #1 Outcome: Progressing as expected  Short Term Goals  Short Term Goal # 3: Pt will tolerate a diet of mildly thick liquids and SB6 w/o signs of aspiration related pulmonary complications  Goal Outcome  # 3: Progressing as expected  Short Term Goal # 4: Pt will complete exercises targeting pharyngeal constriction and cough strength w/ min cueing  Goal Outcome  # 4: Progressing slower than expected    Cristiana Rowell, JACKY  "

## 2025-01-23 NOTE — THERAPY
Occupational Therapy  Daily Treatment     Patient Name: Asha Hannon  Age:  64 y.o., Sex:  female  Medical Record #: [id number]  Today's Date: 1/23/2025     Precautions  Precautions: Fall Risk  Comments: T4-6 lami/fusion, no brace per order    Assessment    Assisted RN staff with safely assisting patient from floor after assisted fall to chair and eventually back to bed. Pt requiring total assist for mobility and ADLs, pt describing weakness/numbness to legs RN aware in room. Continue to recommend post-acute placement.    Plan    Treatment Plan Status: (P) Continue Current Treatment Plan  Type of Treatment: Self Care / Activities of Daily Living, Neuro Re-Education / Balance, Therapeutic Activity  Treatment Frequency: 3 Times per Week  Treatment Duration: Until Therapy Goals Met    DC Equipment Recommendations: (P) Unable to determine at this time  Discharge Recommendations: (P) Recommend post-acute placement for additional occupational therapy services prior to discharge home    Objective       01/23/25 1505   Precautions   Precautions Fall Risk   Pain 0 - 10 Group   Therapist Pain Assessment Post Activity Pain Same as Prior to Activity;Nurse Notified   Cognition    Level of Consciousness Alert   Balance   Sitting Balance (Static) Poor   Sitting Balance (Dynamic) Poor -   Standing Balance (Static) Dependent   Weight Shift Sitting Absent   Weight Shift Standing Absent   Skilled Intervention Verbal Cuing;Facilitation   Bed Mobility    Sit to Supine Total Assist   Scooting Total Assist   Rolling Total Assist to Rt.   Skilled Intervention Verbal Cuing;Facilitation   Activities of Daily Living   Lower Body Dressing Total Assist   Skilled Intervention Verbal Cuing;Facilitation   How much help from another person does the patient currently need...   Putting on and taking off regular lower body clothing? 2   Bathing (including washing, rinsing, and drying)? 2   Toileting, which includes using a toilet, bedpan,  or urinal? 2   Putting on and taking off regular upper body clothing? 3   Taking care of personal grooming such as brushing teeth? 3   Eating meals? 4   6 Clicks Daily Activity Score 16   Functional Mobility   Sit to Stand Total Assist   Bed, Chair, Wheelchair Transfer Total Assist   Transfer Method Stand Pivot   Mobility assisted patient off floor with RNs, transfer to chair, transfer back to bed   Skilled Intervention Verbal Cuing;Compensatory Strategies   Activity Tolerance   Sitting in Chair 1 min   Standing 2 min   Short Term Goals   Short Term Goal # 1 SBA with UB dressing   Goal Outcome # 1 Goal not met   Short Term Goal # 2 mod A with LB dressing   Goal Outcome # 2 Progressing slower than expected   Short Term Goal # 3 mod A with ADL txfs   Goal Outcome # 3 Progressing slower than expected   Education Group   Education Provided Role of Occupational Therapist   Role of Occupational Therapist Patient Response Patient;Acceptance;Explanation;Verbal Demonstration   Occupational Therapy Treatment Plan    O.T. Treatment Plan Continue Current Treatment Plan   Anticipated Discharge Equipment and Recommendations   DC Equipment Recommendations Unable to determine at this time   Discharge Recommendations Recommend post-acute placement for additional occupational therapy services prior to discharge home   Interdisciplinary Plan of Care Collaboration   IDT Collaboration with  Nursing   Patient Position at End of Therapy In Bed;Bed Alarm On;Call Light within Reach;Tray Table within Reach;Phone within Reach   Collaboration Comments RN updated

## 2025-01-23 NOTE — WOUND TEAM
[facility]  Inpatient Services  Wound and Skin Care Brief Evaluation    Admission Date: 12/25/2024     Last order of IP CONSULT TO WOUND CARE was found on 1/16/2025 from Hospital Encounter on 12/25/2024     HPI, PMH, SH: Reviewed    Chief Complaint   Patient presents with    Shortness of Breath     Pt c/o shortness of breath over the past couple of days. Denies new fever.  Pt wears 1.5L NC at baseline.  Pt has hx breast cancer with mets to the lungs. Currently taking PO chemotherapy pills.     Extremity Weakness     Pt states she has progressively been feeling weaker over the past couple of days to that point where she cannot ambulate.     Abdominal Pain     Pt also c/o epigastric pain and nausea.      Diagnosis: Breast cancer metastasized to bone, unspecified laterality (HCC) [C50.919, C79.51]    Unit where seen by Wound Team: S632/02     Wound consult placed regarding sacrum. Chart and images reviewed. This discussed with bedside RN. This clinician in to assess patient. Patient pleasant and agreeable. Sacrum with moderate area of dermatitis, recommend barrier paste. All reddened areas were blanching.     No pressure injuries or advanced wound care needs identified. Wound consult completed. No further follow up unless indicated and consulted.          PREVENTATIVE INTERVENTIONS:    Q shift Raj - performed per nursing policy  Q shift pressure point assessments - performed per nursing policy    Surface/Positioning  Standard/trauma mattress - Currently in Place  Reposition q 2 hours - Currently in Place  TAPs Turning system - Currently in Place

## 2025-01-23 NOTE — PROGRESS NOTES
[facility] Medicine Daily Progress Note    Date of Service  1/23/2025    Chief Complaint  Asha Hannon is a 64 y.o. female admitted 12/25/2024 with weakness, shortness of breath, back pain, has a history of metastatic breast cancer     Hospital Course  64 y.o. female who presented 12/25/2024 with history of stage IV breast cancer metastatic disease, mets to the lungs and now spine, on previous oral chemotherapy, admitted on 12/25/2024 with weakness, shortness of breath and back pain, subsequently a CT chest abdomen showed moderate-sized pleural effusions, worsening hepatic metastatic disease with new abdominal lymphadenopathy, thoracic adenopathy, diffuse osseous metastatic disease with a T5 pathologic fracture, the patient was admitted, undergo a right-sided thoracentesis on 12/27, malignant cells identified in the fluid, and MRI showed multiple thoracic and lumbar metastasized lesions, the patient was taken to the operating room on 1 1 where she had a T4 T6 laminectomy, following the patient developed a right-sided pneumothorax, requiring chest tube placement, left-sided thoracentesis was performed, the patient following his respiratory difficulty requiring BiPAP treatment and subsequently required to be intubated, the patient had a right-sided PleurX and small bore catheter placed and had frequent thoracentesis since placement, the patient remained intubated until 1/15, where the patient then was extubated and had a extensive talk with the ICU team in terms of her overall status, metastatic malignancy and respiratory failure, the patient stated that she wanted to keep fighting and is wishing to hopefully reach outpatient status where she can fail to be treated for her malignancy.  If she would have additional respiratory failure she states that she would like to be reintubated and possibly even undergo a tracheostomy to stay alive.  The patient tolerated extubation, she is alert and x 4, she is in a  sinus rhythm in the 50s to 80s, blood pressure in the 90s to 100s, the patient was n.p.o., underwent evaluation today by speech therapy and is cleared for mildly thick liquids, soft and bite-size solids, the right chest tube had put out 100 cc overnight, the patient remains on DVT prophylaxis with Lovenox, Pepcid for GI prophylaxis,  Laboratory data showed white to count 13.9 hemoglobin 9.3 platelet count 563 sodium 136 potassium 4.5 chloride 94 bicarb 35 glucose 155 BUN 27 creatinine 0.56  Chest x-ray with pulmonary edema and infiltrates, stable, layering left pleural effusion, stable, right-sided small bore chest tube  The patient has been followed by palliative care, she currently confirms a DNR I okay status, the patient lives with her daughter  The patient has been consulted on by palliative care  The patient had a consult from her primary oncology team on 1/17/2025  Radiation oncology was consulted as well      Interval Problem Update  1/17 the patient feels somewhat better, she is oxygenating okay, she has still significant pain, she feels weak, nursing reports the patient is a 2-3 person assist, unsafe to sit up in a chair, the patient continues to resist certain medical interventions, currently afebrile, heart rate in the 70s, respiration unlabored, blood pressure in the 130s over 60s, the patient is saturating in the mid 90s on 5 L, chest tube output 190, no airleak, in the afternoon the patient has some increase in oxygen demand, chest x-ray appears fairly unchanged, left-sided fluid, right-sided small bore chest tube in place, no pneumothorax, with a count 10.5 hemoglobin 9.4 hematocrit 30.2 platelet count 537 sodium 133 potassium 4.2 chloride 90 bicarb 36 glucose 107 BUN 23 creatinine 0.6  1/18 the patient feels less anxious today, she has improved she feels, she would like to titrate down oxygen, she would like to engage with more therapy, PMR has spent quite some time with the patient and made  expectations clear, if she fails to improve where she could transition to acute rehab the patient should most likely consider home hospice  Patient is currently afebrile, heart rate in the 70s respiration unlabored the patient is down to 5 L nasal cannula now saturating 96%, blood pressure 120s over 50s  Pain appears to be controlled I discussed the possibility of palliative radiation to her spine, will engage on Monday  Wbc count 17, hemoglobin 11.5 platelet count 643 sodium 131 potassium 4.2 chloride 86 bicarb 32 glucose 140 BUN 24 creatinine 0.73  alk phos 115  glucose 106  1/19 the patient is not feeling well today, she wants to have a bowel movement she continues to be significantly weak, no nausea vomiting, there was an air leak in the chest tube suction system, it was replaced and resolved, chest x-ray showed a possible small apical pneumothorax, chest tube output 60 cc, afebrile, heart rate in the 70s respiration unlabored, the patient is saturating the mid 90s on 5 L, blood pressure in the 120s over 70s, WBC count 15.4 hemoglobin 10.5 platelet count 600 sodium 133 potassium 3.1 chloride 85 bicarb 37 glucose 130  Procalcitonin 0.14  1/20 9 still weak with insomnia and anxiety.  Rehab will only consider her if she can stand and walk a few steps.  Radiation oncology will only take her if she can get through rehab. Transferred to [facility].  Total time 51 minutes.    1/21:  Evaluated patient at the bedside.  Patient is on 2 L of oxygen.  Hemodynamically stable.  Afebrile.  White counts 18.3.  She is complaining of burning of urination and think that she might have a UTI, urinalysis has been ordered.  Sodium is 129, chloride 85, likely due to dehydration and poor p.o. intake.  Will decrease the Lasix dose to 20 mg daily.  Encourages adequate hydration and good p.o. intake.  Check serum, urine, osmolarity and urine sodium.  Patient has a Aspira drain in place.  Since patient is hemodynamically stable and  on 2 L of oxygen, and chest x-ray with mild pleural effusion, patient is requesting if the drain can be discontinued.  Discussed with IR and planning on discontinuing Aspira drain.  IR reviewed patient images and no indication for thoracentesis at the moment.  Patient is currently pending placement at LTAC  Patient states she doesn't want hospice, family is at the bedside. Per previous provider notes, oncology was consulted and they are recommending hospice.    1/22 poor oral intake with lethargy, pt states she can not tolerate ensure d/t n/v  Pt encouraged oral intake, would monitor intake, considering peg tube placement    1/23 reports nausea today, <10% intake, reports slightly improved, denies abd pain,   Discussed with patient need for supplemental nutrition including tube feeding, patient states that she would like to continue to think about it      I have discussed this patient's plan of care and discharge plan at IDT rounds today with Case Management, Nursing, Nursing leadership, and other members of the IDT team.    Consultants/Specialty  critical care, oncology, and palliative care, physiatry    Code Status  DNAR, I OK    Disposition  Not The patient is not medically cleared for discharge to home or a post-acute facility.      I have placed the appropriate orders for post-discharge needs.    Review of Systems  Review of Systems   Constitutional:  Positive for malaise/fatigue. Negative for chills, diaphoresis and fever.   HENT: Negative.     Eyes: Negative.    Respiratory:  Positive for shortness of breath. Negative for cough.    Cardiovascular: Negative.  Negative for chest pain, palpitations and leg swelling.   Gastrointestinal:  Positive for nausea. Negative for abdominal pain, constipation, diarrhea and vomiting.   Genitourinary: Negative.  Negative for frequency.   Musculoskeletal:  Positive for back pain and myalgias. Negative for neck pain.   Skin: Negative.    Neurological:  Positive for weakness.  Negative for dizziness, focal weakness and headaches.   Endo/Heme/Allergies: Negative.  Negative for polydipsia.   Psychiatric/Behavioral:  The patient is not nervous/anxious.         Physical Exam  Temp:  [35.9 °C (96.6 °F)-36.4 °C (97.6 °F)] 35.9 °C (96.6 °F)  Pulse:  [62-86] 62  Resp:  [17-18] 17  BP: (129-136)/(59-69) 129/60  SpO2:  [87 %-95 %] 95 %    Physical Exam  Vitals and nursing note reviewed.   Constitutional:       General: She is not in acute distress.     Appearance: She is well-developed. She is ill-appearing. She is not diaphoretic.   HENT:      Head: Normocephalic and atraumatic.      Nose: Nose normal.      Mouth/Throat:      Mouth: Mucous membranes are dry.   Eyes:      Extraocular Movements: Extraocular movements intact.      Conjunctiva/sclera: Conjunctivae normal.      Pupils: Pupils are equal, round, and reactive to light.   Neck:      Thyroid: No thyromegaly.      Vascular: No JVD.   Cardiovascular:      Rate and Rhythm: Normal rate and regular rhythm.      Heart sounds: Normal heart sounds.      No friction rub. No gallop.   Pulmonary:      Effort: Pulmonary effort is normal.      Breath sounds: Rhonchi present. No wheezing or rales.      Comments: Aspira drain in place  Abdominal:      General: Bowel sounds are normal. There is no distension.      Palpations: Abdomen is soft.      Tenderness: There is no abdominal tenderness. There is no rebound.   Musculoskeletal:         General: No tenderness. Normal range of motion.      Cervical back: Normal range of motion and neck supple.      Right lower leg: Edema present.      Left lower leg: Edema present.   Skin:     General: Skin is warm and dry.      Coloration: Skin is pale.      Findings: Bruising present.   Neurological:      Mental Status: She is alert and oriented to person, place, and time.      Sensory: No sensory deficit.      Motor: Weakness present.      Coordination: Coordination normal.   Psychiatric:         Behavior: Behavior  normal.         Thought Content: Thought content normal.         Fluids    Intake/Output Summary (Last 24 hours) at 1/23/2025 1237  Last data filed at 1/22/2025 2100  Gross per 24 hour   Intake 120 ml   Output --   Net 120 ml        Laboratory  Recent Labs     01/21/25  0248 01/22/25  0345   WBC 18.3* 16.6*   RBC 4.18* 4.01*   HEMOGLOBIN 11.9* 11.4*   HEMATOCRIT 36.4* 35.9*   MCV 87.1 89.5   MCH 28.5 28.4   MCHC 32.7 31.8*   RDW 49.2 50.6*   PLATELETCT 602* 582*   MPV 8.9* 9.3     Recent Labs     01/21/25 0248 01/22/25  0345   SODIUM 129* 132*   POTASSIUM 3.7 2.8*   CHLORIDE 82* 82*   CO2 35* 39*   GLUCOSE 129* 117*   BUN 30* 34*   CREATININE 0.67 0.68   CALCIUM 9.3 9.3                   Imaging  DX-CHEST-PORTABLE (1 VIEW)   Final Result      1.  Interval removal of right-sided tunneled pleural catheter. No evidence of pneumothorax.   2.  Stable small bilateral pleural effusions.   3.  Increased patchy bilateral interstitial and airspace opacities.      IR-LUNG & PLEURAL CATH REMOVAL   Final Result      Successful removal of tunneled pleural catheter as described.         DX-CHEST-PORTABLE (1 VIEW)   Final Result         1.  Pulmonary edema and/or infiltrates are identified, which are stable since the prior exam.   2.  Small bilateral pleural effusions, increased on the right since prior study   3.  Cardiomegaly      DX-CHEST-PORTABLE (1 VIEW)   Final Result         1.  Pulmonary edema and/or infiltrates are identified, which are stable since the prior exam.   2.  Small left pleural effusion   3.  Cardiomegaly      DX-CHEST-PORTABLE (1 VIEW)   Final Result         1. Small right apical pneumothorax now visible. Right chest tube unchanged.      2. Persistent infiltrates and small effusions.                     DX-CHEST-PORTABLE (1 VIEW)   Final Result      No significant change from prior exam.      DX-CHEST-PORTABLE (1 VIEW)   Final Result         1.  Pulmonary edema and/or infiltrates are identified, which are  stable since the prior exam.   2.  Cardiomegaly      DX-CHEST-PORTABLE (1 VIEW)   Final Result      1.  Interval improvement of pulmonary vascular congestion and pulmonary edema.   2.  No other significant change from prior exam.         DX-CHEST-PORTABLE (1 VIEW)   Final Result         1.  Pulmonary edema and/or infiltrates are identified, which appear somewhat increased since the prior exam.   2.  Layering left pleural effusion, stable   3.  Cardiomegaly      DX-CHEST-PORTABLE (1 VIEW)   Final Result         1.  Pulmonary edema and/or infiltrates are identified, which are stable since the prior exam.   2.  Layering left pleural effusion, stable      DX-CHEST-PORTABLE (1 VIEW)   Final Result         1.  Pulmonary edema and/or infiltrates are identified, which are stable since the prior exam.   2.  Layering left pleural effusion, stable      DX-CHEST-PORTABLE (1 VIEW)   Final Result         1.  Pulmonary edema and/or infiltrates are identified, which are stable since the prior exam.   2.  Trace bilateral pleural effusions, stable      DX-CHEST-PORTABLE (1 VIEW)   Final Result         1.  Pulmonary edema and/or infiltrates are identified, which are stable since the prior exam.   2.  Small left pleural effusion, stable      DX-CHEST-PORTABLE (1 VIEW)   Final Result      1.  Support equipment is unchanged.      2.  Cardiomegaly with moderate perihilar pulmonary edema unchanged from previous exam.      3.  Small bilateral pleural effusions left greater than right.      DX-CHEST-PORTABLE (1 VIEW)   Final Result      1.  Tubes and lines unchanged in position.      2.  Marked bilateral perihilar pulmonary edema which has improved slightly since previous exam.      3.  Small bilateral pleural effusions left greater than right.      IR-INSERT PLEURAL CATH W/ CUFF   Final Result      1.  Ultrasound and fluoroscopic guided placement of a RIGHT HEMITHORAX PleurX (Aspira type) tunneled pleural drainage catheter.   2.  The  catheter can be used immediately with drainage of pleural effusion as needed for comfort, as per package insert instructions.   3.  If the catheter becomes dislodged, do not reinsert it.  Place sterile dressing over the entry wound site.  May consult primary care physician or oncologist as to whether there is clinical indication for PleurX replacement.      DX-CHEST-PORTABLE (1 VIEW)   Final Result      1.  Tubes and lines unchanged in position.      2.   Extensive bilateral perihilar airspace opacities consistent with pneumonitis and/or pulmonary edema.      3.  Mild to moderate bilateral pleural effusions right greater than left      DX-CHEST-PORTABLE (1 VIEW)   Final Result         1. Increasing moderate right and small left pleural effusions. No change edema or infiltrates.      2. ETT 5 cm above masoud. NG tube in stomach. Right central line.                     DX-CHEST-PORTABLE (1 VIEW)   Final Result      Stable diffuse bilateral pulmonary infiltrates.      DX-ABDOMEN FOR TUBE PLACEMENT   Final Result      NG tube tip overlies the gastric body.      DX-CHEST-PORTABLE (1 VIEW)   Final Result      1.  Supportive tubing as described above.   2.  Apparent increasing RIGHT pleural fluid, possibly due to change in position.   3.  Improvement of LEFT lung base consolidation.   4.  No other significant change from prior exam.         DX-CHEST-PORTABLE (1 VIEW)   Final Result         1. Stable right IJ central catheter. Right chest tube has been removed. No pneumothorax.   2. Unchanged bilateral pulmonary opacities and associated pleural effusions.         DX-CHEST-PORTABLE (1 VIEW)   Final Result      1.  Unchanged bilateral pulmonary opacities. Stable small bilateral pleural effusions.   2.  Right pigtail catheter remains. No pneumothorax.      DX-CHEST-PORTABLE (1 VIEW)   Final Result      Removal of large bore right chest tube with stable right pigtail chest tube. Questionable tiny right apical pneumothorax.       DX-CHEST-PORTABLE (1 VIEW)   Final Result      No significant interval change.      DX-CHEST-PORTABLE (1 VIEW)   Final Result      1.  Persistent bilateral pulmonary opacities, stable to slightly worse, although changes may be related to differences in imaging technique. No large pleural effusions.   2.  Well-positioned right IJ central catheter. No pneumothorax.   3.  Stable other lines and tubes.      DX-CHEST-LIMITED (1 VIEW)   Final Result         1.  No change in 2 separate right-sided chest tubes.      2.  No pneumothorax identified.      3.  Bilateral pulmonary opacifications again identified which are slightly less prominent.      DX-CHEST-PORTABLE (1 VIEW)   Final Result         1. No change mild infiltrates.      2. Right chest tubes. No pneumothorax.      3. ETT out. NG tube and jugular line remain.                     DX-CHEST-PORTABLE (1 VIEW)   Final Result         1. No significant interval change.      DX-CHEST-LIMITED (1 VIEW)   Final Result         1.  Findings on chest radiograph appear stable since the prior radiograph.  No new abnormalities are identified.      2.  2 separate right-sided chest tubes are again identified with no change in position.      3.  No pneumothorax identified.      DX-CHEST-LIMITED (1 VIEW)   Final Result         1.  No pneumothorax identified.      2.  2 right-sided chest tubes again identified.      CT-CTA CHEST PULMONARY ARTERY W/ RECONS   Final Result         1. No pulmonary embolus.      2. Medial right lung base consolidation felt to represent complete collapse of the right middle lobe. Mild to moderate mid and upper lung pneumonia infiltrates remain.      3. Right chest tubes. Small right pneumothorax and small right effusion. Improving small 2.5 cm left pleural effusion since 12/25/2024.      4. Extensive metastatic lesions including adenopathy, liver lesions, right breast nodule and bone lesions. Thoracic spine fusion hardware remains.                  CT-HEAD  W/O   Final Result         1. No acute process in the brain evident.      2. Fluid in sphenoid sinus.      3. Right convexity midthoracic scoliosis and unchanged from 12/31/2024 MRI.               DX-ABDOMEN FOR TUBE PLACEMENT   Final Result         1. NG tube in stomach.                     DX-CHEST-FOR LINE PLACEMENT Perform procedure in: Patient's Room   Final Result         1. Small right apical pneumothorax remains. 2 right chest tubes.      2. ETT 3 cm above masoud. NG tube in stomach. Right jugular line in SVC. No complication evident.      3. Mild infiltrates and small right effusion.                  DX-CHEST-LIMITED (1 VIEW)   Final Result         1. 2nd right chest tube placed with near complete evacuation of right pneumothorax.      2. Mild infiltrates, basilar atelectasis and small right effusion remain.      3. Endotracheal tube tip at about T5 and about 2 cm above the masoud. The masoud and tip of the endotracheal tube are difficult to visualize in part because of overlying thoracic spine fusion hardware.                  DX-CHEST-PORTABLE (1 VIEW)   Final Result         Enlarging right pneumothorax with collapsing of the right lung.      Preliminary findings texted to Dr. STANLEY FISHER in the Emergency Department via Voalte on 1/4/2025 1:50 AM         DX-CHEST-LIMITED (1 VIEW)   Final Result         Interval placement of right pigtail chest tube. Small right residual pneumothorax.      DX-CHEST-PORTABLE (1 VIEW)   Final Result   Addendum (preliminary) 1 of 1      CRITICAL RESULT READ BACK: Preliminary findings discussed with and    critical read back performed by Dr. Downs via telephone on 1/3/2025 8:03    PM      Final      1.  Interval worsening in right pneumothorax, now moderate.   2.  Persistent interstitial and alveolar pulmonary opacities bilaterally. Possible bilateral pleural effusions, right greater than left.            DX-CHEST-PORTABLE (1 VIEW)   Final Result         1. Slightly  decreased right pneumothorax.   2. Stable bilateral interstitial and airspace opacification.   3. Stable right pleural effusion.      DX-CHEST-PORTABLE (1 VIEW)   Final Result         1. Left pleural effusion has decreased in size status post thoracentesis. There is no left-sided pneumothorax.   2. There is persistent right basilar opacity with a new right apical pneumothorax.      Findings were communicated to the ordering provider at the time of dictation via Voalte.      US-THORACENTESIS PUNCTURE LEFT   Final Result      1. Ultrasound guided left sided therapeutic thoracentesis.      2. 950 mL of fluid withdrawn.      DX-CHEST-PORTABLE (1 VIEW)   Final Result      1. New postsurgical changes in the upper thoracic spine.   2. Worsening bilateral pleural effusions, pulmonary vascular congestion and interstitial edema.      DX-PORTABLE FLUORO > 1 HOUR   Final Result      Portable fluoroscopy utilized for 1 minute 19 seconds.      INTERPRETING LOCATION: [address], [place], [address]      DX-THORACIC SPINE-2 VIEWS   Final Result      Digitized intraoperative radiograph is submitted for review. This examination is not for diagnostic purpose but for guidance during a surgical procedure. Please see the patient's chart for full procedural details.         INTERPRETING LOCATION: [address], [place], [address]      MR-CERVICAL SPINE-WITH & W/O   Final Result         1.  No evidence of metastatic disease to the cervical spine.      MR-LUMBAR SPINE-WITH & W/O   Final Result   Impression:      Multiple bony metastatic lesions as described above, most prominent along the inferior endplate of L1.      Degenerative moderate canal stenosis at L4-5 with cauda equina compression. Epidural thickening and enhancement is noted in this region with severe bilateral facet degeneration and enhancement along the bilateral facet joints. This is felt to be    degenerative in origin.      MR-THORACIC SPINE-WITH & W/O   Final Result          Multiple metastatic lesions of the thoracic spine as described above.      Pathologic compression fracture at T5 with 50% loss of height and posterior cortical retropulsion with circumferential epidural thickening representing epidural extension of metastatic disease. This causes severe canal stenosis with cord compression.      Metastatic disease also involves the posterior elements and spinous processes at T4, T5.      Pre and paravertebral metastatic soft tissue infiltration at T4-T5 noted.         MR-BRAIN-WITH & W/O   Final Result         Multiple nodular enhancing metastatic lesions involving the cranium as described above.      The largest lesion is in the right posterior temporal/parietal occipital region and demonstrates inner table cortical breakthrough with epidural extension and adjacent mild dural thickening and enhancement.      US-THORACENTESIS PUNCTURE RIGHT   Final Result   Addendum (preliminary) 1 of 1 12/27/2024 10:59 AM      HISTORY/REASON FOR EXAM:  Shortness of breath         TECHNIQUE/EXAM DESCRIPTION:   Ultrasound-guided thoracentesis.      Indication:  RIGHT pleural fluid collection.      COMPARISON:  None-however patient states history of right-sided    thoracentesis September 2024.      PROCEDURE:     Informed consent was obtained. A timeout was taken. A right    pleural effusion was localized with real-time ultrasound guidance. The    right posterior chest wall was prepped and draped in a sterile manner.    Following local anesthesia with 1%    lidocaine, and under live ultrasound guidance a 5 Irish Yueh pigtail    catheter was advanced into the pleural space with trocar technique and    pleural fluid was drained. The patient tolerated the procedure well    without evidence of complication. A post    thoracentesis chest radiograph is forthcoming.      FINDINGS:      Fluid was sent to the laboratory.      Fluid character: serosanguinous         1. Ultrasound guided right sided  therapeutic and diagnostic thoracentesis.      2. 1000 mL of fluid withdrawn.      Final      1. Ultrasound guided right sided therapeutic and diagnostic thoracentesis.      2. 1000 mL of fluid withdrawn.      DX-CHEST-PORTABLE (1 VIEW)   Final Result      1.  Small persistent right pleural effusion which is decreased since recent thoracentesis.      2.  Small to moderate left pleural effusion.      3.  Bilateral perihilar and left lower lobe atelectasis and/or pneumonitis.      4.  Increased interstitial markings throughout the lung fields consistent with parenchymal scarring, and/or pulmonary edema.      CT-LSPINE W/O PLUS RECONS   Final Result         1. Diffuse metastatic disease.   2. No definite pathologic fracture.      CT-TSPINE W/O PLUS RECONS   Final Result         1. Extensive metastatic disease.   2. There is new pathologic compression fracture deformity of T5.      CT-CHEST,ABDOMEN,PELVIS WITH   Final Result         1. New bilateral upper lobe nodular infiltrates.   2. Moderate-sized bilateral pleural effusions.   3. Interval worsening of hepatic metastatic disease.   4. New abdominal lymphadenopathy. Thoracic adenopathy is again noted.   5. Diffuse osseous metastatic disease. There is a new compression fracture of T5 as reported on the prior spine CT.      DX-CHEST-PORTABLE (1 VIEW)   Final Result      1.  Stable bibasilar atelectasis/consolidation and small bilateral pleural effusions.      2.  Stable cardiomegaly and interstitial edema.           Assessment/Plan  * Acute on chronic respiratory failure with hypoxia and hypercapnia (HCC)- (present on admission)  Assessment & Plan  intubated 1/4  extubated 1/5  re-intubated 1/8  Extubated 1/15  Patient is doing well while extubated.  He has been on 3 to 5 L nasal cannula since extubation  Extensive conversation had with patient and her family regarding her wishes.  She remains DNAR/I okay  We also discussed the likelihood of her needing a tracheostomy  if she got intubated again.  At this time, if she were to be intubated again she would like to go ahead with a tracheotomy if necessary to prolong her life.    Hyponatremia  Assessment & Plan  Na 129, cl 82  Likely hypotonia hyponatremia from dehydration and poor PO intake   Decreased lasix to 20 QD  Check serum osmolarity, urine osmolarity, and urine sodium  Goal correction 6 to 8 mEq      Anemia of chronic disease- (present on admission)  Assessment & Plan  Monitor, transfuse as indicated    Hypokalemia- (present on admission)  Assessment & Plan  Replaced, monitor    Restless leg syndrome- (present on admission)  Assessment & Plan  Requip     Debility- (present on admission)  Assessment & Plan  Very weak and currently cannot walk  SNF rejected her due to her acuity  Rehab would need her to be able to walk a few steps before accepting her    Pathological fracture of vertebra- (present on admission)  Assessment & Plan  Pathologic fracture of T5 with myelopathy on presentation  S/P T4-6 laminectomy with fusion and spinal decompression on 1/1/2025 by Dr. Lowery  multi-modal pain management  The patient with additional L4-5 lesion with cauda equina  Getting a radiation oncology opinion if the patient possibly could be undergoing palliative radiation hopefully to somewhat stabilize further  Fall Precautions  PT OT    Cancer related pain- (present on admission)  Assessment & Plan  Palliative Care following for pain management  Multimodal pain management  Consider palliative radiation    Bilateral pleural effusion- (present on admission)  Assessment & Plan  Malignant pleural effusions from metastatic breast cancer s/p thoracenteses and chest tubes  Diuresis as tolerated  monitor chest xrays  PleurX placement in IR 1/11 on the right, monitor and continue to drain  Discussed with IR and since pleural effusion is minimal and patient is hemodynamcally stable, plans is discontinue aspira drain.     1/22 aspira catheter  "removed per IR, monitor    Malignant neoplasm of breast (HCC)- (present on admission)  Assessment & Plan  \"Bilateral breast cancer with extensive destruction of the left breast and ulceration and skin lesions making her inoperable on the left.  Right breast shows a 3 x 3 cm mass.  PET scan March 2023 showed non-hypermetabolic pleural effusions which are cytology positive and she has demonstrable pleural masses. Started aromatase inhibition early September 2023.  Has not yet started CDK 4 6 inhibitor.  Lost to follow-up for over 6 months although apparently was on letrozole as single agent.  Now with clear progression of disease in the left breast.  August 2024: Progression of disease in liver with extensive disease.  Breast is worse.  Lung is better.  Lymph nodes are worse.  Status post radiation to symptomatic oozing and exophytic left breast mass.  Marked worsening of her cancer on letrozole with progression in the bone and liver.\"--Dr. Benitez  ongoing goals of care discussions. Patient is fighting to recover and discharge home so she can continue treatment for her cancer  DNR, I OK   Palliative following for pain management as well as further discussions.  Consulted with oncology, her primary oncology team, recommended hospice  The patient is not willing to engage in hospice at this time, she would like to get stronger and have more outpatient therapy  radiation oncology for possibility of spine radiation -would only take her if she can complete rehab successfully    1/22 Palliative care following, patient would like continued therapy, for skilled versus rehab  Patient with poor oral intake, dietary to consult for calorie count and recommendation for tube feeding  At this point patient is considering PEG tube feeding, if patient is not meeting caloric needs, does not want NG tube feed at this time    1/23 poor oral intake, pt considering peg tube vs ngt feeding, on megace  Per RN <10% oral intake  Schedule " zofran q6    Primary hypertension- (present on admission)  Assessment & Plan  Monitor, resume as indicated  PRN's available    Diabetes mellitus, type 2 (HCC)- (present on admission)  Assessment & Plan  A1c 6.4  accuchecks with SSI  CHO diet  glucose goal 140-180      VTE prophylaxis: xarelto    I have performed a physical exam and reviewed and updated ROS and Plan today (1/23/2025). In review of yesterday's note (1/22/2025), there are no changes except as documented above.    Patient has a high medical complexity, complex decision making and is at high risk of complication, morbidity, and mortality

## 2025-01-24 PROBLEM — E43 SEVERE PROTEIN-CALORIE MALNUTRITION (HCC): Status: ACTIVE | Noted: 2025-01-24

## 2025-01-24 LAB
ALBUMIN SERPL BCP-MCNC: 3.2 G/DL (ref 3.2–4.9)
ALBUMIN/GLOB SERPL: 0.9 G/DL
ALP SERPL-CCNC: 116 U/L (ref 30–99)
ALT SERPL-CCNC: 37 U/L (ref 2–50)
ANION GAP SERPL CALC-SCNC: 15 MMOL/L (ref 7–16)
AST SERPL-CCNC: 204 U/L (ref 12–45)
BASOPHILS # BLD AUTO: 0.4 % (ref 0–1.8)
BASOPHILS # BLD: 0.07 K/UL (ref 0–0.12)
BILIRUB SERPL-MCNC: 0.7 MG/DL (ref 0.1–1.5)
BUN SERPL-MCNC: 34 MG/DL (ref 8–22)
CALCIUM ALBUM COR SERPL-MCNC: 9.8 MG/DL (ref 8.5–10.5)
CALCIUM SERPL-MCNC: 9.2 MG/DL (ref 8.5–10.5)
CHLORIDE SERPL-SCNC: 84 MMOL/L (ref 96–112)
CO2 SERPL-SCNC: 34 MMOL/L (ref 20–33)
CREAT SERPL-MCNC: 0.75 MG/DL (ref 0.5–1.4)
EKG IMPRESSION: NORMAL
EOSINOPHIL # BLD AUTO: 0.11 K/UL (ref 0–0.51)
EOSINOPHIL NFR BLD: 0.6 % (ref 0–6.9)
ERYTHROCYTE [DISTWIDTH] IN BLOOD BY AUTOMATED COUNT: 50.1 FL (ref 35.9–50)
GFR SERPLBLD CREATININE-BSD FMLA CKD-EPI: 88 ML/MIN/1.73 M 2
GLOBULIN SER CALC-MCNC: 3.4 G/DL (ref 1.9–3.5)
GLUCOSE BLD STRIP.AUTO-MCNC: 115 MG/DL (ref 65–99)
GLUCOSE SERPL-MCNC: 142 MG/DL (ref 65–99)
HCT VFR BLD AUTO: 37.5 % (ref 37–47)
HGB BLD-MCNC: 12 G/DL (ref 12–16)
IMM GRANULOCYTES # BLD AUTO: 0.11 K/UL (ref 0–0.11)
IMM GRANULOCYTES NFR BLD AUTO: 0.6 % (ref 0–0.9)
LYMPHOCYTES # BLD AUTO: 0.95 K/UL (ref 1–4.8)
LYMPHOCYTES NFR BLD: 5.2 % (ref 22–41)
MAGNESIUM SERPL-MCNC: 2.1 MG/DL (ref 1.5–2.5)
MCH RBC QN AUTO: 28.8 PG (ref 27–33)
MCHC RBC AUTO-ENTMCNC: 32 G/DL (ref 32.2–35.5)
MCV RBC AUTO: 90.1 FL (ref 81.4–97.8)
MONOCYTES # BLD AUTO: 1.08 K/UL (ref 0–0.85)
MONOCYTES NFR BLD AUTO: 5.9 % (ref 0–13.4)
NEUTROPHILS # BLD AUTO: 16.07 K/UL (ref 1.82–7.42)
NEUTROPHILS NFR BLD: 87.3 % (ref 44–72)
NRBC # BLD AUTO: 0 K/UL
NRBC BLD-RTO: 0 /100 WBC (ref 0–0.2)
PHOSPHATE SERPL-MCNC: 3 MG/DL (ref 2.5–4.5)
PLATELET # BLD AUTO: 570 K/UL (ref 164–446)
PMV BLD AUTO: 9.3 FL (ref 9–12.9)
POTASSIUM SERPL-SCNC: 3.4 MMOL/L (ref 3.6–5.5)
PROT SERPL-MCNC: 6.6 G/DL (ref 6–8.2)
RBC # BLD AUTO: 4.16 M/UL (ref 4.2–5.4)
SODIUM SERPL-SCNC: 133 MMOL/L (ref 135–145)
WBC # BLD AUTO: 18.4 K/UL (ref 4.8–10.8)

## 2025-01-24 PROCEDURE — 51798 US URINE CAPACITY MEASURE: CPT

## 2025-01-24 PROCEDURE — 700111 HCHG RX REV CODE 636 W/ 250 OVERRIDE (IP): Mod: JZ | Performed by: STUDENT IN AN ORGANIZED HEALTH CARE EDUCATION/TRAINING PROGRAM

## 2025-01-24 PROCEDURE — 93010 ELECTROCARDIOGRAM REPORT: CPT | Performed by: INTERNAL MEDICINE

## 2025-01-24 PROCEDURE — 80053 COMPREHEN METABOLIC PANEL: CPT

## 2025-01-24 PROCEDURE — 700111 HCHG RX REV CODE 636 W/ 250 OVERRIDE (IP): Mod: JZ | Performed by: INTERNAL MEDICINE

## 2025-01-24 PROCEDURE — 700102 HCHG RX REV CODE 250 W/ 637 OVERRIDE(OP): Performed by: STUDENT IN AN ORGANIZED HEALTH CARE EDUCATION/TRAINING PROGRAM

## 2025-01-24 PROCEDURE — A9270 NON-COVERED ITEM OR SERVICE: HCPCS | Performed by: STUDENT IN AN ORGANIZED HEALTH CARE EDUCATION/TRAINING PROGRAM

## 2025-01-24 PROCEDURE — 700102 HCHG RX REV CODE 250 W/ 637 OVERRIDE(OP): Performed by: HOSPITALIST

## 2025-01-24 PROCEDURE — 84100 ASSAY OF PHOSPHORUS: CPT

## 2025-01-24 PROCEDURE — 770022 HCHG ROOM/CARE - ICU (200)

## 2025-01-24 PROCEDURE — 82962 GLUCOSE BLOOD TEST: CPT

## 2025-01-24 PROCEDURE — 85025 COMPLETE CBC W/AUTO DIFF WBC: CPT

## 2025-01-24 PROCEDURE — 700111 HCHG RX REV CODE 636 W/ 250 OVERRIDE (IP): Mod: JZ

## 2025-01-24 PROCEDURE — 83735 ASSAY OF MAGNESIUM: CPT

## 2025-01-24 PROCEDURE — 94640 AIRWAY INHALATION TREATMENT: CPT

## 2025-01-24 PROCEDURE — 99291 CRITICAL CARE FIRST HOUR: CPT | Performed by: INTERNAL MEDICINE

## 2025-01-24 PROCEDURE — 93005 ELECTROCARDIOGRAM TRACING: CPT | Mod: TC

## 2025-01-24 PROCEDURE — A9270 NON-COVERED ITEM OR SERVICE: HCPCS | Performed by: HOSPITALIST

## 2025-01-24 PROCEDURE — 700101 HCHG RX REV CODE 250: Performed by: INTERNAL MEDICINE

## 2025-01-24 RX ORDER — POTASSIUM CHLORIDE 7.45 MG/ML
10 INJECTION INTRAVENOUS
Status: DISPENSED | OUTPATIENT
Start: 2025-01-24 | End: 2025-01-24

## 2025-01-24 RX ORDER — POTASSIUM CHLORIDE 1500 MG/1
40 TABLET, EXTENDED RELEASE ORAL 3 TIMES DAILY
Status: DISCONTINUED | OUTPATIENT
Start: 2025-01-24 | End: 2025-01-24

## 2025-01-24 RX ADMIN — MEGESTROL ACETATE 20 MG: 20 TABLET ORAL at 17:10

## 2025-01-24 RX ADMIN — ROPINIROLE HYDROCHLORIDE 1 MG: 0.5 TABLET, FILM COATED ORAL at 03:45

## 2025-01-24 RX ADMIN — Medication 5 MG: at 21:07

## 2025-01-24 RX ADMIN — THERA TABS 1 TABLET: TAB at 05:10

## 2025-01-24 RX ADMIN — RIVAROXABAN 10 MG: 10 TABLET, FILM COATED ORAL at 17:05

## 2025-01-24 RX ADMIN — POTASSIUM CHLORIDE 10 MEQ: 10 INJECTION, SOLUTION INTRAVENOUS at 09:52

## 2025-01-24 RX ADMIN — ONDANSETRON 4 MG: 2 INJECTION INTRAMUSCULAR; INTRAVENOUS at 00:13

## 2025-01-24 RX ADMIN — ROPINIROLE HYDROCHLORIDE 1 MG: 0.5 TABLET, FILM COATED ORAL at 20:28

## 2025-01-24 RX ADMIN — HYDROCODONE BITARTRATE AND ACETAMINOPHEN 1 TABLET: 10; 325 TABLET ORAL at 03:44

## 2025-01-24 RX ADMIN — ONDANSETRON 4 MG: 2 INJECTION INTRAMUSCULAR; INTRAVENOUS at 19:57

## 2025-01-24 RX ADMIN — POTASSIUM CHLORIDE 10 MEQ: 10 INJECTION, SOLUTION INTRAVENOUS at 13:51

## 2025-01-24 RX ADMIN — ONDANSETRON 4 MG: 2 INJECTION INTRAMUSCULAR; INTRAVENOUS at 17:04

## 2025-01-24 RX ADMIN — MEGESTROL ACETATE 20 MG: 20 TABLET ORAL at 09:58

## 2025-01-24 RX ADMIN — ONDANSETRON 4 MG: 2 INJECTION INTRAMUSCULAR; INTRAVENOUS at 11:29

## 2025-01-24 RX ADMIN — FOLIC ACID 1 MG: 1 TABLET ORAL at 05:10

## 2025-01-24 RX ADMIN — ONDANSETRON 4 MG: 2 INJECTION INTRAMUSCULAR; INTRAVENOUS at 05:10

## 2025-01-24 RX ADMIN — POTASSIUM CHLORIDE 10 MEQ: 10 INJECTION, SOLUTION INTRAVENOUS at 11:29

## 2025-01-24 RX ADMIN — FUROSEMIDE 20 MG: 10 INJECTION, SOLUTION INTRAVENOUS at 05:10

## 2025-01-24 RX ADMIN — Medication 100 MG: at 05:10

## 2025-01-24 ASSESSMENT — PAIN DESCRIPTION - PAIN TYPE
TYPE: ACUTE PAIN

## 2025-01-24 ASSESSMENT — FIBROSIS 4 INDEX: FIB4 SCORE: 3.82

## 2025-01-24 NOTE — CARE PLAN
The patient is Watcher - Medium risk of patient condition declining or worsening    Shift Goals  Clinical Goals: Monitor Respiratory Status  Patient Goals: Rest  Family Goals: Rest    Progress made toward(s) clinical / shift goals:    Problem: Fall Risk  Goal: Patient will remain free from falls  Outcome: Progressing     Problem: Pain - Standard  Goal: Alleviation of pain or a reduction in pain to the patient’s comfort goal  Outcome: Progressing     Problem: Respiratory  Goal: Patient will achieve/maintain optimum respiratory ventilation and gas exchange  Outcome: Progressing       Patient is not progressing towards the following goals:

## 2025-01-24 NOTE — CONSULTS
"Critical Care History & Physical    Date of consult: 01/23/25    Referring Physician  Cait Nj D.O.    Reason for Consultation  Chief Complaint   Patient presents with    Shortness of Breath     Pt c/o shortness of breath over the past couple of days. Denies new fever.  Pt wears 1.5L NC at baseline.  Pt has hx breast cancer with mets to the lungs. Currently taking PO chemotherapy pills.     Extremity Weakness     Pt states she has progressively been feeling weaker over the past couple of days to that point where she cannot ambulate.     Abdominal Pain     Pt also c/o epigastric pain and nausea.        History of Presenting Illness  64 y.o. female with a history of metastatic breast cancer with mets to her lungs, spine, liver, pleura and brain.  She has been followed by oncology but per their last note \"I expect a response rate is probably less than 10% and her prognosis is very poor.  Medically she is a futile case.\"  She has been in the hospital for the past month and has had an unfortunately complicated course.  Due to recurring pleural effusions she had a Pleurx catheter placed, however she requested that this be removed yesterday (1/22) despite assurances that her pleural effusions would continue to grow and she will require thoracenteses she still requested its removal.  She has had multiple chest tubes for pneumothoraces, 2 separate intubations and underwent a T4-6 laminectomy due to pathologic fractures secondary to metastatic lesions.    Multiple goals of care conversation have been had over the course of her hospitalization and palliative care has been following.  She is currently DNR/I okay and has been consistent in her desire to keep fighting.  Unfortunately, she is not a candidate for palliative chemo at this time given her deconditioned status, and as above, oncology has been recommending hospice, to which she has been resistant.  ICU was reconsulted on 1/23 given worsening respiratory failure and " "lethargy.  Will move her back to ICU for HFNC and close monitoring for intubation.      Code Status  DNAR, I OK    Review of Systems  Review of Systems   Unable to perform ROS: Critical illness       Past Medical History   has a past medical history of Anxiety, Breast cancer metastasized to bone, unspecified laterality (HCC) (12/25/2024), Depression, Diabetes (HCC), HLD (hyperlipidemia), HTN (hypertension), and Hypothyroidism.    Surgical History   has a past surgical history that includes other; cholecystectomy; thoracic laminectomy (1/1/2025); and fusion, spine, thoracic approach (1/1/2025).    Family History  Reviewed and not pertinent    Social History   reports that she has never smoked. She has never used smokeless tobacco. She reports current alcohol use. She reports current drug use. Drug: Marijuana.    Medications  Home Medications       Reviewed by Marleny Turcios R.N. (Registered Nurse) on 01/01/25 at 1515  Med List Status: Complete     Medication Last Dose Status   Abemaciclib 150 MG Tab 12/25/2024 Active   carvedilol (COREG) 3.125 MG Tab 12/25/2024 Active   HYDROcodone-acetaminophen (NORCO) 5-325 MG Tab per tablet 12/25/2024 Active   letrozole (FEMARA) 2.5 MG Tab New Rx Active   losartan (COZAAR) 100 MG Tab 12/25/2024 Active                  Audit from Redirected Encounters    **Home medications have not yet been reviewed for this encounter**         Allergies  Allergies   Allergen Reactions    Demerol Rash     \"was told this during surgery\"  Other reaction(s): rash         Vital Signs last 24 hours  Temp:  [35.6 °C (96 °F)-36.5 °C (97.7 °F)] 35.6 °C (96 °F)  Pulse:  [59-93] 93  Resp:  [16-19] 19  BP: (106-138)/(56-83) 106/56  SpO2:  [88 %-95 %] 95 %      Physical Exam   Physical Exam  Vitals and nursing note reviewed. Exam conducted with a chaperone present.   Constitutional:       General: She is not in acute distress.     Appearance: Normal appearance. She is ill-appearing.      Comments: She " does arouse to voice   HENT:      Head: Normocephalic.      Mouth/Throat:      Mouth: Mucous membranes are moist.   Eyes:      Extraocular Movements: Extraocular movements intact.   Cardiovascular:      Rate and Rhythm: Normal rate and regular rhythm.      Pulses: Normal pulses.   Pulmonary:      Effort: Tachypnea present. No respiratory distress.      Breath sounds: Rhonchi present.   Abdominal:      General: There is no distension.      Palpations: Abdomen is soft.      Tenderness: There is no abdominal tenderness. There is no guarding or rebound.   Musculoskeletal:         General: Normal range of motion.      Cervical back: Normal range of motion and neck supple.   Skin:     General: Skin is warm and dry.      Capillary Refill: Capillary refill takes less than 2 seconds.   Neurological:      General: No focal deficit present.      Mental Status: She is oriented to person, place, and time. Mental status is at baseline. She is lethargic.           Fluids  No intake or output data in the 24 hours ending 01/23/25 2237        Laboratory  Recent Results (from the past 48 hours)   CBC WITHOUT DIFFERENTIAL    Collection Time: 01/22/25  3:45 AM   Result Value Ref Range    WBC 16.6 (H) 4.8 - 10.8 K/uL    RBC 4.01 (L) 4.20 - 5.40 M/uL    Hemoglobin 11.4 (L) 12.0 - 16.0 g/dL    Hematocrit 35.9 (L) 37.0 - 47.0 %    MCV 89.5 81.4 - 97.8 fL    MCH 28.4 27.0 - 33.0 pg    MCHC 31.8 (L) 32.2 - 35.5 g/dL    RDW 50.6 (H) 35.9 - 50.0 fL    Platelet Count 582 (H) 164 - 446 K/uL    MPV 9.3 9.0 - 12.9 fL   Basic Metabolic Panel    Collection Time: 01/22/25  3:45 AM   Result Value Ref Range    Sodium 132 (L) 135 - 145 mmol/L    Potassium 2.8 (L) 3.6 - 5.5 mmol/L    Chloride 82 (L) 96 - 112 mmol/L    Co2 39 (H) 20 - 33 mmol/L    Glucose 117 (H) 65 - 99 mg/dL    Bun 34 (H) 8 - 22 mg/dL    Creatinine 0.68 0.50 - 1.40 mg/dL    Calcium 9.3 8.5 - 10.5 mg/dL    Anion Gap 11.0 7.0 - 16.0   ESTIMATED GFR    Collection Time: 01/22/25  3:45 AM    Result Value Ref Range    GFR (CKD-EPI) 96 >60 mL/min/1.73 m 2   POCT glucose device results    Collection Time: 01/22/25  7:54 AM   Result Value Ref Range    POC Glucose, Blood 124 (H) 65 - 99 mg/dL   POCT glucose device results    Collection Time: 01/22/25  1:17 PM   Result Value Ref Range    POC Glucose, Blood 109 (H) 65 - 99 mg/dL   POCT glucose device results    Collection Time: 01/22/25  5:20 PM   Result Value Ref Range    POC Glucose, Blood 120 (H) 65 - 99 mg/dL   POCT glucose device results    Collection Time: 01/22/25  9:58 PM   Result Value Ref Range    POC Glucose, Blood 122 (H) 65 - 99 mg/dL   POCT glucose device results    Collection Time: 01/23/25  7:51 AM   Result Value Ref Range    POC Glucose, Blood 131 (H) 65 - 99 mg/dL   POCT glucose device results    Collection Time: 01/23/25 11:51 AM   Result Value Ref Range    POC Glucose, Blood 124 (H) 65 - 99 mg/dL   POCT glucose device results    Collection Time: 01/23/25  5:29 PM   Result Value Ref Range    POC Glucose, Blood 136 (H) 65 - 99 mg/dL   POCT arterial blood gas device results    Collection Time: 01/23/25  6:53 PM   Result Value Ref Range    Ph 7.490 (H) 7.350 - 7.450    Pco2 59.1 (HH) 32.0 - 48.0 mmHg    Po2 52 (L) 83 - 108 mmHg    Tco2 47 (HH) 32 - 48 mmol/L    S02 87 (L) 93 - 99 %    Hco3 45.0 (H) 21.0 - 28.0 mmol/L    BE 18 (H) -4 - 3 mmol/L    Body Temp 96.7 F degrees    Ph Temp Martina 7.506 (H) 7.350 - 7.450    Pco2 Temp Co 56.4 (HH) 32.0 - 48.0 mmHg    Po2 Temp Cor 48 (LL) 83 - 108 mmHg    Specimen Arterial     Dieter Test Pass     DelSys Other     LPM 15 lpm   POCT lactate device results    Collection Time: 01/23/25  6:53 PM   Result Value Ref Range    iStat Lactate 1.6 0.5 - 2.0 mmol/L   POCT glucose device results    Collection Time: 01/23/25  8:39 PM   Result Value Ref Range    POC Glucose, Blood 122 (H) 65 - 99 mg/dL         Imaging  DX-CHEST-PORTABLE (1 VIEW)   Final Result      1.  Mild worsening hypoinflation.   2.  No other  significant change from prior exam.         DX-CHEST-PORTABLE (1 VIEW)   Final Result      1.  Interval removal of right-sided tunneled pleural catheter. No evidence of pneumothorax.   2.  Stable small bilateral pleural effusions.   3.  Increased patchy bilateral interstitial and airspace opacities.      IR-LUNG & PLEURAL CATH REMOVAL   Final Result      Successful removal of tunneled pleural catheter as described.         DX-CHEST-PORTABLE (1 VIEW)   Final Result         1.  Pulmonary edema and/or infiltrates are identified, which are stable since the prior exam.   2.  Small bilateral pleural effusions, increased on the right since prior study   3.  Cardiomegaly      DX-CHEST-PORTABLE (1 VIEW)   Final Result         1.  Pulmonary edema and/or infiltrates are identified, which are stable since the prior exam.   2.  Small left pleural effusion   3.  Cardiomegaly      DX-CHEST-PORTABLE (1 VIEW)   Final Result         1. Small right apical pneumothorax now visible. Right chest tube unchanged.      2. Persistent infiltrates and small effusions.                     DX-CHEST-PORTABLE (1 VIEW)   Final Result      No significant change from prior exam.      DX-CHEST-PORTABLE (1 VIEW)   Final Result         1.  Pulmonary edema and/or infiltrates are identified, which are stable since the prior exam.   2.  Cardiomegaly      DX-CHEST-PORTABLE (1 VIEW)   Final Result      1.  Interval improvement of pulmonary vascular congestion and pulmonary edema.   2.  No other significant change from prior exam.         DX-CHEST-PORTABLE (1 VIEW)   Final Result         1.  Pulmonary edema and/or infiltrates are identified, which appear somewhat increased since the prior exam.   2.  Layering left pleural effusion, stable   3.  Cardiomegaly      DX-CHEST-PORTABLE (1 VIEW)   Final Result         1.  Pulmonary edema and/or infiltrates are identified, which are stable since the prior exam.   2.  Layering left pleural effusion, stable       DX-CHEST-PORTABLE (1 VIEW)   Final Result         1.  Pulmonary edema and/or infiltrates are identified, which are stable since the prior exam.   2.  Layering left pleural effusion, stable      DX-CHEST-PORTABLE (1 VIEW)   Final Result         1.  Pulmonary edema and/or infiltrates are identified, which are stable since the prior exam.   2.  Trace bilateral pleural effusions, stable      DX-CHEST-PORTABLE (1 VIEW)   Final Result         1.  Pulmonary edema and/or infiltrates are identified, which are stable since the prior exam.   2.  Small left pleural effusion, stable      DX-CHEST-PORTABLE (1 VIEW)   Final Result      1.  Support equipment is unchanged.      2.  Cardiomegaly with moderate perihilar pulmonary edema unchanged from previous exam.      3.  Small bilateral pleural effusions left greater than right.      DX-CHEST-PORTABLE (1 VIEW)   Final Result      1.  Tubes and lines unchanged in position.      2.  Marked bilateral perihilar pulmonary edema which has improved slightly since previous exam.      3.  Small bilateral pleural effusions left greater than right.      IR-INSERT PLEURAL CATH W/ CUFF   Final Result      1.  Ultrasound and fluoroscopic guided placement of a RIGHT HEMITHORAX PleurX (Aspira type) tunneled pleural drainage catheter.   2.  The catheter can be used immediately with drainage of pleural effusion as needed for comfort, as per package insert instructions.   3.  If the catheter becomes dislodged, do not reinsert it.  Place sterile dressing over the entry wound site.  May consult primary care physician or oncologist as to whether there is clinical indication for PleurX replacement.      DX-CHEST-PORTABLE (1 VIEW)   Final Result      1.  Tubes and lines unchanged in position.      2.   Extensive bilateral perihilar airspace opacities consistent with pneumonitis and/or pulmonary edema.      3.  Mild to moderate bilateral pleural effusions right greater than left      DX-CHEST-PORTABLE (1  VIEW)   Final Result         1. Increasing moderate right and small left pleural effusions. No change edema or infiltrates.      2. ETT 5 cm above masoud. NG tube in stomach. Right central line.                     DX-CHEST-PORTABLE (1 VIEW)   Final Result      Stable diffuse bilateral pulmonary infiltrates.      DX-ABDOMEN FOR TUBE PLACEMENT   Final Result      NG tube tip overlies the gastric body.      DX-CHEST-PORTABLE (1 VIEW)   Final Result      1.  Supportive tubing as described above.   2.  Apparent increasing RIGHT pleural fluid, possibly due to change in position.   3.  Improvement of LEFT lung base consolidation.   4.  No other significant change from prior exam.         DX-CHEST-PORTABLE (1 VIEW)   Final Result         1. Stable right IJ central catheter. Right chest tube has been removed. No pneumothorax.   2. Unchanged bilateral pulmonary opacities and associated pleural effusions.         DX-CHEST-PORTABLE (1 VIEW)   Final Result      1.  Unchanged bilateral pulmonary opacities. Stable small bilateral pleural effusions.   2.  Right pigtail catheter remains. No pneumothorax.      DX-CHEST-PORTABLE (1 VIEW)   Final Result      Removal of large bore right chest tube with stable right pigtail chest tube. Questionable tiny right apical pneumothorax.      DX-CHEST-PORTABLE (1 VIEW)   Final Result      No significant interval change.      DX-CHEST-PORTABLE (1 VIEW)   Final Result      1.  Persistent bilateral pulmonary opacities, stable to slightly worse, although changes may be related to differences in imaging technique. No large pleural effusions.   2.  Well-positioned right IJ central catheter. No pneumothorax.   3.  Stable other lines and tubes.      DX-CHEST-LIMITED (1 VIEW)   Final Result         1.  No change in 2 separate right-sided chest tubes.      2.  No pneumothorax identified.      3.  Bilateral pulmonary opacifications again identified which are slightly less prominent.      DX-CHEST-PORTABLE  (1 VIEW)   Final Result         1. No change mild infiltrates.      2. Right chest tubes. No pneumothorax.      3. ETT out. NG tube and jugular line remain.                     DX-CHEST-PORTABLE (1 VIEW)   Final Result         1. No significant interval change.      DX-CHEST-LIMITED (1 VIEW)   Final Result         1.  Findings on chest radiograph appear stable since the prior radiograph.  No new abnormalities are identified.      2.  2 separate right-sided chest tubes are again identified with no change in position.      3.  No pneumothorax identified.      DX-CHEST-LIMITED (1 VIEW)   Final Result         1.  No pneumothorax identified.      2.  2 right-sided chest tubes again identified.      CT-CTA CHEST PULMONARY ARTERY W/ RECONS   Final Result         1. No pulmonary embolus.      2. Medial right lung base consolidation felt to represent complete collapse of the right middle lobe. Mild to moderate mid and upper lung pneumonia infiltrates remain.      3. Right chest tubes. Small right pneumothorax and small right effusion. Improving small 2.5 cm left pleural effusion since 12/25/2024.      4. Extensive metastatic lesions including adenopathy, liver lesions, right breast nodule and bone lesions. Thoracic spine fusion hardware remains.                  CT-HEAD W/O   Final Result         1. No acute process in the brain evident.      2. Fluid in sphenoid sinus.      3. Right convexity midthoracic scoliosis and unchanged from 12/31/2024 MRI.               DX-ABDOMEN FOR TUBE PLACEMENT   Final Result         1. NG tube in stomach.                     DX-CHEST-FOR LINE PLACEMENT Perform procedure in: Patient's Room   Final Result         1. Small right apical pneumothorax remains. 2 right chest tubes.      2. ETT 3 cm above masoud. NG tube in stomach. Right jugular line in SVC. No complication evident.      3. Mild infiltrates and small right effusion.                  DX-CHEST-LIMITED (1 VIEW)   Final Result          1. 2nd right chest tube placed with near complete evacuation of right pneumothorax.      2. Mild infiltrates, basilar atelectasis and small right effusion remain.      3. Endotracheal tube tip at about T5 and about 2 cm above the masoud. The masoud and tip of the endotracheal tube are difficult to visualize in part because of overlying thoracic spine fusion hardware.                  DX-CHEST-PORTABLE (1 VIEW)   Final Result         Enlarging right pneumothorax with collapsing of the right lung.      Preliminary findings texted to Dr. STANLEY FISHER in the Emergency Department via Voalte on 1/4/2025 1:50 AM         DX-CHEST-LIMITED (1 VIEW)   Final Result         Interval placement of right pigtail chest tube. Small right residual pneumothorax.      DX-CHEST-PORTABLE (1 VIEW)   Final Result   Addendum (preliminary) 1 of 1      CRITICAL RESULT READ BACK: Preliminary findings discussed with and    critical read back performed by Dr. Downs via telephone on 1/3/2025 8:03    PM      Final      1.  Interval worsening in right pneumothorax, now moderate.   2.  Persistent interstitial and alveolar pulmonary opacities bilaterally. Possible bilateral pleural effusions, right greater than left.            DX-CHEST-PORTABLE (1 VIEW)   Final Result         1. Slightly decreased right pneumothorax.   2. Stable bilateral interstitial and airspace opacification.   3. Stable right pleural effusion.      DX-CHEST-PORTABLE (1 VIEW)   Final Result         1. Left pleural effusion has decreased in size status post thoracentesis. There is no left-sided pneumothorax.   2. There is persistent right basilar opacity with a new right apical pneumothorax.      Findings were communicated to the ordering provider at the time of dictation via Voalte.      US-THORACENTESIS PUNCTURE LEFT   Final Result      1. Ultrasound guided left sided therapeutic thoracentesis.      2. 950 mL of fluid withdrawn.      DX-CHEST-PORTABLE (1 VIEW)   Final  Result      1. New postsurgical changes in the upper thoracic spine.   2. Worsening bilateral pleural effusions, pulmonary vascular congestion and interstitial edema.      DX-PORTABLE FLUORO > 1 HOUR   Final Result      Portable fluoroscopy utilized for 1 minute 19 seconds.      INTERPRETING LOCATION: [address], [place], [address]      DX-THORACIC SPINE-2 VIEWS   Final Result      Digitized intraoperative radiograph is submitted for review. This examination is not for diagnostic purpose but for guidance during a surgical procedure. Please see the patient's chart for full procedural details.         INTERPRETING LOCATION: [address], [place], [address]      MR-CERVICAL SPINE-WITH & W/O   Final Result         1.  No evidence of metastatic disease to the cervical spine.      MR-LUMBAR SPINE-WITH & W/O   Final Result   Impression:      Multiple bony metastatic lesions as described above, most prominent along the inferior endplate of L1.      Degenerative moderate canal stenosis at L4-5 with cauda equina compression. Epidural thickening and enhancement is noted in this region with severe bilateral facet degeneration and enhancement along the bilateral facet joints. This is felt to be    degenerative in origin.      MR-THORACIC SPINE-WITH & W/O   Final Result         Multiple metastatic lesions of the thoracic spine as described above.      Pathologic compression fracture at T5 with 50% loss of height and posterior cortical retropulsion with circumferential epidural thickening representing epidural extension of metastatic disease. This causes severe canal stenosis with cord compression.      Metastatic disease also involves the posterior elements and spinous processes at T4, T5.      Pre and paravertebral metastatic soft tissue infiltration at T4-T5 noted.         MR-BRAIN-WITH & W/O   Final Result         Multiple nodular enhancing metastatic lesions involving the cranium as described above.      The largest lesion is in  the right posterior temporal/parietal occipital region and demonstrates inner table cortical breakthrough with epidural extension and adjacent mild dural thickening and enhancement.      US-THORACENTESIS PUNCTURE RIGHT   Final Result   Addendum (preliminary) 1 of 1 12/27/2024 10:59 AM      HISTORY/REASON FOR EXAM:  Shortness of breath         TECHNIQUE/EXAM DESCRIPTION:   Ultrasound-guided thoracentesis.      Indication:  RIGHT pleural fluid collection.      COMPARISON:  None-however patient states history of right-sided    thoracentesis September 2024.      PROCEDURE:     Informed consent was obtained. A timeout was taken. A right    pleural effusion was localized with real-time ultrasound guidance. The    right posterior chest wall was prepped and draped in a sterile manner.    Following local anesthesia with 1%    lidocaine, and under live ultrasound guidance a 5 Frisian Yueh pigtail    catheter was advanced into the pleural space with trocar technique and    pleural fluid was drained. The patient tolerated the procedure well    without evidence of complication. A post    thoracentesis chest radiograph is forthcoming.      FINDINGS:      Fluid was sent to the laboratory.      Fluid character: serosanguinous         1. Ultrasound guided right sided therapeutic and diagnostic thoracentesis.      2. 1000 mL of fluid withdrawn.      Final      1. Ultrasound guided right sided therapeutic and diagnostic thoracentesis.      2. 1000 mL of fluid withdrawn.      DX-CHEST-PORTABLE (1 VIEW)   Final Result      1.  Small persistent right pleural effusion which is decreased since recent thoracentesis.      2.  Small to moderate left pleural effusion.      3.  Bilateral perihilar and left lower lobe atelectasis and/or pneumonitis.      4.  Increased interstitial markings throughout the lung fields consistent with parenchymal scarring, and/or pulmonary edema.      CT-LSPINE W/O PLUS RECONS   Final Result         1. Diffuse  metastatic disease.   2. No definite pathologic fracture.      CT-TSPINE W/O PLUS RECONS   Final Result         1. Extensive metastatic disease.   2. There is new pathologic compression fracture deformity of T5.      CT-CHEST,ABDOMEN,PELVIS WITH   Final Result         1. New bilateral upper lobe nodular infiltrates.   2. Moderate-sized bilateral pleural effusions.   3. Interval worsening of hepatic metastatic disease.   4. New abdominal lymphadenopathy. Thoracic adenopathy is again noted.   5. Diffuse osseous metastatic disease. There is a new compression fracture of T5 as reported on the prior spine CT.      DX-CHEST-PORTABLE (1 VIEW)   Final Result      1.  Stable bibasilar atelectasis/consolidation and small bilateral pleural effusions.      2.  Stable cardiomegaly and interstitial edema.            Assessment/Plan  * Acute on chronic respiratory failure with hypoxia and hypercapnia (HCC)- (present on admission)  Assessment & Plan  intubated 1/4 - 1/5  extubated 1/5 - 1/8    We will do our best to avoid intubation though she is at high risk.  She is currently tolerating HFNC fairly well.      Ongoing diuresis  She will require thoracentesis at some point in the near future now that she requested her Pleurx to be removed    Bilateral pleural effusion- (present on admission)  Assessment & Plan  She requested Pleurx removed on 1/22  These effusions are going to recur and will eventually need thoracentesis again  CXR reviewed, no emergent need for thora kianna    Malignant neoplasm of breast (HCC)- (present on admission)  Assessment & Plan  Per oncology's last note they are recommending hospice and consider her case to be futile    She is too deconditioned to even undergo palliative chemotherapy.  That being said, she has been consistent with her goals, she does not want to go on hospice and thus has been in the hospital trying to get stronger to see if she could undergo chemotherapy again.       Hyponatremia  Assessment & Plan  Mild  Trend    Anemia of chronic disease- (present on admission)  Assessment & Plan  Conservative transfusion strategy    Hypokalemia- (present on admission)  Assessment & Plan  Check BMP daily  Goal is 4  Replete as necessary     Restless leg syndrome- (present on admission)  Assessment & Plan  Requip     Pathological fracture of vertebra- (present on admission)  Assessment & Plan  Pathologic fracture of T5 with myelopathy on presentation  S/P T4-6 laminectomy with fusion and spinal decompression on 1/1/2025 by Dr. Lowery  multi-modal pain management  Fall Precautions  PT OT    Cancer related pain- (present on admission)  Assessment & Plan  She is growing lethargic  Will reevaluate pain control as needed    Primary hypertension- (present on admission)  Assessment & Plan  Hold home meds for the time being    Diabetes mellitus, type 2 (HCC)- (present on admission)  Assessment & Plan  A1c 6.4  accuchecks with SSI        DVT prophylaxis: Xarelto 10mg qd  PUD prophylaxis: NA  Glycemic control: SSI  Nutrition: CHO  Lines: NA  Mojica: Will place     Discussed patient condition and risk of morbidity and/or mortality with Hospitalist, RN, RT, Pharmacy, Code status disscussed, Charge nurse / hot rounds, and Patient.      The patient remains critically ill.  Critical care time = 69 minutes in directly providing and coordinating critical care and extensive data review.  No time overlap and excludes procedures.

## 2025-01-24 NOTE — DISCHARGE PLANNING
Case Management Discharge Planning    Admission Date: 12/25/2024  GMLOS: 10.1  ALOS: 30    6-Clicks ADL Score: 16  6-Clicks Mobility Score: 11    Anticipated Discharge Dispo: Discharge Disposition: D/T to SNF with Medicare cert in anticipation of skilled care (03)    This RN CM completed chart review and patient was higher leveled to the ICU over night related to increased O2 demand and ABG results. Patient on HFNC this morning.     Care discussion completed with Dr Hopper and 2 daughters. Dr Hopper wanted to discuss patient prognosis with family to ensure their understanding of medical condition.     Plan for Dr Hopper to follow up with oncologist and speak with patient again regarding code status.     Code status updated as DNR/DNI, TARC canceled family and patient okay to proceed with plan for hospice.

## 2025-01-24 NOTE — PROGRESS NOTES
4 Eyes Skin Assessment Completed by RIYA Lopez and RIYA Thomas.    Head WDL  Ears WDL  Nose WDL  Mouth WDL  Neck Redness  Breast/Chest Redness  Shoulder Blades Redness  Spine Redness, Spinal fusion scar (discolored)  (R) Arm/Elbow/Hand Redness and Blanching  (L) Arm/Elbow/Hand Redness and Blanching  Abdomen Redness and Scar, old thora and chest tube scars, one stitch. [place].  Groin Redness, discoloration, fragile, moisture associated redness, yeast-like  Scrotum/Coccyx/Buttocks Redness, Blanching, and Excoriation  (R) Leg Discoloration  (L) Leg Discoloration  (R) Heel/Foot/Toe Redness, Blanching, and Boggy, Small scab on R bunion  (L) Heel/Foot/Toe Redness and Blanching          Devices In Places ECG, Blood Pressure Cuff, Pulse Ox, SCD's, and HFNC      Interventions In Place Sacral Mepilex, TAP System, Pillows, Elbow Mepilex, Q2 Turns, Low Air Loss Mattress, Heels Loaded W/Pillows, and Pressure Redistribution Mattress    Possible Skin Injury No    Pictures Uploaded Into Epic Yes  Wound Consult Placed No  RN Wound Prevention Protocol Ordered Yes

## 2025-01-24 NOTE — PROGRESS NOTES
Pulm/cc    2:30 pm addendum     Called to pt's room  Pt with her daughter Jane present expressed no intubation  She also expressed that she understands tat her cancer is progressive and no option for treatment  Code status changed to DNR/DNI  Will d/w pt re: hospice  TARC cancelled    3:30 pm addendum  Further discussion with pt  Supportive care   OK to eat as she wishes  Hospice requested with pt's permission

## 2025-01-24 NOTE — THERAPY
Speech Language Therapy Contact Note    Patient Name: Asha Hannon  Age:  64 y.o., Sex:  female  Medical Record #: [id number]  Today's Date: 1/24/2025 01/24/25 1025   Treatment Variance   Reason For Missed Therapy Medical - Other (Please Comment)   Interdisciplinary Plan of Care Collaboration   IDT Collaboration with  Nursing   Collaboration Comments Attempted to see pt for dysphagia management and determine appropriateness for a repeat diagnostic swallow study. Per EMR, pt transferred to higher level of care overnight d/t worsening respiratory status. Pt now on HFNC. Per discussion with RN, pt refused breakfast this morning and is scheduled to have a GOC discussion. SLP to hold and follow as pt is medically appropriate and as aligned with GOC.

## 2025-01-24 NOTE — PROGRESS NOTES
Pt with 2 hypoxia events this evening.     1545 Pt desaturated to 84% on 3L, RT at bedside. Lungs diminished, no wheezing or crackles noted. Pt placed on 15L NRB, with Spo2 90%. CXR done, one time lasix ordered.  Pt able to wean to 5L oxymask after 1 hr.    1820 Pt again desaturated to 84% on 5 L oxymask. Placed on NRB. RT and Dr. Nj notified. ABG obtained by RT. Plan for Dr. Edgar to evaluate for IMCU

## 2025-01-24 NOTE — CARE PLAN
Problem: Hyperinflation  Goal: Prevent or improve atelectasis  Description: 1. Instruct incentive spirometry usage  2.  Perform hyperinflation therapy as indicated  Outcome: Not Progressing     QID PEP  Increase of Oxygen delivery

## 2025-01-24 NOTE — PROGRESS NOTES
[facility] Progress Note      Admit Date: 12/25/2024    Resident(s): Joseluis Collins D.O.   Attending:  ALESIA MAGAÑA/ Dr. Hopper     Patient ID:    Name:  Asha Gregory   YOB: 1960  Age:  64 y.o.  female   MRN:  [id number]    Hospital Course (carried forward and updated):  Asha Hannon is a 64 y.o. female with PMH most notable for metastatic breast cancer to lungs, spine, liver, pleura, and brain as well as chronic pleural effusions who was admitted 12/25/24 with shortness of breath. Patient has had a long hospital course with multiple intubations and chest tubes status post T4-6 laminectomy related to epidural metastases resulting in spinal cord compression. Due to recurring pleural effusions a pleurx catheter was placed, however she adamantly wanted it removed 1/22 despite assurances that her pleural effusions would increase without it. Unfortunately patient is medically futile but has continued to request full care. Multiple goals of care conversations have occurred during the course of the hospitalization and palliative care is onboard. Unfortunately, she is not a candidate for palliative chemo due to her deconditioning and oncology has been recommending hospice. She was transferred to ICU 1/23 with worsening respiratory failure and lethargy.     Consultants:  Critical Care  Hematology/Oncology   Palliative   PM&R     Interval Events:  This morning pt seen at bedside, She appeared ill, malnourished, and frail. She shook her head no when asked about any pain. GOC discussions ongoing.       Vitals Range last 24h:  Temp:  [35.6 °C (96 °F)-36.5 °C (97.7 °F)] 36.1 °C (97 °F)  Pulse:  [59-93] 80  Resp:  [15-62] 21  BP: ()/(52-83) 98/57  SpO2:  [87 %-99 %] 89 %      Intake/Output Summary (Last 24 hours) at 1/24/2025 0704  Last data filed at 1/24/2025 0600  Gross per 24 hour   Intake --   Output 800 ml   Net -800 ml        Review of Systems   Unable to perform ROS: Critical  illness       PHYSICAL EXAM:  Vitals:    01/24/25 0400 01/24/25 0500 01/24/25 0600 01/24/25 0637   BP: 131/64 119/58 98/57    Pulse: 85 78 80 80   Resp: (!) 36 18 (!) 62 (!) 21   Temp: 36.1 °C (97 °F)      TempSrc: Temporal      SpO2: 96% 92% 98% 89%   Weight:       Height:        Body mass index is 26.69 kg/m².    O2 therapy: Pulse Oximetry: 89 %, O2 (LPM): 60, O2 Delivery Device: Heated High Flow Nasal Cannula         Physical Exam  Constitutional:       General: She is not in acute distress.     Appearance: She is ill-appearing.      Comments: Overall appears malnourished and frail.    HENT:      Mouth/Throat:      Mouth: Mucous membranes are dry.      Pharynx: Oropharynx is clear.   Cardiovascular:      Rate and Rhythm: Normal rate and regular rhythm.      Heart sounds: No murmur heard.     Comments: Difficult to auscultate over lung sounds.   Pulmonary:      Effort: No respiratory distress.      Breath sounds: Wheezing present. No rales.      Comments: On HFNC  Abdominal:      General: Abdomen is flat. There is no distension.      Palpations: Abdomen is soft.      Tenderness: There is no abdominal tenderness. There is no guarding or rebound.   Skin:     General: Skin is warm and dry.      Coloration: Skin is pale.   Neurological:      Mental Status: She is alert and oriented to person, place, and time.         Recent Labs     01/23/25  1853   ISTATAPH 7.490*   ISTATAPCO2 59.1*   ISTATAPO2 52*   ISTATATCO2 47*   JLCJEOP8CYJ 87*   ISTATARTHCO3 45.0*   ISTATARTBE 18*   ISTATTEMP 96.7 F   ISTATSPEC Arterial   ISTATAPHTC 7.506*   [id number] 48*     Recent Labs     01/22/25  0345 01/24/25  0435   SODIUM 132* 133*   POTASSIUM 2.8* 3.4*   CHLORIDE 82* 84*   CO2 39* 34*   BUN 34* 34*   CREATININE 0.68 0.75   MAGNESIUM  --  2.1   PHOSPHORUS  --  3.0   CALCIUM 9.3 9.2     Recent Labs     01/22/25  0345 01/24/25  0435   ALTSGPT  --  37   ASTSGOT  --  204*   ALKPHOSPHAT  --  116*   TBILIRUBIN  --  0.7   GLUCOSE 117* 142*      Recent Labs     01/22/25  0345 01/24/25  0435   RBC 4.01* 4.16*   HEMOGLOBIN 11.4* 12.0   HEMATOCRIT 35.9* 37.5   PLATELETCT 582* 570*     Recent Labs     01/22/25  0345 01/24/25  0435   WBC 16.6* 18.4*   NEUTSPOLYS  --  87.30*   LYMPHOCYTES  --  5.20*   MONOCYTES  --  5.90   EOSINOPHILS  --  0.60   BASOPHILS  --  0.40   ASTSGOT  --  204*   ALTSGPT  --  37   ALKPHOSPHAT  --  116*   TBILIRUBIN  --  0.7       Meds:   ondansetron  4 mg      megestrol  20 mg      furosemide  20 mg      thiamine  100 mg      folic acid  1 mg      multivitamin  1 Tablet      Fleet Enema  1 Enema      HYDROcodone/acetaminophen  1 Tablet      insulin lispro  2-9 Units      And    dextrose bolus  25 g      melatonin  5 mg      rivaroxaban  10 mg      ROPINIRole  1 mg      senna-docusate  2 Tablet      And    polyethylene glycol/lytes  1 Packet      And    magnesium hydroxide  30 mL      And    bisacodyl  10 mg      acetaminophen  650 mg      calcium carbonate  500 mg      methocarbamol  750 mg      metoclopramide  10 mg      ondansetron  4 mg      ROPINIRole  1 mg      LORazepam  0.26 mg      prochlorperazine  10 mg      Respiratory Therapy Consult        labetalol  10 mg      ipratropium-albuterol  3 mL      sodium chloride  2 Spray      MD ALERT...DO NOT ADMINISTER NSAIDS or ASPIRIN unless ORDERED By Neurosurgery  1 Each      ondansetron  4 mg          Procedures:  1/1/25 thoracic laminectomy and fusion with Dr. Lowery   1/3/25 chest tube insertion with Dr. Shi   1/4/25 Emergent Intubation with Dr. Avalos   1/4/25 BAL with Dr. Avalos   1/4/25 Thoracostomy with Dr. Avalos   1/4/25 Art line insertion with Aidee Hicks   1/4/25 Central Line insertion with Aidee Hicks   1/8/25 Intubation with Dr. Baca   1/9/25 Bronchoscopy with Dr. Preston   1/11/25 R chest tube placement with Dr. Gaming     Imaging:  DX-CHEST-PORTABLE (1 VIEW)   Final Result      1.  Mild worsening hypoinflation.   2.  No other significant change  from prior exam.         DX-CHEST-PORTABLE (1 VIEW)   Final Result      1.  Interval removal of right-sided tunneled pleural catheter. No evidence of pneumothorax.   2.  Stable small bilateral pleural effusions.   3.  Increased patchy bilateral interstitial and airspace opacities.      IR-LUNG & PLEURAL CATH REMOVAL   Final Result      Successful removal of tunneled pleural catheter as described.         DX-CHEST-PORTABLE (1 VIEW)   Final Result         1.  Pulmonary edema and/or infiltrates are identified, which are stable since the prior exam.   2.  Small bilateral pleural effusions, increased on the right since prior study   3.  Cardiomegaly      DX-CHEST-PORTABLE (1 VIEW)   Final Result         1.  Pulmonary edema and/or infiltrates are identified, which are stable since the prior exam.   2.  Small left pleural effusion   3.  Cardiomegaly      DX-CHEST-PORTABLE (1 VIEW)   Final Result         1. Small right apical pneumothorax now visible. Right chest tube unchanged.      2. Persistent infiltrates and small effusions.                     DX-CHEST-PORTABLE (1 VIEW)   Final Result      No significant change from prior exam.      DX-CHEST-PORTABLE (1 VIEW)   Final Result         1.  Pulmonary edema and/or infiltrates are identified, which are stable since the prior exam.   2.  Cardiomegaly      DX-CHEST-PORTABLE (1 VIEW)   Final Result      1.  Interval improvement of pulmonary vascular congestion and pulmonary edema.   2.  No other significant change from prior exam.         DX-CHEST-PORTABLE (1 VIEW)   Final Result         1.  Pulmonary edema and/or infiltrates are identified, which appear somewhat increased since the prior exam.   2.  Layering left pleural effusion, stable   3.  Cardiomegaly      DX-CHEST-PORTABLE (1 VIEW)   Final Result         1.  Pulmonary edema and/or infiltrates are identified, which are stable since the prior exam.   2.  Layering left pleural effusion, stable      DX-CHEST-PORTABLE (1  VIEW)   Final Result         1.  Pulmonary edema and/or infiltrates are identified, which are stable since the prior exam.   2.  Layering left pleural effusion, stable      DX-CHEST-PORTABLE (1 VIEW)   Final Result         1.  Pulmonary edema and/or infiltrates are identified, which are stable since the prior exam.   2.  Trace bilateral pleural effusions, stable      DX-CHEST-PORTABLE (1 VIEW)   Final Result         1.  Pulmonary edema and/or infiltrates are identified, which are stable since the prior exam.   2.  Small left pleural effusion, stable      DX-CHEST-PORTABLE (1 VIEW)   Final Result      1.  Support equipment is unchanged.      2.  Cardiomegaly with moderate perihilar pulmonary edema unchanged from previous exam.      3.  Small bilateral pleural effusions left greater than right.      DX-CHEST-PORTABLE (1 VIEW)   Final Result      1.  Tubes and lines unchanged in position.      2.  Marked bilateral perihilar pulmonary edema which has improved slightly since previous exam.      3.  Small bilateral pleural effusions left greater than right.      IR-INSERT PLEURAL CATH W/ CUFF   Final Result      1.  Ultrasound and fluoroscopic guided placement of a RIGHT HEMITHORAX PleurX (Aspira type) tunneled pleural drainage catheter.   2.  The catheter can be used immediately with drainage of pleural effusion as needed for comfort, as per package insert instructions.   3.  If the catheter becomes dislodged, do not reinsert it.  Place sterile dressing over the entry wound site.  May consult primary care physician or oncologist as to whether there is clinical indication for PleurX replacement.      DX-CHEST-PORTABLE (1 VIEW)   Final Result      1.  Tubes and lines unchanged in position.      2.   Extensive bilateral perihilar airspace opacities consistent with pneumonitis and/or pulmonary edema.      3.  Mild to moderate bilateral pleural effusions right greater than left      DX-CHEST-PORTABLE (1 VIEW)   Final Result          1. Increasing moderate right and small left pleural effusions. No change edema or infiltrates.      2. ETT 5 cm above masoud. NG tube in stomach. Right central line.                     DX-CHEST-PORTABLE (1 VIEW)   Final Result      Stable diffuse bilateral pulmonary infiltrates.      DX-ABDOMEN FOR TUBE PLACEMENT   Final Result      NG tube tip overlies the gastric body.      DX-CHEST-PORTABLE (1 VIEW)   Final Result      1.  Supportive tubing as described above.   2.  Apparent increasing RIGHT pleural fluid, possibly due to change in position.   3.  Improvement of LEFT lung base consolidation.   4.  No other significant change from prior exam.         DX-CHEST-PORTABLE (1 VIEW)   Final Result         1. Stable right IJ central catheter. Right chest tube has been removed. No pneumothorax.   2. Unchanged bilateral pulmonary opacities and associated pleural effusions.         DX-CHEST-PORTABLE (1 VIEW)   Final Result      1.  Unchanged bilateral pulmonary opacities. Stable small bilateral pleural effusions.   2.  Right pigtail catheter remains. No pneumothorax.      DX-CHEST-PORTABLE (1 VIEW)   Final Result      Removal of large bore right chest tube with stable right pigtail chest tube. Questionable tiny right apical pneumothorax.      DX-CHEST-PORTABLE (1 VIEW)   Final Result      No significant interval change.      DX-CHEST-PORTABLE (1 VIEW)   Final Result      1.  Persistent bilateral pulmonary opacities, stable to slightly worse, although changes may be related to differences in imaging technique. No large pleural effusions.   2.  Well-positioned right IJ central catheter. No pneumothorax.   3.  Stable other lines and tubes.      DX-CHEST-LIMITED (1 VIEW)   Final Result         1.  No change in 2 separate right-sided chest tubes.      2.  No pneumothorax identified.      3.  Bilateral pulmonary opacifications again identified which are slightly less prominent.      DX-CHEST-PORTABLE (1 VIEW)   Final  Result         1. No change mild infiltrates.      2. Right chest tubes. No pneumothorax.      3. ETT out. NG tube and jugular line remain.                     DX-CHEST-PORTABLE (1 VIEW)   Final Result         1. No significant interval change.      DX-CHEST-LIMITED (1 VIEW)   Final Result         1.  Findings on chest radiograph appear stable since the prior radiograph.  No new abnormalities are identified.      2.  2 separate right-sided chest tubes are again identified with no change in position.      3.  No pneumothorax identified.      DX-CHEST-LIMITED (1 VIEW)   Final Result         1.  No pneumothorax identified.      2.  2 right-sided chest tubes again identified.      CT-CTA CHEST PULMONARY ARTERY W/ RECONS   Final Result         1. No pulmonary embolus.      2. Medial right lung base consolidation felt to represent complete collapse of the right middle lobe. Mild to moderate mid and upper lung pneumonia infiltrates remain.      3. Right chest tubes. Small right pneumothorax and small right effusion. Improving small 2.5 cm left pleural effusion since 12/25/2024.      4. Extensive metastatic lesions including adenopathy, liver lesions, right breast nodule and bone lesions. Thoracic spine fusion hardware remains.                  CT-HEAD W/O   Final Result         1. No acute process in the brain evident.      2. Fluid in sphenoid sinus.      3. Right convexity midthoracic scoliosis and unchanged from 12/31/2024 MRI.               DX-ABDOMEN FOR TUBE PLACEMENT   Final Result         1. NG tube in stomach.                     DX-CHEST-FOR LINE PLACEMENT Perform procedure in: Patient's Room   Final Result         1. Small right apical pneumothorax remains. 2 right chest tubes.      2. ETT 3 cm above masoud. NG tube in stomach. Right jugular line in SVC. No complication evident.      3. Mild infiltrates and small right effusion.                  DX-CHEST-LIMITED (1 VIEW)   Final Result         1. 2nd right chest  tube placed with near complete evacuation of right pneumothorax.      2. Mild infiltrates, basilar atelectasis and small right effusion remain.      3. Endotracheal tube tip at about T5 and about 2 cm above the masoud. The masoud and tip of the endotracheal tube are difficult to visualize in part because of overlying thoracic spine fusion hardware.                  DX-CHEST-PORTABLE (1 VIEW)   Final Result         Enlarging right pneumothorax with collapsing of the right lung.      Preliminary findings texted to Dr. STANLEY FISHER in the Emergency Department via Voalte on 1/4/2025 1:50 AM         DX-CHEST-LIMITED (1 VIEW)   Final Result         Interval placement of right pigtail chest tube. Small right residual pneumothorax.      DX-CHEST-PORTABLE (1 VIEW)   Final Result   Addendum (preliminary) 1 of 1      CRITICAL RESULT READ BACK: Preliminary findings discussed with and    critical read back performed by Dr. Downs via telephone on 1/3/2025 8:03    PM      Final      1.  Interval worsening in right pneumothorax, now moderate.   2.  Persistent interstitial and alveolar pulmonary opacities bilaterally. Possible bilateral pleural effusions, right greater than left.            DX-CHEST-PORTABLE (1 VIEW)   Final Result         1. Slightly decreased right pneumothorax.   2. Stable bilateral interstitial and airspace opacification.   3. Stable right pleural effusion.      DX-CHEST-PORTABLE (1 VIEW)   Final Result         1. Left pleural effusion has decreased in size status post thoracentesis. There is no left-sided pneumothorax.   2. There is persistent right basilar opacity with a new right apical pneumothorax.      Findings were communicated to the ordering provider at the time of dictation via Voalte.      US-THORACENTESIS PUNCTURE LEFT   Final Result      1. Ultrasound guided left sided therapeutic thoracentesis.      2. 950 mL of fluid withdrawn.      DX-CHEST-PORTABLE (1 VIEW)   Final Result      1. New  postsurgical changes in the upper thoracic spine.   2. Worsening bilateral pleural effusions, pulmonary vascular congestion and interstitial edema.      DX-PORTABLE FLUORO > 1 HOUR   Final Result      Portable fluoroscopy utilized for 1 minute 19 seconds.      INTERPRETING LOCATION: [address], [place], [address]      DX-THORACIC SPINE-2 VIEWS   Final Result      Digitized intraoperative radiograph is submitted for review. This examination is not for diagnostic purpose but for guidance during a surgical procedure. Please see the patient's chart for full procedural details.         INTERPRETING LOCATION: [address], [place], [address]      MR-CERVICAL SPINE-WITH & W/O   Final Result         1.  No evidence of metastatic disease to the cervical spine.      MR-LUMBAR SPINE-WITH & W/O   Final Result   Impression:      Multiple bony metastatic lesions as described above, most prominent along the inferior endplate of L1.      Degenerative moderate canal stenosis at L4-5 with cauda equina compression. Epidural thickening and enhancement is noted in this region with severe bilateral facet degeneration and enhancement along the bilateral facet joints. This is felt to be    degenerative in origin.      MR-THORACIC SPINE-WITH & W/O   Final Result         Multiple metastatic lesions of the thoracic spine as described above.      Pathologic compression fracture at T5 with 50% loss of height and posterior cortical retropulsion with circumferential epidural thickening representing epidural extension of metastatic disease. This causes severe canal stenosis with cord compression.      Metastatic disease also involves the posterior elements and spinous processes at T4, T5.      Pre and paravertebral metastatic soft tissue infiltration at T4-T5 noted.         MR-BRAIN-WITH & W/O   Final Result         Multiple nodular enhancing metastatic lesions involving the cranium as described above.      The largest lesion is in the right posterior  temporal/parietal occipital region and demonstrates inner table cortical breakthrough with epidural extension and adjacent mild dural thickening and enhancement.      US-THORACENTESIS PUNCTURE RIGHT   Final Result   Addendum (preliminary) 1 of 1 12/27/2024 10:59 AM      HISTORY/REASON FOR EXAM:  Shortness of breath         TECHNIQUE/EXAM DESCRIPTION:   Ultrasound-guided thoracentesis.      Indication:  RIGHT pleural fluid collection.      COMPARISON:  None-however patient states history of right-sided    thoracentesis September 2024.      PROCEDURE:     Informed consent was obtained. A timeout was taken. A right    pleural effusion was localized with real-time ultrasound guidance. The    right posterior chest wall was prepped and draped in a sterile manner.    Following local anesthesia with 1%    lidocaine, and under live ultrasound guidance a 5 Upper sorbian Yueh pigtail    catheter was advanced into the pleural space with trocar technique and    pleural fluid was drained. The patient tolerated the procedure well    without evidence of complication. A post    thoracentesis chest radiograph is forthcoming.      FINDINGS:      Fluid was sent to the laboratory.      Fluid character: serosanguinous         1. Ultrasound guided right sided therapeutic and diagnostic thoracentesis.      2. 1000 mL of fluid withdrawn.      Final      1. Ultrasound guided right sided therapeutic and diagnostic thoracentesis.      2. 1000 mL of fluid withdrawn.      DX-CHEST-PORTABLE (1 VIEW)   Final Result      1.  Small persistent right pleural effusion which is decreased since recent thoracentesis.      2.  Small to moderate left pleural effusion.      3.  Bilateral perihilar and left lower lobe atelectasis and/or pneumonitis.      4.  Increased interstitial markings throughout the lung fields consistent with parenchymal scarring, and/or pulmonary edema.      CT-LSPINE W/O PLUS RECONS   Final Result         1. Diffuse metastatic disease.    2. No definite pathologic fracture.      CT-TSPINE W/O PLUS RECONS   Final Result         1. Extensive metastatic disease.   2. There is new pathologic compression fracture deformity of T5.      CT-CHEST,ABDOMEN,PELVIS WITH   Final Result         1. New bilateral upper lobe nodular infiltrates.   2. Moderate-sized bilateral pleural effusions.   3. Interval worsening of hepatic metastatic disease.   4. New abdominal lymphadenopathy. Thoracic adenopathy is again noted.   5. Diffuse osseous metastatic disease. There is a new compression fracture of T5 as reported on the prior spine CT.      DX-CHEST-PORTABLE (1 VIEW)   Final Result      1.  Stable bibasilar atelectasis/consolidation and small bilateral pleural effusions.      2.  Stable cardiomegaly and interstitial edema.          ASSESSEMENT and PLAN:    * Acute on chronic respiratory failure with hypoxia and hypercapnia (HCC)- (present on admission)  Assessment & Plan  Respiratory failure in setting of recurrent pleural effusions and pulmonary edema. Intubated twice so far this admission (1/4, 1/8).   -Monitoring for need for repeat thora after Pleurx removed.   -ongoing goals of care discussions.      Bilateral pleural effusion- (present on admission)  Assessment & Plan  Pleurx placed 1/11/25. However, despite recommendations, she requested Pleurx removed on 1/22/25 despite assurances that her pleural effusion would return and require more thoras.   -monitor for now - will need repeat thoras in the future.      Malignant neoplasm of breast (HCC)- (present on admission)  Assessment & Plan  Per oncology's last note they are recommending hospice and consider her case to be futile. She is too deconditioned to even undergo palliative chemotherapy.   -ongoing goals of care discussions.     Cancer related pain- (present on admission)  Assessment & Plan  Multimodal pain management   -PRN Tylenol, PRN Norco   -Palliative care team onboard, appreciate recommendations.      Severe protein-calorie malnutrition (HCC)  Assessment & Plan  patient has lost >25% of her body weight in past 3 months. Meets criteria for severe protein calorie malnutrition.   -Nutrition onboard   -ongoing goals of care    Pathological fracture of vertebra- (present on admission)  Assessment & Plan  Pathologic fracture of T5 with myelopathy when initially presented. S/p T4-6 laminectomy with fusion and spinal decompression on 1/1/2025 by Dr. Lowery.   -multi-modal pain management  -Fall Precautions  -PT OT    Debility- (present on admission)  Assessment & Plan  Very weak and unable to walk. SNF rejected her due to her acuity. Rehab would need her to be able to walk a few steps before accepting her.   -PT/OT      Hyponatremia  Assessment & Plan  Mild.   -Trend     Anemia of chronic disease- (present on admission)  Assessment & Plan  Now WNL.      Hypokalemia- (present on admission)  Assessment & Plan  Goal K of 4.   -Trend and replete.      Restless leg syndrome- (present on admission)  Assessment & Plan  -continue home ropinirole.       Primary hypertension- (present on admission)  Assessment & Plan  -holding home meds in setting hypotension      Diabetes mellitus, type 2 (HCC)- (present on admission)  Assessment & Plan  A1C of 6.4  -ISS not needed.       DISPO: ICU pending goals of care.     CODE STATUS: DNR/ I Okay     Quality Measures:  Feeding: Soft and bite sized   Analgesia: tylenol, norco   Sedation: Ativan PRN   Thromboprophylaxis: Xarelto   Head of bed: >30 degrees  Ulcer prophylaxis: n/a   Glycemic control: n/a   Bowel care: bowel regimen  Indwelling lines: pIV x1   Deescalation of antibiotics: n/a       Joseluis Collins D.O.  IM PGY-3

## 2025-01-24 NOTE — THERAPY
Physical Therapy Contact Note    Patient Name: Asha Hannon  Age:  64 y.o., Sex:  female  Medical Record #: [id number]  Today's Date: 1/24/2025 01/24/25 0832   Treatment Variance   Reason For Missed Therapy Medical - Patient Is Not Medically Stable   Interdisciplinary Plan of Care Collaboration   IDT Collaboration with  Nursing   Collaboration Comments PT to hold session today.  Per nurse, pt is on max high flow and sat'ing in the 80s.  PT will follow up at the next available date.   Session Information   Date / Session Number  1/23 -6 (2/4, 1/26) PTA/1

## 2025-01-24 NOTE — CARE PLAN
Problem: Hyperinflation  Goal: Prevent or improve atelectasis  Description: 1. Instruct incentive spirometry usage  2.  Perform hyperinflation therapy as indicated  Outcome: Not Met       60L/100% HHFNC

## 2025-01-25 ENCOUNTER — HOME CARE VISIT (OUTPATIENT)
Dept: HOSPICE | Facility: HOSPICE | Age: 65
End: 2025-01-25

## 2025-01-25 PROBLEM — E87.1 HYPONATREMIA: Status: RESOLVED | Noted: 2025-01-21 | Resolved: 2025-01-25

## 2025-01-25 LAB
ANION GAP SERPL CALC-SCNC: 10 MMOL/L (ref 7–16)
BUN SERPL-MCNC: 31 MG/DL (ref 8–22)
CALCIUM SERPL-MCNC: 8.9 MG/DL (ref 8.5–10.5)
CHLORIDE SERPL-SCNC: 89 MMOL/L (ref 96–112)
CO2 SERPL-SCNC: 36 MMOL/L (ref 20–33)
CREAT SERPL-MCNC: 0.7 MG/DL (ref 0.5–1.4)
GFR SERPLBLD CREATININE-BSD FMLA CKD-EPI: 96 ML/MIN/1.73 M 2
GLUCOSE SERPL-MCNC: 117 MG/DL (ref 65–99)
POTASSIUM SERPL-SCNC: 3.4 MMOL/L (ref 3.6–5.5)
SODIUM SERPL-SCNC: 135 MMOL/L (ref 135–145)

## 2025-01-25 PROCEDURE — 99233 SBSQ HOSP IP/OBS HIGH 50: CPT | Performed by: INTERNAL MEDICINE

## 2025-01-25 PROCEDURE — 700111 HCHG RX REV CODE 636 W/ 250 OVERRIDE (IP): Mod: JZ

## 2025-01-25 PROCEDURE — 700101 HCHG RX REV CODE 250: Performed by: INTERNAL MEDICINE

## 2025-01-25 PROCEDURE — 51798 US URINE CAPACITY MEASURE: CPT

## 2025-01-25 PROCEDURE — 700102 HCHG RX REV CODE 250 W/ 637 OVERRIDE(OP): Performed by: HOSPITALIST

## 2025-01-25 PROCEDURE — 700111 HCHG RX REV CODE 636 W/ 250 OVERRIDE (IP): Mod: JZ | Performed by: STUDENT IN AN ORGANIZED HEALTH CARE EDUCATION/TRAINING PROGRAM

## 2025-01-25 PROCEDURE — A9270 NON-COVERED ITEM OR SERVICE: HCPCS | Performed by: HOSPITALIST

## 2025-01-25 PROCEDURE — 770022 HCHG ROOM/CARE - ICU (200)

## 2025-01-25 PROCEDURE — 700111 HCHG RX REV CODE 636 W/ 250 OVERRIDE (IP): Mod: JZ | Performed by: INTERNAL MEDICINE

## 2025-01-25 PROCEDURE — A9270 NON-COVERED ITEM OR SERVICE: HCPCS | Performed by: INTERNAL MEDICINE

## 2025-01-25 PROCEDURE — 700102 HCHG RX REV CODE 250 W/ 637 OVERRIDE(OP): Performed by: INTERNAL MEDICINE

## 2025-01-25 PROCEDURE — 80048 BASIC METABOLIC PNL TOTAL CA: CPT

## 2025-01-25 RX ORDER — ALUMINA, MAGNESIA, AND SIMETHICONE 2400; 2400; 240 MG/30ML; MG/30ML; MG/30ML
10 SUSPENSION ORAL 4 TIMES DAILY PRN
Status: DISCONTINUED | OUTPATIENT
Start: 2025-01-25 | End: 2025-01-26

## 2025-01-25 RX ADMIN — ONDANSETRON 4 MG: 2 INJECTION INTRAMUSCULAR; INTRAVENOUS at 18:10

## 2025-01-25 RX ADMIN — ONDANSETRON 4 MG: 2 INJECTION INTRAMUSCULAR; INTRAVENOUS at 06:30

## 2025-01-25 RX ADMIN — ALUMINUM HYDROXIDE, MAGNESIUM HYDROXIDE, AND DIMETHICONE 10 ML: 400; 400; 40 SUSPENSION ORAL at 18:10

## 2025-01-25 RX ADMIN — FUROSEMIDE 20 MG: 10 INJECTION, SOLUTION INTRAVENOUS at 06:30

## 2025-01-25 RX ADMIN — ROPINIROLE HYDROCHLORIDE 1 MG: 0.5 TABLET, FILM COATED ORAL at 21:47

## 2025-01-25 RX ADMIN — MEGESTROL ACETATE 20 MG: 20 TABLET ORAL at 18:09

## 2025-01-25 RX ADMIN — ONDANSETRON 4 MG: 2 INJECTION INTRAMUSCULAR; INTRAVENOUS at 23:56

## 2025-01-25 RX ADMIN — ONDANSETRON 4 MG: 2 INJECTION INTRAMUSCULAR; INTRAVENOUS at 00:06

## 2025-01-25 RX ADMIN — ROPINIROLE HYDROCHLORIDE 1 MG: 0.5 TABLET, FILM COATED ORAL at 12:30

## 2025-01-25 RX ADMIN — RIVAROXABAN 10 MG: 10 TABLET, FILM COATED ORAL at 18:10

## 2025-01-25 RX ADMIN — ONDANSETRON 4 MG: 2 INJECTION INTRAMUSCULAR; INTRAVENOUS at 09:10

## 2025-01-25 ASSESSMENT — ENCOUNTER SYMPTOMS
PSYCHIATRIC NEGATIVE: 1
EYES NEGATIVE: 1
WEAKNESS: 1
GASTROINTESTINAL NEGATIVE: 1
SHORTNESS OF BREATH: 1
CARDIOVASCULAR NEGATIVE: 1
RESPIRATORY NEGATIVE: 1
MUSCULOSKELETAL NEGATIVE: 1

## 2025-01-25 ASSESSMENT — PAIN DESCRIPTION - PAIN TYPE
TYPE: ACUTE PAIN

## 2025-01-25 ASSESSMENT — ACTIVITIES OF DAILY LIVING (ADL)
BATHING_REQUIRES_ASSISTANCE: 1
CONTINENCE_REQUIRES_ASSISTANCE: 1
DRESSING_REQUIRES_ASSISTANCE: 1
PHYSICAL_TRANSFER_REQUIRES_ASSISTANCE: 1
AMBULATION_REQUIRES_ASSISTANCE: 1

## 2025-01-25 NOTE — CARE PLAN
The patient is Watcher - Medium risk of patient condition declining or worsening    Shift Goals  Clinical Goals: spO2 >90%, pain management , comfort  Patient Goals: rest, comfort, pain control  Family Goals: Visitation, updates    Progress made toward(s) clinical / shift goals:    Problem: Knowledge Deficit - Standard  Goal: Patient and family/care givers will demonstrate understanding of plan of care, disease process/condition, diagnostic tests and medications  Outcome: Progressing     Problem: Skin Integrity  Goal: Skin integrity is maintained or improved  Outcome: Progressing     Problem: Fall Risk  Goal: Patient will remain free from falls  Outcome: Progressing     Problem: Pain - Standard  Goal: Alleviation of pain or a reduction in pain to the patient’s comfort goal  Outcome: Progressing     Problem: Hemodynamics  Goal: Patient's hemodynamics, fluid balance and neurologic status will be stable or improve  Outcome: Progressing     Problem: Fluid Volume  Goal: Fluid volume balance will be maintained  Outcome: Progressing     Problem: Respiratory  Goal: Patient will achieve/maintain optimum respiratory ventilation and gas exchange  Outcome: Progressing     Problem: Respiratory:  Goal: Ability to achieve and maintain a regular respiratory rate will improve  Outcome: Progressing  Goal: Ability to maintain a clear airway will improve  Outcome: Progressing     Problem: Knowledge Deficit:  Goal: Knowledge of diagnostic tests will improve  Outcome: Progressing  Goal: Knowledge of the prescribed therapeutic regimen will improve  Outcome: Progressing       Patient is not progressing towards the following goals:

## 2025-01-25 NOTE — CARE PLAN
The patient is Stable - Low risk of patient condition declining or worsening    Shift Goals  Clinical Goals: SpO2 > 90%, pain control  Patient Goals: rest, comfort,pain control  Family Goals: Visitation, updates    Progress made toward(s) clinical / shift goals:      Problem: Knowledge Deficit - Standard  Goal: Patient and family/care givers will demonstrate understanding of plan of care, disease process/condition, diagnostic tests and medications  Outcome: Progressing     Problem: Skin Integrity  Goal: Skin integrity is maintained or improved  Outcome: Progressing     Problem: Pain - Standard  Goal: Alleviation of pain or a reduction in pain to the patient’s comfort goal  Outcome: Progressing     Problem: Hemodynamics  Goal: Patient's hemodynamics, fluid balance and neurologic status will be stable or improve  Outcome: Progressing     Problem: Respiratory:  Goal: Ability to achieve and maintain a regular respiratory rate will improve  Outcome: Progressing       Patient is not progressing towards the following goals:

## 2025-01-25 NOTE — CASE COMMUNICATION
- Level of Care evaluation: Visit made and spoke with RIYA Red. He noted that per Dr. Hopper, [facility] is now home with hospice. Met with patient and [place] and verified this. Per patient, “We want some information first would like my other daughter, Jane be present for the discussion. They added that Jane won't be at bedside until later today. Informed them that this RN will call Jane and set up a time for a  discussion. Updated RIYA Rde. Called Jane and left a detailed message, awaiting call back.   - Update: Called Jane after a couple of hours and it went to voicemail. Another message left.

## 2025-01-25 NOTE — HOSPICE
[facility] referral/consult response    Is this patient accepted to [facility]?: Yes  What hospice level of care?: Routine/Community  Approved by provider: Dr. Esqueda    Anticipated DC date: TBD  DC Barriers: Hospice discussion, consents, DMEs  Additional Information:  Level of Care evaluation. Per discussion with Dr. Esqueda, patient does not qualify for GIP level of care but qualifies for home hospice at this time.  Visit made and spoke with RIYA Red. He noted that per Dr. Hopper, [facility] is now home with hospice. Met with patient and her daughter (from [place] and verified this. Per patient, “We want some information first would like my other daughter, Jane be present for the discussion. They added that Jane won't be at bedside until later today. Informed them that this RN will call Jane and set up a time for a discussion. Updated RIYA Red. Called Jane and left a detailed message, awaiting call back.   Update: Called Jane after a couple of hours and it went to voicemail. Another message left.     (1500) Message received from Teofilo that Jane is now at bedside. ETA provided. Hospice discussion ensued. Patient and daughter will still be thinking about. In case they choose the hospice route, patient will first discharge to her son's home before going back home with Jane as they would need to make some arrangements/changes in her current home. Jane knows to call us once she has decided. Continue to follow.

## 2025-01-25 NOTE — DISCHARGE PLANNING
Referral Management Team    Hospice referral received in [facility].     Patient has been assigned to complex discharge team.     Spoke to [facility]. Palliative asked for level of care evaluation if possible Saturday.     Message sent to Nilda Gupta CM RICU and Alec Gaviria  complex discharge    Alec is no longer on voalte.     Referral sent to [facility] hospice for level of care evaluation tomorrow.     Referral Management team to sign off at this time. Please reach out if assistance is needed.

## 2025-01-26 PROBLEM — R19.8 ABNORMAL BOWEL MOVEMENT: Status: RESOLVED | Noted: 2023-05-30 | Resolved: 2025-01-26

## 2025-01-26 PROBLEM — R53.81 DEBILITY: Status: RESOLVED | Noted: 2024-12-26 | Resolved: 2025-01-26

## 2025-01-26 LAB
ARTERIAL PATENCY WRIST A: ABNORMAL
BASE EXCESS BLDA CALC-SCNC: 18 MMOL/L (ref -4–3)
BODY TEMPERATURE: ABNORMAL DEGREES
CO2 BLDA-SCNC: 49 MMOL/L (ref 32–48)
DELSYS IDSYS: ABNORMAL
HCO3 BLDA-SCNC: 46.5 MMOL/L (ref 21–28)
HOROWITZ INDEX BLDA+IHG-RTO: 47 MM[HG]
LACTATE BLD-SCNC: 1.3 MMOL/L (ref 0.5–2)
LPM ILPM: 60 LPM
O2/TOTAL GAS SETTING VFR VENT: 100 %
PCO2 BLDA: 70.8 MMHG (ref 32–48)
PCO2 TEMP ADJ BLDA: 68.3 MMHG (ref 32–48)
PH BLDA: 7.42 [PH] (ref 7.35–7.45)
PH TEMP ADJ BLDA: 7.44 [PH] (ref 7.35–7.45)
PO2 BLDA: 47 MMHG (ref 83–108)
PO2 TEMP ADJ BLDA: 44 MMHG (ref 83–108)
SAO2 % BLDA: 81 % (ref 93–99)
SPECIMEN DRAWN FROM PATIENT: ABNORMAL

## 2025-01-26 PROCEDURE — A9270 NON-COVERED ITEM OR SERVICE: HCPCS | Performed by: HOSPITALIST

## 2025-01-26 PROCEDURE — 700111 HCHG RX REV CODE 636 W/ 250 OVERRIDE (IP): Mod: JZ

## 2025-01-26 PROCEDURE — 99233 SBSQ HOSP IP/OBS HIGH 50: CPT | Performed by: HOSPITALIST

## 2025-01-26 PROCEDURE — 99233 SBSQ HOSP IP/OBS HIGH 50: CPT | Performed by: INTERNAL MEDICINE

## 2025-01-26 PROCEDURE — 700111 HCHG RX REV CODE 636 W/ 250 OVERRIDE (IP): Mod: JZ | Performed by: STUDENT IN AN ORGANIZED HEALTH CARE EDUCATION/TRAINING PROGRAM

## 2025-01-26 PROCEDURE — 700102 HCHG RX REV CODE 250 W/ 637 OVERRIDE(OP): Performed by: HOSPITALIST

## 2025-01-26 PROCEDURE — 700101 HCHG RX REV CODE 250: Performed by: INTERNAL MEDICINE

## 2025-01-26 PROCEDURE — 770000 HCHG ROOM/CARE - INTERMEDIATE ICU *

## 2025-01-26 PROCEDURE — 82803 BLOOD GASES ANY COMBINATION: CPT

## 2025-01-26 PROCEDURE — 83605 ASSAY OF LACTIC ACID: CPT

## 2025-01-26 PROCEDURE — 94640 AIRWAY INHALATION TREATMENT: CPT

## 2025-01-26 PROCEDURE — 700111 HCHG RX REV CODE 636 W/ 250 OVERRIDE (IP): Mod: JZ | Performed by: HOSPITALIST

## 2025-01-26 PROCEDURE — 36600 WITHDRAWAL OF ARTERIAL BLOOD: CPT

## 2025-01-26 RX ORDER — AMOXICILLIN 250 MG
2 CAPSULE ORAL 2 TIMES DAILY
Status: DISCONTINUED | OUTPATIENT
Start: 2025-01-26 | End: 2025-01-29

## 2025-01-26 RX ORDER — ROPINIROLE 1 MG/1
1 TABLET, FILM COATED ORAL 2 TIMES DAILY PRN
Status: DISCONTINUED | OUTPATIENT
Start: 2025-01-26 | End: 2025-01-31

## 2025-01-26 RX ORDER — FOLIC ACID 1 MG/1
1 TABLET ORAL DAILY
Status: DISCONTINUED | OUTPATIENT
Start: 2025-01-26 | End: 2025-01-26

## 2025-01-26 RX ORDER — FOLIC ACID 1 MG/1
1 TABLET ORAL DAILY
Status: DISCONTINUED | OUTPATIENT
Start: 2025-01-27 | End: 2025-01-29

## 2025-01-26 RX ORDER — ONDANSETRON 2 MG/ML
4 INJECTION INTRAMUSCULAR; INTRAVENOUS EVERY 6 HOURS
Status: DISCONTINUED | OUTPATIENT
Start: 2025-01-26 | End: 2025-02-01 | Stop reason: HOSPADM

## 2025-01-26 RX ORDER — BISACODYL 10 MG
10 SUPPOSITORY, RECTAL RECTAL
Status: DISCONTINUED | OUTPATIENT
Start: 2025-01-26 | End: 2025-01-29

## 2025-01-26 RX ORDER — ONDANSETRON 2 MG/ML
4 INJECTION INTRAMUSCULAR; INTRAVENOUS EVERY 4 HOURS PRN
Status: DISCONTINUED | OUTPATIENT
Start: 2025-01-26 | End: 2025-02-01 | Stop reason: HOSPADM

## 2025-01-26 RX ORDER — IPRATROPIUM BROMIDE AND ALBUTEROL SULFATE 2.5; .5 MG/3ML; MG/3ML
3 SOLUTION RESPIRATORY (INHALATION)
Status: DISCONTINUED | OUTPATIENT
Start: 2025-01-26 | End: 2025-02-01 | Stop reason: HOSPADM

## 2025-01-26 RX ORDER — ALUMINA, MAGNESIA, AND SIMETHICONE 2400; 2400; 240 MG/30ML; MG/30ML; MG/30ML
10 SUSPENSION ORAL 4 TIMES DAILY PRN
Status: DISCONTINUED | OUTPATIENT
Start: 2025-01-26 | End: 2025-02-01 | Stop reason: HOSPADM

## 2025-01-26 RX ORDER — ROPINIROLE 1 MG/1
1 TABLET, FILM COATED ORAL
Status: DISCONTINUED | OUTPATIENT
Start: 2025-01-26 | End: 2025-01-31

## 2025-01-26 RX ORDER — GAUZE BANDAGE 2" X 2"
100 BANDAGE TOPICAL DAILY
Status: DISCONTINUED | OUTPATIENT
Start: 2025-01-27 | End: 2025-01-31

## 2025-01-26 RX ORDER — KETOROLAC TROMETHAMINE 15 MG/ML
15 INJECTION, SOLUTION INTRAMUSCULAR; INTRAVENOUS ONCE
Status: DISCONTINUED | OUTPATIENT
Start: 2025-01-26 | End: 2025-01-26

## 2025-01-26 RX ORDER — OXYCODONE HYDROCHLORIDE 5 MG/1
5 TABLET ORAL ONCE
Status: DISPENSED | OUTPATIENT
Start: 2025-01-26 | End: 2025-01-27

## 2025-01-26 RX ORDER — HYDROCODONE BITARTRATE AND ACETAMINOPHEN 10; 325 MG/1; MG/1
1 TABLET ORAL EVERY 4 HOURS PRN
Status: DISCONTINUED | OUTPATIENT
Start: 2025-01-26 | End: 2025-01-28

## 2025-01-26 RX ORDER — SODIUM PHOSPHATE,MONO-DIBASIC 19G-7G/118
1 ENEMA (ML) RECTAL
Status: DISCONTINUED | OUTPATIENT
Start: 2025-01-26 | End: 2025-02-01 | Stop reason: HOSPADM

## 2025-01-26 RX ORDER — ACETAMINOPHEN 325 MG/1
650 TABLET ORAL EVERY 6 HOURS PRN
Status: DISCONTINUED | OUTPATIENT
Start: 2025-01-26 | End: 2025-02-01 | Stop reason: HOSPADM

## 2025-01-26 RX ORDER — POLYETHYLENE GLYCOL 3350 17 G/17G
1 POWDER, FOR SOLUTION ORAL
Status: DISCONTINUED | OUTPATIENT
Start: 2025-01-26 | End: 2025-01-29

## 2025-01-26 RX ORDER — MEGESTROL ACETATE 20 MG/1
20 TABLET ORAL 4 TIMES DAILY
Status: DISCONTINUED | OUTPATIENT
Start: 2025-01-26 | End: 2025-01-31

## 2025-01-26 RX ORDER — ONDANSETRON 4 MG/1
4 TABLET, ORALLY DISINTEGRATING ORAL EVERY 4 HOURS PRN
Status: DISCONTINUED | OUTPATIENT
Start: 2025-01-26 | End: 2025-02-01 | Stop reason: HOSPADM

## 2025-01-26 RX ADMIN — FUROSEMIDE 20 MG: 10 INJECTION, SOLUTION INTRAVENOUS at 05:30

## 2025-01-26 RX ADMIN — Medication 5 MG: at 20:42

## 2025-01-26 RX ADMIN — ROPINIROLE HYDROCHLORIDE 1 MG: 0.5 TABLET, FILM COATED ORAL at 13:42

## 2025-01-26 RX ADMIN — ONDANSETRON 4 MG: 2 INJECTION INTRAMUSCULAR; INTRAVENOUS at 17:29

## 2025-01-26 RX ADMIN — ONDANSETRON 4 MG: 2 INJECTION INTRAMUSCULAR; INTRAVENOUS at 05:30

## 2025-01-26 RX ADMIN — IPRATROPIUM BROMIDE AND ALBUTEROL SULFATE 3 ML: .5; 2.5 SOLUTION RESPIRATORY (INHALATION) at 05:21

## 2025-01-26 RX ADMIN — ONDANSETRON 4 MG: 2 INJECTION INTRAMUSCULAR; INTRAVENOUS at 12:57

## 2025-01-26 RX ADMIN — RIVAROXABAN 10 MG: 10 TABLET, FILM COATED ORAL at 17:29

## 2025-01-26 RX ADMIN — HYDROCODONE BITARTRATE AND ACETAMINOPHEN 1 TABLET: 10; 325 TABLET ORAL at 03:00

## 2025-01-26 RX ADMIN — ROPINIROLE HYDROCHLORIDE 1 MG: 0.5 TABLET, FILM COATED ORAL at 20:43

## 2025-01-26 ASSESSMENT — PAIN DESCRIPTION - PAIN TYPE
TYPE: ACUTE PAIN

## 2025-01-26 ASSESSMENT — ENCOUNTER SYMPTOMS
CARDIOVASCULAR NEGATIVE: 1
PSYCHIATRIC NEGATIVE: 1
EYES NEGATIVE: 1
MUSCULOSKELETAL NEGATIVE: 1
WEAKNESS: 1
COUGH: 1
SPUTUM PRODUCTION: 0
GASTROINTESTINAL NEGATIVE: 1
SHORTNESS OF BREATH: 1
RESPIRATORY NEGATIVE: 1

## 2025-01-26 NOTE — CARE PLAN
Problem: Hyperinflation  Goal: Prevent or improve atelectasis  Description: 1. Instruct incentive spirometry usage  2.  Perform hyperinflation therapy as indicated  Outcome: Not Met   FNC 60 100

## 2025-01-26 NOTE — PROGRESS NOTES
"Pulmonary Progress Note    Date of admission  12/25/2024    Chief Complaint  64 y.o. female admitted 12/25/2024 with sob    Hospital Course  \"64 y.o. female with a history of metastatic breast cancer with mets to her lungs, spine, liver, pleura and brain.  She has been followed by oncology but per their last note \"I expect a response rate is probably less than 10% and her prognosis is very poor.  Medically she is a futile case.\"  She has been in the hospital for the past month and has had an unfortunately complicated course.  Due to recurring pleural effusions she had a Pleurx catheter placed, however she requested that this be removed yesterday (1/22) despite assurances that her pleural effusions would continue to grow and she will require thoracenteses she still requested its removal.  She has had multiple chest tubes for pneumothoraces, 2 separate intubations and underwent a T4-6 laminectomy due to pathologic fractures secondary to metastatic lesions. \" DR. Edgar's consult    1/24: Pt requested hospice and now is DNR/DNI      Interval Problem Update  Reviewed last 24 hour events:  Donw to 8 /l    Review of Systems  Review of Systems   Constitutional:  Positive for malaise/fatigue.   HENT: Negative.     Eyes: Negative.    Respiratory: Negative.     Cardiovascular: Negative.    Gastrointestinal: Negative.    Genitourinary: Negative.    Musculoskeletal: Negative.    Skin: Negative.    Neurological:  Positive for weakness.   Endo/Heme/Allergies: Negative.    Psychiatric/Behavioral: Negative.          Vital Signs for last 24 hours   Temp:  [35.8 °C (96.5 °F)-36.8 °C (98.3 °F)] 36.2 °C (97.2 °F)  Pulse:  [61-90] 84  Resp:  [13-65] 65  BP: (104-163)/(51-69) 129/60  SpO2:  [92 %-97 %] 93 %         Physical Exam   Physical Exam  Constitutional:       Appearance: She is obese. She is ill-appearing.   HENT:      Head: Normocephalic and atraumatic.      Mouth/Throat:      Mouth: Mucous membranes are dry.   Eyes:      " Extraocular Movements: Extraocular movements intact.      Pupils: Pupils are equal, round, and reactive to light.   Cardiovascular:      Rate and Rhythm: Normal rate.   Pulmonary:      Effort: Pulmonary effort is normal.   Musculoskeletal:      Right lower leg: Edema present.      Left lower leg: Edema present.   Skin:     General: Skin is warm and dry.   Neurological:      General: No focal deficit present.      Mental Status: She is alert and oriented to person, place, and time. Mental status is at baseline.   Psychiatric:         Mood and Affect: Mood normal.         Behavior: Behavior normal.         Thought Content: Thought content normal.         Judgment: Judgment normal.         Medications  Current Facility-Administered Medications   Medication Dose Route Frequency Provider Last Rate Last Admin    mag hydrox-al hydrox-simeth (Maalox Plus Es Or Mylanta Ds) suspension 10 mL  10 mL Oral 4X/DAY PRN Leslee Christina M.D.        ondansetron (Zofran) syringe/vial injection 4 mg  4 mg Intravenous Q6HRS Cait Nj D.OMimi   4 mg at 01/25/25 0630    megestrol (Megace) tablet 20 mg  20 mg Oral 4X/DAY Cait Nj D.OMimi   20 mg at 01/24/25 1710    furosemide (Lasix) injection 20 mg  20 mg Intravenous Q DAY Lana Hartley M.D.   20 mg at 01/25/25 0630    thiamine (Vitamin B-1) tablet 100 mg  100 mg Oral DAILY Lana Hartley M.D.   100 mg at 01/24/25 0510    folic acid (Folvite) tablet 1 mg  1 mg Oral DAILY Lana Hartley M.D.   1 mg at 01/24/25 0510    multivitamin tablet 1 Tablet  1 Tablet Oral DAILY Lana Hartley M.D.   1 Tablet at 01/24/25 0510    sodium phosphate enema 1 Enema  1 Enema Rectal QDAY PRN Keny Cartagena M.D.   1 Enema at 01/19/25 1143    HYDROcodone/acetaminophen (Norco)  MG per tablet 1 Tablet  1 Tablet Oral Q4HRS PRN Keny Cartagena M.D.   1 Tablet at 01/24/25 0344    melatonin tablet 5 mg  5 mg Oral Nightly Keny Cartagena M.D.   5 mg at 01/24/25 2107    rivaroxaban  (Xarelto) tablet 10 mg  10 mg Oral DAILY AT 1800 Keny Cartagena M.D.   10 mg at 01/24/25 1705    ROPINIRole (Requip) tablet 1 mg  1 mg Oral QHS Keny Cartagena M.D.   1 mg at 01/24/25 2028    senna-docusate (Pericolace Or Senokot S) 8.6-50 MG per tablet 2 Tablet  2 Tablet Oral BID Keny Cartagena M.D.   2 Tablet at 01/19/25 0623    And    polyethylene glycol/lytes (Miralax) Packet 1 Packet  1 Packet Oral QDAY PRN Keny Cartagena M.D.        And    magnesium hydroxide (Milk Of Magnesia) suspension 30 mL  30 mL Oral QDAY PRN Keny Cartagena M.D.        And    bisacodyl (Dulcolax) suppository 10 mg  10 mg Rectal QDAY PRN Keny Cartagena M.D.        acetaminophen (Tylenol) tablet 650 mg  650 mg Oral Q6HRS PRN Keny Cartagena M.D.        calcium carbonate (Tums) chewable tab 500 mg  500 mg Oral TID PRN Keny Cartagena M.D.        methocarbamol (Robaxin) tablet 750 mg  750 mg Oral Q8HRS PRN Keny Cartagena M.D.        metoclopramide (Reglan) tablet 10 mg  10 mg Oral Q6HRS PRN Keny Cartagena M.D.        ondansetron (Zofran ODT) dispertab 4 mg  4 mg Oral Q4HRS PRN Keny Cartagena M.D.   4 mg at 01/17/25 2002    ROPINIRole (Requip) tablet 1 mg  1 mg Oral BID PRN Keny Cartagena M.D.   1 mg at 01/25/25 1230    LORazepam (Ativan) injection 0.26 mg  0.26 mg Intravenous Q4HRS PRN Keny Cartagena M.D.   0.26 mg at 01/19/25 2231    prochlorperazine (Compazine) injection 10 mg  10 mg Intravenous Q6HRS PRN A[place] M.D.        Respiratory Therapy Consult   Nebulization Continuous RT Juan Daniel Baca M.D.        labetalol (Normodyne/Trandate) injection 10 mg  10 mg Intravenous Q4HRS PRN Lidia Figueroa M.D.   10 mg at 01/07/25 0611    ipratropium-albuterol (DUONEB) nebulizer solution  3 mL Nebulization Q2HRS PRN (RT) Americo Avalos M.D.   3 mL at 01/23/25 1546    sodium chloride (Ocean) 0.65 % nasal spray 2 Spray  2 Spray Nasal Q2HRS PRN Omar Saleh M.D.   2  Spray at 01/01/25 0600    MD ALERT...DO NOT ADMINISTER NSAIDS or ASPIRIN unless ORDERED By Neurosurgery 1 Each  1 Each Other PRN Karthik Lowery D.O.        ondansetron (Zofran) syringe/vial injection 4 mg  4 mg Intravenous Q4HRS PRN Abdulaziz Nieto M.D.   4 mg at 01/25/25 0910       Fluids    Intake/Output Summary (Last 24 hours) at 1/25/2025 1607  Last data filed at 1/25/2025 0600  Gross per 24 hour   Intake 180 ml   Output 0 ml   Net 180 ml       Laboratory  Recent Labs     01/23/25  1853   ISTATAPH 7.490*   ISTATAPCO2 59.1*   ISTATAPO2 52*   ISTATATCO2 47*   PUXPOOA8EUF 87*   ISTATARTHCO3 45.0*   ISTATARTBE 18*   ISTATTEMP 96.7 F   ISTATSPEC Arterial   ISTATAPHTC 7.506*   [id number] 48*         Recent Labs     01/24/25  0435 01/25/25  0224   SODIUM 133* 135   POTASSIUM 3.4* 3.4*   CHLORIDE 84* 89*   CO2 34* 36*   BUN 34* 31*   CREATININE 0.75 0.70   MAGNESIUM 2.1  --    PHOSPHORUS 3.0  --    CALCIUM 9.2 8.9     Recent Labs     01/24/25  0435 01/25/25  0224   ALTSGPT 37  --    ASTSGOT 204*  --    ALKPHOSPHAT 116*  --    TBILIRUBIN 0.7  --    GLUCOSE 142* 117*     Recent Labs     01/24/25  0435   WBC 18.4*   NEUTSPOLYS 87.30*   LYMPHOCYTES 5.20*   MONOCYTES 5.90   EOSINOPHILS 0.60   BASOPHILS 0.40   ASTSGOT 204*   ALTSGPT 37   ALKPHOSPHAT 116*   TBILIRUBIN 0.7     Recent Labs     01/24/25  0435   RBC 4.16*   HEMOGLOBIN 12.0   HEMATOCRIT 37.5   PLATELETCT 570*        Assessment/Plan  * Acute on chronic respiratory failure with hypoxia and hypercapnia (HCC)- (present on admission)  Assessment & Plan  intubated 1/4 - 1/5  extubated 1/5 - 1/8    ARDS vs lymphangetic spread vs both  Pt understand re: progression of her cancer ad no options for tx  She would like to go home with hospice  Hospice team consulted  She is down to 8 l    Anemia of chronic disease- (present on admission)  Assessment & Plan  Conservative transfusion strategy    Hypokalemia- (present on admission)  Assessment & Plan  Check BMP daily  Goal is  4  Replete as necessary     Restless leg syndrome- (present on admission)  Assessment & Plan  Requip     Pathological fracture of vertebra- (present on admission)  Assessment & Plan  Pathologic fracture of T5 with myelopathy on presentation  S/P T4-6 laminectomy with fusion and spinal decompression on 1/1/2025 by Dr. Lowery  multi-modal pain management  Fall Precautions  PT OT    Cancer related pain- (present on admission)  Assessment & Plan  Pain meds prn    Bilateral pleural effusion- (present on admission)  Assessment & Plan  She requested Pleurx removed on 1/22  Comfort directed care    Malignant neoplasm of breast (HCC)- (present on admission)  Assessment & Plan  Per oncology's last note they are recommending hospice and consider her case to be futile  She is too deconditioned to even undergo palliative chemotherapy.     Patent and family aware    Primary hypertension- (present on admission)  Assessment & Plan  Hold home meds for the time being    Diabetes mellitus, type 2 (HCC)- (present on admission)  Assessment & Plan  A1c 6.4  accuchecks with SSI        Pt directed care     I have performed a physical exam and reviewed and updated ROS and Plan today (1/25/2025). In review of yesterday's note (1/24/2025), there are no changes except as documented above.     Discussed patient condition and risk of morbidity and/or mortality with Family, RN, RT, Charge nurse / hot rounds, and Patient

## 2025-01-26 NOTE — PROGRESS NOTES
0150-  Assumed care of patient.  Pt assessed, no pain reported, respirations shallow but even and unlabored, pt expresses no needs at this time.  Bed low and locked, call light within reach.

## 2025-01-26 NOTE — PROGRESS NOTES
[facility] Medicine Daily Progress Note    Date of Service  1/26/2025    Chief Complaint  Asha Hannon is a 64 y.o. female admitted 12/25/2024 with shortness of breath.    Hospital Course  Ms. Hannon is a 64 y.o. female who presented 12/25/2024 with history of stage IV breast cancer metastatic disease, mets to the lungs and now spine, on previous oral chemotherapy, admitted on 12/25/2024 with weakness, shortness of breath and back pain, subsequently a CT chest abdomen showed moderate-sized pleural effusions, worsening hepatic metastatic disease with new abdominal lymphadenopathy, thoracic adenopathy, diffuse osseous metastatic disease with a T5 pathologic fracture, the patient was admitted, undergo a right-sided thoracentesis on 12/27, malignant cells identified in the fluid, and MRI showed multiple thoracic and lumbar metastasized lesions, the patient was taken to the operating room on 1 1 where she had a T4 T6 laminectomy, following the patient developed a right-sided pneumothorax, requiring chest tube placement, left-sided thoracentesis was performed, the patient following his respiratory difficulty requiring BiPAP treatment and subsequently required to be intubated, the patient had a right-sided PleurX and small bore catheter placed and had frequent thoracentesis since placement, the patient remained intubated until 1/15, where the patient then was extubated and had a extensive talk with the ICU team in terms of her overall status, metastatic malignancy and respiratory failure, the patient stated that she wanted to keep fighting and is wishing to hopefully reach outpatient status where she can fail to be treated for her malignancy.  If she would have additional respiratory failure she states that she would like to be reintubated and possibly even undergo a tracheostomy to stay alive.  The patient tolerated extubation. Oncology was consulted. She was transferred to the [facility]. On 1/23 she was  transferred back to the ICU for high flow oxygen. She transitioned to DNR/DNI status.     Interval Problem Update  1/26: Ms. Hannon was evaluated in the ICU. She is on high flow oxygen. She remains on IV lasix.  Reviewed her x-ray this morning which reveals extensive airspace disease and opacities in the right lung field.  She denies coughing with eating.  She was nasogastric suction to last night.    I have discussed this patient's plan of care and discharge plan at IDT rounds today with Case Management, Nursing, Nursing leadership, and other members of the IDT team.    Consultants/Specialty  Critical care. I discussed with Dr. Hopper in person  Oncology  Physiatry  Neurosurgery  Palliative care      Code Status  DNAR/DNI    Disposition  The patient is not medically cleared for discharge to home or a post-acute facility.  Anticipate discharge to: hospice    I have placed the appropriate orders for post-discharge needs.    Review of Systems  Review of Systems   Respiratory:  Positive for cough and shortness of breath. Negative for sputum production.    Gastrointestinal:         She denies troubles with swallowing   Neurological:         Generally weak        Physical Exam  Temp:  [35.9 °C (96.7 °F)-36.6 °C (97.8 °F)] 36.6 °C (97.8 °F)  Pulse:  [65-94] 91  Resp:  [13-65] 27  BP: (104-136)/(51-79) 128/62  SpO2:  [82 %-97 %] 92 %    Physical Exam  Vitals and nursing note reviewed.   Cardiovascular:      Rate and Rhythm: Normal rate and regular rhythm.   Pulmonary:      Comments: Poor air movement right lungs with coarse breath sounds on the right  Good air movement left  High flow oxygen  Abdominal:      General: There is no distension.      Tenderness: There is no abdominal tenderness.   Musculoskeletal:      Right lower leg: Edema present.   Skin:     Coloration: Skin is pale.   Neurological:      General: No focal deficit present.      Mental Status: She is oriented to person, place, and time.         Fluids  No  intake or output data in the 24 hours ending 01/26/25 0715     Laboratory  Recent Labs     01/24/25  0435   WBC 18.4*   RBC 4.16*   HEMOGLOBIN 12.0   HEMATOCRIT 37.5   MCV 90.1   MCH 28.8   MCHC 32.0*   RDW 50.1*   PLATELETCT 570*   MPV 9.3     Recent Labs     01/24/25  0435 01/25/25  0224   SODIUM 133* 135   POTASSIUM 3.4* 3.4*   CHLORIDE 84* 89*   CO2 34* 36*   GLUCOSE 142* 117*   BUN 34* 31*   CREATININE 0.75 0.70   CALCIUM 9.2 8.9                   Imaging  DX-CHEST-LIMITED (1 VIEW)   Final Result      Slightly progressed right-sided airspace disease. Otherwise, stable as above.      DX-CHEST-PORTABLE (1 VIEW)   Final Result      1.  Mild worsening hypoinflation.   2.  No other significant change from prior exam.         DX-CHEST-PORTABLE (1 VIEW)   Final Result      1.  Interval removal of right-sided tunneled pleural catheter. No evidence of pneumothorax.   2.  Stable small bilateral pleural effusions.   3.  Increased patchy bilateral interstitial and airspace opacities.      IR-LUNG & PLEURAL CATH REMOVAL   Final Result      Successful removal of tunneled pleural catheter as described.         DX-CHEST-PORTABLE (1 VIEW)   Final Result         1.  Pulmonary edema and/or infiltrates are identified, which are stable since the prior exam.   2.  Small bilateral pleural effusions, increased on the right since prior study   3.  Cardiomegaly      DX-CHEST-PORTABLE (1 VIEW)   Final Result         1.  Pulmonary edema and/or infiltrates are identified, which are stable since the prior exam.   2.  Small left pleural effusion   3.  Cardiomegaly      DX-CHEST-PORTABLE (1 VIEW)   Final Result         1. Small right apical pneumothorax now visible. Right chest tube unchanged.      2. Persistent infiltrates and small effusions.                     DX-CHEST-PORTABLE (1 VIEW)   Final Result      No significant change from prior exam.      DX-CHEST-PORTABLE (1 VIEW)   Final Result         1.  Pulmonary edema and/or infiltrates  are identified, which are stable since the prior exam.   2.  Cardiomegaly      DX-CHEST-PORTABLE (1 VIEW)   Final Result      1.  Interval improvement of pulmonary vascular congestion and pulmonary edema.   2.  No other significant change from prior exam.         DX-CHEST-PORTABLE (1 VIEW)   Final Result         1.  Pulmonary edema and/or infiltrates are identified, which appear somewhat increased since the prior exam.   2.  Layering left pleural effusion, stable   3.  Cardiomegaly      DX-CHEST-PORTABLE (1 VIEW)   Final Result         1.  Pulmonary edema and/or infiltrates are identified, which are stable since the prior exam.   2.  Layering left pleural effusion, stable      DX-CHEST-PORTABLE (1 VIEW)   Final Result         1.  Pulmonary edema and/or infiltrates are identified, which are stable since the prior exam.   2.  Layering left pleural effusion, stable      DX-CHEST-PORTABLE (1 VIEW)   Final Result         1.  Pulmonary edema and/or infiltrates are identified, which are stable since the prior exam.   2.  Trace bilateral pleural effusions, stable      DX-CHEST-PORTABLE (1 VIEW)   Final Result         1.  Pulmonary edema and/or infiltrates are identified, which are stable since the prior exam.   2.  Small left pleural effusion, stable      DX-CHEST-PORTABLE (1 VIEW)   Final Result      1.  Support equipment is unchanged.      2.  Cardiomegaly with moderate perihilar pulmonary edema unchanged from previous exam.      3.  Small bilateral pleural effusions left greater than right.      DX-CHEST-PORTABLE (1 VIEW)   Final Result      1.  Tubes and lines unchanged in position.      2.  Marked bilateral perihilar pulmonary edema which has improved slightly since previous exam.      3.  Small bilateral pleural effusions left greater than right.      IR-INSERT PLEURAL CATH W/ CUFF   Final Result      1.  Ultrasound and fluoroscopic guided placement of a RIGHT HEMITHORAX PleurX (Aspira type) tunneled pleural drainage  catheter.   2.  The catheter can be used immediately with drainage of pleural effusion as needed for comfort, as per package insert instructions.   3.  If the catheter becomes dislodged, do not reinsert it.  Place sterile dressing over the entry wound site.  May consult primary care physician or oncologist as to whether there is clinical indication for PleurX replacement.      DX-CHEST-PORTABLE (1 VIEW)   Final Result      1.  Tubes and lines unchanged in position.      2.   Extensive bilateral perihilar airspace opacities consistent with pneumonitis and/or pulmonary edema.      3.  Mild to moderate bilateral pleural effusions right greater than left      DX-CHEST-PORTABLE (1 VIEW)   Final Result         1. Increasing moderate right and small left pleural effusions. No change edema or infiltrates.      2. ETT 5 cm above masoud. NG tube in stomach. Right central line.                     DX-CHEST-PORTABLE (1 VIEW)   Final Result      Stable diffuse bilateral pulmonary infiltrates.      DX-ABDOMEN FOR TUBE PLACEMENT   Final Result      NG tube tip overlies the gastric body.      DX-CHEST-PORTABLE (1 VIEW)   Final Result      1.  Supportive tubing as described above.   2.  Apparent increasing RIGHT pleural fluid, possibly due to change in position.   3.  Improvement of LEFT lung base consolidation.   4.  No other significant change from prior exam.         DX-CHEST-PORTABLE (1 VIEW)   Final Result         1. Stable right IJ central catheter. Right chest tube has been removed. No pneumothorax.   2. Unchanged bilateral pulmonary opacities and associated pleural effusions.         DX-CHEST-PORTABLE (1 VIEW)   Final Result      1.  Unchanged bilateral pulmonary opacities. Stable small bilateral pleural effusions.   2.  Right pigtail catheter remains. No pneumothorax.      DX-CHEST-PORTABLE (1 VIEW)   Final Result      Removal of large bore right chest tube with stable right pigtail chest tube. Questionable tiny right  apical pneumothorax.      DX-CHEST-PORTABLE (1 VIEW)   Final Result      No significant interval change.      DX-CHEST-PORTABLE (1 VIEW)   Final Result      1.  Persistent bilateral pulmonary opacities, stable to slightly worse, although changes may be related to differences in imaging technique. No large pleural effusions.   2.  Well-positioned right IJ central catheter. No pneumothorax.   3.  Stable other lines and tubes.      DX-CHEST-LIMITED (1 VIEW)   Final Result         1.  No change in 2 separate right-sided chest tubes.      2.  No pneumothorax identified.      3.  Bilateral pulmonary opacifications again identified which are slightly less prominent.      DX-CHEST-PORTABLE (1 VIEW)   Final Result         1. No change mild infiltrates.      2. Right chest tubes. No pneumothorax.      3. ETT out. NG tube and jugular line remain.                     DX-CHEST-PORTABLE (1 VIEW)   Final Result         1. No significant interval change.      DX-CHEST-LIMITED (1 VIEW)   Final Result         1.  Findings on chest radiograph appear stable since the prior radiograph.  No new abnormalities are identified.      2.  2 separate right-sided chest tubes are again identified with no change in position.      3.  No pneumothorax identified.      DX-CHEST-LIMITED (1 VIEW)   Final Result         1.  No pneumothorax identified.      2.  2 right-sided chest tubes again identified.      CT-CTA CHEST PULMONARY ARTERY W/ RECONS   Final Result         1. No pulmonary embolus.      2. Medial right lung base consolidation felt to represent complete collapse of the right middle lobe. Mild to moderate mid and upper lung pneumonia infiltrates remain.      3. Right chest tubes. Small right pneumothorax and small right effusion. Improving small 2.5 cm left pleural effusion since 12/25/2024.      4. Extensive metastatic lesions including adenopathy, liver lesions, right breast nodule and bone lesions. Thoracic spine fusion hardware remains.                   CT-HEAD W/O   Final Result         1. No acute process in the brain evident.      2. Fluid in sphenoid sinus.      3. Right convexity midthoracic scoliosis and unchanged from 12/31/2024 MRI.               DX-ABDOMEN FOR TUBE PLACEMENT   Final Result         1. NG tube in stomach.                     DX-CHEST-FOR LINE PLACEMENT Perform procedure in: Patient's Room   Final Result         1. Small right apical pneumothorax remains. 2 right chest tubes.      2. ETT 3 cm above masoud. NG tube in stomach. Right jugular line in SVC. No complication evident.      3. Mild infiltrates and small right effusion.                  DX-CHEST-LIMITED (1 VIEW)   Final Result         1. 2nd right chest tube placed with near complete evacuation of right pneumothorax.      2. Mild infiltrates, basilar atelectasis and small right effusion remain.      3. Endotracheal tube tip at about T5 and about 2 cm above the masoud. The masoud and tip of the endotracheal tube are difficult to visualize in part because of overlying thoracic spine fusion hardware.                  DX-CHEST-PORTABLE (1 VIEW)   Final Result         Enlarging right pneumothorax with collapsing of the right lung.      Preliminary findings texted to Dr. STANLEY FISHER in the Emergency Department via Voalte on 1/4/2025 1:50 AM         DX-CHEST-LIMITED (1 VIEW)   Final Result         Interval placement of right pigtail chest tube. Small right residual pneumothorax.      DX-CHEST-PORTABLE (1 VIEW)   Final Result   Addendum (preliminary) 1 of 1      CRITICAL RESULT READ BACK: Preliminary findings discussed with and    critical read back performed by Dr. Downs via telephone on 1/3/2025 8:03    PM      Final      1.  Interval worsening in right pneumothorax, now moderate.   2.  Persistent interstitial and alveolar pulmonary opacities bilaterally. Possible bilateral pleural effusions, right greater than left.            DX-CHEST-PORTABLE (1 VIEW)   Final  Result         1. Slightly decreased right pneumothorax.   2. Stable bilateral interstitial and airspace opacification.   3. Stable right pleural effusion.      DX-CHEST-PORTABLE (1 VIEW)   Final Result         1. Left pleural effusion has decreased in size status post thoracentesis. There is no left-sided pneumothorax.   2. There is persistent right basilar opacity with a new right apical pneumothorax.      Findings were communicated to the ordering provider at the time of dictation via Voalte.      US-THORACENTESIS PUNCTURE LEFT   Final Result      1. Ultrasound guided left sided therapeutic thoracentesis.      2. 950 mL of fluid withdrawn.      DX-CHEST-PORTABLE (1 VIEW)   Final Result      1. New postsurgical changes in the upper thoracic spine.   2. Worsening bilateral pleural effusions, pulmonary vascular congestion and interstitial edema.      DX-PORTABLE FLUORO > 1 HOUR   Final Result      Portable fluoroscopy utilized for 1 minute 19 seconds.      INTERPRETING LOCATION: [address] , [place], [address]      DX-THORACIC SPINE-2 VIEWS   Final Result      Digitized intraoperative radiograph is submitted for review. This examination is not for diagnostic purpose but for guidance during a surgical procedure. Please see the patient's chart for full procedural details.         INTERPRETING LOCATION: [address] , [place], [address]      MR-CERVICAL SPINE-WITH & W/O   Final Result         1.  No evidence of metastatic disease to the cervical spine.      MR-LUMBAR SPINE-WITH & W/O   Final Result   Impression:      Multiple bony metastatic lesions as described above, most prominent along the inferior endplate of L1.      Degenerative moderate canal stenosis at L4-5 with cauda equina compression. Epidural thickening and enhancement is noted in this region with severe bilateral facet degeneration and enhancement along the bilateral facet joints. This is felt to be    degenerative in origin.      MR-THORACIC SPINE-WITH & W/O    Final Result         Multiple metastatic lesions of the thoracic spine as described above.      Pathologic compression fracture at T5 with 50% loss of height and posterior cortical retropulsion with circumferential epidural thickening representing epidural extension of metastatic disease. This causes severe canal stenosis with cord compression.      Metastatic disease also involves the posterior elements and spinous processes at T4, T5.      Pre and paravertebral metastatic soft tissue infiltration at T4-T5 noted.         MR-BRAIN-WITH & W/O   Final Result         Multiple nodular enhancing metastatic lesions involving the cranium as described above.      The largest lesion is in the right posterior temporal/parietal occipital region and demonstrates inner table cortical breakthrough with epidural extension and adjacent mild dural thickening and enhancement.      US-THORACENTESIS PUNCTURE RIGHT   Final Result   Addendum (preliminary) 1 of 1 12/27/2024 10:59 AM      HISTORY/REASON FOR EXAM:  Shortness of breath         TECHNIQUE/EXAM DESCRIPTION:   Ultrasound-guided thoracentesis.      Indication:  RIGHT pleural fluid collection.      COMPARISON:  None-however patient states history of right-sided    thoracentesis September 2024.      PROCEDURE:     Informed consent was obtained. A timeout was taken. A right    pleural effusion was localized with real-time ultrasound guidance. The    right posterior chest wall was prepped and draped in a sterile manner.    Following local anesthesia with 1%    lidocaine, and under live ultrasound guidance a 5 Cymro Yueh pigtail    catheter was advanced into the pleural space with trocar technique and    pleural fluid was drained. The patient tolerated the procedure well    without evidence of complication. A post    thoracentesis chest radiograph is forthcoming.      FINDINGS:      Fluid was sent to the laboratory.      Fluid character: serosanguinous         1. Ultrasound  "guided right sided therapeutic and diagnostic thoracentesis.      2. 1000 mL of fluid withdrawn.      Final      1. Ultrasound guided right sided therapeutic and diagnostic thoracentesis.      2. 1000 mL of fluid withdrawn.      DX-CHEST-PORTABLE (1 VIEW)   Final Result      1.  Small persistent right pleural effusion which is decreased since recent thoracentesis.      2.  Small to moderate left pleural effusion.      3.  Bilateral perihilar and left lower lobe atelectasis and/or pneumonitis.      4.  Increased interstitial markings throughout the lung fields consistent with parenchymal scarring, and/or pulmonary edema.      CT-LSPINE W/O PLUS RECONS   Final Result         1. Diffuse metastatic disease.   2. No definite pathologic fracture.      CT-TSPINE W/O PLUS RECONS   Final Result         1. Extensive metastatic disease.   2. There is new pathologic compression fracture deformity of T5.      CT-CHEST,ABDOMEN,PELVIS WITH   Final Result         1. New bilateral upper lobe nodular infiltrates.   2. Moderate-sized bilateral pleural effusions.   3. Interval worsening of hepatic metastatic disease.   4. New abdominal lymphadenopathy. Thoracic adenopathy is again noted.   5. Diffuse osseous metastatic disease. There is a new compression fracture of T5 as reported on the prior spine CT.      DX-CHEST-PORTABLE (1 VIEW)   Final Result      1.  Stable bibasilar atelectasis/consolidation and small bilateral pleural effusions.      2.  Stable cardiomegaly and interstitial edema.           Assessment/Plan  * Malignant neoplasm of breast (HCC)- (present on admission)  Assessment & Plan  Diagnosed 2019.  \"Bilateral breast cancer with extensive destruction of the left breast and ulceration and skin lesions making her inoperable on the left.  Right breast shows a 3 x 3 cm mass.  PET scan March 2023 showed non-hypermetabolic pleural effusions which are cytology positive and she has demonstrable pleural masses. Started aromatase " "inhibition early September 2023.  Has not yet started CDK 4 6 inhibitor.  Lost to follow-up for over 6 months although apparently was on letrozole as single agent.  Now with clear progression of disease in the left breast.  August 2024: Progression of disease in liver with extensive disease.  Breast is worse.  Lung is better.  Lymph nodes are worse.  Status post radiation to symptomatic oozing and exophytic left breast mass.  Marked worsening of her cancer on letrozole with progression in the bone and liver.\"--Dr. Benitez  ongoing goals of care discussions. Patient is fighting to recover and discharge home so she can continue treatment for her cancer  Now DNR/DNI  Palliative following   End of life. Address pain and anxiety.    Acute on chronic respiratory failure with hypoxia and hypercapnia (HCC)- (present on admission)  Assessment & Plan  intubated 1/4  extubated 1/5  re-intubated 1/8  Extubated 1/15  Now on high flow oxygen with near complete opacification of the right lung  Prognosis is poor.    Anemia of chronic disease- (present on admission)  Assessment & Plan  Hb is appropriate    Hypokalemia- (present on admission)  Assessment & Plan  Replaced, monitor    Restless leg syndrome- (present on admission)  Assessment & Plan  Requip     Pathological fracture of vertebra- (present on admission)  Assessment & Plan  Pathologic fracture of T5 with myelopathy on presentation  S/P T4-6 laminectomy with fusion and spinal decompression on 1/1/2025 by Dr. Lowery  multi-modal pain management  The patient with additional L4-5 lesion with cauda equina      Cancer related pain- (present on admission)  Assessment & Plan  Palliative Care following for pain management  Multimodal pain management  Consider palliative radiation    Metastasis to lung (HCC)- (present on admission)  Assessment & Plan  Noted on CT    Bilateral pleural effusion- (present on admission)  Assessment & Plan  Malignant pleural effusions from " metastatic breast cancer s/p thoracenteses and chest tubes  Stop diuresis  monitor chest xrays  PleurX placement in IR 1/11 on the right and was removed      Primary hypertension- (present on admission)  Assessment & Plan  Monitor, resume as indicated  PRN's available    Diabetes mellitus, type 2 (HCC)- (present on admission)  Assessment & Plan  A1c 6.4  Diabetic diet         VTE prophylaxis:    Xarelto 10mg daily as prophylaxis      I have performed a physical exam and reviewed and updated ROS and Plan today (1/26/2025). In review of yesterday's note (1/25/2025), there are no changes except as documented above.

## 2025-01-26 NOTE — PROGRESS NOTES
"Pulmonary Progress Note    Date of admission  12/25/2024    Chief Complaint  64 y.o. female admitted 12/25/2024 with sob    Hospital Course  \"64 y.o. female with a history of metastatic breast cancer with mets to her lungs, spine, liver, pleura and brain.  She has been followed by oncology but per their last note \"I expect a response rate is probably less than 10% and her prognosis is very poor.  Medically she is a futile case.\"  She has been in the hospital for the past month and has had an unfortunately complicated course.  Due to recurring pleural effusions she had a Pleurx catheter placed, however she requested that this be removed yesterday (1/22) despite assurances that her pleural effusions would continue to grow and she will require thoracenteses she still requested its removal.  She has had multiple chest tubes for pneumothoraces, 2 separate intubations and underwent a T4-6 laminectomy due to pathologic fractures secondary to metastatic lesions. \" DR. Edgar's consult    1/24: Pt requested hospice and now is DNR/DNI  1/25: discussed with hospice at that time o2 down to 8 l  1/26: increased Fio2 to 60 l 100%      Interval Problem Update  Reviewed last 24 hour events:  As above    Review of Systems  Review of Systems   Constitutional:  Positive for malaise/fatigue.   HENT: Negative.     Eyes: Negative.    Respiratory: Negative.     Cardiovascular: Negative.    Gastrointestinal: Negative.    Genitourinary: Negative.    Musculoskeletal: Negative.    Skin: Negative.    Neurological:  Positive for weakness.   Endo/Heme/Allergies: Negative.    Psychiatric/Behavioral: Negative.          Vital Signs for last 24 hours   Temp:  [35.9 °C (96.7 °F)-36.6 °C (97.8 °F)] 36.3 °C (97.4 °F)  Pulse:  [65-94] 87  Resp:  [11-55] 47  BP: (115-136)/(55-93) 116/93  SpO2:  [82 %-98 %] 93 %         Physical Exam   Physical Exam  Constitutional:       Appearance: She is obese. She is ill-appearing.   HENT:      Head: Normocephalic and " atraumatic.      Mouth/Throat:      Mouth: Mucous membranes are dry.   Eyes:      Extraocular Movements: Extraocular movements intact.      Pupils: Pupils are equal, round, and reactive to light.   Cardiovascular:      Rate and Rhythm: Normal rate.   Pulmonary:      Effort: Pulmonary effort is normal.   Musculoskeletal:      Right lower leg: Edema present.      Left lower leg: Edema present.   Skin:     General: Skin is warm and dry.   Neurological:      General: No focal deficit present.      Mental Status: She is alert and oriented to person, place, and time. Mental status is at baseline.   Psychiatric:         Mood and Affect: Mood normal.         Behavior: Behavior normal.         Thought Content: Thought content normal.         Judgment: Judgment normal.         Medications  Current Facility-Administered Medications   Medication Dose Route Frequency Provider Last Rate Last Admin    mag hydrox-al hydrox-simeth (Maalox Plus Es Or Mylanta Ds) suspension 10 mL  10 mL Oral 4X/DAY PRN Leslee Christina M.D.   10 mL at 01/25/25 1810    ondansetron (Zofran) syringe/vial injection 4 mg  4 mg Intravenous Q6HRS Cait Nj D.OMimi   4 mg at 01/25/25 2356    megestrol (Megace) tablet 20 mg  20 mg Oral 4X/DAY Cait Nj, D.OMimi   20 mg at 01/25/25 1809    thiamine (Vitamin B-1) tablet 100 mg  100 mg Oral DAILY Lana Hartley M.D.   100 mg at 01/24/25 0510    folic acid (Folvite) tablet 1 mg  1 mg Oral DAILY Lana Hartley M.D.   1 mg at 01/24/25 0510    multivitamin tablet 1 Tablet  1 Tablet Oral DAILY Lana Hartley M.D.   1 Tablet at 01/24/25 0510    sodium phosphate enema 1 Enema  1 Enema Rectal QDAY PRN Keny Cartagena M.D.   1 Enema at 01/19/25 1143    HYDROcodone/acetaminophen (Norco)  MG per tablet 1 Tablet  1 Tablet Oral Q4HRS PRN Keyn Cartagena M.D.   1 Tablet at 01/26/25 0300    melatonin tablet 5 mg  5 mg Oral Nightly Keny Cartagena M.D.   5 mg at 01/24/25 2107    rivaroxaban  (Xarelto) tablet 10 mg  10 mg Oral DAILY AT 1800 Keny Cartagena M.D.   10 mg at 01/25/25 1810    ROPINIRole (Requip) tablet 1 mg  1 mg Oral QHS Keny Cartagena M.D.   1 mg at 01/25/25 2147    senna-docusate (Pericolace Or Senokot S) 8.6-50 MG per tablet 2 Tablet  2 Tablet Oral BID Keny Cartagena M.D.   2 Tablet at 01/19/25 0623    And    polyethylene glycol/lytes (Miralax) Packet 1 Packet  1 Packet Oral QDAY PRN Keny Cartagena M.D.        And    magnesium hydroxide (Milk Of Magnesia) suspension 30 mL  30 mL Oral QDAY PRN Keny Cartagena M.D.        And    bisacodyl (Dulcolax) suppository 10 mg  10 mg Rectal QDAY PRN Keny Cartagena M.D.        acetaminophen (Tylenol) tablet 650 mg  650 mg Oral Q6HRS PRN Kney Cartagena M.D.        calcium carbonate (Tums) chewable tab 500 mg  500 mg Oral TID PRN Keny Cartagena M.D.        methocarbamol (Robaxin) tablet 750 mg  750 mg Oral Q8HRS PRN Keny Cartagena M.D.        metoclopramide (Reglan) tablet 10 mg  10 mg Oral Q6HRS PRN Keny Cartagena M.D.        ondansetron (Zofran ODT) dispertab 4 mg  4 mg Oral Q4HRS PRN Keny Cartagena M.D.   4 mg at 01/17/25 2002    ROPINIRole (Requip) tablet 1 mg  1 mg Oral BID PRN Keny Cartagena M.D.   1 mg at 01/26/25 1342    LORazepam (Ativan) injection 0.26 mg  0.26 mg Intravenous Q4HRS PRN Keny Cartagena M.D.   0.26 mg at 01/19/25 2231    prochlorperazine (Compazine) injection 10 mg  10 mg Intravenous Q6HRS PRN A[place] M.D.        Respiratory Therapy Consult   Nebulization Continuous RT Juan Daniel Baca M.D.        labetalol (Normodyne/Trandate) injection 10 mg  10 mg Intravenous Q4HRS PRN Lidia Figueroa M.D.   10 mg at 01/07/25 0611    ipratropium-albuterol (DUONEB) nebulizer solution  3 mL Nebulization Q2HRS PRN (RT) Americo Avalos M.D.   3 mL at 01/26/25 0521    sodium chloride (Ocean) 0.65 % nasal spray 2 Spray  2 Spray Nasal Q2HRS PRN Omar Saleh M.D.   2  Spray at 01/01/25 0600    MD ALERT...DO NOT ADMINISTER NSAIDS or ASPIRIN unless ORDERED By Neurosurgery 1 Each  1 Each Other PRN Karthik Lowery D.O.        ondansetron (Zofran) syringe/vial injection 4 mg  4 mg Intravenous Q4HRS PRN Abdulaziz Nieto M.D.   4 mg at 01/26/25 1257       Fluids    Intake/Output Summary (Last 24 hours) at 1/26/2025 1550  Last data filed at 1/26/2025 1000  Gross per 24 hour   Intake 50 ml   Output 0 ml   Net 50 ml         Laboratory  Recent Labs     01/23/25  1853 01/26/25  0540   ISTATAPH 7.490* 7.425   ISTATAPCO2 59.1* 70.8*   ISTATAPO2 52* 47*   ISTATATCO2 47* 49*   FDRMBPZ5GSD 87* 81*   ISTATARTHCO3 45.0* 46.5*   ISTATARTBE 18* 18*   ISTATTEMP 96.7 F 97.1 F   ISTATFIO2  --  100   ISTATSPEC Arterial Arterial   ISTATAPHTC 7.506* 7.437   [id number] 48* 44*         Recent Labs     01/24/25  0435 01/25/25  0224   SODIUM 133* 135   POTASSIUM 3.4* 3.4*   CHLORIDE 84* 89*   CO2 34* 36*   BUN 34* 31*   CREATININE 0.75 0.70   MAGNESIUM 2.1  --    PHOSPHORUS 3.0  --    CALCIUM 9.2 8.9     Recent Labs     01/24/25  0435 01/25/25  0224   ALTSGPT 37  --    ASTSGOT 204*  --    ALKPHOSPHAT 116*  --    TBILIRUBIN 0.7  --    GLUCOSE 142* 117*     Recent Labs     01/24/25  0435   WBC 18.4*   NEUTSPOLYS 87.30*   LYMPHOCYTES 5.20*   MONOCYTES 5.90   EOSINOPHILS 0.60   BASOPHILS 0.40   ASTSGOT 204*   ALTSGPT 37   ALKPHOSPHAT 116*   TBILIRUBIN 0.7     Recent Labs     01/24/25  0435   RBC 4.16*   HEMOGLOBIN 12.0   HEMATOCRIT 37.5   PLATELETCT 570*        Assessment/Plan  * Malignant neoplasm of breast (HCC)- (present on admission)  Assessment & Plan  Per oncology's last note they are recommending hospice and consider her case to be futile  She is too deconditioned to even undergo palliative chemotherapy.     Patent and family aware    Anemia of chronic disease- (present on admission)  Assessment & Plan  Conservative transfusion strategy    Hypokalemia- (present on admission)  Assessment & Plan  Check BMP  daily  Goal is 4  Replete as necessary     Acute on chronic respiratory failure with hypoxia and hypercapnia (HCC)- (present on admission)  Assessment & Plan  intubated 1/4 - 1/5  extubated 1/5 - 1/8    ARDS vs lymphangetic spread vs both  Pt understand re: progression of her cancer ad no options for tx  She would like to go home with hospice  Hospice team consulted  Fio2 back up    Restless leg syndrome- (present on admission)  Assessment & Plan  Requip     Pathological fracture of vertebra- (present on admission)  Assessment & Plan  Pathologic fracture of T5 with myelopathy on presentation  S/P T4-6 laminectomy with fusion and spinal decompression on 1/1/2025 by Dr. Lowery  multi-modal pain management  Fall Precautions  PT OT    Cancer related pain- (present on admission)  Assessment & Plan  Pain meds prn    Bilateral pleural effusion- (present on admission)  Assessment & Plan  She requested Pleurx removed on 1/22  Comfort directed care    Primary hypertension- (present on admission)  Assessment & Plan  Hold home meds for the time being    Diabetes mellitus, type 2 (HCC)- (present on admission)  Assessment & Plan  A1c 6.4  accuchecks with SSI        Pt directed care and transition to hospice when able    I have performed a physical exam and reviewed and updated ROS and Plan today (1/26/2025). In review of yesterday's note (1/25/2025), there are no changes except as documented above.     Discussed patient condition and risk of morbidity and/or mortality with Family, RN, RT, Charge nurse / hot rounds, and Patient

## 2025-01-26 NOTE — CARE PLAN
The patient is Watcher - Medium risk of patient condition declining or worsening    Shift Goals  Clinical Goals: The patient will express a sense of comfort and reassurance related to their illness and treatment plan. The patient will maintain oxygen saturation levels above 90% with the assistance of high-flow oxygen. The patient will demonstrate an understanding of their condition, treatment plan, and signs of respiratory distress or infection.  Patient Goals: to get off high flow N/S  Family Goals: updadtes      Problem: Knowledge Deficit - Standard  Goal: Patient and family/care givers will demonstrate understanding of plan of care, disease process/condition, diagnostic tests and medications  Outcome: Progressing     Problem: Skin Integrity  Goal: Skin integrity is maintained or improved  Outcome: Progressing     Problem: Fall Risk  Goal: Patient will remain free from falls  Outcome: Progressing     Problem: Pain - Standard  Goal: Alleviation of pain or a reduction in pain to the patient’s comfort goal  Outcome: Progressing     Problem: Hemodynamics  Goal: Patient's hemodynamics, fluid balance and neurologic status will be stable or improve  Outcome: Progressing     Problem: Respiratory:  Goal: Ability to achieve and maintain a regular respiratory rate will improve  Outcome: Progressing  Goal: Ability to maintain a clear airway will improve  Outcome: Progressing  Goal: Ability to maintain normal pulse oximetry readings will improve  Outcome: Progressing

## 2025-01-26 NOTE — DOCUMENTATION QUERY
"                                                                         [facility]                                                                       Query Response Note      PATIENT:               CHILANGO BAKER  ACCT #:                  [id number]  MRN:                     [id number]  :                      1960  ADMIT DATE:       2024 4:46 PM  DISCH DATE:          RESPONDING  PROVIDER #:        [id number]           QUERY TEXT:    Encephalopathy is documented in the critical care consult note on 1/3 from Dr. Shi.  Please specify type if known:    The patient's Clinical Indicators include:  Clinical Findings:    1/3- Critical card consult- Dr. Shi-  \"This evening she developed encephalopathy and blood gas was obtained, 7.14/100.  Intensivist was consulted for further management.\"    \"Acute hypercapneic respiratory failure multifactorial - in part due to sedation/analgesia - she received hydrocodone, morphine, fentanyl, and lorazepam since noon today.  ABG 7.1/100.  She  was given 0.4mg and then 0.2mg naloxone with improvement in mentation and respiratory drive.  XR notable for moderate R pneumothorax.  Chest tube placed with near resolution of pneumothorax.  She was placed on NIV for ~30 minutes with repeat blood gas  7.35/55.\"    1/3- Rapid response note Dr. Downs:  \"Upon assessment, the patient was found to be obtunded, unresponsive to sternal rubs, hypertensive with a systolic blood pressure (SBP) of 210 mmHg, and diaphoretic. Pupillary size was decreased bilaterally with no response to light.\"  \"initial ABG showed a pH of 7.142, pCO2 of 99.9 mmHg.\"  \"Dr. Shi from the ICU was consulted and recommended administration of Narcan. An initial dose of 0.4 mg was administered, resulting in a positive response, with the patient becoming slightly more awake and responsive to verbal commands. A subsequent  dose of 0.2 mg of Narcan was given, leading to further improvement. \"    1/3 ABG:  pH- " 7.13/PCO2- 98.0/PO2- 92.1/HCO3- 32/ BE- 0  1/4 ABG: pH- 7.24/PCO2- 72.9/PO2-65.4/HCO3- 31/BE- 2    Risk factors: Encephalopathy, acute hypercapnic respiratory failure; hydrocodone, morphine, fentanyl and lorazepam received, pneumothorax    Treatment: Narcan 0.4 mg IV given 1/3 at 1955;  Narcan 0.2 mg IV given 1/3 at 2013; Narcan 0.4 mg IV given 1/4 at 0130; NIV, O2, ABG ; intubated 1/4 at 0225; chest tube placed    Please contact me with any questions:    Kay MARCUS RN CCDS  [facility]  [email or web address]  Kay Maldonado via Voalte  Options provided:   -- Encephalopathy due to medications or drugs   -- Toxic encephalopathy   -- Metabolic encephalopathy   -- Other type of encephalopathy   -- Other explanation, (please specify other explanation)      Query created by: Kay Maldonado on 1/17/2025 7:57 AM    RESPONSE TEXT:    Metabolic encephalopathy          Electronically signed by:  MARY JONES 1/26/2025 7:56 AM

## 2025-01-26 NOTE — CARE PLAN
Problem: Knowledge Deficit - Standard  Goal: Patient and family/care givers will demonstrate understanding of plan of care, disease process/condition, diagnostic tests and medications  Outcome: Progressing  Note: Discussed POC and medications with patient.  Patient verbalized understanding     Problem: Pain - Standard  Goal: Alleviation of pain or a reduction in pain to the patient’s comfort goal  Outcome: Progressing  Note: POC regarding pain management discussed with pt.  Pt will be medicated for pain per MAR     The patient is Stable - Low risk of patient condition declining or worsening    Shift Goals  Clinical Goals: spO2 >90%, pain management , comfort  Patient Goals: rest, comfort, pain control  Family Goals: Visitation, updates

## 2025-01-26 NOTE — PROGRESS NOTES
4 Eyes Skin Assessment Completed by VENUS Castillo RN and Marva PERALTA RN.    Head WDL  Ears Redness and Non-Blanching  Nose WDL  Mouth WDL  Neck WDL  Breast/Chest :radiation lesionRedness and Blanching    Shoulder Blades WDL  Spine WDL, sx site    (R) Arm/Elbow/Hand Bruising  (L) Arm/Elbow/Hand Bruising  Abdomen WDL  Groin WDL  Scrotum/Coccyx/Buttocks Redness and Non-Blanching    (R) Leg WDL brown discoloration  (L) Leg WDL brown discoloration  (R) Heel/Foot/Toe Redness, Blanching, Discoloration, and Boggy          (L) Heel/Foot/Toe Redness, Blanching, and Boggy          Devices In Places ECG, Blood Pressure Cuff, Pulse Ox, SCD's, and Nasal Cannula      Interventions In Place NC W/Ear Foams, InterDry, Heel Mepilex, Sacral Mepilex, Pillows, Q2 Turns, Low Air Loss Mattress, Barrier Cream, Heels Loaded W/Pillows, and Pressure Redistribution Mattress    Possible Skin Injury Yes    Pictures Uploaded Into Epic Yes  Wound Consult Placed Yes  RN Wound Prevention Protocol Ordered Yes

## 2025-01-27 LAB
ALBUMIN SERPL BCP-MCNC: 3 G/DL (ref 3.2–4.9)
ALBUMIN/GLOB SERPL: 0.9 G/DL
ALP SERPL-CCNC: 106 U/L (ref 30–99)
ALT SERPL-CCNC: 30 U/L (ref 2–50)
ANION GAP SERPL CALC-SCNC: 10 MMOL/L (ref 7–16)
AST SERPL-CCNC: 209 U/L (ref 12–45)
BILIRUB SERPL-MCNC: 0.7 MG/DL (ref 0.1–1.5)
BUN SERPL-MCNC: 26 MG/DL (ref 8–22)
CALCIUM ALBUM COR SERPL-MCNC: 10.1 MG/DL (ref 8.5–10.5)
CALCIUM SERPL-MCNC: 9.3 MG/DL (ref 8.5–10.5)
CHLORIDE SERPL-SCNC: 85 MMOL/L (ref 96–112)
CO2 SERPL-SCNC: 38 MMOL/L (ref 20–33)
CREAT SERPL-MCNC: 0.7 MG/DL (ref 0.5–1.4)
GFR SERPLBLD CREATININE-BSD FMLA CKD-EPI: 96 ML/MIN/1.73 M 2
GLOBULIN SER CALC-MCNC: 3.4 G/DL (ref 1.9–3.5)
GLUCOSE SERPL-MCNC: 127 MG/DL (ref 65–99)
POTASSIUM SERPL-SCNC: 4 MMOL/L (ref 3.6–5.5)
PROT SERPL-MCNC: 6.4 G/DL (ref 6–8.2)
SODIUM SERPL-SCNC: 133 MMOL/L (ref 135–145)

## 2025-01-27 PROCEDURE — 770000 HCHG ROOM/CARE - INTERMEDIATE ICU *

## 2025-01-27 PROCEDURE — 80053 COMPREHEN METABOLIC PANEL: CPT

## 2025-01-27 PROCEDURE — A9270 NON-COVERED ITEM OR SERVICE: HCPCS | Performed by: HOSPITALIST

## 2025-01-27 PROCEDURE — 700101 HCHG RX REV CODE 250: Performed by: HOSPITALIST

## 2025-01-27 PROCEDURE — 700111 HCHG RX REV CODE 636 W/ 250 OVERRIDE (IP): Mod: JZ | Performed by: HOSPITALIST

## 2025-01-27 PROCEDURE — 700102 HCHG RX REV CODE 250 W/ 637 OVERRIDE(OP): Performed by: HOSPITALIST

## 2025-01-27 PROCEDURE — 99232 SBSQ HOSP IP/OBS MODERATE 35: CPT | Performed by: HOSPITALIST

## 2025-01-27 PROCEDURE — 94640 AIRWAY INHALATION TREATMENT: CPT

## 2025-01-27 RX ORDER — MORPHINE SULFATE 4 MG/ML
4 INJECTION INTRAVENOUS ONCE
Status: DISPENSED | OUTPATIENT
Start: 2025-01-27 | End: 2025-01-28

## 2025-01-27 RX ADMIN — RIVAROXABAN 10 MG: 10 TABLET, FILM COATED ORAL at 17:39

## 2025-01-27 RX ADMIN — ONDANSETRON 4 MG: 2 INJECTION INTRAMUSCULAR; INTRAVENOUS at 11:28

## 2025-01-27 RX ADMIN — ONDANSETRON 4 MG: 2 INJECTION INTRAMUSCULAR; INTRAVENOUS at 05:47

## 2025-01-27 RX ADMIN — MEGESTROL ACETATE 20 MG: 20 TABLET ORAL at 12:05

## 2025-01-27 RX ADMIN — FOLIC ACID 1 MG: 1 TABLET ORAL at 11:28

## 2025-01-27 RX ADMIN — ALUMINUM HYDROXIDE, MAGNESIUM HYDROXIDE, AND DIMETHICONE 10 ML: 400; 400; 40 SUSPENSION ORAL at 03:37

## 2025-01-27 RX ADMIN — ROPINIROLE HYDROCHLORIDE 1 MG: 0.5 TABLET, FILM COATED ORAL at 20:59

## 2025-01-27 RX ADMIN — ONDANSETRON 4 MG: 2 INJECTION INTRAMUSCULAR; INTRAVENOUS at 17:39

## 2025-01-27 RX ADMIN — MEGESTROL ACETATE 20 MG: 20 TABLET ORAL at 17:39

## 2025-01-27 RX ADMIN — Medication 5 MG: at 20:59

## 2025-01-27 RX ADMIN — ONDANSETRON 4 MG: 2 INJECTION INTRAMUSCULAR; INTRAVENOUS at 00:38

## 2025-01-27 RX ADMIN — HYDROCODONE BITARTRATE AND ACETAMINOPHEN 1 TABLET: 10; 325 TABLET ORAL at 07:53

## 2025-01-27 RX ADMIN — ALUMINUM HYDROXIDE, MAGNESIUM HYDROXIDE, AND DIMETHICONE 10 ML: 400; 400; 40 SUSPENSION ORAL at 17:47

## 2025-01-27 ASSESSMENT — PAIN DESCRIPTION - PAIN TYPE
TYPE: ACUTE PAIN
TYPE: CHRONIC PAIN
TYPE: CHRONIC PAIN;ACUTE PAIN
TYPE: CHRONIC PAIN;ACUTE PAIN
TYPE: ACUTE PAIN
TYPE: ACUTE PAIN
TYPE: CHRONIC PAIN;ACUTE PAIN

## 2025-01-27 ASSESSMENT — LIFESTYLE VARIABLES: SUBSTANCE_ABUSE: 0

## 2025-01-27 ASSESSMENT — FIBROSIS 4 INDEX: FIB4 SCORE: 3.82

## 2025-01-27 ASSESSMENT — ENCOUNTER SYMPTOMS
NERVOUS/ANXIOUS: 0
COUGH: 1
HEADACHES: 0
BACK PAIN: 0
FEVER: 0
SORE THROAT: 0
SPEECH CHANGE: 0
SHORTNESS OF BREATH: 1
ABDOMINAL PAIN: 0
SPUTUM PRODUCTION: 0
DIZZINESS: 0
NAUSEA: 0

## 2025-01-27 NOTE — DISCHARGE PLANNING
Case Management Discharge Planning    Admission Date: 12/25/2024  GMLOS: 10.1  ALOS: 33    6-Clicks ADL Score: 16  6-Clicks Mobility Score: 11      Anticipated Discharge Dispo: Discharge Disposition: D/T to SNF with Medicare cert in anticipation of skilled care (03)    This RN CM completed chart review, patient is open to hospice. [facility] was consulted for consideration for GIP and patient does not meet criteria but will qualify for home with hospice.     This RN CM dicussed case in complex discharge meeting and was updated that with patients going home with hospice there is not a need for the patient to stay in the hospital for weaning down oxygen. Discussed following up with patient A/Ox4 for hospice choice.     This RN CM went to bedside and spoke with patient she stated that it is not a good time to discuss hospice choice and that her daughter is sleeping. Patient asked for daughter to not be woken up and to return later today for hospice choice.     This RN CM touched base with ICU RN whom will update this RN CM when family member is awake for follow up on hospice choice.     Plan is for Hospice at [facility], patient will need DME such as Home O2 and Hospital Bed arranged for home with Hospice.     This RN CM followed up with patient and daughter at bedside and they care considering [facility] vs [facility]. Information requested from patient and liaison contact provided for [facility]. This RN CM received contact information for Kyrie Pedro son of patient [phone].     This RN CM called Kyrie Vasquez to follow up on address as sister at bedside is not sure on exact address for brother's home. Kyrie did not answer phone voicemail left for follow up.

## 2025-01-27 NOTE — PROGRESS NOTES
Palliative Care   CODE STATUS changed to DNR/DNI 1/24.  Patient on IMCU on HFNC.  Patient's daughter Juana bedside initially sleeping but awoke during brief conversation.  Patient would like to have a goals of care discussion with me but feels too tired today.  She would like me to come back at 1 PM tomorrow.  Juana confirms she is available.    Petra Agustin A.P.R.N.  Palliative Care Nurse Practitioner  [phone]

## 2025-01-27 NOTE — DIETARY
Nutrition Update:    Day 33 of admit.  Asha Hannon is a 64 y.o. female with admitting DX of Breast cancer metastasized to bone, unspecified laterality    Patient being followed to optimize nutrition.    Current Diet: Regular with oral nutrition supplements    Problem: Nutritional:  Goal: Achieve adequate nutritional intake  Description: Patient will consume 50% of meals  Outcome: Not progressing    PO per flow sheet has been very poor, chronically sub-optimal with meals consistently consumed at <25% (many refused or 0% consumed).  I attempted to meet with pt at bedside today, however pt was busy with staff during time of visit.   Pt was assessed for tube feeding 1/9.  Diet was advanced to L6/L2 1/16, then Regular with thin liquids 1/24.  Supplement order in place to provide Glucerna BID and Magic cups BID.  Pt transferred from the floor to the ICU 1/23; then to CU 1/26.  Pt's code status was changed to DNR/DNI 1/24.  Palliative met with pt and daughter today. Plan for follow up meeting tomorrow.  Wt decreased 20 kg from admit wt, though per flow sheet, pt is -7L of fluid.  1+ edema noted to all extremities.  Recommend continuing to optimize nutrition with oral nutrition supplements and diet per MD.  Nutrition support to resume if within pt's wishes to better meet estimated nutrition needs.    RD will follow.

## 2025-01-27 NOTE — CARE PLAN
The patient is Watcher - Medium risk of patient condition declining or worsening    Shift Goals  Clinical Goals: decrease o2 demands, hemodynamic stability, pain control  Patient Goals: get off high flow  Family Goals: updates    Progress made toward(s) clinical / shift goals:        Problem: Knowledge Deficit - Standard  Goal: Patient and family/care givers will demonstrate understanding of plan of care, disease process/condition, diagnostic tests and medications  Description: Target End Date:  1-3 days or as soon as patient condition allows    Document in Patient Education    1.  Patient and family/caregiver oriented to unit, equipment, visitation policy and means for communicating concern  2.  Complete/review Learning Assessment  3.  Assess knowledge level of disease process/condition, treatment plan, diagnostic tests and medications  4.  Explain disease process/condition, treatment plan, diagnostic tests and medications  Outcome: Progressing     Problem: Skin Integrity  Goal: Skin integrity is maintained or improved  Description: Target End Date:  Prior to discharge or change in level of care    Document interventions on Skin Risk/Raj flowsheet groups and corresponding LDA    1.  Assess and monitor skin integrity, appearance and/or temperature  2.  Assess risk factors for impaired skin integrity and/or pressures ulcers  3.  Implement precautions to protect skin integrity in collaboration with interdisciplinary team  4.  Implement pressure ulcer prevention protocol if at risk for skin breakdown  5.  Confirm wound care consult if at risk for skin breakdown  6.  Ensure patient use of pressure relieving devices  (Low air loss bed, waffle overlay, heel protectors, ROHO cushion, etc)  Outcome: Progressing     Problem: Fall Risk  Goal: Patient will remain free from falls  Description: Target End Date:  Prior to discharge or change in level of care    Document interventions on the Saundra Garcia Fall Risk  Assessment    1.  Assess for fall risk factors  2.  Implement fall precautions  Outcome: Progressing     Problem: Pain - Standard  Goal: Alleviation of pain or a reduction in pain to the patient’s comfort goal  Description: Target End Date:  Prior to discharge or change in level of care    Document on Vitals flowsheet    1.  Document pain using the appropriate pain scale per order or unit policy  2.  Educate and implement non-pharmacologic comfort measures (i.e. relaxation, distraction, massage, cold/heat therapy, etc.)  3.  Pain management medications as ordered  4.  Reassess pain after pain med administration per policy  5.  If opiods administered assess patient's response to pain medication is appropriate per POSS sedation scale  6.  Follow pain management plan developed in collaboration with patient and interdisciplinary team (including palliative care or pain specialists if applicable)  Outcome: Progressing     Problem: Hemodynamics  Goal: Patient's hemodynamics, fluid balance and neurologic status will be stable or improve  Description: Target End Date:  Prior to discharge or change in level of care    Document on Assessment and I/O flowsheet templates    1.  Monitor vital signs, pulse oximetry and cardiac monitor per provider order and/or policy  2.  Maintain blood pressure per provider order  3.  Hemodynamic monitoring per provider order  4.  Manage IV fluids and IV infusions  5.  Monitor intake and output  6.  Daily weights per unit policy or provider order  7.  Assess peripheral pulses and capillary refill  8.  Assess color and body temperature  9.  Position patient for maximum circulation/cardiac output  10. Monitor for signs/symptoms of excessive bleeding  11. Assess mental status, restlessness and changes in level of consciousness  12. Monitor temperature and report fever or hypothermia to provider immediately. Consideration of targeted temperature management.  Outcome: Progressing     Problem: Fluid  Volume  Goal: Fluid volume balance will be maintained  Description: Target End Date:  Prior to discharge or change in level of care    Document on I/O flowsheet    1.  Monitor intake and output as ordered  2.  Promote oral intake as appropriate  3.  Report inadequate intake or output to physician  4.  Administer IV therapy as ordered  5.  Weights per provider order  6.  Assess for signs and symptoms of bleeding  7.  Monitor for signs of fluid overload (respiratory changes, edema, weight gain, increased abdominal girth)  8.  Monitor of signs for inadequate fluid volume (poor skin turgor, dry mucous membranes)  9.  Instruct patient on adherence to fluid restrictions  Outcome: Progressing     Problem: Respiratory  Goal: Patient will achieve/maintain optimum respiratory ventilation and gas exchange  Description: Target End Date:  Prior to discharge or change in level of care    Document on Assessment flowsheet    1.  Assess and monitor rate, rhythm, depth and effort of respiration  2.  Breath sounds assessed qshift and/or as needed  3.  Assess O2 saturation, administer/titrate oxygen as ordered  4.  Position patient for maximum ventilatory efficiency  5.  Turn, cough, and deep breath with splinting to improve effectiveness  6.  Collaborate with RT to administer medication/treatments per order  7.  Encourage use of incentive spirometer and encourage patient to cough after use and utilize splinting techniques if applicable  8.  Airway suctioning  9.  Monitor sputum production for changes in color, consistency and frequency  10. Perform frequent oral hygiene  11. Alternate physical activity with rest periods  Outcome: Progressing     Problem: Physical Regulation  Goal: Diagnostic test results will improve  Description: Target End Date:  Prior to discharge or change in level of care    1.  Monitor lactic acid levels  2.  Monitor ABG's  3.  Monitor diagnostic test results  Outcome: Progressing  Goal: Signs and symptoms of  infection will decrease  Description: Target End Date:  Prior to discharge or change in level of care    1.  Remove potential routes of infection, such as central lines and urinary catheter  2.  Follow facility protocol for changing IV tubing and sites  3.  Collaborate with Infectious Disease  4.  Antibiotic therapy per provider order  5.  Note drug effects and monitor for antibiotic toxicity  Outcome: Progressing     Problem: Respiratory:  Goal: Ability to achieve and maintain a regular respiratory rate will improve  Outcome: Progressing  Goal: Ability to maintain a clear airway will improve  Outcome: Progressing  Goal: Ability to maintain normal pulse oximetry readings will improve  Outcome: Progressing  Goal: Ability to maintain arterial blood gas levels within normal range will improve  Outcome: Progressing  Goal: Verbalizations of increased ease of respirations will increase  Outcome: Progressing  Goal: Complications related to the disease process, condition or treatment will be avoided or minimized  Outcome: Progressing     Problem: Knowledge Deficit:  Goal: Knowledge of diagnostic tests will improve  Outcome: Progressing  Goal: Knowledge of the prescribed therapeutic regimen will improve  Outcome: Progressing       Patient is not progressing towards the following goals:

## 2025-01-27 NOTE — CARE PLAN
The patient is Unstable - High likelihood or risk of patient condition declining or worsening    Shift Goals  Clinical Goals: decrease oxygen demand and pain control  Patient Goals: pain control  Family Goals: updates    Progress made toward(s) clinical / shift goals:    Problem: Knowledge Deficit - Standard  Goal: Patient and family/care givers will demonstrate understanding of plan of care, disease process/condition, diagnostic tests and medications  Outcome: Progressing     Problem: Skin Integrity  Goal: Skin integrity is maintained or improved  Outcome: Progressing     Problem: Fall Risk  Goal: Patient will remain free from falls  Outcome: Progressing     Problem: Pain - Standard  Goal: Alleviation of pain or a reduction in pain to the patient’s comfort goal  Outcome: Progressing     Problem: Hemodynamics  Goal: Patient's hemodynamics, fluid balance and neurologic status will be stable or improve  Outcome: Progressing     Problem: Fluid Volume  Goal: Fluid volume balance will be maintained  Outcome: Progressing     Problem: Respiratory  Goal: Patient will achieve/maintain optimum respiratory ventilation and gas exchange  Outcome: Progressing     Problem: Physical Regulation  Goal: Diagnostic test results will improve  Outcome: Progressing  Goal: Signs and symptoms of infection will decrease  Outcome: Progressing     Problem: Respiratory:  Goal: Ability to achieve and maintain a regular respiratory rate will improve  Outcome: Progressing  Goal: Ability to maintain a clear airway will improve  Outcome: Progressing  Goal: Ability to maintain normal pulse oximetry readings will improve  Outcome: Progressing  Goal: Ability to maintain arterial blood gas levels within normal range will improve  Outcome: Progressing  Goal: Verbalizations of increased ease of respirations will increase  Outcome: Progressing  Goal: Complications related to the disease process, condition or treatment will be avoided or minimized  Outcome:  Progressing     Problem: Knowledge Deficit:  Goal: Knowledge of diagnostic tests will improve  Outcome: Progressing  Goal: Knowledge of the prescribed therapeutic regimen will improve  Outcome: Progressing       Patient is not progressing towards the following goals:

## 2025-01-27 NOTE — PROGRESS NOTES
[facility] Medicine Daily Progress Note    Date of Service  1/27/2025    Chief Complaint  Weakness, short of breath with stage IV breast cancer.    Hospital Course  64 y.o. female who presented 12/25/2024 with history of stage IV breast cancer metastatic disease, mets to the lungs and now spine, on previous oral chemotherapy, admitted on 12/25/2024 with weakness, shortness of breath and back pain, subsequently a CT chest abdomen showed moderate-sized pleural effusions, worsening hepatic metastatic disease with new abdominal lymphadenopathy, thoracic adenopathy, diffuse osseous metastatic disease with a T5 pathologic fracture, the patient was admitted, undergo a right-sided thoracentesis on 12/27, malignant cells identified in the fluid, and MRI showed multiple thoracic and lumbar metastasized lesions, the patient was taken to the operating room on 1 1 where she had a T4 T6 laminectomy, following the patient developed a right-sided pneumothorax, requiring chest tube placement, left-sided thoracentesis was performed, the patient following his respiratory difficulty requiring BiPAP treatment and subsequently required to be intubated, the patient had a right-sided PleurX and small bore catheter placed and had frequent thoracentesis since placement, the patient remained intubated until 1/15, where the patient then was extubated and had a extensive talk with the ICU team in terms of her overall status, metastatic malignancy and respiratory failure, the patient stated that she wanted to keep fighting and is wishing to hopefully reach outpatient status where she can fail to be treated for her malignancy.  If she would have additional respiratory failure she states that she would like to be reintubated and possibly even undergo a tracheostomy to stay alive.  The patient tolerated extubation, she is alert and x 4, she is in a sinus rhythm in the 50s to 80s, blood pressure in the 90s to 100s, the patient was n.p.o., underwent  evaluation today by speech therapy and is cleared for mildly thick liquids, soft and bite-size solids, the right chest tube had put out 100 cc overnight, the patient remains on DVT prophylaxis with Lovenox, Pepcid for GI prophylaxis,  Laboratory data showed white to count 13.9 hemoglobin 9.3 platelet count 563 sodium 136 potassium 4.5 chloride 94 bicarb 35 glucose 155 BUN 27 creatinine 0.56  Chest x-ray with pulmonary edema and infiltrates, stable, layering left pleural effusion, stable, right-sided small bore chest tube  The patient has been followed by palliative care, she currently confirms a DNR I okay status, the patient lives with her daughter  The patient has been consulted on by palliative care  The patient had a consult from her primary oncology team on 1/17/2025  Radiation oncology was consulted as well    Interval Problem Update  1/27/25: Awake on HFNC. Speech clear and in short sentences. Daughter at bed side. Case management gave patient and daughter options for hospice.      I have discussed this patient's plan of care and discharge plan at IDT rounds today with Case Management, Nursing, Nursing leadership, and other members of the IDT team.    Consultants/Specialty  critical care    Code Status  DNAR/DNI    Disposition  The patient is not medically cleared for discharge to home or a post-acute facility.    I have placed the appropriate orders for post-discharge needs.    Review of Systems  Review of Systems   Constitutional:  Positive for malaise/fatigue. Negative for fever.   HENT:  Negative for sore throat.    Respiratory:  Positive for cough and shortness of breath. Negative for sputum production.    Cardiovascular:  Negative for chest pain and leg swelling.   Gastrointestinal:  Negative for abdominal pain and nausea.   Musculoskeletal:  Negative for back pain.   Neurological:  Negative for dizziness, speech change and headaches.   Psychiatric/Behavioral:  Negative for substance abuse. The patient  is not nervous/anxious.         Physical Exam  Temp:  [36.1 °C (97 °F)-36.6 °C (97.8 °F)] 36.1 °C (97 °F)  Pulse:  [67-89] 73  Resp:  [11-49] 17  BP: (102-136)/(55-93) 104/77  SpO2:  [89 %-98 %] 94 %    Physical Exam  Vitals reviewed.   Constitutional:       Appearance: She is ill-appearing.   HENT:      Head: Normocephalic.   Eyes:      Conjunctiva/sclera: Conjunctivae normal.   Cardiovascular:      Rate and Rhythm: Normal rate and regular rhythm.      Heart sounds: No murmur heard.  Pulmonary:      Breath sounds: Examination of the right-upper field reveals decreased breath sounds. Examination of the right-middle field reveals decreased breath sounds. Decreased breath sounds present.   Musculoskeletal:      Cervical back: Neck supple.   Lymphadenopathy:      Cervical: No cervical adenopathy.   Neurological:      Mental Status: She is alert.         Fluids    Intake/Output Summary (Last 24 hours) at 1/27/2025 0717  Last data filed at 1/26/2025 1000  Gross per 24 hour   Intake 50 ml   Output 0 ml   Net 50 ml        Laboratory      Recent Labs     01/25/25  0224 01/27/25  0330   SODIUM 135 133*   POTASSIUM 3.4* 4.0   CHLORIDE 89* 85*   CO2 36* 38*   GLUCOSE 117* 127*   BUN 31* 26*   CREATININE 0.70 0.70   CALCIUM 8.9 9.3                   Imaging  DX-CHEST-LIMITED (1 VIEW)   Final Result      Slightly progressed right-sided airspace disease. Otherwise, stable as above.      DX-CHEST-PORTABLE (1 VIEW)   Final Result      1.  Mild worsening hypoinflation.   2.  No other significant change from prior exam.         DX-CHEST-PORTABLE (1 VIEW)   Final Result      1.  Interval removal of right-sided tunneled pleural catheter. No evidence of pneumothorax.   2.  Stable small bilateral pleural effusions.   3.  Increased patchy bilateral interstitial and airspace opacities.      IR-LUNG & PLEURAL CATH REMOVAL   Final Result      Successful removal of tunneled pleural catheter as described.         DX-CHEST-PORTABLE (1 VIEW)    Final Result         1.  Pulmonary edema and/or infiltrates are identified, which are stable since the prior exam.   2.  Small bilateral pleural effusions, increased on the right since prior study   3.  Cardiomegaly      DX-CHEST-PORTABLE (1 VIEW)   Final Result         1.  Pulmonary edema and/or infiltrates are identified, which are stable since the prior exam.   2.  Small left pleural effusion   3.  Cardiomegaly      DX-CHEST-PORTABLE (1 VIEW)   Final Result         1. Small right apical pneumothorax now visible. Right chest tube unchanged.      2. Persistent infiltrates and small effusions.                     DX-CHEST-PORTABLE (1 VIEW)   Final Result      No significant change from prior exam.      DX-CHEST-PORTABLE (1 VIEW)   Final Result         1.  Pulmonary edema and/or infiltrates are identified, which are stable since the prior exam.   2.  Cardiomegaly      DX-CHEST-PORTABLE (1 VIEW)   Final Result      1.  Interval improvement of pulmonary vascular congestion and pulmonary edema.   2.  No other significant change from prior exam.         DX-CHEST-PORTABLE (1 VIEW)   Final Result         1.  Pulmonary edema and/or infiltrates are identified, which appear somewhat increased since the prior exam.   2.  Layering left pleural effusion, stable   3.  Cardiomegaly      DX-CHEST-PORTABLE (1 VIEW)   Final Result         1.  Pulmonary edema and/or infiltrates are identified, which are stable since the prior exam.   2.  Layering left pleural effusion, stable      DX-CHEST-PORTABLE (1 VIEW)   Final Result         1.  Pulmonary edema and/or infiltrates are identified, which are stable since the prior exam.   2.  Layering left pleural effusion, stable      DX-CHEST-PORTABLE (1 VIEW)   Final Result         1.  Pulmonary edema and/or infiltrates are identified, which are stable since the prior exam.   2.  Trace bilateral pleural effusions, stable      DX-CHEST-PORTABLE (1 VIEW)   Final Result         1.  Pulmonary edema  and/or infiltrates are identified, which are stable since the prior exam.   2.  Small left pleural effusion, stable      DX-CHEST-PORTABLE (1 VIEW)   Final Result      1.  Support equipment is unchanged.      2.  Cardiomegaly with moderate perihilar pulmonary edema unchanged from previous exam.      3.  Small bilateral pleural effusions left greater than right.      DX-CHEST-PORTABLE (1 VIEW)   Final Result      1.  Tubes and lines unchanged in position.      2.  Marked bilateral perihilar pulmonary edema which has improved slightly since previous exam.      3.  Small bilateral pleural effusions left greater than right.      IR-INSERT PLEURAL CATH W/ CUFF   Final Result      1.  Ultrasound and fluoroscopic guided placement of a RIGHT HEMITHORAX PleurX (Aspira type) tunneled pleural drainage catheter.   2.  The catheter can be used immediately with drainage of pleural effusion as needed for comfort, as per package insert instructions.   3.  If the catheter becomes dislodged, do not reinsert it.  Place sterile dressing over the entry wound site.  May consult primary care physician or oncologist as to whether there is clinical indication for PleurX replacement.      DX-CHEST-PORTABLE (1 VIEW)   Final Result      1.  Tubes and lines unchanged in position.      2.   Extensive bilateral perihilar airspace opacities consistent with pneumonitis and/or pulmonary edema.      3.  Mild to moderate bilateral pleural effusions right greater than left      DX-CHEST-PORTABLE (1 VIEW)   Final Result         1. Increasing moderate right and small left pleural effusions. No change edema or infiltrates.      2. ETT 5 cm above masoud. NG tube in stomach. Right central line.                     DX-CHEST-PORTABLE (1 VIEW)   Final Result      Stable diffuse bilateral pulmonary infiltrates.      DX-ABDOMEN FOR TUBE PLACEMENT   Final Result      NG tube tip overlies the gastric body.      DX-CHEST-PORTABLE (1 VIEW)   Final Result      1.   Supportive tubing as described above.   2.  Apparent increasing RIGHT pleural fluid, possibly due to change in position.   3.  Improvement of LEFT lung base consolidation.   4.  No other significant change from prior exam.         DX-CHEST-PORTABLE (1 VIEW)   Final Result         1. Stable right IJ central catheter. Right chest tube has been removed. No pneumothorax.   2. Unchanged bilateral pulmonary opacities and associated pleural effusions.         DX-CHEST-PORTABLE (1 VIEW)   Final Result      1.  Unchanged bilateral pulmonary opacities. Stable small bilateral pleural effusions.   2.  Right pigtail catheter remains. No pneumothorax.      DX-CHEST-PORTABLE (1 VIEW)   Final Result      Removal of large bore right chest tube with stable right pigtail chest tube. Questionable tiny right apical pneumothorax.      DX-CHEST-PORTABLE (1 VIEW)   Final Result      No significant interval change.      DX-CHEST-PORTABLE (1 VIEW)   Final Result      1.  Persistent bilateral pulmonary opacities, stable to slightly worse, although changes may be related to differences in imaging technique. No large pleural effusions.   2.  Well-positioned right IJ central catheter. No pneumothorax.   3.  Stable other lines and tubes.      DX-CHEST-LIMITED (1 VIEW)   Final Result         1.  No change in 2 separate right-sided chest tubes.      2.  No pneumothorax identified.      3.  Bilateral pulmonary opacifications again identified which are slightly less prominent.      DX-CHEST-PORTABLE (1 VIEW)   Final Result         1. No change mild infiltrates.      2. Right chest tubes. No pneumothorax.      3. ETT out. NG tube and jugular line remain.                     DX-CHEST-PORTABLE (1 VIEW)   Final Result         1. No significant interval change.      DX-CHEST-LIMITED (1 VIEW)   Final Result         1.  Findings on chest radiograph appear stable since the prior radiograph.  No new abnormalities are identified.      2.  2 separate  right-sided chest tubes are again identified with no change in position.      3.  No pneumothorax identified.      DX-CHEST-LIMITED (1 VIEW)   Final Result         1.  No pneumothorax identified.      2.  2 right-sided chest tubes again identified.      CT-CTA CHEST PULMONARY ARTERY W/ RECONS   Final Result         1. No pulmonary embolus.      2. Medial right lung base consolidation felt to represent complete collapse of the right middle lobe. Mild to moderate mid and upper lung pneumonia infiltrates remain.      3. Right chest tubes. Small right pneumothorax and small right effusion. Improving small 2.5 cm left pleural effusion since 12/25/2024.      4. Extensive metastatic lesions including adenopathy, liver lesions, right breast nodule and bone lesions. Thoracic spine fusion hardware remains.                  CT-HEAD W/O   Final Result         1. No acute process in the brain evident.      2. Fluid in sphenoid sinus.      3. Right convexity midthoracic scoliosis and unchanged from 12/31/2024 MRI.               DX-ABDOMEN FOR TUBE PLACEMENT   Final Result         1. NG tube in stomach.                     DX-CHEST-FOR LINE PLACEMENT Perform procedure in: Patient's Room   Final Result         1. Small right apical pneumothorax remains. 2 right chest tubes.      2. ETT 3 cm above masoud. NG tube in stomach. Right jugular line in SVC. No complication evident.      3. Mild infiltrates and small right effusion.                  DX-CHEST-LIMITED (1 VIEW)   Final Result         1. 2nd right chest tube placed with near complete evacuation of right pneumothorax.      2. Mild infiltrates, basilar atelectasis and small right effusion remain.      3. Endotracheal tube tip at about T5 and about 2 cm above the masoud. The masoud and tip of the endotracheal tube are difficult to visualize in part because of overlying thoracic spine fusion hardware.                  DX-CHEST-PORTABLE (1 VIEW)   Final Result         Enlarging  right pneumothorax with collapsing of the right lung.      Preliminary findings texted to Dr. STANLEY FISHER in the Emergency Department via Voalte on 1/4/2025 1:50 AM         DX-CHEST-LIMITED (1 VIEW)   Final Result         Interval placement of right pigtail chest tube. Small right residual pneumothorax.      DX-CHEST-PORTABLE (1 VIEW)   Final Result   Addendum (preliminary) 1 of 1      CRITICAL RESULT READ BACK: Preliminary findings discussed with and    critical read back performed by Dr. Downs via telephone on 1/3/2025 8:03    PM      Final      1.  Interval worsening in right pneumothorax, now moderate.   2.  Persistent interstitial and alveolar pulmonary opacities bilaterally. Possible bilateral pleural effusions, right greater than left.            DX-CHEST-PORTABLE (1 VIEW)   Final Result         1. Slightly decreased right pneumothorax.   2. Stable bilateral interstitial and airspace opacification.   3. Stable right pleural effusion.      DX-CHEST-PORTABLE (1 VIEW)   Final Result         1. Left pleural effusion has decreased in size status post thoracentesis. There is no left-sided pneumothorax.   2. There is persistent right basilar opacity with a new right apical pneumothorax.      Findings were communicated to the ordering provider at the time of dictation via Voalte.      US-THORACENTESIS PUNCTURE LEFT   Final Result      1. Ultrasound guided left sided therapeutic thoracentesis.      2. 950 mL of fluid withdrawn.      DX-CHEST-PORTABLE (1 VIEW)   Final Result      1. New postsurgical changes in the upper thoracic spine.   2. Worsening bilateral pleural effusions, pulmonary vascular congestion and interstitial edema.      DX-PORTABLE FLUORO > 1 HOUR   Final Result      Portable fluoroscopy utilized for 1 minute 19 seconds.      INTERPRETING LOCATION: [address] [place] [address], [facility]      DX-THORACIC SPINE-2 VIEWS   Final Result      Digitized intraoperative radiograph is submitted for review. This  examination is not for diagnostic purpose but for guidance during a surgical procedure. Please see the patient's chart for full procedural details.         INTERPRETING LOCATION: [address] [facility], [place], [address]      MR-CERVICAL SPINE-WITH & W/O   Final Result         1.  No evidence of metastatic disease to the cervical spine.      MR-LUMBAR SPINE-WITH & W/O   Final Result   Impression:      Multiple bony metastatic lesions as described above, most prominent along the inferior endplate of L1.      Degenerative moderate canal stenosis at L4-5 with cauda equina compression. Epidural thickening and enhancement is noted in this region with severe bilateral facet degeneration and enhancement along the bilateral facet joints. This is felt to be    degenerative in origin.      MR-THORACIC SPINE-WITH & W/O   Final Result         Multiple metastatic lesions of the thoracic spine as described above.      Pathologic compression fracture at T5 with 50% loss of height and posterior cortical retropulsion with circumferential epidural thickening representing epidural extension of metastatic disease. This causes severe canal stenosis with cord compression.      Metastatic disease also involves the posterior elements and spinous processes at T4, T5.      Pre and paravertebral metastatic soft tissue infiltration at T4-T5 noted.         MR-BRAIN-WITH & W/O   Final Result         Multiple nodular enhancing metastatic lesions involving the cranium as described above.      The largest lesion is in the right posterior temporal/parietal occipital region and demonstrates inner table cortical breakthrough with epidural extension and adjacent mild dural thickening and enhancement.      US-THORACENTESIS PUNCTURE RIGHT   Final Result   Addendum (preliminary) 1 of 1 12/27/2024 10:59 AM      HISTORY/REASON FOR EXAM:  Shortness of breath         TECHNIQUE/EXAM DESCRIPTION:   Ultrasound-guided thoracentesis.      Indication:  RIGHT pleural fluid  collection.      COMPARISON:  None-however patient states history of right-sided    thoracentesis September 2024.      PROCEDURE:     Informed consent was obtained. A timeout was taken. A right    pleural effusion was localized with real-time ultrasound guidance. The    right posterior chest wall was prepped and draped in a sterile manner.    Following local anesthesia with 1%    lidocaine, and under live ultrasound guidance a 5 Kinyarwanda Yueh pigtail    catheter was advanced into the pleural space with trocar technique and    pleural fluid was drained. The patient tolerated the procedure well    without evidence of complication. A post    thoracentesis chest radiograph is forthcoming.      FINDINGS:      Fluid was sent to the laboratory.      Fluid character: serosanguinous         1. Ultrasound guided right sided therapeutic and diagnostic thoracentesis.      2. 1000 mL of fluid withdrawn.      Final      1. Ultrasound guided right sided therapeutic and diagnostic thoracentesis.      2. 1000 mL of fluid withdrawn.      DX-CHEST-PORTABLE (1 VIEW)   Final Result      1.  Small persistent right pleural effusion which is decreased since recent thoracentesis.      2.  Small to moderate left pleural effusion.      3.  Bilateral perihilar and left lower lobe atelectasis and/or pneumonitis.      4.  Increased interstitial markings throughout the lung fields consistent with parenchymal scarring, and/or pulmonary edema.      CT-LSPINE W/O PLUS RECONS   Final Result         1. Diffuse metastatic disease.   2. No definite pathologic fracture.      CT-TSPINE W/O PLUS RECONS   Final Result         1. Extensive metastatic disease.   2. There is new pathologic compression fracture deformity of T5.      CT-CHEST,ABDOMEN,PELVIS WITH   Final Result         1. New bilateral upper lobe nodular infiltrates.   2. Moderate-sized bilateral pleural effusions.   3. Interval worsening of hepatic metastatic disease.   4. New abdominal  "lymphadenopathy. Thoracic adenopathy is again noted.   5. Diffuse osseous metastatic disease. There is a new compression fracture of T5 as reported on the prior spine CT.      DX-CHEST-PORTABLE (1 VIEW)   Final Result      1.  Stable bibasilar atelectasis/consolidation and small bilateral pleural effusions.      2.  Stable cardiomegaly and interstitial edema.           Assessment/Plan  * Malignant neoplasm of breast (HCC)- (present on admission)  Assessment & Plan  Diagnosed 2019.  \"Bilateral breast cancer with extensive destruction of the left breast and ulceration and skin lesions making her inoperable on the left.  Right breast shows a 3 x 3 cm mass.  PET scan March 2023 showed non-hypermetabolic pleural effusions which are cytology positive and she has demonstrable pleural masses. Started aromatase inhibition early September 2023.  Has not yet started CDK 4 6 inhibitor.  Lost to follow-up for over 6 months although apparently was on letrozole as single agent.  Now with clear progression of disease in the left breast.  August 2024: Progression of disease in liver with extensive disease.  Breast is worse.  Lung is better.  Lymph nodes are worse.  Status post radiation to symptomatic oozing and exophytic left breast mass.  Marked worsening of her cancer on letrozole with progression in the bone and liver.\"--Dr. Benitez  ongoing goals of care discussions. Patient is fighting to recover and discharge home so she can continue treatment for her cancer  Now DNR/DNI  Palliative following   End of life. Address pain and anxiety.    Anemia of chronic disease- (present on admission)  Assessment & Plan  No new CBC    Hypokalemia- (present on admission)  Assessment & Plan  Replaced, monitor  1/27 K:4 <3.4    Acute on chronic respiratory failure with hypoxia and hypercapnia (HCC)- (present on admission)  Assessment & Plan  intubated 1/4  extubated 1/5  re-intubated 1/8  Extubated 1/15  Now on high flow oxygen with near " complete opacification of the right lung  Patient wanted removal of PleurX catheter which was removed  CXR shows right lung opacification   Prognosis is poor.    Restless leg syndrome- (present on admission)  Assessment & Plan  Requip     Pathological fracture of vertebra- (present on admission)  Assessment & Plan  Pathologic fracture of T5 with myelopathy on presentation  S/P T4-6 laminectomy with fusion and spinal decompression on 1/1/2025 by Dr. Lowery  multi-modal pain management  The patient with additional L4-5 lesion with cauda equina    Cancer related pain- (present on admission)  Assessment & Plan  Palliative Care following for pain management  Multimodal pain management  Consider palliative radiation    Metastasis to lung (HCC)- (present on admission)  Assessment & Plan  Noted on CT    Bilateral pleural effusion- (present on admission)  Assessment & Plan  Malignant pleural effusions from metastatic breast cancer s/p thoracenteses and chest tubes  Stop diuresis  monitor chest xrays  PleurX placement in IR 1/11 on the right and was removed      Primary hypertension- (present on admission)  Assessment & Plan  Monitor, resume as indicated  PRN's available    Diabetes mellitus, type 2 (HCC)- (present on admission)  Assessment & Plan  A1c 6.4  Diabetic diet         VTE prophylaxis:    Xarelto 10mg daily as prophylaxis      I have performed a physical exam and reviewed and updated ROS and Plan today (1/27/2025). In review of yesterday's note (1/26/2025), there are no changes except as documented above.

## 2025-01-27 NOTE — PROGRESS NOTES
Patient complaining of pain 4/10 in her back. Patient given the option of getting a Norco. Patient stated that she does not want to take the norco in fear of respiratory depression. Reached out to MD and got ordered a new oxy 5mg for her to take. Patient refused oxy 5mg stating that she doesn't want to take anything by mouth because her stomach hurts right now. Reached out to MD for alternative IV medication.

## 2025-01-27 NOTE — PROGRESS NOTES
Pharmacy Pharmacotherapy Consult for LOS >30 days    Admit Date: 12/25/2024      Medications were reviewed for appropriateness and ongoing need.     Current Facility-Administered Medications   Medication Dose Route Frequency Provider Last Rate Last Admin    mag hydrox-al hydrox-simeth (Maalox Plus Es Or Mylanta Ds) suspension 10 mL  10 mL Oral 4X/DAY PRN Leslee Christina M.D.   10 mL at 01/25/25 1810    ondansetron (Zofran) syringe/vial injection 4 mg  4 mg Intravenous Q6HRS Cait Nj D.OMimi   4 mg at 01/25/25 2356    megestrol (Megace) tablet 20 mg  20 mg Oral 4X/DAY Cait Nj D.OMimi   20 mg at 01/25/25 1809    thiamine (Vitamin B-1) tablet 100 mg  100 mg Oral DAILY Lana Hartley M.D.   100 mg at 01/24/25 0510    folic acid (Folvite) tablet 1 mg  1 mg Oral DAILY Lana Hartley M.D.   1 mg at 01/24/25 0510    multivitamin tablet 1 Tablet  1 Tablet Oral DAILY Lana Hartley M.D.   1 Tablet at 01/24/25 0510    sodium phosphate enema 1 Enema  1 Enema Rectal QDAY PRN Keny Cartagena M.D.   1 Enema at 01/19/25 1143    HYDROcodone/acetaminophen (Norco)  MG per tablet 1 Tablet  1 Tablet Oral Q4HRS PRN Keny Cartagena M.D.   1 Tablet at 01/26/25 0300    melatonin tablet 5 mg  5 mg Oral Nightly Keny Cartagena M.D.   5 mg at 01/24/25 2107    rivaroxaban (Xarelto) tablet 10 mg  10 mg Oral DAILY AT 1800 Keny Cartagena M.D.   10 mg at 01/25/25 1810    ROPINIRole (Requip) tablet 1 mg  1 mg Oral QHS Keny Cartagena M.D.   1 mg at 01/25/25 2147    senna-docusate (Pericolace Or Senokot S) 8.6-50 MG per tablet 2 Tablet  2 Tablet Oral BID Keny Cartagena M.D.   2 Tablet at 01/19/25 0623    And    polyethylene glycol/lytes (Miralax) Packet 1 Packet  1 Packet Oral QDAY PRN Keny Cartagena M.D.        And    magnesium hydroxide (Milk Of Magnesia) suspension 30 mL  30 mL Oral QDAY PRN Keny Cartagena M.D.        And    bisacodyl (Dulcolax) suppository 10 mg  10 mg Rectal QDAY PRN  Keny Cartagena M.D.        acetaminophen (Tylenol) tablet 650 mg  650 mg Oral Q6HRS PRN Keny Cartagena M.D.        ondansetron (Zofran ODT) dispertab 4 mg  4 mg Oral Q4HRS PRN Keny Cartagena M.D.   4 mg at 01/17/25 2002    ROPINIRole (Requip) tablet 1 mg  1 mg Oral BID PRN Keny Cartagena M.D.   1 mg at 01/26/25 1342    Respiratory Therapy Consult   Nebulization Continuous RT Juan Daniel Baca M.D.        ipratropium-albuterol (DUONEB) nebulizer solution  3 mL Nebulization Q2HRS PRN (RT) Americo Avalos M.D.   3 mL at 01/26/25 0521    MD ALERT...DO NOT ADMINISTER NSAIDS or ASPIRIN unless ORDERED By Neurosurgery 1 Each  1 Each Other PRN Karthik Lowery D.O.        ondansetron (Zofran) syringe/vial injection 4 mg  4 mg Intravenous Q4HRS PRN Abdulaziz Nieto M.D.   4 mg at 01/26/25 1257       Recommendations:  Discussed with provider, all medications that were no longer indicated have been appropriately discontinued.

## 2025-01-28 LAB
ERYTHROCYTE [DISTWIDTH] IN BLOOD BY AUTOMATED COUNT: 48.8 FL (ref 35.9–50)
HCT VFR BLD AUTO: 40.1 % (ref 37–47)
HGB BLD-MCNC: 12.5 G/DL (ref 12–16)
MCH RBC QN AUTO: 28.5 PG (ref 27–33)
MCHC RBC AUTO-ENTMCNC: 31.2 G/DL (ref 32.2–35.5)
MCV RBC AUTO: 91.3 FL (ref 81.4–97.8)
PLATELET # BLD AUTO: 489 K/UL (ref 164–446)
PMV BLD AUTO: 9.2 FL (ref 9–12.9)
RBC # BLD AUTO: 4.39 M/UL (ref 4.2–5.4)
WBC # BLD AUTO: 19.9 K/UL (ref 4.8–10.8)

## 2025-01-28 PROCEDURE — A9270 NON-COVERED ITEM OR SERVICE: HCPCS | Performed by: HOSPITALIST

## 2025-01-28 PROCEDURE — 770001 HCHG ROOM/CARE - MED/SURG/GYN PRIV*

## 2025-01-28 PROCEDURE — 99497 ADVNCD CARE PLAN 30 MIN: CPT | Performed by: NURSE PRACTITIONER

## 2025-01-28 PROCEDURE — 94640 AIRWAY INHALATION TREATMENT: CPT

## 2025-01-28 PROCEDURE — 700102 HCHG RX REV CODE 250 W/ 637 OVERRIDE(OP): Performed by: HOSPITALIST

## 2025-01-28 PROCEDURE — 700111 HCHG RX REV CODE 636 W/ 250 OVERRIDE (IP): Performed by: INTERNAL MEDICINE

## 2025-01-28 PROCEDURE — 99233 SBSQ HOSP IP/OBS HIGH 50: CPT | Mod: 25 | Performed by: NURSE PRACTITIONER

## 2025-01-28 PROCEDURE — 97602 WOUND(S) CARE NON-SELECTIVE: CPT

## 2025-01-28 PROCEDURE — 85027 COMPLETE CBC AUTOMATED: CPT

## 2025-01-28 PROCEDURE — 99498 ADVNCD CARE PLAN ADDL 30 MIN: CPT | Performed by: NURSE PRACTITIONER

## 2025-01-28 PROCEDURE — 700101 HCHG RX REV CODE 250: Performed by: NURSE PRACTITIONER

## 2025-01-28 PROCEDURE — 31720 CLEARANCE OF AIRWAYS: CPT

## 2025-01-28 PROCEDURE — 700111 HCHG RX REV CODE 636 W/ 250 OVERRIDE (IP): Performed by: HOSPITALIST

## 2025-01-28 PROCEDURE — 99291 CRITICAL CARE FIRST HOUR: CPT | Performed by: INTERNAL MEDICINE

## 2025-01-28 RX ORDER — MORPHINE SULFATE 10 MG/5ML
2.5 SOLUTION ORAL EVERY 4 HOURS
Status: DISCONTINUED | OUTPATIENT
Start: 2025-01-28 | End: 2025-01-31

## 2025-01-28 RX ORDER — FUROSEMIDE 20 MG/1
40 TABLET ORAL ONCE
Status: DISCONTINUED | OUTPATIENT
Start: 2025-01-28 | End: 2025-01-28

## 2025-01-28 RX ORDER — FUROSEMIDE 10 MG/ML
40 INJECTION INTRAMUSCULAR; INTRAVENOUS ONCE
Status: COMPLETED | OUTPATIENT
Start: 2025-01-28 | End: 2025-01-28

## 2025-01-28 RX ORDER — ATROPINE SULFATE 10 MG/ML
2 SOLUTION/ DROPS OPHTHALMIC EVERY 4 HOURS PRN
Status: DISCONTINUED | OUTPATIENT
Start: 2025-01-28 | End: 2025-02-01 | Stop reason: HOSPADM

## 2025-01-28 RX ORDER — CARBOXYMETHYLCELLULOSE SODIUM 5 MG/ML
1 SOLUTION/ DROPS OPHTHALMIC PRN
Status: DISCONTINUED | OUTPATIENT
Start: 2025-01-28 | End: 2025-02-01 | Stop reason: HOSPADM

## 2025-01-28 RX ADMIN — ONDANSETRON 4 MG: 2 INJECTION INTRAMUSCULAR; INTRAVENOUS at 11:46

## 2025-01-28 RX ADMIN — ONDANSETRON 4 MG: 2 INJECTION INTRAMUSCULAR; INTRAVENOUS at 17:24

## 2025-01-28 RX ADMIN — MORPHINE SULFATE 2.5 MG: 10 SOLUTION ORAL at 15:01

## 2025-01-28 RX ADMIN — FUROSEMIDE 40 MG: 10 INJECTION INTRAMUSCULAR; INTRAVENOUS at 16:23

## 2025-01-28 RX ADMIN — MORPHINE SULFATE 2.5 MG: 10 SOLUTION ORAL at 17:24

## 2025-01-28 RX ADMIN — ONDANSETRON 4 MG: 2 INJECTION INTRAMUSCULAR; INTRAVENOUS at 05:46

## 2025-01-28 RX ADMIN — MORPHINE SULFATE 2.5 MG: 10 SOLUTION ORAL at 22:37

## 2025-01-28 RX ADMIN — ONDANSETRON 4 MG: 2 INJECTION INTRAMUSCULAR; INTRAVENOUS at 00:00

## 2025-01-28 RX ADMIN — ALUMINUM HYDROXIDE, MAGNESIUM HYDROXIDE, AND DIMETHICONE 10 ML: 400; 400; 40 SUSPENSION ORAL at 10:30

## 2025-01-28 RX ADMIN — ONDANSETRON 4 MG: 2 INJECTION INTRAMUSCULAR; INTRAVENOUS at 22:37

## 2025-01-28 RX ADMIN — ROPINIROLE HYDROCHLORIDE 1 MG: 0.5 TABLET, FILM COATED ORAL at 18:45

## 2025-01-28 ASSESSMENT — PAIN DESCRIPTION - PAIN TYPE
TYPE: ACUTE PAIN

## 2025-01-28 NOTE — PROGRESS NOTES
Palliative Care    Room: [id number]    HPI:   Asha Hannon is a 64 y.o. female with medical history significant for metastatic breast cancer to bone, liver, lungs, and spine with recurrent malignant pleural effusion requiring frequent thoracentesis admitted 12/26/2024 for acute on chronic hypoxic respiratory failure, weakness, and severe back pain.  She underwent C4-6 decompression and posterior spinal fusion due to T5 pathological compression fracture.  Hospital course complicated by ongoing respiratory failure, pneumothorax, and failure to thrive with poor performance status.  She was originally seen by palliative care 12/26/2024 for symptom management.  We have been following patient during hospital course for symptom management and to discuss goals of care.    REVIEW OF SYSTEMS:  Positive for dyspnea (severe). Positive for pain (back pain; mild-mod). Positive for fatigue.     PHYSICAL EXAM:  Physical Exam  HENT:      Mouth/Throat:      Mouth: Mucous membranes are dry.   Pulmonary:      Effort: Tachypnea, accessory muscle usage and respiratory distress present.      Breath sounds: Decreased breath sounds present.      Comments: HFNC 50 L/min with 100% FiO2  Neurological:      Mental Status: She is alert and oriented to person, place, and time.      Comments: Oriented to event   Psychiatric:         Attention and Perception: Attention normal.         Behavior: Behavior is slowed.         Cognition and Memory: Cognition normal.       Discussion/Plan   #Metastatic breast cancer  #Acute on chronic respiratory failure with hypoxia and hypercapnia  #Dyspnea  -Schedule morphine 2.5 mg every 4 hours; can increase to 5 mg if patient tolerates but being cautious due to hypercapnia  -Can add as needed dosing if effective for both pain and dyspnea  -Goal is to get oxygen down to a level that patient may be able to go home with hospice  -Discussed diuretics with hospital medicine  -Discussed adding  #Malignant  bilateral pleural effusion right greater than left  -Status post thoracentesis and chest tubes for pneumothorax  -Pleurx catheter placed via IR on the right side but was removed as patient did not want hospice and wanted intermittent thoracentesis  -Discussed with Dr. Sheikh and requested IR reconsult to see if patient appropriate for Pleurx catheter now that she wants hospice  #Pathological fracture of vertebrae status post decompression and fusion  #Cancer associated pain  #DM 2  #HTN  #RLS  #Advance care planning  Met with patient and patient's daughter Jane at patient's bedside.  Patient agreeable to discuss goals of care.  She expressed frustration over lack of care including PT.  She stated she feels like she is not getting better because she is not getting out of bed.  We discussed role of PT in acute care setting.  Patient expressed she wants to go home.  Discussed patient's prognosis and discussed she is likely only able to explore getting home with hospice care due to overall poor condition, prolonged hospital course, respiratory failure, and metastatic cancer.  Discussed my concern over patient being able to be medically cleared for discharge even with hospice unless oxygen demands are decreased.  Discussed strategies to decrease oxygen demand including exploring low-dose opioid to decrease work of breathing; discussed risk given patient's buildup of CO2.  Discussed diuretics.  Discussed IR consult for possible pleural drain for recurrent malignant pleural effusion.  Ultimately patient confirms she would like to move forward with comfort care and hospice which were both discussed in detail.  She reports she is already spoken with [facility] and would like to talk with [facility].     She would like to complete healthcare documents today and requested information regarding notary for other business.  Completed advance directive her power of .  Patient selected her daughter Jane ng  agent and son travis as first alternate if Jane cannot be reached within a day/24 hours.  She did not wish to complete statement of desires or declaration.  Document witnessed by 2 staff members.    She did wish to complete POLST form.  Ultimately she chose DNR/allow natural death, comfort focused treatment, no artificial nutrition.  She expressed she would not want water to be withheld.  Confirmed on comfort focused treatment she can eat and drink despite risk.  She stated initially she would want defibrillation however after discussion confirmed wishes for comfort focused treatment.  She denied having other questions or needs at this time.  Patient's daughter denied having questions or need.  Jane was tearful; offered support and encouraged her to call with any questions or needs.    Updated: bedside nurse and Dr. Sheikh; updated patient's oncologist Dr. Tabor via [employer]. Discussed with hospitals [facility] liasion who can meet with patient/family tomorrow.     Provided patient and daughter with mobile [facility] list and upon my return patient's son travis was at bedside.  Introduced myself and discussed role on care team.  Travis denied having questions or needs.    80 minutes spent discussing advance care planning, this time excludes any other billed services.    Interval diagnostic studies and medical documentation entries pertinent to this case were reviewed independently by me. This patient has at least one acute or chronic illness or injury that poses a threat to life or bodily function. This patient suffers from a high risk of morbidity from additional invasive diagnostic testing or intensive treatment. Discussion of recommendations and coordination of care undertaken with primary provider/treatment team.      YESIKA Rae.  Palliative Care Nurse Practitioner  [phone]

## 2025-01-28 NOTE — CARE PLAN
The patient is Watcher - Medium risk of patient condition declining or worsening    Shift Goals  Clinical Goals: decrease o2 demand, plan of care meeting 1/28, pain control  Patient Goals: pain control and get home  Family Goals: updates    Progress made toward(s) clinical / shift goals:        Problem: Knowledge Deficit - Standard  Goal: Patient and family/care givers will demonstrate understanding of plan of care, disease process/condition, diagnostic tests and medications  Description: Target End Date:  1-3 days or as soon as patient condition allows    Document in Patient Education    1.  Patient and family/caregiver oriented to unit, equipment, visitation policy and means for communicating concern  2.  Complete/review Learning Assessment  3.  Assess knowledge level of disease process/condition, treatment plan, diagnostic tests and medications  4.  Explain disease process/condition, treatment plan, diagnostic tests and medications  Outcome: Progressing     Problem: Skin Integrity  Goal: Skin integrity is maintained or improved  Description: Target End Date:  Prior to discharge or change in level of care    Document interventions on Skin Risk/Raj flowsheet groups and corresponding LDA    1.  Assess and monitor skin integrity, appearance and/or temperature  2.  Assess risk factors for impaired skin integrity and/or pressures ulcers  3.  Implement precautions to protect skin integrity in collaboration with interdisciplinary team  4.  Implement pressure ulcer prevention protocol if at risk for skin breakdown  5.  Confirm wound care consult if at risk for skin breakdown  6.  Ensure patient use of pressure relieving devices  (Low air loss bed, waffle overlay, heel protectors, ROHO cushion, etc)  Outcome: Progressing     Problem: Fall Risk  Goal: Patient will remain free from falls  Description: Target End Date:  Prior to discharge or change in level of care    Document interventions on the Saundra Garcia Fall Risk  Assessment    1.  Assess for fall risk factors  2.  Implement fall precautions  Outcome: Progressing     Problem: Pain - Standard  Goal: Alleviation of pain or a reduction in pain to the patient’s comfort goal  Description: Target End Date:  Prior to discharge or change in level of care    Document on Vitals flowsheet    1.  Document pain using the appropriate pain scale per order or unit policy  2.  Educate and implement non-pharmacologic comfort measures (i.e. relaxation, distraction, massage, cold/heat therapy, etc.)  3.  Pain management medications as ordered  4.  Reassess pain after pain med administration per policy  5.  If opiods administered assess patient's response to pain medication is appropriate per POSS sedation scale  6.  Follow pain management plan developed in collaboration with patient and interdisciplinary team (including palliative care or pain specialists if applicable)  Outcome: Progressing     Problem: Hemodynamics  Goal: Patient's hemodynamics, fluid balance and neurologic status will be stable or improve  Description: Target End Date:  Prior to discharge or change in level of care    Document on Assessment and I/O flowsheet templates    1.  Monitor vital signs, pulse oximetry and cardiac monitor per provider order and/or policy  2.  Maintain blood pressure per provider order  3.  Hemodynamic monitoring per provider order  4.  Manage IV fluids and IV infusions  5.  Monitor intake and output  6.  Daily weights per unit policy or provider order  7.  Assess peripheral pulses and capillary refill  8.  Assess color and body temperature  9.  Position patient for maximum circulation/cardiac output  10. Monitor for signs/symptoms of excessive bleeding  11. Assess mental status, restlessness and changes in level of consciousness  12. Monitor temperature and report fever or hypothermia to provider immediately. Consideration of targeted temperature management.  Outcome: Progressing     Problem: Fluid  Volume  Goal: Fluid volume balance will be maintained  Description: Target End Date:  Prior to discharge or change in level of care    Document on I/O flowsheet    1.  Monitor intake and output as ordered  2.  Promote oral intake as appropriate  3.  Report inadequate intake or output to physician  4.  Administer IV therapy as ordered  5.  Weights per provider order  6.  Assess for signs and symptoms of bleeding  7.  Monitor for signs of fluid overload (respiratory changes, edema, weight gain, increased abdominal girth)  8.  Monitor of signs for inadequate fluid volume (poor skin turgor, dry mucous membranes)  9.  Instruct patient on adherence to fluid restrictions  Outcome: Progressing     Problem: Respiratory  Goal: Patient will achieve/maintain optimum respiratory ventilation and gas exchange  Description: Target End Date:  Prior to discharge or change in level of care    Document on Assessment flowsheet    1.  Assess and monitor rate, rhythm, depth and effort of respiration  2.  Breath sounds assessed qshift and/or as needed  3.  Assess O2 saturation, administer/titrate oxygen as ordered  4.  Position patient for maximum ventilatory efficiency  5.  Turn, cough, and deep breath with splinting to improve effectiveness  6.  Collaborate with RT to administer medication/treatments per order  7.  Encourage use of incentive spirometer and encourage patient to cough after use and utilize splinting techniques if applicable  8.  Airway suctioning  9.  Monitor sputum production for changes in color, consistency and frequency  10. Perform frequent oral hygiene  11. Alternate physical activity with rest periods  Outcome: Progressing     Problem: Physical Regulation  Goal: Diagnostic test results will improve  Description: Target End Date:  Prior to discharge or change in level of care    1.  Monitor lactic acid levels  2.  Monitor ABG's  3.  Monitor diagnostic test results  Outcome: Progressing  Goal: Signs and symptoms of  infection will decrease  Description: Target End Date:  Prior to discharge or change in level of care    1.  Remove potential routes of infection, such as central lines and urinary catheter  2.  Follow facility protocol for changing IV tubing and sites  3.  Collaborate with Infectious Disease  4.  Antibiotic therapy per provider order  5.  Note drug effects and monitor for antibiotic toxicity  Outcome: Progressing     Problem: Respiratory:  Goal: Ability to achieve and maintain a regular respiratory rate will improve  Outcome: Progressing  Goal: Ability to maintain a clear airway will improve  Outcome: Progressing  Goal: Ability to maintain normal pulse oximetry readings will improve  Outcome: Progressing  Goal: Ability to maintain arterial blood gas levels within normal range will improve  Outcome: Progressing  Goal: Verbalizations of increased ease of respirations will increase  Outcome: Progressing  Goal: Complications related to the disease process, condition or treatment will be avoided or minimized  Outcome: Progressing     Problem: Knowledge Deficit:  Goal: Knowledge of diagnostic tests will improve  Outcome: Progressing  Goal: Knowledge of the prescribed therapeutic regimen will improve  Outcome: Progressing       Patient is not progressing towards the following goals:

## 2025-01-28 NOTE — WOUND TEAM
[facility]  Inpatient Services  Initial Wound and Skin Care Evaluation    Admission Date: 12/25/2024     Last order of IP CONSULT TO WOUND CARE was found on 1/26/2025 from Hospital Encounter on 12/25/2024     HPI, PMH, SH: Reviewed    Past Surgical History:   Procedure Laterality Date    THORACIC LAMINECTOMY  1/1/2025    Procedure: LAMINECTOMY, SPINE, THORACIC. T4-T6;  Surgeon: Karthik Lowery D.O.;  Location: SURGERY [facility];  Service: Neurosurgery    FUSION, SPINE, THORACIC APPROACH  1/1/2025    Procedure: FUSION, SPINE, THORACIC APPROACH;  Surgeon: Karthik Lowery D.O.;  Location: SURGERY [facility];  Service: Neurosurgery    CHOLECYSTECTOMY      OTHER      mastoid bone     Social History     Tobacco Use    Smoking status: Never    Smokeless tobacco: Never   Substance Use Topics    Alcohol use: Yes     Comment: occas     Chief Complaint   Patient presents with    Shortness of Breath     Pt c/o shortness of breath over the past couple of days. Denies new fever.  Pt wears 1.5L NC at baseline.  Pt has hx breast cancer with mets to the lungs. Currently taking PO chemotherapy pills.     Extremity Weakness     Pt states she has progressively been feeling weaker over the past couple of days to that point where she cannot ambulate.     Abdominal Pain     Pt also c/o epigastric pain and nausea.      Diagnosis: Breast cancer metastasized to bone, unspecified laterality (HCC) [C50.919, C79.51]    Unit where seen by Wound Team: [id number]     WOUND CONSULT RELATED TO:  Buttocks, L Breast, L Ear    WOUND TEAM PLAN OF CARE - Frequency of Follow-up:   Nursing to follow dressing orders written for wound care. Contact wound team if area fails to progress, deteriorates or with any questions/concerns if something comes up before next scheduled follow up (See below as to whether wound is following and frequency of wound follow up)   Not following, consult as needed  - Buttocks, L Breast, L Ear    WOUND HISTORY:    MASD       WOUND ASSESSMENT/LDA  Wound 01/17/25 Radiation Breast Lower Left L breast radiation wound (Active)   Date First Assessed/Time First Assessed: 01/17/25 0800   Primary Wound Type: Radiation  Location: Breast  Wound Orientation: Lower  Laterality: Left  Wound Description (Comments): L breast radiation wound      Assessments 1/28/2025 12:00 PM   Wound Image     Site Assessment Pink;Red   Periwound Assessment Scar tissue   Margins Attached edges;Defined edges   Closure Adhesive bandage   Drainage Amount Scant   Drainage Description Serosanguineous   Treatments Cleansed;Nonselective debridement;Site care   Wound Cleansing Normal Saline Irrigation   Periwound Protectant Mepitel   Dressing Status Clean;Dry;Intact   Dressing Changed New   Dressing Options Mepitel One   Dressing Change/Treatment Frequency Every 72 hrs, and As Needed   NEXT Dressing Change/Treatment Date 01/31/25   NEXT Weekly Photo (Inpatient Only) 02/04/25   Wound Team Following Not following   Non-staged Wound Description Partial thickness       Moisture Associated Skin Damage 01/28/25 Buttock;Perineum (Active)   First Observed Date/First Observed Time: 01/28/25 1200   Wound Location : Buttock;Perineum      Assessments 1/28/2025 12:00 PM   Wound Image     NEXT Weekly Photo (Inpatient Only) 02/04/25   Drainage Amount None   Periwound Assessment Pink;Red   IAD Cleansing Dimethecone Wipes   Periwound Protectant Barrier Paste        Vascular:    LESLEE:   No results found.    Lab Values:    Lab Results   Component Value Date/Time    WBC 19.9 (H) 01/28/2025 05:40 AM    RBC 4.39 01/28/2025 05:40 AM    HEMOGLOBIN 12.5 01/28/2025 05:40 AM    HEMATOCRIT 40.1 01/28/2025 05:40 AM    CREACTPROT 9.40 (H) 01/13/2025 02:20 PM    HBA1C 6.4 (H) 12/26/2024 03:34 AM         Culture Results show:  No results found for this or any previous visit (from the past 720 hours).    Pain Level/Medicated:  None, Tolerated without pain medication       INTERVENTIONS BY WOUND  TEAM:  Chart and images reviewed. Discussed with bedside RN. All areas of concern (based on picture review, LDA review and discussion with bedside RN) have been thoroughly assessed. Documentation of areas based on significant findings. This RN in to assess patient. Performed standard wound care which includes appropriate positioning, dressing removal and non-selective debridement. Pictures and measurements obtained weekly if/when required.    Wound:  L Breast  Cleansed/Non-selectively Debrided with:  Normal Saline and Gauze  An wound: Cleansed with Normal Saline and Gauze, Prepped with Mepitel One  Primary Dressing:  Mepitel one     Wound:  Buttocks/Perirectal  Cleansed/Non-selectively Debrided with:  Perineal Wipes (Barrier wipes)  An wound: Cleansed with Perineal Wipes (Barrier wipes), Prepped with Barrier paste  Primary Dressing:  ALEC, barrier paste applied    Advanced Wound Care Discharge Planning  Number of Clinicians necessary to complete wound care: 1  Is patient requiring IV pain medications for dressing changes:  No   Length of time for dressing change 15 min. (This does not include chart review, pre-medication time, set up, clean up or time spent charting.)    Interdisciplinary consultation: Patient, Bedside RN (Bernarda),  Gertrudis Wound RN .      EVALUATION / RATIONALE FOR TREATMENT:     Date:  01/28/25  Wound Status:  Initial evaluation    Patient left breast with partial thickness skin loss within scar tissue. Covered with mepitel one to prevent further trauma to skin. May apply silicone foam for drainage.    Patient perirectal area with moisture related skin breakdown. Barrier paste applied for protective layer.    BL ears assessed and found to be intact.         Goals: Steady decrease in wound area and depth weekly.    NURSING PLAN OF CARE ORDERS:  Dressing changes: See Dressing Care orders  Skin care: See Skin Care orders  RN Prevention Protocol    NUTRITION RECOMMENDATIONS   Wound Team  Recommendations:  N/A    DIET ORDERS (From admission to next 24h)       Start     Ordered    01/24/25 1608  Diet Order Diet: Regular (Likes soups); Tray Modifications (optional): SLP - 1:1 Supervision by Nursing  ALL MEALS        Question Answer Comment   Diet: Regular Likes soups   Tray Modifications (optional) SLP - 1:1 Supervision by Nursing        01/24/25 1608    01/16/25 1824  Supplements  2X A DAY        Question:  Which Supplement  Answer:  Magic Cup    01/16/25 1823    01/16/25 1821  Supplements  2X A DAY        Question Answer Comment   Which Supplement Glucerna    Glucerna: Glucerna Shake Carton        01/16/25 1822                    PREVENTATIVE INTERVENTIONS:    Q shift Raj - performed per nursing policy  Q shift pressure point assessments - performed per nursing policy    Surface/Positioning  ICU Low Airloss - Currently in Place  Reposition q 2 hours - Currently in Place  TAPs Turning system - Currently in Place    Offloading/Redistribution  Sacral offloading dressing (Silicone dressing) - Currently in Place      Respiratory  High flow offloading Clip - Currently in Place    Containment/Moisture Prevention    Purwick/Condom Cath - Currently in Place  Barrier paste - Applied this Visit    Anticipated discharge plans:  TBD        Vac Discharge Needs:  Vac Discharge plan is purely a recommendation from wound team and not a requirement for discharge unless otherwise stated by physician.  Not Applicable Pt not on a wound vac

## 2025-01-28 NOTE — THERAPY
Occupational Therapy Contact Note    Patient Name: Asha Hannon  Age:  64 y.o., Sex:  female  Medical Record #: [id number]  Today's Date: 1/28/2025    OT tx deferred as pt is anticipated to have goals of care conversation today. EMR also indicates that pt may DC to son's house with hospice services. Will re-attempt OT tx when appropriate/able.

## 2025-01-28 NOTE — CARE PLAN
The patient is Unstable - High likelihood or risk of patient condition declining or worsening    Shift Goals  Clinical Goals: plan of care meeting and decrease O2 demand  Patient Goals: get home  Family Goals: updates    Progress made toward(s) clinical / shift goals:    Problem: Knowledge Deficit - Standard  Goal: Patient and family/care givers will demonstrate understanding of plan of care, disease process/condition, diagnostic tests and medications  Outcome: Progressing     Problem: Skin Integrity  Goal: Skin integrity is maintained or improved  Outcome: Progressing     Problem: Fall Risk  Goal: Patient will remain free from falls  Outcome: Progressing     Problem: Pain - Standard  Goal: Alleviation of pain or a reduction in pain to the patient’s comfort goal  Outcome: Progressing     Problem: Hemodynamics  Goal: Patient's hemodynamics, fluid balance and neurologic status will be stable or improve  Outcome: Progressing     Problem: Fluid Volume  Goal: Fluid volume balance will be maintained  Outcome: Progressing     Problem: Respiratory  Goal: Patient will achieve/maintain optimum respiratory ventilation and gas exchange  Outcome: Progressing     Problem: Physical Regulation  Goal: Diagnostic test results will improve  Outcome: Progressing  Goal: Signs and symptoms of infection will decrease  Outcome: Progressing     Problem: Respiratory:  Goal: Ability to achieve and maintain a regular respiratory rate will improve  Outcome: Progressing  Goal: Ability to maintain a clear airway will improve  Outcome: Progressing  Goal: Ability to maintain normal pulse oximetry readings will improve  Outcome: Progressing  Goal: Ability to maintain arterial blood gas levels within normal range will improve  Outcome: Progressing  Goal: Verbalizations of increased ease of respirations will increase  Outcome: Progressing  Goal: Complications related to the disease process, condition or treatment will be avoided or minimized  Outcome:  Progressing     Problem: Knowledge Deficit:  Goal: Knowledge of diagnostic tests will improve  Outcome: Progressing  Goal: Knowledge of the prescribed therapeutic regimen will improve  Outcome: Progressing       Patient is not progressing towards the following goals:

## 2025-01-28 NOTE — PROGRESS NOTES
[facility] Medicine Daily Progress Note    Date of Service  1/28/2025    Chief Complaint  Weakness, short of breath with stage IV breast cancer.    Hospital Course  64 y.o. female who presented 12/25/2024 with history of stage IV breast cancer metastatic disease, mets to the lungs and now spine, on previous oral chemotherapy, admitted on 12/25/2024 with weakness, shortness of breath and back pain, subsequently a CT chest abdomen showed moderate-sized pleural effusions, worsening hepatic metastatic disease with new abdominal lymphadenopathy, thoracic adenopathy, diffuse osseous metastatic disease with a T5 pathologic fracture, the patient was admitted, undergo a right-sided thoracentesis on 12/27, malignant cells identified in the fluid, and MRI showed multiple thoracic and lumbar metastasized lesions, the patient was taken to the operating room on 1 1 where she had a T4 T6 laminectomy, following the patient developed a right-sided pneumothorax, requiring chest tube placement, left-sided thoracentesis was performed, the patient following his respiratory difficulty requiring BiPAP treatment and subsequently required to be intubated, the patient had a right-sided PleurX and small bore catheter placed and had frequent thoracentesis since placement, the patient remained intubated until 1/15, where the patient then was extubated and had a extensive talk with the ICU team in terms of her overall status, metastatic malignancy and respiratory failure, the patient stated that she wanted to keep fighting and is wishing to hopefully reach outpatient status where she can fail to be treated for her malignancy.  If she would have additional respiratory failure she states that she would like to be reintubated and possibly even undergo a tracheostomy to stay alive.  The patient tolerated extubation, she is alert and x 4, she is in a sinus rhythm in the 50s to 80s, blood pressure in the 90s to 100s, the patient was n.p.o., underwent  evaluation today by speech therapy and is cleared for mildly thick liquids, soft and bite-size solids, the right chest tube had put out 100 cc overnight, the patient remains on DVT prophylaxis with Lovenox, Pepcid for GI prophylaxis,  Laboratory data showed white to count 13.9 hemoglobin 9.3 platelet count 563 sodium 136 potassium 4.5 chloride 94 bicarb 35 glucose 155 BUN 27 creatinine 0.56  Chest x-ray with pulmonary edema and infiltrates, stable, layering left pleural effusion, stable, right-sided small bore chest tube  The patient has been followed by palliative care, she currently confirms a DNR I okay status, the patient lives with her daughter  The patient has been consulted on by palliative care  The patient had a consult from her primary oncology team on 1/17/2025  Radiation oncology was consulted as well    Interval Problem Update  Patient continues to require high flow nasal cannula  Patient is somnolent, apparently when she is more awake, is requesting chest x-ray to see if there is recurrent pulmonary effusion.    Overnight  Highflow 55/100%   no events  NSR  -150  Poor appetite   Good uop    I have discussed this patient's plan of care and discharge plan at IDT rounds today with Case Management, Nursing, Nursing leadership, and other members of the IDT team.    Consultants/Specialty  critical care    Code Status  DNAR/DNI    Disposition  The patient is not medically cleared for discharge to home or a post-acute facility.  Anticipate discharge to: hospice    I have placed the appropriate orders for post-discharge needs.    Review of Systems  Review of Systems   Unable to perform ROS: Acuity of condition        Physical Exam  Temp:  [36.4 °C (97.6 °F)-36.7 °C (98 °F)] 36.6 °C (97.9 °F)  Pulse:  [80-93] 84  Resp:  [13-37] 15  BP: (101-144)/(50-72) 108/59  SpO2:  [89 %-95 %] 91 %    Physical Exam  Vitals and nursing note reviewed.   Constitutional:       General: She is not in acute distress.      Appearance: She is well-developed. She is not diaphoretic.      Comments: Somnolent    HENT:      Head: Normocephalic and atraumatic.      Right Ear: External ear normal.      Left Ear: External ear normal.      Nose: Nose normal. No congestion or rhinorrhea.      Mouth/Throat:      Pharynx: No oropharyngeal exudate.   Eyes:      General:         Right eye: No discharge.         Left eye: No discharge.   Neck:      Trachea: No tracheal deviation.   Cardiovascular:      Rate and Rhythm: Normal rate and regular rhythm.      Heart sounds: No murmur heard.     No friction rub. No gallop.   Pulmonary:      Effort: Pulmonary effort is normal. No respiratory distress.      Breath sounds: No stridor. Rales present. No wheezing.   Abdominal:      General: Bowel sounds are normal. There is no distension.      Palpations: Abdomen is soft.   Musculoskeletal:      Cervical back: Neck supple.      Right lower leg: No edema.      Left lower leg: No edema.   Lymphadenopathy:      Cervical: No cervical adenopathy.   Skin:     General: Skin is warm and dry.      Findings: No erythema or rash.   Neurological:      Comments: Somnolent, nonverbal    Psychiatric:         Speech: She is noncommunicative.         Fluids    Intake/Output Summary (Last 24 hours) at 1/28/2025 0858  Last data filed at 1/27/2025 1748  Gross per 24 hour   Intake --   Output 600 ml   Net -600 ml        Laboratory  Recent Labs     01/28/25  0540   WBC 19.9*   RBC 4.39   HEMOGLOBIN 12.5   HEMATOCRIT 40.1   MCV 91.3   MCH 28.5   MCHC 31.2*   RDW 48.8   PLATELETCT 489*   MPV 9.2     Recent Labs     01/27/25  0330   SODIUM 133*   POTASSIUM 4.0   CHLORIDE 85*   CO2 38*   GLUCOSE 127*   BUN 26*   CREATININE 0.70   CALCIUM 9.3                   Imaging  DX-CHEST-LIMITED (1 VIEW)   Final Result      Slightly progressed right-sided airspace disease. Otherwise, stable as above.      DX-CHEST-PORTABLE (1 VIEW)   Final Result      1.  Mild worsening hypoinflation.   2.  No  other significant change from prior exam.         DX-CHEST-PORTABLE (1 VIEW)   Final Result      1.  Interval removal of right-sided tunneled pleural catheter. No evidence of pneumothorax.   2.  Stable small bilateral pleural effusions.   3.  Increased patchy bilateral interstitial and airspace opacities.      IR-LUNG & PLEURAL CATH REMOVAL   Final Result      Successful removal of tunneled pleural catheter as described.         DX-CHEST-PORTABLE (1 VIEW)   Final Result         1.  Pulmonary edema and/or infiltrates are identified, which are stable since the prior exam.   2.  Small bilateral pleural effusions, increased on the right since prior study   3.  Cardiomegaly      DX-CHEST-PORTABLE (1 VIEW)   Final Result         1.  Pulmonary edema and/or infiltrates are identified, which are stable since the prior exam.   2.  Small left pleural effusion   3.  Cardiomegaly      DX-CHEST-PORTABLE (1 VIEW)   Final Result         1. Small right apical pneumothorax now visible. Right chest tube unchanged.      2. Persistent infiltrates and small effusions.                     DX-CHEST-PORTABLE (1 VIEW)   Final Result      No significant change from prior exam.      DX-CHEST-PORTABLE (1 VIEW)   Final Result         1.  Pulmonary edema and/or infiltrates are identified, which are stable since the prior exam.   2.  Cardiomegaly      DX-CHEST-PORTABLE (1 VIEW)   Final Result      1.  Interval improvement of pulmonary vascular congestion and pulmonary edema.   2.  No other significant change from prior exam.         DX-CHEST-PORTABLE (1 VIEW)   Final Result         1.  Pulmonary edema and/or infiltrates are identified, which appear somewhat increased since the prior exam.   2.  Layering left pleural effusion, stable   3.  Cardiomegaly      DX-CHEST-PORTABLE (1 VIEW)   Final Result         1.  Pulmonary edema and/or infiltrates are identified, which are stable since the prior exam.   2.  Layering left pleural effusion, stable       DX-CHEST-PORTABLE (1 VIEW)   Final Result         1.  Pulmonary edema and/or infiltrates are identified, which are stable since the prior exam.   2.  Layering left pleural effusion, stable      DX-CHEST-PORTABLE (1 VIEW)   Final Result         1.  Pulmonary edema and/or infiltrates are identified, which are stable since the prior exam.   2.  Trace bilateral pleural effusions, stable      DX-CHEST-PORTABLE (1 VIEW)   Final Result         1.  Pulmonary edema and/or infiltrates are identified, which are stable since the prior exam.   2.  Small left pleural effusion, stable      DX-CHEST-PORTABLE (1 VIEW)   Final Result      1.  Support equipment is unchanged.      2.  Cardiomegaly with moderate perihilar pulmonary edema unchanged from previous exam.      3.  Small bilateral pleural effusions left greater than right.      DX-CHEST-PORTABLE (1 VIEW)   Final Result      1.  Tubes and lines unchanged in position.      2.  Marked bilateral perihilar pulmonary edema which has improved slightly since previous exam.      3.  Small bilateral pleural effusions left greater than right.      IR-INSERT PLEURAL CATH W/ CUFF   Final Result      1.  Ultrasound and fluoroscopic guided placement of a RIGHT HEMITHORAX PleurX (Aspira type) tunneled pleural drainage catheter.   2.  The catheter can be used immediately with drainage of pleural effusion as needed for comfort, as per package insert instructions.   3.  If the catheter becomes dislodged, do not reinsert it.  Place sterile dressing over the entry wound site.  May consult primary care physician or oncologist as to whether there is clinical indication for PleurX replacement.      DX-CHEST-PORTABLE (1 VIEW)   Final Result      1.  Tubes and lines unchanged in position.      2.   Extensive bilateral perihilar airspace opacities consistent with pneumonitis and/or pulmonary edema.      3.  Mild to moderate bilateral pleural effusions right greater than left      DX-CHEST-PORTABLE (1  VIEW)   Final Result         1. Increasing moderate right and small left pleural effusions. No change edema or infiltrates.      2. ETT 5 cm above masoud. NG tube in stomach. Right central line.                     DX-CHEST-PORTABLE (1 VIEW)   Final Result      Stable diffuse bilateral pulmonary infiltrates.      DX-ABDOMEN FOR TUBE PLACEMENT   Final Result      NG tube tip overlies the gastric body.      DX-CHEST-PORTABLE (1 VIEW)   Final Result      1.  Supportive tubing as described above.   2.  Apparent increasing RIGHT pleural fluid, possibly due to change in position.   3.  Improvement of LEFT lung base consolidation.   4.  No other significant change from prior exam.         DX-CHEST-PORTABLE (1 VIEW)   Final Result         1. Stable right IJ central catheter. Right chest tube has been removed. No pneumothorax.   2. Unchanged bilateral pulmonary opacities and associated pleural effusions.         DX-CHEST-PORTABLE (1 VIEW)   Final Result      1.  Unchanged bilateral pulmonary opacities. Stable small bilateral pleural effusions.   2.  Right pigtail catheter remains. No pneumothorax.      DX-CHEST-PORTABLE (1 VIEW)   Final Result      Removal of large bore right chest tube with stable right pigtail chest tube. Questionable tiny right apical pneumothorax.      DX-CHEST-PORTABLE (1 VIEW)   Final Result      No significant interval change.      DX-CHEST-PORTABLE (1 VIEW)   Final Result      1.  Persistent bilateral pulmonary opacities, stable to slightly worse, although changes may be related to differences in imaging technique. No large pleural effusions.   2.  Well-positioned right IJ central catheter. No pneumothorax.   3.  Stable other lines and tubes.      DX-CHEST-LIMITED (1 VIEW)   Final Result         1.  No change in 2 separate right-sided chest tubes.      2.  No pneumothorax identified.      3.  Bilateral pulmonary opacifications again identified which are slightly less prominent.      DX-CHEST-PORTABLE  (1 VIEW)   Final Result         1. No change mild infiltrates.      2. Right chest tubes. No pneumothorax.      3. ETT out. NG tube and jugular line remain.                     DX-CHEST-PORTABLE (1 VIEW)   Final Result         1. No significant interval change.      DX-CHEST-LIMITED (1 VIEW)   Final Result         1.  Findings on chest radiograph appear stable since the prior radiograph.  No new abnormalities are identified.      2.  2 separate right-sided chest tubes are again identified with no change in position.      3.  No pneumothorax identified.      DX-CHEST-LIMITED (1 VIEW)   Final Result         1.  No pneumothorax identified.      2.  2 right-sided chest tubes again identified.      CT-CTA CHEST PULMONARY ARTERY W/ RECONS   Final Result         1. No pulmonary embolus.      2. Medial right lung base consolidation felt to represent complete collapse of the right middle lobe. Mild to moderate mid and upper lung pneumonia infiltrates remain.      3. Right chest tubes. Small right pneumothorax and small right effusion. Improving small 2.5 cm left pleural effusion since 12/25/2024.      4. Extensive metastatic lesions including adenopathy, liver lesions, right breast nodule and bone lesions. Thoracic spine fusion hardware remains.                  CT-HEAD W/O   Final Result         1. No acute process in the brain evident.      2. Fluid in sphenoid sinus.      3. Right convexity midthoracic scoliosis and unchanged from 12/31/2024 MRI.               DX-ABDOMEN FOR TUBE PLACEMENT   Final Result         1. NG tube in stomach.                     DX-CHEST-FOR LINE PLACEMENT Perform procedure in: Patient's Room   Final Result         1. Small right apical pneumothorax remains. 2 right chest tubes.      2. ETT 3 cm above masoud. NG tube in stomach. Right jugular line in SVC. No complication evident.      3. Mild infiltrates and small right effusion.                  DX-CHEST-LIMITED (1 VIEW)   Final Result          1. 2nd right chest tube placed with near complete evacuation of right pneumothorax.      2. Mild infiltrates, basilar atelectasis and small right effusion remain.      3. Endotracheal tube tip at about T5 and about 2 cm above the masoud. The masoud and tip of the endotracheal tube are difficult to visualize in part because of overlying thoracic spine fusion hardware.                  DX-CHEST-PORTABLE (1 VIEW)   Final Result         Enlarging right pneumothorax with collapsing of the right lung.      Preliminary findings texted to Dr. STANLEY FISHER in the Emergency Department via Voalte on 1/4/2025 1:50 AM         DX-CHEST-LIMITED (1 VIEW)   Final Result         Interval placement of right pigtail chest tube. Small right residual pneumothorax.      DX-CHEST-PORTABLE (1 VIEW)   Final Result   Addendum (preliminary) 1 of 1      CRITICAL RESULT READ BACK: Preliminary findings discussed with and    critical read back performed by Dr. Downs via telephone on 1/3/2025 8:03    PM      Final      1.  Interval worsening in right pneumothorax, now moderate.   2.  Persistent interstitial and alveolar pulmonary opacities bilaterally. Possible bilateral pleural effusions, right greater than left.            DX-CHEST-PORTABLE (1 VIEW)   Final Result         1. Slightly decreased right pneumothorax.   2. Stable bilateral interstitial and airspace opacification.   3. Stable right pleural effusion.      DX-CHEST-PORTABLE (1 VIEW)   Final Result         1. Left pleural effusion has decreased in size status post thoracentesis. There is no left-sided pneumothorax.   2. There is persistent right basilar opacity with a new right apical pneumothorax.      Findings were communicated to the ordering provider at the time of dictation via Voalte.      US-THORACENTESIS PUNCTURE LEFT   Final Result      1. Ultrasound guided left sided therapeutic thoracentesis.      2. 950 mL of fluid withdrawn.      DX-CHEST-PORTABLE (1 VIEW)   Final  Result      1. New postsurgical changes in the upper thoracic spine.   2. Worsening bilateral pleural effusions, pulmonary vascular congestion and interstitial edema.      DX-PORTABLE FLUORO > 1 HOUR   Final Result      Portable fluoroscopy utilized for 1 minute 19 seconds.      INTERPRETING LOCATION: [address], [place], [address]      DX-THORACIC SPINE-2 VIEWS   Final Result      Digitized intraoperative radiograph is submitted for review. This examination is not for diagnostic purpose but for guidance during a surgical procedure. Please see the patient's chart for full procedural details.         INTERPRETING LOCATION: [address], [place], [address]      MR-CERVICAL SPINE-WITH & W/O   Final Result         1.  No evidence of metastatic disease to the cervical spine.      MR-LUMBAR SPINE-WITH & W/O   Final Result   Impression:      Multiple bony metastatic lesions as described above, most prominent along the inferior endplate of L1.      Degenerative moderate canal stenosis at L4-5 with cauda equina compression. Epidural thickening and enhancement is noted in this region with severe bilateral facet degeneration and enhancement along the bilateral facet joints. This is felt to be    degenerative in origin.      MR-THORACIC SPINE-WITH & W/O   Final Result         Multiple metastatic lesions of the thoracic spine as described above.      Pathologic compression fracture at T5 with 50% loss of height and posterior cortical retropulsion with circumferential epidural thickening representing epidural extension of metastatic disease. This causes severe canal stenosis with cord compression.      Metastatic disease also involves the posterior elements and spinous processes at T4, T5.      Pre and paravertebral metastatic soft tissue infiltration at T4-T5 noted.         MR-BRAIN-WITH & W/O   Final Result         Multiple nodular enhancing metastatic lesions involving the cranium as described above.      The largest lesion is in  the right posterior temporal/parietal occipital region and demonstrates inner table cortical breakthrough with epidural extension and adjacent mild dural thickening and enhancement.      US-THORACENTESIS PUNCTURE RIGHT   Final Result   Addendum (preliminary) 1 of 1 12/27/2024 10:59 AM      HISTORY/REASON FOR EXAM:  Shortness of breath         TECHNIQUE/EXAM DESCRIPTION:   Ultrasound-guided thoracentesis.      Indication:  RIGHT pleural fluid collection.      COMPARISON:  None-however patient states history of right-sided    thoracentesis September 2024.      PROCEDURE:     Informed consent was obtained. A timeout was taken. A right    pleural effusion was localized with real-time ultrasound guidance. The    right posterior chest wall was prepped and draped in a sterile manner.    Following local anesthesia with 1%    lidocaine, and under live ultrasound guidance a 5 [place] Yueh pigtail    catheter was advanced into the pleural space with trocar technique and    pleural fluid was drained. The patient tolerated the procedure well    without evidence of complication. A post    thoracentesis chest radiograph is forthcoming.      FINDINGS:      Fluid was sent to the laboratory.      Fluid character: serosanguinous         1. Ultrasound guided right sided therapeutic and diagnostic thoracentesis.      2. 1000 mL of fluid withdrawn.      Final      1. Ultrasound guided right sided therapeutic and diagnostic thoracentesis.      2. 1000 mL of fluid withdrawn.      DX-CHEST-PORTABLE (1 VIEW)   Final Result      1.  Small persistent right pleural effusion which is decreased since recent thoracentesis.      2.  Small to moderate left pleural effusion.      3.  Bilateral perihilar and left lower lobe atelectasis and/or pneumonitis.      4.  Increased interstitial markings throughout the lung fields consistent with parenchymal scarring, and/or pulmonary edema.      CT-LSPINE W/O PLUS RECONS   Final Result         1. Diffuse  "metastatic disease.   2. No definite pathologic fracture.      CT-TSPINE W/O PLUS RECONS   Final Result         1. Extensive metastatic disease.   2. There is new pathologic compression fracture deformity of T5.      CT-CHEST,ABDOMEN,PELVIS WITH   Final Result         1. New bilateral upper lobe nodular infiltrates.   2. Moderate-sized bilateral pleural effusions.   3. Interval worsening of hepatic metastatic disease.   4. New abdominal lymphadenopathy. Thoracic adenopathy is again noted.   5. Diffuse osseous metastatic disease. There is a new compression fracture of T5 as reported on the prior spine CT.      DX-CHEST-PORTABLE (1 VIEW)   Final Result      1.  Stable bibasilar atelectasis/consolidation and small bilateral pleural effusions.      2.  Stable cardiomegaly and interstitial edema.           Assessment/Plan  * Malignant neoplasm of breast (HCC)- (present on admission)  Assessment & Plan  Diagnosed 2019, with significant metastatic disease   \"Bilateral breast cancer with extensive destruction of the left breast and ulceration and skin lesions making her inoperable on the left.  Right breast shows a 3 x 3 cm mass.  PET scan March 2023 showed non-hypermetabolic pleural effusions which are cytology positive and she has demonstrable pleural masses. Started aromatase inhibition early September 2023.  Has not yet started CDK 4 6 inhibitor.  Lost to follow-up for over 6 months although apparently was on letrozole as single agent.  Now with clear progression of disease in the left breast.  August 2024: Progression of disease in liver with extensive disease.  Breast is worse.  Lung is better.  Lymph nodes are worse.  Status post radiation to symptomatic oozing and exophytic left breast mass.  Marked worsening of her cancer on letrozole with progression in the bone and liver.\"--Dr. Benitez  Patient is now DNR/DNI  Continue palliative care and hospice discussions    Acute on chronic respiratory failure with " hypoxia and hypercapnia (HCC)- (present on admission)  Assessment & Plan  Patient was intubated on 1/4 and then extubated 1/5   Was reintubated on 1/8 and extubated on 1/15   Patient continues to require high flow nasal cannula   Most recent chest x-ray did show extensive bilateral perihilar airspace opacities as well as mild to moderate bilateral pleural effusions   Patient is DNR, considering comfort care/hospice    Patient is critically ill.   The patient continues to have : Acute on chronic respiratory failure with hypoxia and hypercapnia  The vital organ system that is effected is the: Pulmonary initially  If untreated there is a high chance of deterioration into: Respiratory arrest  The critical care that I am providing today is: High flow nasal cannula, need for frequent monitoring due to near max settings  The critical care that has been undertaken is medically complex.   There has been no overlap in critical care time.  Critical care time not including procedures, no overlap: 38 minutes    Anemia of chronic disease- (present on admission)  Assessment & Plan  No sign of gross bleeding    Hypokalemia- (present on admission)  Assessment & Plan  Resolved with supplementation  Repeat BMP in the morning    Restless leg syndrome- (present on admission)  Assessment & Plan  Continue Requip    Pathological fracture of vertebra- (present on admission)  Assessment & Plan  Pathologic fracture of T5 with myelopathy on presentation  S/P T4-6 laminectomy with fusion and spinal decompression on 1/1/2025 by Dr. Lowery  The patient with L4-5 lesion with cauda equina on imaging  Multimodal pain management    Cancer related pain- (present on admission)  Assessment & Plan  Palliative care following  Continue multimodal pain management    Bilateral pleural effusion- (present on admission)  Assessment & Plan  Malignant pleural effusions from metastatic breast cancer s/p thoracenteses and chest tubes  PleurX placement in IR 1/11  on the right and was removed  Continues to require high flow nasal cannula  Repeat chest x-ray      Primary hypertension- (present on admission)  Assessment & Plan  Home Coreg and losartan are currently being held  Restart as needed    Diabetes mellitus, type 2 (HCC)- (present on admission)  Assessment & Plan  With mild hyperglycemia  No need for coverage at this time         VTE prophylaxis:    Xarelto 10mg daily as prophylaxis      I have performed a physical exam and reviewed and updated ROS and Plan today (1/28/2025). In review of yesterday's note (1/27/2025), there are no changes except as documented above.

## 2025-01-28 NOTE — CARE PLAN
Problem: Humidified High Flow Nasal Cannula  Goal: Maintain adequate oxygenation dependent on patient condition  Description: Target End Date:  resolve prior to discharge or when underlying condition is resolved/stabilized    1.  Implement humidified high flow oxygen therapy  2.  Titrate high flow oxygen to maintain appropriate SpO2  Outcome: Not Progressing   55L 100%

## 2025-01-29 PROCEDURE — 700101 HCHG RX REV CODE 250: Performed by: NURSE PRACTITIONER

## 2025-01-29 PROCEDURE — 700101 HCHG RX REV CODE 250: Performed by: HOSPITALIST

## 2025-01-29 PROCEDURE — 94640 AIRWAY INHALATION TREATMENT: CPT

## 2025-01-29 PROCEDURE — 700102 HCHG RX REV CODE 250 W/ 637 OVERRIDE(OP): Performed by: HOSPITALIST

## 2025-01-29 PROCEDURE — 700111 HCHG RX REV CODE 636 W/ 250 OVERRIDE (IP): Performed by: HOSPITALIST

## 2025-01-29 PROCEDURE — A9270 NON-COVERED ITEM OR SERVICE: HCPCS | Performed by: HOSPITALIST

## 2025-01-29 PROCEDURE — 0W9930Z DRAINAGE OF RIGHT PLEURAL CAVITY WITH DRAINAGE DEVICE, PERCUTANEOUS APPROACH: ICD-10-PCS | Performed by: RADIOLOGY

## 2025-01-29 PROCEDURE — 99291 CRITICAL CARE FIRST HOUR: CPT | Performed by: INTERNAL MEDICINE

## 2025-01-29 PROCEDURE — C1729 CATH, DRAINAGE: HCPCS

## 2025-01-29 PROCEDURE — 770004 HCHG ROOM/CARE - ONCOLOGY PRIVATE *

## 2025-01-29 PROCEDURE — 700111 HCHG RX REV CODE 636 W/ 250 OVERRIDE (IP): Mod: JZ

## 2025-01-29 RX ORDER — ONDANSETRON 2 MG/ML
4 INJECTION INTRAMUSCULAR; INTRAVENOUS PRN
Status: ACTIVE | OUTPATIENT
Start: 2025-01-29 | End: 2025-01-29

## 2025-01-29 RX ORDER — SODIUM CHLORIDE 9 MG/ML
500 INJECTION, SOLUTION INTRAVENOUS
Status: ACTIVE | OUTPATIENT
Start: 2025-01-29 | End: 2025-01-29

## 2025-01-29 RX ORDER — MIDAZOLAM HYDROCHLORIDE 1 MG/ML
.5-2 INJECTION INTRAMUSCULAR; INTRAVENOUS PRN
Status: ACTIVE | OUTPATIENT
Start: 2025-01-29 | End: 2025-01-29

## 2025-01-29 RX ORDER — LIDOCAINE HYDROCHLORIDE AND EPINEPHRINE 10; 10 MG/ML; UG/ML
INJECTION, SOLUTION INFILTRATION; PERINEURAL
Status: DISPENSED
Start: 2025-01-29 | End: 2025-01-29

## 2025-01-29 RX ADMIN — ONDANSETRON 4 MG: 2 INJECTION INTRAMUSCULAR; INTRAVENOUS at 22:26

## 2025-01-29 RX ADMIN — MORPHINE SULFATE 2.5 MG: 10 SOLUTION ORAL at 13:31

## 2025-01-29 RX ADMIN — MEGESTROL ACETATE 20 MG: 20 TABLET ORAL at 08:29

## 2025-01-29 RX ADMIN — MORPHINE SULFATE 2.5 MG: 10 SOLUTION ORAL at 09:39

## 2025-01-29 RX ADMIN — ONDANSETRON 4 MG: 2 INJECTION INTRAMUSCULAR; INTRAVENOUS at 11:09

## 2025-01-29 RX ADMIN — MORPHINE SULFATE 2.5 MG: 10 SOLUTION ORAL at 17:54

## 2025-01-29 RX ADMIN — ONDANSETRON 4 MG: 2 INJECTION INTRAMUSCULAR; INTRAVENOUS at 17:54

## 2025-01-29 RX ADMIN — MEGESTROL ACETATE 20 MG: 20 TABLET ORAL at 13:00

## 2025-01-29 RX ADMIN — FENTANYL CITRATE 12.5 MCG: 50 INJECTION, SOLUTION INTRAMUSCULAR; INTRAVENOUS at 10:27

## 2025-01-29 RX ADMIN — MEGESTROL ACETATE 20 MG: 20 TABLET ORAL at 17:54

## 2025-01-29 RX ADMIN — ONDANSETRON 4 MG: 2 INJECTION INTRAMUSCULAR; INTRAVENOUS at 03:06

## 2025-01-29 RX ADMIN — MORPHINE SULFATE 2.5 MG: 10 SOLUTION ORAL at 03:05

## 2025-01-29 ASSESSMENT — ENCOUNTER SYMPTOMS
NAUSEA: 0
MYALGIAS: 0
FEVER: 0
FALLS: 0
DIZZINESS: 0
CHILLS: 0
DIARRHEA: 0
TINGLING: 0
ABDOMINAL PAIN: 0
HEADACHES: 0
WEAKNESS: 0
SPUTUM PRODUCTION: 0
COUGH: 1
LOSS OF CONSCIOUSNESS: 0
SHORTNESS OF BREATH: 1
DEPRESSION: 0
CONSTIPATION: 0
VOMITING: 0
STRIDOR: 0
PALPITATIONS: 0

## 2025-01-29 ASSESSMENT — PAIN DESCRIPTION - PAIN TYPE
TYPE: ACUTE PAIN

## 2025-01-29 NOTE — DIETARY
Nutrition Services: Brief Update   Day 35 of admit. Asha Hannon is a 64 y.o. female with admitting DX of Breast cancer metastasized to bone, unspecified laterality     Patient being followed to optimize nutrition.    Problem: Nutritional:  Goal: Achieve adequate nutritional intake  Description: Patient will consume 50% of meals  Outcome: Discharged/Goal not met     Pt has been transitioned to comfort care status. Current plan of care per chart notes is home with hospice      Inadequate oral intake related to decreased appetite as evidence by PO intake <25% of meals      Nutrition Dx Status: Ongoing      Severe Malnutrition in context of Acute Illness or Injury related to inadequate PO intake  as evidenced by < 50% of estimated energy requirement x 5 days. Severe weight loss of 6% in 5 days.      Nutrition Dx Status: Ongoing      Malnutrition risk assessment:   Wt Readings from Last 6 Encounters:   01/27/25 77.7 kg (171 lb 4.8 oz)   11/05/24 106 kg (234 lb)   10/30/24 106 kg (232 lb 14.7 oz)   10/28/24 105 kg (232 lb)   10/16/24 105 kg (231 lb)   09/21/24 107 kg (236 lb 5.3 oz)     Wt per chart hx review indicates wt loss.   Malnutrition admit screen addressed earlier in admit.  Fluid accumulation: 1+ edema to all extremities  Severe protein-calorie malnutrition added to hospital problems list 1/24/24.     Plan/Recommendations:   Current diet order is Regular. PO intake per flowsheets is 0% . For pt's comfort and pleasure, dietary requests should be honored.     RD available PRN, please consult as needed.

## 2025-01-29 NOTE — THERAPY
Speech Language Therapy Contact Note    Patient Name: Asha Hannon  Age:  64 y.o., Sex:  female  Medical Record #: [id number]  Today's Date: 1/29/2025    Per EMR, pt has now transitioned to comfort care measures. Pt is on a R7/T0 diet. ST to sign off.

## 2025-01-29 NOTE — PROGRESS NOTES
Report received from Yolanda WOODALL RN. Pt back to the floor via bed with RN. Pt c/o pain will medicate per MAR, no SOB. Pleur x drain to the right midaxillary in place, with dressing in place, cdi. capped. Daughter at bedside. No further needs at this time. Hourly rounding in progress.

## 2025-01-29 NOTE — PROGRESS NOTES
Report received. Assumed care. Pt in bed sleeping.  A/O x4. VSS. Responds appropriately. Denies pain, SOB. Assessment complete. Daughter at bedside. Discussed POC, , pt verbalizes understanding. Explained importance of calling before getting OOB. Call light and belongings within reach. Bed alarm on. Bed in the lowest position. Treaded socks in place. Hourly rounding in progress. Will continue to monitor .

## 2025-01-29 NOTE — DISCHARGE PLANNING
Case Management Discharge Planning    Admission Date: 12/25/2024  GMLOS: 10.1  ALOS: 35    6-Clicks ADL Score: 16  6-Clicks Mobility Score: 11      Anticipated Discharge Dispo: Discharge Disposition: D/T to SNF with Medicare cert in anticipation of skilled care (03)    This RN CM completed chart review and patient code status is now CC. This RN CM followed up with Son Kyrie [phone] to follow up on address for plan for home with hospice as previously patient did not qualify for GIP. Son updated that the family is wanting patient to pass in the hospital because her O2 demand is higher than what can be provided on discharge home with hospice. This RN CM to follow up with provider on CC in hospital vs home with hospice.     Per palliative note patient wants to return home on hospice and [facility] Liaison will be coming to bedside to see patient today. They have already spoke with [facility].     This RN CM followed up with Chichi with Michael and she was working with Halley with palliative yesterday. Son is not able to take patient home related to emotional stress. Per Halley they are still working on plan for home with weaning down O2 and opioids. I was updated that GIP might be reconsidered if patient is not able to tolerate. Michael to return to see patient again tomorrow after IR drain is placed today.

## 2025-01-29 NOTE — OR SURGEON
Immediate Post- Operative Note        Findings: LOCULATED R EFFUSION      Procedure(s): R ASPIRA    EVACUATION: UNDERWAY      Estimated Blood Loss: Less than 5 ml        Complications: None            1/29/2025     10:34 AM     Rubens Gaming M.D.

## 2025-01-29 NOTE — CARE PLAN
The patient is Watcher - Medium risk of patient condition declining or worsening    Shift Goals  Clinical Goals: Comfort measures  Patient Goals: get home  Family Goals: updates    Progress made toward(s) clinical / shift goals:        Problem: Knowledge Deficit - Standard  Goal: Patient and family/care givers will demonstrate understanding of plan of care, disease process/condition, diagnostic tests and medications  Description: Target End Date:  1-3 days or as soon as patient condition allows    Document in Patient Education    1.  Patient and family/caregiver oriented to unit, equipment, visitation policy and means for communicating concern  2.  Complete/review Learning Assessment  3.  Assess knowledge level of disease process/condition, treatment plan, diagnostic tests and medications  4.  Explain disease process/condition, treatment plan, diagnostic tests and medications  Outcome: Progressing     Problem: Skin Integrity  Goal: Skin integrity is maintained or improved  Description: Target End Date:  Prior to discharge or change in level of care    Document interventions on Skin Risk/Raj flowsheet groups and corresponding LDA    1.  Assess and monitor skin integrity, appearance and/or temperature  2.  Assess risk factors for impaired skin integrity and/or pressures ulcers  3.  Implement precautions to protect skin integrity in collaboration with interdisciplinary team  4.  Implement pressure ulcer prevention protocol if at risk for skin breakdown  5.  Confirm wound care consult if at risk for skin breakdown  6.  Ensure patient use of pressure relieving devices  (Low air loss bed, waffle overlay, heel protectors, ROHO cushion, etc)  Outcome: Progressing     Problem: Fall Risk  Goal: Patient will remain free from falls  Description: Target End Date:  Prior to discharge or change in level of care    Document interventions on the Saundra Garcia Fall Risk Assessment    1.  Assess for fall risk factors  2.  Implement  fall precautions  Outcome: Progressing     Problem: Pain - Standard  Goal: Alleviation of pain or a reduction in pain to the patient’s comfort goal  Description: Target End Date:  Prior to discharge or change in level of care    Document on Vitals flowsheet    1.  Document pain using the appropriate pain scale per order or unit policy  2.  Educate and implement non-pharmacologic comfort measures (i.e. relaxation, distraction, massage, cold/heat therapy, etc.)  3.  Pain management medications as ordered  4.  Reassess pain after pain med administration per policy  5.  If opiods administered assess patient's response to pain medication is appropriate per POSS sedation scale  6.  Follow pain management plan developed in collaboration with patient and interdisciplinary team (including palliative care or pain specialists if applicable)  Outcome: Progressing     Problem: Hemodynamics  Goal: Patient's hemodynamics, fluid balance and neurologic status will be stable or improve  Description: Target End Date:  Prior to discharge or change in level of care    Document on Assessment and I/O flowsheet templates    1.  Monitor vital signs, pulse oximetry and cardiac monitor per provider order and/or policy  2.  Maintain blood pressure per provider order  3.  Hemodynamic monitoring per provider order  4.  Manage IV fluids and IV infusions  5.  Monitor intake and output  6.  Daily weights per unit policy or provider order  7.  Assess peripheral pulses and capillary refill  8.  Assess color and body temperature  9.  Position patient for maximum circulation/cardiac output  10. Monitor for signs/symptoms of excessive bleeding  11. Assess mental status, restlessness and changes in level of consciousness  12. Monitor temperature and report fever or hypothermia to provider immediately. Consideration of targeted temperature management.  Outcome: Progressing     Problem: Fluid Volume  Goal: Fluid volume balance will be  maintained  Description: Target End Date:  Prior to discharge or change in level of care    Document on I/O flowsheet    1.  Monitor intake and output as ordered  2.  Promote oral intake as appropriate  3.  Report inadequate intake or output to physician  4.  Administer IV therapy as ordered  5.  Weights per provider order  6.  Assess for signs and symptoms of bleeding  7.  Monitor for signs of fluid overload (respiratory changes, edema, weight gain, increased abdominal girth)  8.  Monitor of signs for inadequate fluid volume (poor skin turgor, dry mucous membranes)  9.  Instruct patient on adherence to fluid restrictions  Outcome: Progressing     Problem: Respiratory  Goal: Patient will achieve/maintain optimum respiratory ventilation and gas exchange  Description: Target End Date:  Prior to discharge or change in level of care    Document on Assessment flowsheet    1.  Assess and monitor rate, rhythm, depth and effort of respiration  2.  Breath sounds assessed qshift and/or as needed  3.  Assess O2 saturation, administer/titrate oxygen as ordered  4.  Position patient for maximum ventilatory efficiency  5.  Turn, cough, and deep breath with splinting to improve effectiveness  6.  Collaborate with RT to administer medication/treatments per order  7.  Encourage use of incentive spirometer and encourage patient to cough after use and utilize splinting techniques if applicable  8.  Airway suctioning  9.  Monitor sputum production for changes in color, consistency and frequency  10. Perform frequent oral hygiene  11. Alternate physical activity with rest periods  Outcome: Progressing     Problem: Physical Regulation  Goal: Diagnostic test results will improve  Description: Target End Date:  Prior to discharge or change in level of care    1.  Monitor lactic acid levels  2.  Monitor ABG's  3.  Monitor diagnostic test results  Outcome: Progressing  Goal: Signs and symptoms of infection will decrease  Description: Target  End Date:  Prior to discharge or change in level of care    1.  Remove potential routes of infection, such as central lines and urinary catheter  2.  Follow facility protocol for changing IV tubing and sites  3.  Collaborate with Infectious Disease  4.  Antibiotic therapy per provider order  5.  Note drug effects and monitor for antibiotic toxicity  Outcome: Progressing     Problem: Respiratory:  Goal: Ability to achieve and maintain a regular respiratory rate will improve  Outcome: Progressing  Goal: Ability to maintain a clear airway will improve  Outcome: Progressing  Goal: Ability to maintain normal pulse oximetry readings will improve  Outcome: Progressing  Goal: Ability to maintain arterial blood gas levels within normal range will improve  Outcome: Progressing  Goal: Verbalizations of increased ease of respirations will increase  Outcome: Progressing  Goal: Complications related to the disease process, condition or treatment will be avoided or minimized  Outcome: Progressing     Problem: Knowledge Deficit:  Goal: Knowledge of diagnostic tests will improve  Outcome: Progressing  Goal: Knowledge of the prescribed therapeutic regimen will improve  Outcome: Progressing       Patient is not progressing towards the following goals:

## 2025-01-29 NOTE — PROGRESS NOTES
[facility] Medicine Daily Progress Note    Date of Service  1/29/2025    Chief Complaint  Weakness, short of breath with stage IV breast cancer.    Hospital Course  64 y.o. female who presented 12/25/2024 with history of stage IV breast cancer metastatic disease, mets to the lungs and now spine, on previous oral chemotherapy, admitted on 12/25/2024 with weakness, shortness of breath and back pain, subsequently a CT chest abdomen showed moderate-sized pleural effusions, worsening hepatic metastatic disease with new abdominal lymphadenopathy, thoracic adenopathy, diffuse osseous metastatic disease with a T5 pathologic fracture, the patient was admitted, undergo a right-sided thoracentesis on 12/27, malignant cells identified in the fluid, and MRI showed multiple thoracic and lumbar metastasized lesions, the patient was taken to the operating room on 1 1 where she had a T4 T6 laminectomy, following the patient developed a right-sided pneumothorax, requiring chest tube placement, left-sided thoracentesis was performed, the patient following his respiratory difficulty requiring BiPAP treatment and subsequently required to be intubated, the patient had a right-sided PleurX and small bore catheter placed and had frequent thoracentesis since placement, the patient remained intubated until 1/15, where the patient then was extubated and had a extensive talk with the ICU team in terms of her overall status, metastatic malignancy and respiratory failure, the patient stated that she wanted to keep fighting and is wishing to hopefully reach outpatient status where she can fail to be treated for her malignancy.  If she would have additional respiratory failure she states that she would like to be reintubated and possibly even undergo a tracheostomy to stay alive.  The patient tolerated extubation, she is alert and x 4, she is in a sinus rhythm in the 50s to 80s, blood pressure in the 90s to 100s, the patient was n.p.o., underwent  evaluation today by speech therapy and is cleared for mildly thick liquids, soft and bite-size solids, the right chest tube had put out 100 cc overnight, the patient remains on DVT prophylaxis with Lovenox, Pepcid for GI prophylaxis,  Laboratory data showed white to count 13.9 hemoglobin 9.3 platelet count 563 sodium 136 potassium 4.5 chloride 94 bicarb 35 glucose 155 BUN 27 creatinine 0.56  Chest x-ray with pulmonary edema and infiltrates, stable, layering left pleural effusion, stable, right-sided small bore chest tube  The patient has been followed by palliative care, she currently confirms a DNR I okay status, the patient lives with her daughter  The patient has been consulted on by palliative care  The patient had a consult from her primary oncology team on 1/17/2025  Radiation oncology was consulted as well    Interval Problem Update  Patient continues to require high flow nasal cannula  Patient is somnolent, apparently when she is more awake, is requesting chest x-ray to see if there is recurrent pulmonary effusion.    1/29  Patient has been transition to comfort care with high flow  Patient does have significant whiteout on the right side on chest x-ray, does have a history of recurrent malignant pleural effusion  Patient would like to decrease the oxygen, because of this she has requested a Pleurx drain to be placed    I did discuss the case with interventional radiology who will  evaluate with ultrasound and if there is significant fluid, place drain  I am unable to evaluate for potential pleural effusion on the chest x-ray due to the significant changes on the right side    Overnight  Highflow 55/100%   Now comfort care  Decreased PO intake  No sedation, alert and oriented  -120  Good uop  max assist    I have discussed this patient's plan of care and discharge plan at IDT rounds today with Case Management, Nursing, Nursing leadership, and other members of the IDT  team.    Consultants/Specialty  critical care    Code Status  Comfort Care/DNR    Disposition  The patient is not medically cleared for discharge to home or a post-acute facility.  Anticipate discharge to: hospice    I have placed the appropriate orders for post-discharge needs.    Review of Systems  Review of Systems   Constitutional:  Positive for malaise/fatigue. Negative for chills and fever.   HENT:  Negative for congestion.    Respiratory:  Positive for cough and shortness of breath. Negative for sputum production and stridor.    Cardiovascular:  Negative for chest pain, palpitations and leg swelling.   Gastrointestinal:  Negative for abdominal pain, constipation, diarrhea, nausea and vomiting.   Genitourinary:  Negative for dysuria and urgency.   Musculoskeletal:  Negative for falls and myalgias.   Neurological:  Negative for dizziness, tingling, loss of consciousness, weakness and headaches.   Psychiatric/Behavioral:  Negative for depression and suicidal ideas.    All other systems reviewed and are negative.       Physical Exam  Temp:  [36.3 °C (97.3 °F)-36.7 °C (98.1 °F)] 36.6 °C (97.8 °F)  Pulse:  [82-96] 87  Resp:  [15-35] 22  BP: (103-148)/(59-74) 120/65  SpO2:  [84 %-93 %] 91 %    Physical Exam  Vitals and nursing note reviewed.   Constitutional:       General: She is not in acute distress.     Appearance: She is well-developed. She is ill-appearing. She is not diaphoretic.   HENT:      Head: Normocephalic and atraumatic.      Right Ear: External ear normal.      Left Ear: External ear normal.      Nose: Nose normal. No congestion or rhinorrhea.      Mouth/Throat:      Mouth: Mucous membranes are moist.      Pharynx: No oropharyngeal exudate.   Eyes:      General:         Right eye: No discharge.         Left eye: No discharge.   Neck:      Trachea: No tracheal deviation.   Cardiovascular:      Rate and Rhythm: Normal rate and regular rhythm.      Heart sounds: No murmur heard.     No friction rub.    Pulmonary:      Effort: Pulmonary effort is normal. No respiratory distress.      Breath sounds: No stridor. Rales present. No wheezing.   Chest:      Chest wall: No tenderness.   Abdominal:      General: Bowel sounds are normal. There is no distension.      Palpations: Abdomen is soft.      Tenderness: There is no abdominal tenderness.   Musculoskeletal:      Cervical back: Neck supple.      Right lower leg: Edema present.      Left lower leg: Edema present.   Lymphadenopathy:      Cervical: No cervical adenopathy.   Skin:     General: Skin is warm and dry.      Findings: No erythema or rash.   Neurological:      Mental Status: She is oriented to person, place, and time.      Cranial Nerves: No cranial nerve deficit.   Psychiatric:         Mood and Affect: Mood normal.         Speech: She is noncommunicative.         Behavior: Behavior normal.         Fluids  No intake or output data in the 24 hours ending 01/29/25 0834       Laboratory  Recent Labs     01/28/25  0540   WBC 19.9*   RBC 4.39   HEMOGLOBIN 12.5   HEMATOCRIT 40.1   MCV 91.3   MCH 28.5   MCHC 31.2*   RDW 48.8   PLATELETCT 489*   MPV 9.2     Recent Labs     01/27/25  0330   SODIUM 133*   POTASSIUM 4.0   CHLORIDE 85*   CO2 38*   GLUCOSE 127*   BUN 26*   CREATININE 0.70   CALCIUM 9.3                   Imaging  DX-CHEST-LIMITED (1 VIEW)   Final Result      1.  Complete opacification of the right hemithorax likely representing a component of volume loss, consolidation and pleural effusion.      2.  Left perihilar infiltrate, worsened from comparison.      DX-CHEST-LIMITED (1 VIEW)   Final Result      Slightly progressed right-sided airspace disease. Otherwise, stable as above.      DX-CHEST-PORTABLE (1 VIEW)   Final Result      1.  Mild worsening hypoinflation.   2.  No other significant change from prior exam.         DX-CHEST-PORTABLE (1 VIEW)   Final Result      1.  Interval removal of right-sided tunneled pleural catheter. No evidence of pneumothorax.    2.  Stable small bilateral pleural effusions.   3.  Increased patchy bilateral interstitial and airspace opacities.      IR-LUNG & PLEURAL CATH REMOVAL   Final Result      Successful removal of tunneled pleural catheter as described.         DX-CHEST-PORTABLE (1 VIEW)   Final Result         1.  Pulmonary edema and/or infiltrates are identified, which are stable since the prior exam.   2.  Small bilateral pleural effusions, increased on the right since prior study   3.  Cardiomegaly      DX-CHEST-PORTABLE (1 VIEW)   Final Result         1.  Pulmonary edema and/or infiltrates are identified, which are stable since the prior exam.   2.  Small left pleural effusion   3.  Cardiomegaly      DX-CHEST-PORTABLE (1 VIEW)   Final Result         1. Small right apical pneumothorax now visible. Right chest tube unchanged.      2. Persistent infiltrates and small effusions.                     DX-CHEST-PORTABLE (1 VIEW)   Final Result      No significant change from prior exam.      DX-CHEST-PORTABLE (1 VIEW)   Final Result         1.  Pulmonary edema and/or infiltrates are identified, which are stable since the prior exam.   2.  Cardiomegaly      DX-CHEST-PORTABLE (1 VIEW)   Final Result      1.  Interval improvement of pulmonary vascular congestion and pulmonary edema.   2.  No other significant change from prior exam.         DX-CHEST-PORTABLE (1 VIEW)   Final Result         1.  Pulmonary edema and/or infiltrates are identified, which appear somewhat increased since the prior exam.   2.  Layering left pleural effusion, stable   3.  Cardiomegaly      DX-CHEST-PORTABLE (1 VIEW)   Final Result         1.  Pulmonary edema and/or infiltrates are identified, which are stable since the prior exam.   2.  Layering left pleural effusion, stable      DX-CHEST-PORTABLE (1 VIEW)   Final Result         1.  Pulmonary edema and/or infiltrates are identified, which are stable since the prior exam.   2.  Layering left pleural effusion, stable       DX-CHEST-PORTABLE (1 VIEW)   Final Result         1.  Pulmonary edema and/or infiltrates are identified, which are stable since the prior exam.   2.  Trace bilateral pleural effusions, stable      DX-CHEST-PORTABLE (1 VIEW)   Final Result         1.  Pulmonary edema and/or infiltrates are identified, which are stable since the prior exam.   2.  Small left pleural effusion, stable      DX-CHEST-PORTABLE (1 VIEW)   Final Result      1.  Support equipment is unchanged.      2.  Cardiomegaly with moderate perihilar pulmonary edema unchanged from previous exam.      3.  Small bilateral pleural effusions left greater than right.      DX-CHEST-PORTABLE (1 VIEW)   Final Result      1.  Tubes and lines unchanged in position.      2.  Marked bilateral perihilar pulmonary edema which has improved slightly since previous exam.      3.  Small bilateral pleural effusions left greater than right.      IR-INSERT PLEURAL CATH W/ CUFF   Final Result      1.  Ultrasound and fluoroscopic guided placement of a RIGHT HEMITHORAX PleurX (Aspira type) tunneled pleural drainage catheter.   2.  The catheter can be used immediately with drainage of pleural effusion as needed for comfort, as per package insert instructions.   3.  If the catheter becomes dislodged, do not reinsert it.  Place sterile dressing over the entry wound site.  May consult primary care physician or oncologist as to whether there is clinical indication for PleurX replacement.      DX-CHEST-PORTABLE (1 VIEW)   Final Result      1.  Tubes and lines unchanged in position.      2.   Extensive bilateral perihilar airspace opacities consistent with pneumonitis and/or pulmonary edema.      3.  Mild to moderate bilateral pleural effusions right greater than left      DX-CHEST-PORTABLE (1 VIEW)   Final Result         1. Increasing moderate right and small left pleural effusions. No change edema or infiltrates.      2. ETT 5 cm above masoud. NG tube in stomach. Right central  line.                     DX-CHEST-PORTABLE (1 VIEW)   Final Result      Stable diffuse bilateral pulmonary infiltrates.      DX-ABDOMEN FOR TUBE PLACEMENT   Final Result      NG tube tip overlies the gastric body.      DX-CHEST-PORTABLE (1 VIEW)   Final Result      1.  Supportive tubing as described above.   2.  Apparent increasing RIGHT pleural fluid, possibly due to change in position.   3.  Improvement of LEFT lung base consolidation.   4.  No other significant change from prior exam.         DX-CHEST-PORTABLE (1 VIEW)   Final Result         1. Stable right IJ central catheter. Right chest tube has been removed. No pneumothorax.   2. Unchanged bilateral pulmonary opacities and associated pleural effusions.         DX-CHEST-PORTABLE (1 VIEW)   Final Result      1.  Unchanged bilateral pulmonary opacities. Stable small bilateral pleural effusions.   2.  Right pigtail catheter remains. No pneumothorax.      DX-CHEST-PORTABLE (1 VIEW)   Final Result      Removal of large bore right chest tube with stable right pigtail chest tube. Questionable tiny right apical pneumothorax.      DX-CHEST-PORTABLE (1 VIEW)   Final Result      No significant interval change.      DX-CHEST-PORTABLE (1 VIEW)   Final Result      1.  Persistent bilateral pulmonary opacities, stable to slightly worse, although changes may be related to differences in imaging technique. No large pleural effusions.   2.  Well-positioned right IJ central catheter. No pneumothorax.   3.  Stable other lines and tubes.      DX-CHEST-LIMITED (1 VIEW)   Final Result         1.  No change in 2 separate right-sided chest tubes.      2.  No pneumothorax identified.      3.  Bilateral pulmonary opacifications again identified which are slightly less prominent.      DX-CHEST-PORTABLE (1 VIEW)   Final Result         1. No change mild infiltrates.      2. Right chest tubes. No pneumothorax.      3. ETT out. NG tube and jugular line remain.                      DX-CHEST-PORTABLE (1 VIEW)   Final Result         1. No significant interval change.      DX-CHEST-LIMITED (1 VIEW)   Final Result         1.  Findings on chest radiograph appear stable since the prior radiograph.  No new abnormalities are identified.      2.  2 separate right-sided chest tubes are again identified with no change in position.      3.  No pneumothorax identified.      DX-CHEST-LIMITED (1 VIEW)   Final Result         1.  No pneumothorax identified.      2.  2 right-sided chest tubes again identified.      CT-CTA CHEST PULMONARY ARTERY W/ RECONS   Final Result         1. No pulmonary embolus.      2. Medial right lung base consolidation felt to represent complete collapse of the right middle lobe. Mild to moderate mid and upper lung pneumonia infiltrates remain.      3. Right chest tubes. Small right pneumothorax and small right effusion. Improving small 2.5 cm left pleural effusion since 12/25/2024.      4. Extensive metastatic lesions including adenopathy, liver lesions, right breast nodule and bone lesions. Thoracic spine fusion hardware remains.                  CT-HEAD W/O   Final Result         1. No acute process in the brain evident.      2. Fluid in sphenoid sinus.      3. Right convexity midthoracic scoliosis and unchanged from 12/31/2024 MRI.               DX-ABDOMEN FOR TUBE PLACEMENT   Final Result         1. NG tube in stomach.                     DX-CHEST-FOR LINE PLACEMENT Perform procedure in: Patient's Room   Final Result         1. Small right apical pneumothorax remains. 2 right chest tubes.      2. ETT 3 cm above masoud. NG tube in stomach. Right jugular line in SVC. No complication evident.      3. Mild infiltrates and small right effusion.                  DX-CHEST-LIMITED (1 VIEW)   Final Result         1. 2nd right chest tube placed with near complete evacuation of right pneumothorax.      2. Mild infiltrates, basilar atelectasis and small right effusion remain.      3.  Endotracheal tube tip at about T5 and about 2 cm above the masoud. The masoud and tip of the endotracheal tube are difficult to visualize in part because of overlying thoracic spine fusion hardware.                  DX-CHEST-PORTABLE (1 VIEW)   Final Result         Enlarging right pneumothorax with collapsing of the right lung.      Preliminary findings texted to Dr. STANLEY FISHER in the Emergency Department via Voalte on 1/4/2025 1:50 AM         DX-CHEST-LIMITED (1 VIEW)   Final Result         Interval placement of right pigtail chest tube. Small right residual pneumothorax.      DX-CHEST-PORTABLE (1 VIEW)   Final Result   Addendum (preliminary) 1 of 1      CRITICAL RESULT READ BACK: Preliminary findings discussed with and    critical read back performed by Dr. Downs via telephone on 1/3/2025 8:03    PM      Final      1.  Interval worsening in right pneumothorax, now moderate.   2.  Persistent interstitial and alveolar pulmonary opacities bilaterally. Possible bilateral pleural effusions, right greater than left.            DX-CHEST-PORTABLE (1 VIEW)   Final Result         1. Slightly decreased right pneumothorax.   2. Stable bilateral interstitial and airspace opacification.   3. Stable right pleural effusion.      DX-CHEST-PORTABLE (1 VIEW)   Final Result         1. Left pleural effusion has decreased in size status post thoracentesis. There is no left-sided pneumothorax.   2. There is persistent right basilar opacity with a new right apical pneumothorax.      Findings were communicated to the ordering provider at the time of dictation via Voalte.      US-THORACENTESIS PUNCTURE LEFT   Final Result      1. Ultrasound guided left sided therapeutic thoracentesis.      2. 950 mL of fluid withdrawn.      DX-CHEST-PORTABLE (1 VIEW)   Final Result      1. New postsurgical changes in the upper thoracic spine.   2. Worsening bilateral pleural effusions, pulmonary vascular congestion and interstitial edema.       DX-PORTABLE FLUORO > 1 HOUR   Final Result      Portable fluoroscopy utilized for 1 minute 19 seconds.      INTERPRETING LOCATION: [address], [place], [address]      DX-THORACIC SPINE-2 VIEWS   Final Result      Digitized intraoperative radiograph is submitted for review. This examination is not for diagnostic purpose but for guidance during a surgical procedure. Please see the patient's chart for full procedural details.         INTERPRETING LOCATION: [address], [place], [address]      MR-CERVICAL SPINE-WITH & W/O   Final Result         1.  No evidence of metastatic disease to the cervical spine.      MR-LUMBAR SPINE-WITH & W/O   Final Result   Impression:      Multiple bony metastatic lesions as described above, most prominent along the inferior endplate of L1.      Degenerative moderate canal stenosis at L4-5 with cauda equina compression. Epidural thickening and enhancement is noted in this region with severe bilateral facet degeneration and enhancement along the bilateral facet joints. This is felt to be    degenerative in origin.      MR-THORACIC SPINE-WITH & W/O   Final Result         Multiple metastatic lesions of the thoracic spine as described above.      Pathologic compression fracture at T5 with 50% loss of height and posterior cortical retropulsion with circumferential epidural thickening representing epidural extension of metastatic disease. This causes severe canal stenosis with cord compression.      Metastatic disease also involves the posterior elements and spinous processes at T4, T5.      Pre and paravertebral metastatic soft tissue infiltration at T4-T5 noted.         MR-BRAIN-WITH & W/O   Final Result         Multiple nodular enhancing metastatic lesions involving the cranium as described above.      The largest lesion is in the right posterior temporal/parietal occipital region and demonstrates inner table cortical breakthrough with epidural extension and adjacent mild dural thickening and  enhancement.      US-THORACENTESIS PUNCTURE RIGHT   Final Result   Addendum (preliminary) 1 of 1 12/27/2024 10:59 AM      HISTORY/REASON FOR EXAM:  Shortness of breath         TECHNIQUE/EXAM DESCRIPTION:   Ultrasound-guided thoracentesis.      Indication:  RIGHT pleural fluid collection.      COMPARISON:  None-however patient states history of right-sided    thoracentesis September 2024.      PROCEDURE:     Informed consent was obtained. A timeout was taken. A right    pleural effusion was localized with real-time ultrasound guidance. The    right posterior chest wall was prepped and draped in a sterile manner.    Following local anesthesia with 1%    lidocaine, and under live ultrasound guidance a 5 [employer] pigtail    catheter was advanced into the pleural space with trocar technique and    pleural fluid was drained. The patient tolerated the procedure well    without evidence of complication. A post    thoracentesis chest radiograph is forthcoming.      FINDINGS:      Fluid was sent to the laboratory.      Fluid character: serosanguinous         1. Ultrasound guided right sided therapeutic and diagnostic thoracentesis.      2. 1000 mL of fluid withdrawn.      Final      1. Ultrasound guided right sided therapeutic and diagnostic thoracentesis.      2. 1000 mL of fluid withdrawn.      DX-CHEST-PORTABLE (1 VIEW)   Final Result      1.  Small persistent right pleural effusion which is decreased since recent thoracentesis.      2.  Small to moderate left pleural effusion.      3.  Bilateral perihilar and left lower lobe atelectasis and/or pneumonitis.      4.  Increased interstitial markings throughout the lung fields consistent with parenchymal scarring, and/or pulmonary edema.      CT-LSPINE W/O PLUS RECONS   Final Result         1. Diffuse metastatic disease.   2. No definite pathologic fracture.      CT-TSPINE W/O PLUS RECONS   Final Result         1. Extensive metastatic disease.   2. There is new  "pathologic compression fracture deformity of T5.      CT-CHEST,ABDOMEN,PELVIS WITH   Final Result         1. New bilateral upper lobe nodular infiltrates.   2. Moderate-sized bilateral pleural effusions.   3. Interval worsening of hepatic metastatic disease.   4. New abdominal lymphadenopathy. Thoracic adenopathy is again noted.   5. Diffuse osseous metastatic disease. There is a new compression fracture of T5 as reported on the prior spine CT.      DX-CHEST-PORTABLE (1 VIEW)   Final Result      1.  Stable bibasilar atelectasis/consolidation and small bilateral pleural effusions.      2.  Stable cardiomegaly and interstitial edema.      IR-CONSULT AND TREAT    (Results Pending)        Assessment/Plan  * Malignant neoplasm of breast (HCC)- (present on admission)  Assessment & Plan  Diagnosed 2019, with significant metastatic disease   \"Bilateral breast cancer with extensive destruction of the left breast and ulceration and skin lesions making her inoperable on the left.  Right breast shows a 3 x 3 cm mass.  PET scan March 2023 showed non-hypermetabolic pleural effusions which are cytology positive and she has demonstrable pleural masses. Started aromatase inhibition early September 2023.  Has not yet started CDK 4 6 inhibitor.  Lost to follow-up for over 6 months although apparently was on letrozole as single agent.  Now with clear progression of disease in the left breast.  August 2024: Progression of disease in liver with extensive disease.  Breast is worse.  Lung is better.  Lymph nodes are worse.  Status post radiation to symptomatic oozing and exophytic left breast mass.  Marked worsening of her cancer on letrozole with progression in the bone and liver.\"--Dr. Benitez  Patient is now comfort care with highflow  Continue palliative care and hospice discussions    Acute on chronic respiratory failure with hypoxia and hypercapnia (HCC)- (present on admission)  Assessment & Plan  Patient was intubated on 1/4 " and then extubated 1/5   Was reintubated on 1/8 and extubated on 1/15   Patient continues to require high flow nasal cannula   Most recent chest x-ray did show extensive bilateral perihilar airspace opacities as well as mild to moderate bilateral pleural effusions   Patient is now comfort care but wants to continue high flow due to shortness of breath  Patient would like to be on less oxygen, ideally would be able to get her home or GIP if we can get her off the high flow, patient is requesting Pleurx drain  I did discuss the case with interventional radiology who was agreeable to attempt to drain if there is adequate fluid  Unfortunately, unable to ascertain if there is adequate fluid on x-ray due to significant right sided whiteout    Patient is critically ill.   The patient continues to have : Acute on chronic respiratory failure with hypoxia and hypercapnia  The vital organ system that is effected is the: Pulmonary initially  If untreated there is a high chance of deterioration into: Respiratory arrest  The critical care that I am providing today is: High flow nasal cannula, need for frequent monitoring due to near max settings  The critical care that has been undertaken is medically complex.   There has been no overlap in critical care time.  Critical care time not including procedures, no overlap: 36 minutes    Anemia of chronic disease- (present on admission)  Assessment & Plan  No sign of gross bleeding    Hypokalemia- (present on admission)  Assessment & Plan  Resolved with supplementation  Repeat BMP in the morning    Restless leg syndrome- (present on admission)  Assessment & Plan  Continue Requip    Pathological fracture of vertebra- (present on admission)  Assessment & Plan  Pathologic fracture of T5 with myelopathy on presentation  S/P T4-6 laminectomy with fusion and spinal decompression on 1/1/2025 by Dr. Lowery  The patient with L4-5 lesion with cauda equina on imaging  Multimodal pain  management    Cancer related pain- (present on admission)  Assessment & Plan  Palliative care following  Continue multimodal pain management    Bilateral pleural effusion- (present on admission)  Assessment & Plan  Malignant pleural effusions from metastatic breast cancer s/p thoracenteses and chest tubes  PleurX placement in IR 1/11 on the right and was removed  Continues to require high flow nasal cannula  Repeat chest x-ray      Primary hypertension- (present on admission)  Assessment & Plan  Home Coreg and losartan are currently being held  Restart as needed    Diabetes mellitus, type 2 (HCC)- (present on admission)  Assessment & Plan  With mild hyperglycemia  No need for coverage at this time         VTE prophylaxis:   SCDs/TEDs      I have performed a physical exam and reviewed and updated ROS and Plan today (1/29/2025). In review of yesterday's note (1/28/2025), there are no changes except as documented above.

## 2025-01-29 NOTE — CARE PLAN
The patient is Stable - Low risk of patient condition declining or worsening    Shift Goals  Clinical Goals: comfort measure, pain control  Patient Goals: discharge home  Family Goals: updates on POC    Progress made toward(s) clinical / shift goals:    Problem: Knowledge Deficit - Standard  Goal: Patient and family/care givers will demonstrate understanding of plan of care, disease process/condition, diagnostic tests and medications  Outcome: Progressing  Note: Educated pt and daughter at bedside on POC, pain control, comfort care measures, IR drain placement, safety, all questions answered, hourly rounding in progress     Problem: Skin Integrity  Goal: Skin integrity is maintained or improved  Outcome: Progressing  Note: Q2 turns with pillows, mepilex to sacrum and heels in place, on low air loss mattress, hourly rounding in progress     Problem: Pain - Standard  Goal: Alleviation of pain or a reduction in pain to the patient’s comfort goal  1/29/2025 1346 by Machelle C. Delos Reyes, R.N.  Outcome: Progressing  Note: Medicated with oral morphine per MAR with adequate pain control per pt report, VSS, no s/s of distress, resting in bed comfortably, hourly rounding in progress  1/29/2025 1344 by Machelle C. Delos Reyes, R.N.  Outcome: Progressing  Note: Medicated with oral Morphine 2.5 mg per MAR with adequate pain control per pt report, resting in bed comfortably, no s/s of distress, hourly rounding in progress       Patient is not progressing towards the following goals:

## 2025-01-29 NOTE — THERAPY
Occupational Therapy Contact Note    Patient Name: Asha Hannon  Age:  64 y.o., Sex:  female  Medical Record #: [id number]  Today's Date: 1/29/2025    Pt has transitioned to comfort care. Will complete OT orders at this time.

## 2025-01-29 NOTE — PROGRESS NOTES
IR RN note    Patient underwent a Aspira - Right Pleural drain catheter placement  by Dr. Gaming.  Procedure site was verified by MD using imaging guidance. Consent was signed.  IR janessa Valentine and Doug assisted. Patient was placed in a right side up position.  Vitals were taken every 5 minutes and remained stable during procedure (see doc flow sheet for results).  CO2 waveform capnography was monitored throughout procedure, see chart.  Right back access site.   A suture, gauze and tegaderm dressing was placed over surgical site. Report called to Judith RITTER. Pt transported by chantal with RN to room, with monitor .   Reviewed sedation orders with MD ANDERSON procedure ended at 1031    550 ml of charles fluid aspirated and discarded    Aspira Pleural drainage catheter insertion Tray   15.5 F  REF # [id number]  LOT # [id number]  Exp. [id number]

## 2025-01-30 PROCEDURE — 94640 AIRWAY INHALATION TREATMENT: CPT

## 2025-01-30 PROCEDURE — 700111 HCHG RX REV CODE 636 W/ 250 OVERRIDE (IP): Mod: JZ | Performed by: HOSPITALIST

## 2025-01-30 PROCEDURE — 700111 HCHG RX REV CODE 636 W/ 250 OVERRIDE (IP): Performed by: STUDENT IN AN ORGANIZED HEALTH CARE EDUCATION/TRAINING PROGRAM

## 2025-01-30 PROCEDURE — 700111 HCHG RX REV CODE 636 W/ 250 OVERRIDE (IP): Mod: JZ

## 2025-01-30 PROCEDURE — 99233 SBSQ HOSP IP/OBS HIGH 50: CPT | Mod: 25 | Performed by: STUDENT IN AN ORGANIZED HEALTH CARE EDUCATION/TRAINING PROGRAM

## 2025-01-30 PROCEDURE — 99497 ADVNCD CARE PLAN 30 MIN: CPT | Performed by: STUDENT IN AN ORGANIZED HEALTH CARE EDUCATION/TRAINING PROGRAM

## 2025-01-30 PROCEDURE — 770004 HCHG ROOM/CARE - ONCOLOGY PRIVATE *

## 2025-01-30 RX ORDER — FUROSEMIDE 10 MG/ML
40 INJECTION INTRAMUSCULAR; INTRAVENOUS DAILY
Status: DISCONTINUED | OUTPATIENT
Start: 2025-01-30 | End: 2025-02-01 | Stop reason: HOSPADM

## 2025-01-30 RX ORDER — MORPHINE SULFATE 4 MG/ML
4 INJECTION INTRAVENOUS
Status: DISCONTINUED | OUTPATIENT
Start: 2025-01-30 | End: 2025-02-01 | Stop reason: HOSPADM

## 2025-01-30 RX ADMIN — MORPHINE SULFATE 4 MG: 4 INJECTION, SOLUTION INTRAMUSCULAR; INTRAVENOUS at 05:25

## 2025-01-30 RX ADMIN — ONDANSETRON 4 MG: 2 INJECTION INTRAMUSCULAR; INTRAVENOUS at 18:22

## 2025-01-30 RX ADMIN — MORPHINE SULFATE 4 MG: 4 INJECTION, SOLUTION INTRAMUSCULAR; INTRAVENOUS at 01:23

## 2025-01-30 RX ADMIN — ONDANSETRON 4 MG: 2 INJECTION INTRAMUSCULAR; INTRAVENOUS at 01:23

## 2025-01-30 RX ADMIN — FUROSEMIDE 40 MG: 10 INJECTION INTRAMUSCULAR; INTRAVENOUS at 16:33

## 2025-01-30 RX ADMIN — ONDANSETRON 4 MG: 2 INJECTION INTRAMUSCULAR; INTRAVENOUS at 05:25

## 2025-01-30 RX ADMIN — MORPHINE SULFATE 4 MG: 4 INJECTION, SOLUTION INTRAMUSCULAR; INTRAVENOUS at 10:12

## 2025-01-30 RX ADMIN — MORPHINE SULFATE 4 MG: 4 INJECTION, SOLUTION INTRAMUSCULAR; INTRAVENOUS at 17:58

## 2025-01-30 ASSESSMENT — ENCOUNTER SYMPTOMS
MYALGIAS: 1
VOMITING: 0
SHORTNESS OF BREATH: 1
ABDOMINAL PAIN: 0
NAUSEA: 0
COUGH: 1

## 2025-01-30 ASSESSMENT — PAIN DESCRIPTION - PAIN TYPE
TYPE: ACUTE PAIN

## 2025-01-30 NOTE — PROGRESS NOTES
Hospital Medicine Daily Progress Note    Date of Service  1/30/2025    Chief Complaint  Asha Hannon is a 64 y.o. female admitted 12/25/2024 with worsening shortness of breath    Hospital Course  Asha Hannon is a 64 y.o. female with PMHx HTN, DMT2, restless leg syndrome, chronic anemia, stage IV breast cancer with metastatic disease to lungs and spine..  Admitted 12/25 for weakness and shortness of breath.    Patient was previously on oral chemotherapy.  She presented with worsening shortness of breath and back pain. CT chest abdomen showed moderate-sized pleural effusions, worsening hepatic metastatic disease with new abdominal lymphadenopathy, thoracic adenopathy, diffuse osseous metastatic disease with a T5 pathologic fracture    Patient underwent a right sided thoracentesis 12/27.  Confirmed diagnosis of malignant pleural effusion.  MRI showed multiple thoracic and lumbar metastasized lesions.  Patient underwent T4-T6 laminectomy for spinal cord compression on 1/1.  She subsequently developed a right-sided pneumothorax and a chest tube was placed.  This was shortly followed by BiPAP and then intubation on 1/4.  Patient remained intubated until 1/15.  During this time she required frequent thoracentesis.    Many goals of care conversations occurred between patient and her medical staff here at the hospital.  Ultimately patient was unable to come off high flow nasal cannula.  Palliative care met with patient's family and patient at bedside.  Patient elected to transition to DNR/comfort care on 1/28.    Interval Problem Update  1/30: Patient remains on high flow nasal cannula 45 L/min she is on comfort care.  She underwent Pleurx drain placement on yesterday.  Patient remains on high flow nasal cannula 45 L/min.  Discussed with palliative care and hospice at bedside.    I have discussed this patient's plan of care and discharge plan at IDT rounds today with Case Management, Nursing, Nursing  leadership, and other members of the IDT team.    Consultants/Specialty  critical care, neurosurgery, oncology, palliative care, and physiatry    Code Status  Comfort Care/DNR    Disposition  Medically Cleared  I have placed the appropriate orders for post-discharge needs.    Review of Systems  ROS     Physical Exam  Temp:  [36.6 °C (97.9 °F)] 36.6 °C (97.9 °F)  Pulse:  [70-86] 70  Resp:  [16-20] 18  BP: (128)/(55) 128/55  SpO2:  [90 %-93 %] 90 %    Physical Exam    Fluids    Intake/Output Summary (Last 24 hours) at 1/30/2025 1554  Last data filed at 1/30/2025 0500  Gross per 24 hour   Intake --   Output 300 ml   Net -300 ml        Laboratory  Recent Labs     01/28/25  0540   WBC 19.9*   RBC 4.39   HEMOGLOBIN 12.5   HEMATOCRIT 40.1   MCV 91.3   MCH 28.5   MCHC 31.2*   RDW 48.8   PLATELETCT 489*   MPV 9.2                       Imaging  DX-CHEST-PORTABLE (1 VIEW)   Final Result      1.  Interval placement of right-sided Pleurx type Aspira catheter.   2.  Nearly complete opacification of the right hemithorax consistent with a combination of lung collapse and residual pleural effusion (after evacuation of 550 mL pleural fluid). Volume loss with shift of mediastinal structures toward the right.   3.  Nearly resolved left-sided central perivascular and interstitial pulmonary edema.   4.  Small left pleural effusion.   5.  No pneumothorax.      IR-INSERT PLEURAL CATH W/ CUFF   Final Result      1.  Ultrasound and fluoroscopic guided placement of a RIGHT HEMITHORAX PleurX (Aspira type) tunneled pleural drainage catheter.   2.  The catheter can be used immediately with drainage of pleural effusion as needed for comfort, as per package insert instructions.   3.  If the catheter becomes dislodged, do not reinsert it.  Place sterile dressing over the entry wound site.  May consult primary care physician or oncologist as to whether there is clinical indication for PleurX replacement.      DX-CHEST-LIMITED (1 VIEW)   Final Result       1.  Complete opacification of the right hemithorax likely representing a component of volume loss, consolidation and pleural effusion.      2.  Left perihilar infiltrate, worsened from comparison.      DX-CHEST-LIMITED (1 VIEW)   Final Result      Slightly progressed right-sided airspace disease. Otherwise, stable as above.      DX-CHEST-PORTABLE (1 VIEW)   Final Result      1.  Mild worsening hypoinflation.   2.  No other significant change from prior exam.         DX-CHEST-PORTABLE (1 VIEW)   Final Result      1.  Interval removal of right-sided tunneled pleural catheter. No evidence of pneumothorax.   2.  Stable small bilateral pleural effusions.   3.  Increased patchy bilateral interstitial and airspace opacities.      IR-LUNG & PLEURAL CATH REMOVAL   Final Result      Successful removal of tunneled pleural catheter as described.         DX-CHEST-PORTABLE (1 VIEW)   Final Result         1.  Pulmonary edema and/or infiltrates are identified, which are stable since the prior exam.   2.  Small bilateral pleural effusions, increased on the right since prior study   3.  Cardiomegaly      DX-CHEST-PORTABLE (1 VIEW)   Final Result         1.  Pulmonary edema and/or infiltrates are identified, which are stable since the prior exam.   2.  Small left pleural effusion   3.  Cardiomegaly      DX-CHEST-PORTABLE (1 VIEW)   Final Result         1. Small right apical pneumothorax now visible. Right chest tube unchanged.      2. Persistent infiltrates and small effusions.                     DX-CHEST-PORTABLE (1 VIEW)   Final Result      No significant change from prior exam.      DX-CHEST-PORTABLE (1 VIEW)   Final Result         1.  Pulmonary edema and/or infiltrates are identified, which are stable since the prior exam.   2.  Cardiomegaly      DX-CHEST-PORTABLE (1 VIEW)   Final Result      1.  Interval improvement of pulmonary vascular congestion and pulmonary edema.   2.  No other significant change from prior exam.          DX-CHEST-PORTABLE (1 VIEW)   Final Result         1.  Pulmonary edema and/or infiltrates are identified, which appear somewhat increased since the prior exam.   2.  Layering left pleural effusion, stable   3.  Cardiomegaly      DX-CHEST-PORTABLE (1 VIEW)   Final Result         1.  Pulmonary edema and/or infiltrates are identified, which are stable since the prior exam.   2.  Layering left pleural effusion, stable      DX-CHEST-PORTABLE (1 VIEW)   Final Result         1.  Pulmonary edema and/or infiltrates are identified, which are stable since the prior exam.   2.  Layering left pleural effusion, stable      DX-CHEST-PORTABLE (1 VIEW)   Final Result         1.  Pulmonary edema and/or infiltrates are identified, which are stable since the prior exam.   2.  Trace bilateral pleural effusions, stable      DX-CHEST-PORTABLE (1 VIEW)   Final Result         1.  Pulmonary edema and/or infiltrates are identified, which are stable since the prior exam.   2.  Small left pleural effusion, stable      DX-CHEST-PORTABLE (1 VIEW)   Final Result      1.  Support equipment is unchanged.      2.  Cardiomegaly with moderate perihilar pulmonary edema unchanged from previous exam.      3.  Small bilateral pleural effusions left greater than right.      DX-CHEST-PORTABLE (1 VIEW)   Final Result      1.  Tubes and lines unchanged in position.      2.  Marked bilateral perihilar pulmonary edema which has improved slightly since previous exam.      3.  Small bilateral pleural effusions left greater than right.      IR-INSERT PLEURAL CATH W/ CUFF   Final Result      1.  Ultrasound and fluoroscopic guided placement of a RIGHT HEMITHORAX PleurX (Aspira type) tunneled pleural drainage catheter.   2.  The catheter can be used immediately with drainage of pleural effusion as needed for comfort, as per package insert instructions.   3.  If the catheter becomes dislodged, do not reinsert it.  Place sterile dressing over the entry wound site.   May consult primary care physician or oncologist as to whether there is clinical indication for PleurX replacement.      DX-CHEST-PORTABLE (1 VIEW)   Final Result      1.  Tubes and lines unchanged in position.      2.   Extensive bilateral perihilar airspace opacities consistent with pneumonitis and/or pulmonary edema.      3.  Mild to moderate bilateral pleural effusions right greater than left      DX-CHEST-PORTABLE (1 VIEW)   Final Result         1. Increasing moderate right and small left pleural effusions. No change edema or infiltrates.      2. ETT 5 cm above masoud. NG tube in stomach. Right central line.                     DX-CHEST-PORTABLE (1 VIEW)   Final Result      Stable diffuse bilateral pulmonary infiltrates.      DX-ABDOMEN FOR TUBE PLACEMENT   Final Result      NG tube tip overlies the gastric body.      DX-CHEST-PORTABLE (1 VIEW)   Final Result      1.  Supportive tubing as described above.   2.  Apparent increasing RIGHT pleural fluid, possibly due to change in position.   3.  Improvement of LEFT lung base consolidation.   4.  No other significant change from prior exam.         DX-CHEST-PORTABLE (1 VIEW)   Final Result         1. Stable right IJ central catheter. Right chest tube has been removed. No pneumothorax.   2. Unchanged bilateral pulmonary opacities and associated pleural effusions.         DX-CHEST-PORTABLE (1 VIEW)   Final Result      1.  Unchanged bilateral pulmonary opacities. Stable small bilateral pleural effusions.   2.  Right pigtail catheter remains. No pneumothorax.      DX-CHEST-PORTABLE (1 VIEW)   Final Result      Removal of large bore right chest tube with stable right pigtail chest tube. Questionable tiny right apical pneumothorax.      DX-CHEST-PORTABLE (1 VIEW)   Final Result      No significant interval change.      DX-CHEST-PORTABLE (1 VIEW)   Final Result      1.  Persistent bilateral pulmonary opacities, stable to slightly worse, although changes may be related to  differences in imaging technique. No large pleural effusions.   2.  Well-positioned right IJ central catheter. No pneumothorax.   3.  Stable other lines and tubes.      DX-CHEST-LIMITED (1 VIEW)   Final Result         1.  No change in 2 separate right-sided chest tubes.      2.  No pneumothorax identified.      3.  Bilateral pulmonary opacifications again identified which are slightly less prominent.      DX-CHEST-PORTABLE (1 VIEW)   Final Result         1. No change mild infiltrates.      2. Right chest tubes. No pneumothorax.      3. ETT out. NG tube and jugular line remain.                     DX-CHEST-PORTABLE (1 VIEW)   Final Result         1. No significant interval change.      DX-CHEST-LIMITED (1 VIEW)   Final Result         1.  Findings on chest radiograph appear stable since the prior radiograph.  No new abnormalities are identified.      2.  2 separate right-sided chest tubes are again identified with no change in position.      3.  No pneumothorax identified.      DX-CHEST-LIMITED (1 VIEW)   Final Result         1.  No pneumothorax identified.      2.  2 right-sided chest tubes again identified.      CT-CTA CHEST PULMONARY ARTERY W/ RECONS   Final Result         1. No pulmonary embolus.      2. Medial right lung base consolidation felt to represent complete collapse of the right middle lobe. Mild to moderate mid and upper lung pneumonia infiltrates remain.      3. Right chest tubes. Small right pneumothorax and small right effusion. Improving small 2.5 cm left pleural effusion since 12/25/2024.      4. Extensive metastatic lesions including adenopathy, liver lesions, right breast nodule and bone lesions. Thoracic spine fusion hardware remains.                  CT-HEAD W/O   Final Result         1. No acute process in the brain evident.      2. Fluid in sphenoid sinus.      3. Right convexity midthoracic scoliosis and unchanged from 12/31/2024 MRI.               DX-ABDOMEN FOR TUBE PLACEMENT   Final  Result         1. NG tube in stomach.                     DX-CHEST-FOR LINE PLACEMENT Perform procedure in: Patient's Room   Final Result         1. Small right apical pneumothorax remains. 2 right chest tubes.      2. ETT 3 cm above masoud. NG tube in stomach. Right jugular line in SVC. No complication evident.      3. Mild infiltrates and small right effusion.                  DX-CHEST-LIMITED (1 VIEW)   Final Result         1. 2nd right chest tube placed with near complete evacuation of right pneumothorax.      2. Mild infiltrates, basilar atelectasis and small right effusion remain.      3. Endotracheal tube tip at about T5 and about 2 cm above the masoud. The masoud and tip of the endotracheal tube are difficult to visualize in part because of overlying thoracic spine fusion hardware.                  DX-CHEST-PORTABLE (1 VIEW)   Final Result         Enlarging right pneumothorax with collapsing of the right lung.      Preliminary findings texted to Dr. STANLEY FISHER in the Emergency Department via Voalte on 1/4/2025 1:50 AM         DX-CHEST-LIMITED (1 VIEW)   Final Result         Interval placement of right pigtail chest tube. Small right residual pneumothorax.      DX-CHEST-PORTABLE (1 VIEW)   Final Result   Addendum (preliminary) 1 of 1      CRITICAL RESULT READ BACK: Preliminary findings discussed with and    critical read back performed by Dr. Downs via telephone on 1/3/2025 8:03    PM      Final      1.  Interval worsening in right pneumothorax, now moderate.   2.  Persistent interstitial and alveolar pulmonary opacities bilaterally. Possible bilateral pleural effusions, right greater than left.            DX-CHEST-PORTABLE (1 VIEW)   Final Result         1. Slightly decreased right pneumothorax.   2. Stable bilateral interstitial and airspace opacification.   3. Stable right pleural effusion.      DX-CHEST-PORTABLE (1 VIEW)   Final Result         1. Left pleural effusion has decreased in size  status post thoracentesis. There is no left-sided pneumothorax.   2. There is persistent right basilar opacity with a new right apical pneumothorax.      Findings were communicated to the ordering provider at the time of dictation via Voalte.      US-THORACENTESIS PUNCTURE LEFT   Final Result      1. Ultrasound guided left sided therapeutic thoracentesis.      2. 950 mL of fluid withdrawn.      DX-CHEST-PORTABLE (1 VIEW)   Final Result      1. New postsurgical changes in the upper thoracic spine.   2. Worsening bilateral pleural effusions, pulmonary vascular congestion and interstitial edema.      DX-PORTABLE FLUORO > 1 HOUR   Final Result      Portable fluoroscopy utilized for 1 minute 19 seconds.      INTERPRETING LOCATION: [address], [place], [address]      DX-THORACIC SPINE-2 VIEWS   Final Result      Digitized intraoperative radiograph is submitted for review. This examination is not for diagnostic purpose but for guidance during a surgical procedure. Please see the patient's chart for full procedural details.         INTERPRETING LOCATION: [address], [place], [address]      MR-CERVICAL SPINE-WITH & W/O   Final Result         1.  No evidence of metastatic disease to the cervical spine.      MR-LUMBAR SPINE-WITH & W/O   Final Result   Impression:      Multiple bony metastatic lesions as described above, most prominent along the inferior endplate of L1.      Degenerative moderate canal stenosis at L4-5 with cauda equina compression. Epidural thickening and enhancement is noted in this region with severe bilateral facet degeneration and enhancement along the bilateral facet joints. This is felt to be    degenerative in origin.      MR-THORACIC SPINE-WITH & W/O   Final Result         Multiple metastatic lesions of the thoracic spine as described above.      Pathologic compression fracture at T5 with 50% loss of height and posterior cortical retropulsion with circumferential epidural thickening representing epidural  extension of metastatic disease. This causes severe canal stenosis with cord compression.      Metastatic disease also involves the posterior elements and spinous processes at T4, T5.      Pre and paravertebral metastatic soft tissue infiltration at T4-T5 noted.         MR-BRAIN-WITH & W/O   Final Result         Multiple nodular enhancing metastatic lesions involving the cranium as described above.      The largest lesion is in the right posterior temporal/parietal occipital region and demonstrates inner table cortical breakthrough with epidural extension and adjacent mild dural thickening and enhancement.      US-THORACENTESIS PUNCTURE RIGHT   Final Result   Addendum (preliminary) 1 of 1 12/27/2024 10:59 AM      HISTORY/REASON FOR EXAM:  Shortness of breath         TECHNIQUE/EXAM DESCRIPTION:   Ultrasound-guided thoracentesis.      Indication:  RIGHT pleural fluid collection.      COMPARISON:  None-however patient states history of right-sided    thoracentesis September 2024.      PROCEDURE:     Informed consent was obtained. A timeout was taken. A right    pleural effusion was localized with real-time ultrasound guidance. The    right posterior chest wall was prepped and draped in a sterile manner.    Following local anesthesia with 1%    lidocaine, and under live ultrasound guidance a 5 [place] Yueh pigtail    catheter was advanced into the pleural space with trocar technique and    pleural fluid was drained. The patient tolerated the procedure well    without evidence of complication. A post    thoracentesis chest radiograph is forthcoming.      FINDINGS:      Fluid was sent to the laboratory.      Fluid character: serosanguinous         1. Ultrasound guided right sided therapeutic and diagnostic thoracentesis.      2. 1000 mL of fluid withdrawn.      Final      1. Ultrasound guided right sided therapeutic and diagnostic thoracentesis.      2. 1000 mL of fluid withdrawn.      DX-CHEST-PORTABLE (1 VIEW)  "  Final Result      1.  Small persistent right pleural effusion which is decreased since recent thoracentesis.      2.  Small to moderate left pleural effusion.      3.  Bilateral perihilar and left lower lobe atelectasis and/or pneumonitis.      4.  Increased interstitial markings throughout the lung fields consistent with parenchymal scarring, and/or pulmonary edema.      CT-LSPINE W/O PLUS RECONS   Final Result         1. Diffuse metastatic disease.   2. No definite pathologic fracture.      CT-TSPINE W/O PLUS RECONS   Final Result         1. Extensive metastatic disease.   2. There is new pathologic compression fracture deformity of T5.      CT-CHEST,ABDOMEN,PELVIS WITH   Final Result         1. New bilateral upper lobe nodular infiltrates.   2. Moderate-sized bilateral pleural effusions.   3. Interval worsening of hepatic metastatic disease.   4. New abdominal lymphadenopathy. Thoracic adenopathy is again noted.   5. Diffuse osseous metastatic disease. There is a new compression fracture of T5 as reported on the prior spine CT.      DX-CHEST-PORTABLE (1 VIEW)   Final Result      1.  Stable bibasilar atelectasis/consolidation and small bilateral pleural effusions.      2.  Stable cardiomegaly and interstitial edema.           Assessment/Plan  * Malignant neoplasm of breast (HCC)- (present on admission)  Assessment & Plan  Diagnosed 2019, with significant metastatic disease   \"Bilateral breast cancer with extensive destruction of the left breast and ulceration and skin lesions making her inoperable on the left.  Right breast shows a 3 x 3 cm mass.  PET scan March 2023 showed non-hypermetabolic pleural effusions which are cytology positive and she has demonstrable pleural masses. Started aromatase inhibition early September 2023.  Has not yet started CDK 4 6 inhibitor.  Lost to follow-up for over 6 months although apparently was on letrozole as single agent.  Now with clear progression of disease in the left " "breast.  August 2024: Progression of disease in liver with extensive disease.  Breast is worse.  Lung is better.  Lymph nodes are worse.  Status post radiation to symptomatic oozing and exophytic left breast mass.  Marked worsening of her cancer on letrozole with progression in the bone and liver.\"--Dr. Benitez  Patient is now comfort care with highflow  Continue palliative care and hospice discussions    Anemia of chronic disease- (present on admission)  Assessment & Plan  No sign of gross bleeding    Hypokalemia- (present on admission)  Assessment & Plan  Resolved with supplementation  Repeat BMP in the morning    Advance care planning- (present on admission)  Assessment & Plan  Long conversation with patient and her daughter at bedside today.  Patient is alert and oriented.  She was requesting Lasix and Mojica catheter.  Both of which I have ordered.  I gently discussed the difficulty in discharging home with hospice on high flow nasal cannula.  When we have tried to wean high flow nasal cannula today-this is resulted in quick desaturation.  Patient does not wish to discontinue high flow nasal cannula at this time.  It is difficult to ascertain her insight into end-of-life care.  Her daughter is at bedside and is frustrated and tearful.  I have discussed with hospice and palliative care as well.  ACP time spent 30 minutes.    Acute on chronic respiratory failure with hypoxia and hypercapnia (HCC)- (present on admission)  Assessment & Plan  Patient was intubated on 1/4 and then extubated 1/5   Was reintubated on 1/8 and extubated on 1/15   Patient continues to require high flow nasal cannula   Most recent chest x-ray did show extensive bilateral perihilar airspace opacities as well as mild to moderate bilateral pleural effusions   Patient is now comfort care but wants to continue high flow due to shortness of breath  Patient would like to be on less oxygen, ideally would be able to get her home or GIP if we " can get her off the high flow, patient is requesting Pleurx drain  I did discuss the case with interventional radiology who was agreeable to attempt to drain if there is adequate fluid  Unfortunately, unable to ascertain if there is adequate fluid on x-ray due to significant right sided whiteout    Restless leg syndrome- (present on admission)  Assessment & Plan  Continue Requip    Pathological fracture of vertebra- (present on admission)  Assessment & Plan  Pathologic fracture of T5 with myelopathy on presentation  S/P T4-6 laminectomy with fusion and spinal decompression on 1/1/2025 by Dr. Lowery  The patient with L4-5 lesion with cauda equina on imaging  Multimodal pain management    Cancer related pain- (present on admission)  Assessment & Plan  Palliative care following  Continue multimodal pain management    Bilateral pleural effusion- (present on admission)  Assessment & Plan  Malignant pleural effusions from metastatic breast cancer s/p thoracenteses and chest tubes  PleurX placement in IR 1/11 on the right and was removed  Continues to require high flow nasal cannula  Repeat chest x-ray  Gentle IV diuresis for comfort today; per patient request    Primary hypertension- (present on admission)  Assessment & Plan  Home Coreg and losartan are currently being held  Restart as needed    Diabetes mellitus, type 2 (HCC)- (present on admission)  Assessment & Plan  With mild hyperglycemia  No need for coverage at this time        VTE prophylaxis: VTE Selection    I have performed a physical exam and reviewed and updated ROS and Plan today (1/30/2025). In review of yesterday's note (1/29/2025), there are no changes except as documented above.

## 2025-01-30 NOTE — ASSESSMENT & PLAN NOTE
"Long conversation with daughter at bedside today.  Patient is actively dying.  She has agonal breathing.  She appears uncomfortable and tachypneic.  Daughter is very reluctant to discontinue high flow nasal cannula at this time.  She is inconsolable and frustrated that \"we are not doing more\".  We are honoring patient's wishes to pursue comfort over aggressive measures.  Patient has a terminal cancer diagnosis that she will not recover from.  I would recommend and I continue to encourage daughter to decrease to high flow nasal cannula.  We will continue to provide comfort medications based on patient's request.  Discussed with palliative care on multiple occasions.  ACP time spent 26 minutes.  "

## 2025-01-30 NOTE — DISCHARGE PLANNING
Case Management Discharge Planning    Admission Date: 12/25/2024  GMLOS: 10.1  ALOS: 36    6-Clicks ADL Score: 16  6-Clicks Mobility Score: 11    Anticipated Discharge Dispo: Discharge Disposition: D/T to SNF with Medicare cert in anticipation of skilled care (03)    This RN CM completed chart review patient weaned down to 20L on HFNC. Follow up with [facility] completed this morning. Chichi with [facility] confirmed they can accept patients with HFNC as long as its 45L. Chichi will come to bedside and talk to family today about home with HFNC as an option and confirm address they would like patient to return to.       1150 This RN CM received voicemail from [facility] that she went to bedside with Halley with palliative and patient asked for them to return later today at 1430. Plan to return to bedside later today and include RT to assist with oxygen titration concentration.

## 2025-01-30 NOTE — PROGRESS NOTES
Palliative Care    Room: R305     HPI:   Asha Hannon is a 64 y.o. female with medical history significant for metastatic breast cancer to bone, liver, lungs, and spine with recurrent malignant pleural effusion requiring frequent thoracentesis admitted 12/26/2024 for acute on chronic hypoxic respiratory failure, weakness, and severe back pain.  She underwent C4-6 decompression and posterior spinal fusion due to T5 pathological compression fracture.  Hospital course complicated by ongoing respiratory failure, pneumothorax, and failure to thrive with poor performance status.  She was originally seen by palliative care 12/26/2024 for symptom management.  We have been following patient during hospital course for symptom management and to discuss goals of care.  She made the decision to transition to comfort care 1/28 and move forward with hospice referral.  She underwent placement of Aspira drain for chronic metastatic right pleural effusion 1/29.  She remains on HFNC.    REVIEW OF SYSTEMS:  Positive for dyspnea (4.5/10). Positive for pain (4/10). Positive for fatigue. Positive for anxiety.     PHYSICAL EXAM:  Physical Exam  Constitutional:       General: She is not in acute distress.  Pulmonary:      Effort: No respiratory distress.      Comments: HFNC  Musculoskeletal:      Comments: anasarca   Skin:     Coloration: Skin is pale.   Neurological:      Mental Status: She is oriented to person, place, and time. She is lethargic.      Comments: Oriented to event   Psychiatric:         Cognition and Memory: Memory is impaired (forgetful - stated she needs to pee but has PureWick).       Discussion/Plan   #Stage IV metastatic breast cancer  #Malignant pleural effusion status post Aspira  #Comfort measures only  #Dyspnea  #Cancer associated pain  Recommended increasing scheduled oral morphine however patient wanted IV morphine.  Recommended transition to scheduled oral or sublingual morphine at higher doses as  patient's goal is to try to get home.  Discussed her oxygen settings are currently too high to tolerate transition home.  She confirmed it is still her wish to go home and she will be going to her son chances home.  She stated she needs to urinate but there is a PureWick catheter in place.  Recommended indwelling [place] catheter however patient stated she does not want to become dependent on it.  Discussed she is likely going to continue to get worse and would recommend placement of urinary catheter to help with turning and less burden of care for patient and caregivers.  She confirms she can will consider.  She stated she would like her daughter Juana to hear all of this.  Juana sleeping on cot.  Asked if I could wake her to discuss and patient asked if someone could come back later and agreed to have care team member return at 2:30 PM to allow Juana to rest.     Requested RN provide medication for pain/dyspnea and reach out to RT to attempt adjusting HFNC to 40L/40% FiO2 - max available support at home on hospice.     [facility] hospice liaison present as well as CHERIE Bronson.     Interval diagnostic studies and medical documentation entries pertinent to this case were reviewed independently by me. This patient has at least one acute or chronic illness or injury that poses a threat to life or bodily function. This patient suffers from a high risk of morbidity from additional invasive diagnostic testing or intensive treatment. Discussion of recommendations and coordination of care undertaken with primary provider/treatment team.      YESIKA Rae.  Palliative Care Nurse Practitioner  [phone]

## 2025-01-30 NOTE — CARE PLAN
The patient is Watcher - Medium risk of patient condition declining or worsening    Shift Goals  Clinical Goals: monitor spo2, comfort  Patient Goals: comfort, rest  Family Goals: updates on POC    Progress made toward(s) clinical / shift goals:        Problem: Skin Integrity  Goal: Skin integrity is maintained or improved  Outcome: Progressing  Note: Pt educated on risk for skin break down. Pt understands risk and prevention measures. Pt is on a purewick and lets us know when she uses it so she does not sit in her urine which increases her risk for skin breakdown. She calls for help appropriately. Pt is able to turn herself with one person assisting.      Problem: Pain - Standard  Goal: Alleviation of pain or a reduction in pain to the patient’s comfort goal  Outcome: Progressing  Note: Pt rates pain using 1-10 pain scale. Pt medicated per mar. Pt requests pain medication appropriately. Patients pain reassessed within 1-2 hours of giving pain medication. Pt educated on non-pharmacological techniques to decrease pain.    Pt was previously getting scheduled q4 2.5mg oral solution morphine. When going to give first scheduled dose on my shift the pt could not tolerate swallowing water. Pt started coughing and her spo2 slowly dropped down to high 60%. Pt recovered well and spo2 went back up to low 90% shortly after. The pt decided she does not feel comfortable taking oral medication and does not want to continue swallowing medication even if it is liquid or crushed. Pt is now receiving IV morphine 4mg q1 prn. Pt says this has been helping her pain very well since starting it.        Patient is not progressing towards the following goals:

## 2025-01-30 NOTE — THERAPY
Physical Therapy   Discharge     Patient Name: Asha Hannon  Age:  64 y.o., Sex:  female  Medical Record #: [id number]  Today's Date: 1/30/2025     Precautions  Precautions: Fall Risk  Comments: T4-T6 lami/ fusion, no brace ordered    Assessment    PT is now comfort care.  DC PT services.    Plan    Reason for Discharge From Therapy: (P) Other - Comments (Pt is now comfort care)    DC Equipment Recommendations: (P) None  Discharge Recommendations: (P) Anticipate that the patient will have no further physical therapy needs after discharge from the hospital           Objective       01/30/25 0804   Precautions   Precautions Fall Risk   Comments T4-T6 lami/ fusion, no brace ordered   Short Term Goals    Short Term Goal # 1 Pt will perform supine < > sit SPV with bed flat, no rail in 6 visits in order to set up for upright mobility   Goal Outcome # 1 goal not met   Short Term Goal # 2 Pt will perform sit < > stand SPV in 6 visits in order to prepare for ambulation   Goal Outcome # 2 Goal not met   Short Term Goal # 3 Pt will ambulate 150' SPV in 6 visits with FWW in order to return home safely   Goal Outcome # 3 Goal not met   Short Term Goal # 4 Pt will ascend/ descend 1 step SPV in 6 visits in order to access her home   Goal Outcome # 4 Goal not met   Physical Therapy Treatment Plan   Physical Therapy Treatment Plan Modify Current Treatment Plan   Duration Discharge Needs Only   Reason For Discharge Other - Comments  (Pt is now comfort care)   Anticipated Discharge Equipment and Recommendations   DC Equipment Recommendations None   Discharge Recommendations Anticipate that the patient will have no further physical therapy needs after discharge from the hospital   Interdisciplinary Plan of Care Collaboration   Collaboration Comments Per hospitalist note, pt is now comfort care.  DC PT services   Session Information   Date / Session Number  1/30 -6 (2/4, 1/26) PTA/1, 1/30 dc PT

## 2025-01-30 NOTE — PROGRESS NOTES
Radiology Progress Note   Author: LOPEZ Hunter Date & Time created: 1/30/2025  8:49 AM   Date of admission  12/25/2024  Note to reader: this note follows the APSO format rather than the historical SOAP format. Assessment and plan located at the top of the note for ease of use.    Chief Complaint  64 y.o. female admitted 12/25/2024 with   Chief Complaint   Patient presents with    Shortness of Breath     Pt c/o shortness of breath over the past couple of days. Denies new fever.  Pt wears 1.5L NC at baseline.  Pt has hx breast cancer with mets to the lungs. Currently taking PO chemotherapy pills.     Extremity Weakness     Pt states she has progressively been feeling weaker over the past couple of days to that point where she cannot ambulate.     Abdominal Pain     Pt also c/o epigastric pain and nausea.          HPI  Asha Hannon is a 64-year-old female with a past medical history    significant for metastatic breast cancer with metastasis to the lungs with hepatic and osseous metastatic disease s/p oral chemotherapy, DM type II, HTN, and OSH with nocturnal hypoxia admitted 12/25/2024 for CT finding of bilateral pleural effusions, worsening hepatic metastatic disease, new abdominal lymphedema, thoracic adenopathy, diffuse osseous metastasis with T5 pathologic fracture after presenting with   increasing weakness, shortness of breath, difficulty with ambulation, and abdominal pain times several weeks.  Patient underwent right sided thoracentesis 12/27, T4-T6 laminectomy 01/01/2025 complicated by right-sided pneumothorax requiring chest tube placement 01/03/ and subsequent removal 01/06, left sided thoracentesis 01/02.  Due to malignant pleural effusion, IR was consulted for right tunneled pleural chest tube placement (Aspira) which Dr. Gaming placed on 01/11/2025; 1150 mL watery yellow fluid out during procedure.  The patient initially believed the drain was temporary and requested its removal.  After a  thorough discussion with the IR APRN about the care and purpose of the drain, the patient ultimately decided to proceed with its removal.  On 01/22/2025 Aspira drain was removed per patient request.  Patient has now transitioned to hospice and Aspira drain was once again recommended for chronic pleural effusion. On 01/29/2025 Dr. Gaming (IR) placed right Aspira drain without complications.      Interval History:   01/30/2025-right Aspira drain in place.  Patient denies pain at drain site at this time  Drain site clean dry intact.       Assessment/Plan     Principal Problem:    Malignant neoplasm of breast (HCC)  Active Problems:    Diabetes mellitus, type 2 (HCC)    Primary hypertension    Bilateral pleural effusion    Metastasis to lung (HCC)    Cancer related pain    Pathological fracture of vertebra    Restless leg syndrome    Acute on chronic respiratory failure with hypoxia and hypercapnia (HCC)    Hypokalemia    Anemia of chronic disease    Severe protein-calorie malnutrition (HCC)      Plan IR  -Aspira Drainage Catheter Home Care Instructions:  - Continue to monitor drains, VS, and labs.  - Keep Aspira insertion site clean and dry.  - Okay to shower with site covered.  - No submerging Aspira insertion site.  - Refer to Aspira Patient Guide for further instructions related to drain care.   -IR signing off    -Thank you for allowing Interventional Radiology team to participate in the patients care, if any additional care or requests are needed in the future please do not hesitate to call or place IR order           Review of Systems  Physical Exam   Review of Systems   Constitutional:  Positive for malaise/fatigue.   Respiratory:  Positive for cough and shortness of breath.    Cardiovascular:  Negative for chest pain.   Gastrointestinal:  Negative for abdominal pain, nausea and vomiting.   Musculoskeletal:  Positive for myalgias.      Vitals:    01/30/25 0740   BP:    Pulse: 70   Resp: 18   Temp:    SpO2: 90%     "    Physical Exam  Vitals and nursing note reviewed.   HENT:      Head: Normocephalic and atraumatic.   Eyes:      Pupils: Pupils are equal, round, and reactive to light.   Cardiovascular:      Pulses:           Radial pulses are 1+ on the right side and 1+ on the left side.   Pulmonary:      Comments: Heated high flow nasal cannula in place  Patient without respiratory distress at time of assessment  Chest:      Comments: Right Aspira drain CDI  Abdominal:      Comments: Soft, round, nondistended   Musculoskeletal:      Right lower leg: Edema present.      Left lower leg: Edema present.   Skin:     General: Skin is warm.      Capillary Refill: Capillary refill takes 2 to 3 seconds.      Coloration: Skin is pale.   Neurological:      Mental Status: She is oriented to person, place, and time. She is lethargic.   Psychiatric:         Behavior: Behavior is cooperative.      Comments: Speech is low volume             Labs    Recent Labs     01/28/25  0540   WBC 19.9*   RBC 4.39   HEMOGLOBIN 12.5   HEMATOCRIT 40.1   MCV 91.3   MCH 28.5   MCHC 31.2*   RDW 48.8   PLATELETCT 489*   MPV 9.2         No results for input(s): \"ALBUMIN\", \"TBILIRUBIN\", \"ALKPHOSPHAT\", \"TOTPROTEIN\", \"ALTSGPT\", \"ASTSGOT\", \"CREATININE\" in the last 72 hours.  DX-CHEST-PORTABLE (1 VIEW)   Final Result      1.  Interval placement of right-sided Pleurx type Aspira catheter.   2.  Nearly complete opacification of the right hemithorax consistent with a combination of lung collapse and residual pleural effusion (after evacuation of 550 mL pleural fluid). Volume loss with shift of mediastinal structures toward the right.   3.  Nearly resolved left-sided central perivascular and interstitial pulmonary edema.   4.  Small left pleural effusion.   5.  No pneumothorax.      IR-INSERT PLEURAL CATH W/ CUFF   Final Result      1.  Ultrasound and fluoroscopic guided placement of a RIGHT HEMITHORAX PleurX (Aspira type) tunneled pleural drainage catheter.   2.  The " catheter can be used immediately with drainage of pleural effusion as needed for comfort, as per package insert instructions.   3.  If the catheter becomes dislodged, do not reinsert it.  Place sterile dressing over the entry wound site.  May consult primary care physician or oncologist as to whether there is clinical indication for PleurX replacement.      DX-CHEST-LIMITED (1 VIEW)   Final Result      1.  Complete opacification of the right hemithorax likely representing a component of volume loss, consolidation and pleural effusion.      2.  Left perihilar infiltrate, worsened from comparison.      DX-CHEST-LIMITED (1 VIEW)   Final Result      Slightly progressed right-sided airspace disease. Otherwise, stable as above.      DX-CHEST-PORTABLE (1 VIEW)   Final Result      1.  Mild worsening hypoinflation.   2.  No other significant change from prior exam.         DX-CHEST-PORTABLE (1 VIEW)   Final Result      1.  Interval removal of right-sided tunneled pleural catheter. No evidence of pneumothorax.   2.  Stable small bilateral pleural effusions.   3.  Increased patchy bilateral interstitial and airspace opacities.      IR-LUNG & PLEURAL CATH REMOVAL   Final Result      Successful removal of tunneled pleural catheter as described.         DX-CHEST-PORTABLE (1 VIEW)   Final Result         1.  Pulmonary edema and/or infiltrates are identified, which are stable since the prior exam.   2.  Small bilateral pleural effusions, increased on the right since prior study   3.  Cardiomegaly      DX-CHEST-PORTABLE (1 VIEW)   Final Result         1.  Pulmonary edema and/or infiltrates are identified, which are stable since the prior exam.   2.  Small left pleural effusion   3.  Cardiomegaly      DX-CHEST-PORTABLE (1 VIEW)   Final Result         1. Small right apical pneumothorax now visible. Right chest tube unchanged.      2. Persistent infiltrates and small effusions.                     DX-CHEST-PORTABLE (1 VIEW)   Final  Result      No significant change from prior exam.      DX-CHEST-PORTABLE (1 VIEW)   Final Result         1.  Pulmonary edema and/or infiltrates are identified, which are stable since the prior exam.   2.  Cardiomegaly      DX-CHEST-PORTABLE (1 VIEW)   Final Result      1.  Interval improvement of pulmonary vascular congestion and pulmonary edema.   2.  No other significant change from prior exam.         DX-CHEST-PORTABLE (1 VIEW)   Final Result         1.  Pulmonary edema and/or infiltrates are identified, which appear somewhat increased since the prior exam.   2.  Layering left pleural effusion, stable   3.  Cardiomegaly      DX-CHEST-PORTABLE (1 VIEW)   Final Result         1.  Pulmonary edema and/or infiltrates are identified, which are stable since the prior exam.   2.  Layering left pleural effusion, stable      DX-CHEST-PORTABLE (1 VIEW)   Final Result         1.  Pulmonary edema and/or infiltrates are identified, which are stable since the prior exam.   2.  Layering left pleural effusion, stable      DX-CHEST-PORTABLE (1 VIEW)   Final Result         1.  Pulmonary edema and/or infiltrates are identified, which are stable since the prior exam.   2.  Trace bilateral pleural effusions, stable      DX-CHEST-PORTABLE (1 VIEW)   Final Result         1.  Pulmonary edema and/or infiltrates are identified, which are stable since the prior exam.   2.  Small left pleural effusion, stable      DX-CHEST-PORTABLE (1 VIEW)   Final Result      1.  Support equipment is unchanged.      2.  Cardiomegaly with moderate perihilar pulmonary edema unchanged from previous exam.      3.  Small bilateral pleural effusions left greater than right.      DX-CHEST-PORTABLE (1 VIEW)   Final Result      1.  Tubes and lines unchanged in position.      2.  Marked bilateral perihilar pulmonary edema which has improved slightly since previous exam.      3.  Small bilateral pleural effusions left greater than right.      IR-INSERT PLEURAL CATH  W/ CUFF   Final Result      1.  Ultrasound and fluoroscopic guided placement of a RIGHT HEMITHORAX PleurX (Aspira type) tunneled pleural drainage catheter.   2.  The catheter can be used immediately with drainage of pleural effusion as needed for comfort, as per package insert instructions.   3.  If the catheter becomes dislodged, do not reinsert it.  Place sterile dressing over the entry wound site.  May consult primary care physician or oncologist as to whether there is clinical indication for PleurX replacement.      DX-CHEST-PORTABLE (1 VIEW)   Final Result      1.  Tubes and lines unchanged in position.      2.   Extensive bilateral perihilar airspace opacities consistent with pneumonitis and/or pulmonary edema.      3.  Mild to moderate bilateral pleural effusions right greater than left      DX-CHEST-PORTABLE (1 VIEW)   Final Result         1. Increasing moderate right and small left pleural effusions. No change edema or infiltrates.      2. ETT 5 cm above masoud. NG tube in stomach. Right central line.                     DX-CHEST-PORTABLE (1 VIEW)   Final Result      Stable diffuse bilateral pulmonary infiltrates.      DX-ABDOMEN FOR TUBE PLACEMENT   Final Result      NG tube tip overlies the gastric body.      DX-CHEST-PORTABLE (1 VIEW)   Final Result      1.  Supportive tubing as described above.   2.  Apparent increasing RIGHT pleural fluid, possibly due to change in position.   3.  Improvement of LEFT lung base consolidation.   4.  No other significant change from prior exam.         DX-CHEST-PORTABLE (1 VIEW)   Final Result         1. Stable right IJ central catheter. Right chest tube has been removed. No pneumothorax.   2. Unchanged bilateral pulmonary opacities and associated pleural effusions.         DX-CHEST-PORTABLE (1 VIEW)   Final Result      1.  Unchanged bilateral pulmonary opacities. Stable small bilateral pleural effusions.   2.  Right pigtail catheter remains. No pneumothorax.       DX-CHEST-PORTABLE (1 VIEW)   Final Result      Removal of large bore right chest tube with stable right pigtail chest tube. Questionable tiny right apical pneumothorax.      DX-CHEST-PORTABLE (1 VIEW)   Final Result      No significant interval change.      DX-CHEST-PORTABLE (1 VIEW)   Final Result      1.  Persistent bilateral pulmonary opacities, stable to slightly worse, although changes may be related to differences in imaging technique. No large pleural effusions.   2.  Well-positioned right IJ central catheter. No pneumothorax.   3.  Stable other lines and tubes.      DX-CHEST-LIMITED (1 VIEW)   Final Result         1.  No change in 2 separate right-sided chest tubes.      2.  No pneumothorax identified.      3.  Bilateral pulmonary opacifications again identified which are slightly less prominent.      DX-CHEST-PORTABLE (1 VIEW)   Final Result         1. No change mild infiltrates.      2. Right chest tubes. No pneumothorax.      3. ETT out. NG tube and jugular line remain.                     DX-CHEST-PORTABLE (1 VIEW)   Final Result         1. No significant interval change.      DX-CHEST-LIMITED (1 VIEW)   Final Result         1.  Findings on chest radiograph appear stable since the prior radiograph.  No new abnormalities are identified.      2.  2 separate right-sided chest tubes are again identified with no change in position.      3.  No pneumothorax identified.      DX-CHEST-LIMITED (1 VIEW)   Final Result         1.  No pneumothorax identified.      2.  2 right-sided chest tubes again identified.      CT-CTA CHEST PULMONARY ARTERY W/ RECONS   Final Result         1. No pulmonary embolus.      2. Medial right lung base consolidation felt to represent complete collapse of the right middle lobe. Mild to moderate mid and upper lung pneumonia infiltrates remain.      3. Right chest tubes. Small right pneumothorax and small right effusion. Improving small 2.5 cm left pleural effusion since 12/25/2024.       4. Extensive metastatic lesions including adenopathy, liver lesions, right breast nodule and bone lesions. Thoracic spine fusion hardware remains.                  CT-HEAD W/O   Final Result         1. No acute process in the brain evident.      2. Fluid in sphenoid sinus.      3. Right convexity midthoracic scoliosis and unchanged from 12/31/2024 MRI.               DX-ABDOMEN FOR TUBE PLACEMENT   Final Result         1. NG tube in stomach.                     DX-CHEST-FOR LINE PLACEMENT Perform procedure in: Patient's Room   Final Result         1. Small right apical pneumothorax remains. 2 right chest tubes.      2. ETT 3 cm above masoud. NG tube in stomach. Right jugular line in SVC. No complication evident.      3. Mild infiltrates and small right effusion.                  DX-CHEST-LIMITED (1 VIEW)   Final Result         1. 2nd right chest tube placed with near complete evacuation of right pneumothorax.      2. Mild infiltrates, basilar atelectasis and small right effusion remain.      3. Endotracheal tube tip at about T5 and about 2 cm above the masoud. The masoud and tip of the endotracheal tube are difficult to visualize in part because of overlying thoracic spine fusion hardware.                  DX-CHEST-PORTABLE (1 VIEW)   Final Result         Enlarging right pneumothorax with collapsing of the right lung.      Preliminary findings texted to Dr. STANLEY FISHER in the Emergency Department via Voalte on 1/4/2025 1:50 AM         DX-CHEST-LIMITED (1 VIEW)   Final Result         Interval placement of right pigtail chest tube. Small right residual pneumothorax.      DX-CHEST-PORTABLE (1 VIEW)   Final Result   Addendum (preliminary) 1 of 1      CRITICAL RESULT READ BACK: Preliminary findings discussed with and    critical read back performed by Dr. Downs via telephone on 1/3/2025 8:03    PM      Final      1.  Interval worsening in right pneumothorax, now moderate.   2.  Persistent interstitial and  alveolar pulmonary opacities bilaterally. Possible bilateral pleural effusions, right greater than left.            DX-CHEST-PORTABLE (1 VIEW)   Final Result         1. Slightly decreased right pneumothorax.   2. Stable bilateral interstitial and airspace opacification.   3. Stable right pleural effusion.      DX-CHEST-PORTABLE (1 VIEW)   Final Result         1. Left pleural effusion has decreased in size status post thoracentesis. There is no left-sided pneumothorax.   2. There is persistent right basilar opacity with a new right apical pneumothorax.      Findings were communicated to the ordering provider at the time of dictation via Voalte.      US-THORACENTESIS PUNCTURE LEFT   Final Result      1. Ultrasound guided left sided therapeutic thoracentesis.      2. 950 mL of fluid withdrawn.      DX-CHEST-PORTABLE (1 VIEW)   Final Result      1. New postsurgical changes in the upper thoracic spine.   2. Worsening bilateral pleural effusions, pulmonary vascular congestion and interstitial edema.      DX-PORTABLE FLUORO > 1 HOUR   Final Result      Portable fluoroscopy utilized for 1 minute 19 seconds.      INTERPRETING LOCATION: [address] [facility], [id number]      DX-THORACIC SPINE-2 VIEWS   Final Result      Digitized intraoperative radiograph is submitted for review. This examination is not for diagnostic purpose but for guidance during a surgical procedure. Please see the patient's chart for full procedural details.         INTERPRETING LOCATION: [address] , [facility], [id number]      MR-CERVICAL SPINE-WITH & W/O   Final Result         1.  No evidence of metastatic disease to the cervical spine.      MR-LUMBAR SPINE-WITH & W/O   Final Result   Impression:      Multiple bony metastatic lesions as described above, most prominent along the inferior endplate of L1.      Degenerative moderate canal stenosis at L4-5 with cauda equina compression. Epidural thickening and enhancement is noted in this region with severe bilateral  facet degeneration and enhancement along the bilateral facet joints. This is felt to be    degenerative in origin.      MR-THORACIC SPINE-WITH & W/O   Final Result         Multiple metastatic lesions of the thoracic spine as described above.      Pathologic compression fracture at T5 with 50% loss of height and posterior cortical retropulsion with circumferential epidural thickening representing epidural extension of metastatic disease. This causes severe canal stenosis with cord compression.      Metastatic disease also involves the posterior elements and spinous processes at T4, T5.      Pre and paravertebral metastatic soft tissue infiltration at T4-T5 noted.         MR-BRAIN-WITH & W/O   Final Result         Multiple nodular enhancing metastatic lesions involving the cranium as described above.      The largest lesion is in the right posterior temporal/parietal occipital region and demonstrates inner table cortical breakthrough with epidural extension and adjacent mild dural thickening and enhancement.      US-THORACENTESIS PUNCTURE RIGHT   Final Result   Addendum (preliminary) 1 of 1 12/27/2024 10:59 AM      HISTORY/REASON FOR EXAM:  Shortness of breath         TECHNIQUE/EXAM DESCRIPTION:   Ultrasound-guided thoracentesis.      Indication:  RIGHT pleural fluid collection.      COMPARISON:  None-however patient states history of right-sided    thoracentesis September 2024.      PROCEDURE:     Informed consent was obtained. A timeout was taken. A right    pleural effusion was localized with real-time ultrasound guidance. The    right posterior chest wall was prepped and draped in a sterile manner.    Following local anesthesia with 1%    lidocaine, and under live ultrasound guidance a 5 [place] Yueh pigtail    catheter was advanced into the pleural space with trocar technique and    pleural fluid was drained. The patient tolerated the procedure well    without evidence of complication. A post    thoracentesis  "chest radiograph is forthcoming.      FINDINGS:      Fluid was sent to the laboratory.      Fluid character: serosanguinous         1. Ultrasound guided right sided therapeutic and diagnostic thoracentesis.      2. 1000 mL of fluid withdrawn.      Final      1. Ultrasound guided right sided therapeutic and diagnostic thoracentesis.      2. 1000 mL of fluid withdrawn.      DX-CHEST-PORTABLE (1 VIEW)   Final Result      1.  Small persistent right pleural effusion which is decreased since recent thoracentesis.      2.  Small to moderate left pleural effusion.      3.  Bilateral perihilar and left lower lobe atelectasis and/or pneumonitis.      4.  Increased interstitial markings throughout the lung fields consistent with parenchymal scarring, and/or pulmonary edema.      CT-LSPINE W/O PLUS RECONS   Final Result         1. Diffuse metastatic disease.   2. No definite pathologic fracture.      CT-TSPINE W/O PLUS RECONS   Final Result         1. Extensive metastatic disease.   2. There is new pathologic compression fracture deformity of T5.      CT-CHEST,ABDOMEN,PELVIS WITH   Final Result         1. New bilateral upper lobe nodular infiltrates.   2. Moderate-sized bilateral pleural effusions.   3. Interval worsening of hepatic metastatic disease.   4. New abdominal lymphadenopathy. Thoracic adenopathy is again noted.   5. Diffuse osseous metastatic disease. There is a new compression fracture of T5 as reported on the prior spine CT.      DX-CHEST-PORTABLE (1 VIEW)   Final Result      1.  Stable bibasilar atelectasis/consolidation and small bilateral pleural effusions.      2.  Stable cardiomegaly and interstitial edema.        INR   Date Value Ref Range Status   01/01/2025 0.96 0.87 - 1.13 Final     Comment:     INR - Non-therapeutic Reference Range: 0.87-1.13  INR - Therapeutic Reference Range: 2.0-4.0       No results found for: \"POCINR\"     Intake/Output Summary (Last 24 hours) at 1/30/2025 0849  Last data filed at " 1/30/2025 0500  Gross per 24 hour   Intake --   Output 1300 ml   Net -1300 ml      I have personally reviewed the above labs and imaging      I have performed a physical exam and reviewed and updated ROS and Plan today (1/30/2025).     35 minutes in directly providing and coordinating care and extensive data review.  No time overlap and excludes procedures.

## 2025-01-31 ENCOUNTER — HOME CARE VISIT (OUTPATIENT)
Dept: HOSPICE | Facility: HOSPICE | Age: 65
End: 2025-01-31
Payer: MEDICARE

## 2025-01-31 VITALS
SYSTOLIC BLOOD PRESSURE: 111 MMHG | BODY MASS INDEX: 25.96 KG/M2 | HEIGHT: 68 IN | OXYGEN SATURATION: 60 % | DIASTOLIC BLOOD PRESSURE: 67 MMHG | RESPIRATION RATE: 22 BRPM | HEART RATE: 89 BPM | WEIGHT: 171.3 LBS | TEMPERATURE: 98.1 F

## 2025-01-31 PROCEDURE — 700111 HCHG RX REV CODE 636 W/ 250 OVERRIDE (IP): Mod: JZ | Performed by: HOSPITALIST

## 2025-01-31 PROCEDURE — 94640 AIRWAY INHALATION TREATMENT: CPT

## 2025-01-31 PROCEDURE — 700111 HCHG RX REV CODE 636 W/ 250 OVERRIDE (IP): Performed by: STUDENT IN AN ORGANIZED HEALTH CARE EDUCATION/TRAINING PROGRAM

## 2025-01-31 PROCEDURE — 99497 ADVNCD CARE PLAN 30 MIN: CPT | Performed by: STUDENT IN AN ORGANIZED HEALTH CARE EDUCATION/TRAINING PROGRAM

## 2025-01-31 PROCEDURE — 700111 HCHG RX REV CODE 636 W/ 250 OVERRIDE (IP): Mod: JZ

## 2025-01-31 PROCEDURE — 99232 SBSQ HOSP IP/OBS MODERATE 35: CPT | Mod: 25 | Performed by: STUDENT IN AN ORGANIZED HEALTH CARE EDUCATION/TRAINING PROGRAM

## 2025-01-31 PROCEDURE — 770004 HCHG ROOM/CARE - ONCOLOGY PRIVATE *

## 2025-01-31 PROCEDURE — 99497 ADVNCD CARE PLAN 30 MIN: CPT | Performed by: NURSE PRACTITIONER

## 2025-01-31 PROCEDURE — 99233 SBSQ HOSP IP/OBS HIGH 50: CPT | Mod: 25 | Performed by: NURSE PRACTITIONER

## 2025-01-31 RX ORDER — LORAZEPAM 2 MG/ML
1 INJECTION INTRAMUSCULAR
Status: DISCONTINUED | OUTPATIENT
Start: 2025-01-31 | End: 2025-02-01 | Stop reason: HOSPADM

## 2025-01-31 RX ADMIN — MORPHINE SULFATE 4 MG: 4 INJECTION, SOLUTION INTRAMUSCULAR; INTRAVENOUS at 07:29

## 2025-01-31 RX ADMIN — MORPHINE SULFATE 4 MG: 4 INJECTION, SOLUTION INTRAMUSCULAR; INTRAVENOUS at 14:05

## 2025-01-31 RX ADMIN — FUROSEMIDE 40 MG: 10 INJECTION INTRAMUSCULAR; INTRAVENOUS at 04:34

## 2025-01-31 RX ADMIN — MORPHINE SULFATE 4 MG: 4 INJECTION, SOLUTION INTRAMUSCULAR; INTRAVENOUS at 16:47

## 2025-01-31 RX ADMIN — MORPHINE SULFATE 4 MG: 4 INJECTION, SOLUTION INTRAMUSCULAR; INTRAVENOUS at 00:22

## 2025-01-31 RX ADMIN — ONDANSETRON 4 MG: 2 INJECTION INTRAMUSCULAR; INTRAVENOUS at 00:03

## 2025-01-31 RX ADMIN — ONDANSETRON 4 MG: 2 INJECTION INTRAMUSCULAR; INTRAVENOUS at 04:34

## 2025-01-31 ASSESSMENT — PAIN DESCRIPTION - PAIN TYPE
TYPE: ACUTE PAIN

## 2025-01-31 NOTE — CARE PLAN
The patient is Watcher - Medium risk of patient condition declining or worsening    Shift Goals  Clinical Goals: pain management, nausea management, comfort  Patient Goals: pain control, comfort  Family Goals: updates on POC    Progress made toward(s) clinical / shift goals:     Problem: Skin Integrity  Goal: Skin integrity is maintained or improved  Outcome: Progressing  Note: Pt educated on risk for skin break down. Pt understands risk and prevention measures. Pt now has a spivey catheter in place so she does not sit in her urine which increases her risk for skin breakdown. She calls for help appropriately. Pt is able to turn herself with one person assisting.      Problem: Pain - Standard  Goal: Alleviation of pain or a reduction in pain to the patient’s comfort goal  Outcome: Progressing  Note: Pt rates pain using 1-10 pain scale. Pt medicated per mar. Pt requests pain medication appropriately. Patients pain reassessed within 1-2 hours of giving pain medication. Pt educated on non-pharmacological techniques to decrease pain.    Pt received IV morphine once for me during tonights shift. Pt has denied pain since.       Problem: Respiratory  Goal: Patient will achieve/maintain optimum respiratory ventilation and gas exchange  Outcome: Progressing  Note: Pt is on high flow oxygen and attempting to be titrated down. Pt is currently tolerating 75% fio2 and 20 L.     Family members may have been adjusting flow rate. Education was provided to family member(daughter) to not touch any of the medical devices in the pt room.        Patient is not progressing towards the following goals:

## 2025-01-31 NOTE — PROGRESS NOTES
Patient progressively more lethargic through the morning and became obtunded around 1215. Daughter sleeping in the cot at the bedside. Called in to the patients room due to daughter's concerns. Patient with oxygen saturations in the mid 50's. Patient's daughter distraught and requesting the respiratory therapist be called. Patient obtunded and gray. Becoming cyanotic, and terminal secretions present. Jaw slack. Emotional support and education regarding imminent passing of the patient provided to the family member who requested this RN to leave the room. Palliative APRN and hospitalist notified. Back in to the room to check on the patient and found daughter to have hooked the pleurex to a drain bag and actively attempting to prime the suction. Offered morphine and patient's daughter consented to administration. Palliative APRN, hospitalist, and respiratory therapist to the bedside. Further emotional support and education provided. Daughter consented to O2 being weaned by RT to 10L for comfort.

## 2025-01-31 NOTE — PROGRESS NOTES
Palliative Care    Room: R305    HPI:   Asha Hannon is a 64 y.o. female with medical history significant for metastatic breast cancer with tenuous hospital course due to respiratory failure for malignant pleural effusion and poor performance status initially admitted 12/25/2024.  Palliative care originally saw patient 12/26/2024 for advance care planning and symptom management.  Patient transition to comfort care 1/28/2025.    Discussion/Plan   Called to bedside by patient's nurse Zayra due to change in condition.  Patient is now obtunded with guppy breathing - just received dose of IV morphine. Dr. Goldberg at bedside.  Patient's daughter Jane bedside crying and holding her mom's head to her head intermittently and draining right pleural space via Aspira bag.  Provided support and education on assessment that patient is actively dying.  Oxygen saturation 62% on 100% FiO2.  Pulse oximetry with continuous alarming with daughter silence seeing.  Offered to turn oximetry off given patient's status however patient's daughter would like to continue with pulse oximetry.  Change settings to alarm as little as possible.      Asked if patient's daughter would like me to stay and she shook her head no though she did ask about increasing settings on oxygen as well as BiPAP.  Provided extensive education in regards to pathophysiology of patient's respiratory failure and obtundation.  Discussed patient's blood gases from 1/26/2025 with pCO2 70.8.  Discussed neither interventions would work and that patient is currently not a candidate for BiPAP given her inability to take off mask.  I discussed that I recommended weaning high flow nasal cannula with patient this morning when she became unable to interact with her environment.  I asked patient's daughter Jane if she would like HFNC weaned versus continuing. At this time she would like to continue HFNC at current settings.     I asked if I could call anyone  but Jane declined. Brought Jane a chair, fresh ice water, tissue, and a business card and encouraged her to reach to me as needed and provided privacy.     Added lorazepam IV 1 mg Q hourly PRN distress/dyspnea. Discussed with nursing and Dr. Goldberg.     30 minutes spent discussing advance care planning, this time excludes any other billed services.    YESIKA Rae.  Palliative Care Nurse Practitioner  [phone]

## 2025-01-31 NOTE — HOSPICE
Discussed with DORIAN Agustin and Dr. Esqueda. Asha's symptoms appear well-managed under current POC. Her conditions/circumstances do not require a GIP level of care at this time.     In general, to qualify for the hospice level of care - general inpatient (GIP), a patient must display refractory pain or other symptoms despite an adequate trial of typical comfort measures, that cannot be effectively managed in a home hospice setting (routine home care). The determination of candidacy for GIP is always patient specific and needs to be approved by the hospice physician in collaboration with the hospice interdisciplinary team.    Updated: Dr. Goldberg    Please contact [facility] [phone] with questions, concerns, or changes in condition. Thank you.   [[phone]] detailed exam

## 2025-01-31 NOTE — PROGRESS NOTES
[facility] Medicine Daily Progress Note    Date of Service  1/31/2025    Chief Complaint  Asha Hannon is a 64 y.o. female admitted 12/25/2024 with worsening shortness of breath    Hospital Course  Asha Hannon is a 64 y.o. female with PMHx HTN, DMT2, restless leg syndrome, chronic anemia, stage IV breast cancer with metastatic disease to lungs and spine..  Admitted 12/25 for weakness and shortness of breath.    Patient was previously on oral chemotherapy.  She presented with worsening shortness of breath and back pain. CT chest abdomen showed moderate-sized pleural effusions, worsening hepatic metastatic disease with new abdominal lymphadenopathy, thoracic adenopathy, diffuse osseous metastatic disease with a T5 pathologic fracture    Patient underwent a right sided thoracentesis 12/27.  Confirmed diagnosis of malignant pleural effusion.  MRI showed multiple thoracic and lumbar metastasized lesions.  Patient underwent T4-T6 laminectomy for spinal cord compression on 1/1.  She subsequently developed a right-sided pneumothorax and a chest tube was placed.  This was shortly followed by BiPAP and then intubation on 1/4.  Patient remained intubated until 1/15.  During this time she required frequent thoracentesis.    Many goals of care conversations occurred between patient and her medical staff here at the hospital.  Ultimately patient was unable to come off high flow nasal cannula.  Palliative care met with patient's family and patient at bedside.  Patient elected to transition to DNR/comfort care on 1/28.    Interval Problem Update  1/30: Patient remains on high flow nasal cannula 45 L/min she is on comfort care.  She underwent Pleurx drain placement on yesterday.  Patient remains on high flow nasal cannula 45 L/min.  Discussed with palliative care and hospice at bedside.    1/31: Vitals notable for tachypnea.  Remains on high flow nasal cannula 40 L/min.    I have discussed this patient's plan of  care and discharge plan at IDT rounds today with Case Management, Nursing, Nursing leadership, and other members of the IDT team.    Consultants/Specialty  critical care, neurosurgery, oncology, palliative care, and physiatry    Code Status  Comfort Care/DNR    Disposition  The patient is medically cleared for discharge to home or a post-acute facility.  Anticipate discharge to: hospice    I have placed the appropriate orders for post-discharge needs.    Review of Systems  Review of Systems   Unable to perform ROS: Acuity of condition        Physical Exam  Temp:  [36.7 °C (98.1 °F)-36.8 °C (98.2 °F)] 36.7 °C (98.1 °F)  Pulse:  [] 89  Resp:  [20-29] 22  BP: (111-149)/(67-86) 111/67  SpO2:  [60 %-91 %] 60 %    Physical Exam  Vitals and nursing note reviewed.   Constitutional:       General: She is in acute distress.      Appearance: She is ill-appearing.   Cardiovascular:      Rate and Rhythm: Normal rate and regular rhythm.   Pulmonary:      Effort: Respiratory distress present.      Breath sounds: Normal breath sounds.      Comments: Tachypnea present  Agonal breathing  Skin:     General: Skin is warm and dry.      Coloration: Skin is pale.   Neurological:      Mental Status: She is lethargic and disoriented.         Fluids    Intake/Output Summary (Last 24 hours) at 1/31/2025 1631  Last data filed at 1/31/2025 0729  Gross per 24 hour   Intake 100 ml   Output 300 ml   Net -200 ml        Laboratory                          Imaging  DX-CHEST-PORTABLE (1 VIEW)   Final Result      1.  Interval placement of right-sided Pleurx type Aspira catheter.   2.  Nearly complete opacification of the right hemithorax consistent with a combination of lung collapse and residual pleural effusion (after evacuation of 550 mL pleural fluid). Volume loss with shift of mediastinal structures toward the right.   3.  Nearly resolved left-sided central perivascular and interstitial pulmonary edema.   4.  Small left pleural effusion.    5.  No pneumothorax.      IR-INSERT PLEURAL CATH W/ CUFF   Final Result      1.  Ultrasound and fluoroscopic guided placement of a RIGHT HEMITHORAX PleurX (Aspira type) tunneled pleural drainage catheter.   2.  The catheter can be used immediately with drainage of pleural effusion as needed for comfort, as per package insert instructions.   3.  If the catheter becomes dislodged, do not reinsert it.  Place sterile dressing over the entry wound site.  May consult primary care physician or oncologist as to whether there is clinical indication for PleurX replacement.      DX-CHEST-LIMITED (1 VIEW)   Final Result      1.  Complete opacification of the right hemithorax likely representing a component of volume loss, consolidation and pleural effusion.      2.  Left perihilar infiltrate, worsened from comparison.      DX-CHEST-LIMITED (1 VIEW)   Final Result      Slightly progressed right-sided airspace disease. Otherwise, stable as above.      DX-CHEST-PORTABLE (1 VIEW)   Final Result      1.  Mild worsening hypoinflation.   2.  No other significant change from prior exam.         DX-CHEST-PORTABLE (1 VIEW)   Final Result      1.  Interval removal of right-sided tunneled pleural catheter. No evidence of pneumothorax.   2.  Stable small bilateral pleural effusions.   3.  Increased patchy bilateral interstitial and airspace opacities.      IR-LUNG & PLEURAL CATH REMOVAL   Final Result      Successful removal of tunneled pleural catheter as described.         DX-CHEST-PORTABLE (1 VIEW)   Final Result         1.  Pulmonary edema and/or infiltrates are identified, which are stable since the prior exam.   2.  Small bilateral pleural effusions, increased on the right since prior study   3.  Cardiomegaly      DX-CHEST-PORTABLE (1 VIEW)   Final Result         1.  Pulmonary edema and/or infiltrates are identified, which are stable since the prior exam.   2.  Small left pleural effusion   3.  Cardiomegaly      DX-CHEST-PORTABLE (1  VIEW)   Final Result         1. Small right apical pneumothorax now visible. Right chest tube unchanged.      2. Persistent infiltrates and small effusions.                     DX-CHEST-PORTABLE (1 VIEW)   Final Result      No significant change from prior exam.      DX-CHEST-PORTABLE (1 VIEW)   Final Result         1.  Pulmonary edema and/or infiltrates are identified, which are stable since the prior exam.   2.  Cardiomegaly      DX-CHEST-PORTABLE (1 VIEW)   Final Result      1.  Interval improvement of pulmonary vascular congestion and pulmonary edema.   2.  No other significant change from prior exam.         DX-CHEST-PORTABLE (1 VIEW)   Final Result         1.  Pulmonary edema and/or infiltrates are identified, which appear somewhat increased since the prior exam.   2.  Layering left pleural effusion, stable   3.  Cardiomegaly      DX-CHEST-PORTABLE (1 VIEW)   Final Result         1.  Pulmonary edema and/or infiltrates are identified, which are stable since the prior exam.   2.  Layering left pleural effusion, stable      DX-CHEST-PORTABLE (1 VIEW)   Final Result         1.  Pulmonary edema and/or infiltrates are identified, which are stable since the prior exam.   2.  Layering left pleural effusion, stable      DX-CHEST-PORTABLE (1 VIEW)   Final Result         1.  Pulmonary edema and/or infiltrates are identified, which are stable since the prior exam.   2.  Trace bilateral pleural effusions, stable      DX-CHEST-PORTABLE (1 VIEW)   Final Result         1.  Pulmonary edema and/or infiltrates are identified, which are stable since the prior exam.   2.  Small left pleural effusion, stable      DX-CHEST-PORTABLE (1 VIEW)   Final Result      1.  Support equipment is unchanged.      2.  Cardiomegaly with moderate perihilar pulmonary edema unchanged from previous exam.      3.  Small bilateral pleural effusions left greater than right.      DX-CHEST-PORTABLE (1 VIEW)   Final Result      1.  Tubes and lines unchanged  in position.      2.  Marked bilateral perihilar pulmonary edema which has improved slightly since previous exam.      3.  Small bilateral pleural effusions left greater than right.      IR-INSERT PLEURAL CATH W/ CUFF   Final Result      1.  Ultrasound and fluoroscopic guided placement of a RIGHT HEMITHORAX PleurX (Aspira type) tunneled pleural drainage catheter.   2.  The catheter can be used immediately with drainage of pleural effusion as needed for comfort, as per package insert instructions.   3.  If the catheter becomes dislodged, do not reinsert it.  Place sterile dressing over the entry wound site.  May consult primary care physician or oncologist as to whether there is clinical indication for PleurX replacement.      DX-CHEST-PORTABLE (1 VIEW)   Final Result      1.  Tubes and lines unchanged in position.      2.   Extensive bilateral perihilar airspace opacities consistent with pneumonitis and/or pulmonary edema.      3.  Mild to moderate bilateral pleural effusions right greater than left      DX-CHEST-PORTABLE (1 VIEW)   Final Result         1. Increasing moderate right and small left pleural effusions. No change edema or infiltrates.      2. ETT 5 cm above masoud. NG tube in stomach. Right central line.                     DX-CHEST-PORTABLE (1 VIEW)   Final Result      Stable diffuse bilateral pulmonary infiltrates.      DX-ABDOMEN FOR TUBE PLACEMENT   Final Result      NG tube tip overlies the gastric body.      DX-CHEST-PORTABLE (1 VIEW)   Final Result      1.  Supportive tubing as described above.   2.  Apparent increasing RIGHT pleural fluid, possibly due to change in position.   3.  Improvement of LEFT lung base consolidation.   4.  No other significant change from prior exam.         DX-CHEST-PORTABLE (1 VIEW)   Final Result         1. Stable right IJ central catheter. Right chest tube has been removed. No pneumothorax.   2. Unchanged bilateral pulmonary opacities and associated pleural effusions.          DX-CHEST-PORTABLE (1 VIEW)   Final Result      1.  Unchanged bilateral pulmonary opacities. Stable small bilateral pleural effusions.   2.  Right pigtail catheter remains. No pneumothorax.      DX-CHEST-PORTABLE (1 VIEW)   Final Result      Removal of large bore right chest tube with stable right pigtail chest tube. Questionable tiny right apical pneumothorax.      DX-CHEST-PORTABLE (1 VIEW)   Final Result      No significant interval change.      DX-CHEST-PORTABLE (1 VIEW)   Final Result      1.  Persistent bilateral pulmonary opacities, stable to slightly worse, although changes may be related to differences in imaging technique. No large pleural effusions.   2.  Well-positioned right IJ central catheter. No pneumothorax.   3.  Stable other lines and tubes.      DX-CHEST-LIMITED (1 VIEW)   Final Result         1.  No change in 2 separate right-sided chest tubes.      2.  No pneumothorax identified.      3.  Bilateral pulmonary opacifications again identified which are slightly less prominent.      DX-CHEST-PORTABLE (1 VIEW)   Final Result         1. No change mild infiltrates.      2. Right chest tubes. No pneumothorax.      3. ETT out. NG tube and jugular line remain.                     DX-CHEST-PORTABLE (1 VIEW)   Final Result         1. No significant interval change.      DX-CHEST-LIMITED (1 VIEW)   Final Result         1.  Findings on chest radiograph appear stable since the prior radiograph.  No new abnormalities are identified.      2.  2 separate right-sided chest tubes are again identified with no change in position.      3.  No pneumothorax identified.      DX-CHEST-LIMITED (1 VIEW)   Final Result         1.  No pneumothorax identified.      2.  2 right-sided chest tubes again identified.      CT-CTA CHEST PULMONARY ARTERY W/ RECONS   Final Result         1. No pulmonary embolus.      2. Medial right lung base consolidation felt to represent complete collapse of the right middle lobe. Mild to  moderate mid and upper lung pneumonia infiltrates remain.      3. Right chest tubes. Small right pneumothorax and small right effusion. Improving small 2.5 cm left pleural effusion since 12/25/2024.      4. Extensive metastatic lesions including adenopathy, liver lesions, right breast nodule and bone lesions. Thoracic spine fusion hardware remains.                  CT-HEAD W/O   Final Result         1. No acute process in the brain evident.      2. Fluid in sphenoid sinus.      3. Right convexity midthoracic scoliosis and unchanged from 12/31/2024 MRI.               DX-ABDOMEN FOR TUBE PLACEMENT   Final Result         1. NG tube in stomach.                     DX-CHEST-FOR LINE PLACEMENT Perform procedure in: Patient's Room   Final Result         1. Small right apical pneumothorax remains. 2 right chest tubes.      2. ETT 3 cm above masoud. NG tube in stomach. Right jugular line in SVC. No complication evident.      3. Mild infiltrates and small right effusion.                  DX-CHEST-LIMITED (1 VIEW)   Final Result         1. 2nd right chest tube placed with near complete evacuation of right pneumothorax.      2. Mild infiltrates, basilar atelectasis and small right effusion remain.      3. Endotracheal tube tip at about T5 and about 2 cm above the masoud. The masoud and tip of the endotracheal tube are difficult to visualize in part because of overlying thoracic spine fusion hardware.                  DX-CHEST-PORTABLE (1 VIEW)   Final Result         Enlarging right pneumothorax with collapsing of the right lung.      Preliminary findings texted to Dr. STANLEY FISHER in the Emergency Department via Voalte on 1/4/2025 1:50 AM         DX-CHEST-LIMITED (1 VIEW)   Final Result         Interval placement of right pigtail chest tube. Small right residual pneumothorax.      DX-CHEST-PORTABLE (1 VIEW)   Final Result   Addendum (preliminary) 1 of 1      CRITICAL RESULT READ BACK: Preliminary findings discussed with  and    critical read back performed by Dr. Downs via telephone on 1/3/2025 8:03    PM      Final      1.  Interval worsening in right pneumothorax, now moderate.   2.  Persistent interstitial and alveolar pulmonary opacities bilaterally. Possible bilateral pleural effusions, right greater than left.            DX-CHEST-PORTABLE (1 VIEW)   Final Result         1. Slightly decreased right pneumothorax.   2. Stable bilateral interstitial and airspace opacification.   3. Stable right pleural effusion.      DX-CHEST-PORTABLE (1 VIEW)   Final Result         1. Left pleural effusion has decreased in size status post thoracentesis. There is no left-sided pneumothorax.   2. There is persistent right basilar opacity with a new right apical pneumothorax.      Findings were communicated to the ordering provider at the time of dictation via Voalte.      US-THORACENTESIS PUNCTURE LEFT   Final Result      1. Ultrasound guided left sided therapeutic thoracentesis.      2. 950 mL of fluid withdrawn.      DX-CHEST-PORTABLE (1 VIEW)   Final Result      1. New postsurgical changes in the upper thoracic spine.   2. Worsening bilateral pleural effusions, pulmonary vascular congestion and interstitial edema.      DX-PORTABLE FLUORO > 1 HOUR   Final Result      Portable fluoroscopy utilized for 1 minute 19 seconds.      INTERPRETING LOCATION: [address] [place] [address], [id number]      DX-THORACIC SPINE-2 VIEWS   Final Result      Digitized intraoperative radiograph is submitted for review. This examination is not for diagnostic purpose but for guidance during a surgical procedure. Please see the patient's chart for full procedural details.         INTERPRETING LOCATION: [address] [facility], [id number]      MR-CERVICAL SPINE-WITH & W/O   Final Result         1.  No evidence of metastatic disease to the cervical spine.      MR-LUMBAR SPINE-WITH & W/O   Final Result   Impression:      Multiple bony metastatic lesions as described above, most prominent  along the inferior endplate of L1.      Degenerative moderate canal stenosis at L4-5 with cauda equina compression. Epidural thickening and enhancement is noted in this region with severe bilateral facet degeneration and enhancement along the bilateral facet joints. This is felt to be    degenerative in origin.      MR-THORACIC SPINE-WITH & W/O   Final Result         Multiple metastatic lesions of the thoracic spine as described above.      Pathologic compression fracture at T5 with 50% loss of height and posterior cortical retropulsion with circumferential epidural thickening representing epidural extension of metastatic disease. This causes severe canal stenosis with cord compression.      Metastatic disease also involves the posterior elements and spinous processes at T4, T5.      Pre and paravertebral metastatic soft tissue infiltration at T4-T5 noted.         MR-BRAIN-WITH & W/O   Final Result         Multiple nodular enhancing metastatic lesions involving the cranium as described above.      The largest lesion is in the right posterior temporal/parietal occipital region and demonstrates inner table cortical breakthrough with epidural extension and adjacent mild dural thickening and enhancement.      US-THORACENTESIS PUNCTURE RIGHT   Final Result   Addendum (preliminary) 1 of 1 12/27/2024 10:59 AM      HISTORY/REASON FOR EXAM:  Shortness of breath         TECHNIQUE/EXAM DESCRIPTION:   Ultrasound-guided thoracentesis.      Indication:  RIGHT pleural fluid collection.      COMPARISON:  None-however patient states history of right-sided    thoracentesis September 2024.      PROCEDURE:     Informed consent was obtained. A timeout was taken. A right    pleural effusion was localized with real-time ultrasound guidance. The    right posterior chest wall was prepped and draped in a sterile manner.    Following local anesthesia with 1%    lidocaine, and under live ultrasound guidance a 5 [place] Yueh pigtail     catheter was advanced into the pleural space with trocar technique and    pleural fluid was drained. The patient tolerated the procedure well    without evidence of complication. A post    thoracentesis chest radiograph is forthcoming.      FINDINGS:      Fluid was sent to the laboratory.      Fluid character: serosanguinous         1. Ultrasound guided right sided therapeutic and diagnostic thoracentesis.      2. 1000 mL of fluid withdrawn.      Final      1. Ultrasound guided right sided therapeutic and diagnostic thoracentesis.      2. 1000 mL of fluid withdrawn.      DX-CHEST-PORTABLE (1 VIEW)   Final Result      1.  Small persistent right pleural effusion which is decreased since recent thoracentesis.      2.  Small to moderate left pleural effusion.      3.  Bilateral perihilar and left lower lobe atelectasis and/or pneumonitis.      4.  Increased interstitial markings throughout the lung fields consistent with parenchymal scarring, and/or pulmonary edema.      CT-LSPINE W/O PLUS RECONS   Final Result         1. Diffuse metastatic disease.   2. No definite pathologic fracture.      CT-TSPINE W/O PLUS RECONS   Final Result         1. Extensive metastatic disease.   2. There is new pathologic compression fracture deformity of T5.      CT-CHEST,ABDOMEN,PELVIS WITH   Final Result         1. New bilateral upper lobe nodular infiltrates.   2. Moderate-sized bilateral pleural effusions.   3. Interval worsening of hepatic metastatic disease.   4. New abdominal lymphadenopathy. Thoracic adenopathy is again noted.   5. Diffuse osseous metastatic disease. There is a new compression fracture of T5 as reported on the prior spine CT.      DX-CHEST-PORTABLE (1 VIEW)   Final Result      1.  Stable bibasilar atelectasis/consolidation and small bilateral pleural effusions.      2.  Stable cardiomegaly and interstitial edema.           Assessment/Plan  * Malignant neoplasm of breast (HCC)- (present on admission)  Assessment &  "Plan  Diagnosed 2019, with significant metastatic disease   \"Bilateral breast cancer with extensive destruction of the left breast and ulceration and skin lesions making her inoperable on the left.  Right breast shows a 3 x 3 cm mass.  PET scan March 2023 showed non-hypermetabolic pleural effusions which are cytology positive and she has demonstrable pleural masses. Started aromatase inhibition early September 2023.  Has not yet started CDK 4 6 inhibitor.  Lost to follow-up for over 6 months although apparently was on letrozole as single agent.  Now with clear progression of disease in the left breast.  August 2024: Progression of disease in liver with extensive disease.  Breast is worse.  Lung is better.  Lymph nodes are worse.  Status post radiation to symptomatic oozing and exophytic left breast mass.  Marked worsening of her cancer on letrozole with progression in the bone and liver.\"--Dr. Benitez  Patient is now comfort care with highflow  Continue palliative care and hospice discussions    Anemia of chronic disease- (present on admission)  Assessment & Plan  No sign of gross bleeding    Hypokalemia- (present on admission)  Assessment & Plan  Discontinue monitoring  Comfort care    Advance care planning- (present on admission)  Assessment & Plan  Long conversation with daughter at bedside today.  Patient is actively dying.  She has agonal breathing.  She appears uncomfortable and tachypneic.  Daughter is very reluctant to discontinue high flow nasal cannula at this time.  She is inconsolable and frustrated that \"we are not doing more\".  We are honoring patient's wishes to pursue comfort over aggressive measures.  Patient has a terminal cancer diagnosis that she will not recover from.  I would recommend and I continue to encourage daughter to decrease to high flow nasal cannula.  We will continue to provide comfort medications based on patient's request.  Discussed with palliative care on multiple " occasions.  ACP time spent 26 minutes.    Acute on chronic respiratory failure with hypoxia and hypercapnia (HCC)- (present on admission)  Assessment & Plan  Patient was intubated on 1/4 and then extubated 1/5   Was reintubated on 1/8 and extubated on 1/15   Patient continues to require high flow nasal cannula   Most recent chest x-ray did show extensive bilateral perihilar airspace opacities as well as mild to moderate bilateral pleural effusions   Patient is now comfort care but wants to continue high flow due to shortness of breath  Patient would like to be on less oxygen, ideally would be able to get her home or GIP if we can get her off the high flow, patient is requesting Pleurx drain  I did discuss the case with interventional radiology who was agreeable to attempt to drain if there is adequate fluid  Unfortunately, unable to ascertain if there is adequate fluid on x-ray due to significant right sided whiteout    Restless leg syndrome- (present on admission)  Assessment & Plan  Continue Requip    Pathological fracture of vertebra- (present on admission)  Assessment & Plan  Pathologic fracture of T5 with myelopathy on presentation  S/P T4-6 laminectomy with fusion and spinal decompression on 1/1/2025 by Dr. Lowery  The patient with L4-5 lesion with cauda equina on imaging  Multimodal pain management    Cancer related pain- (present on admission)  Assessment & Plan  Palliative care following  Continue multimodal pain management    Bilateral pleural effusion- (present on admission)  Assessment & Plan  Malignant pleural effusions from metastatic breast cancer s/p thoracenteses and chest tubes  PleurX placement in IR 1/11 on the right and was removed  Continues to require high flow nasal cannula  Repeat chest x-ray    1/31: Patient is actively dying.  Daughter continues to request high flow nasal cannula.  Oxygen saturation 75 to 80% despite 60 L/min.  Continue to encourage gentle weaning of high flow nasal  cannula for patient comfort.  See ACP for more information    Primary hypertension- (present on admission)  Assessment & Plan  Home Coreg and losartan are currently being held  Restart as needed    Diabetes mellitus, type 2 (HCC)- (present on admission)  Assessment & Plan  With mild hyperglycemia  No need for coverage at this time        VTE prophylaxis:   SCDs/TEDs   pharmacologic prophylaxis contraindicated due to comfort care      I have performed a physical exam and reviewed and updated ROS and Plan today (1/31/2025). In review of yesterday's note (1/30/2025), there are no changes except as documented above.

## 2025-01-31 NOTE — CARE PLAN
The patient is Unstable - High likelihood or risk of patient condition declining or worsening    Shift Goals  Clinical Goals: Patient will be able to rest comfortably.  Patient Goals: Patient will have improvement in her pain,  Family Goals: Sleeping. Unable to assess.    Progress made toward(s) clinical / shift goals:  Patient became obtunded midday. Morphine given as permitted per family with positive results when administered. Patient with periodic breathing and intermittent guppy breaths.     Patient is not progressing towards the following goals:      Problem: Hemodynamics  Goal: Patient's hemodynamics, fluid balance and neurologic status will be stable or improve  Outcome: Not Progressing     Problem: Respiratory  Goal: Patient will achieve/maintain optimum respiratory ventilation and gas exchange  Outcome: Not Progressing    Continues on high flow oxygen despite oxygen saturations maintaining in the low 50-60's.  in use per family request.

## 2025-01-31 NOTE — FLOWSHEET NOTE
01/31/25 1500   Events/Summary/Plan   Events/Summary/Plan [facility] 10L/100%, daughter declined removal. Pt is obtunded and guppy breathing. End of life presentation.

## 2025-02-01 PROBLEM — C79.31 BREAST CANCER METASTASIZED TO BRAIN (HCC): Status: ACTIVE | Noted: 2023-12-22

## 2025-02-01 NOTE — PROGRESS NOTES
Patient passed at 1822. Pronounced by this RN and RIYA Kelly. Son, Kyrie, and daughter, Jane, at the bedside and notified. Dr. Goldberg notified by [employer] at 1826. Shahnaz SAINZ, notified at 1836.  notified at 1838. Not a 's case.

## 2025-02-01 NOTE — DISCHARGE SUMMARY
Death Summary    Cause of Death  Acute hypoxic respiratory failure due to bilateral malignant pleural effusions due to metastatic breast cancer.    Comorbid Conditions at the Time of Death  Principal Problem:    Malignant neoplasm of breast (HCC) (POA: Yes)      Active Problems:    Diabetes mellitus, type 2 (HCC) (POA: Yes)       Primary hypertension (POA: Yes)        Moderate obstructive sleep apnea-hypopnea syndrome (POA: Yes)      Breast cancer metastasized to brain (HCC) (POA: Yes)    Bilateral pleural effusion (POA: Yes)    Metastasis to liver (HCC) (POA: Yes)    Metastasis to bone (HCC) (POA: Yes)    Metastasis to lung (HCC) (POA: Yes)    Cancer related pain (POA: Yes)    Pathological fracture of vertebra (POA: Yes)    Restless leg syndrome (POA: Yes)    Acute on chronic respiratory failure with hypoxia and hypercapnia (HCC) (POA: Yes)    Advance care planning (POA: Yes)    Hypokalemia (POA: Yes)    Anemia of chronic disease (POA: Yes)    Severe protein-calorie malnutrition (HCC) (POA: Unknown)  Resolved Problems:    Pneumothorax (POA: Unknown)    Sepsis (HCC) (POA: Unknown)    Hyponatremia (POA: Unknown)      History of Presenting Illness and Hospital Course  Asha Hannon is a 64 y.o. female with PMHx HTN, DMT2, restless leg syndrome, chronic anemia, stage IV breast cancer with metastatic disease to lungs and spine..  Admitted 12/25 for weakness and shortness of breath.    Patient was previously on oral chemotherapy.  She presented with worsening shortness of breath and back pain. CT chest abdomen showed moderate-sized pleural effusions, worsening hepatic metastatic disease with new abdominal lymphadenopathy, thoracic adenopathy, diffuse osseous metastatic disease with a T5 pathologic fracture    Patient underwent a right sided thoracentesis 12/27.  Confirmed diagnosis of malignant pleural effusion.  MRI showed multiple thoracic and lumbar metastasized lesions.  Patient underwent T4-T6 laminectomy for  spinal cord compression on 1/1.  She subsequently developed a right-sided pneumothorax and a chest tube was placed.  This was shortly followed by BiPAP and then intubation on 1/4.  Patient remained intubated until 1/15.  During this time she required frequent thoracentesis.    Many goals of care conversations occurred between patient and her medical staff here at the hospital.  Ultimately patient was unable to come off high flow nasal cannula.  Palliative care met with patient's family and patient at bedside on multiple occasions.  Patient elected to transition to DNR/comfort care on 1/28. Patient passed peacefully away with family at bedside.     Death Date: 01/31/25   Death Time: 1822      Pronounced By (RN1): Tamara Ortiz Rn  Pronounced By (RN2): Leticia KHANNA RN

## 2025-02-01 NOTE — PROGRESS NOTES
Patient obtunded and breathing more calm. No gasping noted at this time. Family declined intervention. Frequent assessment in place.

## 2025-02-04 LAB
FUNGUS SPEC CULT: NORMAL
FUNGUS SPEC FUNGUS STN: NORMAL
SIGNIFICANT IND 70042: NORMAL
SITE SITE: NORMAL
SOURCE SOURCE: NORMAL

## 2025-02-08 LAB
MYCOBACTERIUM SPEC CULT: NORMAL
RHODAMINE-AURAMINE STN SPEC: NORMAL
SIGNIFICANT IND 70042: NORMAL
SITE SITE: NORMAL
SOURCE SOURCE: NORMAL

## 2025-03-19 ENCOUNTER — RESULTS FOLLOW-UP (OUTPATIENT)
Dept: INTENSIVE CARE | Facility: MEDICAL CENTER | Age: 65
End: 2025-03-19

## 2025-06-21 NOTE — CARE PLAN
Problem: Hyperinflation  Goal: Prevent or improve atelectasis  Description: 1. Instruct incentive spirometry usage  2.  Perform hyperinflation therapy as indicated  Outcome: Not Progressing   PEP QID     General

## (undated) DEVICE — SUTURE 0 VICRYL PLUS CT-1 - 8 X 18 INCH (12/BX)

## (undated) DEVICE — SYRINGE NON SAFETY 3 CC 21 GA X 1 1/2 IN (100/BX 8BX/CA)

## (undated) DEVICE — SHEET TRANSVERSE LAP - (12EA/CA)

## (undated) DEVICE — SUTURE 2-0 VICRYL CP-2 (12EA/BX)

## (undated) DEVICE — TUBING C&T SET FLYING LEADS DRAIN TUBING (10EA/BX)

## (undated) DEVICE — COVER LIGHT HANDLE ALC PLUS DISP (18EA/BX)

## (undated) DEVICE — DRESSING ABDOMINAL PAD STERILE 8 X 10" (360EA/CA)"

## (undated) DEVICE — COVER MAYO STAND X-LG - (22EA/CA)

## (undated) DEVICE — NEEDLE SPINAL NON-SAFETY 18 GA X 3 IN (25EA/BX)

## (undated) DEVICE — DRESSING NON-ADHERING 8 X 3 - (50/BX)

## (undated) DEVICE — TRAY SURESTEP FOLEY TEMP SENSING 16FR SNAP SECURE(10EA/CA) ORDER #18764 FOR TEMP FOLEY ONLY

## (undated) DEVICE — SPONGE GAUZESTER 4 X 4 4PLY - (128PK/CA)

## (undated) DEVICE — TUBING CLEARLINK DUO-VENT - C-FLO (48EA/CA)

## (undated) DEVICE — BANDAGE ROLL STERILE BULKEE 4.5 IN X 4 YD (100EA/CA)

## (undated) DEVICE — ELECTRODE DUAL RETURN W/ CORD - (50/PK)

## (undated) DEVICE — LACTATED RINGERS INJ 1000 ML - (14EA/CA 60CA/PF)

## (undated) DEVICE — CLIP LG INTNL HRZN TI ESCP LGT - (20/BX)

## (undated) DEVICE — TOOL MR8 9CM MATCH HEAD 3MM DIAMETER (1/EA)

## (undated) DEVICE — GOWN WARMING STANDARD FLEX - (30/CA)

## (undated) DEVICE — BLADE SURGICAL CLIPPER - (50EA/CA)

## (undated) DEVICE — PACK NEURO - (2EA/CA)

## (undated) DEVICE — SPONGE PEANUT - (5/PK 50PK/CA)

## (undated) DEVICE — BOVIE BLADE COATED - (50/PK)

## (undated) DEVICE — INTRAOP NEURO IN OR 1:1 PER 15 MIN

## (undated) DEVICE — BOVIE BLADE COATED &INSULATED - 25/PK

## (undated) DEVICE — SET LEADWIRE 5 LEAD BEDSIDE DISPOSABLE ECG (1SET OF 5/EA)

## (undated) DEVICE — SUCTION INSTRUMENT YANKAUER BULBOUS TIP W/O VENT (50EA/CA)

## (undated) DEVICE — KIT EVACUATER 3 SPRING PVC LF 1/8 DRAIN SIZE (10EA/CA)"

## (undated) DEVICE — CLIP INTERNAL LIGATE TITANIUM MEDIUM WECK HORIZON (6EA/PK 30PK/BX)

## (undated) DEVICE — ARMREST CRADLE FOAM - (2PR/PK 12PR/CA)

## (undated) DEVICE — SUTURE GENERAL

## (undated) DEVICE — DRAPE STRLE REG TOWEL 18X24 - (10/BX 4BX/CA)"

## (undated) DEVICE — SET EXTENSION WITH 2 PORTS (48EA/CA) ***PART #2C8610 IS A SUBSTITUTE*****

## (undated) DEVICE — KIT SURGIFLO W/OUT THROMBIN - (6EA/BX)

## (undated) DEVICE — SODIUM CHL IRRIGATION 0.9% 1000ML (12EA/CA)

## (undated) DEVICE — DRAPE 36X28IN RAD CARM BND BG - (25/CA) O

## (undated) DEVICE — CLIP MED LG INTNL HRZN TI ESCP - (20/BX)

## (undated) DEVICE — CANISTER SUCTION 3000ML MECHANICAL FILTER AUTO SHUTOFF MEDI-VAC NONSTERILE LF DISP (40EA/CA)

## (undated) DEVICE — HEADREST PRONEVIEW LARGE - (10/CA)

## (undated) DEVICE — SENSOR OXIMETER ADULT SPO2 RD SET (20EA/BX)

## (undated) DEVICE — CHLORAPREP 26 ML APPLICATOR - ORANGE TINT(25/CA)

## (undated) DEVICE — DRAPE LAPAROTOMY T SHEET - (12EA/CA)

## (undated) DEVICE — CORDS BIPOLAR COAGULATION - 12FT STERILE DISP. (10EA/BX)

## (undated) DEVICE — SYRINGE NON SAFETY 10 CC 21 GA X 1-1/2 IN (100/BX 4BX/CA)

## (undated) DEVICE — DRAPE MICROSCOPE ARMATEC 120IN X 46IN (10EA/CA)

## (undated) DEVICE — SLEEVE VASO DVT COMPRESSION CALF MED - (10PR/CA)

## (undated) DEVICE — LACTATED RINGERS INJ. 500 ML - (24EA/CA)

## (undated) DEVICE — DRAPE C ARMOR (12EA/CA)

## (undated) DEVICE — CLIP SM INTNL HRZN TI ESCP LGT - (24EA/PK 25PK/BX)

## (undated) DEVICE — Device

## (undated) DEVICE — PACK JACKSON TABLE KIT W/OUT - HR (6EA/CA)